# Patient Record
Sex: FEMALE | Race: WHITE | NOT HISPANIC OR LATINO | Employment: OTHER | URBAN - METROPOLITAN AREA
[De-identification: names, ages, dates, MRNs, and addresses within clinical notes are randomized per-mention and may not be internally consistent; named-entity substitution may affect disease eponyms.]

---

## 2017-07-12 ENCOUNTER — APPOINTMENT (OUTPATIENT)
Dept: RADIOLOGY | Facility: MEDICAL CENTER | Age: 77
DRG: 287 | End: 2017-07-12
Attending: EMERGENCY MEDICINE
Payer: MEDICARE

## 2017-07-12 ENCOUNTER — HOSPITAL ENCOUNTER (EMERGENCY)
Facility: MEDICAL CENTER | Age: 77
DRG: 287 | End: 2017-07-12
Attending: EMERGENCY MEDICINE
Payer: MEDICARE

## 2017-07-12 VITALS
OXYGEN SATURATION: 95 % | RESPIRATION RATE: 28 BRPM | WEIGHT: 206 LBS | HEIGHT: 63 IN | SYSTOLIC BLOOD PRESSURE: 147 MMHG | HEART RATE: 99 BPM | BODY MASS INDEX: 36.5 KG/M2 | DIASTOLIC BLOOD PRESSURE: 83 MMHG

## 2017-07-12 DIAGNOSIS — I48.91 ATRIAL FIBRILLATION WITH RAPID VENTRICULAR RESPONSE (HCC): ICD-10-CM

## 2017-07-12 DIAGNOSIS — E05.90 HYPERTHYROIDISM: ICD-10-CM

## 2017-07-12 LAB
ALBUMIN SERPL BCP-MCNC: 3.6 G/DL (ref 3.2–4.9)
ALBUMIN/GLOB SERPL: 1.2 G/DL
ALP SERPL-CCNC: 100 U/L (ref 30–99)
ALT SERPL-CCNC: 14 U/L (ref 2–50)
ANION GAP SERPL CALC-SCNC: 9 MMOL/L (ref 0–11.9)
APTT PPP: 46 SEC (ref 24.7–36)
AST SERPL-CCNC: 20 U/L (ref 12–45)
BASOPHILS # BLD AUTO: 0.8 % (ref 0–1.8)
BASOPHILS # BLD: 0.05 K/UL (ref 0–0.12)
BILIRUB SERPL-MCNC: 1.5 MG/DL (ref 0.1–1.5)
BNP SERPL-MCNC: 437 PG/ML (ref 0–100)
BUN SERPL-MCNC: 14 MG/DL (ref 8–22)
CALCIUM SERPL-MCNC: 9.5 MG/DL (ref 8.5–10.5)
CHLORIDE SERPL-SCNC: 111 MMOL/L (ref 96–112)
CO2 SERPL-SCNC: 21 MMOL/L (ref 20–33)
CREAT SERPL-MCNC: 0.72 MG/DL (ref 0.5–1.4)
EKG IMPRESSION: NORMAL
EOSINOPHIL # BLD AUTO: 0.17 K/UL (ref 0–0.51)
EOSINOPHIL NFR BLD: 2.6 % (ref 0–6.9)
ERYTHROCYTE [DISTWIDTH] IN BLOOD BY AUTOMATED COUNT: 50.1 FL (ref 35.9–50)
GFR SERPL CREATININE-BSD FRML MDRD: >60 ML/MIN/1.73 M 2
GLOBULIN SER CALC-MCNC: 3.1 G/DL (ref 1.9–3.5)
GLUCOSE SERPL-MCNC: 141 MG/DL (ref 65–99)
HCT VFR BLD AUTO: 41.5 % (ref 37–47)
HGB BLD-MCNC: 12.6 G/DL (ref 12–16)
IMM GRANULOCYTES # BLD AUTO: 0.01 K/UL (ref 0–0.11)
IMM GRANULOCYTES NFR BLD AUTO: 0.2 % (ref 0–0.9)
INR PPP: 2.45 (ref 0.87–1.13)
LIPASE SERPL-CCNC: 14 U/L (ref 11–82)
LYMPHOCYTES # BLD AUTO: 1.16 K/UL (ref 1–4.8)
LYMPHOCYTES NFR BLD: 17.6 % (ref 22–41)
MCH RBC QN AUTO: 23.7 PG (ref 27–33)
MCHC RBC AUTO-ENTMCNC: 30.4 G/DL (ref 33.6–35)
MCV RBC AUTO: 78.2 FL (ref 81.4–97.8)
MONOCYTES # BLD AUTO: 0.49 K/UL (ref 0–0.85)
MONOCYTES NFR BLD AUTO: 7.4 % (ref 0–13.4)
NEUTROPHILS # BLD AUTO: 4.72 K/UL (ref 2–7.15)
NEUTROPHILS NFR BLD: 71.4 % (ref 44–72)
NRBC # BLD AUTO: 0 K/UL
NRBC BLD AUTO-RTO: 0 /100 WBC
PLATELET # BLD AUTO: 283 K/UL (ref 164–446)
PMV BLD AUTO: 11.2 FL (ref 9–12.9)
POTASSIUM SERPL-SCNC: 3.7 MMOL/L (ref 3.6–5.5)
PROT SERPL-MCNC: 6.7 G/DL (ref 6–8.2)
PROTHROMBIN TIME: 27.3 SEC (ref 12–14.6)
RBC # BLD AUTO: 5.31 M/UL (ref 4.2–5.4)
SODIUM SERPL-SCNC: 141 MMOL/L (ref 135–145)
T4 FREE SERPL-MCNC: 2.89 NG/DL (ref 0.53–1.43)
TROPONIN I SERPL-MCNC: <0.01 NG/ML (ref 0–0.04)
TSH SERPL DL<=0.005 MIU/L-ACNC: <0.015 UIU/ML (ref 0.3–3.7)
WBC # BLD AUTO: 6.6 K/UL (ref 4.8–10.8)

## 2017-07-12 RX ORDER — SODIUM CHLORIDE 9 MG/ML
1000 INJECTION, SOLUTION INTRAVENOUS ONCE
Status: COMPLETED | OUTPATIENT
Start: 2017-07-12 | End: 2017-07-12

## 2017-07-12 RX ORDER — DILTIAZEM HYDROCHLORIDE 5 MG/ML
10 INJECTION INTRAVENOUS ONCE
Status: COMPLETED | OUTPATIENT
Start: 2017-07-12 | End: 2017-07-12

## 2017-07-12 RX ADMIN — DILTIAZEM HYDROCHLORIDE 10 MG: 5 INJECTION INTRAVENOUS at 08:32

## 2017-07-12 RX ADMIN — SODIUM CHLORIDE 1000 ML: 9 INJECTION, SOLUTION INTRAVENOUS at 08:32

## 2017-07-12 ASSESSMENT — ENCOUNTER SYMPTOMS
DIARRHEA: 0
ABDOMINAL PAIN: 0
PALPITATIONS: 1
BACK PAIN: 0
FEVER: 0
DIZZINESS: 0
NERVOUS/ANXIOUS: 1
HEADACHES: 0
SHORTNESS OF BREATH: 1

## 2017-07-12 NOTE — DISCHARGE INSTRUCTIONS
Atrial Fibrillation  Atrial fibrillation is a type of irregular heart rhythm (arrhythmia). During atrial fibrillation, the upper chambers of the heart (atria) quiver continuously in a chaotic pattern. This causes an irregular and often rapid heart rate.   Atrial fibrillation is the result of the heart becoming overloaded with disorganized signals that tell it to beat. These signals are normally released one at a time by a part of the right atrium called the sinoatrial node. They then travel from the atria to the lower chambers of the heart (ventricles), causing the atria and ventricles to contract and pump blood as they pass. In atrial fibrillation, parts of the atria outside of the sinoatrial node also release these signals. This results in two problems. First, the atria receive so many signals that they do not have time to fully contract. Second, the ventricles, which can only receive one signal at a time, beat irregularly and out of rhythm with the atria.   There are three types of atrial fibrillation:   · Paroxysmal. Paroxysmal atrial fibrillation starts suddenly and stops on its own within a week.  · Persistent. Persistent atrial fibrillation lasts for more than a week. It may stop on its own or with treatment.  · Permanent. Permanent atrial fibrillation does not go away. Episodes of atrial fibrillation may lead to permanent atrial fibrillation.  Atrial fibrillation can prevent your heart from pumping blood normally. It increases your risk of stroke and can lead to heart failure.   CAUSES   · Heart conditions, including a heart attack, heart failure, coronary artery disease, and heart valve conditions.    · Inflammation of the sac that surrounds the heart (pericarditis).  · Blockage of an artery in the lungs (pulmonary embolism).  · Pneumonia or other infections.  · Chronic lung disease.  · Thyroid problems, especially if the thyroid is overactive (hyperthyroidism).  · Caffeine, excessive alcohol use, and use  of some illegal drugs.    · Use of some medicines, including certain decongestants and diet pills.  · Heart surgery.    · Birth defects.    Sometimes, no cause can be found. When this happens, the atrial fibrillation is called lone atrial fibrillation. The risk of complications from atrial fibrillation increases if you have lone atrial fibrillation and you are age 60 years or older.  RISK FACTORS  · Heart failure.  · Coronary artery disease.  · Diabetes mellitus.    · High blood pressure (hypertension).    · Obesity.    · Other arrhythmias.    · Increased age.  SIGNS AND SYMPTOMS   · A feeling that your heart is beating rapidly or irregularly.    · A feeling of discomfort or pain in your chest.    · Shortness of breath.    · Sudden light-headedness or weakness.    · Getting tired easily when exercising.    · Urinating more often than normal (mainly when atrial fibrillation first begins).    In paroxysmal atrial fibrillation, symptoms may start and suddenly stop.  DIAGNOSIS   Your health care provider may be able to detect atrial fibrillation when taking your pulse. Your health care provider may have you take a test called an ambulatory electrocardiogram (ECG). An ECG records your heartbeat patterns over a 24-hour period. You may also have other tests, such as:  · Transthoracic echocardiogram (TTE). During echocardiography, sound waves are used to evaluate how blood flows through your heart.  · Transesophageal echocardiogram (GOMEZ).  · Stress test. There is more than one type of stress test. If a stress test is needed, ask your health care provider about which type is best for you.  · Chest X-ray exam.  · Blood tests.  · Computed tomography (CT).  TREATMENT   Treatment may include:  · Treating any underlying conditions. For example, if you have an overactive thyroid, treating the condition may correct atrial fibrillation.  · Taking medicine. Medicines may be given to control a rapid heart rate or to prevent blood  clots, heart failure, or a stroke.  · Having a procedure to correct the rhythm of the heart:  · Electrical cardioversion. During electrical cardioversion, a controlled, low-energy shock is delivered to the heart through your skin. If you have chest pain, very low blood pressure, or sudden heart failure, this procedure may need to be done as an emergency.  · Catheter ablation. During this procedure, heart tissues that send the signals that cause atrial fibrillation are destroyed.  · Surgical ablation. During this surgery, thin lines of heart tissue that carry the abnormal signals are destroyed. This procedure can either be an open-heart surgery or a minimally invasive surgery. With the minimally invasive surgery, small cuts are made to access the heart instead of a large opening.  · Pulmonary venous isolation. During this surgery, tissue around the veins that carry blood from the lungs (pulmonary veins) is destroyed. This tissue is thought to carry the abnormal signals.  HOME CARE INSTRUCTIONS   · Take medicines only as directed by your health care provider. Some medicines can make atrial fibrillation worse or recur.  · If blood thinners were prescribed by your health care provider, take them exactly as directed. Too much blood-thinning medicine can cause bleeding. If you take too little, you will not have the needed protection against stroke and other problems.  · Perform blood tests at home if directed by your health care provider. Perform blood tests exactly as directed.  · Quit smoking if you smoke.  · Do not drink alcohol.  · Do not drink caffeinated beverages such as coffee, soda, and some teas. You may drink decaffeinated coffee, soda, or tea.    · Maintain a healthy weight. Do not use diet pills unless your health care provider approves. They may make heart problems worse.    · Follow diet instructions as directed by your health care provider.  · Exercise regularly as directed by your health care  provider.  · Keep all follow-up visits as directed by your health care provider. This is important.  PREVENTION   The following substances can cause atrial fibrillation to recur:   · Caffeinated beverages.  · Alcohol.  · Certain medicines, especially those used for breathing problems.  · Certain herbs and herbal medicines, such as those containing ephedra or ginseng.  · Illegal drugs, such as cocaine and amphetamines.  Sometimes medicines are given to prevent atrial fibrillation from recurring. Proper treatment of any underlying condition is also important in helping prevent recurrence.   SEEK MEDICAL CARE IF:  · You notice a change in the rate, rhythm, or strength of your heartbeat.  · You suddenly begin urinating more frequently.  · You tire more easily when exerting yourself or exercising.  SEEK IMMEDIATE MEDICAL CARE IF:   · You have chest pain, abdominal pain, sweating, or weakness.  · You feel nauseous.  · You have shortness of breath.  · You suddenly have swollen feet and ankles.  · You feel dizzy.  · Your face or limbs feel numb or weak.  · You have a change in your vision or speech.  MAKE SURE YOU:   · Understand these instructions.  · Will watch your condition.  · Will get help right away if you are not doing well or get worse.     This information is not intended to replace advice given to you by your health care provider. Make sure you discuss any questions you have with your health care provider.     Document Released: 12/18/2006 Document Revised: 01/08/2016 Document Reviewed: 04/13/2016  [employer] Interactive Patient Education ©2016 [employer]    Hyperthyroidism  Hyperthyroidism is when the thyroid is too active (overactive). Your thyroid is a large gland that is located in your neck. The thyroid helps to control how your body uses food (metabolism). When your thyroid is overactive, it produces too much of a hormone called thyroxine.   CAUSES  Causes of hyperthyroidism may include:  · Graves disease.  This is when your immune system attacks the thyroid gland. This is the most common cause.  · Inflammation of the thyroid gland.  · Tumor in the thyroid gland or somewhere else.  · Excessive use of thyroid medicines, including:  ¨ Prescription thyroid supplement.  ¨ Herbal supplements that mimic thyroid hormones.  · Solid or fluid-filled lumps within your thyroid gland (thyroid nodules).  · Excessive ingestion of iodine.  RISK FACTORS  · Being female.  · Having a family history of thyroid conditions.  SIGNS AND SYMPTOMS  Signs and symptoms of hyperthyroidism may include:  · Nervousness.  · Inability to tolerate heat.  · Unexplained weight loss.  · Diarrhea.  · Change in the texture of hair or skin.  · Heart skipping beats or making extra beats.  · Rapid heart rate.  · Loss of menstruation.  · Shaky hands.  · Fatigue.  · Restlessness.  · Increased appetite.  · Sleep problems.  · Enlarged thyroid gland or nodules.  DIAGNOSIS   Diagnosis of hyperthyroidism may include:  · Medical history and physical exam.  · Blood tests.  · Ultrasound tests.  TREATMENT  Treatment may include:  · Medicines to control your thyroid.  · Surgery to remove your thyroid.  · Radiation therapy.  HOME CARE INSTRUCTIONS   · Take medicines only as directed by your health care provider.  · Do not use any tobacco products, including cigarettes, chewing tobacco, or electronic cigarettes. If you need help quitting, ask your health care provider.  · Do not exercise or do physical activity until your health care provider approves.  · Keep all follow-up appointments as directed by your health care provider. This is important.  SEEK MEDICAL CARE IF:  · Your symptoms do not get better with treatment.  · You have fever.  · You are taking thyroid replacement medicine and you:  ¨ Have depression.  ¨ Feel mentally and physically slow.  ¨ Have weight gain.  SEEK IMMEDIATE MEDICAL CARE IF:   · You have decreased alertness or a change in your awareness.  · You  have abdominal pain.  · You feel dizzy.  · You have a rapid heartbeat.  · You have an irregular heartbeat.     This information is not intended to replace advice given to you by your health care provider. Make sure you discuss any questions you have with your health care provider.     Document Released: 12/18/2006 Document Revised: 01/08/2016 Document Reviewed: 05/05/2015  [employer] Interactive Patient Education ©2016 [employer]

## 2017-07-12 NOTE — ED AVS SNAPSHOT
Home Care Instructions                                                                                                                Aleshia Crooks   MRN: [id number]    Department:  [facility], Emergency Dept   Date of Visit:  7/12/2017            [facility], Emergency Dept    [address]    [place] [address]    Phone:  [phone]      You were seen by     Aby Fair D.O.      Your Diagnosis Was     Atrial fibrillation with rapid ventricular response (CMS-HCC)     I48.91       These are the medications you received during your hospitalization from 07/12/2017 0718 to 07/12/2017 1027     Date/Time Order Dose Route Action    07/12/2017 0832 NS infusion 1,000 mL 1,000 mL Intravenous New Bag    07/12/2017 0832 diltiazem (CARDIZEM) injection 10 mg 10 mg Intravenous Given      Follow-up Information     1. Follow up with Your Physician.    Specialty:  Emergency Medicine    Why:  immediately upon your return home    Contact information    Varies        Medication Information     Review all of your home medications and newly ordered medications with your primary doctor and/or pharmacist as soon as possible. Follow medication instructions as directed by your doctor and/or pharmacist.     Please keep your complete medication list with you and share with your physician. Update the information when medications are discontinued, doses are changed, or new medications (including over-the-counter products) are added; and carry medication information at all times in the event of emergency situations.               Medication List      Notice     You have not been prescribed any medications.            Procedures and tests performed during your visit     Procedure/Test Number of Times Performed    APTT 1    Btype Natriuretic Peptide 1    CBC with Differential 1    Cardiac Monitoring 1    Complete Metabolic Panel (CMP) 1    DX-CHEST-LIMITED (1 VIEW) 1    EKG (ER) 2    ESTIMATED GFR 1    Free Thyroxine (add to TSH for patients with existing thyroid dysfunction) 1    Lipase 1    Maintain O2 sats greater than 94% 1    Oxygen Therapy per Protocol 1    Prothrombin Time 1    Saline Lock 1    TSH (for screening thyroid dysfunction) 1    Troponin 1        Discharge Instructions       Atrial Fibrillation  Atrial fibrillation is a type of irregular heart rhythm (arrhythmia). During atrial fibrillation, the upper chambers of the heart (atria) quiver continuously in a chaotic pattern. This causes an irregular and often rapid heart rate.   Atrial fibrillation is the result of the heart becoming overloaded with disorganized signals that tell it to beat. These signals are normally released one at a time by a part of the right atrium called the sinoatrial node. They then travel from the atria to the lower chambers of the heart (ventricles), causing the atria and ventricles to contract and pump blood as they pass. In atrial fibrillation, parts of the atria outside of the sinoatrial node also release these signals. This results in two problems. First, the atria receive so many signals that they do not have time to fully contract. Second, the ventricles, which can only receive one signal at a time, beat irregularly and out of rhythm with the atria.   There are three types of atrial fibrillation:   · Paroxysmal. Paroxysmal atrial fibrillation starts suddenly and stops on its own within a week.  · Persistent. Persistent atrial fibrillation lasts for more than a week. It may stop on its own or with treatment.  · Permanent. Permanent atrial fibrillation does not go away. Episodes of atrial fibrillation may lead to permanent atrial fibrillation.  Atrial fibrillation can prevent your heart from pumping blood normally. It increases your risk of stroke and can lead to heart failure.   CAUSES   · Heart conditions, including a heart attack, heart failure, coronary artery disease, and heart valve conditions.    · Inflammation  of the sac that surrounds the heart (pericarditis).  · Blockage of an artery in the lungs (pulmonary embolism).  · Pneumonia or other infections.  · Chronic lung disease.  · Thyroid problems, especially if the thyroid is overactive (hyperthyroidism).  · Caffeine, excessive alcohol use, and use of some illegal drugs.    · Use of some medicines, including certain decongestants and diet pills.  · Heart surgery.    · Birth defects.    Sometimes, no cause can be found. When this happens, the atrial fibrillation is called lone atrial fibrillation. The risk of complications from atrial fibrillation increases if you have lone atrial fibrillation and you are age 60 years or older.  RISK FACTORS  · Heart failure.  · Coronary artery disease.  · Diabetes mellitus.    · High blood pressure (hypertension).    · Obesity.    · Other arrhythmias.    · Increased age.  SIGNS AND SYMPTOMS   · A feeling that your heart is beating rapidly or irregularly.    · A feeling of discomfort or pain in your chest.    · Shortness of breath.    · Sudden light-headedness or weakness.    · Getting tired easily when exercising.    · Urinating more often than normal (mainly when atrial fibrillation first begins).    In paroxysmal atrial fibrillation, symptoms may start and suddenly stop.  DIAGNOSIS   Your health care provider may be able to detect atrial fibrillation when taking your pulse. Your health care provider may have you take a test called an ambulatory electrocardiogram (ECG). An ECG records your heartbeat patterns over a 24-hour period. You may also have other tests, such as:  · Transthoracic echocardiogram (TTE). During echocardiography, sound waves are used to evaluate how blood flows through your heart.  · Transesophageal echocardiogram (GOMEZ).  · Stress test. There is more than one type of stress test. If a stress test is needed, ask your health care provider about which type is best for you.  · Chest X-ray exam.  · Blood  tests.  · Computed tomography (CT).  TREATMENT   Treatment may include:  · Treating any underlying conditions. For example, if you have an overactive thyroid, treating the condition may correct atrial fibrillation.  · Taking medicine. Medicines may be given to control a rapid heart rate or to prevent blood clots, heart failure, or a stroke.  · Having a procedure to correct the rhythm of the heart:  · Electrical cardioversion. During electrical cardioversion, a controlled, low-energy shock is delivered to the heart through your skin. If you have chest pain, very low blood pressure, or sudden heart failure, this procedure may need to be done as an emergency.  · Catheter ablation. During this procedure, heart tissues that send the signals that cause atrial fibrillation are destroyed.  · Surgical ablation. During this surgery, thin lines of heart tissue that carry the abnormal signals are destroyed. This procedure can either be an open-heart surgery or a minimally invasive surgery. With the minimally invasive surgery, small cuts are made to access the heart instead of a large opening.  · Pulmonary venous isolation. During this surgery, tissue around the veins that carry blood from the lungs (pulmonary veins) is destroyed. This tissue is thought to carry the abnormal signals.  HOME CARE INSTRUCTIONS   · Take medicines only as directed by your health care provider. Some medicines can make atrial fibrillation worse or recur.  · If blood thinners were prescribed by your health care provider, take them exactly as directed. Too much blood-thinning medicine can cause bleeding. If you take too little, you will not have the needed protection against stroke and other problems.  · Perform blood tests at home if directed by your health care provider. Perform blood tests exactly as directed.  · Quit smoking if you smoke.  · Do not drink alcohol.  · Do not drink caffeinated beverages such as coffee, soda, and some teas. You may drink  decaffeinated coffee, soda, or tea.    · Maintain a healthy weight. Do not use diet pills unless your health care provider approves. They may make heart problems worse.    · Follow diet instructions as directed by your health care provider.  · Exercise regularly as directed by your health care provider.  · Keep all follow-up visits as directed by your health care provider. This is important.  PREVENTION   The following substances can cause atrial fibrillation to recur:   · Caffeinated beverages.  · Alcohol.  · Certain medicines, especially those used for breathing problems.  · Certain herbs and herbal medicines, such as those containing ephedra or ginseng.  · Illegal drugs, such as cocaine and amphetamines.  Sometimes medicines are given to prevent atrial fibrillation from recurring. Proper treatment of any underlying condition is also important in helping prevent recurrence.   SEEK MEDICAL CARE IF:  · You notice a change in the rate, rhythm, or strength of your heartbeat.  · You suddenly begin urinating more frequently.  · You tire more easily when exerting yourself or exercising.  SEEK IMMEDIATE MEDICAL CARE IF:   · You have chest pain, abdominal pain, sweating, or weakness.  · You feel nauseous.  · You have shortness of breath.  · You suddenly have swollen feet and ankles.  · You feel dizzy.  · Your face or limbs feel numb or weak.  · You have a change in your vision or speech.  MAKE SURE YOU:   · Understand these instructions.  · Will watch your condition.  · Will get help right away if you are not doing well or get worse.     This information is not intended to replace advice given to you by your health care provider. Make sure you discuss any questions you have with your health care provider.     Document Released: 12/18/2006 Document Revised: 01/08/2016 Document Reviewed: 04/13/2016  [employer] Interactive Patient Education ©2016 [employer]    Hyperthyroidism  Hyperthyroidism is when the thyroid is too  active (overactive). Your thyroid is a large gland that is located in your neck. The thyroid helps to control how your body uses food (metabolism). When your thyroid is overactive, it produces too much of a hormone called thyroxine.   CAUSES  Causes of hyperthyroidism may include:  · Graves disease. This is when your immune system attacks the thyroid gland. This is the most common cause.  · Inflammation of the thyroid gland.  · Tumor in the thyroid gland or somewhere else.  · Excessive use of thyroid medicines, including:  ¨ Prescription thyroid supplement.  ¨ Herbal supplements that mimic thyroid hormones.  · Solid or fluid-filled lumps within your thyroid gland (thyroid nodules).  · Excessive ingestion of iodine.  RISK FACTORS  · Being female.  · Having a family history of thyroid conditions.  SIGNS AND SYMPTOMS  Signs and symptoms of hyperthyroidism may include:  · Nervousness.  · Inability to tolerate heat.  · Unexplained weight loss.  · Diarrhea.  · Change in the texture of hair or skin.  · Heart skipping beats or making extra beats.  · Rapid heart rate.  · Loss of menstruation.  · Shaky hands.  · Fatigue.  · Restlessness.  · Increased appetite.  · Sleep problems.  · Enlarged thyroid gland or nodules.  DIAGNOSIS   Diagnosis of hyperthyroidism may include:  · Medical history and physical exam.  · Blood tests.  · Ultrasound tests.  TREATMENT  Treatment may include:  · Medicines to control your thyroid.  · Surgery to remove your thyroid.  · Radiation therapy.  HOME CARE INSTRUCTIONS   · Take medicines only as directed by your health care provider.  · Do not use any tobacco products, including cigarettes, chewing tobacco, or electronic cigarettes. If you need help quitting, ask your health care provider.  · Do not exercise or do physical activity until your health care provider approves.  · Keep all follow-up appointments as directed by your health care provider. This is important.  SEEK MEDICAL CARE IF:  · Your  symptoms do not get better with treatment.  · You have fever.  · You are taking thyroid replacement medicine and you:  ¨ Have depression.  ¨ Feel mentally and physically slow.  ¨ Have weight gain.  SEEK IMMEDIATE MEDICAL CARE IF:   · You have decreased alertness or a change in your awareness.  · You have abdominal pain.  · You feel dizzy.  · You have a rapid heartbeat.  · You have an irregular heartbeat.     This information is not intended to replace advice given to you by your health care provider. Make sure you discuss any questions you have with your health care provider.     Document Released: 12/18/2006 Document Revised: 01/08/2016 Document Reviewed: 05/05/2015  [employer] Interactive Patient Education ©2016 [employer]              Patient Information     Patient Information    Following emergency treatment: all patient requiring follow-up care must return either to a private physician or a clinic if your condition worsens before you are able to obtain further medical attention, please return to the emergency room.     Billing Information    At [facility], we work to make the billing process streamlined for our patients.  Our Representatives are here to answer any questions you may have regarding your hospital bill.  If you have insurance coverage and have supplied your insurance information to us, we will submit a claim to your insurer on your behalf.  Should you have any questions regarding your bill, we can be reached online or by phone as follows:  Online: You are able pay your bills online or live chat with our representatives about any billing questions you may have. We are here to help Monday - Friday from 8:00am to 7:30pm and 9:00am - 12:00pm on Saturdays.  Please visit [email or web address]  for more information.   Phone:  [phone] or [phone]    Please note that your emergency physician, surgeon, pathologist, radiologist, anesthesiologist, and other  specialists are not employed by [facility] and will therefore bill separately for their services.  Please contact them directly for any questions concerning their bills at the numbers below:     Emergency Physician Services:  [phone]  [facility]:  [phone]  [facility]:  [phone]  [facility]:  [phone]    1. Your final bill may vary from the amount quoted upon discharge if all procedures are not complete at that time, or if your doctor has additional procedures of which we are not aware. You will receive an additional bill if you return to the Emergency Department at [facility] for suture removal regardless of the facility of which the sutures were placed.     2. Please arrange for settlement of this account at the emergency registration.    3. All self-pay accounts are due in full at the time of treatment.  If you are unable to meet this obligation then payment is expected within 4-5 days.     4. If you have had radiology studies (CT, X-ray, Ultrasound, MRI), you have received a preliminary result during your emergency department visit. Please contact the radiology department [phone] to receive a copy of your final result. Please discuss the Final result with your primary physician or with the follow up physician provided.     Crisis Hotline:  [place] Crisis Hotline:  0-445-WDRPLJM or 1-415.550.3692  [place] Crisis Hotline:    1-411.858.8909 or 483-180-0733         ED Discharge Follow Up Questions    1. In order to provide you with very good care, we would like to follow up with a phone call in the next few days.  May we have your permission to contact you?     YES /  NO    2. What is the best phone number to call you? (       )_____-__________    3. What is the best time to call you?      Morning  /  Afternoon  /  Evening                   Patient Signature:  ____________________________________________________________    Date:   ____________________________________________________________

## 2017-07-12 NOTE — ED NOTES
"Chief Complaint   Patient presents with   • Palpitations     Pt BIB EMS this AM with above CC. Pt denies CP. + SOB and anxiety. Given 500 ml NS and 1 mg versed pta.   /83 mmHg  Pulse 123  Resp 20  Ht 1.6 m (5' 3\")  Wt 93.441 kg (206 lb)  BMI 36.50 kg/m2  SpO2 100%  In gown, EKG completed. Chart up for ERP.   "

## 2017-07-12 NOTE — ED AVS SNAPSHOT
7/12/2017    Aleshia Crooks  [address]  [place] [address]    Dear Aleshia:    [facility] wants to ensure your discharge home is safe and you or your loved ones have had all of your questions answered regarding your care after you leave the hospital.    Below is a list of resources and contact information should you have any questions regarding your hospital stay, follow-up instructions, or active medical symptoms.    Questions or Concerns Regarding… Contact   Medical Questions Related to Your Discharge  (7 days a week, 8am-5pm) Contact a Nurse Care Coordinator   [phone]   Medical Questions Not Related to Your Discharge  (24 hours a day / 7 days a week)  Contact the Nurse Health Line   [phone]    Medications or Discharge Instructions Refer to your discharge packet   or contact your [facility] Primary Care Provider   [phone]   Follow-up Appointment(s) Schedule your appointment via Apixio   or contact Scheduling [phone]   Billing Review your statement via Apixio  or contact Billing [phone]   Medical Records Review your records via Apixio   or contact Medical Records [phone]     You may receive a telephone call within two days of discharge. This call is to make certain you understand your discharge instructions and have the opportunity to have any questions answered. You can also easily access your medical information, test results and upcoming appointments via the Apixio free online health management tool. You can learn more and sign up at [email or web address]. For assistance setting up your Apixio account, please call [phone].    Once again, we want to ensure your discharge home is safe and that you have a clear understanding of any next steps in your care. If you have any questions or concerns, please do not hesitate to contact us, we are here for you. Thank you for choosing [facility] for your healthcare needs.    Sincerely,    Your [facility] Healthcare Team

## 2017-07-12 NOTE — ED AVS SNAPSHOT
BoxTone Access Code: [email or web address]  Expires: 8/11/2017 10:27 AM    Your email address is not on file at [facility].  Email Addresses are required for you to sign up for BoxTone, please contact [phone] to verify your personal information and to provide your email address prior to attempting to register for BoxTone.    Aleshia Crooks  [address] [place] [address]    BoxTone  A secure, online tool to manage your health information     [facility]’s BoxTone® is a secure, online tool that connects you to your personalized health information from the privacy of your home -- day or night - making it very easy for you to manage your healthcare. Once the activation process is completed, you can even access your medical information using the BoxTone alicia, which is available for free in the Apple Alicia store or Google Play store.     To learn more about BoxTone, visit [email or web address]    There are two levels of access available (as shown below):   My Chart Features  [facility] Primary Care Doctor [facility]  Specialists [facility]  Urgent  Care Non-[facility] Primary Care Doctor   Email your healthcare team securely and privately 24/7 X X X    Manage appointments: schedule your next appointment; view details of past/upcoming appointments X      Request prescription refills. X      View recent personal medical records, including lab and immunizations X X X X   View health record, including health history, allergies, medications X X X X   Read reports about your outpatient visits, procedures, consult and ER notes X X X X   See your discharge summary, which is a recap of your hospital and/or ER visit that includes your diagnosis, lab results, and care plan X X  X     How to register for Or[employer]:  Once your e-mail address has been verified, follow the following steps to sign up for BoxTone.     1. Go to  [email or web address]  2. Click on the Sign Up Now box, which takes you to the New Member Sign Up page. You will need  to provide the following information:  a. Enter your [employer] Access Code exactly as it appears at the top of this page. (You will not need to use this code after you’ve completed the sign-up process. If you do not sign up before the expiration date, you must request a new code.)   b. Enter your date of birth.   c. Enter your home email address.   d. Click Submit, and follow the next screen’s instructions.  3. Create a [employer] ID. This will be your [employer] login ID and cannot be changed, so think of one that is secure and easy to remember.  4. Create a [employer] password. You can change your password at any time.  5. Enter your Password Reset Question and Answer. This can be used at a later time if you forget your password.   6. Enter your e-mail address. This allows you to receive e-mail notifications when new information is available in Launch[employer] Toys.  7. Click Sign Up. You can now view your health information.    For assistance activating your [employer] account, call [phone]

## 2017-07-12 NOTE — ED PROVIDER NOTES
ED Provider Note    Scribed for Aby Fair D.O. by Ty Dickerson. 7/12/2017, 7:53 AM.    Primary care provider: Dr. Bria Marques   Means of arrival: EMS  History obtained from: patient  History limited by: none    CHIEF COMPLAINT  Chief Complaint   Patient presents with   • Palpitations       HPI  Aleshia Crooks is a 77 y.o. female with a history of atrial fibrillation who presents to the Emergency Department complaining of heart palpitations starting this morning. She reports associated shortness of breath. Patient reports a history of atrial fibrillation for 15 years. Patient states that she has had similar episodes of pain starting in June. She was evaluated by her primary care doctor at the time and she was found to have hyperthyroid. She has been referred to endocrinology in [facility] where she lives and has an upcoming appointment. She was also evaluated by her Dr. Marques her cardiologist and prescribed Metoprolol to help deal with his symptoms. She takes 25 mg of metoprolol daily. Despite this added medicine, she does report that she continues to occasionally have similar symptoms. She is visiting here from [facility] where she lives. Patient states that she has had a recent increase in stress secondary to her  having a stroke on Sunday. He is now hospitalized. She denies chest pain, diarrhea, abdominal pain.     Really, patient reports improvement in her overall symptoms with resolution of the shortness of breath that often comes with the rapid A. fib.    REVIEW OF SYSTEMS  Review of Systems   Constitutional: Negative for fever.   Respiratory: Positive for shortness of breath.    Cardiovascular: Positive for palpitations. Negative for chest pain.   Gastrointestinal: Negative for abdominal pain and diarrhea.   Genitourinary: Negative for dysuria.   Musculoskeletal: Negative for back pain.   Skin: Negative for rash.   Neurological: Negative for dizziness and headaches.  "  Psychiatric/Behavioral: The patient is nervous/anxious.    All other systems reviewed and are negative.  C.    PAST MEDICAL HISTORY   Atrial fibrillation    SURGICAL HISTORY  patient denies any surgical history    SOCIAL HISTORY  Social History   Substance Use Topics   • Smoking status: No   • Smokeless tobacco: No   • Alcohol Use: No      History   Drug Use No       FAMILY HISTORY  None noted    CURRENT MEDICATIONS  No current facility-administered medications for this encounter.  No current outpatient prescriptions on file.    ALLERGIES  No Known Allergies    PHYSICAL EXAM  VITAL SIGNS: /83 mmHg  Pulse 123  Resp 20  Ht 1.6 m (5' 3\")  Wt 93.441 kg (206 lb)  BMI 36.50 kg/m2  SpO2 100%  Vitals reviewed.  Constitutional: Patient is oriented to person, place, and time. Appears well-developed and well-nourished. No distress.    Head: Normocephalic and atraumatic.   Ears: Normal external ears bilaterally.   Mouth/Throat: Oropharynx is clear and moist, no exudates.   Eyes: Conjunctivae are normal. Pupils are equal, round, and reactive to light.   Neck: Normal range of motion. Neck supple.  Cardiovascular: Tachycardic rate, Regularly irregular rhythm. Normal peripheral pulses.  Pulmonary/Chest: Effort normal and breath sounds normal. No respiratory distress, no wheezes, rhonchi, or rales. No chest wall tenderness.  Abdominal: Soft. Bowel sounds are normal. There is no tenderness, rebound or guarding, or peritoneal signs. No CVA tenderness.  Musculoskeletal: No edema and no tenderness.   Neurological: No focal deficits.   Skin: Skin is warm and dry. No erythema. No pallor.   Psychiatric: Patient has a normal mood and affect.     LABS  Results for orders placed or performed during the hospital encounter of 07/12/17   Troponin   Result Value Ref Range    Troponin I <0.01 0.00 - 0.04 ng/mL   Btype Natriuretic Peptide   Result Value Ref Range    B Natriuretic Peptide 437 (H) 0 - 100 pg/mL   CBC with Differential "   Result Value Ref Range    WBC 6.6 4.8 - 10.8 K/uL    RBC 5.31 4.20 - 5.40 M/uL    Hemoglobin 12.6 12.0 - 16.0 g/dL    Hematocrit 41.5 37.0 - 47.0 %    MCV 78.2 (L) 81.4 - 97.8 fL    MCH 23.7 (L) 27.0 - 33.0 pg    MCHC 30.4 (L) 33.6 - 35.0 g/dL    RDW 50.1 (H) 35.9 - 50.0 fL    Platelet Count 283 164 - 446 K/uL    MPV 11.2 9.0 - 12.9 fL    Neutrophils-Polys 71.40 44.00 - 72.00 %    Lymphocytes 17.60 (L) 22.00 - 41.00 %    Monocytes 7.40 0.00 - 13.40 %    Eosinophils 2.60 0.00 - 6.90 %    Basophils 0.80 0.00 - 1.80 %    Immature Granulocytes 0.20 0.00 - 0.90 %    Nucleated RBC 0.00 /100 WBC    Neutrophils (Absolute) 4.72 2.00 - 7.15 K/uL    Lymphs (Absolute) 1.16 1.00 - 4.80 K/uL    Monos (Absolute) 0.49 0.00 - 0.85 K/uL    Eos (Absolute) 0.17 0.00 - 0.51 K/uL    Baso (Absolute) 0.05 0.00 - 0.12 K/uL    Immature Granulocytes (abs) 0.01 0.00 - 0.11 K/uL    NRBC (Absolute) 0.00 K/uL   Complete Metabolic Panel (CMP)   Result Value Ref Range    Sodium 141 135 - 145 mmol/L    Potassium 3.7 3.6 - 5.5 mmol/L    Chloride 111 96 - 112 mmol/L    Co2 21 20 - 33 mmol/L    Anion Gap 9.0 0.0 - 11.9    Glucose 141 (H) 65 - 99 mg/dL    Bun 14 8 - 22 mg/dL    Creatinine 0.72 0.50 - 1.40 mg/dL    Calcium 9.5 8.5 - 10.5 mg/dL    AST(SGOT) 20 12 - 45 U/L    ALT(SGPT) 14 2 - 50 U/L    Alkaline Phosphatase 100 (H) 30 - 99 U/L    Total Bilirubin 1.5 0.1 - 1.5 mg/dL    Albumin 3.6 3.2 - 4.9 g/dL    Total Protein 6.7 6.0 - 8.2 g/dL    Globulin 3.1 1.9 - 3.5 g/dL    A-G Ratio 1.2 g/dL   Prothrombin Time   Result Value Ref Range    PT 27.3 (H) 12.0 - 14.6 sec    INR 2.45 (H) 0.87 - 1.13   APTT   Result Value Ref Range    APTT 46.0 (H) 24.7 - 36.0 sec   Lipase   Result Value Ref Range    Lipase 14 11 - 82 U/L   ESTIMATED GFR   Result Value Ref Range    GFR If African American >60 >60 mL/min/1.73 m 2    GFR If Non African American >60 >60 mL/min/1.73 m 2   TSH (for screening thyroid dysfunction)   Result Value Ref Range    TSH <0.015 (L) 0.300  - 3.700 uIU/mL   Free Thyroxine (add to TSH for patients with existing thyroid dysfunction)   Result Value Ref Range    Free T-4 2.89 (H) 0.53 - 1.43 ng/dL   EKG (ER)   Result Value Ref Range    Report       [facility] Emergency Dept.    Test Date:  2017  Pt Name:    GINA REICH                Department: ER  MRN:        [id number]                      Room:        12  Gender:     F                            Technician: [id number]  :        [id number]                   Requested By:ER TRIAGE PROTOCOL  Order #:    [id number]                    Reading MD:    Measurements  Intervals                                Axis  Rate:       130                          P:  AZ:                                      QRS:        37  QRSD:       78                           T:          -6  QT:         324  QTc:        477    Interpretive Statements  ATRIAL FIBRILLATION  VENTRICULAR PREMATURE COMPLEX  BORDERLINE REPOL ABNORMALITY, INF-LAT LEADS  BASELINE WANDER IN LEAD(S) I,II,aVR  No previous ECG available for comparison     All labs reviewed by me.    EKG Interpretation  Interpreted by me    Rhythm: atrial fibrillation with RVR  Rate: 130  Axis: normal  Ectopy: none  Conduction: PVCs  ST Segments: no acute change  T Waves: no acute change  Q Waves: none    Clinical Impression: no acute changes and normal EKG    RADIOLOGY  DX-CHEST-LIMITED (1 VIEW)   Final Result      Mild vascular congestion and interstitial edema.      The radiologist's interpretation of all radiological studies have been reviewed by me.    COURSE & MEDICAL DECISION MAKING  Pertinent Labs & Imaging studies reviewed. (See chart for details)    Obtained and reviewed past medical records which indicated the patient has a history of atrial fibrillation.    7:53 AM - Patient seen and examined at bedside. Based on her medical history, she will be evaluated for acute changes to her chronic conditions. Patient will be treated with NS 1000 ml for  fluid resuscitation, Cardizem 10 mg. Ordered DX chest, TSH, Free thyroxine, Estimated GFR, Troponin, BNP, CBC with differential, CMP, PT/INR, APTT, Lipase to evaluate her symptoms. The differential diagnoses include but are not limited to: thyroid dysorder, paroxismal atrial fibrillation, stress reaction, dehydration     9:50 AM - Patient rechecked at bedside. He is resting comfortably. Her heart rates in the 80s. She still in atrial fibrillation on the monitor but the rate is controlled. Back to her normal state of health. Discussed results that confirm she has a hyperactive thyroid. Patient is leaving to return home;ater this week. Discussed increasing her dose of Metoprolol to 25 mg twice a day instead of once per day. However, at this time, I feel she is safe for discharge to home. I instructed her to inform her primary of this recommendation and follow up as scheduled with the endocrinologist. She verbalizes understanding and agrees to discharge.     The patient will return for new or worsening symptoms and is stable at the time of discharge.    The patient is referred to a primary physician for blood pressure management, diabetic screening, and for all other preventative health concerns.    DISPOSITION:  Patient will be discharged home in stable condition.    FOLLOW UP:  Your Physician  Varies      immediately upon your return home      OUTPATIENT MEDICATIONS:  There are no discharge medications for this patient.          FINAL IMPRESSION  1. Atrial fibrillation with rapid ventricular response (CMS-HCC)    2. Hyperthyroidism          Ty MARTIN (Savannahibannmarie), am scribing for, and in the presence of, Aby Fair D.O..    Electronically signed by: Ty Dickerson (Savannahibannmarie), 7/12/2017    Aby MARTIN D.O. personally performed the services described in this documentation, as scribed by Ty Dickerson in my presence, and it is both accurate and complete.    The note accurately reflects work and  decisions made by me.  Aby Fair  7/12/2017  11:48 AM

## 2017-07-13 ENCOUNTER — APPOINTMENT (OUTPATIENT)
Dept: RADIOLOGY | Facility: MEDICAL CENTER | Age: 77
DRG: 287 | End: 2017-07-13
Attending: INTERNAL MEDICINE
Payer: MEDICARE

## 2017-07-13 ENCOUNTER — HOSPITAL ENCOUNTER (INPATIENT)
Facility: MEDICAL CENTER | Age: 77
LOS: 5 days | DRG: 287 | End: 2017-07-19
Attending: EMERGENCY MEDICINE | Admitting: INTERNAL MEDICINE
Payer: MEDICARE

## 2017-07-13 ENCOUNTER — APPOINTMENT (OUTPATIENT)
Dept: RADIOLOGY | Facility: MEDICAL CENTER | Age: 77
DRG: 287 | End: 2017-07-13
Attending: EMERGENCY MEDICINE
Payer: MEDICARE

## 2017-07-13 ENCOUNTER — RESOLUTE PROFESSIONAL BILLING HOSPITAL PROF FEE (OUTPATIENT)
Dept: HOSPITALIST | Facility: MEDICAL CENTER | Age: 77
End: 2017-07-13
Payer: MEDICARE

## 2017-07-13 DIAGNOSIS — I48.91 ATRIAL FIBRILLATION WITH RVR (HCC): ICD-10-CM

## 2017-07-13 DIAGNOSIS — R06.09 DOE (DYSPNEA ON EXERTION): ICD-10-CM

## 2017-07-13 PROBLEM — F41.9 ANXIETY: Status: ACTIVE | Noted: 2017-07-13

## 2017-07-13 PROBLEM — E05.90 HYPERTHYROIDISM: Status: ACTIVE | Noted: 2017-07-13

## 2017-07-13 PROBLEM — D50.9 MICROCYTIC ANEMIA: Status: ACTIVE | Noted: 2017-07-13

## 2017-07-13 LAB
ALBUMIN SERPL BCP-MCNC: 3.4 G/DL (ref 3.2–4.9)
ALBUMIN/GLOB SERPL: 1.2 G/DL
ALP SERPL-CCNC: 89 U/L (ref 30–99)
ALT SERPL-CCNC: 11 U/L (ref 2–50)
ANION GAP SERPL CALC-SCNC: 10 MMOL/L (ref 0–11.9)
AST SERPL-CCNC: 16 U/L (ref 12–45)
BASOPHILS # BLD AUTO: 0.5 % (ref 0–1.8)
BASOPHILS # BLD: 0.04 K/UL (ref 0–0.12)
BILIRUB SERPL-MCNC: 1.5 MG/DL (ref 0.1–1.5)
BNP SERPL-MCNC: 499 PG/ML (ref 0–100)
BUN SERPL-MCNC: 13 MG/DL (ref 8–22)
CALCIUM SERPL-MCNC: 9.4 MG/DL (ref 8.5–10.5)
CHLORIDE SERPL-SCNC: 111 MMOL/L (ref 96–112)
CO2 SERPL-SCNC: 20 MMOL/L (ref 20–33)
CREAT SERPL-MCNC: 0.78 MG/DL (ref 0.5–1.4)
EKG IMPRESSION: NORMAL
EOSINOPHIL # BLD AUTO: 0.21 K/UL (ref 0–0.51)
EOSINOPHIL NFR BLD: 2.7 % (ref 0–6.9)
ERYTHROCYTE [DISTWIDTH] IN BLOOD BY AUTOMATED COUNT: 50.5 FL (ref 35.9–50)
GFR SERPL CREATININE-BSD FRML MDRD: >60 ML/MIN/1.73 M 2
GLOBULIN SER CALC-MCNC: 2.9 G/DL (ref 1.9–3.5)
GLUCOSE SERPL-MCNC: 114 MG/DL (ref 65–99)
HCT VFR BLD AUTO: 39.1 % (ref 37–47)
HGB BLD-MCNC: 11.9 G/DL (ref 12–16)
IMM GRANULOCYTES # BLD AUTO: 0.02 K/UL (ref 0–0.11)
IMM GRANULOCYTES NFR BLD AUTO: 0.3 % (ref 0–0.9)
INR PPP: 2.07 (ref 0.87–1.13)
IRON SATN MFR SERPL: 7 % (ref 15–55)
IRON SERPL-MCNC: 27 UG/DL (ref 40–170)
LYMPHOCYTES # BLD AUTO: 1.35 K/UL (ref 1–4.8)
LYMPHOCYTES NFR BLD: 17.3 % (ref 22–41)
MAGNESIUM SERPL-MCNC: 1.6 MG/DL (ref 1.5–2.5)
MCH RBC QN AUTO: 24.1 PG (ref 27–33)
MCHC RBC AUTO-ENTMCNC: 30.4 G/DL (ref 33.6–35)
MCV RBC AUTO: 79.1 FL (ref 81.4–97.8)
MONOCYTES # BLD AUTO: 0.66 K/UL (ref 0–0.85)
MONOCYTES NFR BLD AUTO: 8.5 % (ref 0–13.4)
NEUTROPHILS # BLD AUTO: 5.52 K/UL (ref 2–7.15)
NEUTROPHILS NFR BLD: 70.7 % (ref 44–72)
NRBC # BLD AUTO: 0 K/UL
NRBC BLD AUTO-RTO: 0 /100 WBC
PLATELET # BLD AUTO: 252 K/UL (ref 164–446)
PMV BLD AUTO: 10.9 FL (ref 9–12.9)
POTASSIUM SERPL-SCNC: 3.6 MMOL/L (ref 3.6–5.5)
PROT SERPL-MCNC: 6.3 G/DL (ref 6–8.2)
PROTHROMBIN TIME: 23.9 SEC (ref 12–14.6)
RBC # BLD AUTO: 4.94 M/UL (ref 4.2–5.4)
SODIUM SERPL-SCNC: 141 MMOL/L (ref 135–145)
TIBC SERPL-MCNC: 388 UG/DL (ref 250–450)
TROPONIN I SERPL-MCNC: 0.01 NG/ML (ref 0–0.04)
WBC # BLD AUTO: 7.8 K/UL (ref 4.8–10.8)

## 2017-07-13 PROCEDURE — 83880 ASSAY OF NATRIURETIC PEPTIDE: CPT

## 2017-07-13 PROCEDURE — 700111 HCHG RX REV CODE 636 W/ 250 OVERRIDE (IP): Performed by: EMERGENCY MEDICINE

## 2017-07-13 PROCEDURE — 700105 HCHG RX REV CODE 258: Performed by: INTERNAL MEDICINE

## 2017-07-13 PROCEDURE — 84484 ASSAY OF TROPONIN QUANT: CPT

## 2017-07-13 PROCEDURE — G0378 HOSPITAL OBSERVATION PER HR: HCPCS

## 2017-07-13 PROCEDURE — A9270 NON-COVERED ITEM OR SERVICE: HCPCS | Performed by: INTERNAL MEDICINE

## 2017-07-13 PROCEDURE — 93306 TTE W/DOPPLER COMPLETE: CPT | Mod: 26 | Performed by: INTERNAL MEDICINE

## 2017-07-13 PROCEDURE — 83735 ASSAY OF MAGNESIUM: CPT

## 2017-07-13 PROCEDURE — 85610 PROTHROMBIN TIME: CPT

## 2017-07-13 PROCEDURE — 71010 DX-CHEST-PORTABLE (1 VIEW): CPT

## 2017-07-13 PROCEDURE — 93005 ELECTROCARDIOGRAM TRACING: CPT

## 2017-07-13 PROCEDURE — 85025 COMPLETE CBC W/AUTO DIFF WBC: CPT

## 2017-07-13 PROCEDURE — 700102 HCHG RX REV CODE 250 W/ 637 OVERRIDE(OP): Performed by: PHARMACIST

## 2017-07-13 PROCEDURE — 700102 HCHG RX REV CODE 250 W/ 637 OVERRIDE(OP): Performed by: INTERNAL MEDICINE

## 2017-07-13 PROCEDURE — 93306 TTE W/DOPPLER COMPLETE: CPT

## 2017-07-13 PROCEDURE — 99220 PR INITIAL OBSERVATION CARE,LEVL III: CPT | Performed by: INTERNAL MEDICINE

## 2017-07-13 PROCEDURE — 96374 THER/PROPH/DIAG INJ IV PUSH: CPT

## 2017-07-13 PROCEDURE — A9270 NON-COVERED ITEM OR SERVICE: HCPCS | Performed by: PHARMACIST

## 2017-07-13 PROCEDURE — 99285 EMERGENCY DEPT VISIT HI MDM: CPT

## 2017-07-13 PROCEDURE — 83550 IRON BINDING TEST: CPT

## 2017-07-13 PROCEDURE — 700102 HCHG RX REV CODE 250 W/ 637 OVERRIDE(OP): Performed by: EMERGENCY MEDICINE

## 2017-07-13 PROCEDURE — 80053 COMPREHEN METABOLIC PANEL: CPT

## 2017-07-13 PROCEDURE — A9270 NON-COVERED ITEM OR SERVICE: HCPCS | Performed by: EMERGENCY MEDICINE

## 2017-07-13 PROCEDURE — 302128 INFUSION PUMP: Performed by: INTERNAL MEDICINE

## 2017-07-13 PROCEDURE — 83540 ASSAY OF IRON: CPT

## 2017-07-13 RX ORDER — WARFARIN SODIUM 6 MG/1
6-9 TABLET ORAL DAILY
COMMUNITY
End: 2020-05-15 | Stop reason: SDUPTHER

## 2017-07-13 RX ORDER — ONDANSETRON 4 MG/1
4 TABLET, ORALLY DISINTEGRATING ORAL EVERY 4 HOURS PRN
Status: DISCONTINUED | OUTPATIENT
Start: 2017-07-13 | End: 2017-07-19 | Stop reason: HOSPADM

## 2017-07-13 RX ORDER — LORAZEPAM 1 MG/1
0.5 TABLET ORAL ONCE
Status: COMPLETED | OUTPATIENT
Start: 2017-07-13 | End: 2017-07-13

## 2017-07-13 RX ORDER — WARFARIN SODIUM 6 MG/1
6 TABLET ORAL
Status: DISCONTINUED | OUTPATIENT
Start: 2017-07-13 | End: 2017-07-15

## 2017-07-13 RX ORDER — POLYETHYLENE GLYCOL 3350 17 G/17G
1 POWDER, FOR SOLUTION ORAL
Status: DISCONTINUED | OUTPATIENT
Start: 2017-07-13 | End: 2017-07-19 | Stop reason: HOSPADM

## 2017-07-13 RX ORDER — DILTIAZEM HYDROCHLORIDE 5 MG/ML
10 INJECTION INTRAVENOUS EVERY 4 HOURS PRN
Status: DISCONTINUED | OUTPATIENT
Start: 2017-07-13 | End: 2017-07-19 | Stop reason: HOSPADM

## 2017-07-13 RX ORDER — LOSARTAN POTASSIUM 50 MG/1
50 TABLET ORAL DAILY
Status: ON HOLD | COMMUNITY
End: 2017-07-19

## 2017-07-13 RX ORDER — AMOXICILLIN 250 MG
2 CAPSULE ORAL 2 TIMES DAILY
Status: DISCONTINUED | OUTPATIENT
Start: 2017-07-13 | End: 2017-07-19 | Stop reason: HOSPADM

## 2017-07-13 RX ORDER — ACETAMINOPHEN 325 MG/1
650 TABLET ORAL EVERY 6 HOURS PRN
Status: DISCONTINUED | OUTPATIENT
Start: 2017-07-13 | End: 2017-07-19 | Stop reason: HOSPADM

## 2017-07-13 RX ORDER — DILTIAZEM HYDROCHLORIDE 5 MG/ML
10 INJECTION INTRAVENOUS ONCE
Status: COMPLETED | OUTPATIENT
Start: 2017-07-13 | End: 2017-07-13

## 2017-07-13 RX ORDER — SODIUM CHLORIDE 9 MG/ML
INJECTION, SOLUTION INTRAVENOUS CONTINUOUS
Status: DISCONTINUED | OUTPATIENT
Start: 2017-07-13 | End: 2017-07-14 | Stop reason: SINTOL

## 2017-07-13 RX ORDER — METOPROLOL SUCCINATE 25 MG/1
25 TABLET, EXTENDED RELEASE ORAL 2 TIMES DAILY
Status: ON HOLD | COMMUNITY
End: 2017-07-19

## 2017-07-13 RX ORDER — METHIMAZOLE 10 MG/1
10 TABLET ORAL 3 TIMES DAILY
Status: DISCONTINUED | OUTPATIENT
Start: 2017-07-13 | End: 2017-07-19 | Stop reason: HOSPADM

## 2017-07-13 RX ORDER — ATORVASTATIN CALCIUM 20 MG/1
40 TABLET, FILM COATED ORAL NIGHTLY
Status: ON HOLD | COMMUNITY
End: 2017-07-19

## 2017-07-13 RX ORDER — ONDANSETRON 2 MG/ML
4 INJECTION INTRAMUSCULAR; INTRAVENOUS EVERY 4 HOURS PRN
Status: DISCONTINUED | OUTPATIENT
Start: 2017-07-13 | End: 2017-07-18

## 2017-07-13 RX ORDER — BISACODYL 10 MG
10 SUPPOSITORY, RECTAL RECTAL
Status: DISCONTINUED | OUTPATIENT
Start: 2017-07-13 | End: 2017-07-19 | Stop reason: HOSPADM

## 2017-07-13 RX ORDER — LORAZEPAM 2 MG/ML
0.5 INJECTION INTRAMUSCULAR EVERY 4 HOURS PRN
Status: DISCONTINUED | OUTPATIENT
Start: 2017-07-13 | End: 2017-07-19 | Stop reason: HOSPADM

## 2017-07-13 RX ORDER — METOPROLOL SUCCINATE 25 MG/1
25 TABLET, EXTENDED RELEASE ORAL DAILY
Status: ON HOLD | COMMUNITY
End: 2017-07-13

## 2017-07-13 RX ORDER — FERROUS GLUCONATE 324(38)MG
324 TABLET ORAL
COMMUNITY
End: 2020-03-12

## 2017-07-13 RX ORDER — OMEPRAZOLE 20 MG/1
20 CAPSULE, DELAYED RELEASE ORAL DAILY
COMMUNITY
End: 2020-06-16 | Stop reason: SDUPTHER

## 2017-07-13 RX ADMIN — METHIMAZOLE 10 MG: 10 TABLET ORAL at 14:49

## 2017-07-13 RX ADMIN — ACETAMINOPHEN 650 MG: 325 TABLET, FILM COATED ORAL at 13:38

## 2017-07-13 RX ADMIN — SODIUM CHLORIDE: 9 INJECTION, SOLUTION INTRAVENOUS at 05:45

## 2017-07-13 RX ADMIN — LORAZEPAM 0.5 MG: 1 TABLET ORAL at 05:07

## 2017-07-13 RX ADMIN — METOPROLOL TARTRATE 25 MG: 25 TABLET, FILM COATED ORAL at 20:28

## 2017-07-13 RX ADMIN — WARFARIN SODIUM 6 MG: 6 TABLET ORAL at 18:28

## 2017-07-13 RX ADMIN — METHIMAZOLE 10 MG: 10 TABLET ORAL at 10:24

## 2017-07-13 RX ADMIN — METHIMAZOLE 10 MG: 10 TABLET ORAL at 20:28

## 2017-07-13 RX ADMIN — DILTIAZEM HYDROCHLORIDE 10 MG: 5 INJECTION INTRAVENOUS at 03:11

## 2017-07-13 RX ADMIN — METOPROLOL TARTRATE 25 MG: 25 TABLET, FILM COATED ORAL at 05:45

## 2017-07-13 ASSESSMENT — PATIENT HEALTH QUESTIONNAIRE - PHQ9
SUM OF ALL RESPONSES TO PHQ QUESTIONS 1-9: 0
2. FEELING DOWN, DEPRESSED, IRRITABLE, OR HOPELESS: NOT AT ALL
SUM OF ALL RESPONSES TO PHQ9 QUESTIONS 1 AND 2: 0
1. LITTLE INTEREST OR PLEASURE IN DOING THINGS: NOT AT ALL

## 2017-07-13 ASSESSMENT — CHA2DS2 SCORE
CHA2DS2 VASC SCORE: 4
DIABETES: NO
CHF OR LEFT VENTRICULAR DYSFUNCTION: NO
AGE 65 TO 74: NO
VASCULAR DISEASE: NO
HYPERTENSION: YES
SEX: FEMALE
PRIOR STROKE OR TIA OR THROMBOEMBOLISM: NO
AGE 75 OR GREATER: YES

## 2017-07-13 ASSESSMENT — LIFESTYLE VARIABLES
EVER_SMOKED: YES
EVER_SMOKED: YES
ALCOHOL_USE: NO
DO YOU DRINK ALCOHOL: NO
DO YOU DRINK ALCOHOL: NO

## 2017-07-13 ASSESSMENT — PAIN SCALES - GENERAL
PAINLEVEL_OUTOF10: 0
PAINLEVEL_OUTOF10: 0

## 2017-07-13 NOTE — H&P
Hospital Medicine History and Physical    Date of Service  7/13/2017    Chief Complaint  Chief Complaint   Patient presents with   • Shortness of Breath     pt D/C at 10:00 on 7/12/17 for SOB, tx of Afib with RVR. pt was home increasing SOB, call ed [facility]. pt HTN.        History of Presenting Illness  77 y.o. female who presented 7/13/2017 with dyspnea with exertion and palpitations. Pt was just in this morning with same, was given fluids and cardizem with resolution of her rvr and she felt better so she went home. Symptoms came back so she returned, noted afib rvr again. Pt also c/o dyspnea with exertion which started in June but has been worse since Saturday. First time she noted it was when her daughter was at [facility] as a pt, this time her  is in the hospital with a stroke. Pt also with known hyperthyroidism.    Primary Care Physician  Pcp Not In Computer    Code Status  Full code    Review of Systems  ROS  Complete ROS obtained with positives noted in HPI, all others negative.     Past Medical History  Past Medical History   Diagnosis Date   • Hypertension        Surgical History  History reviewed. No pertinent past surgical history.    Medications    Current facility-administered medications:   •  senna-docusate (PERICOLACE or SENOKOT S) 8.6-50 MG per tablet 2 Tab, 2 Tab, Oral, BID **AND** polyethylene glycol/lytes (MIRALAX) PACKET 1 Packet, 1 Packet, Oral, QDAY PRN **AND** magnesium hydroxide (MILK OF MAGNESIA) suspension 30 mL, 30 mL, Oral, QDAY PRN **AND** bisacodyl (DULCOLAX) suppository 10 mg, 10 mg, Rectal, QDAY PRN, NISREEN Salgado.O.  •  NS infusion, , Intravenous, Continuous, MEERA SalgadoO.  •  acetaminophen (TYLENOL) tablet 650 mg, 650 mg, Oral, Q6HRS PRN, MEERA SalgadoO.  •  metoprolol (LOPRESSOR) tablet 25 mg, 25 mg, Oral, TWICE DAILY, MEERA SalgadoO.  •  diltiazem (CARDIZEM) injection 10 mg, 10 mg, Intravenous, Q4HRS PRN, NISREEN Salgado.O.  •  MD ALERT... warfarin  (COUMADIN) per pharmacy protocol 1 Each, 1 Each, Other, pharmacy to dose, Saad Mcgee D.O.  •  ondansetron (ZOFRAN) syringe/vial injection 4 mg, 4 mg, Intravenous, Q4HRS PRN, Saad Mcgee D.O.  •  ondansetron (ZOFRAN ODT) dispertab 4 mg, 4 mg, Oral, Q4HRS PRN, Saad Mcgee D.O.  •  methimazole (TAPAZOLE) tablet 10 mg, 10 mg, Oral, TID, Saad Mcgee D.O.  •  lorazepam (ATIVAN) injection 0.5 mg, 0.5 mg, Intravenous, Q4HRS PRN, Saad Mcgee D.O.  No current outpatient prescriptions on file.    Family History  History reviewed. No pertinent family history.    Social History  Social History   Substance Use Topics   • Smoking status: Former Smoker   • Smokeless tobacco: None   • Alcohol Use: No       Allergies  No Known Allergies     Physical Exam  Laboratory   Hemodynamics  Temp (24hrs), Av.9 °C (98.5 °F), Min:36.9 °C (98.5 °F), Max:36.9 °C (98.5 °F)   Temperature: 36.9 °C (98.5 °F)  Pulse  Av  Min: 85  Max: 123 Heart Rate (Monitored): 90  Blood Pressure : (!) 161/89 mmHg, NIBP: 146/95 mmHg      Respiratory      Respiration: 18, Pulse Oximetry: 92 %             Physical Exam  General: NAD, A&O x 4  HEENT: NC/AT, moist mucus membranes  Neck: no JVD/bruit/thyromegaly, No cervical or supraclavicular adenopathy noted  Heart: irregularly irregular, some tachycardia, no murmurs, rubs or gallops  Lungs: CTAB, no wheeze, rhonchi, crackles  Abd: BSx4, non distended/tender, no hepatosplenomegaly  Ext: no clubbing, cyanosis or edema  Skin: no rash or erythema  Neuro: CN II-XII intact, EOMI  Recent Labs      17   0738  17   0301   WBC  6.6  7.8   RBC  5.31  4.94   HEMOGLOBIN  12.6  11.9*   HEMATOCRIT  41.5  39.1   MCV  78.2*  79.1*   MCH  23.7*  24.1*   MCHC  30.4*  30.4*   RDW  50.1*  50.5*   PLATELETCT  283  252   MPV  11.2  10.9     Recent Labs      17   0738  17   0301   SODIUM  141  141   POTASSIUM  3.7  3.6   CHLORIDE  111  111   CO2  21  20   GLUCOSE  141*  114*   BUN  14   13   CREATININE  0.72  0.78   CALCIUM  9.5  9.4     Recent Labs      07/12/17   0738  07/13/17   0301   ALTSGPT  14  11   ASTSGOT  20  16   ALKPHOSPHAT  100*  89   TBILIRUBIN  1.5  1.5   LIPASE  14   --    GLUCOSE  141*  114*     Recent Labs      07/12/17   0738  07/13/17   0301   APTT  46.0*   --    INR  2.45*  2.07*     Recent Labs      07/12/17 0738  07/13/17   0301   BNPBTYPENAT  437*  499*           Imaging  CXR - no acute cardiopulmonary abnormalities    Assessment/Plan     I anticipate this patient is appropriate for observation status at this time.    Atrial fibrillation with RVR (CMS-HCC)  Assessment & Plan  - just had her metoprolol increased by Dr Keith, will continue 25 mg bid  - keeps going back into rvr d/t hyperthyroidism  - will give prn diltiazem    Hyperthyroidism  Assessment & Plan  - pt has had US as outpt and bx, no cancer  - now waiting for endocrine appt which isn't until September  - will add methimazole to metoprolol, metoprolol isn't first line BB for hyperthyroidism but she is already on it and generally has good control with it, likely just needs addition of methimazole    Exertional dyspnea  Assessment & Plan  - will get echo    Anxiety  Assessment & Plan  - symptomatic care  - per her story concerning to also be contributing to her rvr    Microcytic anemia  Assessment & Plan  - no sign of bleeding  - will get iron panel  - if iron deficiency anemia would benefit from outpt GI f/u      VTE prophylaxis: coumadin.

## 2017-07-13 NOTE — IP AVS SNAPSHOT
7/19/2017    Aleshia Crooks  [address]  [place] [address]    Dear Aleshia:    [facility] wants to ensure your discharge home is safe and you or your loved ones have had all of your questions answered regarding your care after you leave the hospital.    Below is a list of resources and contact information should you have any questions regarding your hospital stay, follow-up instructions, or active medical symptoms.    Questions or Concerns Regarding… Contact   Medical Questions Related to Your Discharge  (7 days a week, 8am-5pm) Contact a Nurse Care Coordinator   [phone]   Medical Questions Not Related to Your Discharge  (24 hours a day / 7 days a week)  Contact the Nurse Health Line   [phone]    Medications or Discharge Instructions Refer to your discharge packet   or contact your [facility] Primary Care Provider   [phone]   Follow-up Appointment(s) Schedule your appointment via Check I'm Here   or contact Scheduling [phone]   Billing Review your statement via Check I'm Here  or contact Billing [phone]   Medical Records Review your records via Check I'm Here   or contact Medical Records [phone]     You may receive a telephone call within two days of discharge. This call is to make certain you understand your discharge instructions and have the opportunity to have any questions answered. You can also easily access your medical information, test results and upcoming appointments via the Check I'm Here free online health management tool. You can learn more and sign up at PriceMe/Check I'm Here. For assistance setting up your Check I'm Here account, please call [phone].    Once again, we want to ensure your discharge home is safe and that you have a clear understanding of any next steps in your care. If you have any questions or concerns, please do not hesitate to contact us, we are here for you. Thank you for choosing [facility] for your healthcare needs.    Sincerely,    Your [facility] Healthcare Team

## 2017-07-13 NOTE — IP AVS SNAPSHOT
Goodzer Access Code: [email or web address]  Expires: 8/11/2017 10:27 AM    Your email address is not on file at [facility].  Email Addresses are required for you to sign up for Goodzer, please contact [phone] to verify your personal information and to provide your email address prior to attempting to register for Goodzer.    Aleshia Crooks  [address] [place] [address]    Goodzer  A secure, online tool to manage your health information     [facility]’s Goodzer® is a secure, online tool that connects you to your personalized health information from the privacy of your home -- day or night - making it very easy for you to manage your healthcare. Once the activation process is completed, you can even access your medical information using the Goodzer alicia, which is available for free in the Apple Alicia store or Google Play store.     To learn more about Goodzer, visit [email or web address]    There are two levels of access available (as shown below):   My Chart Features  [facility] Primary Care Doctor [facility]  Specialists [facility]  Urgent  Care Non-[facility] Primary Care Doctor   Email your healthcare team securely and privately 24/7 X X X    Manage appointments: schedule your next appointment; view details of past/upcoming appointments X      Request prescription refills. X      View recent personal medical records, including lab and immunizations X X X X   View health record, including health history, allergies, medications X X X X   Read reports about your outpatient visits, procedures, consult and ER notes X X X X   See your discharge summary, which is a recap of your hospital and/or ER visit that includes your diagnosis, lab results, and care plan X X  X     How to register for Favorite Wordst:  Once your e-mail address has been verified, follow the following steps to sign up for Goodzer.     1. Go to  [email or web address]  2. Click on the Sign Up Now box, which takes you to the New Member Sign Up page. You will need  to provide the following information:  a. Enter your Fengguo Access Code exactly as it appears at the top of this page. (You will not need to use this code after you’ve completed the sign-up process. If you do not sign up before the expiration date, you must request a new code.)   b. Enter your date of birth.   c. Enter your home email address.   d. Click Submit, and follow the next screen’s instructions.  3. Create a Fengguo ID. This will be your Fengguo login ID and cannot be changed, so think of one that is secure and easy to remember.  4. Create a Fengguo password. You can change your password at any time.  5. Enter your Password Reset Question and Answer. This can be used at a later time if you forget your password.   6. Enter your e-mail address. This allows you to receive e-mail notifications when new information is available in Fengguo.  7. Click Sign Up. You can now view your health information.    For assistance activating your Fengguo account, call [phone]

## 2017-07-13 NOTE — IP AVS SNAPSHOT
" <p align=\"LEFT\"><IMG SRC=\"//[email or web address]\" alt=\"Image\" WIDTH=\"50%\" HEIGHT=\"200\" BORDER=\"\"></p>                   Name:Aleshia Crooks  Medical Record Number:[id number]  CSN: [id number]    YOB: 1940   Age: 77 y.o.  Sex: female  HT:1.6 m (5' 3\") WT: 89.7 kg (197 lb 12 oz)          Admit Date: 7/13/2017     Discharge Date:   Today's Date: 7/19/2017  Attending Doctor:  Steph Goodwin M.D.                  Allergies:  Review of patient's allergies indicates no known allergies.          Follow-up Information     1. Follow up with Shelli HOROWITZ MD. Go on 7/25/2017.    Why:  Please arrive at 8:00 am for your appointment. Thank you.    Contact information    [facility] [address] [place] [address] [phone]        2. Follow up with Saad Tabares . Schedule an appointment as soon as possible for a visit in 2 weeks.    Why:  For a follow up appointment. Thank you.    Contact information    Your Primary Care Provider  AIMEE Trimble          Medication List      Take these Medications        Instructions    aspirin 81 MG EC tablet    Take 1 Tab by mouth every day.   Dose:  81 mg       atorvastatin 40 MG Tabs   What changed:    - medication strength  - when to take this   Commonly known as:  LIPITOR    Take 1 Tab by mouth every bedtime.   Dose:  40 mg       digoxin 125 MCG Tabs   Commonly known as:  LANOXIN    Take 1 Tab by mouth every day at 6 PM.   Dose:  125 mcg       ferrous gluconate 324 (38 FE) MG Tabs   Commonly known as:  FERGON    Take 324 mg by mouth every morning with breakfast. Indications: Iron Deficiency   Dose:  324 mg       losartan 100 MG Tabs   What changed:    - medication strength  - how much to take   Commonly known as:  COZAAR    Take 1 Tab by mouth every day.   Dose:  100 mg       methimazole 10 MG Tabs   Commonly known as:  TAPAZOLE    Take 1 Tab by mouth 3 times a day.   Dose:  10 mg       metoprolol 50 MG Tabs   Commonly known as:  LOPRESSOR    Take 1 Tab by mouth 2 Times a Day.   Dose: "  50 mg       omeprazole 20 MG delayed-release capsule   Commonly known as:  PRILOSEC    Take 20 mg by mouth every day. Indications: Gastroesophageal Reflux Disease   Dose:  20 mg       vitamin D 1000 UNIT Tabs   Commonly known as:  cholecalciferol    Take 1,000 Units by mouth 2 Times a Day.   Dose:  1000 Units       warfarin 6 MG Tabs   Commonly known as:  COUMADIN    Take 6 mg by mouth every day. Indications: Obstructing Blood Clot with Chronic Atrial Fibrillation   Dose:  6 mg

## 2017-07-13 NOTE — PROGRESS NOTES
Med Rec completed per patient at bedside.  Allergies reviewed   Called [facility] - They have not filled Metoprolol for her.  Called [facility] they filled Metoprolol for her on 5-11-17 (sig: metoprolol er 25mg daily). Verified with patient that she take BID. She stated that her Dr told her to start taking two a day.

## 2017-07-13 NOTE — ED NOTES
BIB EMS    Chief Complaint   Patient presents with   • Shortness of Breath     pt D/C at 10:00 on 7/12/17 for SOB, tx of Afib with RVR. pt was home increasing SOB, call ed [facility]. pt HTN.      Pt in gown, on monitor, chart up for ERP

## 2017-07-13 NOTE — ED PROVIDER NOTES
"ED Provider Note    Scribed for Henok Sahu M.D. by Rosanna Thomas. 7/13/2017, 2:35 AM.    Means of arrival: Ambulance  History obtained from: Patient  History limited by: None    CHIEF COMPLAINT  Chief Complaint   Patient presents with   • Shortness of Breath     pt D/C at 10:00 on 7/12/17 for SOB, tx of Afib with RVR. pt was home increasing SOB, call ed [facility]. pt HTN.        HPI  Aleshia Crooks is a 77 y.o. female who presents to the Emergency Department for shortness of breath and accelerated heart rate onset 1900 yesterday. As the night progressed, her symptoms began to worsen but she was seen at 1000 on 7/12/17 (17 hours ago) for shortness of breath and treatment of Afib with RVR. She notes that the shortness of breath initially began in June and has been worsening with any amount of exertion. The patient endorses shortness of breath even while she is lying supine. She states that she has been compliant with her medications recently. The patient denies history of similar episodes.     REVIEW OF SYSTEMS  See HPI,  Negative for history of similar episodes. Denies current chest pain, hemoptysis, leg pain, leg swelling, abdominal pain, back pain, dizziness. Remainder of ROS negative.   C    PAST MEDICAL HISTORY   has a past medical history of Hypertension.  Negative stress test in August    SURGICAL HISTORY  CABG    SOCIAL HISTORY  Social History   Substance Use Topics   • Smoking status: Former Smoker   • Smokeless tobacco: None   • Alcohol Use: No      History   Drug Use No   Lives in [place], [place] normally - not much elevation difference    FAMILY HISTORY  History reviewed. No pertinent family history.    CURRENT MEDICATIONS  Reviewed.  See Encounter Summary.     ALLERGIES  No Known Allergies    PHYSICAL EXAM  VITAL SIGNS: /89 mmHg  Pulse 123  Temp(Src) 36.9 °C (98.5 °F)  Resp 20  Ht 1.6 m (5' 3\")  Wt 93.441 kg (206 lb)  BMI 36.50 kg/m2  Constitutional: Awake, alert in no apparent distress.  HENT: " Normocephalic, Bilateral external ears normal. Nose normal.   Eyes: Conjunctiva normal, non-icteric, EOMI.    Thorax & Lungs: Easy unlabored respirations, Clear to ascultation bilaterally.  Cardiovascular: Irregularly regular, No murmurs, rubs or gallops.  Abdomen:  Soft, nontender, no masses   Skin: Visualized skin is  Dry, No erythema, No rash.   Extremities:   No cyanosis, clubbing or edema.  Neurologic: Alert, Grossly non-focal.   Psychiatric: Normal affect, Normal mood    DIAGNOSTIC STUDIES / PROCEDURES     EKG  Interpreted by me    Rhythm: Atrial fibrillatoin  Rate: 111  Axis: normal  Ectopy: none  Conduction: normal  ST Segments: no acute change  T Waves: no acute change  Q Waves: none  Clinical Impression: Atrial Fibrillation    RADIOLOGY  DX-CHEST-PORTABLE (1 VIEW)   Final Result         1.  No acute cardiopulmonary disease.   2.  Cardiomegaly   3.  Atherosclerosis        The radiologist's interpretation of all radiological studies have been reviewed by me.    COURSE & MEDICAL DECISION MAKING  Pertinent Labs & Imaging studies reviewed. (See chart for details)    Differential diagnoses include but are not limited to: Symptomatic A. fib with RVR, CHF, silent ischemia    2:46 AM - Patient seen and examined at bedside. Patient will be treated with Cardizem 10mg IV. Ordered Chest X-ray, PT/INR, CBC, CMP, Troponin, Magnesium, BNP, EKG to evaluate her symptoms.     5:20 AM- patient reexamined, she feels improved but she is complaining of her restless leg syndrome. She is concerned about going home secondary to the severe dyspnea she gets at random intervals. Case discussed with Dr. Mcgee will evaluate patient for admission.    Decision Making:  This is a 77 y.o. year old female who presents with dyspnea on exertion and intermittent paroxysmal dyspnea as well as orthopnea. This is her 2nd presentation in less than 18 hours. Thyroid studies taken yesterday morning are unremarkable. She did have A. fib with RVR that  easily corrected with 10 mg of Cardizem. She is hemodynamically stable, she does not have any sign of fluid overload, she does have slight cardiomegaly but she does not have any hypoxia, rales or edema.   EKG is not suspicious for acute ischemic changes.    At this time the patient is concerned about going home secondary to the significantly worsening dyspnea on exertion and intermittent chest pain/dyspnea at rest. I do not suspect acute pulmonary embolus, the patient does not have signs of DVT, she is therapeutic on Coumadin.    The patient will be admitted to Dr. Mcgee in stable condition.    FINAL IMPRESSION  1. JONES (dyspnea on exertion)    2. Atrial fibrillation with RVR (CMS-HCC)          Rosanna MARTIN (Scribe), am scribing for, and in the presence of, Henok Sahu M.D..    Electronically signed by: Rosanna Thomas (Scribe), 7/13/2017    Henok MARTIN M.D. personally performed the services described in this documentation, as scribed by Rosanna Thomas in my presence, and it is both accurate and complete.    The note accurately reflects work and decisions made by me.  Henok Sahu  7/13/2017  5:23 AM

## 2017-07-13 NOTE — ASSESSMENT & PLAN NOTE
- no sign of bleeding, iron was low  - got IV replacement per pharmcy  - Need GI f/u as outpatient as outpatient for colonoscopy screening

## 2017-07-13 NOTE — ASSESSMENT & PLAN NOTE
- symptomatic care  - per her story ( and daughter presently recovering from medical illnesses) concerning to also be contributing to her rvr

## 2017-07-13 NOTE — IP AVS SNAPSHOT
" Home Care Instructions                                                                                                                  Name:Aleshia Crooks  Medical Record Number:[id number]  CSN: [id number]    YOB: 1940   Age: 77 y.o.  Sex: female  HT:1.6 m (5' 3\") WT: 89.7 kg (197 lb 12 oz)          Admit Date: 7/13/2017     Discharge Date:   Today's Date: 7/19/2017  Attending Doctor:  Steph Goodwin M.D.                  Allergies:  Review of patient's allergies indicates no known allergies.            Discharge Instructions       Discharge Instructions    Discharged to home by car with relative. Discharged via wheelchair, hospital escort: Yes.  Special equipment needed: Not Applicable    Be sure to schedule a follow-up appointment with your primary care doctor or any specialists as instructed.     Discharge Plan:   Not indicated now.     I understand that a diet low in cholesterol, fat, and sodium is recommended for good health. Unless I have been given specific instructions below for another diet, I accept this instruction as my diet prescription.   Other diet: heart healthy   Heart-Healthy Eating Plan  Heart-healthy meal planning includes:  1. Limiting unhealthy fats.  2. Increasing healthy fats.  3. Making other small dietary changes.  You may need to talk with your doctor or a diet specialist (dietitian) to create an eating plan that is right for you.  WHAT TYPES OF FAT SHOULD I CHOOSE?  1. Choose healthy fats. These include olive oil and canola oil, flaxseeds, walnuts, almonds, and seeds.  2. Eat more omega-3 fats. These include salmon, mackerel, sardines, tuna, flaxseed oil, and ground flaxseeds. Try to eat fish at least twice each week.  3. Limit saturated fats.  1. Saturated fats are often found in animal products, such as meats, butter, and cream.  2. Plant sources of saturated fats include palm oil, palm kernel oil, and coconut oil.  4. Avoid foods with partially hydrogenated oils in them. These " "include stick margarine, some tub margarines, cookies, crackers, and other baked goods. These contain trans fats.  WHAT GENERAL GUIDELINES DO I NEED TO FOLLOW?  1. Check food labels carefully. Identify foods with trans fats or high amounts of saturated fat.  2. Fill one half of your plate with vegetables and green salads. Eat 4-5 servings of vegetables per day. A serving of vegetables is:  1. 1 cup of raw leafy vegetables.  2. ½ cup of raw or cooked cut-up vegetables.  3. ½ cup of vegetable juice.  3. Fill one fourth of your plate with whole grains. Look for the word \"whole\" as the first word in the ingredient list.  4. Fill one fourth of your plate with lean protein foods.  5. Eat 4-5 servings of fruit per day. A serving of fruit is:  1. One medium whole fruit.  2. ¼ cup of dried fruit.  3. ½ cup of fresh, frozen, or canned fruit.  4. ½ cup of 100% fruit juice.  6. Eat more foods that contain soluble fiber. These include apples, broccoli, carrots, beans, peas, and barley. Try to get 20-30 g of fiber per day.  7. Eat more home-cooked food. Eat less restaurant, buffet, and fast food.  8. Limit or avoid alcohol.  9. Limit foods high in starch and sugar.  10. Avoid fried foods.  11. Avoid frying your food. Try baking, boiling, grilling, or broiling it instead. You can also reduce fat by:  1. Removing the skin from poultry.  2. Removing all visible fats from meats.  3. Skimming the fat off of stews, soups, and gravies before serving them.  4. Steaming vegetables in water or broth.  12. Lose weight if you are overweight.  13. Eat 4-5 servings of nuts, legumes, and seeds per week:  1. One serving of dried beans or legumes equals ½ cup after being cooked.  2. One serving of nuts equals 1½ ounces.  3. One serving of seeds equals ½ ounce or one tablespoon.  14. You may need to keep track of how much salt or sodium you eat. This is especially true if you have high blood pressure. Talk with your doctor or dietitian to get more " information.  WHAT FOODS CAN I EAT?  Grains  Breads, including English, white, fer, wheat, raisin, rye, oatmeal, and Italian. Tortillas that are neither fried nor made with lard or trans fat. Low-fat rolls, including hotdog and hamburger buns and English muffins. Biscuits. Muffins. Waffles. Pancakes. Light popcorn. Whole-grain cereals. Flatbread. [place] toast. Pretzels. Breadsticks. Rusks. Low-fat snacks. Low-fat crackers, including oyster, saltine, matzo, naheed, animal, and rye. Rice and pasta, including brown rice and pastas that are made with whole wheat.   Vegetables  All vegetables.   Fruits  All fruits, but limit coconut.  Meats and Other Protein Sources  Lean, well-trimmed beef, veal, pork, and lamb. Chicken and turkey without skin. All fish and shellfish. Wild duck, rabbit, pheasant, and venison. Egg whites or low-cholesterol egg substitutes. Dried beans, peas, lentils, and tofu. Seeds and most nuts.  Dairy  Low-fat or nonfat cheeses, including ricotta, string, and mozzarella. Skim or 1% milk that is liquid, powdered, or evaporated. Buttermilk that is made with low-fat milk. Nonfat or low-fat yogurt.  Beverages  Mineral water. Diet carbonated beverages.  Sweets and Desserts  Sherbets and fruit ices. Honey, jam, marmalade, jelly, and syrups. Meringues and gelatins. Pure sugar candy, such as hard candy, jelly beans, gumdrops, mints, marshmallows, and small amounts of dark chocolate. Ld food cake.  Eat all sweets and desserts in moderation.  Fats and Oils  Nonhydrogenated (trans-free) margarines. Vegetable oils, including soybean, sesame, sunflower, olive, peanut, safflower, corn, canola, and cottonseed. Salad dressings or mayonnaise made with a vegetable oil. Limit added fats and oils that you use for cooking, baking, salads, and as spreads.  Other  Cocoa powder. Coffee and tea. All seasonings and condiments.  The items listed above may not be a complete list of recommended foods or beverages. Contact your  dietitian for more options.  WHAT FOODS ARE NOT RECOMMENDED?  Grains  Breads that are made with saturated or trans fats, oils, or whole milk. Croissants. Butter rolls. Cheese breads. Sweet rolls. Donuts. Buttered popcorn. Chow mein noodles. High-fat crackers, such as cheese or butter crackers.  Meats and Other Protein Sources  Fatty meats, such as hotdogs, short ribs, sausage, spareribs, darling, rib eye roast or steak, and mutton. High-fat deli meats, such as salami and bologna. Caviar. Domestic duck and goose. Organ meats, such as kidney, liver, sweetbreads, and heart.  Dairy  Cream, sour cream, cream cheese, and creamed cottage cheese. Whole-milk cheeses, including blue (macie), [place], Brie, Arnie, American, Hav[place]i, Swiss, cheddar, Camembert, and [place]. Whole or 2% milk that is liquid, evaporated, or condensed. Whole buttermilk. Cream sauce or high-fat cheese sauce. Yogurt that is made from whole milk.  Beverages  Regular sodas and juice drinks with added sugar.  Sweets and Desserts  Frosting. Pudding. Cookies. Cakes other than neftaly food cake. Candy that has milk chocolate or white chocolate, hydrogenated fat, butter, coconut, or unknown ingredients. Buttered syrups. Full-fat ice cream or ice cream drinks.  Fats and Oils  Gravy that has suet, meat fat, or shortening. Cocoa butter, hydrogenated oils, palm oil, coconut oil, palm kernel oil. These can often be found in baked products, candy, fried foods, nondairy creamers, and whipped toppings. Solid fats and shortenings, including darling fat, salt pork, lard, and butter. Nondairy cream substitutes, such as coffee creamers and sour cream substitutes. Salad dressings that are made of unknown oils, cheese, or sour cream.  The items listed above may not be a complete list of foods and beverages to avoid. Contact your dietitian for more information.     This information is not intended to replace advice given to you by your health care provider. Make sure you  discuss any questions you have with your health care provider.     Document Released: 06/18/2013 Document Revised: 01/08/2016 Document Reviewed: 06/11/2015  [employer] Interactive Patient Education ©2016 [employer]        Special Instructions: Diagnosis:  Acute Coronary Syndrome (ACS) is a diagnosis that encompasses cardiac-related chest pain and heart attack. ACS occurs when the blood flow to the heart muscle is severely reduced or cut off completely due to a slow process called atherosclerosis.  Atherosclerosis is a disease in which the coronary arteries become narrow from a buildup of fat, cholesterol, and other substances that combine to form plaque. If the plaque breaks, a blood clot will form and block the blood flow to the heart muscle. This lack of blood flow can cause damage or death to the heart muscle which is called a heart attack or Myocardial Infarction (MI). There are two different types of MIs:  ST Elevation Myocardial Infarction or STEMI (the most severe type of heart attack) and Non-ST Elevation Myocardial Infarction or NSTEMI.    Treatment Plan:  · Cardiac Diet  - Low fat, low salt, low cholesterol   · Cardiac Rehab  - Your doctor has ordered you a referral to [facility].  Call [phone] to schedule an appointment.  · Attend my follow-up appointment with my Cardiologist.  · Take my medications as prescribed by my doctor  · Exercise daily  · Reduce stress    Medications:  Certain medications are used to treat ACS.  Remember to always take medications as prescribed and never stop talking medications unless told by your doctor.    You have been prescribed the following medicatons:    Anti-platelet- Aspirin - Aspirin is used as a blood thinning medication and you will require this medication indefinitely.  Beta-Blocker:  metoprolol   Metoprolol tablets  What is this medicine?   METOPROLOL (me TOE proe lole) is a beta-blocker. Beta-blockers reduce the workload on the heart and help it to beat more  regularly. This medicine is used to treat high blood pressure and to prevent chest pain. It is also used to after a heart attack and to prevent an additional heart attack from occurring.  This medicine may be used for other purposes; ask your health care provider or pharmacist if you have questions.  COMMON BRAND NAME(S): Lopressor  What should I tell my health care provider before I take this medicine?  They need to know if you have any of these conditions:  -diabetes  -heart or vessel disease like slow heart rate, worsening heart failure, heart block, sick sinus syndrome or Raynaud's disease  -kidney disease  -liver disease  -lung or breathing disease, like asthma or emphysema  -pheochromocytoma  -thyroid disease  -an unusual or allergic reaction to metoprolol, other beta-blockers, medicines, foods, dyes, or preservatives  -pregnant or trying to get pregnant  -breast-feeding  How should I use this medicine?  Take this medicine by mouth with a drink of water. Follow the directions on the prescription label. Take this medicine immediately after meals. Take your doses at regular intervals. Do not take more medicine than directed. Do not stop taking this medicine suddenly. This could lead to serious heart-related effects.  Talk to your pediatrician regarding the use of this medicine in children. Special care may be needed.  Overdosage: If you think you have taken too much of this medicine contact a poison control center or emergency room at once.  NOTE: This medicine is only for you. Do not share this medicine with others.  What if I miss a dose?  If you miss a dose, take it as soon as you can. If it is almost time for your next dose, take only that dose. Do not take double or extra doses.  What may interact with this medicine?  Do not take this medicine with any of the following medications:  -sotalol  This medicine may also interact with the following  medications:  -clonidine  -digoxin  -dobutamine  -epinephrine  -isoproterenol  -medicine to control heart rhythm like quinidine, propafenone  -medicine for depression like monoamine oxidase (MAO) inhibitors, fluoxetine, and paroxetine  -medicine for high blood pressure like calcium channel blockers  -reserpine  This list may not describe all possible interactions. Give your health care provider a list of all the medicines, herbs, non-prescription drugs, or dietary supplements you use. Also tell them if you smoke, drink alcohol, or use illegal drugs. Some items may interact with your medicine.  What should I watch for while using this medicine?  Visit your doctor or health care professional for regular check ups. Contact your doctor right away if your symptoms worsen. Check your blood pressure and pulse rate regularly. Ask your health care professional what your blood pressure and pulse rate should be, and when you should contact them.  You may get drowsy or dizzy. Do not drive, use machinery, or do anything that needs mental alertness until you know how this medicine affects you. Do not sit or stand up quickly, especially if you are an older patient. This reduces the risk of dizzy or fainting spells. Contact your doctor if these symptoms continue. Alcohol may interfere with the effect of this medicine. Avoid alcoholic drinks.  What side effects may I notice from receiving this medicine?  Side effects that you should report to your doctor or health care professional as soon as possible:  -allergic reactions like skin rash, itching or hives  -cold or numb hands or feet  -depression  -difficulty breathing  -faint  -fever with sore throat  -irregular heartbeat, chest pain  -rapid weight gain  -swollen legs or ankles  Side effects that usually do not require medical attention (report to your doctor or health care professional if they continue or are bothersome):  -anxiety or nervousness  -change in sex drive or  performance  -dry skin  -headache  -nightmares or trouble sleeping  -short term memory loss  -stomach upset or diarrhea  -unusually tired  This list may not describe all possible side effects. Call your doctor for medical advice about side effects. You may report side effects to FDA at 8-130-FDA-8926.  Where should I keep my medicine?  Keep out of the reach of children.  Store at room temperature between 15 and 30 degrees C (59 and 86 degrees F). Throw away any unused medicine after the expiration date.  NOTE: This sheet is a summary. It may not cover all possible information. If you have questions about this medicine, talk to your doctor, pharmacist, or health care provider.  © 2014, [employer]. (2/27/2009 4:11:19 PM)      Statin:  atorvastatin Atorvastatin tablets  What is this medicine?  ATORVASTATIN (a TORE va sta tin) is known as a HMG-CoA reductase inhibitor or 'statin'. It lowers the level of cholesterol and triglycerides in the blood. This drug may also reduce the risk of heart attack, stroke, or other health problems in patients with risk factors for heart disease. Diet and lifestyle changes are often used with this drug.  This medicine may be used for other purposes; ask your health care provider or pharmacist if you have questions.  COMMON BRAND NAME(S): Lipitor  What should I tell my health care provider before I take this medicine?  They need to know if you have any of these conditions:  -frequently drink alcoholic beverages  -history of stroke, TIA  -kidney disease  -liver disease  -muscle aches or weakness  -other medical condition  -an unusual or allergic reaction to atorvastatin, other medicines, foods, dyes, or preservatives  -pregnant or trying to get pregnant  -breast-feeding  How should I use this medicine?  Take this medicine by mouth with a glass of water. Follow the directions on the prescription label. You can take this medicine with or without food. Take your doses at regular  intervals. Do not take your medicine more often than directed.  Talk to your pediatrician regarding the use of this medicine in children. While this drug may be prescribed for children as young as 10 years old for selected conditions, precautions do apply.  Overdosage: If you think you have taken too much of this medicine contact a poison control center or emergency room at once.  NOTE: This medicine is only for you. Do not share this medicine with others.  What if I miss a dose?  If you miss a dose, take it as soon as you can. If it is almost time for your next dose, take only that dose. Do not take double or extra doses.  What may interact with this medicine?  Do not take this medicine with any of the following medications:  -red yeast rice  -telaprevir  -telithromycin  -voriconazole  This medicine may also interact with the following medications:  -alcohol  -antiviral medicines for HIV or AIDS  -boceprevir  -certain antibiotics like clarithromycin, erythromycin, troleandomycin  -certain medicines for cholesterol like fenofibrate or gemfibrozil  -cimetidine  -clarithromycin  -colchicine  -cyclosporine  -digoxin  -female hormones, like estrogens or progestins and birth control pills  -grapefruit juice  -medicines for fungal infections like fluconazole, itraconazole, ketoconazole  -niacin  -rifampin  -spironolactone  This list may not describe all possible interactions. Give your health care provider a list of all the medicines, herbs, non-prescription drugs, or dietary supplements you use. Also tell them if you smoke, drink alcohol, or use illegal drugs. Some items may interact with your medicine.  What should I watch for while using this medicine?  Visit your doctor or health care professional for regular check-ups. You may need regular tests to make sure your liver is working properly.  Tell your doctor or health care professional right away if you get any unexplained muscle pain, tenderness, or weakness,  especially if you also have a fever and tiredness. Your doctor or health care professional may tell you to stop taking this medicine if you develop muscle problems. If your muscle problems do not go away after stopping this medicine, contact your health care professional.  This drug is only part of a total heart-health program. Your doctor or a dietician can suggest a low-cholesterol and low-fat diet to help. Avoid alcohol and smoking, and keep a proper exercise schedule.  Do not use this drug if you are pregnant or breast-feeding. Serious side effects to an unborn child or to an infant are possible. Talk to your doctor or pharmacist for more information.  This medicine may affect blood sugar levels. If you have diabetes, check with your doctor or health care professional before you change your diet or the dose of your diabetic medicine.  If you are going to have surgery tell your health care professional that you are taking this drug.  What side effects may I notice from receiving this medicine?  Side effects that you should report to your doctor or health care professional as soon as possible:  -allergic reactions like skin rash, itching or hives, swelling of the face, lips, or tongue  -dark urine  -fever  -joint pain  -muscle cramps, pain  -redness, blistering, peeling or loosening of the skin, including inside the mouth  -trouble passing urine or change in the amount of urine  -unusually weak or tired  -yellowing of eyes or skin  Side effects that usually do not require medical attention (report to your doctor or health care professional if they continue or are bothersome):  -constipation  -heartburn  -stomach gas, pain, upset  This list may not describe all possible side effects. Call your doctor for medical advice about side effects. You may report side effects to FDA at 2-895-FDA-2033.  Where should I keep my medicine?  Keep out of the reach of children.  Store at room temperature between 20 to 25 degrees C  (68 to 77 degrees F). Throw away any unused medicine after the expiration date.  NOTE: This sheet is a summary. It may not cover all possible information. If you have questions about this medicine, talk to your doctor, pharmacist, or health care provider.  © 2014, [employer]. (11/6/2012 9:18:24 AM)      · Is patient discharged on Warfarin / Coumadin?   Yes    You are receiving the drug warfarin. Please understand the importance of monitoring warfarin with scheduled PT/INR blood draws.  Follow-up with a call to your personal Doctor's office in 3 days to schedule a PT/INR. .    IMPORTANT: HOW TO USE THIS INFORMATION:  This is a summary and does NOT have all possible information about this product. This information does not assure that this product is safe, effective, or appropriate for you. This information is not individual medical advice and does not substitute for the advice of your health care professional. Always ask your health care professional for complete information about this product and your specific health needs.      WARFARIN - ORAL (WARF-uh-rin)      COMMON BRAND NAME(S): Coumadin      WARNING:  Warfarin can cause very serious (possibly fatal) bleeding. This is more likely to occur when you first start taking this medication or if you take too much warfarin. To decrease your risk for bleeding, your doctor or other health care provider will monitor you closely and check your lab results (INR test) to make sure you are not taking too much warfarin. Keep all medical and laboratory appointments. Tell your doctor right away if you notice any signs of serious bleeding. See also Side Effects section.      USES:  This medication is used to treat blood clots (such as in deep vein thrombosis-DVT or pulmonary embolus-PE) and/or to prevent new clots from forming in your body. Preventing harmful blood clots helps to reduce the risk of a stroke or heart attack. Conditions that increase your risk of  "developing blood clots include a certain type of irregular heart rhythm (atrial fibrillation), heart valve replacement, recent heart attack, and certain surgeries (such as hip/knee replacement). Warfarin is commonly called a \"blood thinner,\" but the more correct term is \"anticoagulant.\" It helps to keep blood flowing smoothly in your body by decreasing the amount of certain substances (clotting proteins) in your blood.      HOW TO USE:  Read the Medication Guide provided by your pharmacist before you start taking warfarin and each time you get a refill. If you have any questions, ask your doctor or pharmacist. Take this medication by mouth with or without food as directed by your doctor or other health care professional, usually once a day. It is very important to take it exactly as directed. Do not increase the dose, take it more frequently, or stop using it unless directed by your doctor. Dosage is based on your medical condition, laboratory tests (such as INR), and response to treatment. Your doctor or other health care provider will monitor you closely while you are taking this medication to determine the right dose for you. Use this medication regularly to get the most benefit from it. To help you remember, take it at the same time each day. It is important to eat a balanced, consistent diet while taking warfarin. Some foods can affect how warfarin works in your body and may affect your treatment and dose. Avoid sudden large increases or decreases in your intake of foods high in vitamin K (such as broccoli, cauliflower, cabbage, brussels sprouts, kale, spinach, and other green leafy vegetables, liver, green tea, certain vitamin supplements). If you are trying to lose weight, check with your doctor before you try to go on a diet. Cranberry products may also affect how your warfarin works. Limit the amount of cranberry juice (16 ounces/480 milliliters a day) or other cranberry products you may drink or eat.    "   SIDE EFFECTS:  Nausea, loss of appetite, or stomach/abdominal pain may occur. If any of these effects persist or worsen, tell your doctor or pharmacist promptly. Remember that your doctor has prescribed this medication because he or she has judged that the benefit to you is greater than the risk of side effects. Many people using this medication do not have serious side effects. This medication can cause serious bleeding if it affects your blood clotting proteins too much (shown by unusually high INR lab results). Even if your doctor stops your medication, this risk of bleeding can continue for up to a week. Tell your doctor right away if you have any signs of serious bleeding, including: unusual pain/swelling/discomfort, unusual/easy bruising, prolonged bleeding from cuts or gums, persistent/frequent nosebleeds, unusually heavy/prolonged menstrual flow, pink/dark urine, coughing up blood, vomit that is bloody or looks like coffee grounds, severe headache, dizziness/fainting, unusual or persistent tiredness/weakness, bloody/black/tarry stools, chest pain, shortness of breath, difficulty swallowing. Tell your doctor right away if any of these unlikely but serious side effects occur: persistent nausea/vomiting, severe stomach/abdominal pain, yellowing eyes/skin. This drug rarely has caused very serious (possibly fatal) problems if its effects lead to small blood clots (usually at the beginning of treatment). This can lead to severe skin/tissue damage that may require surgery or amputation if left untreated. Patients with certain blood conditions (protein C or S deficiency) may be at greater risk. Get medical help right away if any of these rare but serious side effects occur: painful/red/purplish patches on the skin (such as on the toe, breast, abdomen), change in the amount of urine, vision changes, confusion, slurred speech, weakness on one side of the body. A very serious allergic reaction to this drug is rare.  However, get medical help right away if you notice any symptoms of a serious allergic reaction, including: rash, itching/swelling (especially of the face/tongue/throat), severe dizziness, trouble breathing. This is not a complete list of possible side effects. If you notice other effects not listed above, contact your doctor or pharmacist. In the [place] - Call your doctor for medical advice about side effects. You may report side effects to FDA at 5-855-LKZ-2061. In [place] - Call your doctor for medical advice about side effects. You may report side effects to [facility] [place] at 1-153.902.6767.      PRECAUTIONS:  Before taking warfarin, tell your doctor or pharmacist if you are allergic to it; or if you have any other allergies. This product may contain inactive ingredients, which can cause allergic reactions or other problems. Talk to your pharmacist for more details. Before using this medication, tell your doctor or pharmacist your medical history, especially of: blood disorders (such as anemia, hemophilia), bleeding problems (such as bleeding of the stomach/intestines, bleeding in the brain), blood vessel disorders (such as aneurysms), recent major injury/surgery, liver disease, alcohol use, mental/mood disorders (including memory problems), frequent falls/injuries. It is important that all your doctors and dentists know that you take warfarin. Before having surgery or any medical/dental procedures, tell your doctor or dentist that you are taking this medication and about all the products you use (including prescription drugs, nonprescription drugs, and herbal products). Avoid getting injections into the muscles. If you must have an injection into a muscle (for example, a flu shot), it should be given in the arm. This way, it will be easier to check for bleeding and/or apply pressure bandages. This medication may cause stomach bleeding. Daily use of alcohol while using this medicine will increase your risk for stomach  bleeding and may also affect how this medication works. Limit or avoid alcoholic beverages. If you have not been eating well, if you have an illness or infection that causes fever, vomiting, or diarrhea for more than 2 days, or if you start using any antibiotic medications, contact your doctor or pharmacist immediately because these conditions can affect how warfarin works. This medication can cause heavy bleeding. To lower the chance of getting cut, bruised, or injured, use great caution with sharp objects like safety razors and nail cutters. Use an electric razor when shaving and a soft toothbrush when brushing your teeth. Avoid activities such as contact sports. If you fall or injure yourself, especially if you hit your head, call your doctor immediately. Your doctor may need to check you. The Food & Drug Administration has stated that generic warfarin products are interchangeable. However, consult your doctor or pharmacist before switching warfarin products. Be careful not to take more than one medication that contains warfarin unless specifically directed by the doctor or health care provider who is monitoring your warfarin treatment. Older adults may be at greater risk for bleeding while using this drug. This medication is not recommended for use during pregnancy because of serious (possibly fatal) harm to an unborn baby. Discuss the use of reliable forms of birth control with your doctor. If you become pregnant or think you may be pregnant, tell your doctor immediately. If you are planning pregnancy, discuss a plan for managing your condition with your doctor before you become pregnant. Your doctor may switch the type of medication you use during pregnancy. Very small amounts of this medication may pass into breast milk but is unlikely to harm a nursing infant. Consult your doctor before breast-feeding.      DRUG INTERACTIONS:  Drug interactions may change how your medications work or increase your risk for  "serious side effects. This document does not contain all possible drug interactions. Keep a list of all the products you use (including prescription/nonprescription drugs and herbal products) and share it with your doctor and pharmacist. Do not start, stop, or change the dosage of any medicines without your doctor's approval. Warfarin interacts with many prescription, nonprescription, vitamin, and herbal products. This includes medications that are applied to the skin or inside the vagina or rectum. The interactions with warfarin usually result in an increase or decrease in the \"blood-thinning\" (anticoagulant) effect. Your doctor or other health care professional should closely monitor you to prevent serious bleeding or clotting problems. While taking warfarin, it is very important to tell your doctor or pharmacist of any changes in medications, vitamins, or herbal products that you are taking. Some products that may interact with this drug include: capecitabine, imatinib, mifepristone. Aspirin, aspirin-like drugs (salicylates), and nonsteroidal anti-inflammatory drugs (NSAIDs such as ibuprofen, naproxen, celecoxib) may have effects similar to warfarin. These drugs may increase the risk of bleeding problems if taken during treatment with warfarin. Carefully check all prescription/nonprescription product labels (including drugs applied to the skin such as pain-relieving creams) since the products may contain NSAIDs or salicylates. Talk to your doctor about using a different medication (such as acetaminophen) to treat pain/fever. Low-dose aspirin and related drugs (such as clopidogrel, ticlopidine) should be continued if prescribed by your doctor for specific medical reasons such as heart attack or stroke prevention. Consult your doctor or pharmacist for more details. Many herbal products interact with warfarin. Tell your doctor before taking any herbal products, especially bromelains, coenzyme Q10, cranberry, " danshen, dong quai, fenugreek, garlic, ginkgo biloba, ginseng, and Evelyn's wort, among others. This medication may interfere with a certain laboratory test to measure theophylline levels, possibly causing false test results. Make sure laboratory personnel and all your doctors know you use this drug.      OVERDOSE:  If overdose is suspected, contact a poison control center or emergency room immediately. [place] residents can call the  National Poison Hotline at 1-504.326.8922. [place] residents can call a provincial poison control center. Symptoms of overdose may include: bloody/black/tarry stools, pink/dark urine, unusual/prolonged bleeding.      NOTES:  Do not share this medication with others. Laboratory and/or medical tests (such as INR, complete blood count) must be performed periodically to monitor your progress or check for side effects. Consult your doctor for more details.      MISSED DOSE:  For the best possible benefit, do not miss any doses. If you do miss a dose and remember on the same day, take it as soon as you remember. If you remember on the next day, skip the missed dose and resume your usual dosing schedule. Do not double the dose to catch up because this could increase your risk for bleeding. Keep a record of missed doses to give to your doctor or pharmacist. Contact your doctor or pharmacist if you miss 2 or more doses in a row.      STORAGE:  Store at room temperature away from light and moisture. Do not store in the bathroom. Keep all medications away from children and pets. Do not flush medications down the toilet or pour them into a drain unless instructed to do so. Properly discard this product when it is  or no longer needed. Consult your pharmacist or local waste disposal company for more details about how to safely discard your product.      MEDICAL ALERT:  Your condition and medication can cause complications in a medical emergency. For information about enrolling in MedicAlert,  call 1-297.148.9551 ([place]) or 1-309.365.4081 ([place]).      Information last revised October 2010 Copyright(c) 2010 [employer]           HF Patient Discharge Instructions  · Monitor your weight daily, and maintain a weight chart, to track your weight changes.   · Activity as tolerated, unless your Doctor has ordered otherwise. Other activity order: walk with rest periods.  · Follow a low fat, low cholesterol, low salt diet unless instructed otherwise by your Doctor. Read the labels on the back of food products and track your intake of fat, cholesterol and salt.   · Fluid Restriction No. If a Fluid Restriction has been ordered by your Doctor, measure fluids with a measuring cup to ensure that you are not exceeding the restriction.   · No smoking.  · Oxygen -no, only Cpap. If your Doctor has ordered that you wear Oxygen at home, it is important to wear it as ordered.  · Did you receive an explanation from staff on the importance of taking each of your medications and why it is necessary to keep taking them unless your doctor says to stop? Yes  · Were all of your questions answered about how to manage your heart failure and what to do if you have increased signs and symptoms after you go home? Yes  · Do you feel like your heart failure care team involved you in the care treatment plan and allowed you to make decisions regarding your care while in the hospital and addressed any discharge needs you might have? Yes    See the educational handout provided at discharge for more information on monitoring your daily weight, activity and diet. This also explains more about Heart Failure, symptoms of a flare-up and some of the tests that you have undergone.     Warning Signs of a Flare-Up include:  · Swelling in the ankles or lower legs.  · Shortness of breath, while at rest, or while doing normal activities.   · Shortness of breath at night when in bed, or coughing in bed.   · Requiring more pillows to sleep at night,  or needing to sit up at night to sleep.  · Feeling weak, dizzy or fatigued.     When to call your Doctor:  · Call [facility] seven days a week from 8:00 a.m. to 8:00 p.m. for medical questions [phone].  · Call your Primary Care Physician or Cardiologist if:   1. You experience any pain radiating to your jaw or neck.  2. You have any difficulty breathing.  3. You experience weight gain of 3 lbs in a day or 5 lbs in a week.   4. You feel any palpitations or irregular heartbeats.  5. You become dizzy or lose consciousness.   If you have had an angiogram or had a pacemaker or AICD placed, and experience:  1. Bleeding, drainage or swelling at the surgical / puncture site.  2. Fever greater than 100.0 F  3. Shock from internal defibrillator.  4. Cool and / or numb extremities.  5.       · Is patient Post Blood Transfusion?  No    Depression / Suicide Risk    As you are discharged from this [facility], it is important to learn how to keep safe from harming yourself.    Recognize the warning signs:  · Abrupt changes in personality, positive or negative- including increase in energy   · Giving away possessions  · Change in eating patterns- significant weight changes-  positive or negative  · Change in sleeping patterns- unable to sleep or sleeping all the time   · Unwillingness or inability to communicate  · Depression  · Unusual sadness, discouragement and loneliness  · Talk of wanting to die  · Neglect of personal appearance   · Rebelliousness- reckless behavior  · Withdrawal from people/activities they love  · Confusion- inability to concentrate     If you or a loved one observes any of these behaviors or has concerns about self-harm, here's what you can do:  · Talk about it- your feelings and reasons for harming yourself  · Remove any means that you might use to hurt yourself (examples: pills, rope, extension cords, firearm)  · Get professional help from the community (Mental Health,  Substance Abuse, psychological counseling)  · Do not be alone:Call your Safe Contact- someone whom you trust who will be there for you.  · Call your local CRISIS HOTLINE 168-7269 or 246-320-3700  · Call your local Children's Mobile Crisis Response Team [place] (410) 548-4763 or www.Gertrude  · Call the toll free National Suicide Prevention Hotlines   · National Suicide Prevention Lifeline 225-530-MXLI (8232)  · National Hope Line Network 800-SUICIDE (898-3444)        Follow-up Information     1. Follow up with Shelli HOROWITZ MD. Go on 7/25/2017.    Why:  Please arrive at 8:00 am for your appointment. Thank you.    Contact information    [address] [place] [address] [phone]        2. Follow up with Saad Tabares . Schedule an appointment as soon as possible for a visit in 2 weeks.    Why:  For a follow up appointment. Thank you.    Contact information    Your Primary Care Provider  Atilio [place]          Discharge Medication Instructions:    Below are the medications your physician expects you to take upon discharge:    Review all your home medications and newly ordered medications with your doctor and/or pharmacist. Follow medication instructions as directed by your doctor and/or pharmacist.    Please keep your medication list with you and share with your physician.               Medication List      START taking these medications        Instructions    Morning Afternoon Evening Bedtime    aspirin 81 MG EC tablet   Last time this was given:  81 mg on 7/19/2017  9:34 AM        Take 1 Tab by mouth every day.   Dose:  81 mg                        digoxin 125 MCG Tabs   Last time this was given:  125 mcg on 7/18/2017  6:01 PM   Commonly known as:  LANOXIN        Take 1 Tab by mouth every day at 6 PM.   Dose:  125 mcg                        methimazole 10 MG Tabs   Last time this was given:  10 mg on 7/19/2017  3:22 PM   Commonly known as:  TAPAZOLE        Take 1 Tab by mouth 3 times a day.    Dose:  10 mg                        metoprolol 50 MG Tabs   Last time this was given:  50 mg on 7/19/2017  9:36 AM   Commonly known as:  LOPRESSOR        Take 1 Tab by mouth 2 Times a Day.   Dose:  50 mg                          CHANGE how you take these medications        Instructions    Morning Afternoon Evening Bedtime    atorvastatin 40 MG Tabs   What changed:    - medication strength  - when to take this   Last time this was given:  40 mg on 7/18/2017 10:47 PM   Commonly known as:  LIPITOR        Take 1 Tab by mouth every bedtime.   Dose:  40 mg                        losartan 100 MG Tabs   What changed:    - medication strength  - how much to take   Last time this was given:  100 mg on 7/19/2017  9:35 AM   Commonly known as:  COZAAR        Take 1 Tab by mouth every day.   Dose:  100 mg                          CONTINUE taking these medications        Instructions    Morning Afternoon Evening Bedtime    ferrous gluconate 324 (38 FE) MG Tabs   Commonly known as:  FERGON        Take 324 mg by mouth every morning with breakfast. Indications: Iron Deficiency   Dose:  324 mg                        omeprazole 20 MG delayed-release capsule   Commonly known as:  PRILOSEC        Take 20 mg by mouth every day. Indications: Gastroesophageal Reflux Disease   Dose:  20 mg                        vitamin D 1000 UNIT Tabs   Commonly known as:  cholecalciferol        Take 1,000 Units by mouth 2 Times a Day.   Dose:  1000 Units                        warfarin 6 MG Tabs   Last time this was given:  5 mg on 7/15/2017  6:01 PM   Commonly known as:  COUMADIN        Take 6 mg by mouth every day. Indications: Obstructing Blood Clot with Chronic Atrial Fibrillation   Dose:  6 mg                          STOP taking these medications     metoprolol SR 25 MG Tb24   Commonly known as:  TOPROL XL                    Where to Get Your Medications      These medications were sent to [facility] #[id number] - [place], [place] - [address]  [place] [address]     Phone:  [phone]    - aspirin 81 MG EC tablet  - atorvastatin 40 MG Tabs  - digoxin 125 MCG Tabs  - losartan 100 MG Tabs  - methimazole 10 MG Tabs  - metoprolol 50 MG Tabs            Instructions           Diet / Nutrition:    Follow any diet instructions given to you by your doctor or the dietician, including how much salt (sodium) you are allowed each day.    If you are overweight, talk to your doctor about a weight reduction plan.    Activity:    Remain physically active following your doctor's instructions about exercise and activity.    Rest often.     Any time you become even a little tired or short of breath, SIT DOWN and rest.    Worsening Symptoms:    Report any of the following signs and symptoms to the doctor's office immediately:    *Pain of jaw, arm, or neck  *Chest pain not relieved by medication                               *Dizziness or loss of consciousness  *Difficulty breathing even when at rest   *More tired than usual                                       *Bleeding drainage or swelling of surgical site  *Swelling of feet, ankles, legs or stomach                 *Fever (>100ºF)  *Pink or blood tinged sputum  *Weight gain (3lbs/day or 5lbs /week)           *Shock from internal defibrillator (if applicable)  *Palpitations or irregular heartbeats                *Cool and/or numb extremities    Stroke Awareness    Common Risk Factors for Stroke include:    Age  Atrial Fibrillation  Carotid Artery Stenosis  Diabetes Mellitus  Excessive alcohol consumption  High blood pressure  Overweight   Physical inactivity  Smoking    Warning signs and symptoms of a stroke include:    *Sudden numbness or weakness of the face, arm or leg (especially on one side of the body).  *Sudden confusion, trouble speaking or understanding.  *Sudden trouble seeing in one or both eyes.  *Sudden trouble walking, dizziness, loss of balance or coordination.Sudden severe headache with no  known cause.    It is very important to get treatment quickly when a stroke occurs. If you experience any of the above warning signs, call 911 immediately.                   Disclaimer         Quit Smoking / Tobacco Use:    I understand the use of any tobacco products increases my chance of suffering from future heart disease or stroke and could cause other illnesses which may shorten my life. Quitting the use of tobacco products is the single most important thing I can do to improve my health. For further information on smoking / tobacco cessation call a Toll Free Quit Line at 1-701.186.2733 (*National Cancer [place]) or 1-405.491.4254 (American Lung Association) or you can access the web based program at www.lungusa.org.    [place] Tobacco Users Help Line:  (853) 187-6255       Toll Free: 1-172.356.4995  Quit Tobacco Program [facility] ([phone]    Crisis Hotline:    [place] Crisis Hotline:  7-004-XGHVGYT or 1-723.837.7293    [place] Crisis Hotline:    1-587.497.1984 or 590-498-6284    Discharge Survey:   Thank you for choosing [facility]. We hope we did everything we could to make your hospital stay a pleasant one. You may be receiving a phone survey and we would appreciate your time and participation in answering the questions. Your input is very valuable to us in our efforts to improve our service to our patients and their families.        My signature on this form indicates that:    1. I have reviewed and understand the above information.  2. My questions regarding this information have been answered to my satisfaction.  3. I have formulated a plan with my discharge nurse to obtain my prescribed medications for home.                  Disclaimer         __________________________________                     __________       ________                       Patient Signature                                                 Date                    Time

## 2017-07-13 NOTE — ASSESSMENT & PLAN NOTE
- pt has had US as outpt and bx, no cancer  - now waiting for endocrine appt which isn't until September  - Added methimazole 10 TID  - Continue metoprolol (isn't first line BB for hyperthyroid but she is already on it and generally has good control with it, likely just needs addition of methimazole

## 2017-07-13 NOTE — PROGRESS NOTES
Inpatient Anticoagulation Service Note    Date: 2017  Reason for Anticoagulation: Atrial Fibrillation   [id number] VASc Score: 4    Hemoglobin Value: 11.9  Hematocrit Value: 39.1  Lab Platelet Value: 252  Target INR: 2.0 to 3.0    INR from last 7 days     Date/Time INR Value    17 0301 (!)2.07        Dose from last 7 days     Date/Time Dose (mg)    17 1600 6        Average Dose (mg):  (Home Dose 6 mg daily per chart review)  Significant Interactions: Other (Comments) (methimazole)  Bridge Therapy: No    Comments:   Home Coumadin is to continue for pt who presented to the ED in afib with RVR.  Pt was seen for same on 17, but was discharged home without admsission due to resolution of sx in the ED. Per med rec interview, pt takes 6 mg daily, last taken 7/12 AM. Drug interaction with methimazole - may reduce INR. INR is therapeutic today. Regular diet has been ordered.     Plan:  Will continue with pt's home regimen of 6 mg daily and trend daily INRs x a few days.      Pharmacist suggested discharge dosin mg daily, with outpatient INR follow up within 1 week of discharge      Alejandra Cook, PharmD

## 2017-07-14 PROBLEM — I48.0 PAROXYSMAL ATRIAL FIBRILLATION (HCC): Status: ACTIVE | Noted: 2017-07-14

## 2017-07-14 PROBLEM — I27.20 MODERATE TO SEVERE PULMONARY HYPERTENSION (HCC): Status: ACTIVE | Noted: 2017-07-14

## 2017-07-14 PROBLEM — I25.10 CORONARY ARTERY DISEASE INVOLVING NATIVE CORONARY ARTERY OF NATIVE HEART WITHOUT ANGINA PECTORIS: Status: ACTIVE | Noted: 2017-07-14

## 2017-07-14 PROBLEM — I50.42 CHRONIC COMBINED SYSTOLIC AND DIASTOLIC CHF, NYHA CLASS 3 (HCC): Status: ACTIVE | Noted: 2017-07-14

## 2017-07-14 PROBLEM — I51.89 DIASTOLIC DYSFUNCTION: Status: ACTIVE | Noted: 2017-07-14

## 2017-07-14 PROBLEM — I50.43 ACUTE ON CHRONIC COMBINED SYSTOLIC AND DIASTOLIC CHF, NYHA CLASS 3 (HCC): Status: ACTIVE | Noted: 2017-07-14

## 2017-07-14 PROBLEM — I34.0 SEVERE MITRAL REGURGITATION: Status: ACTIVE | Noted: 2017-07-14

## 2017-07-14 LAB
ANION GAP SERPL CALC-SCNC: 8 MMOL/L (ref 0–11.9)
BUN SERPL-MCNC: 13 MG/DL (ref 8–22)
CALCIUM SERPL-MCNC: 9.3 MG/DL (ref 8.5–10.5)
CHLORIDE SERPL-SCNC: 111 MMOL/L (ref 96–112)
CO2 SERPL-SCNC: 21 MMOL/L (ref 20–33)
CREAT SERPL-MCNC: 0.8 MG/DL (ref 0.5–1.4)
ERYTHROCYTE [DISTWIDTH] IN BLOOD BY AUTOMATED COUNT: 50.1 FL (ref 35.9–50)
FERRITIN SERPL-MCNC: 53.2 NG/ML (ref 10–291)
GFR SERPL CREATININE-BSD FRML MDRD: >60 ML/MIN/1.73 M 2
GLUCOSE SERPL-MCNC: 113 MG/DL (ref 65–99)
HCT VFR BLD AUTO: 40.6 % (ref 37–47)
HGB BLD-MCNC: 12.2 G/DL (ref 12–16)
INR PPP: 2.16 (ref 0.87–1.13)
LV EJECT FRACT  99904: 30
LV EJECT FRACT MOD 2C 99903: 37.35
LV EJECT FRACT MOD 4C 99902: 44.25
LV EJECT FRACT MOD BP 99901: 41.88
MCH RBC QN AUTO: 23.6 PG (ref 27–33)
MCHC RBC AUTO-ENTMCNC: 30 G/DL (ref 33.6–35)
MCV RBC AUTO: 78.5 FL (ref 81.4–97.8)
PLATELET # BLD AUTO: 242 K/UL (ref 164–446)
PMV BLD AUTO: 11.3 FL (ref 9–12.9)
POTASSIUM SERPL-SCNC: 3.8 MMOL/L (ref 3.6–5.5)
PROTHROMBIN TIME: 24.8 SEC (ref 12–14.6)
RBC # BLD AUTO: 5.17 M/UL (ref 4.2–5.4)
SODIUM SERPL-SCNC: 140 MMOL/L (ref 135–145)
WBC # BLD AUTO: 7.2 K/UL (ref 4.8–10.8)

## 2017-07-14 PROCEDURE — A9270 NON-COVERED ITEM OR SERVICE: HCPCS | Performed by: INTERNAL MEDICINE

## 2017-07-14 PROCEDURE — 700111 HCHG RX REV CODE 636 W/ 250 OVERRIDE (IP): Performed by: INTERNAL MEDICINE

## 2017-07-14 PROCEDURE — 36415 COLL VENOUS BLD VENIPUNCTURE: CPT

## 2017-07-14 PROCEDURE — 770020 HCHG ROOM/CARE - TELE (206)

## 2017-07-14 PROCEDURE — 85027 COMPLETE CBC AUTOMATED: CPT

## 2017-07-14 PROCEDURE — 700102 HCHG RX REV CODE 250 W/ 637 OVERRIDE(OP): Performed by: INTERNAL MEDICINE

## 2017-07-14 PROCEDURE — A9270 NON-COVERED ITEM OR SERVICE: HCPCS | Performed by: PHARMACIST

## 2017-07-14 PROCEDURE — 99233 SBSQ HOSP IP/OBS HIGH 50: CPT | Performed by: INTERNAL MEDICINE

## 2017-07-14 PROCEDURE — 82728 ASSAY OF FERRITIN: CPT

## 2017-07-14 PROCEDURE — 80048 BASIC METABOLIC PNL TOTAL CA: CPT

## 2017-07-14 PROCEDURE — 85610 PROTHROMBIN TIME: CPT

## 2017-07-14 PROCEDURE — 700105 HCHG RX REV CODE 258: Performed by: INTERNAL MEDICINE

## 2017-07-14 PROCEDURE — 700102 HCHG RX REV CODE 250 W/ 637 OVERRIDE(OP): Performed by: PHARMACIST

## 2017-07-14 RX ORDER — FUROSEMIDE 10 MG/ML
20 INJECTION INTRAMUSCULAR; INTRAVENOUS
Status: DISCONTINUED | OUTPATIENT
Start: 2017-07-14 | End: 2017-07-14

## 2017-07-14 RX ORDER — ATORVASTATIN CALCIUM 40 MG/1
40 TABLET, FILM COATED ORAL
Status: DISCONTINUED | OUTPATIENT
Start: 2017-07-14 | End: 2017-07-19 | Stop reason: HOSPADM

## 2017-07-14 RX ORDER — LOSARTAN POTASSIUM 25 MG/1
25 TABLET ORAL ONCE
Status: COMPLETED | OUTPATIENT
Start: 2017-07-14 | End: 2017-07-14

## 2017-07-14 RX ORDER — FUROSEMIDE 10 MG/ML
40 INJECTION INTRAMUSCULAR; INTRAVENOUS
Status: DISCONTINUED | OUTPATIENT
Start: 2017-07-15 | End: 2017-07-19 | Stop reason: HOSPADM

## 2017-07-14 RX ORDER — LOSARTAN POTASSIUM 50 MG/1
50 TABLET ORAL
Status: DISCONTINUED | OUTPATIENT
Start: 2017-07-14 | End: 2017-07-14

## 2017-07-14 RX ORDER — LOSARTAN POTASSIUM 25 MG/1
75 TABLET ORAL
Status: DISCONTINUED | OUTPATIENT
Start: 2017-07-15 | End: 2017-07-16

## 2017-07-14 RX ADMIN — SODIUM CHLORIDE: 9 INJECTION, SOLUTION INTRAVENOUS at 05:25

## 2017-07-14 RX ADMIN — FUROSEMIDE 20 MG: 10 INJECTION, SOLUTION INTRAMUSCULAR; INTRAVENOUS at 17:17

## 2017-07-14 RX ADMIN — METHIMAZOLE 10 MG: 10 TABLET ORAL at 08:05

## 2017-07-14 RX ADMIN — LOSARTAN POTASSIUM 25 MG: 25 TABLET, FILM COATED ORAL at 21:46

## 2017-07-14 RX ADMIN — METHIMAZOLE 10 MG: 10 TABLET ORAL at 14:15

## 2017-07-14 RX ADMIN — ASPIRIN 81 MG: 81 TABLET ORAL at 21:46

## 2017-07-14 RX ADMIN — METOPROLOL TARTRATE 25 MG: 25 TABLET, FILM COATED ORAL at 08:05

## 2017-07-14 RX ADMIN — ACETAMINOPHEN 650 MG: 325 TABLET, FILM COATED ORAL at 22:57

## 2017-07-14 RX ADMIN — METHIMAZOLE 10 MG: 10 TABLET ORAL at 20:02

## 2017-07-14 RX ADMIN — ACETAMINOPHEN 650 MG: 325 TABLET, FILM COATED ORAL at 14:16

## 2017-07-14 RX ADMIN — METOPROLOL TARTRATE 25 MG: 25 TABLET, FILM COATED ORAL at 20:02

## 2017-07-14 RX ADMIN — LOSARTAN POTASSIUM 50 MG: 50 TABLET, FILM COATED ORAL at 09:58

## 2017-07-14 RX ADMIN — ATORVASTATIN CALCIUM 40 MG: 40 TABLET, FILM COATED ORAL at 20:02

## 2017-07-14 RX ADMIN — DILTIAZEM HYDROCHLORIDE 10 MG: 5 INJECTION INTRAVENOUS at 05:25

## 2017-07-14 RX ADMIN — WARFARIN SODIUM 6 MG: 6 TABLET ORAL at 17:17

## 2017-07-14 ASSESSMENT — ENCOUNTER SYMPTOMS
DIZZINESS: 0
NAUSEA: 0
SORE THROAT: 0
DEPRESSION: 0
FOCAL WEAKNESS: 0
ABDOMINAL PAIN: 0
HEADACHES: 0
CHILLS: 0
DIARRHEA: 0
BLOOD IN STOOL: 0
VOMITING: 0
COUGH: 0
BACK PAIN: 0
SHORTNESS OF BREATH: 1
HALLUCINATIONS: 0
FEVER: 0
WEAKNESS: 0
ORTHOPNEA: 1
MYALGIAS: 0
PALPITATIONS: 1
HEARTBURN: 0

## 2017-07-14 ASSESSMENT — PAIN SCALES - GENERAL
PAINLEVEL_OUTOF10: 0
PAINLEVEL_OUTOF10: 7
PAINLEVEL_OUTOF10: 8

## 2017-07-14 ASSESSMENT — LIFESTYLE VARIABLES: DO YOU DRINK ALCOHOL: NO

## 2017-07-14 NOTE — CONSULTS
Cardiology Consult Note    Date & Time note created:    7/14/2017   12:45 PM       Patient ID:   Name:             Aleshia Crooks     YOB: 1940  Age:                 77 y.o.  female   MRN:               [id number]               Referring Physician: Dr. Bergeron                                                  Reason for Consult:      cardiomyopathy    History of Present Illness:    77-year-old female with a past medical history significant for hyperthyroidism, paroxysmal atrial fibrillation, coronary artery disease status post CABG in 1992 who was found to have a new diagnosis of cardiomyopathy and cardiology consulted for further management. In the past 2 days, patient has been seen in the ER twice for dyspnea and A. fib with RVR. On her initial presentation 2 days ago, her metoprolol dose was increased and she was discharged after achieving rate control. After her second presentation, she was admitted for further management. Echocardiogram was ordered which showed cardiomyopathy.    Patient reports being in her usual state of health until about June 2017 at which time her daughter underwent renal transplant. A week ago her  suffered from a stroke. During this extreme stress, patient reports having palpitations almost daily basis usually with exertion associated with dyspnea. Her activity level is limited to about 5-10 feet, by dyspnea on exertion. She denies any chest discomfort. Previously she used to have occasional palpitations about once a week. She also reports that in the past month she has noted orthopnea and PND about once a week. No lower extremity edema. She does not add salt to her meals. She has noted abdominal distention as well.     She was diagnosed with hyperthyroidism around March 2017, however was not on any medications pending endocrinology consultation.  She has been compliant with all her medications. She reports having an annual stress test with her cardiologist, Dr. Marques  in [facility]. Last stress test was in August 2016 which per patient was unremarkable.    She drinks one cup of coffee a day.    Of note, the cardiologist who recommended a CABG to the patient was Dr. Shahid in [facility].    Review of Systems:      A full review of systems was completed and all pertinent positives and negatives are included in the HPI above.    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 No pertinent past surgical history.    Family History:  History reviewed. No pertinent family history.    Social History:  Social History     Social History   • Marital Status:      Spouse Name: N/A   • Number of Children: N/A   • Years of Education: N/A     Occupational History   • Not on file.     Social History Main Topics   • Smoking status: Former Smoker   • Smokeless tobacco: Not on file   • Alcohol Use: No   • Drug Use: No   • Sexual Activity: Not on file     Other Topics Concern   • Not on file     Social History Narrative     Former smoker, quit in 1992. No alcohol or recreational drug use.    Hospital Medications:    Current facility-administered medications:   •  pneumococcal vaccine (PNEUMOVAX-23) injection 25 mcg, 0.5 mL, Intramuscular, Once PRN, Adriana Bergeron D.O.  •  atorvastatin (LIPITOR) tablet 40 mg, 40 mg, Oral, QHS, NISREEN Chambers.O.  •  losartan (COZAAR) tablet 50 mg, 50 mg, Oral, Q DAY, MEERA ChambersO., 50 mg at 07/14/17 0958  •  senna-docusate (PERICOLACE  or SENOKOT S) 8.6-50 MG per tablet 2 Tab, 2 Tab, Oral, BID, 2 Tab at 07/13/17 0900 **AND** polyethylene glycol/lytes (MIRALAX) PACKET 1 Packet, 1 Packet, Oral, QDAY PRN **AND** magnesium hydroxide (MILK OF MAGNESIA) suspension 30 mL, 30 mL, Oral, QDAY PRN **AND** bisacodyl (DULCOLAX) suppository 10 mg, 10 mg, Rectal, QDAY PRN, MEERA SalgadoO.  •  acetaminophen (TYLENOL) tablet 650 mg, 650 mg, Oral, Q6HRS PRN, NISREEN Salgado.O., 650 mg at 07/13/17 1338  •  metoprolol (LOPRESSOR) tablet 25 mg, 25 mg, Oral, TWICE DAILY, MEERA ChambersOMerary, 25 mg at 07/14/17 0805  •  diltiazem (CARDIZEM) injection 10 mg, 10 mg, Intravenous, Q4HRS PRN, MEERA SalgadoO., 10 mg at 07/14/17 0525  •  ondansetron (ZOFRAN) syringe/vial injection 4 mg, 4 mg, Intravenous, Q4HRS PRN, MEERA SalgadoO.  •  ondansetron (ZOFRAN ODT) dispertab 4 mg, 4 mg, Oral, Q4HRS PRN, NISREEN Salgdao.O.  •  methimazole (TAPAZOLE) tablet 10 mg, 10 mg, Oral, TID, MEERA SalgadoO., 10 mg at 07/14/17 0805  •  lorazepam (ATIVAN) injection 0.5 mg, 0.5 mg, Intravenous, Q4HRS PRN, NISREEN Salgado.O.  •  MD ALERT... warfarin (COUMADIN) per pharmacy protocol, , Other, pharmacy to dose, Saad Mcgee D.O.  •  warfarin (COUMADIN) tablet 6 mg, 6 mg, Oral, COUMADIN-DAILY, Quique Tovar, PHARMD, 6 mg at 07/13/17 7001    Current Outpatient Medications:  Prescriptions prior to admission   Medication Sig Dispense Refill Last Dose   • warfarin (COUMADIN) 6 MG Tab Take 6 mg by mouth every day. Indications: Obstructing Blood Clot with Chronic Atrial Fibrillation   7/12/2017 at 0900   • losartan (COZAAR) 50 MG Tab Take 50 mg by mouth every day. Indications: High Blood Pressure   7/12/2017 at 0900   • atorvastatin (LIPITOR) 20 MG Tab Take 40 mg by mouth every evening. Indications: Elevation of Both Cholesterol and Triglycerides in Blood   7/12/2017 at 2200   • omeprazole (PRILOSEC) 20 MG delayed-release capsule Take 20 mg by mouth every day.  "Indications: Gastroesophageal Reflux Disease   7/12/2017 at 0900   • ferrous gluconate (FERGON) 324 (38 FE) MG Tab Take 324 mg by mouth every morning with breakfast. Indications: Iron Deficiency   7/12/2017 at 0900   • vitamin D (CHOLECALCIFEROL) 1000 UNIT Tab Take 1,000 Units by mouth 2 Times a Day.   7/12/2017 at 0900   • metoprolol SR (TOPROL XL) 25 MG TABLET SR 24 HR Take 25 mg by mouth 2 Times a Day.   7/12/2017 at 0900       Medication Allergy:  No Known Allergies    Physical Exam:  Vitals/ General Appearance:   Weight/BMI: Body mass index is 36.99 kg/(m^2).  Blood pressure 157/81, pulse 83, temperature 36.1 °C (97 °F), resp. rate 18, height 1.6 m (5' 3\"), weight 94.7 kg (208 lb 12.4 oz), SpO2 96 %, not currently breastfeeding.  Filed Vitals:    07/14/17 0439 07/14/17 0800 07/14/17 0901 07/14/17 1200   BP: 131/101 154/92 159/103 157/81   Pulse: 103 80 60 83   Temp: 36.7 °C (98 °F) 36.1 °C (97 °F) 36.8 °C (98.2 °F) 36.1 °C (97 °F)   Resp: 19 16 20 18   Height:       Weight:       SpO2: 98% 97% 98% 96%       Constitutional:   Well developed, Well nourished, No acute distress  HEENT:  Normocephalic, Atraumatic  Eyes: Conjunctiva normal  Neck:  Normal range of motion, no JVD.  Cardiovascular:  Normal heart rate, irregular rhythm, grade 3/6 holosystolic murmur appreciated at the apex, No rubs, No gallops. Extremitites with intact distal pulses, no cyanosis, or edema. RV heave noted as well.  Lungs:  bibasilar crackles appreciated  Abdomen:  Soft, No tenderness  Skin: No rash  Neurologic: Alert & oriented x 3  Psychiatric: Affect normal    MDM (Data Review):     Records reviewed and summarized in current documentation    Lab Data Review:  Recent Labs      07/12/17   0738  07/13/17   0301  07/14/17   0329   WBC  6.6  7.8  7.2   RBC  5.31  4.94  5.17   HEMOGLOBIN  12.6  11.9*  12.2   HEMATOCRIT  41.5  39.1  40.6   MCV  78.2*  79.1*  78.5*   MCH  23.7*  24.1*  23.6*   MCHC  30.4*  30.4*  30.0*   RDW  50.1*  50.5*  " 50.1*   PLATELETCT  283  252  242   MPV  11.2  10.9  11.3     Recent Labs      07/12/17   0738  07/13/17   0301  07/14/17   0329   SODIUM  141  141  140   POTASSIUM  3.7  3.6  3.8   CHLORIDE  111  111  111   CO2  21  20  21   GLUCOSE  141*  114*  113*   BUN  14  13  13   CREATININE  0.72  0.78  0.80   CALCIUM  9.5  9.4  9.3     Recent Labs      07/12/17   0738  07/13/17   0301   TROPONINI  <0.01  0.01   BNPBTYPENAT  437*  499*       Labs reviewed as noted above.    Imaging/Procedures Review:      EKG: From yesterday was reviewed and shows atrial fibrillation with RVR, ventricular rate 110s, nonspecific ST-T changes.    ECHO:   CONCLUSIONS  Severely reduced left ventricular systolic function.  Global hypokinesis with regional variation, most prominent in mid   lateral wall.  Grade III-IV diastolic dysfunction (restrictive pattern).  Ecentric, posterolaterally directed, severe mitral regurgitation.  Severely dilated left atrium.  Severely enlarged right atrium.  Dilated inferior vena cava without inspiratory collapse.  Moderately dilated right ventricle.  Reduced right ventricular systolic function.  Estimated right ventricular systolic pressure  is 80 mmHg.  Right heart pressures are consistent with severe pulmonary   hypertension.  No aortic stenosis.      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 Concern for ischemic, other etiologies could include rate related, stress-induced and also secondary to untreated hyperthyroidism. She is NYHA class III. Mildly fluid overloaded  on exam. Echocardiogram shows an elevated right ventricular systolic pressure. IVC is dilated and not collapsible. She will definitely benefit with some diuresis. Patient received her first dose of diuretics today. Will monitor ins and outs, plan for Lasix 40 mg tomorrow. We will increase losartan dose to 75 mg daily and metoprolol dose to 37.5 mg twice a day. We'll maximize above medications before addition of Aldactone.  Patient is already on therapy for hyperthyroidism. Rate control has been achieved. She will need an ischemic eval during this hospitalization. We'll plan for coronary angiography on Monday.    Concern for pulmonary hypertension-noted on echocardiogram. Will need right heart catheterization at the time of angiography. For now diuresis as noted above.    Coronary artery disease-continue statin. Ideally patient needs to be on aspirin. No history of GI bleed. Will start baby aspirin. Continue metoprolol at current dose.    Atrial fibrillation-likely chronic. Currently rate controlled. Metoprolol dose as noted above. Continue Coumadin.    Blood pressure elevated earlier today. Medication changes as noted above.    Hypothyroidism is already being treated by the primary team.    Thank you for allowing me to participate in the care of this patient. Please do not hesitate to contact me with any questions.    Vijaya Jasso MD  Cardiologist  [facility]

## 2017-07-14 NOTE — CARE PLAN
Problem: Safety  Goal: Will remain free from falls  Outcome: PROGRESSING AS EXPECTED  Call don't fall.

## 2017-07-14 NOTE — CARE PLAN
Problem: Knowledge Deficit  Goal: Knowledge of disease process/condition, treatment plan, diagnostic tests, and medications will improve  Outcome: PROGRESSING AS EXPECTED  Discussed afib complications, importance of warfarin and anticoagulants, pt. Expressed understanding of disease and treatment from having been educated on previous admission.    Problem: Discharge Barriers/Planning  Goal: Patient’s continuum of care needs will be met  Outcome: PROGRESSING AS EXPECTED    Problem: Respiratory:  Goal: Respiratory status will improve  Outcome: PROGRESSING AS EXPECTED  Discussed possibly requiring O2 at home during times of dyspnea. Pt admitted to becoming SOB frequently at home but did not express interest in home O2.

## 2017-07-14 NOTE — ASSESSMENT & PLAN NOTE
Very likely contributing to her symptoms (SOB)  -Cardiology consulting, cath results as above   -Lasix to 40 IV daily  - Echo shows EF 30%, severe MR, RVSP 80 mmHg

## 2017-07-14 NOTE — ASSESSMENT & PLAN NOTE
No smoking hx per patient.  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 2 years  -Continue CPAP nightly  -Consider CTA to rule out PE (but already on coumadin, so PE unlikely)  -Supplemental O2  -Lasix  -Right heart cath with left heart cath yest, results as above

## 2017-07-14 NOTE — CARE PLAN
Problem: Discharge Barriers/Planning  Goal: Patient’s continuum of care needs will be met  Outcome: PROGRESSING AS EXPECTED  Discussed possible discharge and new medications.

## 2017-07-14 NOTE — CARE PLAN
Problem: Safety  Goal: Will remain free from injury  Intervention: Provide assistance with mobility  Pt instructed to call for help when getting out of bed, verbalized understanding.

## 2017-07-14 NOTE — PROGRESS NOTES
Inpatient Anticoagulation Service Note    Date: 2017  Reason for Anticoagulation: Atrial Fibrillation  Hemoglobin Value: 12.2  Hematocrit Value: 40.6  Lab Platelet Value: 242  Target INR: 2.0 to 3.0  INR from last 7 days     Date/Time INR Value    17 0329 (!)2.16    17 0301 (!)2.07        Dose from last 7 days     Date/Time Dose (mg)    17 1300 6    17 1600 6        Average Dose (mg):  (Home Dose 6 mg daily per chart review)  Significant Interactions: Other (Comments) (methimazole), statin  Bridge Therapy: No    Comments:   Home Coumadin is to continue for pt who presented to the ED in afib with RVR.  Pt was seen for same on 17, but was discharged home without admsission due to resolution of sx in the ED. Per med rec interview, pt takes 6 mg daily, last taken 7/12 AM.   New drug interaction with atorvastatin. Drug interaction with methimazole - may reduce INR. Monitor.  INR is therapeutic today.     Plan: Will continue with pt's home regimen of 6 mg daily and trend daily INRs  Education Material Provided?: No (Chronic warfarin)  Pharmacist suggested discharge dosin mg daily, with outpatient INR follow up within 1 week of discharge       Nakul Marshall, PharmD, BCPS

## 2017-07-14 NOTE — PROGRESS NOTES
Patient receiving bed bath at this time. CNA in room assisting patient. Patient acknowledged plan to stay at hospital and see Cardiology. Encouraged to speak up and ask questions or voice concerns.

## 2017-07-14 NOTE — CARE PLAN
Problem: Communication  Goal: The ability to communicate needs accurately and effectively will improve  Outcome: PROGRESSING AS EXPECTED  Encouraged to speak up.

## 2017-07-15 LAB
ALBUMIN SERPL BCP-MCNC: 3.2 G/DL (ref 3.2–4.9)
BASOPHILS # BLD AUTO: 0.6 % (ref 0–1.8)
BASOPHILS # BLD: 0.05 K/UL (ref 0–0.12)
BUN SERPL-MCNC: 15 MG/DL (ref 8–22)
CALCIUM SERPL-MCNC: 9.2 MG/DL (ref 8.5–10.5)
CHLORIDE SERPL-SCNC: 109 MMOL/L (ref 96–112)
CO2 SERPL-SCNC: 22 MMOL/L (ref 20–33)
CREAT SERPL-MCNC: 0.78 MG/DL (ref 0.5–1.4)
EOSINOPHIL # BLD AUTO: 0.34 K/UL (ref 0–0.51)
EOSINOPHIL NFR BLD: 4.4 % (ref 0–6.9)
ERYTHROCYTE [DISTWIDTH] IN BLOOD BY AUTOMATED COUNT: 49.7 FL (ref 35.9–50)
GFR SERPL CREATININE-BSD FRML MDRD: >60 ML/MIN/1.73 M 2
GLUCOSE SERPL-MCNC: 110 MG/DL (ref 65–99)
HCT VFR BLD AUTO: 38.6 % (ref 37–47)
HGB BLD-MCNC: 11.7 G/DL (ref 12–16)
IMM GRANULOCYTES # BLD AUTO: 0.03 K/UL (ref 0–0.11)
IMM GRANULOCYTES NFR BLD AUTO: 0.4 % (ref 0–0.9)
INR PPP: 2.86 (ref 0.87–1.13)
LYMPHOCYTES # BLD AUTO: 1.32 K/UL (ref 1–4.8)
LYMPHOCYTES NFR BLD: 17.1 % (ref 22–41)
MCH RBC QN AUTO: 23.9 PG (ref 27–33)
MCHC RBC AUTO-ENTMCNC: 30.3 G/DL (ref 33.6–35)
MCV RBC AUTO: 78.8 FL (ref 81.4–97.8)
MONOCYTES # BLD AUTO: 0.76 K/UL (ref 0–0.85)
MONOCYTES NFR BLD AUTO: 9.9 % (ref 0–13.4)
NEUTROPHILS # BLD AUTO: 5.21 K/UL (ref 2–7.15)
NEUTROPHILS NFR BLD: 67.6 % (ref 44–72)
NRBC # BLD AUTO: 0 K/UL
NRBC BLD AUTO-RTO: 0 /100 WBC
PHOSPHATE SERPL-MCNC: 3.6 MG/DL (ref 2.5–4.5)
PLATELET # BLD AUTO: 231 K/UL (ref 164–446)
PMV BLD AUTO: 11.3 FL (ref 9–12.9)
POTASSIUM SERPL-SCNC: 3.4 MMOL/L (ref 3.6–5.5)
PROTHROMBIN TIME: 30.9 SEC (ref 12–14.6)
RBC # BLD AUTO: 4.9 M/UL (ref 4.2–5.4)
SODIUM SERPL-SCNC: 141 MMOL/L (ref 135–145)
WBC # BLD AUTO: 7.7 K/UL (ref 4.8–10.8)

## 2017-07-15 PROCEDURE — 99232 SBSQ HOSP IP/OBS MODERATE 35: CPT | Performed by: INTERNAL MEDICINE

## 2017-07-15 PROCEDURE — 700102 HCHG RX REV CODE 250 W/ 637 OVERRIDE(OP): Performed by: PHARMACIST

## 2017-07-15 PROCEDURE — 85610 PROTHROMBIN TIME: CPT

## 2017-07-15 PROCEDURE — A9270 NON-COVERED ITEM OR SERVICE: HCPCS | Performed by: PHARMACIST

## 2017-07-15 PROCEDURE — 700102 HCHG RX REV CODE 250 W/ 637 OVERRIDE(OP): Performed by: INTERNAL MEDICINE

## 2017-07-15 PROCEDURE — A9270 NON-COVERED ITEM OR SERVICE: HCPCS | Performed by: INTERNAL MEDICINE

## 2017-07-15 PROCEDURE — 85025 COMPLETE CBC W/AUTO DIFF WBC: CPT

## 2017-07-15 PROCEDURE — 770020 HCHG ROOM/CARE - TELE (206)

## 2017-07-15 PROCEDURE — 36415 COLL VENOUS BLD VENIPUNCTURE: CPT

## 2017-07-15 PROCEDURE — 700111 HCHG RX REV CODE 636 W/ 250 OVERRIDE (IP): Performed by: INTERNAL MEDICINE

## 2017-07-15 PROCEDURE — 80069 RENAL FUNCTION PANEL: CPT

## 2017-07-15 RX ORDER — WARFARIN SODIUM 6 MG/1
6 TABLET ORAL
Status: DISCONTINUED | OUTPATIENT
Start: 2017-07-16 | End: 2017-07-16

## 2017-07-15 RX ORDER — WARFARIN SODIUM 5 MG/1
5 TABLET ORAL
Status: COMPLETED | OUTPATIENT
Start: 2017-07-15 | End: 2017-07-15

## 2017-07-15 RX ORDER — POTASSIUM CHLORIDE 20 MEQ/1
40 TABLET, EXTENDED RELEASE ORAL DAILY
Status: DISCONTINUED | OUTPATIENT
Start: 2017-07-15 | End: 2017-07-16

## 2017-07-15 RX ORDER — FERROUS SULFATE 325(65) MG
325 TABLET ORAL
Status: DISCONTINUED | OUTPATIENT
Start: 2017-07-15 | End: 2017-07-19 | Stop reason: HOSPADM

## 2017-07-15 RX ADMIN — WARFARIN SODIUM 5 MG: 5 TABLET ORAL at 18:01

## 2017-07-15 RX ADMIN — METHIMAZOLE 10 MG: 10 TABLET ORAL at 20:32

## 2017-07-15 RX ADMIN — METOPROLOL TARTRATE 37.5 MG: 25 TABLET, FILM COATED ORAL at 10:03

## 2017-07-15 RX ADMIN — Medication 325 MG: at 09:51

## 2017-07-15 RX ADMIN — LOSARTAN POTASSIUM 75 MG: 25 TABLET, FILM COATED ORAL at 09:52

## 2017-07-15 RX ADMIN — METOPROLOL TARTRATE 37.5 MG: 25 TABLET, FILM COATED ORAL at 20:31

## 2017-07-15 RX ADMIN — SENNOSIDES AND DOCUSATE SODIUM 2 TABLET: 8.6; 5 TABLET ORAL at 20:31

## 2017-07-15 RX ADMIN — METHIMAZOLE 10 MG: 10 TABLET ORAL at 09:53

## 2017-07-15 RX ADMIN — SENNOSIDES AND DOCUSATE SODIUM 2 TABLET: 8.6; 5 TABLET ORAL at 09:54

## 2017-07-15 RX ADMIN — ASPIRIN 81 MG: 81 TABLET ORAL at 09:51

## 2017-07-15 RX ADMIN — ATORVASTATIN CALCIUM 40 MG: 40 TABLET, FILM COATED ORAL at 20:31

## 2017-07-15 RX ADMIN — METHIMAZOLE 10 MG: 10 TABLET ORAL at 15:11

## 2017-07-15 RX ADMIN — ACETAMINOPHEN 650 MG: 325 TABLET, FILM COATED ORAL at 15:26

## 2017-07-15 RX ADMIN — POTASSIUM CHLORIDE 40 MEQ: 1500 TABLET, EXTENDED RELEASE ORAL at 09:54

## 2017-07-15 RX ADMIN — FUROSEMIDE 40 MG: 10 INJECTION, SOLUTION INTRAMUSCULAR; INTRAVENOUS at 09:52

## 2017-07-15 ASSESSMENT — ENCOUNTER SYMPTOMS
PND: 0
BLOOD IN STOOL: 0
FEVER: 0
ORTHOPNEA: 1
ABDOMINAL PAIN: 0
ORTHOPNEA: 0
CHILLS: 0
SHORTNESS OF BREATH: 1
HEADACHES: 0
PALPITATIONS: 1
COUGH: 0
MYALGIAS: 0
CLAUDICATION: 0
FOCAL WEAKNESS: 0
HEARTBURN: 0
DIARRHEA: 0
WEAKNESS: 0
BACK PAIN: 0
HALLUCINATIONS: 0
DIZZINESS: 0
VOMITING: 0
DEPRESSION: 0
NAUSEA: 0
PALPITATIONS: 0
SORE THROAT: 0

## 2017-07-15 ASSESSMENT — PAIN SCALES - GENERAL: PAINLEVEL_OUTOF10: 5

## 2017-07-15 NOTE — PROGRESS NOTES
Cardiology Progress Note               Author: Richie Cabrera Date & Time created: 7/15/2017  10:06 AM     Interval History:        Consultation for new cardiomyopathy EF 30%, afib RVR      Admitted with palpitation, dyspnea with exertion      History of hyperthyroid ( 3/2017 ) , PAF, CAD with CABG in         Echocardiogram 17, severely reduced LV systolic function, global hypokinesis with regional variation, grade 3 peripheral diastolic dysfunction, severe MR, severe dilated left atrium severely enlarged right atrium, moderately dilated RV, RVSP 80 mmHg, no aortic stenosis        Labs reviewed  K=3.4  Na, CR stable  Trop  neg   AON==933  T4=2.89      JQ=060/85  HR=98 afib    Review of Systems   Respiratory: Negative for cough.    Cardiovascular: Negative for chest pain, palpitations, orthopnea, claudication, leg swelling and PND.       Physical Exam   Constitutional: She is oriented to person, place, and time.   Over weight   HENT:   Head: Normocephalic.   Neck: No thyromegaly present.   Cardiovascular: Normal rate.  An irregularly irregular rhythm present.   Murmur heard.   Systolic murmur is present   Pulses:       Carotid pulses are 2+ on the right side, and 2+ on the left side.       Radial pulses are 2+ on the right side, and 2+ on the left side.        Posterior tibial pulses are 2+ on the right side, and 2+ on the left side.   Pulmonary/Chest: She has no wheezes.   Musculoskeletal: She exhibits no edema.   Neurological: She is alert and oriented to person, place, and time.   Skin: Skin is warm and dry.       Hemodynamics:  Temp (24hrs), Av.4 °C (97.5 °F), Min:36.1 °C (97 °F), Max:36.9 °C (98.4 °F)  Temperature: 36.6 °C (97.8 °F)  Pulse  Av.3  Min: 60  Max: 123  Blood Pressure : 135/85 mmHg     Respiratory:    Respiration: 16, Pulse Oximetry: 97 %     Work Of Breathing / Effort: Mild  RUL Breath Sounds: Clear, RML Breath Sounds: Diminished, RLL Breath Sounds: Diminished, RICHA Breath Sounds:  Clear, LLL Breath Sounds: Diminished  Fluids:     Weight: 93.6 kg (206 lb 5.6 oz)  GI/Nutrition:  Orders Placed This Encounter   Procedures   • Diet Order     Standing Status: Standing      Number of Occurrences: 1      Standing Expiration Date:      Order Specific Question:  Diet:     Answer:  Regular [1]     Lab Results:  Recent Labs      07/13/17   0301  07/14/17   0329  07/15/17   0205   WBC  7.8  7.2  7.7   RBC  4.94  5.17  4.90   HEMOGLOBIN  11.9*  12.2  11.7*   HEMATOCRIT  39.1  40.6  38.6   MCV  79.1*  78.5*  78.8*   MCH  24.1*  23.6*  23.9*   MCHC  30.4*  30.0*  30.3*   RDW  50.5*  50.1*  49.7   PLATELETCT  252  242  231   MPV  10.9  11.3  11.3     Recent Labs      07/13/17   0301  07/14/17   0329  07/15/17   0205   SODIUM  141  140  141   POTASSIUM  3.6  3.8  3.4*   CHLORIDE  111  111  109   CO2  20  21  22   GLUCOSE  114*  113*  110*   BUN  13  13  15   CREATININE  0.78  0.80  0.78   CALCIUM  9.4  9.3  9.2     Recent Labs      07/13/17   0301  07/14/17   0329  07/15/17   0205   INR  2.07*  2.16*  2.86*     Recent Labs      07/13/17   0301   BNPBTYPENAT  499*     Recent Labs      07/13/17   0301   TROPONINI  0.01   BNPBTYPENAT  499*             Medical Decision Making, by Problem:  Active Hospital Problems    Diagnosis   • *Atrial fibrillation with RVR (CMS-[facility]) [I48.91]   • Microcytic anemia [D50.9]   • Severe mitral regurgitation [I34.0]   • Moderate to severe pulmonary hypertension (CMS-[facility]) [I27.2]   • Acute on chronic combined systolic and diastolic CHF, NYHA class 3 (CMS-[facility]) [I50.43]   • Paroxysmal atrial fibrillation (CMS-[facility]) [I48.0]   • Coronary artery disease involving native coronary artery of native heart without angina pectoris, s/p 1V CABG 1992 [I25.10]   • Hyperthyroidism [E05.90]   • Anxiety [F41.9]       Plan:      New cardiomyopathy, EF 30% (ischemia versus rate related versus stress induced versus untreated hyperthyroid), class III, losartan 75 mg, Metoprolol 37.5 mg BID,     Systolic  and diastolic HF, Lasix 40 mg IV ( dose was increased today )      I/O =- 140    Standing weight tonight        Plan for ischemic eval on Monday (L & R heart  cath )    Severe pulmonary hypertension by echo, continue with diuresis, may need right heart catheter as well      Severe MR, continue with diuresis    History of CAD with CABG x 1 v 1992, , aspirin, Lipitor 40,  beta blocker, denies angina    A. fib RVR (history of paroxysmal A. Fib), BB, Coumadin, INR=2.86, in afib rate controlled     Low K= 3.4, repleted    Hyperthyroid, Methimazole        Medications reviewed and Labs reviewed

## 2017-07-15 NOTE — ASSESSMENT & PLAN NOTE
-Lasix 40 mg IV daily  -Atorvastatin 40 mg  -cath results as above  -pt follows with Dr. Marques (cardiology) in [place]  - ASA  -Continue BB, cozaar, dig 125 mcg daily

## 2017-07-15 NOTE — PROGRESS NOTES
[facility] Hospitalist Progress Note    Date of Service: 7/15/2017    Chief Complaint  77 y.o. Female w hx of atrial fibrillation on coumadin, hx of MI s/ 1v CABG, hyperthyroid admitted 2017 with increasing SOB w exertion found to have severe MR, acute on chronic s/grade III-IV d CHF (EF 30%), severe PAH.    Interval Problem Update  : Echo abnormal, MR, PAH, EF 30%, cardiology consulted.  Lasix started. Remains on 4L O2  7/15: Lasix increased, weaned to RA at rest 1-3 with ambulation.  HR controlled, BP 120s    Consultants/Specialty  Dr. Jasso - Cardiology    Disposition  F/U Cath on Monday        Review of Systems   Constitutional: Negative for fever, chills and malaise/fatigue.   HENT: Negative for sore throat.    Respiratory: Positive for shortness of breath. Negative for cough.    Cardiovascular: Positive for palpitations, orthopnea and leg swelling. Negative for chest pain.   Gastrointestinal: Negative for heartburn, nausea, vomiting, abdominal pain, diarrhea and blood in stool.   Genitourinary: Negative for dysuria and frequency.   Musculoskeletal: Negative for myalgias and back pain.   Neurological: Negative for dizziness, focal weakness, weakness and headaches.   Psychiatric/Behavioral: Negative for depression and hallucinations.   All other systems reviewed and are negative.     Physical Exam  Laboratory/Imaging   Hemodynamics  Temp (24hrs), Av.6 °C (97.8 °F), Min:36.2 °C (97.2 °F), Max:36.9 °C (98.4 °F)   Temperature: 36.8 °C (98.3 °F)  Pulse  Av.6  Min: 60  Max: 123   Blood Pressure : 126/75 mmHg      Respiratory      Respiration: 15, Pulse Oximetry: 96 %     Work Of Breathing / Effort: Mild  RUL Breath Sounds: Clear, RML Breath Sounds: Clear, RLL Breath Sounds: Clear;Diminished, RICHA Breath Sounds: Clear, LLL Breath Sounds: Clear;Diminished    Fluids    Intake/Output Summary (Last 24 hours) at 07/15/17 6528  Last data filed at 07/15/17 1335   Gross per 24 hour   Intake    836 ml   Output    3450 ml   Net  -2614 ml       Nutrition  Orders Placed This Encounter   Procedures   • Diet Order     Standing Status: Standing      Number of Occurrences: 1      Standing Expiration Date:      Order Specific Question:  Diet:     Answer:  Regular [1]     Physical Exam   Constitutional: She is oriented to person, place, and time. She appears well-developed and well-nourished. No distress.   HENT:   Head: Normocephalic and atraumatic.   Neck: No JVD present. No thyromegaly present.   Cardiovascular:   No murmur heard.  Irreg irreg   Pulmonary/Chest: No respiratory distress. She has no wheezes.   Abdominal: She exhibits no distension. There is no tenderness.   Musculoskeletal: She exhibits no edema or tenderness.   Neurological: She is alert and oriented to person, place, and time. No cranial nerve deficit.   Skin: No rash noted. No erythema.   Psychiatric: She has a normal mood and affect. Her behavior is normal.       Recent Labs      07/13/17   0301  07/14/17   0329  07/15/17   0205   WBC  7.8  7.2  7.7   RBC  4.94  5.17  4.90   HEMOGLOBIN  11.9*  12.2  11.7*   HEMATOCRIT  39.1  40.6  38.6   MCV  79.1*  78.5*  78.8*   MCH  24.1*  23.6*  23.9*   MCHC  30.4*  30.0*  30.3*   RDW  50.5*  50.1*  49.7   PLATELETCT  252  242  231   MPV  10.9  11.3  11.3     Recent Labs      07/13/17   0301  07/14/17   0329  07/15/17   0205   SODIUM  141  140  141   POTASSIUM  3.6  3.8  3.4*   CHLORIDE  111  111  109   CO2  20  21  22   GLUCOSE  114*  113*  110*   BUN  13  13  15   CREATININE  0.78  0.80  0.78   CALCIUM  9.4  9.3  9.2     Recent Labs      07/13/17   0301  07/14/17   0329  07/15/17   0205   INR  2.07*  2.16*  2.86*     Recent Labs      07/13/17   0301   BNPBTYPENAT  499*              Assessment/Plan     * Atrial fibrillation with RVR (CMS-HCC)  Assessment & Plan  - just had her metoprolol increased by Dr Keith, will continue 25 mg bid  - keeps going back into rvr d/t hyperthyroidism  - will give prn  diltiazem    Hyperthyroidism  Assessment & Plan  - pt has had US as outpt and bx, no cancer  - now waiting for endocrine appt which isn't until September  - will add methimazole 10 TID  - Continue metoprolol (isn't first line BB for hyperthyroid but she is already on it and generally has good control with it, likely just needs addition of methimazole    Anxiety  Assessment & Plan  - symptomatic care  - per her story concerning to also be contributing to her rvr    Severe mitral regurgitation  Assessment & Plan  Very likely contributing to her symptoms (SOB)  -Requested records from Dr. Gardner ([facility]) her primary cardiologist  -Cardiology consulting, planning for cath Monday  -Increase lasix to 40 IV daily    Moderate to severe pulmonary hypertension (CMS-HCC)  Assessment & Plan  No smoking hx per patient.  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 2 years  -Continue CPAP  -Consider CTA to rule out PE (but already on coumadin, so PE unlikely)  -Supplemental O2  -Lasix  -Right heart cath with left heart cath on Monday    Acute on chronic combined systolic and diastolic CHF, NYHA class 3 (CMS-HCC)  Assessment & Plan  -Lasix 20 IV daily  -Atorva 40  -Consult cardiology, possible cath if EF is reduced from echo in 9/2016  -Requested records from Dr. Marques in [facility]  -On coumadin so no ASA    Paroxysmal atrial fibrillation (CMS-[facility])  Assessment & Plan  Rate is controlled  -Continue metoprolol 25 BID  -Continue coumadin  -Dilt PRN RVR  -Tele  -Lytes in AM  -TSH suppressed, add methimazole    Coronary artery disease involving native coronary artery of native heart without angina pectoris, s/p 1V CABG 1992  Assessment & Plan  Cardiologist is in [facility]  -Continue Coumadin  -Add asa  -Atorva 40  -Metop 25 BID  -Cardiology following, her last cath was >15 years ago  -Planning cath on Monday    Microcytic anemia  Assessment & Plan  - no sign of bleeding, iron was low  - replacement per pharmcy  - Need GI f/u as outpatient as  outpatient for colonoscopy screening      Labs reviewed, Medications reviewed and Radiology images reviewed        DVT Prophylaxis: Warfarin (Coumadin)

## 2017-07-15 NOTE — CARE PLAN
Problem: Respiratory:  Goal: Respiratory status will improve  Intervention: Assess and monitor pulmonary status  SOB with any type of activity. Encouraged to listen to her body and speak up if she has increased SOB.      Problem: Psychosocial Needs:  Goal: Level of anxiety will decrease  Outcome: PROGRESSING AS EXPECTED  Patient likes to express her thoughts and feelings. Encouraged to speak up.

## 2017-07-15 NOTE — PROGRESS NOTES
Handoff report received. Assume patient care. Patient sitting on edge of bed. Patient not in pain. Patient not in distress, AAOX 4. Safety precautions in place. Call light and personal belongings within reach. Educated to call for assistance if needed.

## 2017-07-15 NOTE — ASSESSMENT & PLAN NOTE
Rate controlled  -Metoprolol to 50 BID  -Dig 125 mcg daily in AM  -Hold coumadin for GOMEZ tomm   -Tele  -TSH suppressed, added methimazole

## 2017-07-15 NOTE — CARE PLAN
Problem: Communication  Goal: The ability to communicate needs accurately and effectively will improve  Outcome: PROGRESSING AS EXPECTED  Encouraged to speak up.    Problem: Knowledge Deficit  Goal: Knowledge of disease process/condition, treatment plan, diagnostic tests, and medications will improve  Outcome: PROGRESSING AS EXPECTED  Plan for Heart Cath on Monday. Will need continue education on Heart Cath.

## 2017-07-15 NOTE — ASSESSMENT & PLAN NOTE
Cardiologist is in [place]  -Add asa  -Atorva 40  -Metop 50 BID  -Cardiology following, her last cath was >15 years ago  -s/p left and right heart cath on 7/18 which showed 100% occlusion of the mid RCA, patent saphenous vein graft to distal RCA.  significant mitral insufficiency, EF of 45%.  - pt scheduled for GOMEZ tomm per cards recs

## 2017-07-15 NOTE — PROGRESS NOTES
[facility] Hospitalist Progress Note    Date of Service: 2017    Chief Complaint  77 y.o. Female w hx of atrial fibrillation on coumadin, hx of MI s/ 1v CABG, hyperthyroid admitted 2017 with increasing SOB w exertion found to have severe MR, acute on chronic s/grade III-IV d CHF (EF 30%), severe PAH.    Interval Problem Update  : Echo abnormal, MR, PAH, EF 30%, cardiology consulted.  Lasix started. Remains on 4L O2    Consultants/Specialty  Dr. Jasso - Cardiology    Disposition  F/U Cardiology recs, outside records        Review of Systems   Constitutional: Negative for fever, chills and malaise/fatigue.   HENT: Negative for sore throat.    Respiratory: Positive for shortness of breath. Negative for cough.    Cardiovascular: Positive for palpitations, orthopnea and leg swelling. Negative for chest pain.   Gastrointestinal: Negative for heartburn, nausea, vomiting, abdominal pain, diarrhea and blood in stool.   Genitourinary: Negative for dysuria and frequency.   Musculoskeletal: Negative for myalgias and back pain.   Neurological: Negative for dizziness, focal weakness, weakness and headaches.   Psychiatric/Behavioral: Negative for depression and hallucinations.   All other systems reviewed and are negative.     Physical Exam  Laboratory/Imaging   Hemodynamics  Temp (24hrs), Av.5 °C (97.7 °F), Min:36.1 °C (97 °F), Max:37.2 °C (98.9 °F)   Temperature: 36.2 °C (97.2 °F)  Pulse  Av.8  Min: 60  Max: 123   Blood Pressure : 131/91 mmHg (rn notified)      Respiratory      Respiration: 18, Pulse Oximetry: 97 %     Work Of Breathing / Effort: Mild  RUL Breath Sounds: Clear, RML Breath Sounds: Clear, RLL Breath Sounds: Clear, RICHA Breath Sounds: Clear, LLL Breath Sounds: Clear    Fluids    Intake/Output Summary (Last 24 hours) at 17 1706  Last data filed at 17 0800   Gross per 24 hour   Intake    360 ml   Output      0 ml   Net    360 ml       Nutrition  Orders Placed This Encounter    Procedures   • Diet Order     Standing Status: Standing      Number of Occurrences: 1      Standing Expiration Date:      Order Specific Question:  Diet:     Answer:  Regular [1]     Physical Exam   Constitutional: She is oriented to person, place, and time. She appears well-developed and well-nourished. No distress.   HENT:   Head: Normocephalic and atraumatic.   Neck: No JVD present. No thyromegaly present.   Cardiovascular:   No murmur heard.  Irreg irreg   Pulmonary/Chest: No respiratory distress. She has no wheezes.   Abdominal: She exhibits no distension. There is no tenderness.   Musculoskeletal: She exhibits no edema or tenderness.   Neurological: She is alert and oriented to person, place, and time. No cranial nerve deficit.   Skin: No rash noted. No erythema.   Psychiatric: She has a normal mood and affect. Her behavior is normal.       Recent Labs      07/12/17 0738 07/13/17 0301 07/14/17   0329   WBC  6.6  7.8  7.2   RBC  5.31  4.94  5.17   HEMOGLOBIN  12.6  11.9*  12.2   HEMATOCRIT  41.5  39.1  40.6   MCV  78.2*  79.1*  78.5*   MCH  23.7*  24.1*  23.6*   MCHC  30.4*  30.4*  30.0*   RDW  50.1*  50.5*  50.1*   PLATELETCT  283  252  242   MPV  11.2  10.9  11.3     Recent Labs      07/12/17 0738 07/13/17   0301  07/14/17   0329   SODIUM  141  141  140   POTASSIUM  3.7  3.6  3.8   CHLORIDE  111  111  111   CO2  21  20  21   GLUCOSE  141*  114*  113*   BUN  14  13  13   CREATININE  0.72  0.78  0.80   CALCIUM  9.5  9.4  9.3     Recent Labs      07/12/17 0738 07/13/17   0301  07/14/17   0329   APTT  46.0*   --    --    INR  2.45*  2.07*  2.16*     Recent Labs      07/12/17 0738 07/13/17   0301   BNPBTYPENAT  437*  499*              Assessment/Plan     * Atrial fibrillation with RVR (CMS-[facility])  Assessment & Plan  - just had her metoprolol increased by Dr Keith, will continue 25 mg bid  - keeps going back into rvr d/t hyperthyroidism  - will give prn diltiazem    Hyperthyroidism  Assessment &  Plan  - pt has had US as outpt and bx, no cancer  - now waiting for endocrine appt which isn't until September  - will add methimazole 10 TID  - Continue metoprolol (isn't first line BB for hyperthyroid but she is already on it and generally has good control with it, likely just needs addition of methimazole    Anxiety  Assessment & Plan  - symptomatic care  - per her story concerning to also be contributing to her rvr    Severe mitral regurgitation  Assessment & Plan  Very likely contributing to her symptoms (SOB)  -Requested records from Dr. Gardner ([facility]) her primary cardiologist  -Consult made to cardiology ([facility])  -Lasix 20 IV daily    Moderate to severe pulmonary hypertension (CMS-HCC)  Assessment & Plan  No smoking hx per patient.  ?SHAKA vs L heart failure causing R heart failure  -Sleep study as outpatient  -Consider CTA to rule out PE (but already on coumadin, so PE unlikely)  -Supplemental O2  -Lasix as tolerated    Acute on chronic combined systolic and diastolic CHF, NYHA class 3 (CMS-[facility])  Assessment & Plan  -Lasix 20 IV daily  -Atorva 40  -Consult cardiology, possible cath if EF is reduced from echo in 9/2016  -Requested records from Dr. Marques in [facility]  -On coumadin so no ASA    Paroxysmal atrial fibrillation (CMS-[facility])  Assessment & Plan  Rate is controlled  -Continue metoprolol 25 BID  -Continue coumadin  -Dilt PRN RVR  -Tele  -Lytes in AM  -TSH suppressed, add methimazole    Coronary artery disease involving native coronary artery of native heart without angina pectoris, s/p 1V CABG 1992  Assessment & Plan  Cardiologist is in [facility]  -Continue Coumadin (no asa due to this)  -Atorva 40  -Metop 24 BID  -Cardiology consulted, her last cath was >15 years ago    Microcytic anemia  Assessment & Plan  - no sign of bleeding  - iron mildly low, f/u ferritin  - May need GI f/u as outpatient      Labs reviewed, Medications reviewed and Radiology images reviewed        DVT Prophylaxis: Warfarin  (Coumadin)

## 2017-07-15 NOTE — PROGRESS NOTES
Assumed Care at 0715 after bedside report from Ambar BUSTILLO. Plan of care reviewed with patient. Patient is alert and orientated x4. Verbalized understanding.

## 2017-07-15 NOTE — PROGRESS NOTES
MS     A fib     With PVCs, couplets, and triplets  Pt had 4 beats of V-tach and was asymptomatic    -/0.08/-

## 2017-07-15 NOTE — PROGRESS NOTES
Cardiology consult brief note    No significant response with diuresis. Lasix dose increased for this morning. Blood pressure elevated, will await medication administration this morning and reevaluate blood pressure. If it continues to be elevated we'll need to adjust medications. Telemetry reviewed.  No other changes compared to initial consultation from yesterday.    Formal note to follow by NP.    Vijaya Jasso MD  Cardiologist  [facility]

## 2017-07-15 NOTE — CARE PLAN
Problem: Infection  Goal: Will remain free from infection  Intervention: Implement standard precautions and perform hand washing before and after patient contact  RN will follow protocols and necessary steps to minimize the spread of infection. RN educated pt and and any visitors on proper hand hygiene. RN will follow protocols and necessary steps to minimize the spread of infection. RN educated pt and and any visitors on proper hand hygiene.       Problem: Knowledge Deficit  Goal: Knowledge of disease process/condition, treatment plan, diagnostic tests, and medications will improve  Intervention: Explain information regarding disease process/condition, treatment plan, diagnostic tests, and medications and document in education  Patient educated on plan of care, medications, pain management, and disease processes. Patient showed verbal understanding and acceptance. Will continue to answer questions regarding medication administration, care, procedures, and upcoming tests. Patient educated on plan of care, medications, pain management, and disease processes. Patient showed verbal understanding and acceptance. Will continue to answer questions regarding medication administration, care, procedures, and upcoming tests.

## 2017-07-15 NOTE — PROGRESS NOTES
Inpatient Anticoagulation Service Note  Date: 7/15/2017  Estimated Creatinine Clearance: 67.8 mL/min (by C-G formula based on Cr of 0.78).  Reason for Anticoagulation: Atrial Fibrillation   [id number] VASc Score: 4  Hemoglobin Value: 11.7  Hematocrit Value: 38.6  Lab Platelet Value: 231  Target INR: 2.0 to 3.0  INR from last 7 days     Date/Time INR Value    07/15/17 0205 (!)2.86    07/14/17 0329 (!)2.16    07/13/17 0301 (!)2.07        Dose from last 7 days     Date/Time Dose (mg)    07/15/17 1300 5    07/14/17 1300 6    07/13/17 1600 6        Average Dose (mg): 6 (Home Dose 6 mg daily per chart review)  Significant Interactions: Other (Comments), Statin, Aspirin (methimazole, oral FE supplements)  Bridge Therapy: No  Reversal Agent Administered: Not Applicable  Comments: INR improved to upper end of goal tonight. Continued warfarin interactions noted. H/H down slightly. PLT stable. No overt bleeding noted. Will give 5 mg today and trend INR with AM labs. Likely to continue home dose pending clinical course.     Plan:  Warfarin 5 mg 7/15/17  Education Material Provided?: No  Pharmacist suggested discharge dosing: warfarin 6 mg daily (recomemnd INR withing 72 hours of discharge)    Quique Tovar, PHARMD

## 2017-07-16 LAB
ALBUMIN SERPL BCP-MCNC: 3.2 G/DL (ref 3.2–4.9)
BASOPHILS # BLD AUTO: 0.7 % (ref 0–1.8)
BASOPHILS # BLD: 0.05 K/UL (ref 0–0.12)
BUN SERPL-MCNC: 14 MG/DL (ref 8–22)
CALCIUM SERPL-MCNC: 9.9 MG/DL (ref 8.5–10.5)
CHLORIDE SERPL-SCNC: 107 MMOL/L (ref 96–112)
CO2 SERPL-SCNC: 25 MMOL/L (ref 20–33)
CREAT SERPL-MCNC: 0.71 MG/DL (ref 0.5–1.4)
EOSINOPHIL # BLD AUTO: 0.34 K/UL (ref 0–0.51)
EOSINOPHIL NFR BLD: 4.9 % (ref 0–6.9)
ERYTHROCYTE [DISTWIDTH] IN BLOOD BY AUTOMATED COUNT: 51.3 FL (ref 35.9–50)
GFR SERPL CREATININE-BSD FRML MDRD: >60 ML/MIN/1.73 M 2
GLUCOSE SERPL-MCNC: 98 MG/DL (ref 65–99)
HCT VFR BLD AUTO: 40.4 % (ref 37–47)
HGB BLD-MCNC: 12.2 G/DL (ref 12–16)
IMM GRANULOCYTES # BLD AUTO: 0.02 K/UL (ref 0–0.11)
IMM GRANULOCYTES NFR BLD AUTO: 0.3 % (ref 0–0.9)
INR PPP: 2.82 (ref 0.87–1.13)
LYMPHOCYTES # BLD AUTO: 1.12 K/UL (ref 1–4.8)
LYMPHOCYTES NFR BLD: 16 % (ref 22–41)
MAGNESIUM SERPL-MCNC: 1.6 MG/DL (ref 1.5–2.5)
MCH RBC QN AUTO: 23.7 PG (ref 27–33)
MCHC RBC AUTO-ENTMCNC: 30.2 G/DL (ref 33.6–35)
MCV RBC AUTO: 78.6 FL (ref 81.4–97.8)
MONOCYTES # BLD AUTO: 0.76 K/UL (ref 0–0.85)
MONOCYTES NFR BLD AUTO: 10.9 % (ref 0–13.4)
NEUTROPHILS # BLD AUTO: 4.71 K/UL (ref 2–7.15)
NEUTROPHILS NFR BLD: 67.2 % (ref 44–72)
NRBC # BLD AUTO: 0 K/UL
NRBC BLD AUTO-RTO: 0 /100 WBC
PHOSPHATE SERPL-MCNC: 3.6 MG/DL (ref 2.5–4.5)
PLATELET # BLD AUTO: 251 K/UL (ref 164–446)
PMV BLD AUTO: 11.4 FL (ref 9–12.9)
POTASSIUM SERPL-SCNC: 3.4 MMOL/L (ref 3.6–5.5)
PROTHROMBIN TIME: 30.5 SEC (ref 12–14.6)
RBC # BLD AUTO: 5.14 M/UL (ref 4.2–5.4)
SODIUM SERPL-SCNC: 141 MMOL/L (ref 135–145)
WBC # BLD AUTO: 7 K/UL (ref 4.8–10.8)

## 2017-07-16 PROCEDURE — A9270 NON-COVERED ITEM OR SERVICE: HCPCS | Performed by: INTERNAL MEDICINE

## 2017-07-16 PROCEDURE — 700111 HCHG RX REV CODE 636 W/ 250 OVERRIDE (IP): Performed by: INTERNAL MEDICINE

## 2017-07-16 PROCEDURE — 770020 HCHG ROOM/CARE - TELE (206)

## 2017-07-16 PROCEDURE — 700102 HCHG RX REV CODE 250 W/ 637 OVERRIDE(OP): Performed by: INTERNAL MEDICINE

## 2017-07-16 PROCEDURE — 80069 RENAL FUNCTION PANEL: CPT

## 2017-07-16 PROCEDURE — 85610 PROTHROMBIN TIME: CPT

## 2017-07-16 PROCEDURE — 99233 SBSQ HOSP IP/OBS HIGH 50: CPT | Performed by: INTERNAL MEDICINE

## 2017-07-16 PROCEDURE — 36415 COLL VENOUS BLD VENIPUNCTURE: CPT

## 2017-07-16 PROCEDURE — 85025 COMPLETE CBC W/AUTO DIFF WBC: CPT

## 2017-07-16 PROCEDURE — 83735 ASSAY OF MAGNESIUM: CPT

## 2017-07-16 RX ORDER — DIGOXIN 0.25 MG/ML
500 INJECTION INTRAMUSCULAR; INTRAVENOUS DAILY
Status: DISCONTINUED | OUTPATIENT
Start: 2017-07-16 | End: 2017-07-16

## 2017-07-16 RX ORDER — DIGOXIN 0.25 MG/ML
250 INJECTION INTRAMUSCULAR; INTRAVENOUS EVERY 6 HOURS
Status: COMPLETED | OUTPATIENT
Start: 2017-07-16 | End: 2017-07-16

## 2017-07-16 RX ORDER — DIGOXIN 125 MCG
125 TABLET ORAL DAILY
Status: DISCONTINUED | OUTPATIENT
Start: 2017-07-17 | End: 2017-07-19 | Stop reason: HOSPADM

## 2017-07-16 RX ORDER — DIGOXIN 0.25 MG/ML
500 INJECTION INTRAMUSCULAR; INTRAVENOUS ONCE
Status: COMPLETED | OUTPATIENT
Start: 2017-07-16 | End: 2017-07-16

## 2017-07-16 RX ORDER — METOPROLOL TARTRATE 50 MG/1
50 TABLET, FILM COATED ORAL TWICE DAILY
Status: DISCONTINUED | OUTPATIENT
Start: 2017-07-16 | End: 2017-07-19 | Stop reason: HOSPADM

## 2017-07-16 RX ORDER — PHYTONADIONE 5 MG/1
2.5 TABLET ORAL ONCE
Status: COMPLETED | OUTPATIENT
Start: 2017-07-16 | End: 2017-07-16

## 2017-07-16 RX ORDER — POTASSIUM CHLORIDE 20 MEQ/1
40 TABLET, EXTENDED RELEASE ORAL 2 TIMES DAILY
Status: DISCONTINUED | OUTPATIENT
Start: 2017-07-16 | End: 2017-07-19 | Stop reason: HOSPADM

## 2017-07-16 RX ORDER — LOSARTAN POTASSIUM 50 MG/1
100 TABLET ORAL
Status: DISCONTINUED | OUTPATIENT
Start: 2017-07-16 | End: 2017-07-19 | Stop reason: HOSPADM

## 2017-07-16 RX ADMIN — LOSARTAN POTASSIUM 100 MG: 50 TABLET, FILM COATED ORAL at 09:45

## 2017-07-16 RX ADMIN — FUROSEMIDE 40 MG: 10 INJECTION, SOLUTION INTRAMUSCULAR; INTRAVENOUS at 09:47

## 2017-07-16 RX ADMIN — POTASSIUM CHLORIDE 40 MEQ: 1500 TABLET, EXTENDED RELEASE ORAL at 09:46

## 2017-07-16 RX ADMIN — METOPROLOL TARTRATE 50 MG: 50 TABLET, FILM COATED ORAL at 20:40

## 2017-07-16 RX ADMIN — DIGOXIN 250 MCG: 0.25 INJECTION INTRAMUSCULAR; INTRAVENOUS at 22:57

## 2017-07-16 RX ADMIN — POTASSIUM CHLORIDE 40 MEQ: 1500 TABLET, EXTENDED RELEASE ORAL at 20:41

## 2017-07-16 RX ADMIN — METOPROLOL TARTRATE 50 MG: 50 TABLET, FILM COATED ORAL at 09:46

## 2017-07-16 RX ADMIN — METHIMAZOLE 10 MG: 10 TABLET ORAL at 14:53

## 2017-07-16 RX ADMIN — ATORVASTATIN CALCIUM 40 MG: 40 TABLET, FILM COATED ORAL at 20:40

## 2017-07-16 RX ADMIN — ACETAMINOPHEN 650 MG: 325 TABLET, FILM COATED ORAL at 21:00

## 2017-07-16 RX ADMIN — DIGOXIN 500 MCG: 0.25 INJECTION INTRAMUSCULAR; INTRAVENOUS at 09:44

## 2017-07-16 RX ADMIN — METHIMAZOLE 10 MG: 10 TABLET ORAL at 09:47

## 2017-07-16 RX ADMIN — DIGOXIN 250 MCG: 0.25 INJECTION INTRAMUSCULAR; INTRAVENOUS at 14:54

## 2017-07-16 RX ADMIN — SENNOSIDES AND DOCUSATE SODIUM 2 TABLET: 8.6; 5 TABLET ORAL at 09:49

## 2017-07-16 RX ADMIN — ACETAMINOPHEN 650 MG: 325 TABLET, FILM COATED ORAL at 14:53

## 2017-07-16 RX ADMIN — ACETAMINOPHEN 650 MG: 325 TABLET, FILM COATED ORAL at 00:54

## 2017-07-16 RX ADMIN — PHYTONADIONE 2.5 MG: 5 TABLET ORAL at 09:47

## 2017-07-16 RX ADMIN — METHIMAZOLE 10 MG: 10 TABLET ORAL at 22:57

## 2017-07-16 RX ADMIN — Medication 325 MG: at 09:46

## 2017-07-16 RX ADMIN — ASPIRIN 81 MG: 81 TABLET ORAL at 09:45

## 2017-07-16 ASSESSMENT — PAIN SCALES - GENERAL
PAINLEVEL_OUTOF10: 3
PAINLEVEL_OUTOF10: 0
PAINLEVEL_OUTOF10: 5

## 2017-07-16 ASSESSMENT — ENCOUNTER SYMPTOMS
WEAKNESS: 0
CHILLS: 0
BACK PAIN: 0
PALPITATIONS: 0
ORTHOPNEA: 0
SORE THROAT: 0
VOMITING: 0
DEPRESSION: 0
HALLUCINATIONS: 0
HEARTBURN: 0
CLAUDICATION: 0
MYALGIAS: 0
PND: 0
HEADACHES: 0
DIARRHEA: 0
FEVER: 0
ORTHOPNEA: 1
ABDOMINAL PAIN: 0
COUGH: 0
SHORTNESS OF BREATH: 1
BLOOD IN STOOL: 0
DIZZINESS: 0
FOCAL WEAKNESS: 0
NAUSEA: 0

## 2017-07-16 NOTE — CARE PLAN
Problem: Venous Thromboembolism (VTW)/Deep Vein Thrombosis (DVT) Prevention:  Goal: Patient will participate in Venous Thrombosis (VTE)/Deep Vein Thrombosis (DVT)Prevention Measures  Intervention: Assess and monitor for anticoagulation complications  IV access points, gums, mucus membranes observed for bleeding. Also assessed for blood in urine or stool or black stool, nose bleeds, severe bruising, back pain, or chest pain.           Problem: Knowledge Deficit  Goal: Knowledge of disease process/condition, treatment plan, diagnostic tests, and medications will improve  Intervention: Explain information regarding disease process/condition, treatment plan, diagnostic tests, and medications and document in education  Pt  educated on POC, all questions answered in regards to disease process, treatment and diet. Pt  verbalize understanding and voice no further questions at this time.

## 2017-07-16 NOTE — PROGRESS NOTES
Bedside report performed with Annie. Pt is a/o, safety check performed, all possessions and call bell within reach. POC and doctors orders addressed as needed.

## 2017-07-16 NOTE — PROGRESS NOTES
Report received by NOC RN. Assumed care of pt. Assessment complete. Personal items at bedside. Pt A&O x4. Pt is in an afib rhythm 80s, no c/o SOB, and ambulates to bathroom. Pt in no apparent signs of distress. Plan of care discussed. Call light in reach,  bed in lowest position, and pt has no further questions at this time.

## 2017-07-16 NOTE — CARE PLAN
Problem: Venous Thromboembolism (VTW)/Deep Vein Thrombosis (DVT) Prevention:  Goal: Patient will participate in Venous Thrombosis (VTE)/Deep Vein Thrombosis (DVT)Prevention Measures  Intervention: Encourage patient to perform ankle flex, foot rotation, and knee flex exercises in addition to other prophylatic measures every hour while awake  Encouraged to move and flex legs.

## 2017-07-16 NOTE — PROGRESS NOTES
[facility] Hospitalist Progress Note    Date of Service: 2017    Chief Complaint  77 y.o. Female w hx of atrial fibrillation on coumadin, hx of MI s/ 1v CABG, hyperthyroid admitted 2017 with increasing SOB w exertion found to have severe MR, acute on chronic s/grade III-IV d CHF (EF 30%), severe PAH.    Interval Problem Update  : Echo abnormal, MR, PAH, EF 30%, cardiology consulted.  Lasix started. Remains on 4L O2  7/15: Lasix increased, weaned to RA at rest 1-3 with ambulation.  HR controlled, BP 120s  : Dig added, tolerating lasix, remains on RA.  PO vit K x1, coumadin held for cath tuesday    Consultants/Specialty  Dr. Jasso - Cardiology    Disposition  F/U Cath on Tuesday  HR w digoxin        Review of Systems   Constitutional: Negative for fever, chills and malaise/fatigue.   HENT: Negative for sore throat.    Respiratory: Positive for shortness of breath (Improved). Negative for cough.    Cardiovascular: Positive for orthopnea and leg swelling. Negative for chest pain and palpitations (Resolved).   Gastrointestinal: Negative for heartburn, nausea, vomiting, abdominal pain, diarrhea and blood in stool.   Genitourinary: Negative for dysuria and frequency.   Musculoskeletal: Negative for myalgias and back pain.   Neurological: Negative for dizziness, focal weakness, weakness and headaches.   Psychiatric/Behavioral: Negative for depression and hallucinations.   All other systems reviewed and are negative.     Physical Exam  Laboratory/Imaging   Hemodynamics  Temp (24hrs), Av.5 °C (97.7 °F), Min:36.2 °C (97.2 °F), Max:36.7 °C (98.1 °F)   Temperature: 36.5 °C (97.7 °F)  Pulse  Av.4  Min: 60  Max: 123   Blood Pressure : 142/77 mmHg      Respiratory      Respiration: 18, Pulse Oximetry: 100 %        RUL Breath Sounds: Clear, RML Breath Sounds: Clear, RLL Breath Sounds: Clear;Diminished, RICHA Breath Sounds: Clear, LLL Breath Sounds: Clear;Diminished    Fluids    Intake/Output Summary (Last 24  hours) at 07/16/17 1214  Last data filed at 07/16/17 0600   Gross per 24 hour   Intake    480 ml   Output   2200 ml   Net  -1720 ml       Nutrition  Orders Placed This Encounter   Procedures   • Diet Order     Standing Status: Standing      Number of Occurrences: 1      Standing Expiration Date:      Order Specific Question:  Diet:     Answer:  Regular [1]     Physical Exam   Constitutional: She is oriented to person, place, and time. She appears well-developed and well-nourished. No distress.   HENT:   Head: Normocephalic and atraumatic.   Neck: No JVD present. No thyromegaly present.   Cardiovascular:   No murmur heard.  Irreg irreg   Pulmonary/Chest: No respiratory distress. She has no wheezes.   Abdominal: She exhibits no distension. There is no tenderness.   Overweight   Musculoskeletal: She exhibits no edema or tenderness.   Neurological: She is alert and oriented to person, place, and time. No cranial nerve deficit.   Skin: No rash noted. No erythema.   Psychiatric: She has a normal mood and affect. Her behavior is normal.       Recent Labs      07/14/17   0329  07/15/17   0205  07/16/17   0356   WBC  7.2  7.7  7.0   RBC  5.17  4.90  5.14   HEMOGLOBIN  12.2  11.7*  12.2   HEMATOCRIT  40.6  38.6  40.4   MCV  78.5*  78.8*  78.6*   MCH  23.6*  23.9*  23.7*   MCHC  30.0*  30.3*  30.2*   RDW  50.1*  49.7  51.3*   PLATELETCT  242  231  251   MPV  11.3  11.3  11.4     Recent Labs      07/14/17   0329  07/15/17   0205  07/16/17   0356   SODIUM  140  141  141   POTASSIUM  3.8  3.4*  3.4*   CHLORIDE  111  109  107   CO2  21  22  25   GLUCOSE  113*  110*  98   BUN  13  15  14   CREATININE  0.80  0.78  0.71   CALCIUM  9.3  9.2  9.9     Recent Labs      07/14/17   0329  07/15/17   0205  07/16/17   0356   INR  2.16*  2.86*  2.82*                  Assessment/Plan     Hyperthyroidism  Assessment & Plan  - pt has had US as outpt and bx, no cancer  - now waiting for endocrine appt which isn't until September  - Added  methimazole 10 TID  - Continue metoprolol (isn't first line BB for hyperthyroid but she is already on it and generally has good control with it, likely just needs addition of methimazole    Anxiety  Assessment & Plan  - symptomatic care  - per her story concerning to also be contributing to her rvr    Severe mitral regurgitation  Assessment & Plan  Very likely contributing to her symptoms (SOB)  -Cardiology consulting, planning for cath Tuesday  -Lasix to 40 IV daily    Moderate to severe pulmonary hypertension (CMS-[facility])  Assessment & Plan  No smoking hx per patient.  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 2 years  -Continue CPAP  -Consider CTA to rule out PE (but already on coumadin, so PE unlikely)  -Supplemental O2  -Lasix  -Right heart cath with left heart cath on Monday    Acute on chronic combined systolic and diastolic CHF, NYHA class 3 (CMS-HCC)  Assessment & Plan  Inadequate urine output overnight  -Lasix to 40 IV daily  -Atorva 40  -Cath Tuesday  -Requested records from Dr. Marques in [place]  -On coumadin  -Add ASA  -Continue BB  -Continue cozaar  -Add dig today (per cardiology)      Paroxysmal atrial fibrillation (CMS-[facility])  Assessment & Plan  Rate is consistently in 100s-110s at rest, no pain, breathing improved.  -Increase metoprolol to 50 BID  -IV loading dose of dig today  -Add Dig 125 mcg daily in AM  -Hold coumadin for cath Tuesday  -INR elevated, 1 dose PO vit K today  -Tele  -Lytes in AM  -TSH suppressed, added methimazole    Coronary artery disease involving native coronary artery of native heart without angina pectoris, s/p 1V CABG 1992  Assessment & Plan  Cardiologist is in [place]  -Continue Coumadin  -Add asa  -Atorva 40  -Metop 50 BID  -Cardiology following, her last cath was >15 years ago  -Planning cath on Tuesday    Microcytic anemia  Assessment & Plan  - no sign of bleeding, iron was low  - replacement per pharmcy  - Need GI f/u as outpatient as outpatient for colonoscopy screening      Labs  reviewed, Medications reviewed and Radiology images reviewed        DVT Prophylaxis: Warfarin (Coumadin)

## 2017-07-16 NOTE — PROGRESS NOTES
Cardiology Progress Note               Author: Richie Cabrera Date & Time created: 2017  12:09 PM     Interval History:        Consultation for new cardiomyopathy EF 30%, afib RVR ( hx of PAF )      Admitted with palpitation, dyspnea with exertion, afib RVR      History of hyperthyroid ( 3/2017 ) , PAF, CAD with CABG in         Echocardiogram 17, severely reduced LV systolic function, global hypokinesis with regional variation, grade 3 peripheral diastolic dysfunction, severe MR, severe dilated left atrium severely enlarged right atrium, moderately dilated RV, RVSP 80 mmHg, no aortic stenosis        Labs reviewed  K=3.4  Na, CR stable  Trop  neg   WNA==464  T4=2.89      SG=511/77  HR=98 afib    Review of Systems   Respiratory: Negative for cough.    Cardiovascular: Negative for chest pain, palpitations, orthopnea, claudication, leg swelling and PND.       Physical Exam   Constitutional: She is oriented to person, place, and time.   Over weight   HENT:   Head: Normocephalic.   Neck: No thyromegaly present.   Cardiovascular: Normal rate.  An irregularly irregular rhythm present.   Murmur heard.   Systolic murmur is present   Pulses:       Carotid pulses are 2+ on the right side, and 2+ on the left side.       Radial pulses are 2+ on the right side, and 2+ on the left side.        Posterior tibial pulses are 2+ on the right side, and 2+ on the left side.   Pulmonary/Chest: She has no wheezes.   Musculoskeletal: She exhibits no edema.   Neurological: She is alert and oriented to person, place, and time.   Skin: Skin is warm and dry.       Hemodynamics:  Temp (24hrs), Av.5 °C (97.7 °F), Min:36.2 °C (97.2 °F), Max:36.7 °C (98.1 °F)  Temperature: 36.5 °C (97.7 °F)  Pulse  Av.4  Min: 60  Max: 123  Blood Pressure : 142/77 mmHg     Respiratory:    Respiration: 18, Pulse Oximetry: 100 %        RUL Breath Sounds: Clear, RML Breath Sounds: Clear, RLL Breath Sounds: Clear;Diminished, RICHA Breath Sounds:  Clear, LLL Breath Sounds: Clear;Diminished  Fluids:     Weight: 91.9 kg (202 lb 9.6 oz)  GI/Nutrition:  Orders Placed This Encounter   Procedures   • Diet Order     Standing Status: Standing      Number of Occurrences: 1      Standing Expiration Date:      Order Specific Question:  Diet:     Answer:  Regular [1]     Lab Results:  Recent Labs      07/14/17   0329  07/15/17   0205  07/16/17   0356   WBC  7.2  7.7  7.0   RBC  5.17  4.90  5.14   HEMOGLOBIN  12.2  11.7*  12.2   HEMATOCRIT  40.6  38.6  40.4   MCV  78.5*  78.8*  78.6*   MCH  23.6*  23.9*  23.7*   MCHC  30.0*  30.3*  30.2*   RDW  50.1*  49.7  51.3*   PLATELETCT  242  231  251   MPV  11.3  11.3  11.4     Recent Labs      07/14/17   0329  07/15/17   0205  07/16/17   0356   SODIUM  140  141  141   POTASSIUM  3.8  3.4*  3.4*   CHLORIDE  111  109  107   CO2  21  22  25   GLUCOSE  113*  110*  98   BUN  13  15  14   CREATININE  0.80  0.78  0.71   CALCIUM  9.3  9.2  9.9     Recent Labs      07/14/17   0329  07/15/17   0205  07/16/17   0356   INR  2.16*  2.86*  2.82*                     Medical Decision Making, by Problem:  Active Hospital Problems    Diagnosis   • *Atrial fibrillation with RVR (CMS-HCC) [I48.91]   • Microcytic anemia [D50.9]   • Severe mitral regurgitation [I34.0]   • Moderate to severe pulmonary hypertension (CMS-HCC) [I27.2]   • Acute on chronic combined systolic and diastolic CHF, NYHA class 3 (CMS-[facility]) [I50.43]   • Paroxysmal atrial fibrillation (CMS-[facility]) [I48.0]   • Coronary artery disease involving native coronary artery of native heart without angina pectoris, s/p 1V CABG 1992 [I25.10]   • Hyperthyroidism [E05.90]   • Anxiety [F41.9]       Plan:    INR=2.82  Plan for L&R heart cath on Tuesday, when INR more stable  HOld Coumadin  Start IV Heparin when INR subtherapeutic  AFib RVR with exertion, dig added  Excellent diuresis  Weight down  Discussed with DR Jasso      New cardiomyopathy, EF 30% (ischemia versus rate related versus stress  induced versus untreated hyperthyroid), class III, losartan 100 mg, Metoprolol 50 mg BID,     Systolic and diastolic HF, Lasix 40 mg IV       I/O =- 3,000 x 24 h     Weight 202 # ( weight trending down )      Plan for ischemic eval on Tuesday  (L & R heart  cath ) depending on INR,     Severe pulmonary hypertension by echo, continue with diuresis, RHC on Tuesday depending on INR    Severe MR, continue with diuresis    History of CAD with CABG x 1 v 1992, , aspirin, Lipitor 40,  beta blocker, denies angina    A. fib RVR (history of paroxysmal A. Fib), BB, Coumadin, INR=2.82, in afib rate controlled     Low K= 3.4, repleted    Hyperthyroid, Methimazole        Medications reviewed and Labs reviewed

## 2017-07-17 LAB
ALBUMIN SERPL BCP-MCNC: 3.3 G/DL (ref 3.2–4.9)
APTT PPP: 74.9 SEC (ref 24.7–36)
BUN SERPL-MCNC: 16 MG/DL (ref 8–22)
CALCIUM SERPL-MCNC: 9.5 MG/DL (ref 8.5–10.5)
CHLORIDE SERPL-SCNC: 107 MMOL/L (ref 96–112)
CO2 SERPL-SCNC: 24 MMOL/L (ref 20–33)
CREAT SERPL-MCNC: 0.68 MG/DL (ref 0.5–1.4)
ERYTHROCYTE [DISTWIDTH] IN BLOOD BY AUTOMATED COUNT: 49.2 FL (ref 35.9–50)
GFR SERPL CREATININE-BSD FRML MDRD: >60 ML/MIN/1.73 M 2
GLUCOSE SERPL-MCNC: 100 MG/DL (ref 65–99)
HCT VFR BLD AUTO: 40.1 % (ref 37–47)
HGB BLD-MCNC: 12.3 G/DL (ref 12–16)
INR PPP: 1.32 (ref 0.87–1.13)
INR PPP: 1.53 (ref 0.87–1.13)
MCH RBC QN AUTO: 23.8 PG (ref 27–33)
MCHC RBC AUTO-ENTMCNC: 30.7 G/DL (ref 33.6–35)
MCV RBC AUTO: 77.6 FL (ref 81.4–97.8)
PHOSPHATE SERPL-MCNC: 3.8 MG/DL (ref 2.5–4.5)
PLATELET # BLD AUTO: 265 K/UL (ref 164–446)
PMV BLD AUTO: 11.3 FL (ref 9–12.9)
POTASSIUM SERPL-SCNC: 3.7 MMOL/L (ref 3.6–5.5)
PROTHROMBIN TIME: 16.8 SEC (ref 12–14.6)
PROTHROMBIN TIME: 18.9 SEC (ref 12–14.6)
RBC # BLD AUTO: 5.17 M/UL (ref 4.2–5.4)
SODIUM SERPL-SCNC: 138 MMOL/L (ref 135–145)
WBC # BLD AUTO: 7.1 K/UL (ref 4.8–10.8)

## 2017-07-17 PROCEDURE — 80069 RENAL FUNCTION PANEL: CPT

## 2017-07-17 PROCEDURE — 85730 THROMBOPLASTIN TIME PARTIAL: CPT

## 2017-07-17 PROCEDURE — 700102 HCHG RX REV CODE 250 W/ 637 OVERRIDE(OP): Performed by: INTERNAL MEDICINE

## 2017-07-17 PROCEDURE — 770020 HCHG ROOM/CARE - TELE (206)

## 2017-07-17 PROCEDURE — 85610 PROTHROMBIN TIME: CPT

## 2017-07-17 PROCEDURE — 700111 HCHG RX REV CODE 636 W/ 250 OVERRIDE (IP): Performed by: INTERNAL MEDICINE

## 2017-07-17 PROCEDURE — A9270 NON-COVERED ITEM OR SERVICE: HCPCS | Performed by: INTERNAL MEDICINE

## 2017-07-17 PROCEDURE — 85027 COMPLETE CBC AUTOMATED: CPT

## 2017-07-17 PROCEDURE — 99233 SBSQ HOSP IP/OBS HIGH 50: CPT | Performed by: INTERNAL MEDICINE

## 2017-07-17 PROCEDURE — 36415 COLL VENOUS BLD VENIPUNCTURE: CPT

## 2017-07-17 PROCEDURE — 700111 HCHG RX REV CODE 636 W/ 250 OVERRIDE (IP)

## 2017-07-17 RX ADMIN — HEPARIN SODIUM 1200 UNITS/HR: 5000 INJECTION, SOLUTION INTRAVENOUS at 14:58

## 2017-07-17 RX ADMIN — ACETAMINOPHEN 650 MG: 325 TABLET, FILM COATED ORAL at 21:01

## 2017-07-17 RX ADMIN — METHIMAZOLE 10 MG: 10 TABLET ORAL at 21:01

## 2017-07-17 RX ADMIN — POTASSIUM CHLORIDE 40 MEQ: 1500 TABLET, EXTENDED RELEASE ORAL at 08:27

## 2017-07-17 RX ADMIN — ASPIRIN 81 MG: 81 TABLET ORAL at 08:26

## 2017-07-17 RX ADMIN — METOPROLOL TARTRATE 50 MG: 50 TABLET, FILM COATED ORAL at 21:01

## 2017-07-17 RX ADMIN — FUROSEMIDE 40 MG: 10 INJECTION, SOLUTION INTRAMUSCULAR; INTRAVENOUS at 10:45

## 2017-07-17 RX ADMIN — METHIMAZOLE 10 MG: 10 TABLET ORAL at 15:03

## 2017-07-17 RX ADMIN — POTASSIUM CHLORIDE 40 MEQ: 1500 TABLET, EXTENDED RELEASE ORAL at 21:01

## 2017-07-17 RX ADMIN — METHIMAZOLE 10 MG: 10 TABLET ORAL at 08:31

## 2017-07-17 RX ADMIN — METOPROLOL TARTRATE 50 MG: 50 TABLET, FILM COATED ORAL at 08:28

## 2017-07-17 RX ADMIN — DIGOXIN 125 MCG: 125 TABLET ORAL at 18:46

## 2017-07-17 RX ADMIN — LOSARTAN POTASSIUM 100 MG: 50 TABLET, FILM COATED ORAL at 08:28

## 2017-07-17 RX ADMIN — Medication 325 MG: at 08:26

## 2017-07-17 RX ADMIN — ATORVASTATIN CALCIUM 40 MG: 40 TABLET, FILM COATED ORAL at 21:01

## 2017-07-17 ASSESSMENT — ENCOUNTER SYMPTOMS
DIARRHEA: 0
DIZZINESS: 0
VOMITING: 0
HEARTBURN: 0
HEADACHES: 0
ORTHOPNEA: 1
NAUSEA: 0
FEVER: 0
WEAKNESS: 0
FOCAL WEAKNESS: 0
CHILLS: 0
PALPITATIONS: 0
MYALGIAS: 0
SORE THROAT: 0
ABDOMINAL PAIN: 0
SHORTNESS OF BREATH: 1
COUGH: 0
DEPRESSION: 0
HALLUCINATIONS: 0
BLOOD IN STOOL: 0
WHEEZING: 1
BACK PAIN: 0

## 2017-07-17 NOTE — HEART FAILURE PROGRAM
Cardiovascular Nurse Navigator () Progress Note:    Attempted to meet with patient to provide HF Education. Pt not in room. HF packet not seen, left one on table.    Thank you and please call with questions, Aby

## 2017-07-17 NOTE — PROGRESS NOTES
"Date of Service: 7/17/2017  Chief Complaint:  Chief Complaint   Patient presents with   • Shortness of Breath     pt D/C at 10:00 on 7/12/17 for SOB, tx of Afib with RVR. pt was home increasing SOB, call ed [facility]. pt HTN.      Interval History:  78 y/o femal: Consultation for new cardiomyopathy EF 30%, afib RVR ( hx of PAF )Admitted with palpitation, dyspnea with exertion, afib RVR  History of hyperthyroid ( 3/2017 ) , PAF, CAD with CABG in 1992  Breathing not as good this AM but better after her bathroom chores;  All recent medication, labs, imaging studies and procedures reviewed    Physical Exam   Blood pressure 178/87, pulse 74, temperature 36.9 °C (98.5 °F), resp. rate 16, height 1.6 m (5' 3\"), weight 90.1 kg (198 lb 10.2 oz), SpO2 96 %, not currently breastfeeding.    Constitutional:  SHe appears well-developed.   HENT: Normocephalic and atraumatic. No scleral icterus.   Neck: No JVD present.   Cardiovascular: Normal rate. RRR; Exam reveals no gallop and no friction rub. No murmur heard.   Pulmonary/Chest: CTAB coarse   Abdominal: S/NT/ND BS+   Musculoskeletal: SHe exhibits no edema. Pulses present.  Skin: Skin is warm and dry.       Intake/Output Summary (Last 24 hours) at 07/17/17 0904  Last data filed at 07/17/17 0500   Gross per 24 hour   Intake    480 ml   Output   1800 ml   Net  -1320 ml       LABS:  No results found for: CHOLSTRLTOT, LDL, HDL, TRIGLYCERIDE    Lab Results   Component Value Date/Time    WBC 7.1 07/17/2017 02:39 AM    RBC 5.17 07/17/2017 02:39 AM    HEMOGLOBIN 12.3 07/17/2017 02:39 AM    HEMATOCRIT 40.1 07/17/2017 02:39 AM    MCV 77.6* 07/17/2017 02:39 AM    NEUTROPHILS-POLYS 67.20 07/16/2017 03:56 AM    LYMPHOCYTES 16.00* 07/16/2017 03:56 AM    MONOCYTES 10.90 07/16/2017 03:56 AM    EOSINOPHILS 4.90 07/16/2017 03:56 AM    BASOPHILS 0.70 07/16/2017 03:56 AM     Lab Results   Component Value Date/Time    SODIUM 138 07/17/2017 02:39 AM    POTASSIUM 3.7 07/17/2017 02:39 AM    CHLORIDE 107 " 07/17/2017 02:39 AM    CO2 24 07/17/2017 02:39 AM    GLUCOSE 100* 07/17/2017 02:39 AM    BUN 16 07/17/2017 02:39 AM    CREATININE 0.68 07/17/2017 02:39 AM         Lab Results   Component Value Date/Time    ALKALINE PHOSPHATASE 89 07/13/2017 03:01 AM    AST(SGOT) 16 07/13/2017 03:01 AM    ALT(SGPT) 11 07/13/2017 03:01 AM    TOTAL BILIRUBIN 1.5 07/13/2017 03:01 AM      Lab Results   Component Value Date/Time    B NATRIURETIC PEPTIDE 499* 07/13/2017 03:01 AM      No results found for: TSH  Lab Results   Component Value Date/Time    PT 30.5* 07/16/2017 03:56 AM    INR 2.82* 07/16/2017 03:56 AM        Medications reviewed    Imaging reviewed    ECHO(7/14/2017):  Severely reduced left ventricular systolic function.Left ventricular ejection fraction is visually estimated to   be 30-34%.  Global hypokinesis with regional variation, most prominent in mid   lateral wall.  Grade III-IV diastolic dysfunction (restrictive pattern).  Ecentric, posterolaterally directed, severe mitral regurgitation.  Severely dilated left atrium.  Severely enlarged right atrium.  Dilated inferior vena cava without inspiratory collapse.  Moderately dilated right ventricle.  Reduced right ventricular systolic function.  Estimated right ventricular systolic pressure  is 80 mmHg.  Right heart pressures are consistent with severe pulmonary   hypertension.  No aortic stenosis    Impressions:  •  *Atrial fibrillation with RVR (CMS-HCC) [I48.91]    •  Microcytic anemia [D50.9]    •  Severe mitral regurgitation [I34.0]    •  Moderate to severe pulmonary hypertension (CMS-[facility]) [I27.2]    •  Acute on chronic combined systolic and diastolic CHF, NYHA class 3 (CMS-[facility]) [I50.43]    •  Paroxysmal atrial fibrillation (CMS-[facility]) [I48.0]    •  Coronary artery disease involving native coronary artery of native heart without angina pectoris, s/p 1V CABG 1992 [I25.10]    •  Hyperthyroidism [E05.90]    •  Anxiety [F41.9               Recommendations:  History of CAD  with CABG x 1 v 1992, , aspirin, Lipitor 40,  beta blocker, denies angina  Recheck INR;  Coumadin discontinued for planned L&R cardiac catheterization on Tuesday. If INR becomes subtherapeutic, we will need to start heparin drip.  Patient is agreeable with the above plan. Little scared and wants to be sedated for cath;    Case discussed with javan and RN  Will follow  x

## 2017-07-17 NOTE — PROGRESS NOTES
Bedside report performed with Yomi. Pt is a/o, safety check performed, all possessions and call bell within reach. POC and doctors orders addressed as needed.

## 2017-07-18 ENCOUNTER — PATIENT OUTREACH (OUTPATIENT)
Dept: HEALTH INFORMATION MANAGEMENT | Facility: OTHER | Age: 77
End: 2017-07-18

## 2017-07-18 LAB
ALBUMIN SERPL BCP-MCNC: 3.3 G/DL (ref 3.2–4.9)
BUN SERPL-MCNC: 14 MG/DL (ref 8–22)
CALCIUM SERPL-MCNC: 10 MG/DL (ref 8.5–10.5)
CHLORIDE SERPL-SCNC: 107 MMOL/L (ref 96–112)
CO2 SERPL-SCNC: 27 MMOL/L (ref 20–33)
CREAT SERPL-MCNC: 0.82 MG/DL (ref 0.5–1.4)
ERYTHROCYTE [DISTWIDTH] IN BLOOD BY AUTOMATED COUNT: 50.3 FL (ref 35.9–50)
GFR SERPL CREATININE-BSD FRML MDRD: >60 ML/MIN/1.73 M 2
GLUCOSE SERPL-MCNC: 103 MG/DL (ref 65–99)
HCT VFR BLD AUTO: 43.7 % (ref 37–47)
HGB BLD-MCNC: 13.1 G/DL (ref 12–16)
INR PPP: 1.26 (ref 0.87–1.13)
MCH RBC QN AUTO: 23.6 PG (ref 27–33)
MCHC RBC AUTO-ENTMCNC: 30 G/DL (ref 33.6–35)
MCV RBC AUTO: 78.7 FL (ref 81.4–97.8)
PHOSPHATE SERPL-MCNC: 3.8 MG/DL (ref 2.5–4.5)
PLATELET # BLD AUTO: 266 K/UL (ref 164–446)
PMV BLD AUTO: 11.4 FL (ref 9–12.9)
POTASSIUM SERPL-SCNC: 3.8 MMOL/L (ref 3.6–5.5)
PROTHROMBIN TIME: 16.2 SEC (ref 12–14.6)
RBC # BLD AUTO: 5.55 M/UL (ref 4.2–5.4)
SODIUM SERPL-SCNC: 140 MMOL/L (ref 135–145)
WBC # BLD AUTO: 6.6 K/UL (ref 4.8–10.8)

## 2017-07-18 PROCEDURE — 93461 R&L HRT ART/VENTRICLE ANGIO: CPT

## 2017-07-18 PROCEDURE — A9270 NON-COVERED ITEM OR SERVICE: HCPCS | Performed by: INTERNAL MEDICINE

## 2017-07-18 PROCEDURE — 4A023N8 MEASUREMENT OF CARDIAC SAMPLING AND PRESSURE, BILATERAL, PERCUTANEOUS APPROACH: ICD-10-PCS | Performed by: INTERNAL MEDICINE

## 2017-07-18 PROCEDURE — 700102 HCHG RX REV CODE 250 W/ 637 OVERRIDE(OP): Performed by: INTERNAL MEDICINE

## 2017-07-18 PROCEDURE — 80069 RENAL FUNCTION PANEL: CPT

## 2017-07-18 PROCEDURE — B2151ZZ FLUOROSCOPY OF LEFT HEART USING LOW OSMOLAR CONTRAST: ICD-10-PCS | Performed by: INTERNAL MEDICINE

## 2017-07-18 PROCEDURE — 99153 MOD SED SAME PHYS/QHP EA: CPT

## 2017-07-18 PROCEDURE — B2181ZZ FLUOROSCOPY OF LEFT INTERNAL MAMMARY BYPASS GRAFT USING LOW OSMOLAR CONTRAST: ICD-10-PCS | Performed by: INTERNAL MEDICINE

## 2017-07-18 PROCEDURE — C1769 GUIDE WIRE: HCPCS

## 2017-07-18 PROCEDURE — 700117 HCHG RX CONTRAST REV CODE 255: Performed by: INTERNAL MEDICINE

## 2017-07-18 PROCEDURE — 99233 SBSQ HOSP IP/OBS HIGH 50: CPT | Performed by: INTERNAL MEDICINE

## 2017-07-18 PROCEDURE — 304952 HCHG R 2 PADS

## 2017-07-18 PROCEDURE — B2121ZZ FLUOROSCOPY OF SINGLE CORONARY ARTERY BYPASS GRAFT USING LOW OSMOLAR CONTRAST: ICD-10-PCS | Performed by: INTERNAL MEDICINE

## 2017-07-18 PROCEDURE — 700105 HCHG RX REV CODE 258: Performed by: INTERNAL MEDICINE

## 2017-07-18 PROCEDURE — 99152 MOD SED SAME PHYS/QHP 5/>YRS: CPT

## 2017-07-18 PROCEDURE — 700101 HCHG RX REV CODE 250

## 2017-07-18 PROCEDURE — 700111 HCHG RX REV CODE 636 W/ 250 OVERRIDE (IP): Performed by: INTERNAL MEDICINE

## 2017-07-18 PROCEDURE — 305478 HCHG 7.5FR EDWARDS SWAN/THERMO

## 2017-07-18 PROCEDURE — 360979 HCHG DIAGNOSTIC CATH

## 2017-07-18 PROCEDURE — 85027 COMPLETE CBC AUTOMATED: CPT

## 2017-07-18 PROCEDURE — 36415 COLL VENOUS BLD VENIPUNCTURE: CPT

## 2017-07-18 PROCEDURE — B2111ZZ FLUOROSCOPY OF MULTIPLE CORONARY ARTERIES USING LOW OSMOLAR CONTRAST: ICD-10-PCS | Performed by: INTERNAL MEDICINE

## 2017-07-18 PROCEDURE — 85610 PROTHROMBIN TIME: CPT

## 2017-07-18 PROCEDURE — 700111 HCHG RX REV CODE 636 W/ 250 OVERRIDE (IP)

## 2017-07-18 PROCEDURE — C1894 INTRO/SHEATH, NON-LASER: HCPCS

## 2017-07-18 PROCEDURE — 770020 HCHG ROOM/CARE - TELE (206)

## 2017-07-18 RX ORDER — HEPARIN SODIUM,PORCINE 1000/ML
VIAL (ML) INJECTION
Status: COMPLETED
Start: 2017-07-18 | End: 2017-07-18

## 2017-07-18 RX ORDER — LIDOCAINE HYDROCHLORIDE 20 MG/ML
INJECTION, SOLUTION INFILTRATION; PERINEURAL
Status: COMPLETED
Start: 2017-07-18 | End: 2017-07-18

## 2017-07-18 RX ORDER — LORAZEPAM 0.5 MG/1
0.5 TABLET ORAL ONCE
Status: COMPLETED | OUTPATIENT
Start: 2017-07-18 | End: 2017-07-18

## 2017-07-18 RX ORDER — ONDANSETRON 2 MG/ML
4 INJECTION INTRAMUSCULAR; INTRAVENOUS EVERY 4 HOURS PRN
Status: DISCONTINUED | OUTPATIENT
Start: 2017-07-18 | End: 2017-07-19 | Stop reason: HOSPADM

## 2017-07-18 RX ORDER — SODIUM CHLORIDE 9 MG/ML
INJECTION, SOLUTION INTRAVENOUS CONTINUOUS
Status: ACTIVE | OUTPATIENT
Start: 2017-07-18 | End: 2017-07-18

## 2017-07-18 RX ORDER — HALOPERIDOL 5 MG/ML
1 INJECTION INTRAMUSCULAR EVERY 6 HOURS PRN
Status: DISCONTINUED | OUTPATIENT
Start: 2017-07-18 | End: 2017-07-19 | Stop reason: HOSPADM

## 2017-07-18 RX ORDER — SCOLOPAMINE TRANSDERMAL SYSTEM 1 MG/1
1 PATCH, EXTENDED RELEASE TRANSDERMAL
Status: DISCONTINUED | OUTPATIENT
Start: 2017-07-18 | End: 2017-07-19 | Stop reason: HOSPADM

## 2017-07-18 RX ORDER — LABETALOL HYDROCHLORIDE 5 MG/ML
10 INJECTION, SOLUTION INTRAVENOUS EVERY 4 HOURS PRN
Status: DISCONTINUED | OUTPATIENT
Start: 2017-07-18 | End: 2017-07-19 | Stop reason: HOSPADM

## 2017-07-18 RX ORDER — DEXAMETHASONE SODIUM PHOSPHATE 4 MG/ML
4 INJECTION, SOLUTION INTRA-ARTICULAR; INTRALESIONAL; INTRAMUSCULAR; INTRAVENOUS; SOFT TISSUE
Status: DISCONTINUED | OUTPATIENT
Start: 2017-07-18 | End: 2017-07-19 | Stop reason: HOSPADM

## 2017-07-18 RX ORDER — DIPHENHYDRAMINE HYDROCHLORIDE 50 MG/ML
25 INJECTION INTRAMUSCULAR; INTRAVENOUS EVERY 6 HOURS PRN
Status: DISCONTINUED | OUTPATIENT
Start: 2017-07-18 | End: 2017-07-19 | Stop reason: HOSPADM

## 2017-07-18 RX ORDER — MIDAZOLAM HYDROCHLORIDE 1 MG/ML
INJECTION INTRAMUSCULAR; INTRAVENOUS
Status: COMPLETED
Start: 2017-07-18 | End: 2017-07-18

## 2017-07-18 RX ADMIN — FUROSEMIDE 40 MG: 10 INJECTION, SOLUTION INTRAMUSCULAR; INTRAVENOUS at 07:33

## 2017-07-18 RX ADMIN — ATORVASTATIN CALCIUM 40 MG: 40 TABLET, FILM COATED ORAL at 22:47

## 2017-07-18 RX ADMIN — SODIUM CHLORIDE: 9 INJECTION, SOLUTION INTRAVENOUS at 18:01

## 2017-07-18 RX ADMIN — POTASSIUM CHLORIDE 40 MEQ: 1500 TABLET, EXTENDED RELEASE ORAL at 22:47

## 2017-07-18 RX ADMIN — METHIMAZOLE 10 MG: 10 TABLET ORAL at 17:03

## 2017-07-18 RX ADMIN — LORAZEPAM 0.5 MG: 0.5 TABLET ORAL at 09:52

## 2017-07-18 RX ADMIN — METOPROLOL TARTRATE 50 MG: 50 TABLET, FILM COATED ORAL at 22:47

## 2017-07-18 RX ADMIN — Medication 325 MG: at 07:33

## 2017-07-18 RX ADMIN — METHIMAZOLE 10 MG: 10 TABLET ORAL at 07:34

## 2017-07-18 RX ADMIN — IOHEXOL 146 ML: 350 INJECTION, SOLUTION INTRAVENOUS at 16:14

## 2017-07-18 RX ADMIN — LOSARTAN POTASSIUM 100 MG: 50 TABLET, FILM COATED ORAL at 07:33

## 2017-07-18 RX ADMIN — ASPIRIN 81 MG: 81 TABLET ORAL at 07:33

## 2017-07-18 RX ADMIN — DIGOXIN 125 MCG: 125 TABLET ORAL at 18:01

## 2017-07-18 RX ADMIN — LIDOCAINE HYDROCHLORIDE: 20 INJECTION, SOLUTION INFILTRATION; PERINEURAL at 14:28

## 2017-07-18 RX ADMIN — MIDAZOLAM 1.5 MG: 1 INJECTION INTRAMUSCULAR; INTRAVENOUS at 16:11

## 2017-07-18 RX ADMIN — POTASSIUM CHLORIDE 40 MEQ: 1500 TABLET, EXTENDED RELEASE ORAL at 07:33

## 2017-07-18 RX ADMIN — FENTANYL CITRATE 50 MCG: 50 INJECTION, SOLUTION INTRAMUSCULAR; INTRAVENOUS at 16:11

## 2017-07-18 RX ADMIN — LORAZEPAM 0.5 MG: 2 INJECTION INTRAMUSCULAR; INTRAVENOUS at 13:34

## 2017-07-18 RX ADMIN — METOPROLOL TARTRATE 50 MG: 50 TABLET, FILM COATED ORAL at 07:33

## 2017-07-18 RX ADMIN — HEPARIN SODIUM 2000 UNITS: 200 INJECTION, SOLUTION INTRAVENOUS at 14:27

## 2017-07-18 RX ADMIN — METHIMAZOLE 10 MG: 10 TABLET ORAL at 22:47

## 2017-07-18 RX ADMIN — HEPARIN SODIUM: 1000 INJECTION, SOLUTION INTRAVENOUS; SUBCUTANEOUS at 14:28

## 2017-07-18 RX ADMIN — NITROGLYCERIN 10 ML: 20 INJECTION INTRAVENOUS at 14:28

## 2017-07-18 ASSESSMENT — ENCOUNTER SYMPTOMS
CHILLS: 0
DEPRESSION: 0
BACK PAIN: 0
ABDOMINAL PAIN: 0
DIZZINESS: 0
HEARTBURN: 0
PALPITATIONS: 0
WHEEZING: 0
ORTHOPNEA: 0
FEVER: 0
FOCAL WEAKNESS: 0
SHORTNESS OF BREATH: 0
NERVOUS/ANXIOUS: 1
MYALGIAS: 0
NAUSEA: 0
WEAKNESS: 0
COUGH: 0

## 2017-07-18 ASSESSMENT — PAIN SCALES - GENERAL: PAINLEVEL_OUTOF10: 0

## 2017-07-18 NOTE — PROGRESS NOTES
[facility] Hospitalist Progress Note    Date of Service: 2017    Chief Complaint  77 y.o. Female w hx of atrial fibrillation on coumadin, hx of MI s/ 1v CABG, hyperthyroid admitted 2017 with increasing SOB w exertion found to have severe MR, acute on chronic s/grade III-IV d CHF (EF 30%), severe PAH.    Interval Problem Update  Patient anxious and tearful about cardiac cath today. Cardiac cath procedure explained to patient. She states that she has had 3 previous catheterizations before and is familiar with the procedure. Patient is anxious as her family cannot be with her. Patient's  and daughter recovering from medical illnesses at present time.    Consultants/Specialty  Dr. Alexis/ Cardiology    Disposition  Pt to get cath today, dispo pending         Review of Systems   Constitutional: Negative for fever, chills and malaise/fatigue.   Respiratory: Negative for cough, shortness of breath and wheezing.    Cardiovascular: Negative for chest pain, palpitations, orthopnea and leg swelling.   Gastrointestinal: Negative for heartburn, nausea and abdominal pain.   Genitourinary: Negative for dysuria and frequency.   Musculoskeletal: Negative for myalgias and back pain.   Neurological: Negative for dizziness, focal weakness and weakness.   Psychiatric/Behavioral: Negative for depression. The patient is nervous/anxious.    All other systems reviewed and are negative.     Physical Exam  Laboratory/Imaging   Hemodynamics  Temp (24hrs), Av.6 °C (97.9 °F), Min:36.2 °C (97.1 °F), Max:37 °C (98.6 °F)   Temperature: 36.6 °C (97.9 °F)  Pulse  Av.9  Min: 59  Max: 123   Blood Pressure : 148/63 mmHg      Respiratory      Respiration: 17, Pulse Oximetry: 94 %        RUL Breath Sounds: Clear, RML Breath Sounds: Clear, RLL Breath Sounds: Clear, RICHA Breath Sounds: Clear, LLL Breath Sounds: Clear    Fluids    Intake/Output Summary (Last 24 hours) at 17 1108  Last data filed at 17 0600   Gross per 24 hour    Intake  696.8 ml   Output   1400 ml   Net -703.2 ml       Nutrition  Orders Placed This Encounter   Procedures   • DIET NPO     Standing Status: Standing      Number of Occurrences: 8      Standing Expiration Date:      Order Specific Question:  Restrict to:     Answer:  Sips with Medications [3]     Physical Exam   Constitutional: She is oriented to person, place, and time. She appears well-developed and well-nourished. No distress.   HENT:   Head: Normocephalic and atraumatic.   Neck: No JVD present. No thyromegaly present.   Cardiovascular:   Murmur heard.  Irreg irreg   Pulmonary/Chest: No respiratory distress. She has no wheezes.   Abdominal: Soft. Bowel sounds are normal. She exhibits no distension. There is no tenderness.   Overweight   Musculoskeletal: She exhibits no edema or tenderness.   Neurological: She is alert and oriented to person, place, and time. No cranial nerve deficit.   Skin: No rash noted. No erythema. There is pallor.   Psychiatric: She has a normal mood and affect. Her behavior is normal.   Nursing note and vitals reviewed.      Recent Labs      07/16/17   0356  07/17/17   0239  07/18/17   0507   WBC  7.0  7.1  6.6   RBC  5.14  5.17  5.55*   HEMOGLOBIN  12.2  12.3  13.1   HEMATOCRIT  40.4  40.1  43.7   MCV  78.6*  77.6*  78.7*   MCH  23.7*  23.8*  23.6*   MCHC  30.2*  30.7*  30.0*   RDW  51.3*  49.2  50.3*   PLATELETCT  251  265  266   MPV  11.4  11.3  11.4     Recent Labs      07/16/17   0356  07/17/17   0239  07/18/17   0507   SODIUM  141  138  140   POTASSIUM  3.4*  3.7  3.8   CHLORIDE  107  107  107   CO2  25  24  27   GLUCOSE  98  100*  103*   BUN  14  16  14   CREATININE  0.71  0.68  0.82   CALCIUM  9.9  9.5  10.0     Recent Labs      07/17/17   1111  07/17/17   2034  07/18/17   0507   APTT   --   74.9*   --    INR  1.53*  1.32*  1.26*                  Assessment/Plan     * Acute on chronic combined systolic and diastolic CHF, NYHA class 3 (CMS-HCC)  Assessment & Plan  -Lasix 40 mg  IV daily  -Atorvastatin 40 mg  -cath today   -Requested records from Dr. Marques in [place]  - ASA  -Continue BB, cozaar, dig 125 mcg daily      Hyperthyroidism  Assessment & Plan  - pt has had US as outpt and bx, no cancer  - now waiting for endocrine appt which isn't until September  - Added methimazole 10 TID  - Continue metoprolol (isn't first line BB for hyperthyroid but she is already on it and generally has good control with it, likely just needs addition of methimazole    Anxiety  Assessment & Plan  - symptomatic care  - per her story ( and daughter presently recovering from medical illnesses) concerning to also be contributing to her rvr    Severe mitral regurgitation  Assessment & Plan  Very likely contributing to her symptoms (SOB)  -Cardiology consulting, planning for cath today  -Lasix to 40 IV daily  - Echo shows EF 30%, severe MR, RVSP 80 mmHg     Moderate to severe pulmonary hypertension (CMS-HCC)  Assessment & Plan  No smoking hx per patient.  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 2 years  -Continue CPAP nightly  -Consider CTA to rule out PE (but already on coumadin, so PE unlikely)  -Supplemental O2  -Lasix  -Right heart cath with left heart cath today    Paroxysmal atrial fibrillation (CMS-HCC)  Assessment & Plan  Rate controlled  -Metoprolol to 50 BID  -Dig 125 mcg daily in AM  -Hold coumadin for cath, bridge w heparin gtt   -Tele  -TSH suppressed, added methimazole    Coronary artery disease involving native coronary artery of native heart without angina pectoris, s/p 1V CABG 1992  Assessment & Plan  Cardiologist is in [place]  -Continue heparin gtt, hold at midnight (hold coumadin and resume after cath)  -Add asa  -Atorva 40  -Metop 50 BID  -Cardiology following, her last cath was >15 years ago  -Planning cath today    Microcytic anemia  Assessment & Plan  - no sign of bleeding, iron was low  - got IV replacement per pharmcy  - Need GI f/u as outpatient as outpatient for colonoscopy  screening      Labs reviewed, Medications reviewed and Radiology images reviewed        DVT Prophylaxis: Warfarin (Coumadin)

## 2017-07-18 NOTE — PROGRESS NOTES
Bedside report performed with Adolfo. Pt is a/o, safety check performed, all possessions and call bell within reach. POC and doctors orders addressed as needed.

## 2017-07-18 NOTE — CATH LAB
Immediate Post-Operative Note      PreOp Diagnosis: S/P CABG and mitral insufficiency    PostOp Diagnosis: same    Procedure(s) :  Coronary Angiography, Left Heart Catheterization, Left Ventriculography. anative ANDRÉS angio, SVG X1.  R heart cath    Surgeon(s):  Star Keith M.D.    Type of Anesthesia: Moderate Sedation    Specimen: None    Estimated Blood Loss: 15 cc's    Contrast Media:  146 cc's    Fluoro Time: 7.5 min    Xray DAP: 27071    Findings: Widely patent SVG to RCA. 100% mid RCA. 3+ or greater MR by LV gram. Significant V waves  On PAW    Complications: none      Star Keith M.D.  7/18/2017 4:18 PM

## 2017-07-18 NOTE — PROGRESS NOTES
Bedside report received. Pt admitted for JONES/SOB. Hx Afib. Echo 30%, severe MR. Scheduled for cath on Tues a.m. Pt denies CP, SOB, or N&V, at this time. Paperwork needs to be signed for tomorrow. Bed low and locked, alarm off, call bell and phone within reach.

## 2017-07-18 NOTE — CARE PLAN
Problem: Bowel/Gastric:  Goal: Normal bowel function is maintained or improved  Outcome: PROGRESSING AS EXPECTED    Problem: Respiratory:  Goal: Respiratory status will improve  Outcome: PROGRESSING AS EXPECTED

## 2017-07-18 NOTE — PROGRESS NOTES
Bedside report received. Pt is asleep in bed, no acute distress. PM RN reports no issues overnight. Pt scheduled for heart cath today, time TBD. No other issues. Bed low and locked, alarm on, call bell and phone in place.

## 2017-07-18 NOTE — PROGRESS NOTES
[facility] Hospitalist Progress Note    Date of Service: 2017    Chief Complaint  77 y.o. Female w hx of atrial fibrillation on coumadin, hx of MI s/ 1v CABG, hyperthyroid admitted 2017 with increasing SOB w exertion found to have severe MR, acute on chronic s/grade III-IV d CHF (EF 30%), severe PAH.    Interval Problem Update  : Echo abnormal, MR, PAH, EF 30%, cardiology consulted.  Lasix started. Remains on 4L O2  7/15: Lasix increased, weaned to RA at rest 1-3 with ambulation.  HR controlled, BP 120s  : Dig added, tolerating lasix, remains on RA.  PO vit K x1, coumadin held for cath : BP elevated, not at goal despite two rx and lasix.  Heparin gtt started to bridge prior to cath.  NPO at MN    Consultants/Specialty  Dr. Jasso - Cardiology    Disposition  F/U Cath in AM, heparin gtt        Review of Systems   Constitutional: Negative for fever, chills and malaise/fatigue.   HENT: Negative for sore throat.    Respiratory: Positive for shortness of breath (Feels slightly worse today, still on room air) and wheezing. Negative for cough.    Cardiovascular: Positive for orthopnea and leg swelling (R>L, improved since admission). Negative for chest pain and palpitations (Resolved).   Gastrointestinal: Negative for heartburn, nausea, vomiting, abdominal pain, diarrhea and blood in stool.   Genitourinary: Negative for dysuria and frequency.   Musculoskeletal: Negative for myalgias and back pain.   Neurological: Negative for dizziness, focal weakness, weakness and headaches.   Psychiatric/Behavioral: Negative for depression and hallucinations.   All other systems reviewed and are negative.     Physical Exam  Laboratory/Imaging   Hemodynamics  Temp (24hrs), Av.7 °C (98 °F), Min:36.2 °C (97.2 °F), Max:37 °C (98.6 °F)   Temperature: 37 °C (98.6 °F)  Pulse  Av.2  Min: 59  Max: 123   Blood Pressure : 137/72 mmHg      Respiratory      Respiration: 18, Pulse Oximetry: 93 %              Fluids    Intake/Output Summary (Last 24 hours) at 07/17/17 1706  Last data filed at 07/17/17 0500   Gross per 24 hour   Intake    480 ml   Output   1200 ml   Net   -720 ml       Nutrition  Orders Placed This Encounter   Procedures   • Diet Order     Standing Status: Standing      Number of Occurrences: 1      Standing Expiration Date:      Order Specific Question:  Diet:     Answer:  Regular [1]     Physical Exam   Constitutional: She is oriented to person, place, and time. She appears well-developed and well-nourished. No distress.   HENT:   Head: Normocephalic and atraumatic.   Neck: No JVD present. No thyromegaly present.   Cardiovascular:   No murmur heard.  Irreg irreg   Pulmonary/Chest: No respiratory distress. She has no wheezes.   Tachypnia   Abdominal: She exhibits no distension. There is no tenderness.   Overweight   Musculoskeletal: She exhibits no edema or tenderness.   Neurological: She is alert and oriented to person, place, and time. No cranial nerve deficit.   Skin: No rash noted. No erythema. There is pallor.   Psychiatric: She has a normal mood and affect. Her behavior is normal.       Recent Labs      07/15/17   0205  07/16/17   0356  07/17/17   0239   WBC  7.7  7.0  7.1   RBC  4.90  5.14  5.17   HEMOGLOBIN  11.7*  12.2  12.3   HEMATOCRIT  38.6  40.4  40.1   MCV  78.8*  78.6*  77.6*   MCH  23.9*  23.7*  23.8*   MCHC  30.3*  30.2*  30.7*   RDW  49.7  51.3*  49.2   PLATELETCT  231  251  265   MPV  11.3  11.4  11.3     Recent Labs      07/15/17   0205  07/16/17   0356  07/17/17   0239   SODIUM  141  141  138   POTASSIUM  3.4*  3.4*  3.7   CHLORIDE  109  107  107   CO2  22  25  24   GLUCOSE  110*  98  100*   BUN  15  14  16   CREATININE  0.78  0.71  0.68   CALCIUM  9.2  9.9  9.5     Recent Labs      07/15/17   0205  07/16/17   0356  07/17/17   1111   INR  2.86*  2.82*  1.53*                  Assessment/Plan     * Acute on chronic combined systolic and diastolic CHF, NYHA class 3  (CMS-HCC)  Assessment & Plan  Inadequate urine output overnight  -Lasix 40 IV daily  -Atorva 40  -Cath Tuesday  -Requested records from Dr. Marques in [facility]  -Added ASA  -Continue BB  -Continue cozaar  -Continue dig 125 mcg daily      Hyperthyroidism  Assessment & Plan  - pt has had US as outpt and bx, no cancer  - now waiting for endocrine appt which isn't until September  - Added methimazole 10 TID  - Continue metoprolol (isn't first line BB for hyperthyroid but she is already on it and generally has good control with it, likely just needs addition of methimazole    Anxiety  Assessment & Plan  - symptomatic care  - per her story concerning to also be contributing to her rvr    Severe mitral regurgitation  Assessment & Plan  Very likely contributing to her symptoms (SOB)  -Cardiology consulting, planning for cath Tuesday  -Lasix to 40 IV daily    Moderate to severe pulmonary hypertension (CMS-HCC)  Assessment & Plan  No smoking hx per patient.  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  -Metoprolol to 50 BID  -Dig 125 mcg daily in AM  -Hold coumadin for cath Tuesday, bridge w heparin gtt due to INR of 1.5 (high CHADS2)  -Tele  -Lytes in AM  -TSH suppressed, added methimazole    Coronary artery disease involving native coronary artery of native heart without angina pectoris, s/p 1V CABG 1992  Assessment & Plan  Cardiologist is in [facility]  -Continue heparin gtt, hold at midnight (hold coumadin and resume after cath)  -Add asa  -Atorva 40  -Metop 50 BID  -Cardiology following, her last cath was >15 years ago  -Planning cath on Tuesday    Microcytic anemia  Assessment & Plan  - no sign of bleeding, iron was low  -  got IV replacement per pharmcy  - Need GI f/u as outpatient as outpatient for colonoscopy screening      Labs reviewed, Medications reviewed and Radiology images reviewed        DVT Prophylaxis: Warfarin (Coumadin)

## 2017-07-18 NOTE — PROGRESS NOTES
"Date of Service: 7/18/2017  Chief Complaint:  Chief Complaint   Patient presents with   • Shortness of Breath     pt D/C at 10:00 on 7/12/17 for SOB, tx of Afib with RVR. pt was home increasing SOB, call ed [facility]. pt HTN.      Interval History:  78 y/o femal: Consultation for new cardiomyopathy EF 30%, afib RVR ( hx of PAF )Admitted with palpitation, dyspnea with exertion, afib RVR  History of hyperthyroid ( 3/2017 ) , PAF, CAD with CABG in 1992  BP better;   No CP and breathing better; OK to proceeds to L&RHC today  All recent medication, labs, imaging studies and procedures reviewed    Physical Exam   Blood pressure 148/63, pulse 70, temperature 36.6 °C (97.9 °F), resp. rate 17, height 1.6 m (5' 3\"), weight 91.5 kg (201 lb 11.5 oz), SpO2 94 %, not currently breastfeeding.    Constitutional:  SHe appears well-developed.   HENT: Normocephalic and atraumatic. No scleral icterus.   Neck: No JVD present.   Cardiovascular: Normal rate. Exam reveals no gallop and no friction rub. No murmur heard.   Pulmonary/Chest: CTAB coarse   Abdominal: S/NT/ND BS+   Musculoskeletal: SHe exhibits no edema. Pulses present.  Skin: Skin is warm and dry.       Intake/Output Summary (Last 24 hours) at 07/18/17 0907  Last data filed at 07/18/17 0600   Gross per 24 hour   Intake  696.8 ml   Output   1400 ml   Net -703.2 ml       LABS:  No results found for: CHOLSTRLTOT, LDL, HDL, TRIGLYCERIDE    Lab Results   Component Value Date/Time    WBC 6.6 07/18/2017 05:07 AM    RBC 5.55* 07/18/2017 05:07 AM    HEMOGLOBIN 13.1 07/18/2017 05:07 AM    HEMATOCRIT 43.7 07/18/2017 05:07 AM    MCV 78.7* 07/18/2017 05:07 AM    NEUTROPHILS-POLYS 67.20 07/16/2017 03:56 AM    LYMPHOCYTES 16.00* 07/16/2017 03:56 AM    MONOCYTES 10.90 07/16/2017 03:56 AM    EOSINOPHILS 4.90 07/16/2017 03:56 AM    BASOPHILS 0.70 07/16/2017 03:56 AM     Lab Results   Component Value Date/Time    SODIUM 140 07/18/2017 05:07 AM    POTASSIUM 3.8 07/18/2017 05:07 AM    CHLORIDE 107 " 07/18/2017 05:07 AM    CO2 27 07/18/2017 05:07 AM    GLUCOSE 103* 07/18/2017 05:07 AM    BUN 14 07/18/2017 05:07 AM    CREATININE 0.82 07/18/2017 05:07 AM         Lab Results   Component Value Date/Time    ALKALINE PHOSPHATASE 89 07/13/2017 03:01 AM    AST(SGOT) 16 07/13/2017 03:01 AM    ALT(SGPT) 11 07/13/2017 03:01 AM    TOTAL BILIRUBIN 1.5 07/13/2017 03:01 AM      Lab Results   Component Value Date/Time    B NATRIURETIC PEPTIDE 499* 07/13/2017 03:01 AM      No results found for: TSH  Lab Results   Component Value Date/Time    PT 16.2* 07/18/2017 05:07 AM    INR 1.26* 07/18/2017 05:07 AM        Medications reviewed    Imaging reviewed    ECHO(7/14/2017):  Severely reduced left ventricular systolic function.Left ventricular ejection fraction is visually estimated to   be 30-34%.  Global hypokinesis with regional variation, most prominent in mid   lateral wall.  Grade III-IV diastolic dysfunction (restrictive pattern).  Ecentric, posterolaterally directed, severe mitral regurgitation.  Severely dilated left atrium.  Severely enlarged right atrium.  Dilated inferior vena cava without inspiratory collapse.  Moderately dilated right ventricle.  Reduced right ventricular systolic function.  Estimated right ventricular systolic pressure  is 80 mmHg.  Right heart pressures are consistent with severe pulmonary   hypertension.  No aortic stenosis    Impressions:  •   *Atrial fibrillation with RVR (CMS-HCC) [I48.91]     •   Microcytic anemia [D50.9]     •   Severe mitral regurgitation [I34.0]     •   Moderate to severe pulmonary hypertension (CMS-[facility]) [I27.2]     •   Acute on chronic combined systolic and diastolic CHF, NYHA class 3 (CMS-[facility]) [I50.43]     •   Paroxysmal atrial fibrillation (CMS-[facility]) [I48.0]     •   Coronary artery disease involving native coronary artery of native heart without angina pectoris, s/p 1V CABG 1992 [I25.10]     •   Hyperthyroidism [E05.90]     •   Anxiety [F41.9                       Recommendations:  Recommendations:  History of CAD with CABG x 1 v 1992, , aspirin, Lipitor 40,  beta blocker, denies angina  Recheck INR; now decreased to 1.26; No CP  Coumadin discontinued for planned L&R cardiac catheterization on Today. If INR becomes subtherapeutic, we will need to start heparin drip.  Patient is agreeable with the above plan. Little scared and wants to be sedated for cath;    Case discussed with javan and RN  Will follow  Thx

## 2017-07-19 VITALS
SYSTOLIC BLOOD PRESSURE: 98 MMHG | HEART RATE: 61 BPM | WEIGHT: 197.75 LBS | TEMPERATURE: 98.2 F | OXYGEN SATURATION: 97 % | BODY MASS INDEX: 35.04 KG/M2 | HEIGHT: 63 IN | RESPIRATION RATE: 18 BRPM | DIASTOLIC BLOOD PRESSURE: 57 MMHG

## 2017-07-19 PROBLEM — I50.43 ACUTE ON CHRONIC COMBINED SYSTOLIC AND DIASTOLIC CHF, NYHA CLASS 3 (HCC): Status: RESOLVED | Noted: 2017-07-14 | Resolved: 2017-07-19

## 2017-07-19 LAB
ALBUMIN SERPL BCP-MCNC: 3.4 G/DL (ref 3.2–4.9)
ANION GAP SERPL CALC-SCNC: 8 MMOL/L (ref 0–11.9)
BUN SERPL-MCNC: 19 MG/DL (ref 8–22)
CALCIUM SERPL-MCNC: 9.8 MG/DL (ref 8.5–10.5)
CHLORIDE SERPL-SCNC: 106 MMOL/L (ref 96–112)
CO2 SERPL-SCNC: 25 MMOL/L (ref 20–33)
CREAT SERPL-MCNC: 0.86 MG/DL (ref 0.5–1.4)
GFR SERPL CREATININE-BSD FRML MDRD: >60 ML/MIN/1.73 M 2
GLUCOSE SERPL-MCNC: 101 MG/DL (ref 65–99)
INR PPP: 1.17 (ref 0.87–1.13)
PHOSPHATE SERPL-MCNC: 4.4 MG/DL (ref 2.5–4.5)
POTASSIUM SERPL-SCNC: 4.4 MMOL/L (ref 3.6–5.5)
PROTHROMBIN TIME: 15.3 SEC (ref 12–14.6)
SODIUM SERPL-SCNC: 139 MMOL/L (ref 135–145)

## 2017-07-19 PROCEDURE — 700102 HCHG RX REV CODE 250 W/ 637 OVERRIDE(OP): Performed by: INTERNAL MEDICINE

## 2017-07-19 PROCEDURE — 700111 HCHG RX REV CODE 636 W/ 250 OVERRIDE (IP): Performed by: INTERNAL MEDICINE

## 2017-07-19 PROCEDURE — 80069 RENAL FUNCTION PANEL: CPT

## 2017-07-19 PROCEDURE — A9270 NON-COVERED ITEM OR SERVICE: HCPCS | Performed by: INTERNAL MEDICINE

## 2017-07-19 PROCEDURE — 85610 PROTHROMBIN TIME: CPT

## 2017-07-19 PROCEDURE — 99239 HOSP IP/OBS DSCHRG MGMT >30: CPT | Performed by: INTERNAL MEDICINE

## 2017-07-19 PROCEDURE — 36415 COLL VENOUS BLD VENIPUNCTURE: CPT

## 2017-07-19 RX ORDER — LOSARTAN POTASSIUM 100 MG/1
100 TABLET ORAL DAILY
Qty: 30 TAB | Refills: 0 | Status: SHIPPED | OUTPATIENT
Start: 2017-07-19 | End: 2017-07-19

## 2017-07-19 RX ORDER — DIGOXIN 125 MCG
125 TABLET ORAL DAILY
Qty: 30 TAB | Refills: 0 | Status: SHIPPED | OUTPATIENT
Start: 2017-07-19 | End: 2017-07-19

## 2017-07-19 RX ORDER — METOPROLOL TARTRATE 50 MG/1
50 TABLET, FILM COATED ORAL 2 TIMES DAILY
Qty: 60 TAB | Refills: 0 | Status: SHIPPED | OUTPATIENT
Start: 2017-07-19 | End: 2017-07-19

## 2017-07-19 RX ORDER — WARFARIN SODIUM 5 MG/1
5 TABLET ORAL
Status: DISCONTINUED | OUTPATIENT
Start: 2017-07-19 | End: 2017-07-19 | Stop reason: HOSPADM

## 2017-07-19 RX ORDER — FERROUS SULFATE 325(65) MG
325 TABLET ORAL
Qty: 30 TAB | Refills: 0 | Status: SHIPPED | OUTPATIENT
Start: 2017-07-19 | End: 2017-07-19

## 2017-07-19 RX ORDER — ATORVASTATIN CALCIUM 40 MG/1
40 TABLET, FILM COATED ORAL
Qty: 30 TAB | Refills: 1 | Status: SHIPPED | OUTPATIENT
Start: 2017-07-19 | End: 2020-06-16 | Stop reason: SDUPTHER

## 2017-07-19 RX ORDER — ASPIRIN 81 MG/1
81 TABLET ORAL DAILY
Qty: 30 TAB | Refills: 0 | Status: SHIPPED | OUTPATIENT
Start: 2017-07-19 | End: 2017-07-19

## 2017-07-19 RX ORDER — HEPARIN SODIUM 5000 [USP'U]/ML
5000 INJECTION, SOLUTION INTRAVENOUS; SUBCUTANEOUS EVERY 8 HOURS
Status: DISCONTINUED | OUTPATIENT
Start: 2017-07-19 | End: 2017-07-19 | Stop reason: HOSPADM

## 2017-07-19 RX ORDER — METHIMAZOLE 10 MG/1
10 TABLET ORAL 3 TIMES DAILY
Qty: 90 TAB | Refills: 0 | Status: SHIPPED | OUTPATIENT
Start: 2017-07-19 | End: 2017-07-19

## 2017-07-19 RX ORDER — ASPIRIN 81 MG/1
81 TABLET ORAL DAILY
Qty: 30 TAB | Refills: 0 | Status: SHIPPED | OUTPATIENT
Start: 2017-07-19 | End: 2020-03-12

## 2017-07-19 RX ORDER — METOPROLOL TARTRATE 50 MG/1
50 TABLET, FILM COATED ORAL 2 TIMES DAILY
Qty: 60 TAB | Refills: 0 | Status: SHIPPED | OUTPATIENT
Start: 2017-07-19 | End: 2020-08-14 | Stop reason: SDUPTHER

## 2017-07-19 RX ORDER — ATORVASTATIN CALCIUM 40 MG/1
40 TABLET, FILM COATED ORAL
Qty: 30 TAB | Refills: 1 | Status: SHIPPED | OUTPATIENT
Start: 2017-07-19 | End: 2017-07-19

## 2017-07-19 RX ORDER — LOSARTAN POTASSIUM 100 MG/1
100 TABLET ORAL DAILY
Qty: 30 TAB | Refills: 0 | Status: SHIPPED | OUTPATIENT
Start: 2017-07-19 | End: 2020-03-12

## 2017-07-19 RX ORDER — METHIMAZOLE 10 MG/1
10 TABLET ORAL 3 TIMES DAILY
Qty: 90 TAB | Refills: 0 | Status: SHIPPED | OUTPATIENT
Start: 2017-07-19 | End: 2020-03-12

## 2017-07-19 RX ORDER — DIGOXIN 125 MCG
125 TABLET ORAL DAILY
Qty: 30 TAB | Refills: 0 | Status: SHIPPED | OUTPATIENT
Start: 2017-07-19 | End: 2020-03-12

## 2017-07-19 RX ADMIN — ASPIRIN 81 MG: 81 TABLET ORAL at 09:34

## 2017-07-19 RX ADMIN — METOPROLOL TARTRATE 50 MG: 50 TABLET, FILM COATED ORAL at 09:36

## 2017-07-19 RX ADMIN — ACETAMINOPHEN 650 MG: 325 TABLET, FILM COATED ORAL at 09:34

## 2017-07-19 RX ADMIN — Medication 325 MG: at 09:34

## 2017-07-19 RX ADMIN — METHIMAZOLE 10 MG: 10 TABLET ORAL at 09:34

## 2017-07-19 RX ADMIN — LOSARTAN POTASSIUM 100 MG: 50 TABLET, FILM COATED ORAL at 09:35

## 2017-07-19 RX ADMIN — METHIMAZOLE 10 MG: 10 TABLET ORAL at 15:22

## 2017-07-19 RX ADMIN — POTASSIUM CHLORIDE 40 MEQ: 1500 TABLET, EXTENDED RELEASE ORAL at 09:36

## 2017-07-19 RX ADMIN — FUROSEMIDE 40 MG: 10 INJECTION, SOLUTION INTRAMUSCULAR; INTRAVENOUS at 09:34

## 2017-07-19 RX ADMIN — HEPARIN SODIUM 5000 UNITS: 5000 INJECTION, SOLUTION INTRAVENOUS; SUBCUTANEOUS at 15:22

## 2017-07-19 ASSESSMENT — PAIN SCALES - GENERAL
PAINLEVEL_OUTOF10: 0

## 2017-07-19 ASSESSMENT — ENCOUNTER SYMPTOMS
WHEEZING: 0
SHORTNESS OF BREATH: 0
WEAKNESS: 0
PND: 0
NERVOUS/ANXIOUS: 1
HEARTBURN: 0
CHILLS: 0
NAUSEA: 0
CLAUDICATION: 0
COUGH: 0
DEPRESSION: 0
DIZZINESS: 0
ABDOMINAL PAIN: 0
FEVER: 0
FOCAL WEAKNESS: 0
BACK PAIN: 0
MYALGIAS: 0
PALPITATIONS: 0
ORTHOPNEA: 0

## 2017-07-19 ASSESSMENT — CHA2DS2 SCORE
AGE 75 OR GREATER: YES
CHA2DS2 VASC SCORE: 5
DIABETES: NO
CHF OR LEFT VENTRICULAR DYSFUNCTION: YES
HYPERTENSION: YES
VASCULAR DISEASE: NO
AGE 65 TO 74: NO
SEX: FEMALE
PRIOR STROKE OR TIA OR THROMBOEMBOLISM: NO

## 2017-07-19 NOTE — DISCHARGE PLANNING
Medical Social Work    Received call back from [facility] with  stating they don't do INR checks but local lab in [place] does.

## 2017-07-19 NOTE — PROGRESS NOTES
Cardiology Progress Note               Author: Richie Cabrera Date & Time created: 2017  10:35 AM     Interval History:    Consultation for new cardiomyopathy      Admitted with dyspnea, found to be in A. fib RVR    History of hyper-thyroid, paroxysmal A. fib, CAD status post CABG x 1 v (RCA) in     Labs reviewed  Na, K, CR stable     BP = 140/66  HR = 78 atrial fib           Echocardiogram 17, grade 3-4 diastolic dysfunction, severe mitral regurgitation, RVSP 80 mmHg, LVEF 30-34%          Coronary angiography (right and left) 17 widely patent SVG to RCA, 100% mid RCA, 3+ mitral regurgitation        Review of Systems   Respiratory: Negative for cough and shortness of breath.    Cardiovascular: Negative for chest pain, palpitations, orthopnea, claudication, leg swelling and PND.       Physical Exam   Constitutional: She is oriented to person, place, and time.   Over weight    HENT:   Head: Normocephalic.   Eyes: Conjunctivae are normal.   Neck: No JVD present. No thyromegaly present.   Cardiovascular: Normal rate.  An irregularly irregular rhythm present.   Murmur heard.  Pulses:       Carotid pulses are 2+ on the right side, and 2+ on the left side.       Radial pulses are 2+ on the right side, and 2+ on the left side.   Pulmonary/Chest: She has no wheezes.   Abdominal: Soft.   Musculoskeletal: She exhibits no edema.   Neurological: She is oriented to person, place, and time.   Skin: Skin is warm and dry.   R groin C/D/I  NO hematoma       Hemodynamics:  Temp (24hrs), Av.8 °C (98.2 °F), Min:36.2 °C (97.2 °F), Max:37.1 °C (98.8 °F)  Temperature: 36.8 °C (98.3 °F)  Pulse  Av.3  Min: 59  Max: 123  Blood Pressure : 140/66 mmHg     Respiratory:    Respiration: 17, Pulse Oximetry: 98 %     Work Of Breathing / Effort: Mild  RUL Breath Sounds: Clear, RML Breath Sounds: Fine Crackles, RLL Breath Sounds: Fine Crackles, RICHA Breath Sounds: Clear, LLL Breath Sounds: Fine Crackles  Fluids:     Weight:  89.7 kg (197 lb 12 oz)  GI/Nutrition:  Orders Placed This Encounter   Procedures   • Diet Order     Standing Status: Standing      Number of Occurrences: 1      Standing Expiration Date:      Order Specific Question:  Diet:     Answer:  Cardiac [6]     Lab Results:  Recent Labs      07/17/17   0239  07/18/17   0507   WBC  7.1  6.6   RBC  5.17  5.55*   HEMOGLOBIN  12.3  13.1   HEMATOCRIT  40.1  43.7   MCV  77.6*  78.7*   MCH  23.8*  23.6*   MCHC  30.7*  30.0*   RDW  49.2  50.3*   PLATELETCT  265  266   MPV  11.3  11.4     Recent Labs      07/17/17   0239  07/18/17   0507  07/19/17   0509   SODIUM  138  140  139   POTASSIUM  3.7  3.8  4.4   CHLORIDE  107  107  106   CO2  24  27  25   GLUCOSE  100*  103*  101*   BUN  16  14  19   CREATININE  0.68  0.82  0.86   CALCIUM  9.5  10.0  9.8     Recent Labs      07/17/17   1111  07/17/17 2034  07/18/17   0507   APTT   --   74.9*   --    INR  1.53*  1.32*  1.26*                     Medical Decision Making, by Problem:  Active Hospital Problems    Diagnosis   • *Acute on chronic combined systolic and diastolic CHF, NYHA class 3 (CMS-HCC) [I50.43]   • Microcytic anemia [D50.9]   • Severe mitral regurgitation [I34.0]   • Moderate to severe pulmonary hypertension (CMS-HCC) [I27.2]   • Paroxysmal atrial fibrillation (CMS-[facility]) [I48.0]   • Coronary artery disease involving native coronary artery of native heart without angina pectoris, s/p 1V CABG 1992 [I25.10]   • Hyperthyroidism [E05.90]   • Anxiety [F41.9]       Plan:    Plan for GOMEZ in am   Discussed with Dr Alexis     Severe mitral regurgitation by recent L and R heart cath 7/1/17    Severe pulmonary hypertension, RVSP 80 mmHg, on Lasix 40 mg IV daily , stable, denies dyspnea    Presented with decompensated systolic and diastolic heart failure, furosemide 40 IV, I/O=- 5600, no HF symptoms    New cardiomyopathy, EF 30%, losartan 100 , metoprolol 50 mg BID     Hyperthyroid, T 4= 2.89 on admission, on Methimazole        Atrial  fibrillation RVR, remains in A. fib, rate controlled, on Coumadin, INR=1.26 7/18/17 ( goal 2-3 )  , on Dig, rate controlled     History of CAD times CABG ×1 vessel in 1992, no angina, cath 7/18/17 patent graft, on ASA, BB, Lipitor        Labs reviewed and Medications reviewed

## 2017-07-19 NOTE — CARE PLAN
Problem: Respiratory:  Goal: Respiratory status will improve  Intervention: Assess and monitor pulmonary status    07/18/17 2000 07/19/17 0010   OTHER   Respiration --  18   Pulse Oximetry --  97 %   O2 (LPM) --  2   Respiratory Pattern Dyspnea with Exertion --    Work Of Breathing / Effort Mild --    RUL Breath Sounds Clear --    RML Breath Sounds Clear --    RLL Breath Sounds Diminished --    RICHA Breath Sounds Clear --    LLL Breath Sounds Diminished --

## 2017-07-19 NOTE — PROGRESS NOTES
[facility] Hospitalist Progress Note    Date of Service: 2017    Chief Complaint  77 y.o. Female w hx of atrial fibrillation on coumadin, hx of MI s/ 1v CABG, hyperthyroid admitted 2017 with increasing SOB w exertion found to have severe MR, acute on chronic s/grade III-IV d CHF (EF 30%), severe PAH.    Interval Problem Update  Cardiac cath yesterday showed 100% occlusion of the mid RCA with  patent saphenous vein graft to distal RCA, significant mitral insufficiency. Pt denies any chest pain, shortness of breath, palpitations, lower extremity edema today. Patient resting in bed comfortably. Cardiology following, patient scheduled for GOMEZ tomorrow. Patient nothing by mouth at midnight.      Consultants/Specialty  Dr. Alexis/ Cardiology    Disposition  Pt to get GOMEZ tomm, dispo pending         Review of Systems   Constitutional: Negative for fever, chills and malaise/fatigue.   Respiratory: Negative for cough, shortness of breath and wheezing.    Cardiovascular: Negative for chest pain, palpitations, orthopnea and leg swelling.   Gastrointestinal: Negative for heartburn, nausea and abdominal pain.   Genitourinary: Negative for dysuria and frequency.   Musculoskeletal: Negative for myalgias and back pain.   Neurological: Negative for dizziness, focal weakness and weakness.   Psychiatric/Behavioral: Negative for depression. The patient is nervous/anxious.    All other systems reviewed and are negative.     Physical Exam  Laboratory/Imaging   Hemodynamics  Temp (24hrs), Av.8 °C (98.3 °F), Min:36.2 °C (97.2 °F), Max:37.1 °C (98.8 °F)   Temperature: 36.8 °C (98.3 °F)  Pulse  Av.1  Min: 59  Max: 123   Blood Pressure : 113/66 mmHg      Respiratory      Respiration: 18, Pulse Oximetry: 97 %     Work Of Breathing / Effort: Mild  RUL Breath Sounds: Clear, RML Breath Sounds: Fine Crackles, RLL Breath Sounds: Fine Crackles, RICHA Breath Sounds: Clear, LLL Breath Sounds: Fine Crackles    Fluids    Intake/Output  Summary (Last 24 hours) at 07/19/17 1129  Last data filed at 07/19/17 0600   Gross per 24 hour   Intake    360 ml   Output    300 ml   Net     60 ml       Nutrition  Orders Placed This Encounter   Procedures   • Diet Order     Standing Status: Standing      Number of Occurrences: 1      Standing Expiration Date:      Order Specific Question:  Diet:     Answer:  Cardiac [6]   • DIET NPO     Standing Status: Standing      Number of Occurrences: 8      Standing Expiration Date:      Order Specific Question:  Restrict to:     Answer:  Strict [1]     Physical Exam   Constitutional: She is oriented to person, place, and time. She appears well-developed and well-nourished. No distress.   HENT:   Head: Normocephalic and atraumatic.   Neck: No JVD present. No thyromegaly present.   Cardiovascular:   Murmur heard.  Irreg irreg   Pulmonary/Chest: No respiratory distress. She has no wheezes.   Abdominal: Soft. Bowel sounds are normal. She exhibits no distension. There is no tenderness.   Overweight   Musculoskeletal: She exhibits no edema or tenderness.   Neurological: She is alert and oriented to person, place, and time. No cranial nerve deficit.   Skin: No rash noted. No erythema. There is pallor.   Psychiatric: She has a normal mood and affect. Her behavior is normal.   Nursing note and vitals reviewed.      Recent Labs      07/17/17   0239  07/18/17   0507   WBC  7.1  6.6   RBC  5.17  5.55*   HEMOGLOBIN  12.3  13.1   HEMATOCRIT  40.1  43.7   MCV  77.6*  78.7*   MCH  23.8*  23.6*   MCHC  30.7*  30.0*   RDW  49.2  50.3*   PLATELETCT  265  266   MPV  11.3  11.4     Recent Labs      07/17/17   0239  07/18/17   0507  07/19/17   0509   SODIUM  138  140  139   POTASSIUM  3.7  3.8  4.4   CHLORIDE  107  107  106   CO2  24  27  25   GLUCOSE  100*  103*  101*   BUN  16  14  19   CREATININE  0.68  0.82  0.86   CALCIUM  9.5  10.0  9.8     Recent Labs      07/17/17   1111  07/17/17   2034  07/18/17   0507   APTT   --   74.9*   --    INR   1.53*  1.32*  1.26*                  Assessment/Plan     * Acute on chronic combined systolic and diastolic CHF, NYHA class 3 (CMS-[facility])  Assessment & Plan  -Lasix 40 mg IV daily  -Atorvastatin 40 mg  -cath results as above  -pt follows with Dr. Marques (cardiology) in [place]  - ASA  -Continue BB, cozaar, dig 125 mcg daily      Hyperthyroidism  Assessment & Plan  - pt has had US as outpt and bx, no cancer  - now waiting for endocrine appt which isn't until September  - Added methimazole 10 TID  - Continue metoprolol (isn't first line BB for hyperthyroid but she is already on it and generally has good control with it, likely just needs addition of methimazole    Anxiety  Assessment & Plan  - symptomatic care  - per her story ( and daughter presently recovering from medical illnesses) concerning to also be contributing to her rvr    Severe mitral regurgitation  Assessment & Plan  Very likely contributing to her symptoms (SOB)  -Cardiology consulting, cath results as above   -Lasix to 40 IV daily  - Echo shows EF 30%, severe MR, RVSP 80 mmHg     Moderate to severe pulmonary hypertension (CMS-[facility])  Assessment & Plan  No smoking hx per patient.  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 2 years  -Continue CPAP nightly  -Consider CTA to rule out PE (but already on coumadin, so PE unlikely)  -Supplemental O2  -Lasix  -Right heart cath with left heart cath yest, results as above    Paroxysmal atrial fibrillation (CMS-[facility])  Assessment & Plan  Rate controlled  -Metoprolol to 50 BID  -Dig 125 mcg daily in AM  -Hold coumadin for GOMEZ tomm   -Tele  -TSH suppressed, added methimazole    Coronary artery disease involving native coronary artery of native heart without angina pectoris, s/p 1V CABG 1992  Assessment & Plan  Cardiologist is in [place]  -Add asa  -Atorva 40  -Metop 50 BID  -Cardiology following, her last cath was >15 years ago  -s/p left and right heart cath on 7/18 which showed 100% occlusion of the mid RCA, patent  saphenous vein graft to distal RCA.  significant mitral insufficiency, EF of 45%.  - pt scheduled for GOMEZ tomm per cards recs     Microcytic anemia  Assessment & Plan  - no sign of bleeding, iron was low  - got IV replacement per pharmcy  - Need GI f/u as outpatient as outpatient for colonoscopy screening      Labs reviewed, Medications reviewed and Radiology images reviewed  Barnes catheter: No Barnes      DVT Prophylaxis: Heparin    Ulcer prophylaxis: Not indicated

## 2017-07-19 NOTE — CATH LAB
Cardiac catheterization report  This report is dictated in the Epic format as the hospital dictation is currently not functioning.    Procedures:  Selective coronary angiography  Saphenous vein graft angiography  Angiography of native internal mammary artery  Left heart catheterization  right heart catheterization  Left ventriculography    Indications:   77-year-old female with history of remote CABG, LV dysfunction and severe mitral insufficiency. She is undergoing left and right heart catheterization to determine extent of her coronary disease and severity of her mitral insufficiency.    Procedure:  The right groin was sterilely prepped and draped in the usual fashion and the area over the right femoral artery and vein was anesthetized with 2% lidocaine. Conscious sedation was obtained using Versed 1 mg and fentanyl 25 µg. The patient received additional Versed and fentanyl during the procedure please refer to the nurse's notes for dosages and timing.   The right femoral vein was easily entered and an 8 [place] sheath was placed. A 4 [place] sheath was placed in the right femoral artery. A [place] catheter was placed via the venous sheath and a right heart catheterization was done in standard fashion.  A 4 [place] left Brad catheter was advanced via the arterial sheath to the ostium of the left main coronary artery where selective angiographic performed. This cath was exchanged for a right Brad catheter which was advanced into the ostium of the native right coronary artery where selective angiograms were performed. Next the same catheter was advanced into the ostium of the native internal mammary artery where a selective angiogram was performed. The catheter would not seat well into these saphenous vein graft that was exchanged for a multipurpose catheter which was advanced and the ostium of saphenous vein graft where selective angiograms were performed.  Next, pigtail catheter was exchanged and a left  heart catheterization was performed followed by left ventriculogram using 30 mL of contrast. Left heart pullback was performed. The catheters and sheaths were removed and hemostasis obtained by direct compression of the artery and vein.  Complications: None  Estimated blood loss 15 mL.  Total contrast media 146 mL.  Fluoroscopy time 7.5 minutes.  X-ray dose area product 15,061.     Hemodynamics:  Cardiac output 4.2 L/m cardiac index 2.2 L/m  Right atrial pressure mean 8, V waves 11. Right ventricular pressure 45/7. Pulmonary artery pressure 47/22. Wedge pressure mean 16 V waves 23. Aortic pressure 106/80. LV pressure 153/16. No gradient on LV pullback.,     Angiography:  Left main: Free of disease.  LAD: Terminates at LV apex. Large diagonal arising mid vessel. Smaller 1st diagonal artery. No disease in the LAD system.  Circumflex: Small caliber proximal 1st marginal artery. Proximal atrial branch. Terminates in a large marginal artery. Free of disease  Right coronary artery: Dominant vessel. Completed occluded proximal to the acute margin.  Saphenous vein graft to right coronary artery: Widely patent. PDA unremarkable. Large posterior left ventricular branch. Distal anastomosis unremarkable.  Left ventricular gram: Estimated ejection fraction 45%. At least 3+ mitral insufficiency.  Native ANDRÉS: Widely patent. Not used for graft    Impression:  100% occlusion of the mid RCA.  Widely patent saphenous vein graft to distal RCA.  Elevated pulmonary artery pressure at 47 mmHg.  Significant V waves noted on pulmonary artery wedge pressure indicating significant mitral insufficiency.  At least 3+ mitral sufficiency by left ventriculography.  Reduced ejection fraction of 45%.                                Star Keith M.D.  7/18/2017 9:58 PM

## 2017-07-19 NOTE — CARE PLAN
Problem: Infection  Goal: Will remain free from infection  Outcome: PROGRESSING AS EXPECTED  Assessing closely for s/s of worsening infection. Vitals q4, and additionally as needed. Encouraged pt on using alcohol hand wipes before eating meals, and after using toilet.  Used scrupulous hand hygiene, and sterile technique when administering IV medications.   Educated on s/s of infection of cath site.        Problem: Fluid Volume:  Goal: Will maintain balanced intake and output  Outcome: PROGRESSING AS EXPECTED  Maintained strict I's and O's, educated on the reason for this, the disease process behind it.

## 2017-07-19 NOTE — DISCHARGE PLANNING
Medical Social Work    VM left for [facility] to see if they are able to do INR checks for pt. SW confirmed they service pt's area.     Awaiting call back

## 2017-07-19 NOTE — PROGRESS NOTES
Cardiovascular Nurse Navigator ([id number]) Note:    Reviewed CAD medications:  aspirin + warfarin  Beta-Blocker:  metoprolol  Statin:  atorvastatin    Consider for aldosterone blockade?   No--EF 45%    Intensive Cardiac Rehab (ICR) Referral:  Referred on 7/18; has current inpatient orders for nutrition consult & PT for Phase I ICR    Cardiology Follow-Up:  From [facility]  Follow-up With Details Why Contact Info Shelli HOROWITZ MD Go on 7/25/2017 Please arrive at 8:00 am for your appointment. Thank you. [id number], [place] [id number] [phone]      Inpatient & Discharge Patient Education:  Bedside nursing to continually provide patient education on CAD meds, signs and symptoms to monitor for, and risk factor modification.     Also at discharge please complete the “ACS” special instructions on the AVS.      Thank you and please call with questions.

## 2017-07-19 NOTE — PROGRESS NOTES
"Date of Service: 7/19/2017  Chief Complaint:  Chief Complaint   Patient presents with   • Shortness of Breath     pt D/C at 10:00 on 7/12/17 for SOB, tx of Afib with RVR. pt was home increasing SOB, call ed [facility]. pt HTN.      Interval History:  78 y/o femal: Consultation for new cardiomyopathy EF 30%, afib RVR ( hx of PAF )Admitted with palpitation, dyspnea with exertion, afib RVR  History of hyperthyroid ( 3/2017 ) , PAF, CAD with CABG in 1992  Feels fine no cardiac complaints;    LHC: 100% occlusion of the mid RCA.  Widely patent saphenous vein graft to distal RCA.  Elevated pulmonary artery pressure at 47 mmHg.  Significant V waves noted on pulmonary artery wedge pressure indicating significant mitral insufficiency.  At least 3+ mitral sufficiency by left ventriculography.  Reduced ejection fraction of 45%.  All recent medication, labs, imaging studies and procedures reviewed    Physical Exam   Blood pressure 140/66, pulse 78, temperature 36.8 °C (98.3 °F), resp. rate 17, height 1.6 m (5' 3\"), weight 89.7 kg (197 lb 12 oz), SpO2 98 %, not currently breastfeeding.    Constitutional:  SHe appears well-developed.    HENT: Normocephalic and atraumatic. No scleral icterus.    Neck: No JVD present.    Cardiovascular: Normal rate. Exam reveals no gallop and no friction rub. 1-2/6 SE murmur heard.    Pulmonary/Chest: CTAB coarse    Abdominal: S/NT/ND BS+   Musculoskeletal: SHe exhibits no edema. Pulses present.  Skin: Skin is warm and dry.       Intake/Output Summary (Last 24 hours) at 07/19/17 1040  Last data filed at 07/19/17 0600   Gross per 24 hour   Intake    360 ml   Output    300 ml   Net     60 ml       LABS:  No results found for: CHOLSTRLTOT, LDL, HDL, TRIGLYCERIDE    Lab Results   Component Value Date/Time    WBC 6.6 07/18/2017 05:07 AM    RBC 5.55* 07/18/2017 05:07 AM    HEMOGLOBIN 13.1 07/18/2017 05:07 AM    HEMATOCRIT 43.7 07/18/2017 05:07 AM    MCV 78.7* 07/18/2017 05:07 AM    NEUTROPHILS-POLYS 67.20 " 07/16/2017 03:56 AM    LYMPHOCYTES 16.00* 07/16/2017 03:56 AM    MONOCYTES 10.90 07/16/2017 03:56 AM    EOSINOPHILS 4.90 07/16/2017 03:56 AM    BASOPHILS 0.70 07/16/2017 03:56 AM     Lab Results   Component Value Date/Time    SODIUM 139 07/19/2017 05:09 AM    POTASSIUM 4.4 07/19/2017 05:09 AM    CHLORIDE 106 07/19/2017 05:09 AM    CO2 25 07/19/2017 05:09 AM    GLUCOSE 101* 07/19/2017 05:09 AM    BUN 19 07/19/2017 05:09 AM    CREATININE 0.86 07/19/2017 05:09 AM         Lab Results   Component Value Date/Time    ALKALINE PHOSPHATASE 89 07/13/2017 03:01 AM    AST(SGOT) 16 07/13/2017 03:01 AM    ALT(SGPT) 11 07/13/2017 03:01 AM    TOTAL BILIRUBIN 1.5 07/13/2017 03:01 AM      Lab Results   Component Value Date/Time    B NATRIURETIC PEPTIDE 499* 07/13/2017 03:01 AM      No results found for: TSH  Lab Results   Component Value Date/Time    PT 16.2* 07/18/2017 05:07 AM    INR 1.26* 07/18/2017 05:07 AM        Medications reviewed    Imaging reviewed    ECHO(7/14/2017):  Severely reduced left ventricular systolic function.Left ventricular ejection fraction is visually estimated to   be 30-34%.  Global hypokinesis with regional variation, most prominent in mid   lateral wall.  Grade III-IV diastolic dysfunction (restrictive pattern).  Ecentric, posterolaterally directed, severe mitral regurgitation.  Severely dilated left atrium.  Severely enlarged right atrium.  Dilated inferior vena cava without inspiratory collapse.  Moderately dilated right ventricle.  Reduced right ventricular systolic function.  Estimated right ventricular systolic pressure  is 80 mmHg.  Right heart pressures are consistent with severe pulmonary   hypertension.  No aortic stenosis    Impressions:  •    *Atrial fibrillation with RVR (CMS-HCC) [I48.91]      •    Microcytic anemia [D50.9]      •    Severe mitral regurgitation [I34.0]      •    Moderate to severe pulmonary hypertension (CMS-HCC) [I27.2]      •    Acute on chronic combined systolic and  diastolic CHF, NYHA class 3 (CMS-[facility]) [I50.43]      •    Paroxysmal atrial fibrillation (CMS-HCC) [I48.0]      •    Coronary artery disease involving native coronary artery of native heart without angina pectoris, s/p 1V CABG 1992 [I25.10]      •    Hyperthyroidism [E05.90]      •    Anxiety [F41.9                              Recommendations:  GOMEZ at 9 AM to eval MR  She is deciding if she wants to go home and do GOMEZ as outpatient  INR aims 2-3  CM with reduced LVEF; BB, ACEI/ARB, Statin, posssible Aldactone, NTG  [place]  discuss with ateending and RN  Will follow  Thx

## 2017-07-19 NOTE — PROGRESS NOTES
Pt discussed with her daughter and she wants to leave. She wants to do the GOMEZ as outpatient. RN explained the risks of delaying this test given her severe valve disorder, pt verbalized understanding of these risks.  Updated hospitalist and Dr Alexis.

## 2017-07-19 NOTE — DISCHARGE INSTRUCTIONS
Discharge Instructions    Discharged to home by car with relative. Discharged via wheelchair, hospital escort: Yes.  Special equipment needed: Not Applicable    Be sure to schedule a follow-up appointment with your primary care doctor or any specialists as instructed.     Discharge Plan:   Not indicated now.     I understand that a diet low in cholesterol, fat, and sodium is recommended for good health. Unless I have been given specific instructions below for another diet, I accept this instruction as my diet prescription.   Other diet: heart healthy   Heart-Healthy Eating Plan  Heart-healthy meal planning includes:  1. Limiting unhealthy fats.  2. Increasing healthy fats.  3. Making other small dietary changes.  You may need to talk with your doctor or a diet specialist (dietitian) to create an eating plan that is right for you.  WHAT TYPES OF FAT SHOULD I CHOOSE?  1. Choose healthy fats. These include olive oil and canola oil, flaxseeds, walnuts, almonds, and seeds.  2. Eat more omega-3 fats. These include salmon, mackerel, sardines, tuna, flaxseed oil, and ground flaxseeds. Try to eat fish at least twice each week.  3. Limit saturated fats.  1. Saturated fats are often found in animal products, such as meats, butter, and cream.  2. Plant sources of saturated fats include palm oil, palm kernel oil, and coconut oil.  4. Avoid foods with partially hydrogenated oils in them. These include stick margarine, some tub margarines, cookies, crackers, and other baked goods. These contain trans fats.  WHAT GENERAL GUIDELINES DO I NEED TO FOLLOW?  1. Check food labels carefully. Identify foods with trans fats or high amounts of saturated fat.  2. Fill one half of your plate with vegetables and green salads. Eat 4-5 servings of vegetables per day. A serving of vegetables is:  1. 1 cup of raw leafy vegetables.  2. ½ cup of raw or cooked cut-up vegetables.  3. ½ cup of vegetable juice.  3. Fill one fourth of your plate with  "whole grains. Look for the word \"whole\" as the first word in the ingredient list.  4. Fill one fourth of your plate with lean protein foods.  5. Eat 4-5 servings of fruit per day. A serving of fruit is:  1. One medium whole fruit.  2. ¼ cup of dried fruit.  3. ½ cup of fresh, frozen, or canned fruit.  4. ½ cup of 100% fruit juice.  6. Eat more foods that contain soluble fiber. These include apples, broccoli, carrots, beans, peas, and barley. Try to get 20-30 g of fiber per day.  7. Eat more home-cooked food. Eat less restaurant, buffet, and fast food.  8. Limit or avoid alcohol.  9. Limit foods high in starch and sugar.  10. Avoid fried foods.  11. Avoid frying your food. Try baking, boiling, grilling, or broiling it instead. You can also reduce fat by:  1. Removing the skin from poultry.  2. Removing all visible fats from meats.  3. Skimming the fat off of stews, soups, and gravies before serving them.  4. Steaming vegetables in water or broth.  12. Lose weight if you are overweight.  13. Eat 4-5 servings of nuts, legumes, and seeds per week:  1. One serving of dried beans or legumes equals ½ cup after being cooked.  2. One serving of nuts equals 1½ ounces.  3. One serving of seeds equals ½ ounce or one tablespoon.  14. You may need to keep track of how much salt or sodium you eat. This is especially true if you have high blood pressure. Talk with your doctor or dietitian to get more information.  WHAT FOODS CAN I EAT?  Grains  Breads, including Dutch, white, fer, wheat, raisin, rye, oatmeal, and Italian. Tortillas that are neither fried nor made with lard or trans fat. Low-fat rolls, including hotdog and hamburger buns and English muffins. Biscuits. Muffins. Waffles. Pancakes. Light popcorn. Whole-grain cereals. Flatbread. [place] toast. Pretzels. Breadsticks. Rusks. Low-fat snacks. Low-fat crackers, including oyster, saltine, matzo, naheed, animal, and rye. Rice and pasta, including brown rice and pastas that are " made with whole wheat.   Vegetables  All vegetables.   Fruits  All fruits, but limit coconut.  Meats and Other Protein Sources  Lean, well-trimmed beef, veal, pork, and lamb. Chicken and turkey without skin. All fish and shellfish. Wild duck, rabbit, pheasant, and venison. Egg whites or low-cholesterol egg substitutes. Dried beans, peas, lentils, and tofu. Seeds and most nuts.  Dairy  Low-fat or nonfat cheeses, including ricotta, string, and mozzarella. Skim or 1% milk that is liquid, powdered, or evaporated. Buttermilk that is made with low-fat milk. Nonfat or low-fat yogurt.  Beverages  Mineral water. Diet carbonated beverages.  Sweets and Desserts  Sherbets and fruit ices. Honey, jam, marmalade, jelly, and syrups. Meringues and gelatins. Pure sugar candy, such as hard candy, jelly beans, gumdrops, mints, marshmallows, and small amounts of dark chocolate. Ld food cake.  Eat all sweets and desserts in moderation.  Fats and Oils  Nonhydrogenated (trans-free) margarines. Vegetable oils, including soybean, sesame, sunflower, olive, peanut, safflower, corn, canola, and cottonseed. Salad dressings or mayonnaise made with a vegetable oil. Limit added fats and oils that you use for cooking, baking, salads, and as spreads.  Other  Cocoa powder. Coffee and tea. All seasonings and condiments.  The items listed above may not be a complete list of recommended foods or beverages. Contact your dietitian for more options.  WHAT FOODS ARE NOT RECOMMENDED?  Grains  Breads that are made with saturated or trans fats, oils, or whole milk. Croissants. Butter rolls. Cheese breads. Sweet rolls. Donuts. Buttered popcorn. Chow mein noodles. High-fat crackers, such as cheese or butter crackers.  Meats and Other Protein Sources  Fatty meats, such as hotdogs, short ribs, sausage, spareribs, darling, rib eye roast or steak, and mutton. High-fat deli meats, such as salami and bologna. Caviar. Domestic duck and goose. Organ meats, such as  kidney, liver, sweetbreads, and heart.  Dairy  Cream, sour cream, cream cheese, and creamed cottage cheese. Whole-milk cheeses, including blue (macie), [place], Brie, Arnie, American, [place]i, Swiss, cheddar, Camembert, and [place]. Whole or 2% milk that is liquid, evaporated, or condensed. Whole buttermilk. Cream sauce or high-fat cheese sauce. Yogurt that is made from whole milk.  Beverages  Regular sodas and juice drinks with added sugar.  Sweets and Desserts  Frosting. Pudding. Cookies. Cakes other than neftaly food cake. Candy that has milk chocolate or white chocolate, hydrogenated fat, butter, coconut, or unknown ingredients. Buttered syrups. Full-fat ice cream or ice cream drinks.  Fats and Oils  Gravy that has suet, meat fat, or shortening. Cocoa butter, hydrogenated oils, palm oil, coconut oil, palm kernel oil. These can often be found in baked products, candy, fried foods, nondairy creamers, and whipped toppings. Solid fats and shortenings, including darling fat, salt pork, lard, and butter. Nondairy cream substitutes, such as coffee creamers and sour cream substitutes. Salad dressings that are made of unknown oils, cheese, or sour cream.  The items listed above may not be a complete list of foods and beverages to avoid. Contact your dietitian for more information.     This information is not intended to replace advice given to you by your health care provider. Make sure you discuss any questions you have with your health care provider.     Document Released: 06/18/2013 Document Revised: 01/08/2016 Document Reviewed: 06/11/2015  [employer] Interactive Patient Education ©2016 [employer]        Special Instructions: Diagnosis:  Acute Coronary Syndrome (ACS) is a diagnosis that encompasses cardiac-related chest pain and heart attack. ACS occurs when the blood flow to the heart muscle is severely reduced or cut off completely due to a slow process called atherosclerosis.  Atherosclerosis is a disease in  which the coronary arteries become narrow from a buildup of fat, cholesterol, and other substances that combine to form plaque. If the plaque breaks, a blood clot will form and block the blood flow to the heart muscle. This lack of blood flow can cause damage or death to the heart muscle which is called a heart attack or Myocardial Infarction (MI). There are two different types of MIs:  ST Elevation Myocardial Infarction or STEMI (the most severe type of heart attack) and Non-ST Elevation Myocardial Infarction or NSTEMI.    Treatment Plan:  · Cardiac Diet  - Low fat, low salt, low cholesterol   · Cardiac Rehab  - Your doctor has ordered you a referral to [facility].  Call [phone] to schedule an appointment.  · Attend my follow-up appointment with my Cardiologist.  · Take my medications as prescribed by my doctor  · Exercise daily  · Reduce stress    Medications:  Certain medications are used to treat ACS.  Remember to always take medications as prescribed and never stop talking medications unless told by your doctor.    You have been prescribed the following medicatons:    Anti-platelet- Aspirin - Aspirin is used as a blood thinning medication and you will require this medication indefinitely.  Beta-Blocker:  metoprolol   Metoprolol tablets  What is this medicine?   METOPROLOL (me TOE proe lole) is a beta-blocker. Beta-blockers reduce the workload on the heart and help it to beat more regularly. This medicine is used to treat high blood pressure and to prevent chest pain. It is also used to after a heart attack and to prevent an additional heart attack from occurring.  This medicine may be used for other purposes; ask your health care provider or pharmacist if you have questions.  COMMON BRAND NAME(S): Lopressor  What should I tell my health care provider before I take this medicine?  They need to know if you have any of these conditions:  -diabetes  -heart or vessel disease like slow heart rate, worsening heart  failure, heart block, sick sinus syndrome or Raynaud's disease  -kidney disease  -liver disease  -lung or breathing disease, like asthma or emphysema  -pheochromocytoma  -thyroid disease  -an unusual or allergic reaction to metoprolol, other beta-blockers, medicines, foods, dyes, or preservatives  -pregnant or trying to get pregnant  -breast-feeding  How should I use this medicine?  Take this medicine by mouth with a drink of water. Follow the directions on the prescription label. Take this medicine immediately after meals. Take your doses at regular intervals. Do not take more medicine than directed. Do not stop taking this medicine suddenly. This could lead to serious heart-related effects.  Talk to your pediatrician regarding the use of this medicine in children. Special care may be needed.  Overdosage: If you think you have taken too much of this medicine contact a poison control center or emergency room at once.  NOTE: This medicine is only for you. Do not share this medicine with others.  What if I miss a dose?  If you miss a dose, take it as soon as you can. If it is almost time for your next dose, take only that dose. Do not take double or extra doses.  What may interact with this medicine?  Do not take this medicine with any of the following medications:  -sotalol  This medicine may also interact with the following medications:  -clonidine  -digoxin  -dobutamine  -epinephrine  -isoproterenol  -medicine to control heart rhythm like quinidine, propafenone  -medicine for depression like monoamine oxidase (MAO) inhibitors, fluoxetine, and paroxetine  -medicine for high blood pressure like calcium channel blockers  -reserpine  This list may not describe all possible interactions. Give your health care provider a list of all the medicines, herbs, non-prescription drugs, or dietary supplements you use. Also tell them if you smoke, drink alcohol, or use illegal drugs. Some items may interact with your  medicine.  What should I watch for while using this medicine?  Visit your doctor or health care professional for regular check ups. Contact your doctor right away if your symptoms worsen. Check your blood pressure and pulse rate regularly. Ask your health care professional what your blood pressure and pulse rate should be, and when you should contact them.  You may get drowsy or dizzy. Do not drive, use machinery, or do anything that needs mental alertness until you know how this medicine affects you. Do not sit or stand up quickly, especially if you are an older patient. This reduces the risk of dizzy or fainting spells. Contact your doctor if these symptoms continue. Alcohol may interfere with the effect of this medicine. Avoid alcoholic drinks.  What side effects may I notice from receiving this medicine?  Side effects that you should report to your doctor or health care professional as soon as possible:  -allergic reactions like skin rash, itching or hives  -cold or numb hands or feet  -depression  -difficulty breathing  -faint  -fever with sore throat  -irregular heartbeat, chest pain  -rapid weight gain  -swollen legs or ankles  Side effects that usually do not require medical attention (report to your doctor or health care professional if they continue or are bothersome):  -anxiety or nervousness  -change in sex drive or performance  -dry skin  -headache  -nightmares or trouble sleeping  -short term memory loss  -stomach upset or diarrhea  -unusually tired  This list may not describe all possible side effects. Call your doctor for medical advice about side effects. You may report side effects to FDA at 2-379-FDA-1439.  Where should I keep my medicine?  Keep out of the reach of children.  Store at room temperature between 15 and 30 degrees C (59 and 86 degrees F). Throw away any unused medicine after the expiration date.  NOTE: This sheet is a summary. It may not cover all possible information. If you have  questions about this medicine, talk to your doctor, pharmacist, or health care provider.  © 2014, [employer]. (2/27/2009 4:11:19 PM)      Statin:  atorvastatin Atorvastatin tablets  What is this medicine?  ATORVASTATIN (a TORE va sta tin) is known as a HMG-CoA reductase inhibitor or 'statin'. It lowers the level of cholesterol and triglycerides in the blood. This drug may also reduce the risk of heart attack, stroke, or other health problems in patients with risk factors for heart disease. Diet and lifestyle changes are often used with this drug.  This medicine may be used for other purposes; ask your health care provider or pharmacist if you have questions.  COMMON BRAND NAME(S): Lipitor  What should I tell my health care provider before I take this medicine?  They need to know if you have any of these conditions:  -frequently drink alcoholic beverages  -history of stroke, TIA  -kidney disease  -liver disease  -muscle aches or weakness  -other medical condition  -an unusual or allergic reaction to atorvastatin, other medicines, foods, dyes, or preservatives  -pregnant or trying to get pregnant  -breast-feeding  How should I use this medicine?  Take this medicine by mouth with a glass of water. Follow the directions on the prescription label. You can take this medicine with or without food. Take your doses at regular intervals. Do not take your medicine more often than directed.  Talk to your pediatrician regarding the use of this medicine in children. While this drug may be prescribed for children as young as 10 years old for selected conditions, precautions do apply.  Overdosage: If you think you have taken too much of this medicine contact a poison control center or emergency room at once.  NOTE: This medicine is only for you. Do not share this medicine with others.  What if I miss a dose?  If you miss a dose, take it as soon as you can. If it is almost time for your next dose, take only that dose. Do  not take double or extra doses.  What may interact with this medicine?  Do not take this medicine with any of the following medications:  -red yeast rice  -telaprevir  -telithromycin  -voriconazole  This medicine may also interact with the following medications:  -alcohol  -antiviral medicines for HIV or AIDS  -boceprevir  -certain antibiotics like clarithromycin, erythromycin, troleandomycin  -certain medicines for cholesterol like fenofibrate or gemfibrozil  -cimetidine  -clarithromycin  -colchicine  -cyclosporine  -digoxin  -female hormones, like estrogens or progestins and birth control pills  -grapefruit juice  -medicines for fungal infections like fluconazole, itraconazole, ketoconazole  -niacin  -rifampin  -spironolactone  This list may not describe all possible interactions. Give your health care provider a list of all the medicines, herbs, non-prescription drugs, or dietary supplements you use. Also tell them if you smoke, drink alcohol, or use illegal drugs. Some items may interact with your medicine.  What should I watch for while using this medicine?  Visit your doctor or health care professional for regular check-ups. You may need regular tests to make sure your liver is working properly.  Tell your doctor or health care professional right away if you get any unexplained muscle pain, tenderness, or weakness, especially if you also have a fever and tiredness. Your doctor or health care professional may tell you to stop taking this medicine if you develop muscle problems. If your muscle problems do not go away after stopping this medicine, contact your health care professional.  This drug is only part of a total heart-health program. Your doctor or a dietician can suggest a low-cholesterol and low-fat diet to help. Avoid alcohol and smoking, and keep a proper exercise schedule.  Do not use this drug if you are pregnant or breast-feeding. Serious side effects to an unborn child or to an infant are  possible. Talk to your doctor or pharmacist for more information.  This medicine may affect blood sugar levels. If you have diabetes, check with your doctor or health care professional before you change your diet or the dose of your diabetic medicine.  If you are going to have surgery tell your health care professional that you are taking this drug.  What side effects may I notice from receiving this medicine?  Side effects that you should report to your doctor or health care professional as soon as possible:  -allergic reactions like skin rash, itching or hives, swelling of the face, lips, or tongue  -dark urine  -fever  -joint pain  -muscle cramps, pain  -redness, blistering, peeling or loosening of the skin, including inside the mouth  -trouble passing urine or change in the amount of urine  -unusually weak or tired  -yellowing of eyes or skin  Side effects that usually do not require medical attention (report to your doctor or health care professional if they continue or are bothersome):  -constipation  -heartburn  -stomach gas, pain, upset  This list may not describe all possible side effects. Call your doctor for medical advice about side effects. You may report side effects to FDA at 5-969-FDA-7151.  Where should I keep my medicine?  Keep out of the reach of children.  Store at room temperature between 20 to 25 degrees C (68 to 77 degrees F). Throw away any unused medicine after the expiration date.  NOTE: This sheet is a summary. It may not cover all possible information. If you have questions about this medicine, talk to your doctor, pharmacist, or health care provider.  © 2014, [employer]. (11/6/2012 9:18:24 AM)      · Is patient discharged on Warfarin / Coumadin?   Yes    You are receiving the drug warfarin. Please understand the importance of monitoring warfarin with scheduled PT/INR blood draws.  Follow-up with a call to your personal Doctor's office in 3 days to schedule a PT/INR.  ".    IMPORTANT: HOW TO USE THIS INFORMATION:  This is a summary and does NOT have all possible information about this product. This information does not assure that this product is safe, effective, or appropriate for you. This information is not individual medical advice and does not substitute for the advice of your health care professional. Always ask your health care professional for complete information about this product and your specific health needs.      WARFARIN - ORAL (WARF-uh-rin)      COMMON BRAND NAME(S): Coumadin      WARNING:  Warfarin can cause very serious (possibly fatal) bleeding. This is more likely to occur when you first start taking this medication or if you take too much warfarin. To decrease your risk for bleeding, your doctor or other health care provider will monitor you closely and check your lab results (INR test) to make sure you are not taking too much warfarin. Keep all medical and laboratory appointments. Tell your doctor right away if you notice any signs of serious bleeding. See also Side Effects section.      USES:  This medication is used to treat blood clots (such as in deep vein thrombosis-DVT or pulmonary embolus-PE) and/or to prevent new clots from forming in your body. Preventing harmful blood clots helps to reduce the risk of a stroke or heart attack. Conditions that increase your risk of developing blood clots include a certain type of irregular heart rhythm (atrial fibrillation), heart valve replacement, recent heart attack, and certain surgeries (such as hip/knee replacement). Warfarin is commonly called a \"blood thinner,\" but the more correct term is \"anticoagulant.\" It helps to keep blood flowing smoothly in your body by decreasing the amount of certain substances (clotting proteins) in your blood.      HOW TO USE:  Read the Medication Guide provided by your pharmacist before you start taking warfarin and each time you get a refill. If you have any questions, ask your " doctor or pharmacist. Take this medication by mouth with or without food as directed by your doctor or other health care professional, usually once a day. It is very important to take it exactly as directed. Do not increase the dose, take it more frequently, or stop using it unless directed by your doctor. Dosage is based on your medical condition, laboratory tests (such as INR), and response to treatment. Your doctor or other health care provider will monitor you closely while you are taking this medication to determine the right dose for you. Use this medication regularly to get the most benefit from it. To help you remember, take it at the same time each day. It is important to eat a balanced, consistent diet while taking warfarin. Some foods can affect how warfarin works in your body and may affect your treatment and dose. Avoid sudden large increases or decreases in your intake of foods high in vitamin K (such as broccoli, cauliflower, cabbage, brussels sprouts, kale, spinach, and other green leafy vegetables, liver, green tea, certain vitamin supplements). If you are trying to lose weight, check with your doctor before you try to go on a diet. Cranberry products may also affect how your warfarin works. Limit the amount of cranberry juice (16 ounces/480 milliliters a day) or other cranberry products you may drink or eat.      SIDE EFFECTS:  Nausea, loss of appetite, or stomach/abdominal pain may occur. If any of these effects persist or worsen, tell your doctor or pharmacist promptly. Remember that your doctor has prescribed this medication because he or she has judged that the benefit to you is greater than the risk of side effects. Many people using this medication do not have serious side effects. This medication can cause serious bleeding if it affects your blood clotting proteins too much (shown by unusually high INR lab results). Even if your doctor stops your medication, this risk of bleeding can  continue for up to a week. Tell your doctor right away if you have any signs of serious bleeding, including: unusual pain/swelling/discomfort, unusual/easy bruising, prolonged bleeding from cuts or gums, persistent/frequent nosebleeds, unusually heavy/prolonged menstrual flow, pink/dark urine, coughing up blood, vomit that is bloody or looks like coffee grounds, severe headache, dizziness/fainting, unusual or persistent tiredness/weakness, bloody/black/tarry stools, chest pain, shortness of breath, difficulty swallowing. Tell your doctor right away if any of these unlikely but serious side effects occur: persistent nausea/vomiting, severe stomach/abdominal pain, yellowing eyes/skin. This drug rarely has caused very serious (possibly fatal) problems if its effects lead to small blood clots (usually at the beginning of treatment). This can lead to severe skin/tissue damage that may require surgery or amputation if left untreated. Patients with certain blood conditions (protein C or S deficiency) may be at greater risk. Get medical help right away if any of these rare but serious side effects occur: painful/red/purplish patches on the skin (such as on the toe, breast, abdomen), change in the amount of urine, vision changes, confusion, slurred speech, weakness on one side of the body. A very serious allergic reaction to this drug is rare. However, get medical help right away if you notice any symptoms of a serious allergic reaction, including: rash, itching/swelling (especially of the face/tongue/throat), severe dizziness, trouble breathing. This is not a complete list of possible side effects. If you notice other effects not listed above, contact your doctor or pharmacist. In the [place] - Call your doctor for medical advice about side effects. You may report side effects to FDA at 0-487-FDA-5327. In [place] - Call your doctor for medical advice about side effects. You may report side effects to [employer] at  0-135-750-9681.      PRECAUTIONS:  Before taking warfarin, tell your doctor or pharmacist if you are allergic to it; or if you have any other allergies. This product may contain inactive ingredients, which can cause allergic reactions or other problems. Talk to your pharmacist for more details. Before using this medication, tell your doctor or pharmacist your medical history, especially of: blood disorders (such as anemia, hemophilia), bleeding problems (such as bleeding of the stomach/intestines, bleeding in the brain), blood vessel disorders (such as aneurysms), recent major injury/surgery, liver disease, alcohol use, mental/mood disorders (including memory problems), frequent falls/injuries. It is important that all your doctors and dentists know that you take warfarin. Before having surgery or any medical/dental procedures, tell your doctor or dentist that you are taking this medication and about all the products you use (including prescription drugs, nonprescription drugs, and herbal products). Avoid getting injections into the muscles. If you must have an injection into a muscle (for example, a flu shot), it should be given in the arm. This way, it will be easier to check for bleeding and/or apply pressure bandages. This medication may cause stomach bleeding. Daily use of alcohol while using this medicine will increase your risk for stomach bleeding and may also affect how this medication works. Limit or avoid alcoholic beverages. If you have not been eating well, if you have an illness or infection that causes fever, vomiting, or diarrhea for more than 2 days, or if you start using any antibiotic medications, contact your doctor or pharmacist immediately because these conditions can affect how warfarin works. This medication can cause heavy bleeding. To lower the chance of getting cut, bruised, or injured, use great caution with sharp objects like safety razors and nail cutters. Use an electric razor when  shaving and a soft toothbrush when brushing your teeth. Avoid activities such as contact sports. If you fall or injure yourself, especially if you hit your head, call your doctor immediately. Your doctor may need to check you. The Food & Drug Administration has stated that generic warfarin products are interchangeable. However, consult your doctor or pharmacist before switching warfarin products. Be careful not to take more than one medication that contains warfarin unless specifically directed by the doctor or health care provider who is monitoring your warfarin treatment. Older adults may be at greater risk for bleeding while using this drug. This medication is not recommended for use during pregnancy because of serious (possibly fatal) harm to an unborn baby. Discuss the use of reliable forms of birth control with your doctor. If you become pregnant or think you may be pregnant, tell your doctor immediately. If you are planning pregnancy, discuss a plan for managing your condition with your doctor before you become pregnant. Your doctor may switch the type of medication you use during pregnancy. Very small amounts of this medication may pass into breast milk but is unlikely to harm a nursing infant. Consult your doctor before breast-feeding.      DRUG INTERACTIONS:  Drug interactions may change how your medications work or increase your risk for serious side effects. This document does not contain all possible drug interactions. Keep a list of all the products you use (including prescription/nonprescription drugs and herbal products) and share it with your doctor and pharmacist. Do not start, stop, or change the dosage of any medicines without your doctor's approval. Warfarin interacts with many prescription, nonprescription, vitamin, and herbal products. This includes medications that are applied to the skin or inside the vagina or rectum. The interactions with warfarin usually result in an increase or decrease  "in the \"blood-thinning\" (anticoagulant) effect. Your doctor or other health care professional should closely monitor you to prevent serious bleeding or clotting problems. While taking warfarin, it is very important to tell your doctor or pharmacist of any changes in medications, vitamins, or herbal products that you are taking. Some products that may interact with this drug include: capecitabine, imatinib, mifepristone. Aspirin, aspirin-like drugs (salicylates), and nonsteroidal anti-inflammatory drugs (NSAIDs such as ibuprofen, naproxen, celecoxib) may have effects similar to warfarin. These drugs may increase the risk of bleeding problems if taken during treatment with warfarin. Carefully check all prescription/nonprescription product labels (including drugs applied to the skin such as pain-relieving creams) since the products may contain NSAIDs or salicylates. Talk to your doctor about using a different medication (such as acetaminophen) to treat pain/fever. Low-dose aspirin and related drugs (such as clopidogrel, ticlopidine) should be continued if prescribed by your doctor for specific medical reasons such as heart attack or stroke prevention. Consult your doctor or pharmacist for more details. Many herbal products interact with warfarin. Tell your doctor before taking any herbal products, especially bromelains, coenzyme Q10, cranberry, danshen, dong quai, fenugreek, garlic, ginkgo biloba, ginseng, and [place]oyne's wort, among others. This medication may interfere with a certain laboratory test to measure theophylline levels, possibly causing false test results. Make sure laboratory personnel and all your doctors know you use this drug.      OVERDOSE:  If overdose is suspected, contact a poison control center or emergency room immediately. [place] residents can call the [place] National Poison Hotline at 1-834.109.3369. [place] residents can call a provincial poison control center. Symptoms of overdose may include: " bloody/black/tarry stools, pink/dark urine, unusual/prolonged bleeding.      NOTES:  Do not share this medication with others. Laboratory and/or medical tests (such as INR, complete blood count) must be performed periodically to monitor your progress or check for side effects. Consult your doctor for more details.      MISSED DOSE:  For the best possible benefit, do not miss any doses. If you do miss a dose and remember on the same day, take it as soon as you remember. If you remember on the next day, skip the missed dose and resume your usual dosing schedule. Do not double the dose to catch up because this could increase your risk for bleeding. Keep a record of missed doses to give to your doctor or pharmacist. Contact your doctor or pharmacist if you miss 2 or more doses in a row.      STORAGE:  Store at room temperature away from light and moisture. Do not store in the bathroom. Keep all medications away from children and pets. Do not flush medications down the toilet or pour them into a drain unless instructed to do so. Properly discard this product when it is  or no longer needed. Consult your pharmacist or local waste disposal company for more details about how to safely discard your product.      MEDICAL ALERT:  Your condition and medication can cause complications in a medical emergency. For information about enrolling in MedicAlert, call 1-551.407.7619 ([place]) or 1-336.631.9327 ([place]).      Information last revised 2010 Copyright(c)  [employer]           HF Patient Discharge Instructions  · Monitor your weight daily, and maintain a weight chart, to track your weight changes.   · Activity as tolerated, unless your Doctor has ordered otherwise. Other activity order: walk with rest periods.  · Follow a low fat, low cholesterol, low salt diet unless instructed otherwise by your Doctor. Read the labels on the back of food products and track your intake of fat, cholesterol and salt.    · Fluid Restriction No. If a Fluid Restriction has been ordered by your Doctor, measure fluids with a measuring cup to ensure that you are not exceeding the restriction.   · No smoking.  · Oxygen -no, only Cpap. If your Doctor has ordered that you wear Oxygen at home, it is important to wear it as ordered.  · Did you receive an explanation from staff on the importance of taking each of your medications and why it is necessary to keep taking them unless your doctor says to stop? Yes  · Were all of your questions answered about how to manage your heart failure and what to do if you have increased signs and symptoms after you go home? Yes  · Do you feel like your heart failure care team involved you in the care treatment plan and allowed you to make decisions regarding your care while in the hospital and addressed any discharge needs you might have? Yes    See the educational handout provided at discharge for more information on monitoring your daily weight, activity and diet. This also explains more about Heart Failure, symptoms of a flare-up and some of the tests that you have undergone.     Warning Signs of a Flare-Up include:  · Swelling in the ankles or lower legs.  · Shortness of breath, while at rest, or while doing normal activities.   · Shortness of breath at night when in bed, or coughing in bed.   · Requiring more pillows to sleep at night, or needing to sit up at night to sleep.  · Feeling weak, dizzy or fatigued.     When to call your Doctor:  · [facility] seven days a week from 8:00 a.m. to 8:00 p.m. for medical questions [phone].  · Call your Primary Care Physician or Cardiologist if:   1. You experience any pain radiating to your jaw or neck.  2. You have any difficulty breathing.  3. You experience weight gain of 3 lbs in a day or 5 lbs in a week.   4. You feel any palpitations or irregular heartbeats.  5. You become dizzy or lose consciousness.   If you have had an angiogram  or had a pacemaker or AICD placed, and experience:  1. Bleeding, drainage or swelling at the surgical / puncture site.  2. Fever greater than 100.0 F  3. Shock from internal defibrillator.  4. Cool and / or numb extremities.  5.       · Is patient Post Blood Transfusion?  No    Depression / Suicide Risk    As you are discharged from this [facility] facility, it is important to learn how to keep safe from harming yourself.    Recognize the warning signs:  · Abrupt changes in personality, positive or negative- including increase in energy   · Giving away possessions  · Change in eating patterns- significant weight changes-  positive or negative  · Change in sleeping patterns- unable to sleep or sleeping all the time   · Unwillingness or inability to communicate  · Depression  · Unusual sadness, discouragement and loneliness  · Talk of wanting to die  · Neglect of personal appearance   · Rebelliousness- reckless behavior  · Withdrawal from people/activities they love  · Confusion- inability to concentrate     If you or a loved one observes any of these behaviors or has concerns about self-harm, here's what you can do:  · Talk about it- your feelings and reasons for harming yourself  · Remove any means that you might use to hurt yourself (examples: pills, rope, extension cords, firearm)  · Get professional help from the community (Mental Health, Substance Abuse, psychological counseling)  · Do not be alone:Call your Safe Contact- someone whom you trust who will be there for you.  · Call your local CRISIS HOTLINE 003-3132 or 549-002-7891  · Call your local Children's Mobile Crisis Response Team [place] (530) 098-0187 or www.[employer]  · Call the toll free National Suicide Prevention Hotlines   · National Suicide Prevention Lifeline 982-933-FPFC (7056)  · National Hope Line Network 800-SUICIDE (475-1412)

## 2017-07-19 NOTE — CARE PLAN
Problem: Safety  Goal: Will remain free from injury  Intervention: Provide assistance with mobility    07/18/17 2000   OTHER   Assistance / Tolerance Standby Assist     Provide assistance with ambulation to bathroom as needed.

## 2017-07-19 NOTE — CARE PLAN
Problem: Nutritional:  Goal: Patient to verbalize or demonstrate understanding of diet  Outcome: MET Date Met:  07/19/17  Cardiac diet instruction provided, pt receptive and reports understanding. RD available PRN if pt presents with further questions or concerns.

## 2017-07-19 NOTE — PROGRESS NOTES
Bedside report completed with noc RN. Pt resting, denies pain. Bed locked in low position, call light within reach. Bed alarm on. Reviewed plan of care with noc RN and pt. Patient has no questions at this time.

## 2017-07-19 NOTE — PROGRESS NOTES
Pt returned to floor. Report received. RT groin site c/d/i, no S/S of hematoma or bleeding. Post-cath protocol started.

## 2017-07-20 ENCOUNTER — PATIENT OUTREACH (OUTPATIENT)
Dept: HEALTH INFORMATION MANAGEMENT | Facility: OTHER | Age: 77
End: 2017-07-20

## 2017-07-20 NOTE — PROGRESS NOTES
Pt discharged, all PIVs removed, vitals stable. Went over discharge instructions and paperwork signed. All questions answered. Follow up appointments set and pt understands. All belongings with patient. Daughter at bedside, RN wheeled pt down to [facility] pharmacy and then to the daughter's car. Pt denies pain, vitals stable.   Thoroughly went over HF education with handouts, assessed pt's prior knowledge, and used handouts and diagrams, teach back, answered all questions. Thoroughly went over ACS and CAD information, pt verbalizes understanding. Went over all medications and answered all questions.

## 2017-07-20 NOTE — DISCHARGE SUMMARY
CHIEF COMPLAINT ON ADMISSION  Chief Complaint   Patient presents with   • Shortness of Breath     pt D/C at 10:00 on 7/12/17 for SOB, tx of Afib with RVR. pt was home increasing SOB, call ed [facility]. pt HTN.        CODE STATUS  FULL    HPI & HOSPITAL COURSE  Please see H&P dictated by Dr. Mcgee. This is a 77 y.o. female here with atrial fibrillation with RVR and acute on chronic combined systolic and diastolic congestive heart failure. Patient was admitted to telemetry.  Patient was started on IV Lasix and her electrolytes were monitored closely and repleted as needed. Cardiology was consulted. Patient for atrial fibrillation was on metoprolol 50 twice a day, digoxin 125 µg daily. Patient's rate was controlled. Patient had echocardiogram which showed ejection fraction of 30%, grade 3-4 diastolic dysfunction, severe mitral regurgitation and RVSP of 80 mmHg. Patient had a left and right heart catheterization which showed 100% occlusion of the mid RCA with  patent saphenous vein graft to distal RCA, significant mitral insufficiency, RVSP 47 mmHg, EF 45%.  Patient was restarted on Coumadin for anticoagulation for atrial fibrillation after her right and left heart catheterization. Transesophageal echo was recommended for further evaluation of patient's mitral regurgitation. She decided that she did not want the procedure done as an inpatient and wanted to follow-up with her cardiologist, Dr. Marques in [facility] for outpatient transesophageal echo. Patient was advised to follow-up with cardiology closely.  Patient also has history of hypothyroidism and was started on methimazole 10 mg 3 times a day. Patient is scheduled to follow-up with endocrinology in September 2017.    Therefore, she is discharged in good and stable condition with close outpatient follow-up.    SPECIFIC OUTPATIENT FOLLOW-UP  Primary care physician in 1-2 weeks  Cardiology in 2 weeks    DISCHARGE PROBLEM LIST  Principal Problem (Resolved):     Acute on chronic combined systolic and diastolic CHF, NYHA class 3 (CMS-[facility]) POA: Unknown  Active Problems:    Microcytic anemia POA: Unknown    Hyperthyroidism POA: Unknown    Anxiety POA: Unknown    Severe mitral regurgitation POA: Unknown    Moderate to severe pulmonary hypertension (CMS-HCC) POA: Unknown    Paroxysmal atrial fibrillation (CMS-HCC) POA: Unknown    Coronary artery disease involving native coronary artery of native heart without angina pectoris, s/p 1V CABG 1992 POA: Unknown      FOLLOW UP  No future appointments.  Shelli HOROWITZ MD  [address], [place] [id number] [phone]  Go on 7/25/2017  Please arrive at 8:00 am for your appointment. Thank you.    Saad Tabares   Your Primary Care Provider  AIMEE Trimble   Schedule an appointment as soon as possible for a visit in 2 weeks  For a follow up appointment. Thank you.    Pcp Not In Computer            MEDICATIONS ON DISCHARGE   Aleshia Crooks   Home Medication Instructions TERESA:[id number]    Printed on:07/20/17 0709   Medication Information                      aspirin 81 MG EC tablet  Take 1 Tab by mouth every day.             atorvastatin (LIPITOR) 40 MG Tab  Take 1 Tab by mouth every bedtime.             digoxin (LANOXIN) 125 MCG Tab  Take 1 Tab by mouth every day at 6 PM.             ferrous gluconate (FERGON) 324 (38 FE) MG Tab  Take 324 mg by mouth every morning with breakfast. Indications: Iron Deficiency             losartan (COZAAR) 100 MG Tab  Take 1 Tab by mouth every day.             methimazole (TAPAZOLE) 10 MG Tab  Take 1 Tab by mouth 3 times a day.             metoprolol (LOPRESSOR) 50 MG Tab  Take 1 Tab by mouth 2 Times a Day.             omeprazole (PRILOSEC) 20 MG delayed-release capsule  Take 20 mg by mouth every day. Indications: Gastroesophageal Reflux Disease             vitamin D (CHOLECALCIFEROL) 1000 UNIT Tab  Take 1,000 Units by mouth 2 Times a Day.             warfarin (COUMADIN) 6 MG Tab  Take 6 mg by mouth every day.  Indications: Obstructing Blood Clot with Chronic Atrial Fibrillation                 DIET  No orders of the defined types were placed in this encounter.       ACTIVITY  As tolerated.        CONSULTATIONS  Dr. Alexis - Cardiology     PROCEDURES  Left and right heart catheterization    LABORATORY  Lab Results   Component Value Date/Time    SODIUM 139 07/19/2017 05:09 AM    POTASSIUM 4.4 07/19/2017 05:09 AM    CHLORIDE 106 07/19/2017 05:09 AM    CO2 25 07/19/2017 05:09 AM    GLUCOSE 101* 07/19/2017 05:09 AM    BUN 19 07/19/2017 05:09 AM    CREATININE 0.86 07/19/2017 05:09 AM        Lab Results   Component Value Date/Time    WBC 6.6 07/18/2017 05:07 AM    HEMOGLOBIN 13.1 07/18/2017 05:07 AM    HEMATOCRIT 43.7 07/18/2017 05:07 AM    PLATELET COUNT 266 07/18/2017 05:07 AM        Total time of the discharge process exceeds 40 minutes

## 2020-03-04 ENCOUNTER — TELEPHONE (OUTPATIENT)
Dept: SCHEDULING | Facility: IMAGING CENTER | Age: 80
End: 2020-03-04

## 2020-03-10 LAB — INR PPP: 2.8 (ref 2–3.5)

## 2020-03-12 ENCOUNTER — TELEPHONE (OUTPATIENT)
Dept: VASCULAR LAB | Facility: MEDICAL CENTER | Age: 80
End: 2020-03-12

## 2020-03-12 ENCOUNTER — OFFICE VISIT (OUTPATIENT)
Dept: MEDICAL GROUP | Facility: PHYSICIAN GROUP | Age: 80
End: 2020-03-12
Payer: MEDICARE

## 2020-03-12 VITALS
SYSTOLIC BLOOD PRESSURE: 120 MMHG | DIASTOLIC BLOOD PRESSURE: 90 MMHG | OXYGEN SATURATION: 96 % | TEMPERATURE: 97.5 F | RESPIRATION RATE: 16 BRPM | HEART RATE: 77 BPM | BODY MASS INDEX: 36.86 KG/M2 | HEIGHT: 63 IN | WEIGHT: 208 LBS

## 2020-03-12 DIAGNOSIS — F41.9 ANXIETY: ICD-10-CM

## 2020-03-12 DIAGNOSIS — Z85.820 HX OF MELANOMA EXCISION: ICD-10-CM

## 2020-03-12 DIAGNOSIS — G47.33 OBSTRUCTIVE SLEEP APNEA SYNDROME: ICD-10-CM

## 2020-03-12 DIAGNOSIS — I27.20 MODERATE TO SEVERE PULMONARY HYPERTENSION (HCC): ICD-10-CM

## 2020-03-12 DIAGNOSIS — E55.9 VITAMIN D INSUFFICIENCY: ICD-10-CM

## 2020-03-12 DIAGNOSIS — I48.0 PAROXYSMAL ATRIAL FIBRILLATION (HCC): ICD-10-CM

## 2020-03-12 DIAGNOSIS — I25.10 CORONARY ARTERY DISEASE INVOLVING NATIVE CORONARY ARTERY OF NATIVE HEART WITHOUT ANGINA PECTORIS: ICD-10-CM

## 2020-03-12 DIAGNOSIS — I50.42 CHRONIC COMBINED SYSTOLIC AND DIASTOLIC CONGESTIVE HEART FAILURE (HCC): ICD-10-CM

## 2020-03-12 DIAGNOSIS — E03.9 HYPOTHYROIDISM, UNSPECIFIED TYPE: ICD-10-CM

## 2020-03-12 DIAGNOSIS — I34.0 SEVERE MITRAL REGURGITATION: ICD-10-CM

## 2020-03-12 DIAGNOSIS — I10 ESSENTIAL HYPERTENSION: ICD-10-CM

## 2020-03-12 DIAGNOSIS — E66.9 OBESITY (BMI 35.0-39.9 WITHOUT COMORBIDITY): ICD-10-CM

## 2020-03-12 DIAGNOSIS — L98.9 SKIN LESIONS: ICD-10-CM

## 2020-03-12 DIAGNOSIS — Z98.890 HX OF MELANOMA EXCISION: ICD-10-CM

## 2020-03-12 PROCEDURE — 99204 OFFICE O/P NEW MOD 45 MIN: CPT | Performed by: INTERNAL MEDICINE

## 2020-03-12 RX ORDER — LEVOTHYROXINE SODIUM 0.15 MG/1
150 TABLET ORAL
COMMUNITY
End: 2020-03-12

## 2020-03-12 RX ORDER — FUROSEMIDE 40 MG/1
40 TABLET ORAL DAILY
Qty: 90 TAB | Refills: 0 | Status: SHIPPED | OUTPATIENT
Start: 2020-03-12 | End: 2020-06-22

## 2020-03-12 RX ORDER — POTASSIUM CHLORIDE 20 MEQ/1
20 TABLET, EXTENDED RELEASE ORAL DAILY
Qty: 90 TAB | Refills: 0 | Status: SHIPPED | OUTPATIENT
Start: 2020-03-12 | End: 2020-04-13

## 2020-03-12 RX ORDER — LEVOTHYROXINE SODIUM 0.15 MG/1
150 TABLET ORAL
Qty: 10 TAB | Refills: 0 | Status: SHIPPED | OUTPATIENT
Start: 2020-03-12 | End: 2020-08-11 | Stop reason: SDUPTHER

## 2020-03-12 RX ORDER — POTASSIUM CHLORIDE 20 MEQ/1
20 TABLET, EXTENDED RELEASE ORAL DAILY
COMMUNITY
End: 2020-03-12 | Stop reason: SDUPTHER

## 2020-03-12 RX ORDER — LOSARTAN POTASSIUM 50 MG/1
50 TABLET ORAL DAILY
Qty: 90 TAB | Refills: 0 | Status: SHIPPED | OUTPATIENT
Start: 2020-03-12 | End: 2020-07-07

## 2020-03-12 RX ORDER — FUROSEMIDE 40 MG/1
40 TABLET ORAL DAILY
COMMUNITY
End: 2020-03-12 | Stop reason: SDUPTHER

## 2020-03-12 RX ORDER — LOSARTAN POTASSIUM 50 MG/1
50 TABLET ORAL DAILY
COMMUNITY
End: 2020-03-12 | Stop reason: SDUPTHER

## 2020-03-12 ASSESSMENT — PATIENT HEALTH QUESTIONNAIRE - PHQ9: CLINICAL INTERPRETATION OF PHQ2 SCORE: 0

## 2020-03-12 NOTE — LETTER
[facility]  La Paredes M.D.  [address]  [facility] [address]  Fax: [phone]   Authorization for Release/Disclosure of   Protected Health Information   Name: ALESHIA REICH : 1940 SSN: [id number]   Address: [address] [place] [address] [place] [address] Phone:    [phone] (home)    I authorize the entity listed below to release/disclose the PHI below to:   [facility]La Paredes M.[facility]. and La Paredes M.D.   Provider or Entity Name:     Address   City, State, [facility]   Phone:      Fax:     Reason for request: continuity of care   Information to be released:    [  ] LAST COLONOSCOPY,  including any PATH REPORT and follow-up  [  ] LAST FIT/COLOGUARD RESULT [  ] LAST DEXA  [  ] LAST MAMMOGRAM  [  ] LAST PAP  [  ] LAST LABS [  ] RETINA EXAM REPORT  [  ] IMMUNIZATION RECORDS  [  ] Release all info      [  ] Check here and initial the line next to each item to release ALL health information INCLUDING  _____ Care and treatment for drug and / or alcohol abuse  _____ HIV testing, infection status, or AIDS  _____ Genetic Testing    DATES OF SERVICE OR TIME PERIOD TO BE DISCLOSED: _____________  I understand and acknowledge that:  * This Authorization may be revoked at any time by you in writing, except if your health information has already been used or disclosed.  * Your health information that will be used or disclosed as a result of you signing this authorization could be re-disclosed by the recipient. If this occurs, your re-disclosed health information may no longer be protected by State or Federal laws.  * You may refuse to sign this Authorization. Your refusal will not affect your ability to obtain treatment.  * This Authorization becomes effective upon signing and will  on (date) __________.      If no date is indicated, this Authorization will  one (1) year from the signature date.    Name: Aleshia Reich    Signature:   Date:     3/12/2020       PLEASE  FAX REQUESTED RECORDS BACK TO: [phone]

## 2020-03-12 NOTE — PROGRESS NOTES
New Patient to Establish    Reason to establish: New patient to establish    Aleshia Crooks is a 80 y.o. female who presents today with the following:    CC:   Chief Complaint   Patient presents with   • Establish Care            HPI:     Anxiety  Intermittent anxiety. Ok for behavioral medicine referral  Denies depression, SI/HI      Chronic combined systolic and diastolic congestive heart failure (HCC)  Hx of CHF, Afib, CAD s/p 1v CABG  On metoprolol, losartan, lasix/KCL      Skin lesions  Multiple skin lesions on her trunk. Hx of melanoma resection.       Essential hypertension  Stable on metoprolol, losartan      Moderate to severe pulmonary hypertension (HCC)  History of SHAKA on CPAP, denies smoking history  On Lasix, KCL      Hypothyroidism  Hx of hyperthyroidism, thyroid nodule s/p thyroidectomy  On Levoxyl.       Obstructive sleep apnea syndrome  SHAKA on CPAP      Paroxysmal atrial fibrillation (HCC)  Rate controlled on metoprolol  Anticoagulation with warfarin, Vit K        Severe mitral regurgitation  Hx of severe mitral regurgitation              Current Outpatient Medications:   •  vitamin k 100 MCG tablet, Take 100 mcg by mouth every day., Disp: , Rfl:   •  levothyroxine (LEVOXYL) 150 MCG Tab, Take 1 Tab by mouth Every morning on an empty stomach., Disp: 10 Tab, Rfl: 0  •  potassium chloride SA (KDUR) 20 MEQ Tab CR, Take 1 Tab by mouth every day., Disp: 90 Tab, Rfl: 0  •  furosemide (LASIX) 40 MG Tab, Take 1 Tab by mouth every day., Disp: 90 Tab, Rfl: 0  •  losartan (COZAAR) 50 MG Tab, Take 1 Tab by mouth every day., Disp: 90 Tab, Rfl: 0  •  atorvastatin (LIPITOR) 40 MG Tab, Take 1 Tab by mouth every bedtime., Disp: 30 Tab, Rfl: 1  •  metoprolol (LOPRESSOR) 50 MG Tab, Take 1 Tab by mouth 2 Times a Day., Disp: 60 Tab, Rfl: 0  •  warfarin (COUMADIN) 6 MG Tab, Take 6 mg by mouth every day. Indications: Obstructing Blood Clot with Chronic Atrial Fibrillation, Disp: , Rfl:   •  omeprazole (PRILOSEC) 20 MG  "delayed-release capsule, Take 20 mg by mouth every day. Indications: Gastroesophageal Reflux Disease, Disp: , Rfl:   •  vitamin D (CHOLECALCIFEROL) 1000 UNIT Tab, Take 4,000 Units by mouth every day., Disp: , Rfl:     Allergies, past medical history, past surgical history, medications, family history, social history reviewed and updated.    ROS     Constitutional: Denies fevers or chills  Eyes: Denies changes in vision  Ears/Nose/Throat/Mouth: Denies nasal congestion or sore throat   Cardiovascular: Denies chest pain or palpitations   Respiratory: Denies shortness of breath , Denies cough  Gastrointestinal/Hepatic: Denies abd pain, nausea, vomiting, diarrhea, constipation   Genitourinary: Denies dysuria or frequency  Musculoskeletal/Rheum: Denies joint pain and swelling   Neurological: Denies headache  Psychiatric: Denies mood disorder   Endocrine: hypothyroidism Denies hx of diabetes   Heme/Oncology/Lymph Nodes: Denies weight changes or enlarged LNs.    Physical Exam  /90 (BP Location: Right arm, Patient Position: Sitting, BP Cuff Size: Large adult)   Pulse 77   Temp 36.4 °C (97.5 °F) (Temporal)   Resp 16   Ht 1.6 m (5' 3\")   Wt 94.3 kg (208 lb)   LMP  (LMP Unknown)   SpO2 96%   BMI 36.85 kg/m²   General: Normal appearance.  Well developed, well nourished, no acute distress.  HEENT: Normocephalic.  Extraocular motion intact. Pupils are equally round, reactive to light and accommodation, conjunctiva clear, no scleral icterus.  Ears: normal shape and contour, ear canals clear, tympanic membranes intact. Hearing intact.  Oropharynx clear, no erythema, edema or exudate noted.  NECK: Thyroid is not enlarged. No JVD.  No carotid bruits. No masses.  Cardiovascular: Regular rhythm and rate.    Respiratory: Normal respiratory effort, clear to auscultation bilaterally. No wheezing/rales/rhonchi.    Abdomen: Bowel sounds present, soft, nontender, nondistended, no rebound, no guarding. No hepatosplenomegaly.  : " No suprapubic tenderness. No CVA tenderness.   EXT: no LE edema b/l. No cyanosis.  No clubbing.  Lymph: No cervical, supraclavicular or axillary lymph nodes are palpable  Skin: Warm and dry.  Scattered skin color, brown, round to oval shape skin lesions, also moles on her trunk.  Neurologic: No focal deficits.    Psych: AAOx3,  Normal mood and affect, normal judgment and insight, memory is forgetful especially short-term at baseline        Assessment and Plan    1. Chronic combined systolic and diastolic congestive heart failure (HCC)  2. Coronary artery disease involving native coronary artery of native heart without angina pectoris, s/p 1V CABG 1992  3. Paroxysmal atrial fibrillation (HCC)  4. Severe mitral regurgitation  Continue metoprolol, losartan, Lasix with KCl, warfarin  - REFERRAL TO CARDIOLOGY  - REFERRAL TO ANTICOAGULATION MONITORING  - potassium chloride SA (KDUR) 20 MEQ Tab CR; Take 1 Tab by mouth every day.  Dispense: 90 Tab; Refill: 0  - furosemide (LASIX) 40 MG Tab; Take 1 Tab by mouth every day.  Dispense: 90 Tab; Refill: 0  - Lipid Profile; Future      5. Moderate to severe pulmonary hypertension (HCC)  SHAKA on CPAP  - potassium chloride SA (KDUR) 20 MEQ Tab CR; Take 1 Tab by mouth every day.  Dispense: 90 Tab; Refill: 0  - furosemide (LASIX) 40 MG Tab; Take 1 Tab by mouth every day.  Dispense: 90 Tab; Refill: 0  - REFERRAL TO CARDIOLOGY    6. Essential hypertension  - losartan (COZAAR) 50 MG Tab; Take 1 Tab by mouth every day.  Dispense: 90 Tab; Refill: 0  - REFERRAL TO CARDIOLOGY  - Comp Metabolic Panel; Future  - CBC WITH DIFFERENTIAL; Future  - Lipid Profile; Future    7. Hypothyroidism, unspecified type  - levothyroxine (LEVOXYL) 150 MCG Tab; Take 1 Tab by mouth Every morning on an empty stomach.  Dispense: 10 Tab; Refill: 0  - TSH WITH REFLEX TO FT4; Future    8. Hx of melanoma excision  9. Skin lesions  - REFERRAL TO DERMATOLOGY    10. Anxiety  - REFERRAL TO BEHAVIORAL HEALTH    11. Vitamin  D insufficiency  replacement  - VITAMIN D,25 HYDROXY; Future    12. Obesity (BMI 35.0-39.9 without comorbidity)  - Patient identified as having weight management issue.  Appropriate orders and counseling given.    13. Obstructive sleep apnea syndrom  On CPAP    Request records from her PCP, cardiology, endocrinology    Follow-up:Return in about 2 weeks (around 3/26/2020), or if symptoms worsen or fail to improve.    This note was created using voice recognition software. There may be unintended errors in spelling, grammar or content.

## 2020-03-12 NOTE — LETTER
[facility]  La Paredes M.D.  [address]  [place] [address]  Fax: [phone]   Authorization for Release/Disclosure of   Protected Health Information   Name: ALESHIA REICH : 1940 SSN: [id number]   Address: [address] [place] [address] [place] [address] Phone:    [phone] (home)    I authorize the entity listed below to release/disclose the PHI below to:   [facility]/La Paredes M.D. and La Paredes M.D.   Provider or Entity Name:     Address   City, State, [place]   Phone:      Fax:     Reason for request: continuity of care   Information to be released:    [  ] LAST COLONOSCOPY,  including any PATH REPORT and follow-up  [  ] LAST FIT/COLOGUARD RESULT [  ] LAST DEXA  [  ] LAST MAMMOGRAM  [  ] LAST PAP  [  ] LAST LABS [  ] RETINA EXAM REPORT  [  ] IMMUNIZATION RECORDS  [  ] Release all info      [  ] Check here and initial the line next to each item to release ALL health information INCLUDING  _____ Care and treatment for drug and / or alcohol abuse  _____ HIV testing, infection status, or AIDS  _____ Genetic Testing    DATES OF SERVICE OR TIME PERIOD TO BE DISCLOSED: _____________  I understand and acknowledge that:  * This Authorization may be revoked at any time by you in writing, except if your health information has already been used or disclosed.  * Your health information that will be used or disclosed as a result of you signing this authorization could be re-disclosed by the recipient. If this occurs, your re-disclosed health information may no longer be protected by State or Federal laws.  * You may refuse to sign this Authorization. Your refusal will not affect your ability to obtain treatment.  * This Authorization becomes effective upon signing and will  on (date) __________.      If no date is indicated, this Authorization will  one (1) year from the signature date.    Name: Aleshia Reich    Signature:   Date:     3/12/2020       PLEASE  FAX REQUESTED RECORDS BACK TO: [phone]

## 2020-03-12 NOTE — LETTER
[facility]  La Paredes M.D.  [address]  [facility] [address]  Fax: [phone]   Authorization for Release/Disclosure of   Protected Health Information   Name: ALESHIA REICH : 1940 SSN: [id number]   Address: [address] [place] [address] [place] [address] Phone:    [phone] (home)    I authorize the entity listed below to release/disclose the PHI below to:   [email or web address]/La Paredes M.D. and La Paredes M.D.   Provider or Entity Name:     Address   City, State, [facility]   Phone:      Fax:     Reason for request: continuity of care   Information to be released:    [  ] LAST COLONOSCOPY,  including any PATH REPORT and follow-up  [  ] LAST FIT/COLOGUARD RESULT [  ] LAST DEXA  [  ] LAST MAMMOGRAM  [  ] LAST PAP  [  ] LAST LABS [  ] RETINA EXAM REPORT  [  ] IMMUNIZATION RECORDS  [  ] Release all info      [  ] Check here and initial the line next to each item to release ALL health information INCLUDING  _____ Care and treatment for drug and / or alcohol abuse  _____ HIV testing, infection status, or AIDS  _____ Genetic Testing    DATES OF SERVICE OR TIME PERIOD TO BE DISCLOSED: _____________  I understand and acknowledge that:  * This Authorization may be revoked at any time by you in writing, except if your health information has already been used or disclosed.  * Your health information that will be used or disclosed as a result of you signing this authorization could be re-disclosed by the recipient. If this occurs, your re-disclosed health information may no longer be protected by State or Federal laws.  * You may refuse to sign this Authorization. Your refusal will not affect your ability to obtain treatment.  * This Authorization becomes effective upon signing and will  on (date) __________.      If no date is indicated, this Authorization will  one (1) year from the signature date.    Name: Aleshia Reich    Signature:   Date:     3/12/2020       PLEASE  FAX REQUESTED RECORDS BACK TO: [phone]

## 2020-03-17 ENCOUNTER — ANTICOAGULATION VISIT (OUTPATIENT)
Dept: VASCULAR LAB | Facility: MEDICAL CENTER | Age: 80
End: 2020-03-17
Attending: INTERNAL MEDICINE
Payer: MEDICARE

## 2020-03-17 DIAGNOSIS — I48.0 PAROXYSMAL ATRIAL FIBRILLATION (HCC): ICD-10-CM

## 2020-03-17 LAB
INR BLD: 3.3 (ref 0.9–1.2)
INR PPP: 3.3 (ref 2–3.5)

## 2020-03-17 PROCEDURE — 99213 OFFICE O/P EST LOW 20 MIN: CPT

## 2020-03-17 PROCEDURE — 85610 PROTHROMBIN TIME: CPT

## 2020-03-17 NOTE — PROGRESS NOTES
.  Anticoagulation Summary  As of 3/17/2020    INR goal:   2.0-3.0   TTR:   --   INR used for dosin.80 (3/10/2020)   Warfarin maintenance plan:   9 mg (6 mg x 1.5) every Wed; 6 mg (6 mg x 1) all other days   Weekly warfarin total:   45 mg   Plan last modified:   Rudy Lowe, PharmD (3/17/2020)   Next INR check:   3/24/2020   Target end date:   Indefinite    Indications    Paroxysmal atrial fibrillation (HCC) [I48.0]             Anticoagulation Episode Summary     INR check location:       Preferred lab:       Send INR reminders to:       Comments:         Anticoagulation Care Providers     Provider Role Specialty Phone number    WinniemeirCameronRenay Christian Paredes M.D. Referring Internal Medicine [phone]    [facility] Responsible  [phone]        Anticoagulation Patient Findings      PCP Dr Paredes  Cardiologist none  Pt hx of Afib for the past 3 years. Pt moved from [place] and is establishing care in [facility] ~6(CHF, HTN, Age5, CAD, Gender)  HAS-BLED ~2 (HTN Age)  DOAC = Too expensive per pt.     Pt is new to warfarin and new to [facility]. Discussed:   · Indication for warfarin therapy and INR goal range.   · Importance of monitoring and compliance.   · Monitoring parameters, signs and symptoms of bleeding or clotting.  · Warfarin therapy, side effects, potential DDIs, OTC medications  · Hormonal therapy denies  · ASA denies  · DDI not of clinical concern  · Importance of diet consistency, ie vitamin K intake, supplements  · Lifestyle safety, ie smoking, ETOH, hobby safety, fall safety/prevention  · Procedures for missed doses or suspected missed doses, surgeries/procedures, travel, dental work, any medication changes    Pt denies any unusual s/s of bleeding, bruising, clotting or any changes to diet or medications.    A/P  INR is SUPRA therapeutic today.   She reports she stopped her Vit K 100 mcg tablets about 7 days ago. Discussed if she would like to reduce warfarin or restart Vit K.  Pt prefers to start Vit K.   Reduce warfarin today then Pt is to continue with current warfarin dosing regimen.  Restart Vit K.     Follow up in 7 days based on pt's warfarin hx.     Rudy Lowe, PharmD    CC  Dr Bloch Dr Lin

## 2020-03-18 ENCOUNTER — TELEPHONE (OUTPATIENT)
Dept: VASCULAR LAB | Facility: MEDICAL CENTER | Age: 80
End: 2020-03-18

## 2020-03-18 NOTE — TELEPHONE ENCOUNTER
Initial anticoag note and most recent PCP note reviewed.  Patient with afib and chads vasc =6*    Pending further recommendations, we will continue with indefinite anticoagulation with warfarin as directed by PCP    Will defer all management of rhythm, rate, and other cv issues, aside from anticoagulation, to cards/pcp    Michael Bloch, MD  Anticoagulation Clinic    Cc: Dr Paredes

## 2020-03-24 ENCOUNTER — ANTICOAGULATION VISIT (OUTPATIENT)
Dept: VASCULAR LAB | Facility: MEDICAL CENTER | Age: 80
End: 2020-03-24
Attending: INTERNAL MEDICINE
Payer: MEDICARE

## 2020-03-24 DIAGNOSIS — I48.0 PAROXYSMAL ATRIAL FIBRILLATION (HCC): ICD-10-CM

## 2020-03-24 LAB — INR PPP: 2.6 (ref 2–3.5)

## 2020-03-24 PROCEDURE — 99211 OFF/OP EST MAY X REQ PHY/QHP: CPT

## 2020-03-24 PROCEDURE — 85610 PROTHROMBIN TIME: CPT

## 2020-03-24 NOTE — PROGRESS NOTES
Anticoagulation Summary  As of 3/24/2020    INR goal:   2.0-3.0   TTR:   --   INR used for dosin.60 (3/24/2020)   Warfarin maintenance plan:   9 mg (6 mg x 1.5) every Wed; 6 mg (6 mg x 1) all other days   Weekly warfarin total:   45 mg   Plan last modified:   Rudy Lowe, PharmD (3/17/2020)   Next INR check:      Target end date:   Indefinite    Indications    Paroxysmal atrial fibrillation (HCC) [I48.0]             Anticoagulation Episode Summary     INR check location:       Preferred lab:       Send INR reminders to:       Comments:         Anticoagulation Care Providers     Provider Role Specialty Phone number    La Paredes M.D. Referring Internal Medicine [phone]    [facility] Anticoagulation Services Responsible  [phone]        Anticoagulation Patient Findings          History of Present Illness: follow up appointment for chronic anticoagulation with the high risk medication, warfarin for atrial fibrillation    Last INR was out of range, dosage adjusted: pt is now at goal. Continue current dosing regimen.  Follow up in 2 weeks, to reduce the risk of adverse events related to this high risk medication, warfarin.    Taylor Cabrales, Clinical Pharmacist      Pt states that she has a home meter at home, however does not know who she reports it to. If she can ascertain this information we will send in a new order to that agency for home monitor use.

## 2020-04-08 ENCOUNTER — HOSPITAL ENCOUNTER (INPATIENT)
Facility: MEDICAL CENTER | Age: 80
LOS: 2 days | DRG: 291 | End: 2020-04-10
Attending: EMERGENCY MEDICINE | Admitting: HOSPITALIST
Payer: MEDICARE

## 2020-04-08 ENCOUNTER — APPOINTMENT (OUTPATIENT)
Dept: RADIOLOGY | Facility: MEDICAL CENTER | Age: 80
DRG: 291 | End: 2020-04-08
Attending: EMERGENCY MEDICINE
Payer: MEDICARE

## 2020-04-08 ENCOUNTER — APPOINTMENT (OUTPATIENT)
Dept: CARDIOLOGY | Facility: MEDICAL CENTER | Age: 80
DRG: 291 | End: 2020-04-08
Attending: HOSPITALIST
Payer: MEDICARE

## 2020-04-08 DIAGNOSIS — R09.02 HYPOXIA: ICD-10-CM

## 2020-04-08 DIAGNOSIS — I48.11 LONGSTANDING PERSISTENT ATRIAL FIBRILLATION (HCC): ICD-10-CM

## 2020-04-08 DIAGNOSIS — R06.00 DYSPNEA, UNSPECIFIED TYPE: ICD-10-CM

## 2020-04-08 DIAGNOSIS — I50.1 PULMONARY EDEMA CARDIAC CAUSE (HCC): ICD-10-CM

## 2020-04-08 PROBLEM — J96.01 ACUTE HYPOXEMIC RESPIRATORY FAILURE (HCC): Status: ACTIVE | Noted: 2020-04-08

## 2020-04-08 PROBLEM — D64.9 ANEMIA: Status: ACTIVE | Noted: 2020-04-08

## 2020-04-08 PROBLEM — R79.89 ELEVATED TROPONIN: Status: ACTIVE | Noted: 2020-04-08

## 2020-04-08 LAB
ALBUMIN SERPL BCP-MCNC: 4 G/DL (ref 3.2–4.9)
ALBUMIN/GLOB SERPL: 1.3 G/DL
ALP SERPL-CCNC: 100 U/L (ref 30–99)
ALT SERPL-CCNC: 14 U/L (ref 2–50)
ANION GAP SERPL CALC-SCNC: 15 MMOL/L (ref 7–16)
APTT PPP: 45.3 SEC (ref 24.7–36)
AST SERPL-CCNC: 17 U/L (ref 12–45)
BASOPHILS # BLD AUTO: 1 % (ref 0–1.8)
BASOPHILS # BLD: 0.08 K/UL (ref 0–0.12)
BILIRUB SERPL-MCNC: 1.2 MG/DL (ref 0.1–1.5)
BUN SERPL-MCNC: 16 MG/DL (ref 8–22)
CALCIUM SERPL-MCNC: 9 MG/DL (ref 8.5–10.5)
CHLORIDE SERPL-SCNC: 107 MMOL/L (ref 96–112)
CO2 SERPL-SCNC: 21 MMOL/L (ref 20–33)
COVID ORDER STATUS COVID19: NORMAL
CREAT SERPL-MCNC: 1.06 MG/DL (ref 0.5–1.4)
EKG IMPRESSION: NORMAL
EOSINOPHIL # BLD AUTO: 0.21 K/UL (ref 0–0.51)
EOSINOPHIL NFR BLD: 2.6 % (ref 0–6.9)
ERYTHROCYTE [DISTWIDTH] IN BLOOD BY AUTOMATED COUNT: 51.2 FL (ref 35.9–50)
FERRITIN SERPL-MCNC: 38.8 NG/ML (ref 10–291)
FLUAV RNA SPEC QL NAA+PROBE: NEGATIVE
FLUBV RNA SPEC QL NAA+PROBE: NEGATIVE
FOLATE SERPL-MCNC: 19.6 NG/ML
GLOBULIN SER CALC-MCNC: 3.1 G/DL (ref 1.9–3.5)
GLUCOSE SERPL-MCNC: 143 MG/DL (ref 65–99)
HCT VFR BLD AUTO: 37.5 % (ref 37–47)
HGB BLD-MCNC: 11.3 G/DL (ref 12–16)
HGB RETIC QN AUTO: 28.6 PG/CELL (ref 29–35)
IMM GRANULOCYTES # BLD AUTO: 0.02 K/UL (ref 0–0.11)
IMM GRANULOCYTES NFR BLD AUTO: 0.2 % (ref 0–0.9)
IMM RETICS NFR: 23 % (ref 9.3–17.4)
INR PPP: 2.29 (ref 0.87–1.13)
IRON SATN MFR SERPL: 10 % (ref 15–55)
IRON SERPL-MCNC: 40 UG/DL (ref 40–170)
LACTATE BLD-SCNC: 2.5 MMOL/L (ref 0.5–2)
LIPASE SERPL-CCNC: 35 U/L (ref 11–82)
LYMPHOCYTES # BLD AUTO: 1.06 K/UL (ref 1–4.8)
LYMPHOCYTES NFR BLD: 13.2 % (ref 22–41)
MCH RBC QN AUTO: 25.6 PG (ref 27–33)
MCHC RBC AUTO-ENTMCNC: 30.1 G/DL (ref 33.6–35)
MCV RBC AUTO: 84.8 FL (ref 81.4–97.8)
MONOCYTES # BLD AUTO: 0.61 K/UL (ref 0–0.85)
MONOCYTES NFR BLD AUTO: 7.6 % (ref 0–13.4)
NEUTROPHILS # BLD AUTO: 6.04 K/UL (ref 2–7.15)
NEUTROPHILS NFR BLD: 75.4 % (ref 44–72)
NRBC # BLD AUTO: 0 K/UL
NRBC BLD-RTO: 0 /100 WBC
NT-PROBNP SERPL IA-MCNC: 2324 PG/ML (ref 0–125)
PLATELET # BLD AUTO: 264 K/UL (ref 164–446)
PMV BLD AUTO: 11.4 FL (ref 9–12.9)
POTASSIUM SERPL-SCNC: 3.6 MMOL/L (ref 3.6–5.5)
PROT SERPL-MCNC: 7.1 G/DL (ref 6–8.2)
PROTHROMBIN TIME: 26 SEC (ref 12–14.6)
RBC # BLD AUTO: 4.42 M/UL (ref 4.2–5.4)
RETICS # AUTO: 0.07 M/UL (ref 0.04–0.06)
RETICS/RBC NFR: 1.5 % (ref 0.8–2.1)
SODIUM SERPL-SCNC: 143 MMOL/L (ref 135–145)
TIBC SERPL-MCNC: 409 UG/DL (ref 250–450)
TROPONIN T SERPL-MCNC: 20 NG/L (ref 6–19)
TSH SERPL DL<=0.005 MIU/L-ACNC: 5.73 UIU/ML (ref 0.38–5.33)
UIBC SERPL-MCNC: 369 UG/DL (ref 110–370)
VIT B12 SERPL-MCNC: 345 PG/ML (ref 211–911)
WBC # BLD AUTO: 8 K/UL (ref 4.8–10.8)

## 2020-04-08 PROCEDURE — 84443 ASSAY THYROID STIM HORMONE: CPT

## 2020-04-08 PROCEDURE — 87040 BLOOD CULTURE FOR BACTERIA: CPT

## 2020-04-08 PROCEDURE — 99223 1ST HOSP IP/OBS HIGH 75: CPT | Mod: AI | Performed by: HOSPITALIST

## 2020-04-08 PROCEDURE — 85610 PROTHROMBIN TIME: CPT

## 2020-04-08 PROCEDURE — 87502 INFLUENZA DNA AMP PROBE: CPT

## 2020-04-08 PROCEDURE — 82607 VITAMIN B-12: CPT

## 2020-04-08 PROCEDURE — 96374 THER/PROPH/DIAG INJ IV PUSH: CPT

## 2020-04-08 PROCEDURE — 71045 X-RAY EXAM CHEST 1 VIEW: CPT

## 2020-04-08 PROCEDURE — 84484 ASSAY OF TROPONIN QUANT: CPT

## 2020-04-08 PROCEDURE — 83550 IRON BINDING TEST: CPT

## 2020-04-08 PROCEDURE — 93005 ELECTROCARDIOGRAM TRACING: CPT

## 2020-04-08 PROCEDURE — 82728 ASSAY OF FERRITIN: CPT

## 2020-04-08 PROCEDURE — 83880 ASSAY OF NATRIURETIC PEPTIDE: CPT

## 2020-04-08 PROCEDURE — 83605 ASSAY OF LACTIC ACID: CPT

## 2020-04-08 PROCEDURE — 700117 HCHG RX CONTRAST REV CODE 255: Performed by: EMERGENCY MEDICINE

## 2020-04-08 PROCEDURE — 82746 ASSAY OF FOLIC ACID SERUM: CPT

## 2020-04-08 PROCEDURE — 700111 HCHG RX REV CODE 636 W/ 250 OVERRIDE (IP): Performed by: EMERGENCY MEDICINE

## 2020-04-08 PROCEDURE — 80053 COMPREHEN METABOLIC PANEL: CPT

## 2020-04-08 PROCEDURE — 770020 HCHG ROOM/CARE - TELE (206)

## 2020-04-08 PROCEDURE — 85046 RETICYTE/HGB CONCENTRATE: CPT

## 2020-04-08 PROCEDURE — 93005 ELECTROCARDIOGRAM TRACING: CPT | Performed by: EMERGENCY MEDICINE

## 2020-04-08 PROCEDURE — 74177 CT ABD & PELVIS W/CONTRAST: CPT

## 2020-04-08 PROCEDURE — 83540 ASSAY OF IRON: CPT

## 2020-04-08 PROCEDURE — 83690 ASSAY OF LIPASE: CPT

## 2020-04-08 PROCEDURE — 85730 THROMBOPLASTIN TIME PARTIAL: CPT

## 2020-04-08 PROCEDURE — 99285 EMERGENCY DEPT VISIT HI MDM: CPT

## 2020-04-08 PROCEDURE — 85025 COMPLETE CBC W/AUTO DIFF WBC: CPT

## 2020-04-08 RX ORDER — WARFARIN SODIUM 6 MG/1
6 TABLET ORAL ONCE
Status: COMPLETED | OUTPATIENT
Start: 2020-04-08 | End: 2020-04-09

## 2020-04-08 RX ORDER — METOPROLOL TARTRATE 50 MG/1
50 TABLET, FILM COATED ORAL 2 TIMES DAILY
Status: DISCONTINUED | OUTPATIENT
Start: 2020-04-08 | End: 2020-04-10 | Stop reason: HOSPADM

## 2020-04-08 RX ORDER — LEVOTHYROXINE SODIUM 0.07 MG/1
150 TABLET ORAL
Status: DISCONTINUED | OUTPATIENT
Start: 2020-04-09 | End: 2020-04-10 | Stop reason: HOSPADM

## 2020-04-08 RX ORDER — OMEPRAZOLE 20 MG/1
20 CAPSULE, DELAYED RELEASE ORAL DAILY
Status: DISCONTINUED | OUTPATIENT
Start: 2020-04-09 | End: 2020-04-10 | Stop reason: HOSPADM

## 2020-04-08 RX ORDER — ATORVASTATIN CALCIUM 40 MG/1
40 TABLET, FILM COATED ORAL
Status: DISCONTINUED | OUTPATIENT
Start: 2020-04-08 | End: 2020-04-10 | Stop reason: HOSPADM

## 2020-04-08 RX ORDER — POLYETHYLENE GLYCOL 3350 17 G/17G
1 POWDER, FOR SOLUTION ORAL
Status: DISCONTINUED | OUTPATIENT
Start: 2020-04-08 | End: 2020-04-10 | Stop reason: HOSPADM

## 2020-04-08 RX ORDER — BISACODYL 10 MG
10 SUPPOSITORY, RECTAL RECTAL
Status: DISCONTINUED | OUTPATIENT
Start: 2020-04-08 | End: 2020-04-10 | Stop reason: HOSPADM

## 2020-04-08 RX ORDER — FUROSEMIDE 10 MG/ML
40 INJECTION INTRAMUSCULAR; INTRAVENOUS
Status: DISCONTINUED | OUTPATIENT
Start: 2020-04-08 | End: 2020-04-10 | Stop reason: HOSPADM

## 2020-04-08 RX ORDER — FUROSEMIDE 10 MG/ML
40 INJECTION INTRAMUSCULAR; INTRAVENOUS ONCE
Status: COMPLETED | OUTPATIENT
Start: 2020-04-08 | End: 2020-04-08

## 2020-04-08 RX ORDER — ONDANSETRON 4 MG/1
4 TABLET, ORALLY DISINTEGRATING ORAL EVERY 4 HOURS PRN
Status: DISCONTINUED | OUTPATIENT
Start: 2020-04-08 | End: 2020-04-10 | Stop reason: HOSPADM

## 2020-04-08 RX ORDER — LOSARTAN POTASSIUM 50 MG/1
50 TABLET ORAL DAILY
Status: DISCONTINUED | OUTPATIENT
Start: 2020-04-09 | End: 2020-04-10 | Stop reason: HOSPADM

## 2020-04-08 RX ORDER — ONDANSETRON 2 MG/ML
4 INJECTION INTRAMUSCULAR; INTRAVENOUS EVERY 4 HOURS PRN
Status: DISCONTINUED | OUTPATIENT
Start: 2020-04-08 | End: 2020-04-10 | Stop reason: HOSPADM

## 2020-04-08 RX ORDER — AMOXICILLIN 250 MG
2 CAPSULE ORAL 2 TIMES DAILY
Status: DISCONTINUED | OUTPATIENT
Start: 2020-04-08 | End: 2020-04-10 | Stop reason: HOSPADM

## 2020-04-08 RX ADMIN — IOHEXOL 100 ML: 350 INJECTION, SOLUTION INTRAVENOUS at 21:15

## 2020-04-08 RX ADMIN — FUROSEMIDE 40 MG: 10 INJECTION, SOLUTION INTRAMUSCULAR; INTRAVENOUS at 22:45

## 2020-04-08 ASSESSMENT — ENCOUNTER SYMPTOMS
MYALGIAS: 0
BRUISES/BLEEDS EASILY: 0
COUGH: 1
EYE DISCHARGE: 0
DOUBLE VISION: 0
FEVER: 0
HEMOPTYSIS: 0
WEAKNESS: 0
PALPITATIONS: 0
SPEECH CHANGE: 0
DIZZINESS: 0
SHORTNESS OF BREATH: 1
FLANK PAIN: 0
HEARTBURN: 0
HALLUCINATIONS: 0
ABDOMINAL PAIN: 0
BLURRED VISION: 0
ORTHOPNEA: 1
NAUSEA: 0
VOMITING: 0
DEPRESSION: 0
SENSORY CHANGE: 0
FOCAL WEAKNESS: 0
CHILLS: 0

## 2020-04-08 ASSESSMENT — LIFESTYLE VARIABLES
HAVE PEOPLE ANNOYED YOU BY CRITICIZING YOUR DRINKING: NO
EVER HAD A DRINK FIRST THING IN THE MORNING TO STEADY YOUR NERVES TO GET RID OF A HANGOVER: NO
DO YOU DRINK ALCOHOL: NO
HAVE YOU EVER FELT YOU SHOULD CUT DOWN ON YOUR DRINKING: NO
SUBSTANCE_ABUSE: 0
TOTAL SCORE: 0
CONSUMPTION TOTAL: INCOMPLETE
EVER FELT BAD OR GUILTY ABOUT YOUR DRINKING: NO
TOTAL SCORE: 0
TOTAL SCORE: 0

## 2020-04-08 ASSESSMENT — CHA2DS2 SCORE
AGE 65 TO 74: NO
SEX: FEMALE
AGE 75 OR GREATER: YES
CHA2DS2 VASC SCORE: 3

## 2020-04-08 ASSESSMENT — FIBROSIS 4 INDEX: FIB4 SCORE: 1.34

## 2020-04-09 ENCOUNTER — APPOINTMENT (OUTPATIENT)
Dept: CARDIOLOGY | Facility: MEDICAL CENTER | Age: 80
DRG: 291 | End: 2020-04-09
Attending: HOSPITALIST
Payer: MEDICARE

## 2020-04-09 LAB
ANION GAP SERPL CALC-SCNC: 18 MMOL/L (ref 7–16)
BASOPHILS # BLD AUTO: 0.8 % (ref 0–1.8)
BASOPHILS # BLD: 0.07 K/UL (ref 0–0.12)
BUN SERPL-MCNC: 14 MG/DL (ref 8–22)
CALCIUM SERPL-MCNC: 9.1 MG/DL (ref 8.5–10.5)
CHLORIDE SERPL-SCNC: 98 MMOL/L (ref 96–112)
CO2 SERPL-SCNC: 24 MMOL/L (ref 20–33)
CREAT SERPL-MCNC: 0.98 MG/DL (ref 0.5–1.4)
EOSINOPHIL # BLD AUTO: 0.28 K/UL (ref 0–0.51)
EOSINOPHIL NFR BLD: 3 % (ref 0–6.9)
ERYTHROCYTE [DISTWIDTH] IN BLOOD BY AUTOMATED COUNT: 49 FL (ref 35.9–50)
GLUCOSE SERPL-MCNC: 113 MG/DL (ref 65–99)
HCT VFR BLD AUTO: 39.6 % (ref 37–47)
HGB BLD-MCNC: 12.3 G/DL (ref 12–16)
IMM GRANULOCYTES # BLD AUTO: 0.02 K/UL (ref 0–0.11)
IMM GRANULOCYTES NFR BLD AUTO: 0.2 % (ref 0–0.9)
INR PPP: 1.98 (ref 0.87–1.13)
LV EJECT FRACT  99904: 50
LV EJECT FRACT MOD 2C 99903: 52.84
LV EJECT FRACT MOD 4C 99902: 47.4
LV EJECT FRACT MOD BP 99901: 48.69
LYMPHOCYTES # BLD AUTO: 1.17 K/UL (ref 1–4.8)
LYMPHOCYTES NFR BLD: 12.6 % (ref 22–41)
MCH RBC QN AUTO: 25.4 PG (ref 27–33)
MCHC RBC AUTO-ENTMCNC: 31.1 G/DL (ref 33.6–35)
MCV RBC AUTO: 81.8 FL (ref 81.4–97.8)
MONOCYTES # BLD AUTO: 0.74 K/UL (ref 0–0.85)
MONOCYTES NFR BLD AUTO: 7.9 % (ref 0–13.4)
NEUTROPHILS # BLD AUTO: 7.04 K/UL (ref 2–7.15)
NEUTROPHILS NFR BLD: 75.5 % (ref 44–72)
NRBC # BLD AUTO: 0 K/UL
NRBC BLD-RTO: 0 /100 WBC
NT-PROBNP SERPL IA-MCNC: 3005 PG/ML (ref 0–125)
PLATELET # BLD AUTO: 308 K/UL (ref 164–446)
PMV BLD AUTO: 11.1 FL (ref 9–12.9)
POTASSIUM SERPL-SCNC: 3 MMOL/L (ref 3.6–5.5)
PROTHROMBIN TIME: 23.1 SEC (ref 12–14.6)
RBC # BLD AUTO: 4.84 M/UL (ref 4.2–5.4)
SODIUM SERPL-SCNC: 140 MMOL/L (ref 135–145)
WBC # BLD AUTO: 9.3 K/UL (ref 4.8–10.8)

## 2020-04-09 PROCEDURE — 700102 HCHG RX REV CODE 250 W/ 637 OVERRIDE(OP): Performed by: HOSPITALIST

## 2020-04-09 PROCEDURE — 700102 HCHG RX REV CODE 250 W/ 637 OVERRIDE(OP): Performed by: INTERNAL MEDICINE

## 2020-04-09 PROCEDURE — 99232 SBSQ HOSP IP/OBS MODERATE 35: CPT | Performed by: INTERNAL MEDICINE

## 2020-04-09 PROCEDURE — 85025 COMPLETE CBC W/AUTO DIFF WBC: CPT

## 2020-04-09 PROCEDURE — 85610 PROTHROMBIN TIME: CPT

## 2020-04-09 PROCEDURE — A9270 NON-COVERED ITEM OR SERVICE: HCPCS | Performed by: INTERNAL MEDICINE

## 2020-04-09 PROCEDURE — 80048 BASIC METABOLIC PNL TOTAL CA: CPT

## 2020-04-09 PROCEDURE — 700111 HCHG RX REV CODE 636 W/ 250 OVERRIDE (IP): Performed by: HOSPITALIST

## 2020-04-09 PROCEDURE — 83880 ASSAY OF NATRIURETIC PEPTIDE: CPT

## 2020-04-09 PROCEDURE — 93306 TTE W/DOPPLER COMPLETE: CPT

## 2020-04-09 PROCEDURE — 36415 COLL VENOUS BLD VENIPUNCTURE: CPT

## 2020-04-09 PROCEDURE — 770020 HCHG ROOM/CARE - TELE (206)

## 2020-04-09 PROCEDURE — A9270 NON-COVERED ITEM OR SERVICE: HCPCS | Performed by: HOSPITALIST

## 2020-04-09 PROCEDURE — 93306 TTE W/DOPPLER COMPLETE: CPT | Mod: 26 | Performed by: INTERNAL MEDICINE

## 2020-04-09 RX ORDER — CYCLOBENZAPRINE HCL 10 MG
10 TABLET ORAL 3 TIMES DAILY PRN
Status: DISCONTINUED | OUTPATIENT
Start: 2020-04-09 | End: 2020-04-10 | Stop reason: HOSPADM

## 2020-04-09 RX ORDER — ACETAMINOPHEN 325 MG/1
650 TABLET ORAL EVERY 6 HOURS PRN
Status: DISCONTINUED | OUTPATIENT
Start: 2020-04-09 | End: 2020-04-10 | Stop reason: HOSPADM

## 2020-04-09 RX ORDER — WARFARIN SODIUM 6 MG/1
6 TABLET ORAL
Status: DISCONTINUED | OUTPATIENT
Start: 2020-04-10 | End: 2020-04-10 | Stop reason: HOSPADM

## 2020-04-09 RX ORDER — POTASSIUM CHLORIDE 20 MEQ/1
40 TABLET, EXTENDED RELEASE ORAL 2 TIMES DAILY
Status: DISCONTINUED | OUTPATIENT
Start: 2020-04-09 | End: 2020-04-10 | Stop reason: HOSPADM

## 2020-04-09 RX ORDER — FERROUS SULFATE 325(65) MG
325 TABLET ORAL
Status: DISCONTINUED | OUTPATIENT
Start: 2020-04-10 | End: 2020-04-10 | Stop reason: HOSPADM

## 2020-04-09 RX ADMIN — LEVOTHYROXINE SODIUM 150 MCG: 75 TABLET ORAL at 05:30

## 2020-04-09 RX ADMIN — POTASSIUM CHLORIDE 40 MEQ: 1500 TABLET, EXTENDED RELEASE ORAL at 17:35

## 2020-04-09 RX ADMIN — FUROSEMIDE 40 MG: 10 INJECTION, SOLUTION INTRAMUSCULAR; INTRAVENOUS at 05:30

## 2020-04-09 RX ADMIN — WARFARIN SODIUM 6 MG: 6 TABLET ORAL at 01:10

## 2020-04-09 RX ADMIN — POTASSIUM CHLORIDE 40 MEQ: 1500 TABLET, EXTENDED RELEASE ORAL at 08:22

## 2020-04-09 RX ADMIN — LOSARTAN POTASSIUM 50 MG: 50 TABLET, FILM COATED ORAL at 06:36

## 2020-04-09 RX ADMIN — CYCLOBENZAPRINE HYDROCHLORIDE 10 MG: 10 TABLET, FILM COATED ORAL at 20:49

## 2020-04-09 RX ADMIN — ACETAMINOPHEN 650 MG: 325 TABLET, FILM COATED ORAL at 12:40

## 2020-04-09 RX ADMIN — METOPROLOL TARTRATE 50 MG: 50 TABLET, FILM COATED ORAL at 00:01

## 2020-04-09 RX ADMIN — ATORVASTATIN CALCIUM 40 MG: 40 TABLET, FILM COATED ORAL at 00:01

## 2020-04-09 RX ADMIN — CYCLOBENZAPRINE HYDROCHLORIDE 10 MG: 10 TABLET, FILM COATED ORAL at 15:47

## 2020-04-09 RX ADMIN — METOPROLOL TARTRATE 50 MG: 50 TABLET, FILM COATED ORAL at 06:36

## 2020-04-09 RX ADMIN — ATORVASTATIN CALCIUM 40 MG: 40 TABLET, FILM COATED ORAL at 20:49

## 2020-04-09 RX ADMIN — WARFARIN SODIUM 9 MG: 3 TABLET ORAL at 17:35

## 2020-04-09 RX ADMIN — FUROSEMIDE 40 MG: 10 INJECTION, SOLUTION INTRAMUSCULAR; INTRAVENOUS at 00:01

## 2020-04-09 RX ADMIN — METOPROLOL TARTRATE 50 MG: 50 TABLET, FILM COATED ORAL at 17:35

## 2020-04-09 RX ADMIN — FUROSEMIDE 40 MG: 10 INJECTION, SOLUTION INTRAMUSCULAR; INTRAVENOUS at 15:47

## 2020-04-09 ASSESSMENT — ENCOUNTER SYMPTOMS
CHILLS: 0
DOUBLE VISION: 0
VOMITING: 0
DIZZINESS: 0
HALLUCINATIONS: 0
PALPITATIONS: 0
COUGH: 1
SENSORY CHANGE: 0
FOCAL WEAKNESS: 0
SHORTNESS OF BREATH: 1
ABDOMINAL PAIN: 0
FEVER: 0
HEARTBURN: 0
BLURRED VISION: 0
WEAKNESS: 0
NAUSEA: 0
ORTHOPNEA: 1
FLANK PAIN: 0
EYE DISCHARGE: 0
MYALGIAS: 0
SPEECH CHANGE: 0
BRUISES/BLEEDS EASILY: 0
HEMOPTYSIS: 0
DEPRESSION: 0

## 2020-04-09 ASSESSMENT — COGNITIVE AND FUNCTIONAL STATUS - GENERAL
SUGGESTED CMS G CODE MODIFIER DAILY ACTIVITY: CH
DAILY ACTIVITIY SCORE: 24
MOBILITY SCORE: 24
SUGGESTED CMS G CODE MODIFIER MOBILITY: CH

## 2020-04-09 ASSESSMENT — CHA2DS2 SCORE
AGE 65 TO 74: NO
PRIOR STROKE OR TIA OR THROMBOEMBOLISM: NO
AGE 75 OR GREATER: YES
CHF OR LEFT VENTRICULAR DYSFUNCTION: YES
VASCULAR DISEASE: YES
HYPERTENSION: YES
CHA2DS2 VASC SCORE: 6
DIABETES: NO
SEX: FEMALE

## 2020-04-09 ASSESSMENT — LIFESTYLE VARIABLES
TOTAL SCORE: 0
CONSUMPTION TOTAL: NEGATIVE
EVER HAD A DRINK FIRST THING IN THE MORNING TO STEADY YOUR NERVES TO GET RID OF A HANGOVER: NO
TOTAL SCORE: 0
HOW MANY TIMES IN THE PAST YEAR HAVE YOU HAD 5 OR MORE DRINKS IN A DAY: 0
DOES PATIENT WANT TO STOP DRINKING: NO
SUBSTANCE_ABUSE: 0
ALCOHOL_USE: NO
ON A TYPICAL DAY WHEN YOU DRINK ALCOHOL HOW MANY DRINKS DO YOU HAVE: 0
EVER FELT BAD OR GUILTY ABOUT YOUR DRINKING: NO
HAVE YOU EVER FELT YOU SHOULD CUT DOWN ON YOUR DRINKING: NO
TOTAL SCORE: 0
EVER_SMOKED: YES
HAVE PEOPLE ANNOYED YOU BY CRITICIZING YOUR DRINKING: NO
AVERAGE NUMBER OF DAYS PER WEEK YOU HAVE A DRINK CONTAINING ALCOHOL: 0

## 2020-04-09 ASSESSMENT — COPD QUESTIONNAIRES
DURING THE PAST 4 WEEKS HOW MUCH DID YOU FEEL SHORT OF BREATH: MOST  OR ALL OF THE TIME
HAVE YOU SMOKED AT LEAST 100 CIGARETTES IN YOUR ENTIRE LIFE: YES
COPD SCREENING SCORE: 6
DO YOU EVER COUGH UP ANY MUCUS OR PHLEGM?: NO/ONLY WITH OCCASIONAL COLDS OR INFECTIONS
IN THE PAST 12 MONTHS DO YOU DO LESS THAN YOU USED TO BECAUSE OF YOUR BREATHING PROBLEMS: DISAGREE/UNSURE

## 2020-04-09 ASSESSMENT — PATIENT HEALTH QUESTIONNAIRE - PHQ9
2. FEELING DOWN, DEPRESSED, IRRITABLE, OR HOPELESS: NOT AT ALL
1. LITTLE INTEREST OR PLEASURE IN DOING THINGS: NOT AT ALL
SUM OF ALL RESPONSES TO PHQ9 QUESTIONS 1 AND 2: 0

## 2020-04-09 ASSESSMENT — FIBROSIS 4 INDEX: FIB4 SCORE: 1.15

## 2020-04-09 NOTE — ASSESSMENT & PLAN NOTE
Appears mildly overloaded with associated hypoxia   Cont with IV lasix 40 BID  Resume home bb, arb  Serial troponin   Cardiac monitoring   Monitor I/o and daily weight   2 gm sodium diet  Echocardiogram ordered  COVID 19 r/o due to upper respiratory complaint

## 2020-04-09 NOTE — ED PROVIDER NOTES
ED Provider Note    Scribed for Solo Pritchett MD by Gordy Milligan. 2020, 7:45 PM.    Primary care provider: La Paredes M.D.  Means of arrival: EMS  History obtained from: patient  History limited by: none    CHIEF COMPLAINT  Chief Complaint   Patient presents with   • Chest Pain   • Shortness of Breath       HPI  Aleshia Crooks is a 80 y.o. female with a history of A. Fib and thyroidectomy who presents to the Emergency Department with complaints of shortness of breath that acute onset this morning when she woke up. This has been intermittent, and there is also a positional component as she will become more short of breath with bending over. She reports that abdominal bloating will exacerbate her shortness of breath. She has also been belching more so than normal. She states she had a high-sodium meal last night that may have been . She has had chronic diarrhea for the past 2 years which is slightly more noticeable. She has chronic lower extremity edema; she is currently treated with Lasix 40 mg. She denies any palpitations. She states that she has a mild irritation in the back of her throat, but she does not have any coughing. She is not on O2 supplementation at home. She does use a CPAP at night.     PPE Note: I personally donned full PPE for all patient encounters during this visit, including being clean-shaven with an N95 respirator mask, gloves, and goggles.     Scribe remained outside the patient's room and did not have any contact with the patient for the duration of patient encounter.      REVIEW OF SYSTEMS  Pertinent positives include shortness of breath, bloating, chronic diarrhea, and lower extremity edema (chronic). Pertinent negatives include no palpitations. All other systems reviewed and negative.    PAST MEDICAL HISTORY   has a past medical history of Atrial fibrillation (HCC), Heart attack (HCC), Hypertension, and Thyroid disease.    SURGICAL HISTORY   has a past  surgical history that includes thyroidectomy total; cholecystectomy; multiple coronary artery bypass; and eye surgery (Bilateral).    SOCIAL HISTORY  Social History     Tobacco Use   • Smoking status: Former Smoker     Packs/day: 1.00     Years: 31.00     Pack years: 31.00     Last attempt to quit:      Years since quittin.2   • Smokeless tobacco: Never Used   Substance Use Topics   • Alcohol use: No   • Drug use: No      Social History     Substance and Sexual Activity   Drug Use No       FAMILY HISTORY  Family History   Problem Relation Age of Onset   • Cancer Mother         cancer, smoker   • Stroke Maternal Grandmother    • Other Other         Crohn   • Kidney Disease Other         kidney transplant       CURRENT MEDICATIONS  Current Outpatient Medications:   •  vitamin k 100 MCG tablet, Take 100 mcg by mouth every day., Disp: , Rfl:   •  levothyroxine (LEVOXYL) 150 MCG Tab, Take 1 Tab by mouth Every morning on an empty stomach., Disp: 10 Tab, Rfl: 0  •  potassium chloride SA (KDUR) 20 MEQ Tab CR, Take 1 Tab by mouth every day., Disp: 90 Tab, Rfl: 0  •  furosemide (LASIX) 40 MG Tab, Take 1 Tab by mouth every day., Disp: 90 Tab, Rfl: 0  •  losartan (COZAAR) 50 MG Tab, Take 1 Tab by mouth every day., Disp: 90 Tab, Rfl: 0  •  atorvastatin (LIPITOR) 40 MG Tab, Take 1 Tab by mouth every bedtime., Disp: 30 Tab, Rfl: 1  •  metoprolol (LOPRESSOR) 50 MG Tab, Take 1 Tab by mouth 2 Times a Day., Disp: 60 Tab, Rfl: 0  •  warfarin (COUMADIN) 6 MG Tab, Take 6 mg by mouth every day. Indications: Obstructing Blood Clot with Chronic Atrial Fibrillation, Disp: , Rfl:   •  omeprazole (PRILOSEC) 20 MG delayed-release capsule, Take 20 mg by mouth every day. Indications: Gastroesophageal Reflux Disease, Disp: , Rfl:   •  vitamin D (CHOLECALCIFEROL) 1000 UNIT Tab, Take 4,000 Units by mouth every day., Disp: , Rfl:      ALLERGIES  No Known Allergies    PHYSICAL EXAM  VITAL SIGNS: Pulse 81   Temp 37.1 °C (98.8 °F) (Temporal)   " Resp (!) 22   Ht 1.6 m (5' 3\")   Wt 94.3 kg (208 lb)   LMP  (LMP Unknown)   SpO2 97%   BMI 36.85 kg/m²     General: Alert, no acute distress  Skin: Warm, dry, normal for ethnicity  Head: Normocephalic, atraumatic  Neck: Trachea midline, no tenderness  Eye: PERRL, normal conjunctiva  ENMT: Oral mucosa moist, no pharyngeal erythema or exudate  Cardiovascular: Irregularly irregular rhythm. No murmur, Normal peripheral perfusion  Respiratory:  Decreased breath sounds at the bases bilaterally, respirations are non-labored on 2L nasal cannula.  Gastrointestinal: Hyperactive bowel sounds, Soft, nontender, non distended  Musculoskeletal: 1+ pitting peripheral edema.  Neurological: Alert and oriented to person, place, time, and situation  Lymphatics: No lymphadenopathy  Psychiatric: Cooperative, appropriate mood & affect    DIAGNOSTIC STUDIES/PROCEDURES    LABS  Results for orders placed or performed during the hospital encounter of 04/08/20   CBC w/ Differential   Result Value Ref Range    WBC 8.0 4.8 - 10.8 K/uL    RBC 4.42 4.20 - 5.40 M/uL    Hemoglobin 11.3 (L) 12.0 - 16.0 g/dL    Hematocrit 37.5 37.0 - 47.0 %    MCV 84.8 81.4 - 97.8 fL    MCH 25.6 (L) 27.0 - 33.0 pg    MCHC 30.1 (L) 33.6 - 35.0 g/dL    RDW 51.2 (H) 35.9 - 50.0 fL    Platelet Count 264 164 - 446 K/uL    MPV 11.4 9.0 - 12.9 fL    Neutrophils-Polys 75.40 (H) 44.00 - 72.00 %    Lymphocytes 13.20 (L) 22.00 - 41.00 %    Monocytes 7.60 0.00 - 13.40 %    Eosinophils 2.60 0.00 - 6.90 %    Basophils 1.00 0.00 - 1.80 %    Immature Granulocytes 0.20 0.00 - 0.90 %    Nucleated RBC 0.00 /100 WBC    Neutrophils (Absolute) 6.04 2.00 - 7.15 K/uL    Lymphs (Absolute) 1.06 1.00 - 4.80 K/uL    Monos (Absolute) 0.61 0.00 - 0.85 K/uL    Eos (Absolute) 0.21 0.00 - 0.51 K/uL    Baso (Absolute) 0.08 0.00 - 0.12 K/uL    Immature Granulocytes (abs) 0.02 0.00 - 0.11 K/uL    NRBC (Absolute) 0.00 K/uL   Complete Metabolic Panel (CMP)   Result Value Ref Range    Sodium 143 135 " - 145 mmol/L    Potassium 3.6 3.6 - 5.5 mmol/L    Chloride 107 96 - 112 mmol/L    Co2 21 20 - 33 mmol/L    Anion Gap 15.0 7.0 - 16.0    Glucose 143 (H) 65 - 99 mg/dL    Bun 16 8 - 22 mg/dL    Creatinine 1.06 0.50 - 1.40 mg/dL    Calcium 9.0 8.5 - 10.5 mg/dL    AST(SGOT) 17 12 - 45 U/L    ALT(SGPT) 14 2 - 50 U/L    Alkaline Phosphatase 100 (H) 30 - 99 U/L    Total Bilirubin 1.2 0.1 - 1.5 mg/dL    Albumin 4.0 3.2 - 4.9 g/dL    Total Protein 7.1 6.0 - 8.2 g/dL    Globulin 3.1 1.9 - 3.5 g/dL    A-G Ratio 1.3 g/dL   proBrain Natriuretic Peptide, NT   Result Value Ref Range    NT-proBNP 2324 (H) 0 - 125 pg/mL   Troponin STAT   Result Value Ref Range    Troponin T 20 (H) 6 - 19 ng/L   Lipase   Result Value Ref Range    Lipase 35 11 - 82 U/L   TSH (for screening thyroid dysfunction)   Result Value Ref Range    TSH 5.730 (H) 0.380 - 5.330 uIU/mL   PT/INR   Result Value Ref Range    PT 26.0 (H) 12.0 - 14.6 sec    INR 2.29 (H) 0.87 - 1.13   APTT   Result Value Ref Range    APTT 45.3 (H) 24.7 - 36.0 sec   Influenza By PCR, A/B   Result Value Ref Range    Influenza virus A RNA Negative Negative    Influenza virus B, PCR Negative Negative   LACTIC ACID   Result Value Ref Range    Lactic Acid 2.5 (H) 0.5 - 2.0 mmol/L   ESTIMATED GFR   Result Value Ref Range    GFR If African American >60 >60 mL/min/1.73 m 2    GFR If Non African American 50 (A) >60 mL/min/1.73 m 2   COVID/SARS CoV-2   Result Value Ref Range    COVID Order Status Received    2019-nCoV RNA ([facility])   Result Value Ref Range    2019-nCoV Source NP Swab    EKG   Result Value Ref Range    Report       [facility] Emergency Dept.    Test Date:  2020  Pt Name:    GINA REICH                Department: ER  MRN:        [id number]                      Room:        13  Gender:     Female                       Technician: [id number]  :        [id number]                   Requested By:ER TRIAGE PROTOCOL  Order #:    [id number]                    Reading  MD: KRISTAN CISNEROS MD    Measurements  Intervals                                Axis  Rate:       81                           P:  AZ:                                      QRS:        33  QRSD:       80                           T:          -36  QT:         388  QTc:        451    Interpretive Statements  ATRIAL FIBRILLATION, V-RATE    MULTIPLE VENTRICULAR PREMATURE COMPLEXES  BORDERLINE REPOLARIZATION ABNORMALITY  Compared to ECG 07/13/2017 02:27:22  Ventricular premature complex(es) now present  T-wave abnormality no longer present  Electronically Signed On 4-8-2020 22:42:14 PDT by KRISTAN GUARDADO MD        All labs reviewed by me, impressively elevated BNP, mildly depressed renal function from previous, otherwise unremarkable.    EKG  12 Lead EKG obtained at 1921 and interpreted by me to show:  Rhythm: A. fib  Rate: 81  Axis: Normal  Ectopy: Occasional PCVS  Clinical Impression: A. fib with no signs of ischemia.  Compared to 7/13/17 with no changes noted.     RADIOLOGY  CT-ABDOMEN-PELVIS WITH   Final Result      1.  No acute inflammatory process in the abdomen or pelvis.   2.  Hepatic steatosis with nodular hepatic contour, likely underlying cirrhosis.   3.  Cardiomegaly and interstitial edema, suggestive of CHF.   4.  Colonic diverticulosis. No diverticulitis.      DX-CHEST-PORTABLE (1 VIEW)   Final Result         1. Stable cardiomegaly.   2. No acute cardiac pulmonary abnormality.      EC-ECHOCARDIOGRAM COMPLETE W/O CONT    (Results Pending)     The radiologist's interpretation of all radiological studies have been reviewed by me.    COURSE & MEDICAL DECISION MAKING  Pertinent Labs & Imaging studies reviewed. (See chart for details)    7:45 PM - Patient seen and examined at bedside. Ordered CBC w/ diff, CMP, BNP, troponin, lipase, flu, blood cultures, APTT, PT/INR, TSH, EKG, DX-chest, CT-abdomen/pelvis to evaluate her symptoms. The differential diagnoses include but are not limited to:  "CHF vs ACS vs gastritis.    9:36 PM - Patient was reevaluated at bedside. Discussed current diagnositc results with the patient. BNP is elevated at 2324 and troponin is minimally elevated at 20. We are still awaiting her CT results. She is feeling well at the moment.     9:47 PM - Patient's chest xray showed cardiomegaly without any signs of edema. However, patient's CT showed evidence of cardiomegaly and interstitial edema suggestive of CHF. We will attempt a room air challenge.     10:11 PM - Patient reevaluated at bedside. O2 saturations are between 88-90% RA. Patient also appears mildly dyspneic. I have ordered Lasix for the pulmonary edema seen on CT. We will place the patient back on 2L of O2 and page the hospitalist.    10:17 PM I discussed the patient's case and the above findings with Dr. Hodge (Hospitalist) who accepts the patient for hospitalization. Given patient's respiratory symptoms, he advised placing the patient on droplet precautions and ordering a COVID-19 rule out.     2038: Blood pressure improving with Lasix (144/67), oxygen saturation also improved with supplemental oxygen, currently 94%.    Patient Vitals for the past 24 hrs:   BP Temp Temp src Pulse Resp SpO2 Height Weight   04/08/20 2100 -- -- -- -- -- 94 % -- --   04/08/20 2038 144/67 -- -- 79 14 98 % -- --   04/08/20 2036 (!) 167/74 -- -- 77 16 98 % -- --   04/08/20 1921 -- 37.1 °C (98.8 °F) Temporal 81 (!) 22 97 % -- --   04/08/20 1918 -- -- -- -- -- -- 1.6 m (5' 3\") 94.3 kg (208 lb)     Patient is critically ill.   The patient continues to have: Hypoxia  The vital organ system that is affected is the: Cardiopulmonary  If untreated there is a high chance of deterioration into: Respiratory failure  And eventually death.   The critical care that I am providing today is: Supplemental oxygen and diuresis  The critical that has been undertaken is medically complex.   There has been no overlap in critical care time.   Critical Care Time not " including procedures: 30 min    Decision Makin-year-old female with history of A. fib as well as CHF presents for evaluation of acute dyspnea with sensation of palpitations.  BNP is elevated but has been in the past.  Chest x-ray shows no large pulmonary edema nor pleural effusion.  Troponin is only minimally out of range consistent with her mild renal insufficiency.  She has no sustained dysrhythmia, INR is therapeutic, she has no leukocytosis and no fever, no evidence of sepsis.  This would not be typical for ACS, I suspect perhaps mild volume overload, no x-ray read as unremarkable needs CT does show some pulmonary edema bilaterally and her BNP is impressively elevated.  Given she remains hypoxic throughout stay I do feel inpatient management is warranted.  Hospitalist also requested a rule out CoVID-19 panel given the patient presents with respiratory symptoms and is being admitted.    DISPOSITION:  Patient will be hospitalized by Dr. Hodge in guarded condition.     FINAL IMPRESSION  1. Dyspnea, unspecified type    2. Hypoxia    3. Pulmonary edema cardiac cause (HCC)    4. Longstanding persistent atrial fibrillation (HCC)          Gordy MARTIN (Scribe), am scribing for, and in the presence of, Solo Pritchett MD.    Electronically signed by: Gordy Milligan (Azame), 2020    ISolo MD personally performed the services described in this documentation, as scribed by Gordy Milligan in my presence, and it is both accurate and complete C    The note accurately reflects work and decisions made by me.  Solo Pritchett M.D.  2020  10:44 PM

## 2020-04-09 NOTE — PROGRESS NOTES
2 RN skin check complete with IWONA Steiner.   Devices in place tele box, silicone nasal cannula.  Skin assessed under devices yes.   - pannus slightly pink/ red.   - bilateral shins have small scabs throughout which Pt relates to either psoriasis or eczema.   Confirmed pressure ulcers found on n/a.  New potential pressure ulcers noted on n/a. Wound consult placed n/a.  The following interventions in place gray foams, silicone nasal cannula, pillows, extra blankets.

## 2020-04-09 NOTE — PROGRESS NOTES
Inpatient Anticoagulation Service Note    Date: 4/9/2020    Reason for Anticoagulation: Atrial Fibrillation   Target INR: 2.0 to 3.0  [id number] VASc Score: 6  HAS-BLED Score: 2   Hemoglobin Value: 12.3  Hematocrit Value: 39.6  Lab Platelet Value: 308    INR from last 7 days     Date/Time INR Value    04/09/20 0607  (!) 1.98    04/08/20 1940  (!) 2.29        Dose from last 7 days     Date/Time Dose (mg)    04/09/20 1637  9    04/08/20 2259  6        Average Dose (mg): Confirmed via phone w/pt 6 mg daily except 9 mg on Wed  Significant Interactions: Statin, Thyroid Medications, Proton Pump Inhibitor  Bridge Therapy: No   Reversal Agent Administered: Not Applicable    Comments: Spoke with patient over the phone today.  She confirms to me that she has been taking Coumadin 6 mg daily except 9 mg on Wednesday's, she reports her last dose PTA was 4/7/20 of 6 mg - med rec updated.  Patient just recently established with [facility] after moving to [place].  She confirms that her Coumadin dose has been relatively stable for at least a few years and that only occassionally her INR will increase to above 3.  Patient also chronically takes low dose vitamin K to aid in Coumadin/INR stabilization with reported inconsistent dietary vitamin K intake.  Recently had INR checked and was slightly elevated at 3.3 after being off the vitamin K for about 1 week.  She resumed her vitamin K dosing and subsequent INR's into range.  Slight decrease in INR today but nearly therapeutic, patient reports no missing doses in past week or so.  Prior reported outpatient dose was 6 mg daily on med rec at admission - 6 mg given last night Wednesday.  H/H overall stable, d/w MD consider oral iron supplementation - to be started in AM.      Plan:  Tonight will give 9 mg dose as this dose was missed last night with only 6 mg given.  Then resume home regimen of 6 mg daily except for 9 mg on Wednesday.  Spoke with MD and will continue with her  supplemental low dose vitamin K 100 mcg po daily while inpatient as well to continue to aid stabilization of INR.  INR x 1 ordered for AM.  Pharmacy to monitor and adjust as needed.          Education Material Provided?: No(Chronic Coumadin Patient)  Pharmacist suggested discharge dosing: Coumadin 6 mg daily except 9 mg on Wednesday's, continue with low dose vitamin K supplementation therapy of 100 mcg/day.  Recommend INR follow in 5-7 days post discharge.      Roxy Cardona, PharmD, BCPS

## 2020-04-09 NOTE — PROGRESS NOTES
[facility] Medicine Daily Progress Note    Date of Service  4/9/2020    Chief Complaint  80 y.o. female admitted 4/8/2020 with shortness of breath and cough    Hospital Course    this is a 81 y/o F with PMHX of hypertension, CHF, atrial fibrillation and hypothyroidism who was admitted on 4/8/20 after presenting to ER complaining of shortness of breath and cough.  As per pt the SOB has been intermittent in the last month but was worse on the admission day.  No home oxygen requirement.  Here in the emergency department she had new O2 requirement appeared to be mildly volume overloaded and was be admitted to the hospital for CHF exacerbation.        Interval Problem Update  Pt seen and examined, afebrile no nausea or vomiting, no overnight events still with some SOB. Denies any chest pain.  COvid test pending   Cont on lasix for her CHF.     Consultants/Specialty  None     Code Status  Full Code     Disposition  tbd     Review of Systems  Review of Systems   Constitutional: Positive for malaise/fatigue. Negative for chills and fever.   HENT: Negative for congestion, hearing loss and tinnitus.    Eyes: Negative for blurred vision, double vision and discharge.   Respiratory: Positive for cough and shortness of breath. Negative for hemoptysis.    Cardiovascular: Positive for orthopnea. Negative for chest pain, palpitations and leg swelling.   Gastrointestinal: Negative for abdominal pain, heartburn, nausea and vomiting.   Genitourinary: Negative for dysuria, flank pain, frequency and urgency.   Musculoskeletal: Negative for joint pain and myalgias.   Skin: Negative for rash.   Neurological: Negative for dizziness, sensory change, speech change, focal weakness and weakness.   Endo/Heme/Allergies: Negative for environmental allergies. Does not bruise/bleed easily.   Psychiatric/Behavioral: Negative for depression, hallucinations and substance abuse.   All other systems reviewed and are negative.       Physical Exam  Temp:   [36.5 °C (97.7 °F)-37.1 °C (98.8 °F)] 36.5 °C (97.7 °F)  Pulse:  [65-89] 65  Resp:  [14-22] 18  BP: (124-167)/() 124/85  SpO2:  [93 %-98 %] 96 %    Physical Exam  Vitals signs and nursing note reviewed.   Constitutional:       Appearance: Normal appearance.   HENT:      Head: Normocephalic and atraumatic.      Nose: No congestion.   Eyes:      Extraocular Movements: Extraocular movements intact.      Pupils: Pupils are equal, round, and reactive to light.   Neck:      Musculoskeletal: Normal range of motion and neck supple. No muscular tenderness.   Cardiovascular:      Rate and Rhythm: Normal rate and regular rhythm.      Pulses: Normal pulses.      Heart sounds: Normal heart sounds. No murmur.   Pulmonary:      Effort: Pulmonary effort is normal. No respiratory distress.      Breath sounds: No stridor. Rhonchi present.   Abdominal:      General: Bowel sounds are normal. There is no distension.      Palpations: Abdomen is soft.      Tenderness: There is no abdominal tenderness. There is no guarding or rebound.   Musculoskeletal:         General: No swelling or deformity.      Right lower leg: Edema present.      Left lower leg: Edema present.   Skin:     General: Skin is warm and dry.      Capillary Refill: Capillary refill takes less than 2 seconds.   Neurological:      General: No focal deficit present.      Mental Status: She is alert and oriented to person, place, and time.   Psychiatric:         Mood and Affect: Mood normal.         Behavior: Behavior normal.         Thought Content: Thought content normal.         Judgment: Judgment normal.         Fluids    Intake/Output Summary (Last 24 hours) at 4/9/2020 1048  Last data filed at 4/9/2020 0600  Gross per 24 hour   Intake 1100 ml   Output --   Net 1100 ml       Laboratory  Recent Labs     04/08/20  1940 04/09/20  0607   WBC 8.0 9.3   RBC 4.42 4.84   HEMOGLOBIN 11.3* 12.3   HEMATOCRIT 37.5 39.6   MCV 84.8 81.8   MCH 25.6* 25.4*   MCHC 30.1* 31.1*   RDW  51.2* 49.0   PLATELETCT 264 308   MPV 11.4 11.1     Recent Labs     04/08/20 1940 04/09/20  0607   SODIUM 143 140   POTASSIUM 3.6 3.0*   CHLORIDE 107 98   CO2 21 24   GLUCOSE 143* 113*   BUN 16 14   CREATININE 1.06 0.98   CALCIUM 9.0 9.1     Recent Labs     04/08/20 1940 04/09/20  0607   APTT 45.3*  --    INR 2.29* 1.98*               Imaging  CT-ABDOMEN-PELVIS WITH   Final Result      1.  No acute inflammatory process in the abdomen or pelvis.   2.  Hepatic steatosis with nodular hepatic contour, likely underlying cirrhosis.   3.  Cardiomegaly and interstitial edema, suggestive of CHF.   4.  Colonic diverticulosis. No diverticulitis.      DX-CHEST-PORTABLE (1 VIEW)   Final Result         1. Stable cardiomegaly.   2. No acute cardiac pulmonary abnormality.      EC-ECHOCARDIOGRAM COMPLETE W/O CONT    (Results Pending)        Assessment/Plan  * Acute on chronic combined systolic and diastolic congestive heart failure (HCC)- (present on admission)  Assessment & Plan  Appears mildly overloaded with associated hypoxia   Cont with IV lasix 40 BID  Resume home bb, arb  Serial troponin   Cardiac monitoring   Monitor I/o and daily weight   2 gm sodium diet  Echocardiogram ordered  COVID 19 r/o due to upper respiratory complaint    Anemia  Assessment & Plan  No evidence of bleeding   Lab workup ordered    Elevated troponin  Assessment & Plan  Due to volume overload and demand   Serial troponin and cardiac monitoring for now    Acute hypoxemic respiratory failure (HCC)  Assessment & Plan  Wean 02 as tolerated      Obstructive sleep apnea syndrome- (present on admission)  Assessment & Plan  cpap ordered    Obesity (BMI 35.0-39.9 without comorbidity) (HCC)- (present on admission)  Assessment & Plan  Encourage weight loss    Hypothyroidism- (present on admission)  Assessment & Plan  Resume home levothyroxine     Essential hypertension- (present on admission)  Assessment & Plan  Resume home anti hypertensive  medications    Paroxysmal atrial fibrillation (HCC)- (present on admission)  Assessment & Plan  Cont on coumadin, BB    Moderate to severe pulmonary hypertension (HCC)- (present on admission)  Assessment & Plan  Chronic, hx of SHAKA   cpap ordered         VTE prophylaxis: warfarin

## 2020-04-09 NOTE — HEART FAILURE PROGRAM
Cardiovascular Nurse Navigator () Advanced Heart Failure Program HF Exacerbation Consult Note    Patient presented to the ED yesterday evening via EMS. She had c/o intermittent CP that began after she awoke, she described as indigestion with SOB.    Patient has known AF and she was also showing frequent PVC's in ED. The ERP note indicates that patient ate a very high sodium meal the night before that may also have been .    Patient's BP upon presentation was  167/74 and patient was hypoxic. Thus she was admitted to [facility] by [facility] medicine for exacerbation of her known HFpEF, improved.    Receiving IV furosemide 40mg IV bid.    Unfortunately, patient has not been to cardiology clinic since 2017, unless she is following with a group outside of the [facility] system. She quit smoking 28 years ago and she does not carry a diagnosis of diabetes.    I will place an order for social work to assess any barriers patient may have to getting to the cardiology office and to assess for any food insecurities. Also, I've placed an order for an RD to provide cardiac diet education.    · HFpEF, improved (50%)  · NYHA: III (most notes say that she was dyspneic with exertion)  · Precipitant of exacerbation: presumably salty meal  · Consider for CardioMEMS commercial or GUIDE HF? No, needs to re-establish with cardiology regularly.  · Atrial fibrillation?: is on Warfarin for AC  · Smoking history? As above  · PHQ-2 score:0    [facility] Plan Notes: none    Therapy Notes: none    Demographics:    · Residence: [place]  · Insurance: Medicare and AARP    GD Secondary Prevention Interventions:    ·     Daily Weights: ordered    I's and O's: ordered    Advanced Care Planning: No AD on file. Full code status at the time of this note's filing.    The ACC recommends engaging palliative care as part of optimization of HF treatment to solicit goals of care and focus on quality of life throughout the clinical course of HF.    Once all  "diagnostics are in, please consider an order for palliative to discuss Advanced Care Planning.      Speaking with patients frankly about their end-of-life wishes is one of the most important things a palliative care team can do. This is especially important in the context of heart failure, since it’s such an unpredictable disease.    Follow up appointment:   • If discharged from acute care to home (exception hospice discharge), pt must have an appointment scheduled within 7 days of discharge (Cardiology, PCP, or DC Clinic).    • If discharged to Transitional Care Facility (LTAC, SNF, IRH), appointment should be made about a month out for after TCF.     Bedside Nursing Education:  Please provide HF booklet and repeated, ongoing education while administering medications, weighing patient, discussing management of symptoms, diet and need to follow up and act on changes. Please target education to the precipitant of the exacerbation.    Bedside Nursing Discharge:  When completing the after visit summary (discharge instructions) please select \"Cardiac Diagnosis, and Heart Failure\" in the special instructions section to populate the heart failure specific discharge instructions.     Referrals/Orders Placed:    Hospital Schedulers for HF f/u?  yes  Social Work   yes  Registered Dietician  yes  REMSA CP Program for patients with Medicaid, [facility], or [facility] coverage?  no  Outpatient Care Coordination for patients with Medicaid?  no    Many thanks, Aby, Cardiovascular Nurse Navigator, RN, [facility] [phone], & [employer] M-F (excluding holidays).          "

## 2020-04-09 NOTE — PROGRESS NOTES
Pt transferred to floor by this RN. Pt A&O4, VSS, on 2L via NC. Pt saturating between 91-93% on RA and does not wear oxygen at home. Pt updated on POC, all questions answered. Tele box on and monitor room notified. Pt oriented to room and educated to use call light for assistance. Will continue to monitor.

## 2020-04-09 NOTE — H&P
Hospital Medicine History & Physical Note    Date of Service  4/8/2020    Primary Care Physician  La Paredes M.D.    Consultants  none    Code Status  full    Chief Complaint  Shortness of breath, cough     History of Presenting Illness  80 y.o. female who presented 4/8/2020 with past medical history of hypertension, CHF, atrial fibrillation and hypothyroidism who presents with shortness of breath and cough.  This patient woke up this morning with shortness of breath.  Is been intermittent over the last several months.  But worsened this morning.  She is also had some mild shortness of breath especially with lying flat and exertion.  She is currently on Lasix and is compliant with her home medications.  Otherwise no known alleviating or exacerbating factors to her symptoms.  She does have mild tickle in her throat but no sore throat.  No sick contacts no recent fever.  No home oxygen requirement.  Here in the emergency department she has new O2 requirement appears to be mildly volume overloaded and will be admitted to the hospital for CHF exacerbation.    Review of Systems  Review of Systems   Constitutional: Positive for malaise/fatigue. Negative for chills and fever.   HENT: Negative for congestion, hearing loss and tinnitus.    Eyes: Negative for blurred vision, double vision and discharge.   Respiratory: Positive for cough and shortness of breath. Negative for hemoptysis.    Cardiovascular: Positive for orthopnea. Negative for chest pain, palpitations and leg swelling.   Gastrointestinal: Negative for abdominal pain, heartburn, nausea and vomiting.   Genitourinary: Negative for dysuria and flank pain.   Musculoskeletal: Negative for joint pain and myalgias.   Skin: Negative for rash.   Neurological: Negative for dizziness, sensory change, speech change, focal weakness and weakness.   Endo/Heme/Allergies: Negative for environmental allergies. Does not bruise/bleed easily.   Psychiatric/Behavioral:  Negative for depression, hallucinations and substance abuse.       Past Medical History   has a past medical history of Atrial fibrillation (HCC), Heart attack (HCC), Hypertension, and Thyroid disease.    Surgical History   has a past surgical history that includes thyroidectomy total; cholecystectomy; multiple coronary artery bypass; and eye surgery (Bilateral).     Family History  family history includes Cancer in her mother; Kidney Disease in an other family member; Other in an other family member; Stroke in her maternal grandmother.     Social History   reports that she quit smoking about 28 years ago. She has a 31.00 pack-year smoking history. She has never used smokeless tobacco. She reports that she does not drink alcohol or use drugs.    Allergies  No Known Allergies    Medications  Prior to Admission Medications   Prescriptions Last Dose Informant Patient Reported? Taking?   atorvastatin (LIPITOR) 40 MG Tab   No No   Sig: Take 1 Tab by mouth every bedtime.   furosemide (LASIX) 40 MG Tab   No No   Sig: Take 1 Tab by mouth every day.   levothyroxine (LEVOXYL) 150 MCG Tab   No No   Sig: Take 1 Tab by mouth Every morning on an empty stomach.   losartan (COZAAR) 50 MG Tab   No No   Sig: Take 1 Tab by mouth every day.   metoprolol (LOPRESSOR) 50 MG Tab   No No   Sig: Take 1 Tab by mouth 2 Times a Day.   omeprazole (PRILOSEC) 20 MG delayed-release capsule  Patient Yes No   Sig: Take 20 mg by mouth every day. Indications: Gastroesophageal Reflux Disease   potassium chloride SA (KDUR) 20 MEQ Tab CR   No No   Sig: Take 1 Tab by mouth every day.   vitamin D (CHOLECALCIFEROL) 1000 UNIT Tab  Patient Yes No   Sig: Take 4,000 Units by mouth every day.   vitamin k 100 MCG tablet   Yes No   Sig: Take 100 mcg by mouth every day.   warfarin (COUMADIN) 6 MG Tab  Patient Yes No   Sig: Take 6 mg by mouth every day. Indications: Obstructing Blood Clot with Chronic Atrial Fibrillation      Facility-Administered Medications: None        Physical Exam  Temp:  [37.1 °C (98.8 °F)] 37.1 °C (98.8 °F)  Pulse:  [77-81] 79  Resp:  [14-22] 14  BP: (144-167)/(67-74) 144/67  SpO2:  [94 %-98 %] 94 %    Physical Exam  Vitals signs reviewed.   Constitutional:       Appearance: Normal appearance.   HENT:      Head: Normocephalic and atraumatic.      Nose: No congestion.   Eyes:      Extraocular Movements: Extraocular movements intact.      Pupils: Pupils are equal, round, and reactive to light.   Neck:      Musculoskeletal: Normal range of motion and neck supple. No muscular tenderness.   Cardiovascular:      Rate and Rhythm: Normal rate and regular rhythm.      Pulses: Normal pulses.      Heart sounds: Normal heart sounds. No murmur.   Pulmonary:      Effort: Pulmonary effort is normal. No respiratory distress.      Breath sounds: No stridor. Rhonchi present.   Abdominal:      General: Bowel sounds are normal. There is no distension.      Palpations: Abdomen is soft.      Tenderness: There is no abdominal tenderness.   Musculoskeletal:         General: No swelling or deformity.      Right lower leg: Edema present.      Left lower leg: Edema present.   Skin:     General: Skin is warm and dry.      Capillary Refill: Capillary refill takes less than 2 seconds.   Neurological:      General: No focal deficit present.      Mental Status: She is alert and oriented to person, place, and time.   Psychiatric:         Mood and Affect: Mood normal.         Behavior: Behavior normal.         Thought Content: Thought content normal.         Judgment: Judgment normal.         Laboratory:  Recent Labs     04/08/20 1940   WBC 8.0   RBC 4.42   HEMOGLOBIN 11.3*   HEMATOCRIT 37.5   MCV 84.8   MCH 25.6*   MCHC 30.1*   RDW 51.2*   PLATELETCT 264   MPV 11.4     Recent Labs     04/08/20 1940   SODIUM 143   POTASSIUM 3.6   CHLORIDE 107   CO2 21   GLUCOSE 143*   BUN 16   CREATININE 1.06   CALCIUM 9.0     Recent Labs     04/08/20 1940   ALTSGPT 14   ASTSGOT 17   ALKPHOSPHAT 100*    TBILIRUBIN 1.2   LIPASE 35   GLUCOSE 143*     Recent Labs     04/08/20 1940   APTT 45.3*   INR 2.29*     Recent Labs     04/08/20 1940   NTPROBNP 2324*         Recent Labs     04/08/20 1940   TROPONINT 20*       Urinalysis:    No results found     Imaging:  CT-ABDOMEN-PELVIS WITH   Final Result      1.  No acute inflammatory process in the abdomen or pelvis.   2.  Hepatic steatosis with nodular hepatic contour, likely underlying cirrhosis.   3.  Cardiomegaly and interstitial edema, suggestive of CHF.   4.  Colonic diverticulosis. No diverticulitis.      DX-CHEST-PORTABLE (1 VIEW)   Final Result         1. Stable cardiomegaly.   2. No acute cardiac pulmonary abnormality.      EC-ECHOCARDIOGRAM COMPLETE W/O CONT    (Results Pending)         Assessment/Plan:  I anticipate this patient will require at least two midnights for appropriate medical management, necessitating inpatient admission.    * Acute on chronic combined systolic and diastolic congestive heart failure (HCC)- (present on admission)  Assessment & Plan  Appears mildly overloaded with associated hypoxia   Cont with IV lasix 40 BID  Resume home bb, arb  Serial troponin   Cardiac monitoring   Monitor I/o and daily weight   2 gm sodium diet  Echocardiogram ordered  COVID 19 r/o due to upper respiratory complaint    Anemia  Assessment & Plan  No evidence of bleeding   Lab workup ordered    Elevated troponin  Assessment & Plan  Due to volume overload and demand   Serial troponin and cardiac monitoring for now    Acute hypoxemic respiratory failure (HCC)  Assessment & Plan  Wean 02 as tolerated      Obstructive sleep apnea syndrome- (present on admission)  Assessment & Plan  cpap ordered    Obesity (BMI 35.0-39.9 without comorbidity) (HCC)- (present on admission)  Assessment & Plan  Encourage weight loss    Hypothyroidism- (present on admission)  Assessment & Plan  Resume home levothyroxine     Essential hypertension- (present on admission)  Assessment &  Plan  Resume home anti hypertensive medications    Paroxysmal atrial fibrillation (HCC)- (present on admission)  Assessment & Plan  Resume home coumadin, BB    Moderate to severe pulmonary hypertension (HCC)- (present on admission)  Assessment & Plan  Chroic, hx of SHAKA   cpap ordered      VTE prophylaxis: coumadin

## 2020-04-09 NOTE — CARE PLAN
Problem: Safety  Goal: Will remain free from injury  Outcome: PROGRESSING AS EXPECTED  Goal: Will remain free from falls  Outcome: PROGRESSING AS EXPECTED  Note: Fall precautions in place: treaded slipper socks on, mobility signs posted, hourly rounding, bed in lowest position, belongings and call light are within reach, bed alarm on, and near nurses station.      Problem: Respiratory:  Goal: Respiratory status will improve  Outcome: PROGRESSING AS EXPECTED

## 2020-04-09 NOTE — PROGRESS NOTES
Inpatient Anticoagulation Service Note    Date: 4/8/2020    Reason for Anticoagulation: Atrial Fibrillation   Target INR: 2.0 to 3.0  [id number] VASc Score: 3      Hemoglobin Value: (!) 11.3  Hematocrit Value: 37.5  Lab Platelet Value: 264    INR from last 7 days     Date/Time INR Value    04/08/20 1940  (!) 2.29        Dose from last 7 days     Date/Time Dose (mg)    04/08/20 2259  6        Average Dose (mg): (6 mg qday)  Significant Interactions: Statin, Thyroid Medications, Proton Pump Inhibitor  Bridge Therapy: No (If less than 5 days and overlap therapy discontinued -- document reason (i.e. Bleed Risk))    (If still on overlap therapy, if No -- document reason (i.e. Bleed Risk))    Reversal Agent Administered: Not Applicable  Comments: 80 year old admitted for CHF exacerbation.  Patient is anticoagulated with warfarin PTA for h/o a. fib. Although previous anticoag notes indicate that patient would take warfarin 9 mg on Wednesday patient states she takes warfarin 6 mg on Wednesdays and did not take her dose PTA. Will give patient warfarin 6 mg PO X 1 with repeat INR in AM.     Plan:  Warfarin 6 mg po X 1 with repeat INR in AM  Education Material Provided?: No(Established with Coumadin Clinic)  Pharmacist suggested discharge dosing: Resume home dosing of warfarin 6 mg po qday w/ repeat INR w/i 72 hours of discharge     Preeti Okeefe, PharmD

## 2020-04-09 NOTE — PROGRESS NOTES
MS:    Atrial Fibrillation (78 - 126) with rare Couplets, Frequent Bigeminy, Frequent Trigeminy  - - / 0.10 / - -

## 2020-04-09 NOTE — DISCHARGE PLANNING
"Care Coordination Assessment and IP Consult request. SW called patient over phone for assessment.     Patient is a 80-year old  women who lives with her , Ryne and her 61 year old adult daughter, Yamilka. Patient is retired and has SS financial benefits. Patient is a retired , cook, lumber worker, and . Has Medicare/AARP insurance.     PCP is Dr. Christian Paredes. Also seen in anticoagulation clinic. When assessed about cardiology, patient simply stated, \"I don't have a cardiologist\" and she would be happy to go, but doesn't have one. SW reviewed notes from last PCP appointment on 3/12/20, which was her first appointment. Patient referred to the following specialists:   1.) cardiology  2.) anticoagulation monitoring  3.) dermatology  4.) behavioral health  When asked about this, patient reports she was aware that she was referred to all these specialists, but then COVID lock-down happened and \"no one ever called.\" Patient is under assumption that specialists will call for appointments and are not able to due to COVID (which sounds right). Patient reports she is happy to attend these appointments, has her own car, and drives.     Patient reports no issues with ADLs or IADLs. Was on home O2 \"once\" when she did a sleep study, but was ultimately taken off and now just uses CPAP machine.     No issues with drugs, alcohol, and mental health per patient.     [facility] ([facility] and [facility] ([facility]).     Discussed current diet and how she grocery shops, etc. Patient reports prior to [facility] shut down she used to go to the grocery store herself and shop. Now she has her daughter got to the grocery store. Patient reported that she does know she is supposed to eat healthy foods that are low sodium, but since the shut-down she is \"TIRED OF COOKING!!\" so that is why she had her daughter buy more frozen and processed foods. SW asked if daughter can cook, and she reports she " "can and normally does.    Apparently, they had their daughter move in with a friend the last few weeks, as her daughter was still working in the community and they were worried about getting COVID. So, she got tired of cooking during this time. Now that she is in the hospital, her daughter is back home with patient's spouse, because he needs \"a lot of help.\" She reports once she returns home, her daughter will stay there again and will cook again.     Patient finds it ironic that she self-isolated and now she is sick.     PLAN:  Patient is happy to get dietary education, follow up with specialty appointments. SW to continue to follow for D/C needs.     Care Transition Team Assessment    Information Source  Orientation : Oriented x 4  Information Given By: Patient  Who is responsible for making decisions for patient? : Patient    Readmission Evaluation  Is this a readmission?: No    Elopement Risk  Legal Hold: No  Ambulatory or Self Mobile in Wheelchair: Yes  Disoriented: No  Psychiatric Symptoms: None  History of Wandering: No  Elopement this Admit: No  Vocalizing Wanting to Leave: No  Displays Behaviors, Body Language Wanting to Leave: No-Not at Risk for Elopement  Elopement Risk: Not at Risk for Elopement         Discharge Preparedness  What is your plan after discharge?: Home with help  What are your discharge supports?: Child, Spouse  Prior Functional Level: Ambulatory, Drives Self, Independent with Activities of Daily Living, Independent with Medication Management  Difficulity with ADLs: None  Difficulity with IADLs: None    Functional Assesment  Prior Functional Level: Ambulatory, Drives Self, Independent with Activities of Daily Living, Independent with Medication Management    Finances  Financial Barriers to Discharge: No  Prescription Coverage: Yes    Vision / Hearing Impairment  Vision Impairment : Yes  Right Eye Vision: Wears Glasses  Left Eye Vision: Wears Glasses  Hearing Impairment : No         Advance " Directive  Advance Directive?: None    Domestic Abuse  Have you ever been the victim of abuse or violence?: No  Physical Abuse or Sexual Abuse: No  Verbal Abuse or Emotional Abuse: No    Psychological Assessment  History of Substance Abuse: None  History of Psychiatric Problems: No  Non-compliant with Treatment: No  Newly Diagnosed Illness: No    Discharge Risks or Barriers  Discharge risks or barriers?: No    Anticipated Discharge Information  Anticipated discharge disposition: Home  Discharge Address: [place]  Discharge Contact Phone Number: [phone]

## 2020-04-09 NOTE — ED TRIAGE NOTES
Pt brought in from home via EMS for intermittent chest pain that began after pt woke up. She describes in as indigestion with SOB.  She denies any pain at this time.  Denies any lightheadedness or nausea.  She has a known hx of afib and take coumadin for it.  RR are even and unlabored at this time, worsening with exertion.  Afib noted on the monitor with frequent PVCs.

## 2020-04-09 NOTE — PROGRESS NOTES
Assumed care of patient at 0700. Patient alert and oriented, Afib on tele. Pt c/o cramping in BLE, MD notified of K 3.0. Replacement given. VO for Tylenol per MD and given per MAR. Oxygen weaned off, 94% on RA. Bed in low and locked position. Call light within reach. Will continue to monitor.

## 2020-04-09 NOTE — CARE PLAN
Problem: Communication  Goal: The ability to communicate needs accurately and effectively will improve  Outcome: PROGRESSING AS EXPECTED  Intervention: [place] patient and significant other/support system to call light to alert staff of needs  Flowsheets (Taken 4/9/2020 2774)  Oriented to:: Call Light & Bedside Controls     Problem: Knowledge Deficit  Goal: Knowledge of disease process/condition, treatment plan, diagnostic tests, and medications will improve  Outcome: PROGRESSING AS EXPECTED  Intervention: Assess knowledge level of disease process/condition, treatment plan, diagnostic tests, and medications

## 2020-04-10 VITALS
BODY MASS INDEX: 36.25 KG/M2 | WEIGHT: 204.59 LBS | DIASTOLIC BLOOD PRESSURE: 79 MMHG | HEART RATE: 77 BPM | TEMPERATURE: 97.5 F | OXYGEN SATURATION: 94 % | RESPIRATION RATE: 18 BRPM | HEIGHT: 63 IN | SYSTOLIC BLOOD PRESSURE: 135 MMHG

## 2020-04-10 LAB
ANION GAP SERPL CALC-SCNC: 14 MMOL/L (ref 7–16)
BUN SERPL-MCNC: 22 MG/DL (ref 8–22)
CALCIUM SERPL-MCNC: 8.9 MG/DL (ref 8.5–10.5)
CHLORIDE SERPL-SCNC: 102 MMOL/L (ref 96–112)
CO2 SERPL-SCNC: 24 MMOL/L (ref 20–33)
CREAT SERPL-MCNC: 1.24 MG/DL (ref 0.5–1.4)
ERYTHROCYTE [DISTWIDTH] IN BLOOD BY AUTOMATED COUNT: 50.4 FL (ref 35.9–50)
GLUCOSE SERPL-MCNC: 103 MG/DL (ref 65–99)
HCT VFR BLD AUTO: 41.5 % (ref 37–47)
HGB BLD-MCNC: 12.5 G/DL (ref 12–16)
INR PPP: 2.34 (ref 0.87–1.13)
MCH RBC QN AUTO: 25.3 PG (ref 27–33)
MCHC RBC AUTO-ENTMCNC: 30.1 G/DL (ref 33.6–35)
MCV RBC AUTO: 83.8 FL (ref 81.4–97.8)
PLATELET # BLD AUTO: 299 K/UL (ref 164–446)
PMV BLD AUTO: 11 FL (ref 9–12.9)
POTASSIUM SERPL-SCNC: 3.6 MMOL/L (ref 3.6–5.5)
PROTHROMBIN TIME: 26.5 SEC (ref 12–14.6)
RBC # BLD AUTO: 4.95 M/UL (ref 4.2–5.4)
SARS-COV-2 RNA RESP QL NAA+PROBE: NOT DETECTED
SODIUM SERPL-SCNC: 140 MMOL/L (ref 135–145)
SPECIMEN SOURCE: NORMAL
WBC # BLD AUTO: 7.7 K/UL (ref 4.8–10.8)

## 2020-04-10 PROCEDURE — A9270 NON-COVERED ITEM OR SERVICE: HCPCS | Performed by: HOSPITALIST

## 2020-04-10 PROCEDURE — 99239 HOSP IP/OBS DSCHRG MGMT >30: CPT | Performed by: INTERNAL MEDICINE

## 2020-04-10 PROCEDURE — 85610 PROTHROMBIN TIME: CPT

## 2020-04-10 PROCEDURE — A9270 NON-COVERED ITEM OR SERVICE: HCPCS | Performed by: INTERNAL MEDICINE

## 2020-04-10 PROCEDURE — 700102 HCHG RX REV CODE 250 W/ 637 OVERRIDE(OP): Performed by: HOSPITALIST

## 2020-04-10 PROCEDURE — 700111 HCHG RX REV CODE 636 W/ 250 OVERRIDE (IP): Performed by: HOSPITALIST

## 2020-04-10 PROCEDURE — 700102 HCHG RX REV CODE 250 W/ 637 OVERRIDE(OP): Performed by: INTERNAL MEDICINE

## 2020-04-10 PROCEDURE — 36415 COLL VENOUS BLD VENIPUNCTURE: CPT

## 2020-04-10 PROCEDURE — 80048 BASIC METABOLIC PNL TOTAL CA: CPT

## 2020-04-10 PROCEDURE — 85027 COMPLETE CBC AUTOMATED: CPT

## 2020-04-10 RX ADMIN — POTASSIUM CHLORIDE 40 MEQ: 1500 TABLET, EXTENDED RELEASE ORAL at 05:34

## 2020-04-10 RX ADMIN — LOSARTAN POTASSIUM 50 MG: 50 TABLET, FILM COATED ORAL at 05:34

## 2020-04-10 RX ADMIN — METOPROLOL TARTRATE 50 MG: 50 TABLET, FILM COATED ORAL at 07:49

## 2020-04-10 RX ADMIN — FUROSEMIDE 40 MG: 10 INJECTION, SOLUTION INTRAMUSCULAR; INTRAVENOUS at 05:34

## 2020-04-10 RX ADMIN — FERROUS SULFATE TAB 325 MG (65 MG ELEMENTAL FE) 325 MG: 325 (65 FE) TAB at 07:49

## 2020-04-10 RX ADMIN — LEVOTHYROXINE SODIUM 150 MCG: 75 TABLET ORAL at 05:42

## 2020-04-10 NOTE — DISCHARGE SUMMARY
Discharge Summary    CHIEF COMPLAINT ON ADMISSION  Chief Complaint   Patient presents with   • Chest Pain   • Shortness of Breath       Reason for Admission  EMS     Admission Date  4/8/2020    CODE STATUS  Full Code    HPI & HOSPITAL COURSE  This is a 79 y/o F with PMHX of hypertension, CHF, atrial fibrillation and hypothyroidism who was admitted on 4/8/20 after presenting to ER complaining of shortness of breath and cough.  As per pt the SOB has been intermittent in the last month but was worse on the admission day.  No home oxygen requirement.  Here in the emergency department she had new O2 requirement appeared to be mildly volume overloaded and was be admitted to the hospital for CHF exacerbation.   Pt was also checked for COVID and was negative.  Pt was started on IV lasix and her symptoms have improved, her SOB has resolved and she is not requiring any more oxygen.  Pt feeling well this AM.  Pt will be discharged home today and was instructed for self isolation at home for 14 days.    The patient met 2-midnight criteria for an inpatient stay at the time of discharge.    Discharge Date  4/10/20    FOLLOW UP ITEMS POST DISCHARGE  pcp  cardiology    DISCHARGE DIAGNOSES  Principal Problem:    Acute on chronic combined systolic and diastolic congestive heart failure (HCC) POA: Yes      Overview: Hx of CHF, Afib, CAD s/p 1v CABG      On metoprolol, losartan, lasix/KCL  Active Problems:    Moderate to severe pulmonary hypertension (HCC) POA: Yes      Overview: History of SHAKA on CPAP, denies smoking history      On Lasix, KCL    Paroxysmal atrial fibrillation (HCC) POA: Yes      Overview: Rate controlled on metoprolol      Anticoagulation with warfarin, Vit K    Essential hypertension POA: Yes      Overview: Stable on metoprolol, losartan    Hypothyroidism POA: Yes      Overview: Hx of hyperthyroidism, thyroid nodule s/p thyroidectomy      On Levoxyl.     Obesity (BMI 35.0-39.9 without comorbidity) (HCC) POA: Yes     Obstructive sleep apnea syndrome POA: Yes      Overview: SHAKA on CPAP    Acute hypoxemic respiratory failure (HCC) POA: Unknown    Elevated troponin POA: Unknown    Anemia POA: Unknown  Resolved Problems:    * No resolved hospital problems. *      FOLLOW UP  Future Appointments   Date Time Provider Department Center   4/13/2020  8:15 AM Allen Villar M.D. [facility] None   4/13/2020 11:15 AM [facility] None   4/17/2020  1:00 PM [facility]     No follow-up provider specified.    MEDICATIONS ON DISCHARGE     Medication List      CONTINUE taking these medications      Instructions   atorvastatin 40 MG Tabs  Commonly known as:  LIPITOR   Take 1 Tab by mouth every bedtime.  Dose:  40 mg     furosemide 40 MG Tabs  Commonly known as:  LASIX   Take 1 Tab by mouth every day.  Dose:  40 mg     levothyroxine 150 MCG Tabs  Commonly known as:  Levoxyl   Doctor's comments:  levoxyl  Take 1 Tab by mouth Every morning on an empty stomach.  Dose:  150 mcg     losartan 50 MG Tabs  Commonly known as:  COZAAR   Take 1 Tab by mouth every day.  Dose:  50 mg     metoprolol 50 MG Tabs  Commonly known as:  LOPRESSOR   Take 1 Tab by mouth 2 Times a Day.  Dose:  50 mg     omeprazole 20 MG delayed-release capsule  Commonly known as:  PRILOSEC   Take 20 mg by mouth every day. Indications: Gastroesophageal Reflux Disease  Dose:  20 mg     potassium chloride SA 20 MEQ Tbcr  Commonly known as:  Kdur   Take 1 Tab by mouth every day.  Dose:  20 mEq     vitamin D 1000 Unit (25 mcg) Tabs  Commonly known as:  cholecalciferol   Take 4,000 Units by mouth every day.  Dose:  4,000 Units     vitamin k 100 MCG tablet   Take 100 mcg by mouth every day.  Dose:  100 mcg     warfarin 6 MG Tabs  Commonly known as:  COUMADIN   Take 6-9 mg by mouth every day. 6 mg daily except 9 mg on Wednesday's  Indications: Obstructing Blood Clot with Chronic Atrial Fibrillation  Dose:  6-9 mg            Allergies  No Known  Allergies    DIET  Orders Placed This Encounter   Procedures   • Diet Order 2 Gram Sodium, Cardiac     Standing Status:   Standing     Number of Occurrences:   1     Order Specific Question:   Diet:     Answer:   2 Gram Sodium [7]     Order Specific Question:   Diet:     Answer:   Cardiac [6]   • Discontinue Diet Tray     Standing Status:   Standing     Number of Occurrences:   1       ACTIVITY  As tolerated.  Weight bearing as tolerated    CONSULTATIONS  None     PROCEDURES  None     LABORATORY  Lab Results   Component Value Date    SODIUM 140 04/10/2020    POTASSIUM 3.6 04/10/2020    CHLORIDE 102 04/10/2020    CO2 24 04/10/2020    GLUCOSE 103 (H) 04/10/2020    BUN 22 04/10/2020    CREATININE 1.24 04/10/2020        Lab Results   Component Value Date    WBC 7.7 04/10/2020    HEMOGLOBIN 12.5 04/10/2020    HEMATOCRIT 41.5 04/10/2020    PLATELETCT 299 04/10/2020        Total time of the discharge process exceeds 41 minutes.

## 2020-04-10 NOTE — PROGRESS NOTES
Received report and assumed care at 0700. On assessment patient A/O x 4, no complaints of pain or discomfort. Patient up independently. Reporting urine output to nursing staff as she goes. Bed locked and in lowest position. Tele box in place, on RA. Will continue to monitor.

## 2020-04-10 NOTE — DISCHARGE INSTRUCTIONS
Self-Isolating at Home  If it is determined that you do not need to be hospitalized and can be isolated at home please follow the follow the prevention steps below as based on CDC guidelines.  Stay home except to get medical care  People who are mildly ill with unconfirmed COVID-19 or have any other infectious respiratory illness are able to isolate at home during their illness. You should restrict activities outside your home, except for getting medical care. Do not go to work, school, or public areas. Avoid using public transportation, ride-sharing, or taxis.  Call ahead before visiting your doctor  If you have a medical appointment, call the healthcare provider and tell them that you have or may have unconfirmed COVID-19 or another possibly contagious respiratory illness. This will help the healthcare provider’s office take steps to keep other people from getting infected or exposed.  Separate yourself from other people and animals in your home  As much as possible, you should stay in a specific room and away from other people in your home. Also, you should use a separate bathroom, if available.  You should restrict contact with pets and other animals while you are sick, just like you would around other people. When possible, have another member of your household care for your animals while you are sick. If you must care for your pet or be around animals while you are sick, wash your hands before and after you interact with pets.  Wear a facemask  You should wear a facemask when you are around other people (e.g., sharing a room or vehicle) or pets and before you enter a healthcare provider’s office. If you are not able to wear a facemask (for example, because it causes trouble breathing), then people who live with you should not stay in the same room with you, or they should wear a facemask if they enter your room.  Cover your coughs and sneezes  Cover your mouth and nose with a tissue when you cough or sneeze.  Throw used tissues in a lined trash can. Immediately wash your hands with soap and water for at least 20 seconds or, if soap and water are not available, clean your hands with an alcohol-based hand  that contains at least 60% alcohol.  Clean your hands often  Wash your hands often with soap and water for at least 20 seconds, especially after blowing your nose, coughing, or sneezing; going to the bathroom; and before eating or preparing food. If soap and water are not readily available, use an alcohol-based hand  with at least 60% alcohol, covering all surfaces of your hands and rubbing them together until they feel dry.  Soap and water are the best option if hands are visibly dirty. Avoid touching your eyes, nose, and mouth with unwashed hands.  Avoid sharing personal household items  You should not share dishes, drinking glasses, cups, eating utensils, towels, or bedding with other people or pets in your home. After using these items, they should be washed thoroughly with soap and water.  Clean all “high-touch” surfaces everyday  High touch surfaces include counters, tabletops, doorknobs, bathroom fixtures, toilets, phones, keyboards, tablets, and bedside tables. Also, clean any surfaces that may have blood, stool, or body fluids on them. Use a household cleaning spray or wipe, according to the label instructions. Labels contain instructions for safe and effective use of the cleaning product including precautions you should take when applying the product, such as wearing gloves and making sure you have good ventilation during use of the product.  Monitor your symptoms  Seek prompt medical attention if your illness is worsening (e.g., difficulty breathing). Before seeking care, call your healthcare provider and tell them that you have, or are being evaluated for, unconfirmed COVID-19 or another infectious respiratory illness. Put on a facemask before you enter the facility. These steps will help  the healthcare provider’s office to keep other people in the office or waiting room from getting infected or exposed. Ask your healthcare provider to call the local or state health department. Persons who are placed under active monitoring or facilitated self-monitoring should follow instructions provided by their local health department or occupational health professionals, as appropriate. When working with your local health department check their available hours.  If you have a medical emergency and need to call 911, notify the dispatch personnel that you have, or are being evaluated for unconfirmed COVID-19 or another infectious respiratory illness. If possible, put on a facemask before emergency medical services arrive.  Discontinuing home isolation  Patients with unconfirmed COVID-19 or other infectious respiratory illnesses should remain under home isolation precautions until the risk of secondary transmission to others is thought to be low. In general that means 72 hours after fever resolves without the use of fever reducing medications, AND symptoms have improved AND at least 7 days since symptoms first appeared. If you have questions or concerns consult your healthcare providers or your local health department.  Per CDC guidelines, you are not required to provide a healthcare provider’s note to validate your illness or to return to work, as healthcare provider offices and medical facilities may be extremely busy and not able to provide such documentation in a timely way.    Discharge Instructions    Discharged to home by car with relative. Discharged via wheelchair, hospital escort: Refused.  Special equipment needed: Wheelchair    Be sure to schedule a follow-up appointment with your primary care doctor or any specialists as instructed.     Discharge Plan:   Diet Plan: Discussed  Activity Level: Discussed  Confirmed Follow up Appointment: Appointment Scheduled  Confirmed Symptoms Management:  Discussed  Medication Reconciliation Updated: Yes  Influenza Vaccine Indication: Not indicated: Previously immunized this influenza season and > 8 years of age    I understand that a diet low in cholesterol, fat, and sodium is recommended for good health. Unless I have been given specific instructions below for another diet, I accept this instruction as my diet prescription.   Other diet: low salt, heart healthy    Special Instructions:   HF Patient Discharge Instructions  · Monitor your weight daily, and maintain a weight chart, to track your weight changes.   · Activity as tolerated, unless your Doctor has ordered otherwise. Other activity order: as tolerated.  · Follow a low fat, low cholesterol, low salt diet unless instructed otherwise by your Doctor. Read the labels on the back of food products and track your intake of fat, cholesterol and salt.   · Fluid Restriction No. If a Fluid Restriction has been ordered by your Doctor, measure fluids with a measuring cup to ensure that you are not exceeding the restriction.   · No smoking.  · Oxygen No. If your Doctor has ordered that you wear Oxygen at home, it is important to wear it as ordered.  · Did you receive an explanation from staff on the importance of taking each of your medications and why it is necessary to keep taking them unless your doctor says to stop? Yes  · Were all of your questions answered about how to manage your heart failure and what to do if you have increased signs and symptoms after you go home? Yes  · Do you feel like your heart failure care team involved you in the care treatment plan and allowed you to make decisions regarding your care while in the hospital and addressed any discharge needs you might have? Yes    See the educational handout provided at discharge for more information on monitoring your daily weight, activity and diet. This also explains more about Heart Failure, symptoms of a flare-up and some of the tests that you have  undergone.     Warning Signs of a Flare-Up include:  · Swelling in the ankles or lower legs.  · Shortness of breath, while at rest, or while doing normal activities.   · Shortness of breath at night when in bed, or coughing in bed.   · Requiring more pillows to sleep at night, or needing to sit up at night to sleep.  · Feeling weak, dizzy or fatigued.     When to call your Doctor:  · Call [facility] seven days a week from 8:00 a.m. to 8:00 p.m. for medical questions [phone].  · Call your Primary Care Physician or Cardiologist if:   1. You experience any pain radiating to your jaw or neck.  2. You have any difficulty breathing.  3. You experience weight gain of 3 lbs in a day or 5 lbs in a week.   4. You feel any palpitations or irregular heartbeats.  5. You become dizzy or lose consciousness.   If you have had an angiogram or had a pacemaker or AICD placed, and experience:  1. Bleeding, drainage or swelling at the surgical / puncture site.  2. Fever greater than 100.0 F  3. Shock from internal defibrillator.  4. Cool and / or numb extremities.      · Is patient discharged on Warfarin / Coumadin?   Yes    You are receiving the drug warfarin. Please understand the importance of monitoring warfarin with scheduled PT/INR blood draws.  Follow-up with April 13th    IMPORTANT: HOW TO USE THIS INFORMATION:  This is a summary and does NOT have all possible information about this product. This information does not assure that this product is safe, effective, or appropriate for you. This information is not individual medical advice and does not substitute for the advice of your health care professional. Always ask your health care professional for complete information about this product and your specific health needs.      WARFARIN - ORAL (WARF-uh-rin)      COMMON BRAND NAME(S): Coumadin      WARNING:  Warfarin can cause very serious (possibly fatal) bleeding. This is more likely to occur when you first start  "taking this medication or if you take too much warfarin. To decrease your risk for bleeding, your doctor or other health care provider will monitor you closely and check your lab results (INR test) to make sure you are not taking too much warfarin. Keep all medical and laboratory appointments. Tell your doctor right away if you notice any signs of serious bleeding. See also Side Effects section.      USES:  This medication is used to treat blood clots (such as in deep vein thrombosis-DVT or pulmonary embolus-PE) and/or to prevent new clots from forming in your body. Preventing harmful blood clots helps to reduce the risk of a stroke or heart attack. Conditions that increase your risk of developing blood clots include a certain type of irregular heart rhythm (atrial fibrillation), heart valve replacement, recent heart attack, and certain surgeries (such as hip/knee replacement). Warfarin is commonly called a \"blood thinner,\" but the more correct term is \"anticoagulant.\" It helps to keep blood flowing smoothly in your body by decreasing the amount of certain substances (clotting proteins) in your blood.      HOW TO USE:  Read the Medication Guide provided by your pharmacist before you start taking warfarin and each time you get a refill. If you have any questions, ask your doctor or pharmacist. Take this medication by mouth with or without food as directed by your doctor or other health care professional, usually once a day. It is very important to take it exactly as directed. Do not increase the dose, take it more frequently, or stop using it unless directed by your doctor. Dosage is based on your medical condition, laboratory tests (such as INR), and response to treatment. Your doctor or other health care provider will monitor you closely while you are taking this medication to determine the right dose for you. Use this medication regularly to get the most benefit from it. To help you remember, take it at the same " time each day. It is important to eat a balanced, consistent diet while taking warfarin. Some foods can affect how warfarin works in your body and may affect your treatment and dose. Avoid sudden large increases or decreases in your intake of foods high in vitamin K (such as broccoli, cauliflower, cabbage, brussels sprouts, kale, spinach, and other green leafy vegetables, liver, green tea, certain vitamin supplements). If you are trying to lose weight, check with your doctor before you try to go on a diet. Cranberry products may also affect how your warfarin works. Limit the amount of cranberry juice (16 ounces/480 milliliters a day) or other cranberry products you may drink or eat.      SIDE EFFECTS:  Nausea, loss of appetite, or stomach/abdominal pain may occur. If any of these effects persist or worsen, tell your doctor or pharmacist promptly. Remember that your doctor has prescribed this medication because he or she has judged that the benefit to you is greater than the risk of side effects. Many people using this medication do not have serious side effects. This medication can cause serious bleeding if it affects your blood clotting proteins too much (shown by unusually high INR lab results). Even if your doctor stops your medication, this risk of bleeding can continue for up to a week. Tell your doctor right away if you have any signs of serious bleeding, including: unusual pain/swelling/discomfort, unusual/easy bruising, prolonged bleeding from cuts or gums, persistent/frequent nosebleeds, unusually heavy/prolonged menstrual flow, pink/dark urine, coughing up blood, vomit that is bloody or looks like coffee grounds, severe headache, dizziness/fainting, unusual or persistent tiredness/weakness, bloody/black/tarry stools, chest pain, shortness of breath, difficulty swallowing. Tell your doctor right away if any of these unlikely but serious side effects occur: persistent nausea/vomiting, severe  stomach/abdominal pain, yellowing eyes/skin. This drug rarely has caused very serious (possibly fatal) problems if its effects lead to small blood clots (usually at the beginning of treatment). This can lead to severe skin/tissue damage that may require surgery or amputation if left untreated. Patients with certain blood conditions (protein C or S deficiency) may be at greater risk. Get medical help right away if any of these rare but serious side effects occur: painful/red/purplish patches on the skin (such as on the toe, breast, abdomen), change in the amount of urine, vision changes, confusion, slurred speech, weakness on one side of the body. A very serious allergic reaction to this drug is rare. However, get medical help right away if you notice any symptoms of a serious allergic reaction, including: rash, itching/swelling (especially of the face/tongue/throat), severe dizziness, trouble breathing. This is not a complete list of possible side effects. If you notice other effects not listed above, contact your doctor or pharmacist. In the [place] - Call your doctor for medical advice about side effects. You may report side effects to FDA at 4-026-JUI-4626. In [place] - Call your doctor for medical advice about side effects. You may report side effects to [facility] [place] at 1-906.404.5334.      PRECAUTIONS:  Before taking warfarin, tell your doctor or pharmacist if you are allergic to it; or if you have any other allergies. This product may contain inactive ingredients, which can cause allergic reactions or other problems. Talk to your pharmacist for more details. Before using this medication, tell your doctor or pharmacist your medical history, especially of: blood disorders (such as anemia, hemophilia), bleeding problems (such as bleeding of the stomach/intestines, bleeding in the brain), blood vessel disorders (such as aneurysms), recent major injury/surgery, liver disease, alcohol use, mental/mood disorders  (including memory problems), frequent falls/injuries. It is important that all your doctors and dentists know that you take warfarin. Before having surgery or any medical/dental procedures, tell your doctor or dentist that you are taking this medication and about all the products you use (including prescription drugs, nonprescription drugs, and herbal products). Avoid getting injections into the muscles. If you must have an injection into a muscle (for example, a flu shot), it should be given in the arm. This way, it will be easier to check for bleeding and/or apply pressure bandages. This medication may cause stomach bleeding. Daily use of alcohol while using this medicine will increase your risk for stomach bleeding and may also affect how this medication works. Limit or avoid alcoholic beverages. If you have not been eating well, if you have an illness or infection that causes fever, vomiting, or diarrhea for more than 2 days, or if you start using any antibiotic medications, contact your doctor or pharmacist immediately because these conditions can affect how warfarin works. This medication can cause heavy bleeding. To lower the chance of getting cut, bruised, or injured, use great caution with sharp objects like safety razors and nail cutters. Use an electric razor when shaving and a soft toothbrush when brushing your teeth. Avoid activities such as contact sports. If you fall or injure yourself, especially if you hit your head, call your doctor immediately. Your doctor may need to check you. The Food & Drug Administration has stated that generic warfarin products are interchangeable. However, consult your doctor or pharmacist before switching warfarin products. Be careful not to take more than one medication that contains warfarin unless specifically directed by the doctor or health care provider who is monitoring your warfarin treatment. Older adults may be at greater risk for bleeding while using this drug.  "This medication is not recommended for use during pregnancy because of serious (possibly fatal) harm to an unborn baby. Discuss the use of reliable forms of birth control with your doctor. If you become pregnant or think you may be pregnant, tell your doctor immediately. If you are planning pregnancy, discuss a plan for managing your condition with your doctor before you become pregnant. Your doctor may switch the type of medication you use during pregnancy. Very small amounts of this medication may pass into breast milk but is unlikely to harm a nursing infant. Consult your doctor before breast-feeding.      DRUG INTERACTIONS:  Drug interactions may change how your medications work or increase your risk for serious side effects. This document does not contain all possible drug interactions. Keep a list of all the products you use (including prescription/nonprescription drugs and herbal products) and share it with your doctor and pharmacist. Do not start, stop, or change the dosage of any medicines without your doctor's approval. Warfarin interacts with many prescription, nonprescription, vitamin, and herbal products. This includes medications that are applied to the skin or inside the vagina or rectum. The interactions with warfarin usually result in an increase or decrease in the \"blood-thinning\" (anticoagulant) effect. Your doctor or other health care professional should closely monitor you to prevent serious bleeding or clotting problems. While taking warfarin, it is very important to tell your doctor or pharmacist of any changes in medications, vitamins, or herbal products that you are taking. Some products that may interact with this drug include: capecitabine, imatinib, mifepristone. Aspirin, aspirin-like drugs (salicylates), and nonsteroidal anti-inflammatory drugs (NSAIDs such as ibuprofen, naproxen, celecoxib) may have effects similar to warfarin. These drugs may increase the risk of bleeding problems if " taken during treatment with warfarin. Carefully check all prescription/nonprescription product labels (including drugs applied to the skin such as pain-relieving creams) since the products may contain NSAIDs or salicylates. Talk to your doctor about using a different medication (such as acetaminophen) to treat pain/fever. Low-dose aspirin and related drugs (such as clopidogrel, ticlopidine) should be continued if prescribed by your doctor for specific medical reasons such as heart attack or stroke prevention. Consult your doctor or pharmacist for more details. Many herbal products interact with warfarin. Tell your doctor before taking any herbal products, especially bromelains, coenzyme Q10, cranberry, danshen, dong quai, fenugreek, garlic, ginkgo biloba, ginseng, and Evelyn's wort, among others. This medication may interfere with a certain laboratory test to measure theophylline levels, possibly causing false test results. Make sure laboratory personnel and all your doctors know you use this drug.      OVERDOSE:  If overdose is suspected, contact a poison control center or emergency room immediately. [place] residents can call the [place] National Poison Hotline at 1-752.110.7754. [place] residents can call a provincial poison control center. Symptoms of overdose may include: bloody/black/tarry stools, pink/dark urine, unusual/prolonged bleeding.      NOTES:  Do not share this medication with others. Laboratory and/or medical tests (such as INR, complete blood count) must be performed periodically to monitor your progress or check for side effects. Consult your doctor for more details.      MISSED DOSE:  For the best possible benefit, do not miss any doses. If you do miss a dose and remember on the same day, take it as soon as you remember. If you remember on the next day, skip the missed dose and resume your usual dosing schedule. Do not double the dose to catch up because this could increase your risk for bleeding. Keep a  record of missed doses to give to your doctor or pharmacist. Contact your doctor or pharmacist if you miss 2 or more doses in a row.      STORAGE:  Store at room temperature away from light and moisture. Do not store in the bathroom. Keep all medications away from children and pets. Do not flush medications down the toilet or pour them into a drain unless instructed to do so. Properly discard this product when it is  or no longer needed. Consult your pharmacist or local waste disposal company for more details about how to safely discard your product.      MEDICAL ALERT:  Your condition and medication can cause complications in a medical emergency. For information about enrolling in MedicAlert, call 1-856.223.3353 ([place]) or 1-230.957.8068 ([place]).      Information last revised 2010 Copyright(c)  [employer]             Depression / Suicide Risk    As you are discharged from this [facility] facility, it is important to learn how to keep safe from harming yourself.    Recognize the warning signs:  · Abrupt changes in personality, positive or negative- including increase in energy   · Giving away possessions  · Change in eating patterns- significant weight changes-  positive or negative  · Change in sleeping patterns- unable to sleep or sleeping all the time   · Unwillingness or inability to communicate  · Depression  · Unusual sadness, discouragement and loneliness  · Talk of wanting to die  · Neglect of personal appearance   · Rebelliousness- reckless behavior  · Withdrawal from people/activities they love  · Confusion- inability to concentrate     If you or a loved one observes any of these behaviors or has concerns about self-harm, here's what you can do:  · Talk about it- your feelings and reasons for harming yourself  · Remove any means that you might use to hurt yourself (examples: pills, rope, extension cords, firearm)  · Get professional help from the community (Mental Health,  Substance Abuse, psychological counseling)  · Do not be alone:Call your Safe Contact- someone whom you trust who will be there for you.  · Call your local CRISIS HOTLINE 791-5369 or 134-276-4329  · Call your local Children's Mobile Crisis Response Team [place] (439) 284-8722 or www.[employer]  · Call the toll free National Suicide Prevention Hotlines   · National Suicide Prevention Lifeline 481-038-IKUD (9720)  · National Hope Line Network 800-SUICIDE (011-7016)

## 2020-04-10 NOTE — CARE PLAN
Problem: Safety  Goal: Will remain free from injury  Outcome: PROGRESSING AS EXPECTED     Problem: Venous Thromboembolism (VTW)/Deep Vein Thrombosis (DVT) Prevention:  Goal: Patient will participate in Venous Thrombosis (VTE)/Deep Vein Thrombosis (DVT)Prevention Measures  Outcome: PROGRESSING AS EXPECTED     Problem: Knowledge Deficit  Goal: Knowledge of the prescribed therapeutic regimen will improve  Outcome: PROGRESSING AS EXPECTED     Problem: Respiratory:  Goal: Respiratory status will improve  Outcome: PROGRESSING AS EXPECTED

## 2020-04-10 NOTE — RESPIRATORY CARE
COPD EDUCATION by COPD CLINICAL EDUCATOR  4/10/2020 at 6:16 AM by Desirae Laughlin, RRT     Patient reviewed by COPD education team. Patient does not have a history or diagnosis of COPD and is a non-smoker, therefore does not qualify for the COPD program.

## 2020-04-13 ENCOUNTER — OFFICE VISIT (OUTPATIENT)
Dept: CARDIOLOGY | Facility: MEDICAL CENTER | Age: 80
End: 2020-04-13
Payer: MEDICARE

## 2020-04-13 ENCOUNTER — ANTICOAGULATION VISIT (OUTPATIENT)
Dept: VASCULAR LAB | Facility: MEDICAL CENTER | Age: 80
End: 2020-04-13
Attending: INTERNAL MEDICINE
Payer: MEDICARE

## 2020-04-13 VITALS
HEIGHT: 63 IN | WEIGHT: 207 LBS | OXYGEN SATURATION: 90 % | DIASTOLIC BLOOD PRESSURE: 80 MMHG | HEART RATE: 84 BPM | BODY MASS INDEX: 36.68 KG/M2 | SYSTOLIC BLOOD PRESSURE: 136 MMHG | RESPIRATION RATE: 16 BRPM

## 2020-04-13 DIAGNOSIS — G47.33 OBSTRUCTIVE SLEEP APNEA SYNDROME: ICD-10-CM

## 2020-04-13 DIAGNOSIS — I10 ESSENTIAL HYPERTENSION: ICD-10-CM

## 2020-04-13 DIAGNOSIS — I25.10 CORONARY ARTERY DISEASE INVOLVING NATIVE CORONARY ARTERY OF NATIVE HEART WITHOUT ANGINA PECTORIS: ICD-10-CM

## 2020-04-13 DIAGNOSIS — I48.0 PAROXYSMAL ATRIAL FIBRILLATION (HCC): ICD-10-CM

## 2020-04-13 DIAGNOSIS — I34.0 SEVERE MITRAL REGURGITATION: ICD-10-CM

## 2020-04-13 DIAGNOSIS — E66.9 OBESITY (BMI 35.0-39.9 WITHOUT COMORBIDITY): ICD-10-CM

## 2020-04-13 DIAGNOSIS — I50.43 ACUTE ON CHRONIC COMBINED SYSTOLIC AND DIASTOLIC CONGESTIVE HEART FAILURE (HCC): ICD-10-CM

## 2020-04-13 DIAGNOSIS — I27.20 MODERATE TO SEVERE PULMONARY HYPERTENSION (HCC): ICD-10-CM

## 2020-04-13 DIAGNOSIS — R79.89 ELEVATED TROPONIN: ICD-10-CM

## 2020-04-13 DIAGNOSIS — J96.01 ACUTE HYPOXEMIC RESPIRATORY FAILURE (HCC): ICD-10-CM

## 2020-04-13 LAB
BACTERIA BLD CULT: NORMAL
BACTERIA BLD CULT: NORMAL
INR PPP: 2.9 (ref 2–3.5)
SIGNIFICANT IND 70042: NORMAL
SIGNIFICANT IND 70042: NORMAL
SITE SITE: NORMAL
SITE SITE: NORMAL
SOURCE SOURCE: NORMAL
SOURCE SOURCE: NORMAL

## 2020-04-13 PROCEDURE — 99205 OFFICE O/P NEW HI 60 MIN: CPT | Performed by: INTERNAL MEDICINE

## 2020-04-13 PROCEDURE — 99211 OFF/OP EST MAY X REQ PHY/QHP: CPT

## 2020-04-13 PROCEDURE — 85610 PROTHROMBIN TIME: CPT

## 2020-04-13 RX ORDER — SPIRONOLACTONE 25 MG/1
25 TABLET ORAL DAILY
Qty: 30 TAB | Refills: 11 | Status: SHIPPED | OUTPATIENT
Start: 2020-04-13 | End: 2020-07-02 | Stop reason: SDUPTHER

## 2020-04-13 ASSESSMENT — ENCOUNTER SYMPTOMS
ORTHOPNEA: 0
CONSTITUTIONAL NEGATIVE: 1
CARDIOVASCULAR NEGATIVE: 1
SORE THROAT: 0
STRIDOR: 0
PND: 0
COUGH: 0
HEMOPTYSIS: 0
LOSS OF CONSCIOUSNESS: 0
EYES NEGATIVE: 1
RESPIRATORY NEGATIVE: 1
BRUISES/BLEEDS EASILY: 0
FEVER: 0
WHEEZING: 0
NEUROLOGICAL NEGATIVE: 1
MUSCULOSKELETAL NEGATIVE: 1
WEAKNESS: 0
CHILLS: 0
GASTROINTESTINAL NEGATIVE: 1
DIZZINESS: 0
PALPITATIONS: 0
CLAUDICATION: 0
SPUTUM PRODUCTION: 0
SHORTNESS OF BREATH: 0

## 2020-04-13 ASSESSMENT — FIBROSIS 4 INDEX: FIB4 SCORE: 1.19

## 2020-04-13 NOTE — PROGRESS NOTES
Anticoagulation Summary  As of 2020    INR goal:   2.0-3.0   TTR:   100.0 % (1.7 wk)   INR used for dosin.90 (2020)   Warfarin maintenance plan:   9 mg (6 mg x 1.5) every Wed; 6 mg (6 mg x 1) all other days   Weekly warfarin total:   45 mg   Plan last modified:   Abad MoraD (3/17/2020)   Next INR check:   2020   Target end date:   Indefinite    Indications    Paroxysmal atrial fibrillation (HCC) [I48.0]             Anticoagulation Episode Summary     INR check location:       Preferred lab:       Send INR reminders to:       Comments:         Anticoagulation Care Providers     Provider Role Specialty Phone number    MariahRenay Christian Paredes M.D. Referring Internal Medicine [phone]    [facility] Responsible  [phone]             Anticoagulation Patient Findings  Patient Findings     Positives:   Change in medications (potassium d/c'd, spironolactone initated), Hospital admission (for AeCHF; d/c'd 4/10)    Negatives:   Signs/symptoms of thrombosis, Signs/symptoms of bleeding, Laboratory test error suspected, Change in health, Change in alcohol use, Change in activity, Upcoming invasive procedure, Emergency department visit, Upcoming dental procedure, Missed doses, Extra doses, Change in diet/appetite, Bruising, Other complaints          HPI:  Aleshia Crooks seen in clinic today, on anticoagulation therapy with warfarin for atrial fibrillation  Denies signs/symptoms of bleeding and/or thrombosis since the last appt.    Denies any interval changes to diet  Denies any complications or cost restrictions with current therapy.   Verified current warfarin dosing schedule.   BP check declined        A/P   INR  therapeutic.   Pt is to continue with current warfarin dosing regimen.    Follow up appointment in 3 week(s).    Nedra Mora, PharmD

## 2020-04-13 NOTE — PATIENT INSTRUCTIONS
You will be seen at our drive thru service at [facility] located on the west side of the building at  [address] [place] [address].  [place] [address]         Please call [phone] with any questions.

## 2020-04-13 NOTE — PROGRESS NOTES
Chief Complaint   Patient presents with   • Congestive Heart Failure       Subjective:   Aleshia Crooks is a 80 y.o. female who presents today as a new consultation for heart failure.  She is a 80-year-old female with a past medical history of CAD status post CABG.  She was seen 3 years ago was admitted to the hospital for atrial fibrillation with rapid ventricular response and found to have an EF of 30%.  At the time she was also noted to have severe mitral regurgitation.  She underwent a coronary angiogram where there was no interventions performed.  She was treated with medical therapy started on warfarin and discharged.  Since then she has been doing fine however with the last couple of weeks she developed progressive shortness of breath.  She is not sure what led to this.  She was admitted to the hospital and diuresed and sent home on the same dose of furosemide that she was admitted with.  Since then she continues to be not short of breath and feeling well.  Her blood pressures are controlled.  She did have a set of PFTs performed 1 year ago in [place].    Past Medical History:   Diagnosis Date   • Atrial fibrillation (HCC)    • Heart attack (HCC)    • Hypertension    • Thyroid disease      Past Surgical History:   Procedure Laterality Date   • CHOLECYSTECTOMY     • EYE SURGERY Bilateral    • MULTIPLE CORONARY ARTERY BYPASS      1 bypass   • THYROIDECTOMY TOTAL       Family History   Problem Relation Age of Onset   • Cancer Mother         cancer, smoker   • Stroke Maternal Grandmother    • Other Other         Crohn   • Kidney Disease Other         kidney transplant     Social History     Socioeconomic History   • Marital status:      Spouse name: Not on file   • Number of children: Not on file   • Years of education: Not on file   • Highest education level: Not on file   Occupational History     Comment: [employer]   Social Needs   • Financial resource strain: Not on file   • Food insecurity      Worry: Not on file     Inability: Not on file   • Transportation needs     Medical: Not on file     Non-medical: Not on file   Tobacco Use   • Smoking status: Former Smoker     Packs/day: 1.00     Years: 31.00     Pack years: 31.00     Last attempt to quit:      Years since quittin.3   • Smokeless tobacco: Never Used   Substance and Sexual Activity   • Alcohol use: No   • Drug use: No   • Sexual activity: Not Currently     Partners: Male   Lifestyle   • Physical activity     Days per week: Not on file     Minutes per session: Not on file   • Stress: Not on file   Relationships   • Social connections     Talks on phone: Not on file     Gets together: Not on file     Attends Scientologist service: Not on file     Active member of club or organization: Not on file     Attends meetings of clubs or organizations: Not on file     Relationship status: Not on file   • Intimate partner violence     Fear of current or ex partner: Not on file     Emotionally abused: Not on file     Physically abused: Not on file     Forced sexual activity: Not on file   Other Topics Concern   • Not on file   Social History Narrative   • Not on file     No Known Allergies  Outpatient Encounter Medications as of 2020   Medication Sig Dispense Refill   • spironolactone (ALDACTONE) 25 MG Tab Take 1 Tab by mouth every day. 30 Tab 11   • vitamin k 100 MCG tablet Take 100 mcg by mouth every day.     • levothyroxine (LEVOXYL) 150 MCG Tab Take 1 Tab by mouth Every morning on an empty stomach. 10 Tab 0   • furosemide (LASIX) 40 MG Tab Take 1 Tab by mouth every day. 90 Tab 0   • losartan (COZAAR) 50 MG Tab Take 1 Tab by mouth every day. 90 Tab 0   • atorvastatin (LIPITOR) 40 MG Tab Take 1 Tab by mouth every bedtime. 30 Tab 1   • metoprolol (LOPRESSOR) 50 MG Tab Take 1 Tab by mouth 2 Times a Day. 60 Tab 0   • warfarin (COUMADIN) 6 MG Tab Take 6-9 mg by mouth every day. 6 mg daily except 9 mg on Wednesday's  Indications: Obstructing Blood Clot  "with Chronic Atrial Fibrillation     • omeprazole (PRILOSEC) 20 MG delayed-release capsule Take 20 mg by mouth every day. Indications: Gastroesophageal Reflux Disease     • vitamin D (CHOLECALCIFEROL) 1000 UNIT Tab Take 4,000 Units by mouth every day.     • [DISCONTINUED] potassium chloride SA (KDUR) 20 MEQ Tab CR Take 1 Tab by mouth every day. 90 Tab 0     No facility-administered encounter medications on file as of 4/13/2020.      Review of Systems   Constitutional: Negative.  Negative for chills, fever and malaise/fatigue.   HENT: Negative.  Negative for sore throat.    Eyes: Negative.    Respiratory: Negative.  Negative for cough, hemoptysis, sputum production, shortness of breath, wheezing and stridor.    Cardiovascular: Negative.  Negative for chest pain, palpitations, orthopnea, claudication, leg swelling and PND.   Gastrointestinal: Negative.    Genitourinary: Negative.    Musculoskeletal: Negative.    Skin: Negative.    Neurological: Negative.  Negative for dizziness, loss of consciousness and weakness.   Endo/Heme/Allergies: Negative.  Does not bruise/bleed easily.   All other systems reviewed and are negative.       Objective:   /80 (BP Location: Right arm, Patient Position: Sitting, BP Cuff Size: Adult)   Pulse 84   Resp 16   Ht 1.6 m (5' 3\")   Wt 93.9 kg (207 lb)   LMP  (LMP Unknown)   SpO2 90%   BMI 36.67 kg/m²     Physical Exam   Constitutional: She appears well-developed and well-nourished. No distress.   HENT:   Head: Normocephalic and atraumatic.   Right Ear: External ear normal.   Left Ear: External ear normal.   Nose: Nose normal.   Mouth/Throat: No oropharyngeal exudate.   Eyes: Pupils are equal, round, and reactive to light. Conjunctivae and EOM are normal. Right eye exhibits no discharge. Left eye exhibits no discharge. No scleral icterus.   Neck: Neck supple. No JVD present.   Cardiovascular: Normal rate, regular rhythm and intact distal pulses. Exam reveals no gallop and no " friction rub.   No murmur heard.  Pulmonary/Chest: Effort normal. No stridor. No respiratory distress. She has no wheezes. She has no rales. She exhibits no tenderness.   Abdominal: Soft. She exhibits no distension. There is no guarding.   Musculoskeletal: Normal range of motion.         General: No tenderness, deformity or edema.   Neurological: She is alert. She has normal reflexes. She displays normal reflexes. No cranial nerve deficit. She exhibits normal muscle tone. Coordination normal.   Skin: Skin is warm and dry. No rash noted. She is not diaphoretic. No erythema. No pallor.   Psychiatric: She has a normal mood and affect. Her behavior is normal. Judgment and thought content normal.   Nursing note and vitals reviewed.    Echocardiogram: Dated 4/9/2020 personally viewed interpreted by myself showing an EF of 50%, prolapse of the anterior leaflet with moderate to severe mitral regurgitation present.  Similar to prior.    Echocardiogram: Dated 7/14/2017 personally reviewed maternity myself showing EF of 30% with severe mid regurgitation.    No results found for: CHOLSTRLTOT, LDL, HDL, TRIGLYCERIDE    Lab Results   Component Value Date/Time    SODIUM 140 04/10/2020 04:03 AM    POTASSIUM 3.6 04/10/2020 04:03 AM    CHLORIDE 102 04/10/2020 04:03 AM    CO2 24 04/10/2020 04:03 AM    GLUCOSE 103 (H) 04/10/2020 04:03 AM    BUN 22 04/10/2020 04:03 AM    CREATININE 1.24 04/10/2020 04:03 AM     Lab Results   Component Value Date/Time    ALKPHOSPHAT 100 (H) 04/08/2020 07:40 PM    ASTSGOT 17 04/08/2020 07:40 PM    ALTSGPT 14 04/08/2020 07:40 PM    TBILIRUBIN 1.2 04/08/2020 07:40 PM        Assessment:     1. Acute on chronic combined systolic and diastolic congestive heart failure (HCC)     2. Coronary artery disease involving native coronary artery of native heart without angina pectoris, s/p 1V CABG 1992     3. Elevated troponin  spironolactone (ALDACTONE) 25 MG Tab   4. Essential hypertension  spironolactone (ALDACTONE)  25 MG Tab   5. Moderate to severe pulmonary hypertension (HCC)     6. Obesity (BMI 35.0-39.9 without comorbidity) ([facility])  LIPID PANEL   7. Paroxysmal atrial fibrillation (HCC)  spironolactone (ALDACTONE) 25 MG Tab    Basic Metabolic Panel    LIPID PANEL   8. Severe mitral regurgitation  Basic Metabolic Panel    LIPID PANEL   9. Obstructive sleep apnea syndrome  spironolactone (ALDACTONE) 25 MG Tab    Basic Metabolic Panel    LIPID PANEL   10. Acute hypoxemic respiratory failure (HCC)  spironolactone (ALDACTONE) 25 MG Tab    Basic Metabolic Panel    LIPID PANEL       Medical Decision Making:  Today's Assessment / Status / Plan:   72-year-old female with CAD status post CABG with severe mild regurgitation paroxysmal atrial fibrillation and sleep apnea with possible COPD.  We will get a copy of her PFTs from her pulmonologist in [facility].  In the meantime I will start her on spironolactone and stop her potassium.  We will check labs in 1 week.  We will schedule a GOMEZ to assess her mitral valve.  She will likely be a candidate for a mitral clip.    1. Severe MR    - GOMEZ when able    2. COPD?    - obtain PFTs from prior pulmonologist    3. CAD s/p CABG    - cont atorva 40    4. A-fib    - cont metpo 50 BID    - cont warfarin    5. CHF, normalized    - per above    - cont losartan 50    6. Edema    - cont furosemide 40    - stop K    - start psiro 25    - labs in one week

## 2020-04-14 ENCOUNTER — TELEPHONE (OUTPATIENT)
Dept: CARDIOLOGY | Facility: MEDICAL CENTER | Age: 80
End: 2020-04-14

## 2020-04-14 NOTE — TELEPHONE ENCOUNTER
Called patient and did education over the phone:  Education Narrative  Reviewed anatomy and physiology of heart failure with patient. Went over heart failure worksheet and patient's individual HF diagnosis, EF, risk factors, general medication classes and indications, as well as personal goals.  Goals: Patient's primary goal is to increase activity level.     Discussed daily weights, sodium restriction, worsening signs and symptoms to report to physician, heart medications, and importance of adherence to medication regimen. Emphasized recommendation from AHA/AAHFN to keep daily sodium intake between 1500mg-2000mg.      Reviewed dietary handouts, advanced care planning classes, and advance directive planning handout. Discussed dietary considerations and reviewed Seven Day Heart Healthy Meal Plan by [employer].     Invited patient and family members/friends to HF support group and encouraged patient to call Heart Failure clinic during normal business hours with any questions.  Heart Failure program card with number given to patient.           Patient states full understanding of all information given.

## 2020-04-16 ENCOUNTER — APPOINTMENT (RX ONLY)
Dept: URBAN - METROPOLITAN AREA CLINIC 20 | Facility: CLINIC | Age: 80
Setting detail: DERMATOLOGY
End: 2020-04-16

## 2020-04-16 DIAGNOSIS — L57.8 OTHER SKIN CHANGES DUE TO CHRONIC EXPOSURE TO NONIONIZING RADIATION: ICD-10-CM

## 2020-04-16 DIAGNOSIS — D18.0 HEMANGIOMA: ICD-10-CM

## 2020-04-16 DIAGNOSIS — D22 MELANOCYTIC NEVI: ICD-10-CM

## 2020-04-16 DIAGNOSIS — L40.0 PSORIASIS VULGARIS: ICD-10-CM

## 2020-04-16 DIAGNOSIS — Z85.820 PERSONAL HISTORY OF MALIGNANT MELANOMA OF SKIN: ICD-10-CM

## 2020-04-16 DIAGNOSIS — L81.4 OTHER MELANIN HYPERPIGMENTATION: ICD-10-CM

## 2020-04-16 DIAGNOSIS — L82.1 OTHER SEBORRHEIC KERATOSIS: ICD-10-CM

## 2020-04-16 PROBLEM — D22.5 MELANOCYTIC NEVI OF TRUNK: Status: ACTIVE | Noted: 2020-04-16

## 2020-04-16 PROBLEM — L30.9 DERMATITIS, UNSPECIFIED: Status: ACTIVE | Noted: 2020-04-16

## 2020-04-16 PROBLEM — D18.01 HEMANGIOMA OF SKIN AND SUBCUTANEOUS TISSUE: Status: ACTIVE | Noted: 2020-04-16

## 2020-04-16 LAB — INR BLD: 2.9 (ref 0.9–1.2)

## 2020-04-16 PROCEDURE — ? PRESCRIPTION

## 2020-04-16 PROCEDURE — 99203 OFFICE O/P NEW LOW 30 MIN: CPT

## 2020-04-16 PROCEDURE — ? COUNSELING

## 2020-04-16 RX ORDER — CLOBETASOL PROPIONATE 0.5 MG/G
THIN LAYER CREAM TOPICAL BID
Qty: 2 | Refills: 6 | Status: ERX | COMMUNITY
Start: 2020-04-16

## 2020-04-16 RX ORDER — TRIAMCINOLONE ACETONIDE 1 MG/G
THIN LAYER CREAM TOPICAL BID
Qty: 1 | Refills: 5 | Status: ERX | COMMUNITY
Start: 2020-04-16

## 2020-04-16 RX ADMIN — CLOBETASOL PROPIONATE THIN LAYER: 0.5 CREAM TOPICAL at 00:00

## 2020-04-16 RX ADMIN — TRIAMCINOLONE ACETONIDE THIN LAYER: 1 CREAM TOPICAL at 00:00

## 2020-04-16 ASSESSMENT — LOCATION DETAILED DESCRIPTION DERM
LOCATION DETAILED: RIGHT CHEEK
LOCATION DETAILED: RIGHT DORSAL FOREARM
LOCATION DETAILED: LEFT MID BACK
LOCATION DETAILED: RIGHT DORSAL HAND
LOCATION DETAILED: RIGHT UPPER BACK
LOCATION DETAILED: RIGHT PROXIMAL PRETIBIAL REGION
LOCATION DETAILED: LEFT PROXIMAL PRETIBIAL REGION
LOCATION DETAILED: LEFT CHEEK
LOCATION DETAILED: LEFT DORSAL HAND
LOCATION DETAILED: LEFT ANTERIOR THIGH
LOCATION DETAILED: RIGHT ANTERIOR THIGH
LOCATION DETAILED: LEFT LATERAL SHOULDER
LOCATION DETAILED: LEFT UPPER BACK
LOCATION DETAILED: LEFT DORSAL FOREARM

## 2020-04-16 ASSESSMENT — LOCATION SIMPLE DESCRIPTION DERM
LOCATION SIMPLE: LEFT LOWER EXTREMITY
LOCATION SIMPLE: LEFT CHEEK
LOCATION SIMPLE: LEFT HAND
LOCATION SIMPLE: RIGHT PRETIBIAL REGION
LOCATION SIMPLE: BACK
LOCATION SIMPLE: LEFT SHOULDER
LOCATION SIMPLE: RIGHT UPPER EXTREMITY
LOCATION SIMPLE: RIGHT HAND
LOCATION SIMPLE: LEFT UPPER EXTREMITY
LOCATION SIMPLE: RIGHT LOWER EXTREMITY
LOCATION SIMPLE: RIGHT CHEEK
LOCATION SIMPLE: LEFT PRETIBIAL REGION

## 2020-04-16 ASSESSMENT — LOCATION ZONE DERM
LOCATION ZONE: LEG
LOCATION ZONE: TRUNK
LOCATION ZONE: FACE
LOCATION ZONE: HAND
LOCATION ZONE: ARM

## 2020-04-17 ENCOUNTER — TELEPHONE (OUTPATIENT)
Dept: MEDICAL GROUP | Facility: PHYSICIAN GROUP | Age: 80
End: 2020-04-17

## 2020-04-17 ENCOUNTER — OFFICE VISIT (OUTPATIENT)
Dept: MEDICAL GROUP | Facility: PHYSICIAN GROUP | Age: 80
End: 2020-04-17
Payer: MEDICARE

## 2020-04-17 VITALS
RESPIRATION RATE: 20 BRPM | SYSTOLIC BLOOD PRESSURE: 118 MMHG | HEIGHT: 63 IN | BODY MASS INDEX: 36.14 KG/M2 | WEIGHT: 204 LBS | TEMPERATURE: 97.6 F | OXYGEN SATURATION: 96 % | DIASTOLIC BLOOD PRESSURE: 76 MMHG | HEART RATE: 72 BPM

## 2020-04-17 DIAGNOSIS — G47.33 OBSTRUCTIVE SLEEP APNEA SYNDROME: ICD-10-CM

## 2020-04-17 DIAGNOSIS — Z11.59 ENCOUNTER FOR SCREENING FOR OTHER VIRAL DISEASES: ICD-10-CM

## 2020-04-17 DIAGNOSIS — J96.01 ACUTE HYPOXEMIC RESPIRATORY FAILURE (HCC): ICD-10-CM

## 2020-04-17 DIAGNOSIS — I25.10 CORONARY ARTERY DISEASE INVOLVING NATIVE CORONARY ARTERY OF NATIVE HEART WITHOUT ANGINA PECTORIS: ICD-10-CM

## 2020-04-17 DIAGNOSIS — I48.0 PAROXYSMAL ATRIAL FIBRILLATION (HCC): ICD-10-CM

## 2020-04-17 DIAGNOSIS — R74.8 ELEVATED ALKALINE PHOSPHATASE LEVEL: ICD-10-CM

## 2020-04-17 DIAGNOSIS — Z12.31 ENCOUNTER FOR SCREENING MAMMOGRAM FOR BREAST CANCER: ICD-10-CM

## 2020-04-17 DIAGNOSIS — E66.9 OBESITY (BMI 35.0-39.9 WITHOUT COMORBIDITY): ICD-10-CM

## 2020-04-17 DIAGNOSIS — D64.9 ANEMIA, UNSPECIFIED TYPE: ICD-10-CM

## 2020-04-17 DIAGNOSIS — R93.5 ABNORMAL CT OF THE ABDOMEN: ICD-10-CM

## 2020-04-17 DIAGNOSIS — I34.0 SEVERE MITRAL REGURGITATION: ICD-10-CM

## 2020-04-17 DIAGNOSIS — I27.20 MODERATE TO SEVERE PULMONARY HYPERTENSION (HCC): ICD-10-CM

## 2020-04-17 DIAGNOSIS — E78.49 OTHER HYPERLIPIDEMIA: ICD-10-CM

## 2020-04-17 DIAGNOSIS — E03.9 HYPOTHYROIDISM, UNSPECIFIED TYPE: ICD-10-CM

## 2020-04-17 DIAGNOSIS — K76.0 HEPATIC STEATOSIS: ICD-10-CM

## 2020-04-17 DIAGNOSIS — I50.42 CHRONIC COMBINED SYSTOLIC AND DIASTOLIC CONGESTIVE HEART FAILURE (HCC): ICD-10-CM

## 2020-04-17 DIAGNOSIS — I10 ESSENTIAL HYPERTENSION: ICD-10-CM

## 2020-04-17 PROBLEM — E05.90 HYPERTHYROIDISM: Status: RESOLVED | Noted: 2017-07-13 | Resolved: 2020-04-17

## 2020-04-17 PROCEDURE — 99215 OFFICE O/P EST HI 40 MIN: CPT | Performed by: INTERNAL MEDICINE

## 2020-04-17 ASSESSMENT — FIBROSIS 4 INDEX: FIB4 SCORE: 1.19

## 2020-04-17 NOTE — ASSESSMENT & PLAN NOTE
Hx of severe mitral regurgitation  Moderate MR in recent echo  Per cardiology, may need GOMEZ and procedure when able  Following with cardiology

## 2020-04-17 NOTE — ASSESSMENT & PLAN NOTE
Hx of CHF, Afib, CAD s/p 1v CABG, severe mitral regurgitation  On metoprolol, losartan, lasix, spironolactone  Following with cardiology

## 2020-04-17 NOTE — TELEPHONE ENCOUNTER
Lvm at [facility] in [place], [place] to send previous Colonoscopy Report. Also lvm to Dr. Melendrez phone [phone] to try to obtain previous Bone Density Report.

## 2020-04-17 NOTE — ASSESSMENT & PLAN NOTE
4/8/2020   1.  No acute inflammatory process in the abdomen or pelvis.  2.  Hepatic steatosis with nodular hepatic contour, likely underlying cirrhosis.  3.  Cardiomegaly and interstitial edema, suggestive of CHF.  4.  Colonic diverticulosis. No diverticulitis.    Will get liver US, hepatitis panel, liver function test  Patient denies alcoholism

## 2020-04-17 NOTE — PROGRESS NOTES
Established Patient    Aleshia Crooks is a 80 y.o. female who presents today with the following:    CC:   Chief Complaint   Patient presents with   • Hospital Follow-up       HPI:     Patient was admitted from April 8 to April 10, 2020 presented with shortness of breath and cough and new oxygen requirement found to be mildly volume overloaded was admitted for CHF exacerbation.  She was checked for COVID and was negative.  Patient was started on IV Lasix and her symptoms improved, her shortness of breath resolved and did not require any more oxygen.  She was also found to have probable cirrhosis on her CT abdomen.    Severe mitral regurgitation  Hx of severe mitral regurgitation  Moderate MR in recent echo  Per cardiology, may need GOMEZ and procedure when able  Following with cardiology          Paroxysmal atrial fibrillation (HCC)  Rate controlled on metoprolol  Anticoagulation with warfarin, Vit K  Following with anticoagulation clinic      Obstructive sleep apnea syndrome  SHAKA on CPAP  Referral to sleep medicine in [place]    Obesity (BMI 35.0-39.9 without comorbidity) (HCC)  Body mass index is 36.14 kg/m².  Lifestyle modifications        Moderate to severe pulmonary hypertension (HCC)  History of SHAKA on CPAP, denies smoking history  On Lasix, spironolactone      Hypothyroidism  Hx of hyperthyroidism, thyroid nodule s/p thyroidectomy  On Levoxyl.       Hyperthyroidism  S/p thyroidectomy      Essential hypertension  Stable on metoprolol, losartan      Coronary artery disease involving native coronary artery of native heart without angina pectoris, s/p 1V CABG 1992  On losartan, metoprolol, statin  Following with Dr. Villar      Abnormal CT of the abdomen  4/8/2020   1.  No acute inflammatory process in the abdomen or pelvis.  2.  Hepatic steatosis with nodular hepatic contour, likely underlying cirrhosis.  3.  Cardiomegaly and interstitial edema, suggestive of CHF.  4.  Colonic diverticulosis. No  diverticulitis.    Will get liver US, hepatitis panel, liver function test  Patient denies alcoholism      Other hyperlipidemia  Pending lipid profile.  On statin      Hepatic steatosis  Lifestyle modifications        Elevated alkaline phosphatase level     Ref. Range 4/8/2020 19:40   Alkaline Phosphatase Latest Ref Range: 30 - 99 U/L 100 (H)   Check GGT      Anemia  Improved. Will monitor CBC. If still anemic, would do additional work up  Request records for her last colonoscopy   Ref. Range 4/8/2020 19:40 4/9/2020 06:07 4/10/2020 04:03   WBC Latest Ref Range: 4.8 - 10.8 K/uL 8.0 9.3 7.7   RBC Latest Ref Range: 4.20 - 5.40 M/uL 4.42 4.84 4.95   Hemoglobin Latest Ref Range: 12.0 - 16.0 g/dL 11.3 (L) 12.3 12.5   Hematocrit Latest Ref Range: 37.0 - 47.0 % 37.5 39.6 41.5   MCV Latest Ref Range: 81.4 - 97.8 fL 84.8 81.8 83.8   MCH Latest Ref Range: 27.0 - 33.0 pg 25.6 (L) 25.4 (L) 25.3 (L)   MCHC Latest Ref Range: 33.6 - 35.0 g/dL 30.1 (L) 31.1 (L) 30.1 (L)   RDW Latest Ref Range: 35.9 - 50.0 fL 51.2 (H) 49.0 50.4 (H)   Platelet Count Latest Ref Range: 164 - 446 K/uL 264 308 299   MPV Latest Ref Range: 9.0 - 12.9 fL 11.4 11.1 11.0      Ref. Range 4/8/2020 19:40   Ferritin Latest Ref Range: 10.0 - 291.0 ng/mL 38.8      Ref. Range 4/8/2020 19:40   Iron Latest Ref Range: 40 - 170 ug/dL 40   Total Iron Binding Latest Ref Range: 250 - 450 ug/dL 409   % Saturation Latest Ref Range: 15 - 55 % 10 (L)   Unsat Iron Binding Latest Ref Range: 110 - 370 ug/dL 369         Acute hypoxemic respiratory failure (HCC)  Patient had acute hypoxic respiratory failure requiring oxygen, that improved with lasix treating for her CHF exacerbation, she was wean off oxygen.   She wears CPAP for her SHAKA.       Chronic combined systolic and diastolic congestive heart failure (HCC)  Hx of CHF, Afib, CAD s/p 1v CABG, severe mitral regurgitation  On metoprolol, losartan, lasix, spironolactone  Following with cardiology        Current Outpatient  Medications   Medication Sig Dispense Refill   • spironolactone (ALDACTONE) 25 MG Tab Take 1 Tab by mouth every day. 30 Tab 11   • vitamin k 100 MCG tablet Take 100 mcg by mouth every day.     • levothyroxine (LEVOXYL) 150 MCG Tab Take 1 Tab by mouth Every morning on an empty stomach. 10 Tab 0   • furosemide (LASIX) 40 MG Tab Take 1 Tab by mouth every day. 90 Tab 0   • losartan (COZAAR) 50 MG Tab Take 1 Tab by mouth every day. 90 Tab 0   • atorvastatin (LIPITOR) 40 MG Tab Take 1 Tab by mouth every bedtime. 30 Tab 1   • metoprolol (LOPRESSOR) 50 MG Tab Take 1 Tab by mouth 2 Times a Day. 60 Tab 0   • warfarin (COUMADIN) 6 MG Tab Take 6-9 mg by mouth every day. 6 mg daily except 9 mg on Wednesday's  Indications: Obstructing Blood Clot with Chronic Atrial Fibrillation     • omeprazole (PRILOSEC) 20 MG delayed-release capsule Take 20 mg by mouth every day. Indications: Gastroesophageal Reflux Disease     • vitamin D (CHOLECALCIFEROL) 1000 UNIT Tab Take 4,000 Units by mouth every day.       No current facility-administered medications for this visit.        Allergies, past medical history, past surgical history, medications, family history, social history reviewed and updated.    ROS   Constitutional: Denies fevers or chills  Eyes: Denies changes in vision  Ears/Nose/Throat/Mouth: Denies nasal congestion or sore throat   Cardiovascular: Denies chest pain or palpitations   Respiratory: Denies shortness of breath , Denies cough  Gastrointestinal/Hepatic: Denies abd pain, nausea, vomiting   Genitourinary: Denies dysuria or frequency  Musculoskeletal/Rheum: Denies joint pain and swelling   Neurological: Denies headache  Psychiatric: Denies mood disorder   Endocrine: hypothyroidism Denies hx of diabetes  Heme/Oncology/Lymph Nodes: Denies weight changes or enlarged LNs.    Physical Exam  Vitals: /76 (BP Location: Left arm, Patient Position: Sitting, BP Cuff Size: Adult)   Pulse 72   Temp 36.4 °C (97.6 °F) (Temporal)    "Resp 20   Ht 1.6 m (5' 3\")   Wt 92.5 kg (204 lb)   LMP  (LMP Unknown)   SpO2 96%   BMI 36.14 kg/m²   General: Alert, pleasant, NAD  HEENT: Normocephalic.  EOMI, no icterus or pallor.  Conjunctivae and lids normal. External ears normal. Oropharynx non-erythematous, mucous membranes moist.  Neck supple.  No thyromegaly or masses palpated.   Lymph: No cervical or supraclavicular lymphadenopathy.  Cardiovascular: Regular rate and rhythm.    Respiratory: Normal respiratory effort.  Clear to auscultation bilaterally.  Abdomen: Non-distended, soft, non-tender  Skin: Warm, dry.  Musculoskeletal: Gait is normal.  Moves all extremities well.  Extremities: No leg edema.    Psych:  Affect/mood is normal, judgement is good, memory is at baseline, grooming is appropriate.      Labs (April 8 to April 10, 2020) were reviewed and discussed with patients.  Imaging (April 8 to April 9, 2020) was reviewed.  Patient notes (April 8 to April 10, 2020) were reviewed.   All questions were answered.      Assessment and Plan    1. Abnormal CT of the abdomen  2. Hepatic steatosis  3. Elevated alkaline phosphatase level  Per CT, probable cirrhosis  Patient rarely drinks ETOH  - US-RUQ; Future  - HEPATIC FUNCTION PANEL; Future  - GAMMA GT (GGT); Future  - HEP B SURFACE ANTIGEN; Future  - HEP BE ANTIBODY; Future  - HEP BE ANTIGEN; Future  - HEPATITIS B CORE AB W/REFLEX  - HCV AB REFLEX TO [facility]    4. Encounter for screening for other viral diseases   - HEP B SURFACE AB; Future    5. Encounter for screening mammogram for breast cancer  - MA-SCREENING MAMMO BILAT W/TOMOSYNTHESIS W/CAD; Future    6. Chronic combined systolic and diastolic congestive heart failure (HCC)  7. Coronary artery disease involving native coronary artery of native heart without angina pectoris, s/p 1V CABG 1992  8. Severe mitral regurgitation  9. Paroxysmal atrial fibrillation (HCC)  On metoprolol, losartan, lasix, spironolactone, warfarin  May need addition work up for " severe mitral regurgitation  Following with cardiology    10. Essential hypertension  Controlled    11. Obesity (BMI 35.0-39.9 without comorbidity) ([facility])  Lifestyle modifications    12. Moderate to severe pulmonary hypertension (HCC)  Treating SHAKA with CPAP  Getting diuresis for her CHF    13. Obstructive sleep apnea syndrome  SHAKA on CPAP, need sleep medicine provider in town  - REFERRAL TO SLEEP STUDIES    14. Hypothyroidism, unspecified type  On levoxyl    15. Other hyperlipidemia  Statin  Pending Lipid profile    16. Anemia, unspecified type  Improved.  Get record for her colonoscopy report  Monitor CBC, if still anemia, need work up    17. Acute hypoxemic respiratory failure (HCC)  resolved    Patient was seen for 40 minutes face to face of which > 50% of appointment time was spent on counseling and coordination of care regarding the above.    Follow-up:Return in about 1 month (around 5/17/2020), or if symptoms worsen or fail to improve.    This note was created using voice recognition software. There may be unintended errors in spelling, grammar or content.

## 2020-04-17 NOTE — ASSESSMENT & PLAN NOTE
Ref. Range 4/8/2020 19:40   Alkaline Phosphatase Latest Ref Range: 30 - 99 U/L 100 (H)   Check GGT

## 2020-04-17 NOTE — ASSESSMENT & PLAN NOTE
Rate controlled on metoprolol  Anticoagulation with warfarin, Vit K  Following with anticoagulation clinic

## 2020-04-17 NOTE — ASSESSMENT & PLAN NOTE
Patient had acute hypoxic respiratory failure requiring oxygen, that improved with lasix treating for her CHF exacerbation, she was wean off oxygen.   She wears CPAP for her SHAKA.

## 2020-04-17 NOTE — ASSESSMENT & PLAN NOTE
Improved. Will monitor CBC. If still anemic, would do additional work up  Request records for her last colonoscopy   Ref. Range 4/8/2020 19:40 4/9/2020 06:07 4/10/2020 04:03   WBC Latest Ref Range: 4.8 - 10.8 K/uL 8.0 9.3 7.7   RBC Latest Ref Range: 4.20 - 5.40 M/uL 4.42 4.84 4.95   Hemoglobin Latest Ref Range: 12.0 - 16.0 g/dL 11.3 (L) 12.3 12.5   Hematocrit Latest Ref Range: 37.0 - 47.0 % 37.5 39.6 41.5   MCV Latest Ref Range: 81.4 - 97.8 fL 84.8 81.8 83.8   MCH Latest Ref Range: 27.0 - 33.0 pg 25.6 (L) 25.4 (L) 25.3 (L)   MCHC Latest Ref Range: 33.6 - 35.0 g/dL 30.1 (L) 31.1 (L) 30.1 (L)   RDW Latest Ref Range: 35.9 - 50.0 fL 51.2 (H) 49.0 50.4 (H)   Platelet Count Latest Ref Range: 164 - 446 K/uL 264 308 299   MPV Latest Ref Range: 9.0 - 12.9 fL 11.4 11.1 11.0      Ref. Range 4/8/2020 19:40   Ferritin Latest Ref Range: 10.0 - 291.0 ng/mL 38.8      Ref. Range 4/8/2020 19:40   Iron Latest Ref Range: 40 - 170 ug/dL 40   Total Iron Binding Latest Ref Range: 250 - 450 ug/dL 409   % Saturation Latest Ref Range: 15 - 55 % 10 (L)   Unsat Iron Binding Latest Ref Range: 110 - 370 ug/dL 369

## 2020-04-20 ENCOUNTER — HOSPITAL ENCOUNTER (OUTPATIENT)
Dept: LAB | Facility: MEDICAL CENTER | Age: 80
End: 2020-04-20
Attending: INTERNAL MEDICINE
Payer: MEDICARE

## 2020-04-20 DIAGNOSIS — R74.8 ELEVATED ALKALINE PHOSPHATASE LEVEL: ICD-10-CM

## 2020-04-20 DIAGNOSIS — Z11.59 ENCOUNTER FOR SCREENING FOR OTHER VIRAL DISEASES: ICD-10-CM

## 2020-04-20 DIAGNOSIS — J96.01 ACUTE HYPOXEMIC RESPIRATORY FAILURE (HCC): ICD-10-CM

## 2020-04-20 DIAGNOSIS — G47.33 OBSTRUCTIVE SLEEP APNEA SYNDROME: ICD-10-CM

## 2020-04-20 DIAGNOSIS — I25.10 CORONARY ARTERY DISEASE INVOLVING NATIVE CORONARY ARTERY OF NATIVE HEART WITHOUT ANGINA PECTORIS: ICD-10-CM

## 2020-04-20 DIAGNOSIS — E03.9 HYPOTHYROIDISM, UNSPECIFIED TYPE: ICD-10-CM

## 2020-04-20 DIAGNOSIS — I48.0 PAROXYSMAL ATRIAL FIBRILLATION (HCC): ICD-10-CM

## 2020-04-20 DIAGNOSIS — I34.0 SEVERE MITRAL REGURGITATION: ICD-10-CM

## 2020-04-20 DIAGNOSIS — I10 ESSENTIAL HYPERTENSION: ICD-10-CM

## 2020-04-20 DIAGNOSIS — E55.9 VITAMIN D INSUFFICIENCY: ICD-10-CM

## 2020-04-20 DIAGNOSIS — K76.0 HEPATIC STEATOSIS: ICD-10-CM

## 2020-04-20 LAB
25(OH)D3 SERPL-MCNC: 62 NG/ML (ref 30–100)
ALBUMIN SERPL BCP-MCNC: 4.1 G/DL (ref 3.2–4.9)
ALP SERPL-CCNC: 99 U/L (ref 30–99)
ALT SERPL-CCNC: 12 U/L (ref 2–50)
ANION GAP SERPL CALC-SCNC: 14 MMOL/L (ref 7–16)
AST SERPL-CCNC: 16 U/L (ref 12–45)
BASOPHILS # BLD AUTO: 0.9 % (ref 0–1.8)
BASOPHILS # BLD: 0.07 K/UL (ref 0–0.12)
BILIRUB CONJ SERPL-MCNC: <0.2 MG/DL (ref 0.1–0.5)
BILIRUB INDIRECT SERPL-MCNC: NORMAL MG/DL (ref 0–1)
BILIRUB SERPL-MCNC: 0.8 MG/DL (ref 0.1–1.5)
BUN SERPL-MCNC: 23 MG/DL (ref 8–22)
CALCIUM SERPL-MCNC: 9.4 MG/DL (ref 8.5–10.5)
CHLORIDE SERPL-SCNC: 103 MMOL/L (ref 96–112)
CHOLEST SERPL-MCNC: 149 MG/DL (ref 100–199)
CHOLEST SERPL-MCNC: 149 MG/DL (ref 100–199)
CO2 SERPL-SCNC: 25 MMOL/L (ref 20–33)
CREAT SERPL-MCNC: 1.04 MG/DL (ref 0.5–1.4)
EOSINOPHIL # BLD AUTO: 0.24 K/UL (ref 0–0.51)
EOSINOPHIL NFR BLD: 3.2 % (ref 0–6.9)
ERYTHROCYTE [DISTWIDTH] IN BLOOD BY AUTOMATED COUNT: 50.9 FL (ref 35.9–50)
GGT SERPL-CCNC: 67 U/L (ref 7–34)
GLUCOSE SERPL-MCNC: 101 MG/DL (ref 65–99)
HBV CORE AB SERPL QL IA: NONREACTIVE
HBV SURFACE AB SERPL IA-ACNC: <3.5 MIU/ML (ref 0–10)
HBV SURFACE AG SER QL: NORMAL
HCT VFR BLD AUTO: 40.7 % (ref 37–47)
HCV AB SER QL: NORMAL
HDLC SERPL-MCNC: 35 MG/DL
HDLC SERPL-MCNC: 36 MG/DL
HGB BLD-MCNC: 12.2 G/DL (ref 12–16)
IMM GRANULOCYTES # BLD AUTO: 0.02 K/UL (ref 0–0.11)
IMM GRANULOCYTES NFR BLD AUTO: 0.3 % (ref 0–0.9)
LDLC SERPL CALC-MCNC: 84 MG/DL
LDLC SERPL CALC-MCNC: 86 MG/DL
LYMPHOCYTES # BLD AUTO: 0.98 K/UL (ref 1–4.8)
LYMPHOCYTES NFR BLD: 12.9 % (ref 22–41)
MCH RBC QN AUTO: 25.4 PG (ref 27–33)
MCHC RBC AUTO-ENTMCNC: 30 G/DL (ref 33.6–35)
MCV RBC AUTO: 84.8 FL (ref 81.4–97.8)
MONOCYTES # BLD AUTO: 0.61 K/UL (ref 0–0.85)
MONOCYTES NFR BLD AUTO: 8 % (ref 0–13.4)
NEUTROPHILS # BLD AUTO: 5.67 K/UL (ref 2–7.15)
NEUTROPHILS NFR BLD: 74.7 % (ref 44–72)
NRBC # BLD AUTO: 0 K/UL
NRBC BLD-RTO: 0 /100 WBC
PLATELET # BLD AUTO: 268 K/UL (ref 164–446)
PMV BLD AUTO: 11.1 FL (ref 9–12.9)
POTASSIUM SERPL-SCNC: 3.7 MMOL/L (ref 3.6–5.5)
PROT SERPL-MCNC: 7.5 G/DL (ref 6–8.2)
RBC # BLD AUTO: 4.8 M/UL (ref 4.2–5.4)
SODIUM SERPL-SCNC: 142 MMOL/L (ref 135–145)
TRIGL SERPL-MCNC: 141 MG/DL (ref 0–149)
TRIGL SERPL-MCNC: 145 MG/DL (ref 0–149)
TSH SERPL DL<=0.005 MIU/L-ACNC: 4.29 UIU/ML (ref 0.38–5.33)
WBC # BLD AUTO: 7.6 K/UL (ref 4.8–10.8)

## 2020-04-20 PROCEDURE — 80076 HEPATIC FUNCTION PANEL: CPT

## 2020-04-20 PROCEDURE — 36415 COLL VENOUS BLD VENIPUNCTURE: CPT

## 2020-04-20 PROCEDURE — 86803 HEPATITIS C AB TEST: CPT

## 2020-04-20 PROCEDURE — 87340 HEPATITIS B SURFACE AG IA: CPT

## 2020-04-20 PROCEDURE — 80048 BASIC METABOLIC PNL TOTAL CA: CPT

## 2020-04-20 PROCEDURE — 80061 LIPID PANEL: CPT | Mod: 91

## 2020-04-20 PROCEDURE — 86707 HEPATITIS BE ANTIBODY: CPT

## 2020-04-20 PROCEDURE — 86704 HEP B CORE ANTIBODY TOTAL: CPT

## 2020-04-20 PROCEDURE — 80061 LIPID PANEL: CPT | Mod: GA

## 2020-04-20 PROCEDURE — 82306 VITAMIN D 25 HYDROXY: CPT

## 2020-04-20 PROCEDURE — 82977 ASSAY OF GGT: CPT

## 2020-04-20 PROCEDURE — 85025 COMPLETE CBC W/AUTO DIFF WBC: CPT

## 2020-04-20 PROCEDURE — 87350 HEPATITIS BE AG IA: CPT

## 2020-04-20 PROCEDURE — 86706 HEP B SURFACE ANTIBODY: CPT

## 2020-04-20 PROCEDURE — 84443 ASSAY THYROID STIM HORMONE: CPT

## 2020-04-21 LAB
HBV E AB SERPL QL IA: NEGATIVE
HBV E AG SERPL QL IA: NEGATIVE

## 2020-04-23 ENCOUNTER — ANTICOAGULATION MONITORING (OUTPATIENT)
Dept: VASCULAR LAB | Facility: MEDICAL CENTER | Age: 80
End: 2020-04-23

## 2020-04-23 DIAGNOSIS — I48.0 PAROXYSMAL ATRIAL FIBRILLATION (HCC): ICD-10-CM

## 2020-04-23 LAB — INR PPP: 3.4 (ref 2–3.5)

## 2020-04-23 NOTE — PROGRESS NOTES
Anticoagulation Summary  As of 4/23/2020    INR goal:   2.0-3.0   TTR:   64.9 % (3.1 wk)   INR used for dosing:   3.40! (4/23/2020)   Warfarin maintenance plan:   6 mg (6 mg x 1) every day   Weekly warfarin total:   42 mg   Plan last modified:   Dorina Rodriguez (4/23/2020)   Next INR check:   4/30/2020   Target end date:   Indefinite    Indications    Paroxysmal atrial fibrillation (HCC) [I48.0]             Anticoagulation Episode Summary     INR check location:       Preferred lab:       Send INR reminders to:       Comments:         Anticoagulation Care Providers     Provider Role Specialty Phone number    La Paredes M.D. Referring Internal Medicine [phone]    [facility] Anticoagulation Services Responsible  [phone]        Anticoagulation Patient Findings  Patient Findings     Positives:   Change in diet/appetite    Negatives:   Signs/symptoms of thrombosis, Signs/symptoms of bleeding, Laboratory test error suspected, Change in health, Change in alcohol use, Change in activity, Upcoming invasive procedure, Emergency department visit, Upcoming dental procedure, Missed doses, Extra doses, Change in medications, Hospital admission, Bruising, Other complaints    Comments:   Less greens         Spoke with the patient on the phone today, reporting a SUPRA-therapeutic INR of 3.4. Confirmed the current warfarin dosing regimen and patient compliance. Patient reports not eating as many greens as its hard to get to grocery store as much. Patient denies any interval changes to medications. Patient denies any signs/symptoms of bleeding or clotting.  Patient instructed to reduce dose to 3mg TONIGHT ONLY, then to begin reduced regimen of 6mg QD. Patient will follow up again in 1 week.    Linda Rodriguez  PharmD

## 2020-04-30 ENCOUNTER — ANTICOAGULATION MONITORING (OUTPATIENT)
Dept: VASCULAR LAB | Facility: MEDICAL CENTER | Age: 80
End: 2020-04-30

## 2020-04-30 DIAGNOSIS — I48.0 PAROXYSMAL ATRIAL FIBRILLATION (HCC): ICD-10-CM

## 2020-04-30 LAB — INR PPP: 3.4 (ref 2–3.5)

## 2020-04-30 NOTE — PROGRESS NOTES
OP Telephone Anticoagulation Service Note    Date: 4/30/2020      Anticoagulation Summary  As of 4/30/2020    INR goal:   2.0-3.0   TTR:   49.7 % (4.1 wk)   INR used for dosing:   3.40! (4/30/2020)   Warfarin maintenance plan:   6 mg (6 mg x 1) every day   Weekly warfarin total:   42 mg   Plan last modified:   Dorina Rodriguez (4/23/2020)   Next INR check:      Target end date:   Indefinite    Indications    Paroxysmal atrial fibrillation (HCC) [I48.0]             Anticoagulation Episode Summary     INR check location:       Preferred lab:       Send INR reminders to:       Comments:         Anticoagulation Care Providers     Provider Role Specialty Phone number    La Paredes M.D. Referring Internal Medicine [phone]    [facility] Anticoagulation Services Responsible  [phone]        Anticoagulation Patient Findings        Plan: INR is high. Left message on patient's answering machine/voicemail. Instructed patient to call back with any concerns regarding any unusual bleeding or bruising, any medication or diet changes or any signs or symptoms of thrombosis. Instructed patient to take 3 mg tonight then resume medication as outlined above. Patient to follow up in 1 week(s).             Celina Diego, PharmD

## 2020-05-04 ENCOUNTER — APPOINTMENT (OUTPATIENT)
Dept: VASCULAR LAB | Facility: MEDICAL CENTER | Age: 80
End: 2020-05-04
Attending: INTERNAL MEDICINE
Payer: MEDICARE

## 2020-05-05 ENCOUNTER — HOSPITAL ENCOUNTER (OUTPATIENT)
Dept: RADIOLOGY | Facility: MEDICAL CENTER | Age: 80
End: 2020-05-05
Attending: INTERNAL MEDICINE
Payer: MEDICARE

## 2020-05-05 DIAGNOSIS — K76.0 HEPATIC STEATOSIS: ICD-10-CM

## 2020-05-05 DIAGNOSIS — R93.5 ABNORMAL CT OF THE ABDOMEN: ICD-10-CM

## 2020-05-05 DIAGNOSIS — K74.60 CIRRHOSIS OF LIVER WITHOUT ASCITES, UNSPECIFIED HEPATIC CIRRHOSIS TYPE (HCC): ICD-10-CM

## 2020-05-05 PROCEDURE — 76705 ECHO EXAM OF ABDOMEN: CPT

## 2020-05-05 NOTE — PROGRESS NOTES
Hepatitis panel negative.   Patient denies nausea, vomiting, dysphagia, abdominal pain, diarrhea, constipation     5/5/2020 US RUQ:  1.  Enlarged edematous appearance of the pancreas which could represent pancreatitis.  2.  Heterogeneous appearance of the hepatic parenchyma which may represent cirrhotic change. No hepatic mass or biliary dilatation.  3.  Surgical absence of the gallbladder.  4.  No ascites or portal venous flow reversal.  5.  No right hydronephrosis.    4/8/2020 CT abdomen pelvis with  1.  No acute inflammatory process in the abdomen or pelvis.  2.  Hepatic steatosis with nodular hepatic contour, likely underlying cirrhosis.  3.  Cardiomegaly and interstitial edema, suggestive of CHF.  4.  Colonic diverticulosis. No diverticulitis.   Ref. Range 4/20/2020 08:19 4/23/2020 00:00 4/30/2020 00:00 5/5/2020 10:12   WBC Latest Ref Range: 4.8 - 10.8 K/uL 7.6      RBC Latest Ref Range: 4.20 - 5.40 M/uL 4.80      Hemoglobin Latest Ref Range: 12.0 - 16.0 g/dL 12.2      Hematocrit Latest Ref Range: 37.0 - 47.0 % 40.7      MCV Latest Ref Range: 81.4 - 97.8 fL 84.8      MCH Latest Ref Range: 27.0 - 33.0 pg 25.4 (L)      MCHC Latest Ref Range: 33.6 - 35.0 g/dL 30.0 (L)      RDW Latest Ref Range: 35.9 - 50.0 fL 50.9 (H)      Platelet Count Latest Ref Range: 164 - 446 K/uL 268      MPV Latest Ref Range: 9.0 - 12.9 fL 11.1      Neutrophils-Polys Latest Ref Range: 44.00 - 72.00 % 74.70 (H)      Neutrophils (Absolute) Latest Ref Range: 2.00 - 7.15 K/uL 5.67      Lymphocytes Latest Ref Range: 22.00 - 41.00 % 12.90 (L)      Lymphs (Absolute) Latest Ref Range: 1.00 - 4.80 K/uL 0.98 (L)      Monocytes Latest Ref Range: 0.00 - 13.40 % 8.00      Monos (Absolute) Latest Ref Range: 0.00 - 0.85 K/uL 0.61      Eosinophils Latest Ref Range: 0.00 - 6.90 % 3.20      Eos (Absolute) Latest Ref Range: 0.00 - 0.51 K/uL 0.24      Basophils Latest Ref Range: 0.00 - 1.80 % 0.90      Baso (Absolute) Latest Ref Range: 0.00 - 0.12 K/uL 0.07       Immature Granulocytes Latest Ref Range: 0.00 - 0.90 % 0.30      Immature Granulocytes (abs) Latest Ref Range: 0.00 - 0.11 K/uL 0.02      Nucleated RBC Latest Units: /100 WBC 0.00      NRBC (Absolute) Latest Units: K/uL 0.00      AST(SGOT) Latest Ref Range: 12 - 45 U/L 16      ALT(SGPT) Latest Ref Range: 2 - 50 U/L 12      Alkaline Phosphatase Latest Ref Range: 30 - 99 U/L 99      Total Bilirubin Latest Ref Range: 0.1 - 1.5 mg/dL 0.8      Direct Bilirubin Latest Ref Range: 0.1 - 0.5 mg/dL <0.2      Indirect Bilirubin Latest Ref Range: 0.0 - 1.0 mg/dL see below      Albumin Latest Ref Range: 3.2 - 4.9 g/dL 4.1      Total Protein Latest Ref Range: 6.0 - 8.2 g/dL 7.5      Gamma Gt Latest Ref Range: 7 - 34 U/L 67 (H)      Cholesterol,Tot Latest Ref Range: 100 - 199 mg/dL 149      Triglycerides Latest Ref Range: 0 - 149 mg/dL 141      HDL Latest Ref Range: >=40 mg/dL 35 (A)      LDL Latest Ref Range: <100 mg/dL 86      INR Unknown  3.40 3.40    25-Hydroxy   Vitamin D 25 Latest Ref Range: 30 - 100 ng/mL 62      TSH Latest Ref Range: 0.380 - 5.330 uIU/mL 4.290      Hep B Surface Antibody Quant Latest Ref Range: 0.00 - 10.00 mIU/mL <3.50      Hepatitis B Surface Antigen Latest Ref Range: Non-Reactive  Non-Reactive      Hepatitis B Core Ab, Total Latest Ref Range: Non-Reactive  NonReactive      Hepatitis Be Antigen Latest Ref Range: Negative  Negative      Hepatitis BE Antibody Latest Ref Range: Negative  Negative      Hepatitis C Antibody Latest Ref Range: Non-Reactive  Non-Reactive

## 2020-05-07 ENCOUNTER — ANTICOAGULATION MONITORING (OUTPATIENT)
Dept: MEDICAL GROUP | Facility: MEDICAL CENTER | Age: 80
End: 2020-05-07

## 2020-05-07 DIAGNOSIS — I48.0 PAROXYSMAL ATRIAL FIBRILLATION (HCC): ICD-10-CM

## 2020-05-07 LAB — INR PPP: 2.6 (ref 2–3.5)

## 2020-05-07 NOTE — PROGRESS NOTES
Anticoagulation Summary  As of 2020    INR goal:   2.0-3.0   TTR:   49.8 % (1.2 mo)   INR used for dosin.60 (2020)   Warfarin maintenance plan:   6 mg (6 mg x 1) every day   Weekly warfarin total:   42 mg   Plan last modified:   Dallin Viveros, PharmD (2020)   Next INR check:   2020   Target end date:   Indefinite    Indications    Paroxysmal atrial fibrillation (HCC) [I48.0]             Anticoagulation Episode Summary     INR check location:       Preferred lab:       Send INR reminders to:       Comments:         Anticoagulation Care Providers     Provider Role Specialty Phone number    La Paredes M.D. Referring Internal Medicine [phone]    [facility] Anticoagulation Services Responsible  [phone]        Anticoagulation Patient Findings        Spoke to patient on the phone.   INR  therapeutic.   Denies signs/symptoms of bleeding and/or thrombosis.   Denies changes to diet or medications.   Follow up appointment in 1 week(s).    Continue weekly warfarin dose as noted

## 2020-05-11 ENCOUNTER — APPOINTMENT (OUTPATIENT)
Dept: MEDICAL GROUP | Facility: MEDICAL CENTER | Age: 80
End: 2020-05-11
Payer: MEDICARE

## 2020-05-14 ENCOUNTER — ANTICOAGULATION MONITORING (OUTPATIENT)
Dept: VASCULAR LAB | Facility: MEDICAL CENTER | Age: 80
End: 2020-05-14

## 2020-05-14 DIAGNOSIS — I48.0 PAROXYSMAL ATRIAL FIBRILLATION (HCC): ICD-10-CM

## 2020-05-14 LAB — INR PPP: 3.9 (ref 2–3.5)

## 2020-05-14 NOTE — PROGRESS NOTES
Anticoagulation Summary  As of 5/14/2020    INR goal:   2.0-3.0   TTR:   46.7 % (1.4 mo)   INR used for dosing:   3.90! (5/14/2020)   Warfarin maintenance plan:   6 mg (6 mg x 1) every day   Weekly warfarin total:   42 mg   Plan last modified:   Dallin Viveros PharmD (5/14/2020)   Next INR check:   5/21/2020   Target end date:   Indefinite    Indications    Paroxysmal atrial fibrillation (HCC) [I48.0]             Anticoagulation Episode Summary     INR check location:       Preferred lab:       Send INR reminders to:       Comments:         Anticoagulation Care Providers     Provider Role Specialty Phone number    La Paredes M.D. Referring Internal Medicine [phone]    [facility] Anticoagulation Services Responsible  [phone]        Anticoagulation Patient Findings        Spoke to patient on the phone.   INR  supra-therapeutic.   Denies signs/symptoms of bleeding and/or thrombosis.   Denies changes to diet or medications.   Follow up appointment in 1 week(s).    Hold today then continue weekly warfarin dose as noted      Dallin Viveros, Mau, MS, BCACP, LCC    This note was created using voice recognition software (Dragon). The accuracy of the dictation is limited by the abilities of the software. I have reviewed the note prior to signing, however some errors in grammar and context are still possible. If you have any questions related to this note please do not hesitate to contact our office.

## 2020-05-15 DIAGNOSIS — Z79.01 CHRONIC ANTICOAGULATION: ICD-10-CM

## 2020-05-15 RX ORDER — WARFARIN SODIUM 6 MG/1
TABLET ORAL
Qty: 90 TAB | Refills: 1 | Status: SHIPPED | OUTPATIENT
Start: 2020-05-15 | End: 2020-07-17 | Stop reason: SDUPTHER

## 2020-05-21 ENCOUNTER — ANTICOAGULATION MONITORING (OUTPATIENT)
Dept: VASCULAR LAB | Facility: MEDICAL CENTER | Age: 80
End: 2020-05-21

## 2020-05-21 DIAGNOSIS — I48.0 PAROXYSMAL ATRIAL FIBRILLATION (HCC): ICD-10-CM

## 2020-05-21 LAB — INR PPP: 3 (ref 2–3.5)

## 2020-05-21 NOTE — PROGRESS NOTES
Anticoagulation Summary  As of 5/21/2020    INR goal:   2.0-3.0   TTR:   40.3 % (1.7 mo)   INR used for dosing:   3.00 (5/21/2020)   Warfarin maintenance plan:   3 mg (6 mg x 0.5) every Thu; 6 mg (6 mg x 1) all other days   Weekly warfarin total:   39 mg   Plan last modified:   Sonia Lopez PharmD (5/21/2020)   Next INR check:   5/28/2020   Target end date:   Indefinite    Indications    Paroxysmal atrial fibrillation (HCC) [I48.0]             Anticoagulation Episode Summary     INR check location:       Preferred lab:       Send INR reminders to:       Comments:         Anticoagulation Care Providers     Provider Role Specialty Phone number    La Paredes M.D. Referring Internal Medicine [phone]    [facility] Anticoagulation Services Responsible  [phone]        Anticoagulation Patient Findings      Spoke with patient to report a therapeutic INR.    Pt instructed to start a new weekly warfarin dosing regimen as last INR was elevated, confirms dosing.   Pt denies any s/s of bleeding, bruising, clotting or any changes to diet or medication.    Will follow up in 1 week(s).     Sonia Lopez, AbadD

## 2020-05-29 ENCOUNTER — ANTICOAGULATION MONITORING (OUTPATIENT)
Dept: VASCULAR LAB | Facility: MEDICAL CENTER | Age: 80
End: 2020-05-29

## 2020-05-29 DIAGNOSIS — I48.0 PAROXYSMAL ATRIAL FIBRILLATION (HCC): ICD-10-CM

## 2020-05-29 LAB — INR PPP: 2.3 (ref 2–3.5)

## 2020-05-29 NOTE — PROGRESS NOTES
Anticoagulation Summary  As of 2020    INR goal:   2.0-3.0   TTR:   47.5 % (1.9 mo)   INR used for dosin.30 (2020)   Warfarin maintenance plan:   3 mg (6 mg x 0.5) every Thu; 6 mg (6 mg x 1) all other days   Weekly warfarin total:   39 mg   Plan last modified:   Sonia Lopez PharmD (2020)   Next INR check:   2020   Target end date:   Indefinite    Indications    Paroxysmal atrial fibrillation (HCC) [I48.0]             Anticoagulation Episode Summary     INR check location:       Preferred lab:       Send INR reminders to:       Comments:         Anticoagulation Care Providers     Provider Role Specialty Phone number    WinniemeirCameronRenay Christian Paredes M.D. Referring Internal Medicine [phone]    [facility] Anticoagulation Services Responsible  [phone]        Anticoagulation Patient Findings  Patient Findings     Negatives:   Signs/symptoms of thrombosis, Signs/symptoms of bleeding, Laboratory test error suspected, Change in health, Change in alcohol use, Change in activity, Upcoming invasive procedure, Emergency department visit, Upcoming dental procedure, Missed doses, Extra doses, Change in medications, Change in diet/appetite, Hospital admission, Bruising, Other complaints        Spoke with patient today regarding therapeutic INR of 2.3.  Patient denies any signs/symptoms of bruising or bleeding or any changes in diet and medications.  Instructed patient to call clinic with any questions or concerns.  Pt is to continue with current warfarin dosing regimen.  Follow up in 2 weeks, to reduce risk of adverse events related to this high risk medication,  Warfarin.    Christian Jordan, PharmD, BCACP

## 2020-06-08 ENCOUNTER — OFFICE VISIT (OUTPATIENT)
Dept: MEDICAL GROUP | Facility: PHYSICIAN GROUP | Age: 80
End: 2020-06-08
Payer: MEDICARE

## 2020-06-08 VITALS
HEIGHT: 63 IN | RESPIRATION RATE: 16 BRPM | TEMPERATURE: 98.5 F | OXYGEN SATURATION: 96 % | HEART RATE: 64 BPM | BODY MASS INDEX: 35.44 KG/M2 | SYSTOLIC BLOOD PRESSURE: 110 MMHG | WEIGHT: 200 LBS | DIASTOLIC BLOOD PRESSURE: 60 MMHG

## 2020-06-08 DIAGNOSIS — I48.0 PAROXYSMAL ATRIAL FIBRILLATION (HCC): ICD-10-CM

## 2020-06-08 DIAGNOSIS — R79.89 ABNORMAL CBC: ICD-10-CM

## 2020-06-08 DIAGNOSIS — E03.9 HYPOTHYROIDISM, UNSPECIFIED TYPE: ICD-10-CM

## 2020-06-08 DIAGNOSIS — K21.9 GASTROESOPHAGEAL REFLUX DISEASE WITHOUT ESOPHAGITIS: ICD-10-CM

## 2020-06-08 DIAGNOSIS — I34.0 SEVERE MITRAL REGURGITATION: ICD-10-CM

## 2020-06-08 DIAGNOSIS — E78.49 OTHER HYPERLIPIDEMIA: ICD-10-CM

## 2020-06-08 DIAGNOSIS — I50.42 CHRONIC COMBINED SYSTOLIC AND DIASTOLIC CONGESTIVE HEART FAILURE (HCC): ICD-10-CM

## 2020-06-08 DIAGNOSIS — C43.62 MALIGNANT MELANOMA OF LEFT UPPER EXTREMITY INCLUDING SHOULDER (HCC): ICD-10-CM

## 2020-06-08 DIAGNOSIS — I10 ESSENTIAL HYPERTENSION: ICD-10-CM

## 2020-06-08 DIAGNOSIS — Z12.39 SCREENING FOR BREAST CANCER: ICD-10-CM

## 2020-06-08 DIAGNOSIS — K76.0 HEPATIC STEATOSIS: ICD-10-CM

## 2020-06-08 DIAGNOSIS — Z23 NEED FOR VACCINATION: ICD-10-CM

## 2020-06-08 DIAGNOSIS — G47.33 OBSTRUCTIVE SLEEP APNEA SYNDROME: ICD-10-CM

## 2020-06-08 PROCEDURE — 90471 IMMUNIZATION ADMIN: CPT | Performed by: PHYSICIAN ASSISTANT

## 2020-06-08 PROCEDURE — 90715 TDAP VACCINE 7 YRS/> IM: CPT | Performed by: PHYSICIAN ASSISTANT

## 2020-06-08 PROCEDURE — 99214 OFFICE O/P EST MOD 30 MIN: CPT | Mod: 25 | Performed by: PHYSICIAN ASSISTANT

## 2020-06-08 RX ORDER — TRIAMCINOLONE ACETONIDE 1 MG/G
CREAM TOPICAL
COMMUNITY
Start: 2020-04-16 | End: 2021-03-19

## 2020-06-08 ASSESSMENT — FIBROSIS 4 INDEX: FIB4 SCORE: 1.34

## 2020-06-08 NOTE — ASSESSMENT & PLAN NOTE
On omprazole 20 mg daily. Chronic condition. Stable with PPI. No GERD symptoms. In general avoids most triggers. Does drink a cup coffee.

## 2020-06-08 NOTE — ASSESSMENT & PLAN NOTE
Being followed by Dr. Villar. Severe MR. Considering surgery. Supposed to get transesophageal echo in future.

## 2020-06-08 NOTE — ASSESSMENT & PLAN NOTE
This is a chronic condition.   Latest Labs:   Lab Results   Component Value Date/Time    CHOLSTRLTOT 149 04/20/2020 08:19 AM    LDL 86 04/20/2020 08:19 AM    HDL 35 (A) 04/20/2020 08:19 AM    TRIGLYCERIDE 141 04/20/2020 08:19 AM      Medications: atrovastatin 40 mg   Medication side effects: none  Diet/Exercise: tries to have balanced diet. Has to watch greens with warfarin. Exercise, is very active taking care of her .   Family History of high cholesterol or heart disease? Unkown.   Risk calculator: The 10-year ASCVD risk score (Davepanchito LEROY Jr., et al., 2013) is: 20.7%

## 2020-06-08 NOTE — ASSESSMENT & PLAN NOTE
Being followed by cardiology, Dr. Villar. On metoprolol for rate control. She is always in A-fib. On coumadin, goes to coumadin clinic.

## 2020-06-08 NOTE — ASSESSMENT & PLAN NOTE
Chronic condition, history of MI, A-fib, CAD. S/p CABG. Severe mitral regurge. On metoprolol 50 mg daily. Furosemide 40 mg and spironolactone 25 mg. Taken off potassium. Followed by Dr. Villar. Plan for future- transesophageal echo with possible surgical correction mitral valve.

## 2020-06-08 NOTE — ASSESSMENT & PLAN NOTE
This is a chronic condition.  Onset: years ago  Current Meds: losartan 50 mg, metoprolol 50 mg. Also on lasix and spironolactone with CHF.   Side effects? none  Home BP Log: not checked   Associated symptoms:  denies any chest pain, sob, headaches, dizziness/lightheadedness

## 2020-06-08 NOTE — ASSESSMENT & PLAN NOTE
This is a  chronic condition.   Onset: years ago. Thyroidectomy- large nodules 2017. No cancer.   Last TSH level:   TSH   Date Value Ref Range Status   04/20/2020 4.290 0.380 - 5.330 uIU/mL Final     Comment:     Please note new reference ranges effective 12/14/2017 10:00 AM  Pregnant Females, 1st Trimester  0.050-3.700  Pregnant Females, 2nd Trimester  0.310-4.350  Pregnant Females, 3rd Trimester  0.410-5.180       Current Medication: Levoxyl 150 mcg.   Side effects to medication: none  Denies any  chronic fatigue, constipation, dry skin, weight gain, heat/cold intolerance, or hair loss.

## 2020-06-08 NOTE — ASSESSMENT & PLAN NOTE
Has appointment with Sleep medicine in July. Chronic condition. Uses Cpap nightly. Diagnosed in 2015. She does feel she is sleeping better with cpap machine.

## 2020-06-08 NOTE — PROGRESS NOTES
CC:    Chief Complaint   Patient presents with   • Establish Care   • Hypothyroidism        HISTORY OF THE PRESENT ILLNESS: Patient is a 80 y.o. female. This pleasant patient is here today establish care, and review her chronic conditions:     Health Maintenance: Completed      Other hyperlipidemia  This is a chronic condition.   Latest Labs:   Lab Results   Component Value Date/Time    CHOLSTRLTOT 149 04/20/2020 08:19 AM    LDL 86 04/20/2020 08:19 AM    HDL 35 (A) 04/20/2020 08:19 AM    TRIGLYCERIDE 141 04/20/2020 08:19 AM      Medications: atrovastatin 40 mg   Medication side effects: none  Diet/Exercise: tries to have balanced diet. Has to watch greens with warfarin. Exercise, is very active taking care of her .   Family History of high cholesterol or heart disease? Unkown.   Risk calculator: The 10-year ASCVD risk score ([place] RAD Jr., et al., 2013) is: 20.7%       Chronic combined systolic and diastolic congestive heart failure (HCC)  Chronic condition, history of MI, A-fib, CAD. S/p CABG. Severe mitral regurge. On metoprolol 50 mg daily. Furosemide 40 mg and spironolactone 25 mg. Taken off potassium. Followed by Dr. Villar. Plan for future- transesophageal echo with possible surgical correction mitral valve.     Hypothyroidism  This is a  chronic condition.   Onset: years ago. Thyroidectomy- large nodules 2017. No cancer.   Last TSH level:   TSH   Date Value Ref Range Status   04/20/2020 4.290 0.380 - 5.330 uIU/mL Final     Comment:     Please note new reference ranges effective 12/14/2017 10:00 AM  Pregnant Females, 1st Trimester  0.050-3.700  Pregnant Females, 2nd Trimester  0.310-4.350  Pregnant Females, 3rd Trimester  0.410-5.180       Current Medication: Levoxyl 150 mcg.   Side effects to medication: none  Denies any  chronic fatigue, constipation, dry skin, weight gain, heat/cold intolerance, or hair loss.       Essential hypertension  This is a chronic condition.  Onset: years ago  Current Meds:  losartan 50 mg, metoprolol 50 mg. Also on lasix and spironolactone with CHF.   Side effects? none  Home BP Log: not checked   Associated symptoms:  denies any chest pain, sob, headaches, dizziness/lightheadedness      Gastroesophageal reflux disease without esophagitis  On omprazole 20 mg daily. Chronic condition. Stable with PPI. No GERD symptoms. In general avoids most triggers. Does drink a cup coffee.     Obstructive sleep apnea syndrome  Has appointment with Sleep medicine in July. Chronic condition. Uses Cpap nightly. Diagnosed in 2015. She does feel she is sleeping better with cpap machine.     Paroxysmal atrial fibrillation (HCC)  Being followed by cardiology, Dr. Villar. On metoprolol for rate control. She is always in A-fib. On coumadin, goes to coumadin clinic.     Severe mitral regurgitation  Being followed by Dr. Villar. Severe MR. Considering surgery. Supposed to get transesophageal echo in future.     Hepatic steatosis  Went to GI specialist, she DOES NOT have cirrhosis, suspected fatty liver, she has made lifestyle modifications to help correct this.     Abnormal CBC  Elevated neutrophils and low lymphocytes labs 04/20 . No acute illness. Will repeat labs.     Malignant melanoma of left upper extremity including shoulder (HCC)  Seeing dermatology here in [place]. Goes every 6 months. Hx melanoma left upper arm.     Allergies: Patient has no known allergies.    Current Outpatient Medications Ordered in Epic   Medication Sig Dispense Refill   • Zoster Vac Recomb Adjuvanted (SHINGRIX) 50 MCG/0.5ML Recon Susp 0.5 mL by Intramuscular route Once for 1 dose. 0.5 mL 0   • warfarin (COUMADIN) 6 MG Tab Take one tablet daily as directed by [facility]  Indications: Obstructing Blood Clot with Chronic Atrial Fibrillation 90 Tab 1   • spironolactone (ALDACTONE) 25 MG Tab Take 1 Tab by mouth every day. 30 Tab 11   • vitamin k 100 MCG tablet Take 100 mcg by mouth every day.     • levothyroxine  (LEVOXYL) 150 MCG Tab Take 1 Tab by mouth Every morning on an empty stomach. 10 Tab 0   • furosemide (LASIX) 40 MG Tab Take 1 Tab by mouth every day. 90 Tab 0   • losartan (COZAAR) 50 MG Tab Take 1 Tab by mouth every day. 90 Tab 0   • atorvastatin (LIPITOR) 40 MG Tab Take 1 Tab by mouth every bedtime. 30 Tab 1   • metoprolol (LOPRESSOR) 50 MG Tab Take 1 Tab by mouth 2 Times a Day. 60 Tab 0   • omeprazole (PRILOSEC) 20 MG delayed-release capsule Take 20 mg by mouth every day. Indications: Gastroesophageal Reflux Disease     • vitamin D (CHOLECALCIFEROL) 1000 UNIT Tab Take 4,000 Units by mouth every day.       No current [facility]-ordered facility-administered medications on file.        Past Medical History:   Diagnosis Date   • Atrial fibrillation (HCC)    • Heart attack (HCC)    • Hyperlipidemia    • Hypertension    • Thyroid disease        Past Surgical History:   Procedure Laterality Date   • CHOLECYSTECTOMY     • EYE SURGERY Bilateral     cataracts   • MULTIPLE CORONARY ARTERY BYPASS      1 bypass   • THYROIDECTOMY TOTAL         Social History     Tobacco Use   • Smoking status: Former Smoker     Packs/day: 1.00     Years: 31.00     Pack years: 31.00     Last attempt to quit:      Years since quittin.4   • Smokeless tobacco: Never Used   Substance Use Topics   • Alcohol use: No   • Drug use: No       Social History     Social History Narrative   • Not on file       Family History   Problem Relation Age of Onset   • Cancer Mother         cancer, smoker   • Stroke Maternal Grandmother    • Other Other         Crohn   • Kidney Disease Other         kidney transplant       ROS:   Constitutional: Patient denies any fever, chills, night sweats, weight loss/gain, fatigue, or malaise.   Resp: Patient denies cough, wheezing, or shortness of breath.   CV: Patient denies any chest pain, claudication, cyanosis, palpitations, or edema.   GI: Patient denies any constipation, nausea, vomiting, diarrhea, bloating, abdominal  "pain, or dyspepsia.   MSK: Patient denies any joint pain, cramps, swelling, weakness, stiffness, or tremor  Skin: Patient denies any color changes, skin changes, lesions, ulcerations, wounds, pruritus, hair or nail changes.   Psych: Patient denies any suicidal or homicidal ideation, depression or anxiety.       Exam: /60   Pulse 64   Temp 36.9 °C (98.5 °F) (Temporal)   Resp 16   Ht 1.588 m (5' 2.5\")   Wt 90.7 kg (200 lb)   SpO2 96%  Body mass index is 36 kg/m².      GENERAL: No acute distress, appropriately dressed. Well developed female.   HENT: Atraumatic, normocephalic, EAC's clear without impaction. TM's pearly gray and translucent.   EYES: Extraocular movements intact  NECK: Supple, FROM. (Patient history thyroidectomy) no thyrid palpated. No lymphadenopathy (submandibular, anterior/posterior cervical, and supraclavicular lymph nodes palpated) or masses. No bruits appreciated.   CHEST: No deformities, Equal chest expansion  HEART: Irregularly irregular.   RESP: Clear to auscultation bilaterally without wheezes, rales or rhonchi.   ABD: Soft, Nontender, Non-Distended  Extremities: No Clubbing, Cyanosis, or Edema  Skin: Warm/dry, no rashes.   Psych: Normal behavior, normal affect      Assessment/Plan:  1. Screening for breast cancer    2. Other hyperlipidemia  - Lipid Profile; Future  Continue atorvastatin 40 mg a day due for labs in 3 months.    3. Chronic combined systolic and diastolic congestive heart failure (HCC)  - Comp Metabolic Panel; Future  Chronic condition.  Stable.  Continue furosemide 40 mg a day and Spironolactone 25 mg a day.  Being followed by Dr. Villar at cardiology.  4. Hypothyroidism, unspecified type  - TSH; Future  Chronic condition.  History of thyroidectomy secondary to large nodules.  Continue Levoxyl brand name 150 mcg a day.  TSH acceptable on labs in April.  5. Essential hypertension  Chronic condition.  Stable.  Continue losartan 50 mg a day, metoprolol 50 mg a day, she " is also on Lasix 40 mg a day and Spironolactone 25 mg a day.  6. Gastroesophageal reflux disease without esophagitis  Chronic condition.  Stable continue omeprazole 20 mg a day.  7. Obstructive sleep apnea syndrome  Chronic condition.  Stable.  Being followed by sleep medicine.  Uses CPAP nightly.  8. Paroxysmal atrial fibrillation (HCC)  Being followed by cardiology.  Rate is controlled, on metoprolol 50 mg a day.  9. Severe mitral regurgitation  Being followed by cardiology Dr. Villar.  Scheduled for GOMEZ in the future.  10. Hepatic steatosis  - Comp Metabolic Panel; Future  Has consulted with GI on this.  Was told that she does not have cirrhosis, but probably fatty liver.  She has been making appropriate lifestyle modifications for this condition.  11. Need for vaccination  - Tdap Vaccine =>8YO IM  - Zoster Vac Recomb Adjuvanted (SHINGRIX) 50 MCG/0.5ML Recon Susp; 0.5 mL by Intramuscular route Once for 1 dose.  Dispense: 0.5 mL; Refill: 0    12. Abnormal CBC  - CBC WITH DIFFERENTIAL; Future  Lymphocytes and neutrophils off on her latest CBC.  Not concerned, but will repeat with her labs in 3 months.  13. Malignant melanoma of left upper extremity including shoulder (HCC)  Pain followed by dermatology every 6 months.    Follow-up: Return in about 3 months (around 9/8/2020) for Follow up labs, please schedule seperate AWV as well. .    Please note that this dictation was created using voice recognition software. I have made every reasonable attempt to correct obvious errors, but I expect that there are errors of grammar and possibly content that I did not discover before finalizing the note.

## 2020-06-08 NOTE — ASSESSMENT & PLAN NOTE
Went to GI specialist, she DOES NOT have cirrhosis, suspected fatty liver, she has made lifestyle modifications to help correct this.

## 2020-06-17 RX ORDER — ATORVASTATIN CALCIUM 40 MG/1
40 TABLET, FILM COATED ORAL
Qty: 90 TAB | Refills: 0 | Status: SHIPPED | OUTPATIENT
Start: 2020-06-17 | End: 2020-06-26 | Stop reason: SDUPTHER

## 2020-06-17 RX ORDER — OMEPRAZOLE 20 MG/1
20 CAPSULE, DELAYED RELEASE ORAL DAILY
Qty: 90 CAP | Refills: 0 | Status: SHIPPED | OUTPATIENT
Start: 2020-06-17 | End: 2020-06-26 | Stop reason: SDUPTHER

## 2020-06-19 ENCOUNTER — ANTICOAGULATION MONITORING (OUTPATIENT)
Dept: VASCULAR LAB | Facility: MEDICAL CENTER | Age: 80
End: 2020-06-19

## 2020-06-19 DIAGNOSIS — I48.0 PAROXYSMAL ATRIAL FIBRILLATION (HCC): ICD-10-CM

## 2020-06-19 LAB — INR PPP: 2.2 (ref 2–3.5)

## 2020-06-19 NOTE — PROGRESS NOTES
OP   Telephone Anticoagulation Service Note      Anticoagulation Summary  As of 2020    INR goal:   2.0-3.0   TTR:   62.0 % (2.6 mo)   INR used for dosin.20 (2020)   Warfarin maintenance plan:   3 mg (6 mg x 0.5) every Thu; 6 mg (6 mg x 1) all other days   Weekly warfarin total:   39 mg   No change documented:   Dorina Rodriguez   Plan last modified:   Sonia Lopez, PharmD (2020)   Next INR check:   2020   Target end date:   Indefinite    Indications    Paroxysmal atrial fibrillation (HCC) [I48.0]             Anticoagulation Episode Summary     INR check location:       Preferred lab:       Send INR reminders to:       Comments:         Anticoagulation Care Providers     Provider Role Specialty Phone number    La Paredes M.D. Referring Internal Medicine [phone]    [facility] Responsible  [phone]        Anticoagulation Patient Findings     Spoke with the patient on the phone today, reporting a therapeutic INR of 2.2. Confirmed the current warfarin dosing regimen and patient compliance. Patient denies any interval changes to diet and/or medications. Patient denies any signs/symptoms of bleeding or clotting.  Patient instructed to continue with the current warfarin dosing regimen, and asked to follow up again in 4 weeks.   Patient made appt for clinic visit with her .  Appt: 2020 jami MelgozaD

## 2020-06-20 DIAGNOSIS — I25.10 CORONARY ARTERY DISEASE INVOLVING NATIVE CORONARY ARTERY OF NATIVE HEART WITHOUT ANGINA PECTORIS: ICD-10-CM

## 2020-06-20 DIAGNOSIS — I27.20 MODERATE TO SEVERE PULMONARY HYPERTENSION (HCC): ICD-10-CM

## 2020-06-22 RX ORDER — FUROSEMIDE 40 MG/1
TABLET ORAL
Qty: 90 TAB | Refills: 0 | Status: SHIPPED | OUTPATIENT
Start: 2020-06-22 | End: 2020-06-26 | Stop reason: SDUPTHER

## 2020-06-26 DIAGNOSIS — I25.10 CORONARY ARTERY DISEASE INVOLVING NATIVE CORONARY ARTERY OF NATIVE HEART WITHOUT ANGINA PECTORIS: ICD-10-CM

## 2020-06-26 DIAGNOSIS — I27.20 MODERATE TO SEVERE PULMONARY HYPERTENSION (HCC): ICD-10-CM

## 2020-06-26 RX ORDER — OMEPRAZOLE 20 MG/1
20 CAPSULE, DELAYED RELEASE ORAL DAILY
Qty: 30 CAP | Refills: 0 | Status: SHIPPED | OUTPATIENT
Start: 2020-06-26 | End: 2020-06-30

## 2020-06-26 RX ORDER — ATORVASTATIN CALCIUM 40 MG/1
40 TABLET, FILM COATED ORAL
Qty: 30 TAB | Refills: 0 | Status: SHIPPED | OUTPATIENT
Start: 2020-06-26 | End: 2020-08-20

## 2020-06-26 RX ORDER — FUROSEMIDE 40 MG/1
TABLET ORAL
Qty: 30 TAB | Refills: 0 | Status: SHIPPED | OUTPATIENT
Start: 2020-06-26 | End: 2020-08-10

## 2020-06-26 NOTE — TELEPHONE ENCOUNTER
Received request via: Patient    Was the patient seen in the last year in this department? Yes    Does the patient have an active prescription (recently filled or refills available) for medication(s) requested? Yes. Pt is out and waiting for rxs from mail order.  Was told will not be delivered until 07/07

## 2020-06-30 RX ORDER — OMEPRAZOLE 20 MG/1
CAPSULE, DELAYED RELEASE ORAL
Qty: 90 CAP | Refills: 3 | Status: SHIPPED | OUTPATIENT
Start: 2020-06-30 | End: 2020-09-08

## 2020-06-30 NOTE — TELEPHONE ENCOUNTER
Received request via: Pharmacy    Was the patient seen in the last year in this department? Yes    Does the patient have an active prescription (recently filled or refills available) for medication(s) requested? No       Patient requesting 90 day supply.

## 2020-07-01 ENCOUNTER — SLEEP CENTER VISIT (OUTPATIENT)
Dept: SLEEP MEDICINE | Facility: MEDICAL CENTER | Age: 80
End: 2020-07-01
Payer: MEDICARE

## 2020-07-01 VITALS
HEIGHT: 63 IN | SYSTOLIC BLOOD PRESSURE: 118 MMHG | WEIGHT: 203 LBS | OXYGEN SATURATION: 98 % | RESPIRATION RATE: 16 BRPM | HEART RATE: 73 BPM | DIASTOLIC BLOOD PRESSURE: 62 MMHG | BODY MASS INDEX: 35.97 KG/M2

## 2020-07-01 DIAGNOSIS — E66.9 OBESITY (BMI 35.0-39.9 WITHOUT COMORBIDITY): ICD-10-CM

## 2020-07-01 DIAGNOSIS — I50.42 CHRONIC COMBINED SYSTOLIC AND DIASTOLIC CONGESTIVE HEART FAILURE (HCC): ICD-10-CM

## 2020-07-01 DIAGNOSIS — G47.33 OBSTRUCTIVE SLEEP APNEA SYNDROME: ICD-10-CM

## 2020-07-01 DIAGNOSIS — I25.10 CORONARY ARTERY DISEASE INVOLVING NATIVE CORONARY ARTERY OF NATIVE HEART WITHOUT ANGINA PECTORIS: ICD-10-CM

## 2020-07-01 DIAGNOSIS — I10 ESSENTIAL HYPERTENSION: ICD-10-CM

## 2020-07-01 PROCEDURE — 99203 OFFICE O/P NEW LOW 30 MIN: CPT | Performed by: INTERNAL MEDICINE

## 2020-07-01 ASSESSMENT — FIBROSIS 4 INDEX: FIB4 SCORE: 1.34

## 2020-07-01 NOTE — PROGRESS NOTES
"Chief Complaint   Patient presents with   • Establish Care     Referred by Dr. Paredes for SHAKA on CPAP       HPI: This patient is a 80 y.o. female who is referred for obstructive sleep apnea management.  She was diagnosed with SHAKA in [place], [place] approximately 5 years ago prescribed BiPAP: 13/9 cm of water.  Medical records have been requested.  She presents to establish sleep care.  She has an extensive cardiovascular history including systolic and diastolic heart failure, coronary artery disease and atrial fibrillation.  She \"loves\" her BiPAP machine and compliance card confirms 100% use for 7 hours nightly.  Her AHI is 6.  She sleeps well through the night and awakens rested.  She denies daytime somnolence.  Uses nasal CPAP mask and requires new supplies as her mask is leaking.      Past Medical History:   Diagnosis Date   • Apnea, sleep    • Atrial fibrillation (HCC)    • Gasping for breath    • Heart attack (HCC)    • Hyperlipidemia    • Hypertension    • Thyroid disease        Social History     Socioeconomic History   • Marital status:      Spouse name: Not on file   • Number of children: Not on file   • Years of education: Not on file   • Highest education level: Not on file   Occupational History     Comment: [employer]   Social Needs   • Financial resource strain: Not on file   • Food insecurity     Worry: Not on file     Inability: Not on file   • Transportation needs     Medical: Not on file     Non-medical: Not on file   Tobacco Use   • Smoking status: Former Smoker     Packs/day: 1.00     Years: 31.00     Pack years: 31.00     Last attempt to quit:      Years since quittin.5   • Smokeless tobacco: Never Used   Substance and Sexual Activity   • Alcohol use: No   • Drug use: No   • Sexual activity: Not Currently     Partners: Male   Lifestyle   • Physical activity     Days per week: Not on file     Minutes per session: Not on file   • Stress: Not on file   Relationships   • Social " connections     Talks on phone: Not on file     Gets together: Not on file     Attends [employer] service: Not on file     Active member of club or organization: Not on file     Attends meetings of clubs or organizations: Not on file     Relationship status: Not on file   • Intimate partner violence     Fear of current or ex partner: Not on file     Emotionally abused: Not on file     Physically abused: Not on file     Forced sexual activity: Not on file   Other Topics Concern   • Not on file   Social History Narrative   • Not on file       Family History   Problem Relation Age of Onset   • Cancer Mother         cancer, smoker   • Stroke Maternal Grandmother    • Other Other         Crohn   • Kidney Disease Other         kidney transplant       Current Outpatient Medications on File Prior to Visit   Medication Sig Dispense Refill   • omeprazole (PRILOSEC) 20 MG delayed-release capsule TAKE 1 CAPSULE BY MOUTH EVERY DAY FOR GASTROESOPHAGEAL REFLUX DISEASE 90 Cap 3   • furosemide (LASIX) 40 MG Tab TAKE 1 TABLET BY MOUTH  DAILY 30 Tab 0   • atorvastatin (LIPITOR) 40 MG Tab Take 1 Tab by mouth every bedtime. 30 Tab 0   • triamcinolone acetonide (KENALOG) 0.1 % Cream MAGNUS EXT AA BID FOR 7 TO 10 DAYS THEN PRF FLARES     • warfarin (COUMADIN) 6 MG Tab Take one tablet daily as directed by [facility]  Indications: Obstructing Blood Clot with Chronic Atrial Fibrillation 90 Tab 1   • spironolactone (ALDACTONE) 25 MG Tab Take 1 Tab by mouth every day. 30 Tab 11   • vitamin k 100 MCG tablet Take 100 mcg by mouth every day.     • levothyroxine (LEVOXYL) 150 MCG Tab Take 1 Tab by mouth Every morning on an empty stomach. 10 Tab 0   • losartan (COZAAR) 50 MG Tab Take 1 Tab by mouth every day. 90 Tab 0   • metoprolol (LOPRESSOR) 50 MG Tab Take 1 Tab by mouth 2 Times a Day. 60 Tab 0   • vitamin D (CHOLECALCIFEROL) 1000 UNIT Tab Take 4,000 Units by mouth every day.       No current facility-administered medications on  "file prior to visit.        Allergies: Patient has no known allergies.    ROS:   Constitutional: Denies fevers, chills, night sweats, fatigue or weight loss  Eyes: Denies vision loss, pain, drainage, double vision  Ears, Nose, Throat: Denies earache, difficulty hearing, tinnitus, nasal congestion, hoarseness  Cardiovascular: Denies chest pain, tightness, palpitations, orthopnea or edema  Respiratory: Denies shortness of breath, cough, wheezing, hemoptysis  Sleep: Denies daytime sleepiness, snoring, apneas, insomnia, morning headaches  GI: Denies heartburn, dysphagia, nausea, abdominal pain, diarrhea or constipation  : Denies frequent urination, hematuria, discharge or painful urination  Musculoskeletal: Denies back pain, painful joints, sore muscles  Neurological: Denies weakness or headaches  Skin: No rashes    /62 (BP Location: Left arm, Patient Position: Sitting, BP Cuff Size: Large adult)   Pulse 73   Resp 16   Ht 1.6 m (5' 3\")   Wt 92.1 kg (203 lb)   SpO2 98%     Physical Exam:  Appearance: Well-nourished, well-developed, in no acute distress  HEENT: Normocephalic, atraumatic, white sclera, PERRLA, oropharynx clear, Mallampati 3  Neck: No adenopathy or masses  Respiratory: no intercostal retractions or accessory muscle use  Lungs auscultation: Clear to auscultation bilaterally  Cardiovascular: Regular rate rhythm. No murmurs, rubs or gallops.  No LE edema  Abdomen: soft, nondistended  Gait: Normal  Digits: No clubbing, cyanosis  Motor: No focal deficits  Orientation: Oriented to time, person and place    Diagnosis:  1. Obstructive sleep apnea syndrome  DME Mask and Supplies   2. Obesity (BMI 35.0-39.9 without comorbidity) (HCC)     3. Chronic combined systolic and diastolic congestive heart failure (HCC)     4. Essential hypertension     5. Coronary artery disease involving native coronary artery of native heart without angina pectoris         Plan:  The patient has obstructive sleep apnea, with " excellent compliance with BiPAP therapy and is benefiting from treatment.  She comprehends the importance of ongoing BiPAP use particularly due to her cardiovascular comorbidities.    We will establish her with a local DME for CPAP supplies.    Sleep medical records from Dr. Garcia, [facility] have been requested.   Return in about 6 months (around 1/1/2021).

## 2020-07-02 ENCOUNTER — OFFICE VISIT (OUTPATIENT)
Dept: CARDIOLOGY | Facility: MEDICAL CENTER | Age: 80
End: 2020-07-02
Payer: MEDICARE

## 2020-07-02 VITALS
HEIGHT: 63 IN | OXYGEN SATURATION: 98 % | BODY MASS INDEX: 36.32 KG/M2 | DIASTOLIC BLOOD PRESSURE: 60 MMHG | RESPIRATION RATE: 16 BRPM | HEART RATE: 74 BPM | WEIGHT: 205 LBS | SYSTOLIC BLOOD PRESSURE: 110 MMHG

## 2020-07-02 DIAGNOSIS — R79.89 ELEVATED TROPONIN: ICD-10-CM

## 2020-07-02 DIAGNOSIS — I50.42 CHRONIC COMBINED SYSTOLIC AND DIASTOLIC CONGESTIVE HEART FAILURE (HCC): ICD-10-CM

## 2020-07-02 DIAGNOSIS — J96.01 ACUTE HYPOXEMIC RESPIRATORY FAILURE (HCC): ICD-10-CM

## 2020-07-02 DIAGNOSIS — I10 ESSENTIAL HYPERTENSION: ICD-10-CM

## 2020-07-02 DIAGNOSIS — R74.8 ELEVATED ALKALINE PHOSPHATASE LEVEL: ICD-10-CM

## 2020-07-02 DIAGNOSIS — Z85.820 HX OF MELANOMA EXCISION: ICD-10-CM

## 2020-07-02 DIAGNOSIS — I25.10 CORONARY ARTERY DISEASE INVOLVING NATIVE CORONARY ARTERY OF NATIVE HEART WITHOUT ANGINA PECTORIS: ICD-10-CM

## 2020-07-02 DIAGNOSIS — R93.5 ABNORMAL CT OF THE ABDOMEN: ICD-10-CM

## 2020-07-02 DIAGNOSIS — Z98.890 HX OF MELANOMA EXCISION: ICD-10-CM

## 2020-07-02 DIAGNOSIS — E66.9 OBESITY (BMI 35.0-39.9 WITHOUT COMORBIDITY): ICD-10-CM

## 2020-07-02 DIAGNOSIS — E78.49 OTHER HYPERLIPIDEMIA: ICD-10-CM

## 2020-07-02 DIAGNOSIS — I27.20 MODERATE TO SEVERE PULMONARY HYPERTENSION (HCC): ICD-10-CM

## 2020-07-02 DIAGNOSIS — I34.0 SEVERE MITRAL REGURGITATION: ICD-10-CM

## 2020-07-02 DIAGNOSIS — I48.0 PAROXYSMAL ATRIAL FIBRILLATION (HCC): ICD-10-CM

## 2020-07-02 DIAGNOSIS — K76.0 HEPATIC STEATOSIS: ICD-10-CM

## 2020-07-02 DIAGNOSIS — G47.33 OBSTRUCTIVE SLEEP APNEA SYNDROME: ICD-10-CM

## 2020-07-02 PROCEDURE — 99214 OFFICE O/P EST MOD 30 MIN: CPT | Performed by: INTERNAL MEDICINE

## 2020-07-02 RX ORDER — SPIRONOLACTONE 25 MG/1
25 TABLET ORAL DAILY
Qty: 90 TAB | Refills: 3 | Status: SHIPPED | OUTPATIENT
Start: 2020-07-02 | End: 2020-12-22 | Stop reason: SDUPTHER

## 2020-07-02 ASSESSMENT — ENCOUNTER SYMPTOMS
SORE THROAT: 0
CLAUDICATION: 0
ORTHOPNEA: 0
SHORTNESS OF BREATH: 0
EYES NEGATIVE: 1
COUGH: 0
SPUTUM PRODUCTION: 0
GASTROINTESTINAL NEGATIVE: 1
MUSCULOSKELETAL NEGATIVE: 1
BRUISES/BLEEDS EASILY: 0
CARDIOVASCULAR NEGATIVE: 1
PND: 0
NEUROLOGICAL NEGATIVE: 1
CHILLS: 0
PALPITATIONS: 0
DIZZINESS: 0
RESPIRATORY NEGATIVE: 1
WHEEZING: 0
CONSTITUTIONAL NEGATIVE: 1
STRIDOR: 0
HEMOPTYSIS: 0
WEAKNESS: 0
FEVER: 0
LOSS OF CONSCIOUSNESS: 0

## 2020-07-02 ASSESSMENT — FIBROSIS 4 INDEX: FIB4 SCORE: 1.34

## 2020-07-02 NOTE — PROGRESS NOTES
Chief Complaint   Patient presents with   • Congestive Heart Failure       Subjective:   Aleshia Crooks is a 80 y.o. female who presents today as a follow-up for her CAD status post CABG hypertension hyperlipidemia lower extremity edema and mitral valve regurgitation with paroxysmal atrial fibrillation.  Since she was last seen she continues to be doing well.  Her blood pressures averaging 1 10-1 30.  She has no lower extremity edema.  She no longer no longer has any exertional shortness of breath.  She is very happy with how she is doing at this point.    Past Medical History:   Diagnosis Date   • Apnea, sleep    • Atrial fibrillation (HCC)    • Gasping for breath    • Heart attack (HCC)    • Hyperlipidemia    • Hypertension    • Thyroid disease      Past Surgical History:   Procedure Laterality Date   • CHOLECYSTECTOMY     • EYE SURGERY Bilateral     cataracts   • MULTIPLE CORONARY ARTERY BYPASS      1 bypass   • THYROIDECTOMY TOTAL       Family History   Problem Relation Age of Onset   • Cancer Mother         cancer, smoker   • Stroke Maternal Grandmother    • Other Other         Crohn   • Kidney Disease Other         kidney transplant     Social History     Socioeconomic History   • Marital status:      Spouse name: Not on file   • Number of children: Not on file   • Years of education: Not on file   • Highest education level: Not on file   Occupational History     Comment: [employer]   Social Needs   • Financial resource strain: Not on file   • Food insecurity     Worry: Not on file     Inability: Not on file   • Transportation needs     Medical: Not on file     Non-medical: Not on file   Tobacco Use   • Smoking status: Former Smoker     Packs/day: 1.00     Years: 31.00     Pack years: 31.00     Last attempt to quit:      Years since quittin.5   • Smokeless tobacco: Never Used   Substance and Sexual Activity   • Alcohol use: No   • Drug use: No   • Sexual activity: Not Currently      Partners: Male   Lifestyle   • Physical activity     Days per week: Not on file     Minutes per session: Not on file   • Stress: Not on file   Relationships   • Social connections     Talks on phone: Not on file     Gets together: Not on file     Attends Anabaptist service: Not on file     Active member of club or organization: Not on file     Attends meetings of clubs or organizations: Not on file     Relationship status: Not on file   • Intimate partner violence     Fear of current or ex partner: Not on file     Emotionally abused: Not on file     Physically abused: Not on file     Forced sexual activity: Not on file   Other Topics Concern   • Not on file   Social History Narrative   • Not on file     No Known Allergies  Outpatient Encounter Medications as of 7/2/2020   Medication Sig Dispense Refill   • spironolactone (ALDACTONE) 25 MG Tab Take 1 Tab by mouth every day. 90 Tab 3   • omeprazole (PRILOSEC) 20 MG delayed-release capsule TAKE 1 CAPSULE BY MOUTH EVERY DAY FOR GASTROESOPHAGEAL REFLUX DISEASE 90 Cap 3   • furosemide (LASIX) 40 MG Tab TAKE 1 TABLET BY MOUTH  DAILY 30 Tab 0   • atorvastatin (LIPITOR) 40 MG Tab Take 1 Tab by mouth every bedtime. 30 Tab 0   • triamcinolone acetonide (KENALOG) 0.1 % Cream MAGNUS EXT AA BID FOR 7 TO 10 DAYS THEN PRF FLARES     • warfarin (COUMADIN) 6 MG Tab Take one tablet daily as directed by [facility]  Indications: Obstructing Blood Clot with Chronic Atrial Fibrillation 90 Tab 1   • vitamin k 100 MCG tablet Take 100 mcg by mouth every day.     • levothyroxine (LEVOXYL) 150 MCG Tab Take 1 Tab by mouth Every morning on an empty stomach. 10 Tab 0   • losartan (COZAAR) 50 MG Tab Take 1 Tab by mouth every day. 90 Tab 0   • metoprolol (LOPRESSOR) 50 MG Tab Take 1 Tab by mouth 2 Times a Day. 60 Tab 0   • vitamin D (CHOLECALCIFEROL) 1000 UNIT Tab Take 4,000 Units by mouth every day.     • [DISCONTINUED] spironolactone (ALDACTONE) 25 MG Tab Take 1 Tab by mouth  "every day. 30 Tab 11     No facility-administered encounter medications on file as of 7/2/2020.      Review of Systems   Constitutional: Negative.  Negative for chills, fever and malaise/fatigue.   HENT: Negative.  Negative for sore throat.    Eyes: Negative.    Respiratory: Negative.  Negative for cough, hemoptysis, sputum production, shortness of breath, wheezing and stridor.    Cardiovascular: Negative.  Negative for chest pain, palpitations, orthopnea, claudication, leg swelling and PND.   Gastrointestinal: Negative.    Genitourinary: Negative.    Musculoskeletal: Negative.    Skin: Negative.    Neurological: Negative.  Negative for dizziness, loss of consciousness and weakness.   Endo/Heme/Allergies: Negative.  Does not bruise/bleed easily.   All other systems reviewed and are negative.       Objective:   /60 (BP Location: Left arm, Patient Position: Sitting, BP Cuff Size: Adult)   Pulse 74   Resp 16   Ht 1.6 m (5' 3\")   Wt 93 kg (205 lb)   LMP  (LMP Unknown)   SpO2 98%   BMI 36.31 kg/m²     Physical Exam   Constitutional: She appears well-developed and well-nourished. No distress.   HENT:   Head: Normocephalic and atraumatic.   Right Ear: External ear normal.   Left Ear: External ear normal.   Nose: Nose normal.   Mouth/Throat: No oropharyngeal exudate.   Eyes: Pupils are equal, round, and reactive to light. Conjunctivae and EOM are normal. Right eye exhibits no discharge. Left eye exhibits no discharge. No scleral icterus.   Neck: Neck supple. No JVD present.   Cardiovascular: Normal rate, regular rhythm and intact distal pulses. Exam reveals no gallop and no friction rub.   No murmur heard.  Pulmonary/Chest: Effort normal. No stridor. No respiratory distress. She has no wheezes. She has no rales. She exhibits no tenderness.   Abdominal: Soft. She exhibits no distension. There is no guarding.   Musculoskeletal: Normal range of motion.         General: No tenderness, deformity or edema. "   Neurological: She is alert. She has normal reflexes. She displays normal reflexes. No cranial nerve deficit. She exhibits normal muscle tone. Coordination normal.   Skin: Skin is warm and dry. No rash noted. She is not diaphoretic. No erythema. No pallor.   Psychiatric: She has a normal mood and affect. Her behavior is normal. Judgment and thought content normal.   Nursing note and vitals reviewed.      Assessment:     1. Essential hypertension  spironolactone (ALDACTONE) 25 MG Tab   2. Elevated troponin  spironolactone (ALDACTONE) 25 MG Tab   3. Elevated alkaline phosphatase level     4. Coronary artery disease involving native coronary artery of native heart without angina pectoris, s/p 1V CABG 1992     5. Chronic combined systolic and diastolic congestive heart failure (HCC)     6. Abnormal CT of the abdomen     7. Hepatic steatosis     8. Hx of melanoma excision     9. Obesity (BMI 35.0-39.9 without comorbidity) (HCC)     10. Moderate to severe pulmonary hypertension (HCC)     11. Obstructive sleep apnea syndrome  spironolactone (ALDACTONE) 25 MG Tab   12. Other hyperlipidemia     13. Paroxysmal atrial fibrillation (HCC)  spironolactone (ALDACTONE) 25 MG Tab   14. Severe mitral regurgitation     15. Acute hypoxemic respiratory failure (HCC)  spironolactone (ALDACTONE) 25 MG Tab       Medical Decision Making:  Today's Assessment / Status / Plan:   80-year-old female with paroxysmal atrial fibrillation and might regurgitation which appears to improved following diuresis.  At this point I feel that she is doing well.  I will make decisions to her medications.  We will see her back in 6 months.  She will continue to check her weights.      1. Severe MR, now improved after diuresis    - clinical follow up     2. COPD?    - obtain PFTs from prior pulmonologist     3. CAD s/p CABG    - cont atorva 40     4. A-fib    - cont metpo 50 BID    - cont warfarin     5. CHF, normalized    - per above    - cont losartan  50     6. Edema    - cont furosemide 40    - cont soiro25

## 2020-07-07 DIAGNOSIS — I10 ESSENTIAL HYPERTENSION: ICD-10-CM

## 2020-07-07 RX ORDER — LOSARTAN POTASSIUM 50 MG/1
TABLET ORAL
Qty: 90 TAB | Refills: 0 | Status: SHIPPED | OUTPATIENT
Start: 2020-07-07 | End: 2020-08-18 | Stop reason: SDUPTHER

## 2020-07-17 ENCOUNTER — TELEPHONE (OUTPATIENT)
Dept: VASCULAR LAB | Facility: MEDICAL CENTER | Age: 80
End: 2020-07-17

## 2020-07-17 ENCOUNTER — ANTICOAGULATION VISIT (OUTPATIENT)
Dept: VASCULAR LAB | Facility: MEDICAL CENTER | Age: 80
End: 2020-07-17
Attending: INTERNAL MEDICINE
Payer: MEDICARE

## 2020-07-17 DIAGNOSIS — Z79.01 CHRONIC ANTICOAGULATION: ICD-10-CM

## 2020-07-17 DIAGNOSIS — I48.0 PAROXYSMAL ATRIAL FIBRILLATION (HCC): ICD-10-CM

## 2020-07-17 LAB — INR PPP: 2.3 (ref 2–3.5)

## 2020-07-17 PROCEDURE — 99211 OFF/OP EST MAY X REQ PHY/QHP: CPT

## 2020-07-17 PROCEDURE — 85610 PROTHROMBIN TIME: CPT

## 2020-07-17 RX ORDER — WARFARIN SODIUM 6 MG/1
TABLET ORAL
Qty: 90 TAB | Refills: 3 | Status: SHIPPED | OUTPATIENT
Start: 2020-07-17 | End: 2021-07-02 | Stop reason: SDUPTHER

## 2020-07-17 NOTE — PROGRESS NOTES
Anticoagulation Summary  As of 2020    INR goal:   2.0-3.0   TTR:   71.9 % (3.6 mo)   INR used for dosin.30 (2020)   Warfarin maintenance plan:   3 mg (6 mg x 0.5) every Thu; 6 mg (6 mg x 1) all other days   Weekly warfarin total:   39 mg   Plan last modified:   Sonia Lopez PharmD (2020)   Next INR check:   2020   Target end date:   Indefinite    Indications    Paroxysmal atrial fibrillation (HCC) [I48.0]             Anticoagulation Episode Summary     INR check location:       Preferred lab:       Send INR reminders to:       Comments:         Anticoagulation Care Providers     Provider Role Specialty Phone number    La Paredes M.D. Referring Internal Medicine [phone]    [facility] Responsible  [phone]        Anticoagulation Patient Findings      HPI:  Aleshia Crooks seen in clinic today for follow up on anticoagulation therapy in the presence of Afib.   Denies any changes to current medical/health status since last appointment.   Denies any medication or diet changes.   No current symptoms of bleeding or thrombosis reported.    A/P:   INR therapeutic.   Continue current regimen.   Patient will like to apply for home monitor as she has been on in the past.    Follow up appointment in 5 week(s).    Abad Miner.D

## 2020-07-20 LAB — INR BLD: 2.3 (ref 0.9–1.2)

## 2020-07-28 ENCOUNTER — TELEPHONE (OUTPATIENT)
Dept: SLEEP MEDICINE | Facility: MEDICAL CENTER | Age: 80
End: 2020-07-28

## 2020-07-28 NOTE — TELEPHONE ENCOUNTER
Patient called and states that [facility] still has not received her order for mask and supplies. Please advise and call patient.

## 2020-08-10 DIAGNOSIS — I27.20 MODERATE TO SEVERE PULMONARY HYPERTENSION (HCC): ICD-10-CM

## 2020-08-10 DIAGNOSIS — I25.10 CORONARY ARTERY DISEASE INVOLVING NATIVE CORONARY ARTERY OF NATIVE HEART WITHOUT ANGINA PECTORIS: ICD-10-CM

## 2020-08-10 RX ORDER — FUROSEMIDE 40 MG/1
TABLET ORAL
Qty: 90 TAB | Refills: 3 | Status: SHIPPED | OUTPATIENT
Start: 2020-08-10 | End: 2020-11-12 | Stop reason: SDUPTHER

## 2020-08-11 ENCOUNTER — TELEPHONE (OUTPATIENT)
Dept: MEDICAL GROUP | Facility: PHYSICIAN GROUP | Age: 80
End: 2020-08-11

## 2020-08-11 DIAGNOSIS — E03.9 HYPOTHYROIDISM, UNSPECIFIED TYPE: ICD-10-CM

## 2020-08-11 RX ORDER — LEVOTHYROXINE SODIUM 150 UG/1
150 TABLET ORAL
Qty: 90 TAB | Refills: 1 | Status: SHIPPED | OUTPATIENT
Start: 2020-08-11 | End: 2021-02-12

## 2020-08-11 NOTE — TELEPHONE ENCOUNTER
1. Caller Name: Aleshia Crooks                          Call Back Number: [phone] (home)         How would the patient prefer to be contacted with a response: not indicated    Pt states she needs refill on Levoxyl 150 mcg AYMILE for #90

## 2020-08-12 ENCOUNTER — APPOINTMENT (RX ONLY)
Dept: URBAN - METROPOLITAN AREA CLINIC 20 | Facility: CLINIC | Age: 80
Setting detail: DERMATOLOGY
End: 2020-08-12

## 2020-08-12 DIAGNOSIS — D22 MELANOCYTIC NEVI: ICD-10-CM

## 2020-08-12 DIAGNOSIS — L40.0 PSORIASIS VULGARIS: ICD-10-CM | Status: INADEQUATELY CONTROLLED

## 2020-08-12 DIAGNOSIS — L57.8 OTHER SKIN CHANGES DUE TO CHRONIC EXPOSURE TO NONIONIZING RADIATION: ICD-10-CM

## 2020-08-12 DIAGNOSIS — L81.4 OTHER MELANIN HYPERPIGMENTATION: ICD-10-CM

## 2020-08-12 DIAGNOSIS — L82.1 OTHER SEBORRHEIC KERATOSIS: ICD-10-CM

## 2020-08-12 DIAGNOSIS — Z85.820 PERSONAL HISTORY OF MALIGNANT MELANOMA OF SKIN: ICD-10-CM | Status: STABLE

## 2020-08-12 DIAGNOSIS — D18.0 HEMANGIOMA: ICD-10-CM

## 2020-08-12 PROBLEM — D18.01 HEMANGIOMA OF SKIN AND SUBCUTANEOUS TISSUE: Status: ACTIVE | Noted: 2020-08-12

## 2020-08-12 PROBLEM — D22.5 MELANOCYTIC NEVI OF TRUNK: Status: ACTIVE | Noted: 2020-08-12

## 2020-08-12 PROBLEM — L30.9 DERMATITIS, UNSPECIFIED: Status: ACTIVE | Noted: 2020-08-12

## 2020-08-12 PROCEDURE — ? ADDITIONAL NOTES

## 2020-08-12 PROCEDURE — 99214 OFFICE O/P EST MOD 30 MIN: CPT

## 2020-08-12 PROCEDURE — ? COUNSELING

## 2020-08-12 ASSESSMENT — LOCATION SIMPLE DESCRIPTION DERM
LOCATION SIMPLE: LEFT SHOULDER
LOCATION SIMPLE: LEFT HAND
LOCATION SIMPLE: BACK
LOCATION SIMPLE: RIGHT CHEEK
LOCATION SIMPLE: RIGHT UPPER EXTREMITY
LOCATION SIMPLE: LEFT PRETIBIAL REGION
LOCATION SIMPLE: LEFT LOWER EXTREMITY
LOCATION SIMPLE: RIGHT LOWER EXTREMITY
LOCATION SIMPLE: LEFT CHEEK
LOCATION SIMPLE: RIGHT HAND
LOCATION SIMPLE: LEFT UPPER EXTREMITY
LOCATION SIMPLE: RIGHT PRETIBIAL REGION

## 2020-08-12 ASSESSMENT — LOCATION DETAILED DESCRIPTION DERM
LOCATION DETAILED: LEFT DORSAL FOREARM
LOCATION DETAILED: LEFT UPPER BACK
LOCATION DETAILED: RIGHT DORSAL HAND
LOCATION DETAILED: LEFT MID BACK
LOCATION DETAILED: RIGHT CHEEK
LOCATION DETAILED: RIGHT DORSAL FOREARM
LOCATION DETAILED: LEFT LATERAL SHOULDER
LOCATION DETAILED: LEFT DORSAL HAND
LOCATION DETAILED: RIGHT ANTERIOR THIGH
LOCATION DETAILED: RIGHT UPPER BACK
LOCATION DETAILED: RIGHT PROXIMAL PRETIBIAL REGION
LOCATION DETAILED: LEFT PROXIMAL PRETIBIAL REGION
LOCATION DETAILED: LEFT CHEEK
LOCATION DETAILED: LEFT ANTERIOR THIGH

## 2020-08-12 ASSESSMENT — LOCATION ZONE DERM
LOCATION ZONE: LEG
LOCATION ZONE: ARM
LOCATION ZONE: TRUNK
LOCATION ZONE: HAND
LOCATION ZONE: FACE

## 2020-08-12 NOTE — PROCEDURE: ADDITIONAL NOTES
Additional Notes: Moy Rosas, Sample given. \\n\\nPlan:\\n-Use Clobetasol BID for 2 weeks. \\n-RTC in 3 weeks if no improvement will biopsy
Detail Level: Simple

## 2020-08-14 RX ORDER — METOPROLOL TARTRATE 50 MG/1
50 TABLET, FILM COATED ORAL 2 TIMES DAILY
Qty: 180 TAB | Refills: 0 | Status: SHIPPED | OUTPATIENT
Start: 2020-08-14 | End: 2020-11-11

## 2020-08-18 DIAGNOSIS — I10 ESSENTIAL HYPERTENSION: ICD-10-CM

## 2020-08-18 RX ORDER — LOSARTAN POTASSIUM 50 MG/1
TABLET ORAL
Qty: 90 TAB | Refills: 0 | Status: SHIPPED | OUTPATIENT
Start: 2020-08-18 | End: 2020-11-19

## 2020-08-18 NOTE — TELEPHONE ENCOUNTER
Received request via: Patient    Was the patient seen in the last year in this department? Yes    Does the patient have an active prescription (recently filled or refills available) for medication(s) requested? Yes. Pt states she did not get medication from [facility]

## 2020-08-20 RX ORDER — ATORVASTATIN CALCIUM 40 MG/1
TABLET, FILM COATED ORAL
Qty: 90 TAB | Refills: 3 | Status: SHIPPED | OUTPATIENT
Start: 2020-08-20 | End: 2020-12-07 | Stop reason: SDUPTHER

## 2020-08-20 RX ORDER — OMEPRAZOLE 20 MG/1
CAPSULE, DELAYED RELEASE ORAL
Qty: 90 CAP | Refills: 3 | OUTPATIENT
Start: 2020-08-20

## 2020-08-21 ENCOUNTER — ANTICOAGULATION VISIT (OUTPATIENT)
Dept: VASCULAR LAB | Facility: MEDICAL CENTER | Age: 80
End: 2020-08-21
Attending: INTERNAL MEDICINE
Payer: MEDICARE

## 2020-08-21 DIAGNOSIS — I48.0 PAROXYSMAL ATRIAL FIBRILLATION (HCC): ICD-10-CM

## 2020-08-21 LAB — INR PPP: 2.1 (ref 2–3.5)

## 2020-08-21 PROCEDURE — 85610 PROTHROMBIN TIME: CPT

## 2020-08-21 PROCEDURE — 99211 OFF/OP EST MAY X REQ PHY/QHP: CPT

## 2020-08-21 NOTE — PROGRESS NOTES
Anticoagulation Summary  As of 2020    INR goal:  2.0-3.0   TTR:  78.8 % (4.7 mo)   INR used for dosin.10 (2020)   Warfarin maintenance plan:  3 mg (6 mg x 0.5) every Thu; 6 mg (6 mg x 1) all other days   Weekly warfarin total:  39 mg   Plan last modified:  Sonia Lopez PharmD (2020)   Next INR check:  2020   Target end date:  Indefinite    Indications    Paroxysmal atrial fibrillation (HCC) [I48.0]             Anticoagulation Episode Summary     INR check location:      Preferred lab:      Send INR reminders to:      Comments:        Anticoagulation Care Providers     Provider Role Specialty Phone number    La Paredes M.D. Referring Internal Medicine [phone]    [facility] Responsible  [phone]            Anticoagulation Patient Findings      HPI:  Aleshia Crooks seen in clinic today, on anticoagulation therapy with warfarin for A.fib.  Changes to current medical/health status since last appt: none   Denies signs/symptoms of bleeding and/or thrombosis since the last appt.    Denies any interval changes to diet  Denies any interval changes to medications since last appt.   Denies any complications or cost restrictions with current therapy.   Verified current warfarin dosing schedule.   Pt did not want BP checked today.      A/P   INR - therapeutic.   Patient instructed to continue current dose.   Patient mentioned she is taking Vitamin K as a supplement, and has been doing so for years. She was instructed to continue taking it as she has been, to not risk having her INR go up.     Follow up appointment in 4 week(s).    Mau Vasquez PharmD

## 2020-08-24 ENCOUNTER — HOSPITAL ENCOUNTER (OUTPATIENT)
Dept: LAB | Facility: MEDICAL CENTER | Age: 80
End: 2020-08-24
Attending: INTERNAL MEDICINE
Payer: MEDICARE

## 2020-08-24 LAB
ALBUMIN SERPL BCP-MCNC: 4.3 G/DL (ref 3.2–4.9)
ALBUMIN/GLOB SERPL: 1.4 G/DL
ALP SERPL-CCNC: 88 U/L (ref 30–99)
ALT SERPL-CCNC: 14 U/L (ref 2–50)
ANION GAP SERPL CALC-SCNC: 15 MMOL/L (ref 7–16)
AST SERPL-CCNC: 17 U/L (ref 12–45)
BILIRUB SERPL-MCNC: 0.8 MG/DL (ref 0.1–1.5)
BUN SERPL-MCNC: 24 MG/DL (ref 8–22)
CALCIUM SERPL-MCNC: 8.9 MG/DL (ref 8.5–10.5)
CHLORIDE SERPL-SCNC: 102 MMOL/L (ref 96–112)
CO2 SERPL-SCNC: 24 MMOL/L (ref 20–33)
CREAT SERPL-MCNC: 1.39 MG/DL (ref 0.5–1.4)
GLOBULIN SER CALC-MCNC: 3.1 G/DL (ref 1.9–3.5)
GLUCOSE SERPL-MCNC: 99 MG/DL (ref 65–99)
INR BLD: 2.1 (ref 0.9–1.2)
POTASSIUM SERPL-SCNC: 4.1 MMOL/L (ref 3.6–5.5)
PROT SERPL-MCNC: 7.4 G/DL (ref 6–8.2)
SODIUM SERPL-SCNC: 141 MMOL/L (ref 135–145)

## 2020-08-24 PROCEDURE — 80053 COMPREHEN METABOLIC PANEL: CPT

## 2020-08-24 PROCEDURE — 36415 COLL VENOUS BLD VENIPUNCTURE: CPT

## 2020-08-24 PROCEDURE — 82105 ALPHA-FETOPROTEIN SERUM: CPT

## 2020-08-26 LAB — AFP-TM SERPL-MCNC: 3 NG/ML (ref 0–9)

## 2020-09-03 ENCOUNTER — APPOINTMENT (RX ONLY)
Dept: URBAN - METROPOLITAN AREA CLINIC 20 | Facility: CLINIC | Age: 80
Setting detail: DERMATOLOGY
End: 2020-09-03

## 2020-09-03 DIAGNOSIS — L40.0 PSORIASIS VULGARIS: ICD-10-CM | Status: INADEQUATELY CONTROLLED

## 2020-09-03 DIAGNOSIS — R21 RASH AND OTHER NONSPECIFIC SKIN ERUPTION: ICD-10-CM

## 2020-09-03 PROBLEM — L30.9 DERMATITIS, UNSPECIFIED: Status: ACTIVE | Noted: 2020-09-03

## 2020-09-03 PROCEDURE — 99214 OFFICE O/P EST MOD 30 MIN: CPT | Mod: 25

## 2020-09-03 PROCEDURE — ? PRESCRIPTION

## 2020-09-03 PROCEDURE — 11103 TANGNTL BX SKIN EA SEP/ADDL: CPT

## 2020-09-03 PROCEDURE — ? BIOPSY BY SHAVE METHOD

## 2020-09-03 PROCEDURE — ? ADDITIONAL NOTES

## 2020-09-03 PROCEDURE — 11102 TANGNTL BX SKIN SINGLE LES: CPT

## 2020-09-03 PROCEDURE — ? COUNSELING

## 2020-09-03 RX ORDER — CLOBETASOL PROPIONATE 0.5 MG/G
OINTMENT TOPICAL
Qty: 1 | Refills: 3 | Status: ERX | COMMUNITY
Start: 2020-09-03

## 2020-09-03 RX ADMIN — CLOBETASOL PROPIONATE: 0.5 OINTMENT TOPICAL at 00:00

## 2020-09-03 ASSESSMENT — LOCATION SIMPLE DESCRIPTION DERM
LOCATION SIMPLE: RIGHT PRETIBIAL REGION
LOCATION SIMPLE: LEFT UPPER BACK
LOCATION SIMPLE: LEFT PRETIBIAL REGION
LOCATION SIMPLE: LEFT THIGH

## 2020-09-03 ASSESSMENT — LOCATION DETAILED DESCRIPTION DERM
LOCATION DETAILED: LEFT ANTERIOR PROXIMAL THIGH
LOCATION DETAILED: LEFT MEDIAL UPPER BACK
LOCATION DETAILED: LEFT ANTERIOR DISTAL THIGH
LOCATION DETAILED: RIGHT PROXIMAL PRETIBIAL REGION
LOCATION DETAILED: LEFT PROXIMAL PRETIBIAL REGION

## 2020-09-03 ASSESSMENT — LOCATION ZONE DERM
LOCATION ZONE: TRUNK
LOCATION ZONE: LEG

## 2020-09-03 NOTE — PROCEDURE: ADDITIONAL NOTES
Additional Notes: Instructed patient to D/C using dial soap all over the body, only to wash under each axilla and private areas. Use the Clobetasol ointment bid for 7 days. Continue using the CeraVe lotion. Return to clinic in 2 weeks
Detail Level: Simple
Additional Notes: Discussed UVB photo therapy if the itch gets extremely bad, also discussed oral Hydroxyzine.

## 2020-09-03 NOTE — HPI: RASH (ECZEMA)
How Severe Is Your Eczema?: moderate
Is This A New Presentation, Or A Follow-Up?: Rash
Additional History: Pt did start new detergent, and softener.  She has also been putting dial soap all over body.

## 2020-09-08 RX ORDER — OMEPRAZOLE 20 MG/1
CAPSULE, DELAYED RELEASE ORAL
Qty: 90 CAP | Refills: 3 | Status: SHIPPED | OUTPATIENT
Start: 2020-09-08 | End: 2021-04-22 | Stop reason: SDUPTHER

## 2020-09-08 NOTE — TELEPHONE ENCOUNTER
Received request via: Pharmacy    Was the patient seen in the last year in this department? Yes    Does the patient have an active prescription (recently filled or refills available) for medication(s) requested? No     PATIENT WOULD LIKE THIS SENT TO THE MAIL ORDER.

## 2020-09-09 ENCOUNTER — HOSPITAL ENCOUNTER (OUTPATIENT)
Dept: LAB | Facility: MEDICAL CENTER | Age: 80
End: 2020-09-09
Attending: PHYSICIAN ASSISTANT
Payer: MEDICARE

## 2020-09-09 DIAGNOSIS — R79.89 ABNORMAL CBC: ICD-10-CM

## 2020-09-09 DIAGNOSIS — E03.9 HYPOTHYROIDISM, UNSPECIFIED TYPE: ICD-10-CM

## 2020-09-09 DIAGNOSIS — E78.49 OTHER HYPERLIPIDEMIA: ICD-10-CM

## 2020-09-09 DIAGNOSIS — K76.0 HEPATIC STEATOSIS: ICD-10-CM

## 2020-09-09 DIAGNOSIS — I50.42 CHRONIC COMBINED SYSTOLIC AND DIASTOLIC CONGESTIVE HEART FAILURE (HCC): ICD-10-CM

## 2020-09-09 LAB
ALBUMIN SERPL BCP-MCNC: 4.6 G/DL (ref 3.2–4.9)
ALBUMIN/GLOB SERPL: 1.4 G/DL
ALP SERPL-CCNC: 102 U/L (ref 30–99)
ALT SERPL-CCNC: 14 U/L (ref 2–50)
ANION GAP SERPL CALC-SCNC: 14 MMOL/L (ref 7–16)
AST SERPL-CCNC: 19 U/L (ref 12–45)
BASOPHILS # BLD AUTO: 1.1 % (ref 0–1.8)
BASOPHILS # BLD: 0.09 K/UL (ref 0–0.12)
BILIRUB SERPL-MCNC: 1.1 MG/DL (ref 0.1–1.5)
BUN SERPL-MCNC: 24 MG/DL (ref 8–22)
CALCIUM SERPL-MCNC: 10 MG/DL (ref 8.5–10.5)
CHLORIDE SERPL-SCNC: 98 MMOL/L (ref 96–112)
CHOLEST SERPL-MCNC: 140 MG/DL (ref 100–199)
CO2 SERPL-SCNC: 25 MMOL/L (ref 20–33)
CREAT SERPL-MCNC: 1.35 MG/DL (ref 0.5–1.4)
EOSINOPHIL # BLD AUTO: 0.41 K/UL (ref 0–0.51)
EOSINOPHIL NFR BLD: 5 % (ref 0–6.9)
ERYTHROCYTE [DISTWIDTH] IN BLOOD BY AUTOMATED COUNT: 46.5 FL (ref 35.9–50)
GLOBULIN SER CALC-MCNC: 3.3 G/DL (ref 1.9–3.5)
GLUCOSE SERPL-MCNC: 83 MG/DL (ref 65–99)
HCT VFR BLD AUTO: 38 % (ref 37–47)
HDLC SERPL-MCNC: 35 MG/DL
HGB BLD-MCNC: 11.6 G/DL (ref 12–16)
IMM GRANULOCYTES # BLD AUTO: 0.02 K/UL (ref 0–0.11)
IMM GRANULOCYTES NFR BLD AUTO: 0.2 % (ref 0–0.9)
LDLC SERPL CALC-MCNC: 77 MG/DL
LYMPHOCYTES # BLD AUTO: 1.12 K/UL (ref 1–4.8)
LYMPHOCYTES NFR BLD: 13.7 % (ref 22–41)
MCH RBC QN AUTO: 25.1 PG (ref 27–33)
MCHC RBC AUTO-ENTMCNC: 30.5 G/DL (ref 33.6–35)
MCV RBC AUTO: 82.1 FL (ref 81.4–97.8)
MONOCYTES # BLD AUTO: 0.84 K/UL (ref 0–0.85)
MONOCYTES NFR BLD AUTO: 10.3 % (ref 0–13.4)
NEUTROPHILS # BLD AUTO: 5.68 K/UL (ref 2–7.15)
NEUTROPHILS NFR BLD: 69.7 % (ref 44–72)
NRBC # BLD AUTO: 0 K/UL
NRBC BLD-RTO: 0 /100 WBC
PLATELET # BLD AUTO: 340 K/UL (ref 164–446)
PMV BLD AUTO: 11 FL (ref 9–12.9)
POTASSIUM SERPL-SCNC: 4.3 MMOL/L (ref 3.6–5.5)
PROT SERPL-MCNC: 7.9 G/DL (ref 6–8.2)
RBC # BLD AUTO: 4.63 M/UL (ref 4.2–5.4)
SODIUM SERPL-SCNC: 137 MMOL/L (ref 135–145)
TRIGL SERPL-MCNC: 140 MG/DL (ref 0–149)
TSH SERPL DL<=0.005 MIU/L-ACNC: 1.56 UIU/ML (ref 0.38–5.33)
WBC # BLD AUTO: 8.2 K/UL (ref 4.8–10.8)

## 2020-09-09 PROCEDURE — 85025 COMPLETE CBC W/AUTO DIFF WBC: CPT

## 2020-09-09 PROCEDURE — 80053 COMPREHEN METABOLIC PANEL: CPT

## 2020-09-09 PROCEDURE — 36415 COLL VENOUS BLD VENIPUNCTURE: CPT

## 2020-09-09 PROCEDURE — 80061 LIPID PANEL: CPT

## 2020-09-09 PROCEDURE — 84443 ASSAY THYROID STIM HORMONE: CPT

## 2020-09-11 ENCOUNTER — OFFICE VISIT (OUTPATIENT)
Dept: MEDICAL GROUP | Facility: PHYSICIAN GROUP | Age: 80
End: 2020-09-11
Payer: MEDICARE

## 2020-09-11 VITALS
DIASTOLIC BLOOD PRESSURE: 62 MMHG | HEIGHT: 63 IN | SYSTOLIC BLOOD PRESSURE: 136 MMHG | BODY MASS INDEX: 35.26 KG/M2 | WEIGHT: 199 LBS | HEART RATE: 89 BPM | TEMPERATURE: 97.3 F | RESPIRATION RATE: 14 BRPM | OXYGEN SATURATION: 96 %

## 2020-09-11 DIAGNOSIS — R79.89 ABNORMAL CBC: ICD-10-CM

## 2020-09-11 DIAGNOSIS — E78.2 MIXED HYPERLIPIDEMIA: ICD-10-CM

## 2020-09-11 DIAGNOSIS — K76.0 HEPATIC STEATOSIS: ICD-10-CM

## 2020-09-11 DIAGNOSIS — Z23 NEED FOR VACCINATION: ICD-10-CM

## 2020-09-11 DIAGNOSIS — D64.9 ANEMIA, UNSPECIFIED TYPE: ICD-10-CM

## 2020-09-11 DIAGNOSIS — E03.9 HYPOTHYROIDISM, UNSPECIFIED TYPE: ICD-10-CM

## 2020-09-11 DIAGNOSIS — N18.30 STAGE 3 CHRONIC KIDNEY DISEASE: ICD-10-CM

## 2020-09-11 PROCEDURE — 99214 OFFICE O/P EST MOD 30 MIN: CPT | Mod: 25 | Performed by: PHYSICIAN ASSISTANT

## 2020-09-11 PROCEDURE — 90732 PPSV23 VACC 2 YRS+ SUBQ/IM: CPT | Performed by: PHYSICIAN ASSISTANT

## 2020-09-11 PROCEDURE — G0009 ADMIN PNEUMOCOCCAL VACCINE: HCPCS | Performed by: PHYSICIAN ASSISTANT

## 2020-09-11 ASSESSMENT — FIBROSIS 4 INDEX: FIB4 SCORE: 1.16

## 2020-09-11 NOTE — ASSESSMENT & PLAN NOTE
Lab Results   Component Value Date/Time    WBC 8.2 09/09/2020 12:30 PM    RBC 4.63 09/09/2020 12:30 PM    HEMOGLOBIN 11.6 (L) 09/09/2020 12:30 PM    HEMATOCRIT 38.0 09/09/2020 12:30 PM    MCV 82.1 09/09/2020 12:30 PM    MCH 25.1 (L) 09/09/2020 12:30 PM    MCHC 30.5 (L) 09/09/2020 12:30 PM    RDW 46.5 09/09/2020 12:30 PM    PLATELETCT 340 09/09/2020 12:30 PM    MPV 11.0 09/09/2020 12:30 PM    NEUTSPOLYS 69.70 09/09/2020 12:30 PM    LYMPHOCYTES 13.70 (L) 09/09/2020 12:30 PM    MONOCYTES 10.30 09/09/2020 12:30 PM    EOSINOPHILS 5.00 09/09/2020 12:30 PM    BASOPHILS 1.10 09/09/2020 12:30 PM    IMMGRAN 0.20 09/09/2020 12:30 PM    NRBC 0.00 09/09/2020 12:30 PM    NEUTS 5.68 09/09/2020 12:30 PM    LYMPHS 1.12 09/09/2020 12:30 PM    MONOS 0.84 09/09/2020 12:30 PM    EOS 0.41 09/09/2020 12:30 PM    BASO 0.09 09/09/2020 12:30 PM    IMMGRANAB 0.02 09/09/2020 12:30 PM    NRBCAB 0.00 09/09/2020 12:30 PM   ]history of iron deficiency. No dark tarry stools, no belly pain, no vaginal bleeding. MCV normal. Could be CKD.

## 2020-09-11 NOTE — ASSESSMENT & PLAN NOTE
Lab Results   Component Value Date/Time    WBC 8.2 09/09/2020 12:30 PM    RBC 4.63 09/09/2020 12:30 PM    HEMOGLOBIN 11.6 (L) 09/09/2020 12:30 PM    HEMATOCRIT 38.0 09/09/2020 12:30 PM    MCV 82.1 09/09/2020 12:30 PM    MCH 25.1 (L) 09/09/2020 12:30 PM    MCHC 30.5 (L) 09/09/2020 12:30 PM    RDW 46.5 09/09/2020 12:30 PM    PLATELETCT 340 09/09/2020 12:30 PM    MPV 11.0 09/09/2020 12:30 PM    NEUTSPOLYS 69.70 09/09/2020 12:30 PM    LYMPHOCYTES 13.70 (L) 09/09/2020 12:30 PM    MONOCYTES 10.30 09/09/2020 12:30 PM    EOSINOPHILS 5.00 09/09/2020 12:30 PM    BASOPHILS 1.10 09/09/2020 12:30 PM    IMMGRAN 0.20 09/09/2020 12:30 PM    NRBC 0.00 09/09/2020 12:30 PM    NEUTS 5.68 09/09/2020 12:30 PM    LYMPHS 1.12 09/09/2020 12:30 PM    MONOS 0.84 09/09/2020 12:30 PM    EOS 0.41 09/09/2020 12:30 PM    BASO 0.09 09/09/2020 12:30 PM    IMMGRANAB 0.02 09/09/2020 12:30 PM    NRBCAB 0.00 09/09/2020 12:30 PM   ]  Slight anemia.   Dark tarry stools? Belly pain?   Could be secondary to CKD.

## 2020-09-11 NOTE — ASSESSMENT & PLAN NOTE
Creatinine labs 09/2020 at 1.35.     Patient does not drink enough water, barely drinks any water, also on diuretics. Especially the last few months has been bad about water. She is also on furosemide 40 mg daily. This could be contributing factor.

## 2020-09-11 NOTE — PROGRESS NOTES
CC:   Chief Complaint   Patient presents with   • Lab Results   • Hypothyroidism          HISTORY OF PRESENT ILLNESS: Patient is a 80 y.o. female established patient who presents today to f/u on labs    Health Maintenance: Completed  Colonoscopy- 2014- [facility], she said she was on 10 year list.   Chickenpox as kid. Gave Shingrix Rx.   [facility]. Will request records- declines another.           Stage 3 chronic kidney disease (HCC)      Creatinine labs 09/2020 at 1.35.     Patient does not drink enough water, barely drinks any water, also on diuretics. Especially the last few months has been bad about water. She is also on furosemide 40 mg daily. This could be contributing factor.     Hypothyroidism  This is a  chronic condition.   Medication: levoxyl 150 mcg, continue dose.   Last TSH level:   TSH   Date Value Ref Range Status   09/09/2020 1.560 0.380 - 5.330 uIU/mL Final     Comment:     Please note new reference ranges effective 12/14/2017 10:00 AM  Pregnant Females, 1st Trimester  0.050-3.700  Pregnant Females, 2nd Trimester  0.310-4.350  Pregnant Females, 3rd Trimester  0.410-5.180         Mixed hyperlipidemia  This is a chronic condition.   Latest Labs:   Lab Results   Component Value Date/Time    CHOLSTRLTOT 140 09/09/2020 12:30 PM    LDL 77 09/09/2020 12:30 PM    HDL 35 (A) 09/09/2020 12:30 PM    TRIGLYCERIDE 140 09/09/2020 12:30 PM      Medications: atorvastatin 40 mg daily  Risk calculator: The 10-year ASCVD risk score (Dave RAD Jr., et al., 2013) is: 20.7%       Abnormal CBC  Lab Results   Component Value Date/Time    WBC 8.2 09/09/2020 12:30 PM    RBC 4.63 09/09/2020 12:30 PM    HEMOGLOBIN 11.6 (L) 09/09/2020 12:30 PM    HEMATOCRIT 38.0 09/09/2020 12:30 PM    MCV 82.1 09/09/2020 12:30 PM    MCH 25.1 (L) 09/09/2020 12:30 PM    MCHC 30.5 (L) 09/09/2020 12:30 PM    RDW 46.5 09/09/2020 12:30 PM    PLATELETCT 340 09/09/2020 12:30 PM    MPV 11.0  09/09/2020 12:30 PM    NEUTSPOLYS 69.70 09/09/2020 12:30 PM    LYMPHOCYTES 13.70 (L) 09/09/2020 12:30 PM    MONOCYTES 10.30 09/09/2020 12:30 PM    EOSINOPHILS 5.00 09/09/2020 12:30 PM    BASOPHILS 1.10 09/09/2020 12:30 PM    IMMGRAN 0.20 09/09/2020 12:30 PM    NRBC 0.00 09/09/2020 12:30 PM    NEUTS 5.68 09/09/2020 12:30 PM    LYMPHS 1.12 09/09/2020 12:30 PM    MONOS 0.84 09/09/2020 12:30 PM    EOS 0.41 09/09/2020 12:30 PM    BASO 0.09 09/09/2020 12:30 PM    IMMGRANAB 0.02 09/09/2020 12:30 PM    NRBCAB 0.00 09/09/2020 12:30 PM   ]  Slight anemia.   Dark tarry stools? Belly pain?   Could be secondary to CKD.     Hepatic steatosis  Alk phos slightly elevated, history of fatty liver. Alk phos 102. AST and ALT wnl. Has seen GI in past, no cirrhosis.     Anemia  Lab Results   Component Value Date/Time    WBC 8.2 09/09/2020 12:30 PM    RBC 4.63 09/09/2020 12:30 PM    HEMOGLOBIN 11.6 (L) 09/09/2020 12:30 PM    HEMATOCRIT 38.0 09/09/2020 12:30 PM    MCV 82.1 09/09/2020 12:30 PM    MCH 25.1 (L) 09/09/2020 12:30 PM    MCHC 30.5 (L) 09/09/2020 12:30 PM    RDW 46.5 09/09/2020 12:30 PM    PLATELETCT 340 09/09/2020 12:30 PM    MPV 11.0 09/09/2020 12:30 PM    NEUTSPOLYS 69.70 09/09/2020 12:30 PM    LYMPHOCYTES 13.70 (L) 09/09/2020 12:30 PM    MONOCYTES 10.30 09/09/2020 12:30 PM    EOSINOPHILS 5.00 09/09/2020 12:30 PM    BASOPHILS 1.10 09/09/2020 12:30 PM    IMMGRAN 0.20 09/09/2020 12:30 PM    NRBC 0.00 09/09/2020 12:30 PM    NEUTS 5.68 09/09/2020 12:30 PM    LYMPHS 1.12 09/09/2020 12:30 PM    MONOS 0.84 09/09/2020 12:30 PM    EOS 0.41 09/09/2020 12:30 PM    BASO 0.09 09/09/2020 12:30 PM    IMMGRANAB 0.02 09/09/2020 12:30 PM    NRBCAB 0.00 09/09/2020 12:30 PM   ]history of iron deficiency. No dark tarry stools, no belly pain, no vaginal bleeding. MCV normal. Could be CKD.         Patient Active Problem List    Diagnosis Date Noted   • Microcytic anemia 07/13/2017     Priority: Low   • Stage 3 chronic kidney disease (HCC) 09/11/2020     • Gastroesophageal reflux disease without esophagitis 06/08/2020   • Abnormal CBC 06/08/2020   • Malignant melanoma of left upper extremity including shoulder ([facility]) 06/08/2020   • Abnormal CT of the abdomen 04/17/2020   • Mixed hyperlipidemia 04/17/2020   • Hepatic steatosis 04/17/2020   • Elevated alkaline phosphatase level 04/17/2020   • Elevated troponin 04/08/2020   • Anemia 04/08/2020   • Hx of melanoma excision 03/12/2020   • Skin lesions 03/12/2020   • Essential hypertension 03/12/2020   • Hypothyroidism 03/12/2020   • Obesity (BMI 35.0-39.9 without comorbidity) ([facility]) 03/12/2020   • Obstructive sleep apnea syndrome 03/12/2020   • Severe mitral regurgitation 07/14/2017   • Moderate to severe pulmonary hypertension ([facility]) 07/14/2017   • Chronic combined systolic and diastolic congestive heart failure ([facility]) 07/14/2017   • Paroxysmal atrial fibrillation ([facility]) 07/14/2017   • Coronary artery disease involving native coronary artery of native heart without angina pectoris, s/p 1V CABG 1992 07/14/2017   • Anxiety 07/13/2017      Allergies:Patient has no known allergies.    Current Outpatient Medications   Medication Sig Dispense Refill   • omeprazole (PRILOSEC) 20 MG delayed-release capsule TAKE 1 CAPSULE BY MOUTH  EVERY DAY. 90 Cap 3   • atorvastatin (LIPITOR) 40 MG Tab TAKE 1 TABLET BY MOUTH AT  BEDTIME 90 Tab 3   • losartan (COZAAR) 50 MG Tab TAKE 1 TABLET BY MOUTH  DAILY 90 Tab 0   • metoprolol (LOPRESSOR) 50 MG Tab Take 1 Tab by mouth 2 Times a Day. 180 Tab 0   • LEVOXYL 150 MCG Tab Take 1 Tab by mouth Every morning on an empty stomach. 90 Tab 1   • furosemide (LASIX) 40 MG Tab TAKE 1 TABLET BY MOUTH  DAILY 90 Tab 3   • warfarin (COUMADIN) 6 MG Tab Take one tablet daily as directed by [facility]  Indications: Obstructing Blood Clot with Chronic Atrial Fibrillation 90 Tab 3   • spironolactone (ALDACTONE) 25 MG Tab Take 1 Tab by mouth every day. 90 Tab 3   • triamcinolone acetonide (KENALOG)  "0.1 % Cream MAGNUS EXT AA BID FOR 7 TO 10 DAYS THEN PRF FLARES     • vitamin k 100 MCG tablet Take 100 mcg by mouth every day.     • vitamin D (CHOLECALCIFEROL) 1000 UNIT Tab Take 4,000 Units by mouth every day.       No current facility-administered medications for this visit.        Social History     Tobacco Use   • Smoking status: Former Smoker     Packs/day: 1.00     Years: 31.00     Pack years: 31.00     Quit date:      Years since quittin.   • Smokeless tobacco: Never Used   Substance Use Topics   • Alcohol use: No   • Drug use: No     Social History     Social History Narrative   • Not on file       Family History   Problem Relation Age of Onset   • Cancer Mother         cancer, smoker   • Stroke Maternal Grandmother    • Other Other         Crohn   • Kidney Disease Other         kidney transplant       Review of Systems:    Constitutional: No Fevers, Chills  Eyes: No vision changes  ENT: No hearing changes  Resp: No Shortness of breath  CV: No Chest pain  GI: No Nausea/Vomiting  MSK: No weakness  Skin: No rashes  Neuro: No Headaches  Psych: No Suicidal ideations    All remaining systems reviewed and found to be negative, except as stated above.    Exam:    /62   Pulse 89   Temp 36.3 °C (97.3 °F) (Temporal)   Resp 14   Ht 1.6 m (5' 3\")   Wt 90.3 kg (199 lb)   SpO2 96%  Body mass index is 35.25 kg/m².    General:  Well nourished, well developed female in NAD  HENT: Atraumatic, normocephalic  EYES: Extraocular movements intact  NECK: Supple, FROM  CHEST: No deformities, Equal chest expansion  RESP: Unlabored, no stridor or audible wheeze  HEART: Regular Rate and rhythm.   Extremities: No Clubbing, Cyanosis, or Edema  Skin: Warm/dry, without rashes  Neuro: A/O x 4, due to COVID-19- did not have patient remove face mask to test cranial nerves.  Motor/sensory grossly intact  Psych: Normal behavior, normal affect      Lab review:  Labs are reviewed and discussed with a patient  Lab Results   "   Component Value Date/Time    CHOLSTRLTOT 140 09/09/2020 12:30 PM    LDL 77 09/09/2020 12:30 PM    HDL 35 (A) 09/09/2020 12:30 PM    TRIGLYCERIDE 140 09/09/2020 12:30 PM       Lab Results   Component Value Date/Time    SODIUM 137 09/09/2020 12:30 PM    POTASSIUM 4.3 09/09/2020 12:30 PM    CHLORIDE 98 09/09/2020 12:30 PM    CO2 25 09/09/2020 12:30 PM    GLUCOSE 83 09/09/2020 12:30 PM    BUN 24 (H) 09/09/2020 12:30 PM    CREATININE 1.35 09/09/2020 12:30 PM     Lab Results   Component Value Date/Time    ALKPHOSPHAT 102 (H) 09/09/2020 12:30 PM    ASTSGOT 19 09/09/2020 12:30 PM    ALTSGPT 14 09/09/2020 12:30 PM    TBILIRUBIN 1.1 09/09/2020 12:30 PM      No results found for: HBA1C  No results found for: TSH  Lab Results   Component Value Date/Time    FREET4 2.89 (H) 07/12/2017 07:40 AM       Lab Results   Component Value Date/Time    WBC 8.2 09/09/2020 12:30 PM    RBC 4.63 09/09/2020 12:30 PM    HEMOGLOBIN 11.6 (L) 09/09/2020 12:30 PM    HEMATOCRIT 38.0 09/09/2020 12:30 PM    MCV 82.1 09/09/2020 12:30 PM    MCH 25.1 (L) 09/09/2020 12:30 PM    MCHC 30.5 (L) 09/09/2020 12:30 PM    MPV 11.0 09/09/2020 12:30 PM    NEUTSPOLYS 69.70 09/09/2020 12:30 PM    LYMPHOCYTES 13.70 (L) 09/09/2020 12:30 PM    MONOCYTES 10.30 09/09/2020 12:30 PM    EOSINOPHILS 5.00 09/09/2020 12:30 PM    BASOPHILS 1.10 09/09/2020 12:30 PM        Assessment/Plan:  1. Stage 3 chronic kidney disease (HCC)  - Comp Metabolic Panel; Future  Chronic condition, the last 2 months her GFR has been in 30s.  Previously in the 50s.  We discussed at length the need to increase her fluid intake.  She drinks maybe 1 glass a day possibly 8 ounces.  She does have CHF so we do need to balance her fluid retention in addition to her water intake.  And she is on frusemide 40 mg daily that certainly could be an aggravating factor.  We will try increasing her fluids over the next 6 weeks and repeat labs.  Reassess at that time.  2. Hypothyroidism, unspecified type  Chronic  condition.  Stable.  TSH target.  Continue Levoxyl 150 micrograms daily.  3. Mixed hyperlipidemia  Chronic condition.  Stable.  Continue atorvastatin 40 mg daily.    4. Abnormal CBC  - CBC WITH DIFFERENTIAL; Future  - IRON/TOTAL IRON BIND; Future  Suspect her decrease in hemoglobin could be secondary to her CKD.  She also has a history of iron deficiency.  Will screen for this as well.  No evidence of a bleed.  5. Hepatic steatosis  Alk phos slightly elevated history of fatty liver.  Discussed how this could improve with lifestyle modifications.  She is working on this and has lost some weight.  6. Anemia, unspecified type  - CBC WITH DIFFERENTIAL; Future  - IRON/TOTAL IRON BIND; Future    7. Need for vaccination  - Pneumovax Vaccine (PPSV23)       Follow-up: Return in about 8 weeks (around 11/6/2020) for Follow up on labs.    Please note that this dictation was created using voice recognition software. I have made every reasonable attempt to correct obvious errors, but I expect that there are errors of grammar and possibly content that I did not discover before finalizing the note.

## 2020-09-11 NOTE — ASSESSMENT & PLAN NOTE
This is a chronic condition.   Latest Labs:   Lab Results   Component Value Date/Time    CHOLSTRLTOT 140 09/09/2020 12:30 PM    LDL 77 09/09/2020 12:30 PM    HDL 35 (A) 09/09/2020 12:30 PM    TRIGLYCERIDE 140 09/09/2020 12:30 PM      Medications: atorvastatin 40 mg daily  Risk calculator: The 10-year ASCVD risk score (Dave LEROY Jr., et al., 2013) is: 20.7%

## 2020-09-11 NOTE — ASSESSMENT & PLAN NOTE
This is a  chronic condition.   Medication: levoxyl 150 mcg, continue dose.   Last TSH level:   TSH   Date Value Ref Range Status   09/09/2020 1.560 0.380 - 5.330 uIU/mL Final     Comment:     Please note new reference ranges effective 12/14/2017 10:00 AM  Pregnant Females, 1st Trimester  0.050-3.700  Pregnant Females, 2nd Trimester  0.310-4.350  Pregnant Females, 3rd Trimester  0.410-5.180

## 2020-09-17 ENCOUNTER — APPOINTMENT (RX ONLY)
Dept: URBAN - METROPOLITAN AREA CLINIC 20 | Facility: CLINIC | Age: 80
Setting detail: DERMATOLOGY
End: 2020-09-17

## 2020-09-17 DIAGNOSIS — L20.89 OTHER ATOPIC DERMATITIS: ICD-10-CM | Status: INADEQUATELY CONTROLLED

## 2020-09-17 PROBLEM — L20.84 INTRINSIC (ALLERGIC) ECZEMA: Status: ACTIVE | Noted: 2020-09-17

## 2020-09-17 PROCEDURE — ? PRESCRIPTION

## 2020-09-17 PROCEDURE — ? COUNSELING

## 2020-09-17 PROCEDURE — 99214 OFFICE O/P EST MOD 30 MIN: CPT

## 2020-09-17 PROCEDURE — ? ADDITIONAL NOTES

## 2020-09-17 PROCEDURE — ? DIAGNOSIS COMMENT

## 2020-09-17 RX ORDER — CLOBETASOL PROPIONATE 0.5 MG/ML
SOLUTION TOPICAL
Qty: 1 | Refills: 3 | Status: ERX | COMMUNITY
Start: 2020-09-17

## 2020-09-17 RX ADMIN — CLOBETASOL PROPIONATE: 0.5 SOLUTION TOPICAL at 00:00

## 2020-09-17 ASSESSMENT — LOCATION SIMPLE DESCRIPTION DERM
LOCATION SIMPLE: LEFT PRETIBIAL REGION
LOCATION SIMPLE: ABDOMEN
LOCATION SIMPLE: LEFT UPPER BACK
LOCATION SIMPLE: LEFT THIGH

## 2020-09-17 ASSESSMENT — LOCATION DETAILED DESCRIPTION DERM
LOCATION DETAILED: EPIGASTRIC SKIN
LOCATION DETAILED: LEFT PROXIMAL PRETIBIAL REGION
LOCATION DETAILED: LEFT ANTERIOR DISTAL THIGH
LOCATION DETAILED: LEFT MEDIAL UPPER BACK

## 2020-09-17 ASSESSMENT — LOCATION ZONE DERM
LOCATION ZONE: LEG
LOCATION ZONE: TRUNK

## 2020-09-17 NOTE — HPI: RASH (ALLERGIC CONTACT DERMATITIS)
How Severe Is Your Rash?: mild
Is This A New Presentation, Or A Follow-Up?: Rash
Response To Previous Treatment?: topical treatments are usually effective

## 2020-09-17 NOTE — PROCEDURE: ADDITIONAL NOTES
Detail Level: Simple
Additional Notes: Pt has been using soap all over her body.  Last OV discussed however pt does not remember. \\n\\nlengthy discussion about DDX\\n-ACD; possibly pt to stop body products, start clobetasol + cera ve cream (reviewed how to mix)\\n-ID reaction; less likely \\n-Nummular Dermatitis; less likely presentation of rash does not look nummular, also no improvement w/clobetasol\\n-Medication allergy; possible pt is taking several medications known for allergy (Lasix, Levothyroxine, Losartan, warfarin, atorvastatin)\\n\\nOver next week stop body products, if no improvement and worsening consider IM TAC, refer to PCP to start titrations off medications.

## 2020-10-08 ENCOUNTER — ANTICOAGULATION VISIT (OUTPATIENT)
Dept: VASCULAR LAB | Facility: MEDICAL CENTER | Age: 80
End: 2020-10-08
Attending: INTERNAL MEDICINE
Payer: MEDICARE

## 2020-10-08 DIAGNOSIS — I48.0 PAROXYSMAL ATRIAL FIBRILLATION (HCC): ICD-10-CM

## 2020-10-08 LAB — INR PPP: 3.4 (ref 2–3.5)

## 2020-10-08 PROCEDURE — 85610 PROTHROMBIN TIME: CPT

## 2020-10-08 PROCEDURE — 99212 OFFICE O/P EST SF 10 MIN: CPT

## 2020-10-08 NOTE — PROGRESS NOTES
Anticoagulation Summary  As of 10/8/2020    INR goal:  2.0-3.0   TTR:  76.4 % (6.3 mo)   INR used for dosing:  3.40 (10/8/2020)   Warfarin maintenance plan:  3 mg (6 mg x 0.5) every Thu; 6 mg (6 mg x 1) all other days   Weekly warfarin total:  39 mg   Plan last modified:  Sonia Lopez, PharmD (5/21/2020)   Next INR check:  11/5/2020   Target end date:  Indefinite    Indications    Paroxysmal atrial fibrillation (HCC) [I48.0]             Anticoagulation Episode Summary     INR check location:      Preferred lab:      Send INR reminders to:      Comments:        Anticoagulation Care Providers     Provider Role Specialty Phone number    La Paredes M.D. Referring Internal Medicine [phone]    [facility] Anticoagulation Services Responsible  [phone]        Anticoagulation Patient Findings  Patient Findings     Negatives:  Signs/symptoms of thrombosis, Signs/symptoms of bleeding, Laboratory test error suspected, Change in health, Change in alcohol use, Change in activity, Upcoming invasive procedure, Emergency department visit, Upcoming dental procedure, Missed doses, Extra doses, Change in medications, Change in diet/appetite, Hospital admission, Bruising, Other complaints              History of Present Illness: follow up appointment for chronic anticoagulation with the high risk medication, warfarin for atrial fibrillation    Last INR was at goal.  Pt is now supra therapeutic.  Will HOLD dose tonight, then resume current dosing regimen.   Follow up in 4 weeks pt preference , to reduce the risk of adverse events related to this high risk medication, warfarin.    Taylor Cabrales, Clinical Pharmacist

## 2020-10-09 LAB — INR BLD: 3.4 (ref 0.9–1.2)

## 2020-10-13 ENCOUNTER — TELEPHONE (OUTPATIENT)
Dept: VASCULAR LAB | Facility: MEDICAL CENTER | Age: 80
End: 2020-10-13

## 2020-10-15 ENCOUNTER — APPOINTMENT (RX ONLY)
Dept: URBAN - METROPOLITAN AREA CLINIC 20 | Facility: CLINIC | Age: 80
Setting detail: DERMATOLOGY
End: 2020-10-15

## 2020-10-15 DIAGNOSIS — L20.89 OTHER ATOPIC DERMATITIS: ICD-10-CM | Status: IMPROVED

## 2020-10-15 PROBLEM — L20.84 INTRINSIC (ALLERGIC) ECZEMA: Status: ACTIVE | Noted: 2020-10-15

## 2020-10-15 PROCEDURE — 99214 OFFICE O/P EST MOD 30 MIN: CPT

## 2020-10-15 PROCEDURE — ? DIAGNOSIS COMMENT

## 2020-10-15 PROCEDURE — ? COUNSELING

## 2020-10-15 PROCEDURE — ? ADDITIONAL NOTES

## 2020-10-15 ASSESSMENT — LOCATION SIMPLE DESCRIPTION DERM
LOCATION SIMPLE: LEFT UPPER BACK
LOCATION SIMPLE: LEFT THIGH
LOCATION SIMPLE: ABDOMEN
LOCATION SIMPLE: LEFT PRETIBIAL REGION

## 2020-10-15 ASSESSMENT — LOCATION DETAILED DESCRIPTION DERM
LOCATION DETAILED: LEFT PROXIMAL PRETIBIAL REGION
LOCATION DETAILED: EPIGASTRIC SKIN
LOCATION DETAILED: LEFT MEDIAL UPPER BACK
LOCATION DETAILED: LEFT ANTERIOR DISTAL THIGH

## 2020-10-15 ASSESSMENT — LOCATION ZONE DERM
LOCATION ZONE: LEG
LOCATION ZONE: TRUNK

## 2020-10-15 NOTE — HPI: RASH
What Type Of Note Output Would You Prefer (Optional)?: Bullet Format
Is The Patient Presenting As Previously Scheduled?: Yes
How Severe Is Your Rash?: mild
Is This A New Presentation, Or A Follow-Up?: Follow Up Rash
Additional History: Bx proven Spongiotic derm.  Pt was told at [facility] to stop all fragrances, on exam noted strong perfume odor.  She has stopped her perfume and body wash, which has significantly helped with her itch and rash.

## 2020-10-20 ENCOUNTER — TELEPHONE (OUTPATIENT)
Dept: PULMONOLOGY | Facility: HOSPICE | Age: 80
End: 2020-10-20

## 2020-10-21 ENCOUNTER — TELEPHONE (OUTPATIENT)
Dept: PULMONOLOGY | Facility: HOSPICE | Age: 80
End: 2020-10-21

## 2020-10-21 DIAGNOSIS — G47.33 OBSTRUCTIVE SLEEP APNEA SYNDROME: ICD-10-CM

## 2020-10-21 NOTE — TELEPHONE ENCOUNTER
Patient called and wanted supplies for her C-pap. She has been using nasal cannula's INSTEAD of a full face mask which she never could not tolerate.    Order placed for supplies to [facility]

## 2020-10-22 NOTE — TELEPHONE ENCOUNTER
Faxed to [facility] the order for nasal cannula's as requested. Confirmation from [facility] received regarding the order.

## 2020-10-28 ENCOUNTER — ANTICOAGULATION MONITORING (OUTPATIENT)
Dept: MEDICAL GROUP | Facility: PHYSICIAN GROUP | Age: 80
End: 2020-10-28

## 2020-10-28 DIAGNOSIS — I48.0 PAROXYSMAL ATRIAL FIBRILLATION (HCC): ICD-10-CM

## 2020-10-28 LAB — INR PPP: 4.3 (ref 2–3.5)

## 2020-10-28 NOTE — PROGRESS NOTES
Anticoagulation Summary  As of 10/28/2020    INR goal:  2.0-3.0   TTR:  69.1 % (7 mo)   INR used for dosin.30 (10/28/2020)   Warfarin maintenance plan:  3 mg (6 mg x 0.5) every Thu; 6 mg (6 mg x 1) all other days   Weekly warfarin total:  39 mg   Plan last modified:  Sonia Lopez, PharmD (2020)   Next INR check:  2020   Target end date:  Indefinite    Indications    Paroxysmal atrial fibrillation (HCC) [I48.0]             Anticoagulation Episode Summary     INR check location:      Preferred lab:      Send INR reminders to:      Comments:  [facility]      Anticoagulation Care Providers     Provider Role Specialty Phone number    La Paredes M.D. Referring Internal Medicine [phone]    [facility] Anticoagulation Services Responsible  [phone]        Anticoagulation Patient Findings     Spoke with patient today regarding supra therapeutic INR of 4.3.  Patient denies any signs/symptoms of bruising or bleeding. Pt has stopped cooking and has not eaten any vegetables in the last 2 weeks. She also stopped taking her 100 mg vitamin k supplements. Instructed patient to call clinic with any questions or concerns.    Instructed pt to hold dose x 2 then continue with current warfarin dosing regimen. Also suggested she eat some spinach or broccoli this week.     Follow up in 5 days, to reduce risk of adverse events related to this high risk medication,  Warfarin.    Aby Mosqueda, Pharmacy Intern

## 2020-10-31 ENCOUNTER — HOSPITAL ENCOUNTER (OUTPATIENT)
Dept: LAB | Facility: MEDICAL CENTER | Age: 80
End: 2020-10-31
Attending: PHYSICIAN ASSISTANT
Payer: MEDICARE

## 2020-10-31 DIAGNOSIS — D64.9 ANEMIA, UNSPECIFIED TYPE: ICD-10-CM

## 2020-10-31 DIAGNOSIS — R79.89 ABNORMAL CBC: ICD-10-CM

## 2020-10-31 DIAGNOSIS — N18.30 STAGE 3 CHRONIC KIDNEY DISEASE: ICD-10-CM

## 2020-10-31 LAB
ALBUMIN SERPL BCP-MCNC: 4 G/DL (ref 3.2–4.9)
ALBUMIN/GLOB SERPL: 1.3 G/DL
ALP SERPL-CCNC: 86 U/L (ref 30–99)
ALT SERPL-CCNC: 13 U/L (ref 2–50)
ANION GAP SERPL CALC-SCNC: 14 MMOL/L (ref 7–16)
ANISOCYTOSIS BLD QL SMEAR: ABNORMAL
AST SERPL-CCNC: 21 U/L (ref 12–45)
BASOPHILS # BLD AUTO: 0.9 % (ref 0–1.8)
BASOPHILS # BLD: 0.07 K/UL (ref 0–0.12)
BILIRUB SERPL-MCNC: 0.7 MG/DL (ref 0.1–1.5)
BUN SERPL-MCNC: 26 MG/DL (ref 8–22)
CALCIUM SERPL-MCNC: 9 MG/DL (ref 8.5–10.5)
CHLORIDE SERPL-SCNC: 100 MMOL/L (ref 96–112)
CO2 SERPL-SCNC: 24 MMOL/L (ref 20–33)
COMMENT 1642: NORMAL
CREAT SERPL-MCNC: 1.52 MG/DL (ref 0.5–1.4)
EOSINOPHIL # BLD AUTO: 0.36 K/UL (ref 0–0.51)
EOSINOPHIL NFR BLD: 4.7 % (ref 0–6.9)
ERYTHROCYTE [DISTWIDTH] IN BLOOD BY AUTOMATED COUNT: 50.8 FL (ref 35.9–50)
GLOBULIN SER CALC-MCNC: 3 G/DL (ref 1.9–3.5)
GLUCOSE SERPL-MCNC: 95 MG/DL (ref 65–99)
HCT VFR BLD AUTO: 28 % (ref 37–47)
HGB BLD-MCNC: 8.1 G/DL (ref 12–16)
HYPOCHROMIA BLD QL SMEAR: ABNORMAL
IMM GRANULOCYTES # BLD AUTO: 0.05 K/UL (ref 0–0.11)
IMM GRANULOCYTES NFR BLD AUTO: 0.6 % (ref 0–0.9)
IRON SATN MFR SERPL: 5 % (ref 15–55)
IRON SERPL-MCNC: 23 UG/DL (ref 40–170)
LG PLATELETS BLD QL SMEAR: NORMAL
LYMPHOCYTES # BLD AUTO: 1.01 K/UL (ref 1–4.8)
LYMPHOCYTES NFR BLD: 13.1 % (ref 22–41)
MCH RBC QN AUTO: 23.7 PG (ref 27–33)
MCHC RBC AUTO-ENTMCNC: 28.9 G/DL (ref 33.6–35)
MCV RBC AUTO: 81.9 FL (ref 81.4–97.8)
MICROCYTES BLD QL SMEAR: ABNORMAL
MONOCYTES # BLD AUTO: 0.76 K/UL (ref 0–0.85)
MONOCYTES NFR BLD AUTO: 9.9 % (ref 0–13.4)
MORPHOLOGY BLD-IMP: NORMAL
NEUTROPHILS # BLD AUTO: 5.45 K/UL (ref 2–7.15)
NEUTROPHILS NFR BLD: 70.8 % (ref 44–72)
NRBC # BLD AUTO: 0 K/UL
NRBC BLD-RTO: 0 /100 WBC
OVALOCYTES BLD QL SMEAR: NORMAL
PLATELET # BLD AUTO: 369 K/UL (ref 164–446)
PLATELET BLD QL SMEAR: NORMAL
PMV BLD AUTO: 11.2 FL (ref 9–12.9)
POIKILOCYTOSIS BLD QL SMEAR: NORMAL
POLYCHROMASIA BLD QL SMEAR: NORMAL
POTASSIUM SERPL-SCNC: 4.6 MMOL/L (ref 3.6–5.5)
PROT SERPL-MCNC: 7 G/DL (ref 6–8.2)
RBC # BLD AUTO: 3.42 M/UL (ref 4.2–5.4)
RBC BLD AUTO: PRESENT
SCHISTOCYTES BLD QL SMEAR: NORMAL
SODIUM SERPL-SCNC: 138 MMOL/L (ref 135–145)
TIBC SERPL-MCNC: 443 UG/DL (ref 250–450)
UIBC SERPL-MCNC: 420 UG/DL (ref 110–370)
WBC # BLD AUTO: 7.7 K/UL (ref 4.8–10.8)

## 2020-10-31 PROCEDURE — 83550 IRON BINDING TEST: CPT

## 2020-10-31 PROCEDURE — 83540 ASSAY OF IRON: CPT

## 2020-10-31 PROCEDURE — 36415 COLL VENOUS BLD VENIPUNCTURE: CPT

## 2020-10-31 PROCEDURE — 80053 COMPREHEN METABOLIC PANEL: CPT

## 2020-10-31 PROCEDURE — 85025 COMPLETE CBC W/AUTO DIFF WBC: CPT

## 2020-11-02 ENCOUNTER — ANTICOAGULATION MONITORING (OUTPATIENT)
Dept: VASCULAR LAB | Facility: MEDICAL CENTER | Age: 80
End: 2020-11-02

## 2020-11-02 ENCOUNTER — TELEPHONE (OUTPATIENT)
Dept: MEDICAL GROUP | Facility: PHYSICIAN GROUP | Age: 80
End: 2020-11-02

## 2020-11-02 DIAGNOSIS — I48.0 PAROXYSMAL ATRIAL FIBRILLATION (HCC): ICD-10-CM

## 2020-11-02 DIAGNOSIS — N18.32 STAGE 3B CHRONIC KIDNEY DISEASE: ICD-10-CM

## 2020-11-02 LAB — INR PPP: 1.6 (ref 2–3.5)

## 2020-11-02 NOTE — PROGRESS NOTES
Anticoagulation Summary  As of 2020    INR goal:  2.0-3.0   TTR:  68.4 % (7.2 mo)   INR used for dosin.60 (2020)   Warfarin maintenance plan:  3 mg (6 mg x 0.5) every Thu; 6 mg (6 mg x 1) all other days   Weekly warfarin total:  39 mg   Plan last modified:  Dallin Viveros PharmD (2020)   Next INR check:  2020   Target end date:  Indefinite    Indications    Paroxysmal atrial fibrillation (HCC) [I48.0]             Anticoagulation Episode Summary     INR check location:      Preferred lab:      Send INR reminders to:      Comments:  [facility]      Anticoagulation Care Providers     Provider Role Specialty Phone number    MariahRenay Christian Paredes M.D. Referring Internal Medicine [phone]    [facility] Anticoagulation Services Responsible  [phone]        Anticoagulation Patient Findings      Spoke to patient on the phone.   INR  sub-therapeutic.   Denies signs/symptoms of bleeding and/or thrombosis.   Denies changes to diet or medications.   Follow up appointment in 1 week(s).    Continue the same warfarin dose, as noted above, per pt       Dallin Viveros, PharmD, MS, BCACP, LCC    This note was created using voice recognition software (Dragon). The accuracy of the dictation is limited by the abilities of the software. I have reviewed the note prior to signing, however some errors in grammar and context are still possible. If you have any questions related to this note please do not hesitate to contact our office.

## 2020-11-02 NOTE — TELEPHONE ENCOUNTER
----- Message from Shiela Carpenter P.A.-C. sent at 11/2/2020  9:01 AM PST -----  Please call patient about their results.     Results showed:     I see that we have an appointment on November 12, 2020, we will discuss your labs in detail at that appointment.  But your labs do show significant anemia and that your iron is quite low.  I know that you have a history of this as well.  If you are not already would like you to start iron supplementation twice a day, taking it with at least 500 mg of vitamin C.    If you are having any dark tarry stools, or evidence of blood in your stools please be seen immediately at the emergency room.    Your kidney function is still quite low, this could be combination of the dehydration we discussed at your last visit and medications.  I am also going to send a referral to nephrology now for consult.    If you have any questions or concerns, do not hesitate to contact me or my Medical Assistant. Thank you for your time today.     Respectfully,     Shiela Carpenter PA-C

## 2020-11-05 ENCOUNTER — APPOINTMENT (OUTPATIENT)
Dept: VASCULAR LAB | Facility: MEDICAL CENTER | Age: 80
End: 2020-11-05
Payer: MEDICARE

## 2020-11-09 ENCOUNTER — ANTICOAGULATION MONITORING (OUTPATIENT)
Dept: VASCULAR LAB | Facility: MEDICAL CENTER | Age: 80
End: 2020-11-09

## 2020-11-09 DIAGNOSIS — I48.0 PAROXYSMAL ATRIAL FIBRILLATION (HCC): ICD-10-CM

## 2020-11-09 LAB — INR PPP: 1.6 (ref 2–3.5)

## 2020-11-09 NOTE — PROGRESS NOTES
OP   Telephone Anticoagulation Service Note      Anticoagulation Summary  As of 2020    INR goal:  2.0-3.0   TTR:  66.2 % (7.4 mo)   INR used for dosin.60 (2020)   Warfarin maintenance plan:  3 mg (6 mg x 0.5) every Thu; 6 mg (6 mg x 1) all other days   Weekly warfarin total:  39 mg   Plan last modified:  Dallin Viveros PharmD (2020)   Next INR check:  2020   Target end date:  Indefinite    Indications    Paroxysmal atrial fibrillation (HCC) [I48.0]             Anticoagulation Episode Summary     INR check location:      Preferred lab:      Send INR reminders to:      Comments:  [facility]      Anticoagulation Care Providers     Provider Role Specialty Phone number    La Paredes M.D. Referring Internal Medicine [phone]    [facility] Anticoagulation Services Responsible  [phone]        Anticoagulation Patient Findings  Patient Findings     Positives:  Change in diet/appetite        Spoke with the patient on the phone today, reporting a SUB-therapeutic INR of 1.6. Confirmed the current warfarin dosing regimen and patient compliance.  Patient reports eating increased vegetables this week.   Patient denies any interval changes to medications. Patient denies any signs/symptoms of bleeding or clotting.  Patient instructed to bolus with warfarin 9mg TONIGHT ONLY, as a one time boost dose, then to resume her current regimen.  Patient asked to retest again in 1 week.     Linda MelgozaD

## 2020-11-10 NOTE — ASSESSMENT & PLAN NOTE
This is a chronic condition.   Latest Labs:   Lab Results   Component Value Date/Time    CHOLSTRLTOT 140 09/09/2020 12:30 PM    LDL 77 09/09/2020 12:30 PM    HDL 35 (A) 09/09/2020 12:30 PM    TRIGLYCERIDE 140 09/09/2020 12:30 PM      Stable on atorvastatin 40 mg daily.      Risk calculator: The 10-year ASCVD risk score (Dave LEROY Jr., et al., 2013) is: 29.9%

## 2020-11-10 NOTE — ASSESSMENT & PLAN NOTE
Chronic condition.  Currently on losartan 50 mg, metoprolol 50 mg daily.  Also on Lasix and spironolactone with CHF.  Patient denies any chest pain, shortness of breath, headache or dizziness.

## 2020-11-10 NOTE — ASSESSMENT & PLAN NOTE
Labs do indicate significant iron deficiency anemia with an iron saturation at 5%.  Total iron at 23.  CBC shows white blood cell count at 3.42, hemoglobin 8.1 hematocrit 28.0.  Upon receiving the patient's lab work I started her on iron supplementation immediately.  She should be taking iron twice a day with 500 mg of vitamin C.    No dark tarry stools.

## 2020-11-10 NOTE — ASSESSMENT & PLAN NOTE
At her last visit we discussed extensively a balance between fluid overload and adequate hydration.  Patient admits that she is chronically dehydrated.  Unfortunately GFR is worsened from 38 now at 33.  Her labs on October 31, 2020.  BUN elevated at 26 and creatinine at 1.52.  I have already referred her to nephrology.

## 2020-11-11 RX ORDER — METOPROLOL TARTRATE 50 MG/1
TABLET, FILM COATED ORAL
Qty: 180 TAB | Refills: 1 | Status: SHIPPED | OUTPATIENT
Start: 2020-11-11 | End: 2021-03-25 | Stop reason: SDUPTHER

## 2020-11-12 ENCOUNTER — OFFICE VISIT (OUTPATIENT)
Dept: MEDICAL GROUP | Facility: PHYSICIAN GROUP | Age: 80
End: 2020-11-12
Payer: MEDICARE

## 2020-11-12 VITALS
DIASTOLIC BLOOD PRESSURE: 60 MMHG | RESPIRATION RATE: 14 BRPM | HEART RATE: 62 BPM | WEIGHT: 200 LBS | TEMPERATURE: 97.5 F | HEIGHT: 63 IN | OXYGEN SATURATION: 100 % | BODY MASS INDEX: 35.44 KG/M2 | SYSTOLIC BLOOD PRESSURE: 120 MMHG

## 2020-11-12 DIAGNOSIS — I10 ESSENTIAL HYPERTENSION: ICD-10-CM

## 2020-11-12 DIAGNOSIS — E78.2 MIXED HYPERLIPIDEMIA: ICD-10-CM

## 2020-11-12 DIAGNOSIS — N18.32 STAGE 3B CHRONIC KIDNEY DISEASE: ICD-10-CM

## 2020-11-12 DIAGNOSIS — I25.10 CORONARY ARTERY DISEASE INVOLVING NATIVE CORONARY ARTERY OF NATIVE HEART WITHOUT ANGINA PECTORIS: ICD-10-CM

## 2020-11-12 DIAGNOSIS — D50.9 MICROCYTIC ANEMIA: ICD-10-CM

## 2020-11-12 DIAGNOSIS — I27.20 MODERATE TO SEVERE PULMONARY HYPERTENSION (HCC): ICD-10-CM

## 2020-11-12 PROCEDURE — 99214 OFFICE O/P EST MOD 30 MIN: CPT | Performed by: PHYSICIAN ASSISTANT

## 2020-11-12 RX ORDER — DOCUSATE SODIUM 100 MG/1
100 CAPSULE, LIQUID FILLED ORAL 2 TIMES DAILY PRN
Qty: 60 CAP | Refills: 1 | Status: SHIPPED | OUTPATIENT
Start: 2020-11-12 | End: 2020-12-29

## 2020-11-12 RX ORDER — CLOBETASOL PROPIONATE 0.5 MG/G
1 OINTMENT TOPICAL 2 TIMES DAILY
Qty: 60 G | Refills: 1 | Status: SHIPPED | OUTPATIENT
Start: 2020-11-12 | End: 2021-01-28

## 2020-11-12 RX ORDER — FERROUS SULFATE 325(65) MG
325 TABLET ORAL 2 TIMES DAILY
Qty: 60 TAB | Refills: 3 | Status: SHIPPED | OUTPATIENT
Start: 2020-11-12 | End: 2021-04-20

## 2020-11-12 RX ORDER — FUROSEMIDE 40 MG/1
TABLET ORAL
Qty: 90 TAB | Refills: 3 | Status: SHIPPED | OUTPATIENT
Start: 2020-11-12 | End: 2021-02-26

## 2020-11-12 RX ORDER — ASCORBIC ACID 500 MG
500 TABLET ORAL 2 TIMES DAILY
Qty: 60 TAB | Refills: 3 | Status: SHIPPED | OUTPATIENT
Start: 2020-11-12 | End: 2021-04-20

## 2020-11-12 ASSESSMENT — FIBROSIS 4 INDEX: FIB4 SCORE: 1.23

## 2020-11-12 NOTE — PROGRESS NOTES
CC:   Chief Complaint   Patient presents with   • Lab Results   • Anemia          HISTORY OF PRESENT ILLNESS: Patient is a 80 y.o. female established patient who presents today to follow up labs.     Health Maintenance: Completed    Stage 3 chronic kidney disease (HCC)  At her last visit we discussed extensively a balance between fluid overload and adequate hydration.  Patient admits that she is chronically dehydrated.  Unfortunately GFR is worsened from 38 now at 33.  Her labs on October 31, 2020.  BUN elevated at 26 and creatinine at 1.52.  I have already referred her to nephrology.    Microcytic anemia  Labs do indicate significant iron deficiency anemia with an iron saturation at 5%.  Total iron at 23.  CBC shows white blood cell count at 3.42, hemoglobin 8.1 hematocrit 28.0.  Upon receiving the patient's lab work I started her on iron supplementation immediately.  She should be taking iron twice a day with 500 mg of vitamin C.    No dark tarry stools.     Essential hypertension  Chronic condition.  Currently on losartan 50 mg, metoprolol 50 mg daily.  Also on Lasix and spironolactone with CHF.  Patient denies any chest pain, shortness of breath, headache or dizziness.    Mixed hyperlipidemia  This is a chronic condition.   Latest Labs:   Lab Results   Component Value Date/Time    CHOLSTRLTOT 140 09/09/2020 12:30 PM    LDL 77 09/09/2020 12:30 PM    HDL 35 (A) 09/09/2020 12:30 PM    TRIGLYCERIDE 140 09/09/2020 12:30 PM      Stable on atorvastatin 40 mg daily.      Risk calculator: The 10-year ASCVD risk score ([place] LEROY Jr., et al., 2013) is: 29.9%         Patient Active Problem List    Diagnosis Date Noted   • Microcytic anemia 07/13/2017     Priority: Low   • Stage 3 chronic kidney disease 09/11/2020   • Gastroesophageal reflux disease without esophagitis 06/08/2020   • Abnormal CBC 06/08/2020   • Malignant melanoma of left upper extremity including shoulder (HCC) 06/08/2020   • Abnormal CT of the abdomen  04/17/2020   • Mixed hyperlipidemia 04/17/2020   • Hepatic steatosis 04/17/2020   • Elevated alkaline phosphatase level 04/17/2020   • Elevated troponin 04/08/2020   • Anemia 04/08/2020   • Hx of melanoma excision 03/12/2020   • Skin lesions 03/12/2020   • Essential hypertension 03/12/2020   • Hypothyroidism 03/12/2020   • Obesity (BMI 35.0-39.9 without comorbidity) ([facility]) 03/12/2020   • Obstructive sleep apnea syndrome 03/12/2020   • Severe mitral regurgitation 07/14/2017   • Moderate to severe pulmonary hypertension ([facility]) 07/14/2017   • Chronic combined systolic and diastolic congestive heart failure ([facility]) 07/14/2017   • Paroxysmal atrial fibrillation ([facility]) 07/14/2017   • Coronary artery disease involving native coronary artery of native heart without angina pectoris, s/p 1V CABG 1992 07/14/2017   • Anxiety 07/13/2017      Allergies:Patient has no known allergies.    Current Outpatient Medications   Medication Sig Dispense Refill   • ferrous sulfate 325 (65 Fe) MG tablet Take 1 Tab by mouth 2 Times a Day. 60 Tab 3   • ascorbic acid (VITAMIN C) 500 MG tablet Take 1 Tab by mouth 2 times a day. 60 Tab 3   • docusate sodium (COLACE) 100 MG Cap Take 1 Cap by mouth 2 times a day as needed for Constipation. 60 Cap 1   • furosemide (LASIX) 40 MG Tab TAKE 1 TABLET BY MOUTH DAILY 90 Tab 3   • clobetasol (TEMOVATE) 0.05 % Ointment Apply 1 g topically 2 times a day. 60 g 1   • metoprolol (LOPRESSOR) 50 MG Tab TAKE 1 TABLET BY MOUTH TWICE DAILY 180 Tab 1   • omeprazole (PRILOSEC) 20 MG delayed-release capsule TAKE 1 CAPSULE BY MOUTH  EVERY DAY. 90 Cap 3   • atorvastatin (LIPITOR) 40 MG Tab TAKE 1 TABLET BY MOUTH AT  BEDTIME 90 Tab 3   • losartan (COZAAR) 50 MG Tab TAKE 1 TABLET BY MOUTH  DAILY 90 Tab 0   • LEVOXYL 150 MCG Tab Take 1 Tab by mouth Every morning on an empty stomach. 90 Tab 1   • warfarin (COUMADIN) 6 MG Tab Take one tablet daily as directed by [facility]  Indications: Obstructing Blood Clot  "with Chronic Atrial Fibrillation 90 Tab 3   • spironolactone (ALDACTONE) 25 MG Tab Take 1 Tab by mouth every day. 90 Tab 3   • vitamin k 100 MCG tablet Take 100 mcg by mouth every day.     • vitamin D (CHOLECALCIFEROL) 1000 UNIT Tab Take 4,000 Units by mouth every day.     • triamcinolone acetonide (KENALOG) 0.1 % Cream MAGNUS EXT AA BID FOR 7 TO 10 DAYS THEN PRF FLARES       No current facility-administered medications for this visit.        Social History     Tobacco Use   • Smoking status: Former Smoker     Packs/day: 1.00     Years: 31.     Pack years: 31.00     Quit date:      Years since quittin.8   • Smokeless tobacco: Never Used   Substance Use Topics   • Alcohol use: No   • Drug use: No     Social History     Social History Narrative   • Not on file       Family History   Problem Relation Age of Onset   • Cancer Mother         cancer, smoker   • Stroke Maternal Grandmother    • Other Other         Crohn   • Kidney Disease Other         kidney transplant       Review of Systems:    Constitutional: No Fevers, Chills  Eyes: No vision changes  ENT: No hearing changes  Resp: No Shortness of breath  CV: No Chest pain  GI: No Nausea/Vomiting  MSK: No weakness  Skin: No rashes  Neuro: No Headaches  Psych: No Suicidal ideations    All remaining systems reviewed and found to be negative, except as stated above.    Exam:    /60   Pulse 62   Temp 36.4 °C (97.5 °F) (Temporal)   Resp 14   Ht 1.6 m (5' 3\")   Wt 90.7 kg (200 lb)   SpO2 100%  Body mass index is 35.43 kg/m².    General:  Well nourished, well developed female in NAD. Pallor   HENT: Atraumatic, normocephalic  EYES: Extraocular movements intact  NECK: Supple, FROM  CHEST: No deformities, Equal chest expansion  RESP: Unlabored, no stridor or audible wheeze  HEART: Regular Rate and rhythm.   Extremities: No Clubbing, Cyanosis, or Edema  Skin: Warm/dry, without rashes  Neuro: A/O x 4, due to COVID-19- did not have patient remove face mask to " test cranial nerves.  Motor/sensory grossly intact  Psych: Normal behavior, normal affect      Lab review:  Labs are reviewed and discussed with a patient  Lab Results   Component Value Date/Time    CHOLSTRLTOT 140 09/09/2020 12:30 PM    LDL 77 09/09/2020 12:30 PM    HDL 35 (A) 09/09/2020 12:30 PM    TRIGLYCERIDE 140 09/09/2020 12:30 PM       Lab Results   Component Value Date/Time    SODIUM 138 10/31/2020 10:54 AM    POTASSIUM 4.6 10/31/2020 10:54 AM    CHLORIDE 100 10/31/2020 10:54 AM    CO2 24 10/31/2020 10:54 AM    GLUCOSE 95 10/31/2020 10:54 AM    BUN 26 (H) 10/31/2020 10:54 AM    CREATININE 1.52 (H) 10/31/2020 10:54 AM     Lab Results   Component Value Date/Time    ALKPHOSPHAT 86 10/31/2020 10:54 AM    ASTSGOT 21 10/31/2020 10:54 AM    ALTSGPT 13 10/31/2020 10:54 AM    TBILIRUBIN 0.7 10/31/2020 10:54 AM      No results found for: HBA1C  No results found for: TSH  Lab Results   Component Value Date/Time    FREET4 2.89 (H) 07/12/2017 07:40 AM       Lab Results   Component Value Date/Time    WBC 7.7 10/31/2020 10:54 AM    RBC 3.42 (L) 10/31/2020 10:54 AM    HEMOGLOBIN 8.1 (L) 10/31/2020 10:54 AM    HEMATOCRIT 28.0 (L) 10/31/2020 10:54 AM    MCV 81.9 10/31/2020 10:54 AM    MCH 23.7 (L) 10/31/2020 10:54 AM    MCHC 28.9 (L) 10/31/2020 10:54 AM    MPV 11.2 10/31/2020 10:54 AM    NEUTSPOLYS 70.80 10/31/2020 10:54 AM    LYMPHOCYTES 13.10 (L) 10/31/2020 10:54 AM    MONOCYTES 9.90 10/31/2020 10:54 AM    EOSINOPHILS 4.70 10/31/2020 10:54 AM    BASOPHILS 0.90 10/31/2020 10:54 AM    HYPOCHROMIA 1+ 10/31/2020 10:54 AM    ANISOCYTOSIS 1+ 10/31/2020 10:54 AM          Assessment/Plan:  1. Stage 3b chronic kidney disease  Referred to nephrology, printed referral information out for patient today instructed her to call.  Would like to consult with him on this matter.  Patient has been maintaining her hydration levels and trying to balance her fluid overload risk as well.  GFR now at 33.  In addition she does have significant iron  deficiency anemia could be complicated by the chronic kidney disease.  Patient would like to see Dr. Davis at [facility], her  goes to him as well.     2. Microcytic anemia  - CBC WITH DIFFERENTIAL; Future  - IRON/TOTAL IRON BIND; Future  - RETICULOCYTES COUNT; Future  - FERRITIN; Future  Start iron supplementation immediately we will do ferrous sulfate 325 mg twice a day with 500 mg vitamin C twice a day.  Wrote patient prescription for Colace to use as needed for any constipation issues.  Once again patient denies any dark tarry stools.  She has been diligent on watching for this but she is on a Coumadin regimen.  Discussed with patient today possible referral to GI to rule out slow GI bleed.  We will start with iron supplementation first and watch this very closely.  I want her to repeat labs in 4 weeks    3. Essential hypertension  Chronic condition.  Stable.  Continue metoprolol 50 mg, and losartan 50 mg daily.  Patient is also on furosemide 40 mg and spironolactone 25 mg.  She follows up with her cardiologist beginning December.    4. Mixed hyperlipidemia  Chronic condition.  Stable.  Continue atorvastatin 40 mg daily.    5. Coronary artery disease involving native coronary artery of native heart without angina pectoris, s/p 1V CABG 1992  - furosemide (LASIX) 40 MG Tab; TAKE 1 TABLET BY MOUTH DAILY  Dispense: 90 Tab; Refill: 3    6. Moderate to severe pulmonary hypertension (HCC)  - furosemide (LASIX) 40 MG Tab; TAKE 1 TABLET BY MOUTH DAILY  Dispense: 90 Tab; Refill: 3    Other orders  - ferrous sulfate 325 (65 Fe) MG tablet; Take 1 Tab by mouth 2 Times a Day.  Dispense: 60 Tab; Refill: 3  - ascorbic acid (VITAMIN C) 500 MG tablet; Take 1 Tab by mouth 2 times a day.  Dispense: 60 Tab; Refill: 3  - docusate sodium (COLACE) 100 MG Cap; Take 1 Cap by mouth 2 times a day as needed for Constipation.  Dispense: 60 Cap; Refill: 1  - clobetasol (TEMOVATE) 0.05 % Ointment; Apply 1 g topically 2 times a  day.  Dispense: 60 g; Refill: 1       Follow-up: Return in about 6 weeks (around 12/24/2020) for Follow up on labs.    Please note that this dictation was created using voice recognition software. I have made every reasonable attempt to correct obvious errors, but I expect that there are errors of grammar and possibly content that I did not discover before finalizing the note.

## 2020-11-16 ENCOUNTER — ANTICOAGULATION MONITORING (OUTPATIENT)
Dept: VASCULAR LAB | Facility: MEDICAL CENTER | Age: 80
End: 2020-11-16

## 2020-11-16 DIAGNOSIS — I48.0 PAROXYSMAL ATRIAL FIBRILLATION (HCC): ICD-10-CM

## 2020-11-16 LAB — INR PPP: 2.2 (ref 2–3.5)

## 2020-11-17 NOTE — PROGRESS NOTES
OP Telephone Anticoagulation Service Note    Date: 2020      Anticoagulation Summary  As of 2020    INR goal:  2.0-3.0   TTR:  65.2 % (7.6 mo)   INR used for dosin.20 (2020)   Warfarin maintenance plan:  3 mg (6 mg x 0.5) every Thu; 6 mg (6 mg x 1) all other days   Weekly warfarin total:  39 mg   Plan last modified:  Dallin Viveros PharmD (2020)   Next INR check:  2020   Target end date:  Indefinite    Indications    Paroxysmal atrial fibrillation (HCC) [I48.0]             Anticoagulation Episode Summary     INR check location:      Preferred lab:      Send INR reminders to:      Comments:  [facility]      Anticoagulation Care Providers     Provider Role Specialty Phone number    La Paredes M.D. Referring Internal Medicine [phone]    [facility] Responsible  [phone]        Anticoagulation Patient Findings        Plan: Spoke with patient on the phone. Patient is  therapeutic today. Confirmed dosing. No missed tablets in the last week. Patient denies any changes in medications or diet. Patient denies any signs or symptoms of bleeding or clotting. Instructed patient to call clinic if any unusual bleeding or bruising occurs. Will continue dosing as outlined. Will follow-up with patient in 2 week(s).              Celina Diego PharmD

## 2020-11-18 DIAGNOSIS — I10 ESSENTIAL HYPERTENSION: ICD-10-CM

## 2020-11-19 RX ORDER — LOSARTAN POTASSIUM 50 MG/1
TABLET ORAL
Qty: 90 TAB | Refills: 0 | Status: SHIPPED | OUTPATIENT
Start: 2020-11-19 | End: 2020-12-22 | Stop reason: SDUPTHER

## 2020-11-20 NOTE — TELEPHONE ENCOUNTER
Last seen by PCP 11/12/2020.   Last Blood Pressure reading was 120/60 on 11/12/2020    Lab Results   Component Value Date/Time    SODIUM 138 10/31/2020 10:54 AM    POTASSIUM 4.6 10/31/2020 10:54 AM    CHLORIDE 100 10/31/2020 10:54 AM    CO2 24 10/31/2020 10:54 AM    GLUCOSE 95 10/31/2020 10:54 AM    BUN 26 (H) 10/31/2020 10:54 AM    CREATININE 1.52 (H) 10/31/2020 10:54 AM

## 2020-11-23 ENCOUNTER — ANTICOAGULATION MONITORING (OUTPATIENT)
Dept: VASCULAR LAB | Facility: MEDICAL CENTER | Age: 80
End: 2020-11-23

## 2020-11-23 DIAGNOSIS — I48.0 PAROXYSMAL ATRIAL FIBRILLATION (HCC): ICD-10-CM

## 2020-11-23 LAB — INR PPP: 1.9 (ref 2–3.5)

## 2020-11-23 NOTE — PROGRESS NOTES
Anticoagulation Summary  As of 2020    INR goal:  2.0-3.0   TTR:  65.3 % (7.9 mo)   INR used for dosin.90 (2020)   Warfarin maintenance plan:  6 mg (6 mg x 1) every day   Weekly warfarin total:  42 mg   Plan last modified:  Nedra Mora PharmD (2020)   Next INR check:  2020   Target end date:  Indefinite    Indications    Paroxysmal atrial fibrillation (HCC) [I48.0]             Anticoagulation Episode Summary     INR check location:      Preferred lab:      Send INR reminders to:      Comments:  [facility]      Anticoagulation Care Providers     Provider Role Specialty Phone number    Slimeaisha Christian Paredes M.D. Referring Internal Medicine [phone]    [facility] Responsible  [phone]        Anticoagulation Patient Findings      Spoke with pt.  INR is subtherapeutic.   Pt denies any unusual s/s of bleeding, bruising, clotting or any changes to diet or medications. Denies any etoh, cranberries, supplements, or illness.   Pt verifies warfarin weekly dosing.     Will have pt increase dosing by 7%    Repeat INR in 1 week(s).     Nedra Mora, PharmD

## 2020-11-24 ENCOUNTER — OFFICE VISIT (OUTPATIENT)
Dept: NEPHROLOGY | Facility: MEDICAL CENTER | Age: 80
End: 2020-11-24
Payer: MEDICARE

## 2020-11-24 VITALS
OXYGEN SATURATION: 95 % | SYSTOLIC BLOOD PRESSURE: 112 MMHG | WEIGHT: 199.8 LBS | HEART RATE: 89 BPM | BODY MASS INDEX: 35.39 KG/M2 | DIASTOLIC BLOOD PRESSURE: 66 MMHG | TEMPERATURE: 97.2 F

## 2020-11-24 DIAGNOSIS — I10 ESSENTIAL HYPERTENSION: ICD-10-CM

## 2020-11-24 DIAGNOSIS — I27.20 MODERATE TO SEVERE PULMONARY HYPERTENSION (HCC): ICD-10-CM

## 2020-11-24 DIAGNOSIS — N17.9 AKI (ACUTE KIDNEY INJURY) (HCC): ICD-10-CM

## 2020-11-24 DIAGNOSIS — Z86.39 HISTORY OF VITAMIN D DEFICIENCY: ICD-10-CM

## 2020-11-24 DIAGNOSIS — D64.9 ANEMIA, UNSPECIFIED TYPE: ICD-10-CM

## 2020-11-24 DIAGNOSIS — I50.42 CHRONIC COMBINED SYSTOLIC AND DIASTOLIC CONGESTIVE HEART FAILURE (HCC): ICD-10-CM

## 2020-11-24 PROCEDURE — 99204 OFFICE O/P NEW MOD 45 MIN: CPT | Performed by: INTERNAL MEDICINE

## 2020-11-24 ASSESSMENT — ENCOUNTER SYMPTOMS
FEVER: 0
SHORTNESS OF BREATH: 1
ABDOMINAL PAIN: 0

## 2020-11-24 ASSESSMENT — FIBROSIS 4 INDEX: FIB4 SCORE: 1.23

## 2020-11-25 ENCOUNTER — HOSPITAL ENCOUNTER (OUTPATIENT)
Dept: LAB | Facility: MEDICAL CENTER | Age: 80
End: 2020-11-25
Attending: INTERNAL MEDICINE
Payer: MEDICARE

## 2020-11-25 DIAGNOSIS — N17.9 AKI (ACUTE KIDNEY INJURY) (HCC): ICD-10-CM

## 2020-11-25 LAB
ANION GAP SERPL CALC-SCNC: 12 MMOL/L (ref 7–16)
APPEARANCE UR: CLEAR
BACTERIA #/AREA URNS HPF: NEGATIVE /HPF
BILIRUB UR QL STRIP.AUTO: NEGATIVE
BUN SERPL-MCNC: 24 MG/DL (ref 8–22)
CALCIUM SERPL-MCNC: 9.5 MG/DL (ref 8.5–10.5)
CHLORIDE SERPL-SCNC: 100 MMOL/L (ref 96–112)
CO2 SERPL-SCNC: 25 MMOL/L (ref 20–33)
COLOR UR: YELLOW
CREAT SERPL-MCNC: 1.4 MG/DL (ref 0.5–1.4)
CREAT UR-MCNC: 19.62 MG/DL
CREAT UR-MCNC: 19.89 MG/DL
EPI CELLS #/AREA URNS HPF: NORMAL /HPF
GLUCOSE SERPL-MCNC: 114 MG/DL (ref 65–99)
GLUCOSE UR STRIP.AUTO-MCNC: NEGATIVE MG/DL
HYALINE CASTS #/AREA URNS LPF: NORMAL /LPF
KETONES UR STRIP.AUTO-MCNC: NEGATIVE MG/DL
LEUKOCYTE ESTERASE UR QL STRIP.AUTO: ABNORMAL
MICRO URNS: ABNORMAL
MICROALBUMIN UR-MCNC: <1.2 MG/DL
MICROALBUMIN/CREAT UR: NORMAL MG/G (ref 0–30)
NITRITE UR QL STRIP.AUTO: NEGATIVE
PH UR STRIP.AUTO: 6.5 [PH] (ref 5–8)
POTASSIUM SERPL-SCNC: 4.1 MMOL/L (ref 3.6–5.5)
PROT UR QL STRIP: NEGATIVE MG/DL
PROT UR-MCNC: <4 MG/DL (ref 0–15)
PROT/CREAT UR: NORMAL MG/G (ref 10–107)
RBC # URNS HPF: NORMAL /HPF
RBC UR QL AUTO: NEGATIVE
SODIUM SERPL-SCNC: 137 MMOL/L (ref 135–145)
SP GR UR STRIP.AUTO: 1.01
UROBILINOGEN UR STRIP.AUTO-MCNC: 0.2 MG/DL
WBC #/AREA URNS HPF: NORMAL /HPF

## 2020-11-25 PROCEDURE — 82570 ASSAY OF URINE CREATININE: CPT | Mod: 91

## 2020-11-25 PROCEDURE — 81001 URINALYSIS AUTO W/SCOPE: CPT

## 2020-11-25 PROCEDURE — 84156 ASSAY OF PROTEIN URINE: CPT

## 2020-11-25 PROCEDURE — 82043 UR ALBUMIN QUANTITATIVE: CPT

## 2020-11-25 PROCEDURE — 36415 COLL VENOUS BLD VENIPUNCTURE: CPT

## 2020-11-25 PROCEDURE — 80048 BASIC METABOLIC PNL TOTAL CA: CPT

## 2020-11-25 NOTE — PROGRESS NOTES
Chief Complaint   Patient presents with   • New Patient     CC: my kidney labs are getting worse  Requesting Provider: Shiela Carpenter P.A.*    HPI:  Aleshia Crooks is a 80 y.o. female who presents today to establish nephrology care.     Re: HTN, diagnosed 1992 when she had a heart attack. BP control over the years has been well controlled. She doesn't check BP at home.     Re: CHF, noted in 2017 and 2020. She follows with Dr. Villar. She might have leaky valves. She is on lasix 40mg daily, and feels a diuretic effect from it. She is also on spironolactone.     Re: CKD, notably worsening in the last year. She denies urinary difficulties. She takes Tylenol PRN.  She denies NSAIDs. She has a new rash on legs for the last year or so. She denies history of kidney stones.     Re: Anemia, she denies bleeding. She is on iron BID just for the last two weeks.       Past Medical History:   Diagnosis Date   • Apnea, sleep    • Atrial fibrillation (HCC)    • Gasping for breath    • Heart attack (HCC)    • Hyperlipidemia    • Hypertension    • Thyroid disease        Past Surgical History:   Procedure Laterality Date   • CHOLECYSTECTOMY     • EYE SURGERY Bilateral     cataracts   • MULTIPLE CORONARY ARTERY BYPASS      1 bypass   • THYROIDECTOMY TOTAL          Outpatient Encounter Medications as of 11/24/2020   Medication Sig Dispense Refill   • losartan (COZAAR) 50 MG Tab TAKE 1 TABLET BY MOUTH DAILY 90 Tab 0   • ferrous sulfate 325 (65 Fe) MG tablet Take 1 Tab by mouth 2 Times a Day. 60 Tab 3   • ascorbic acid (VITAMIN C) 500 MG tablet Take 1 Tab by mouth 2 times a day. 60 Tab 3   • docusate sodium (COLACE) 100 MG Cap Take 1 Cap by mouth 2 times a day as needed for Constipation. 60 Cap 1   • furosemide (LASIX) 40 MG Tab TAKE 1 TABLET BY MOUTH DAILY 90 Tab 3   • clobetasol (TEMOVATE) 0.05 % Ointment Apply 1 g topically 2 times a day. 60 g 1   • metoprolol (LOPRESSOR) 50 MG Tab TAKE 1 TABLET BY MOUTH TWICE DAILY 180 Tab 1   •  omeprazole (PRILOSEC) 20 MG delayed-release capsule TAKE 1 CAPSULE BY MOUTH  EVERY DAY. 90 Cap 3   • atorvastatin (LIPITOR) 40 MG Tab TAKE 1 TABLET BY MOUTH AT  BEDTIME 90 Tab 3   • LEVOXYL 150 MCG Tab Take 1 Tab by mouth Every morning on an empty stomach. 90 Tab 1   • warfarin (COUMADIN) 6 MG Tab Take one tablet daily as directed by [facility]  Indications: Obstructing Blood Clot with Chronic Atrial Fibrillation 90 Tab 3   • spironolactone (ALDACTONE) 25 MG Tab Take 1 Tab by mouth every day. 90 Tab 3   • triamcinolone acetonide (KENALOG) 0.1 % Cream MAGNUS EXT AA BID FOR 7 TO 10 DAYS THEN PRF FLARES     • vitamin k 100 MCG tablet Take 100 mcg by mouth every day.     • vitamin D (CHOLECALCIFEROL) 1000 UNIT Tab Take 4,000 Units by mouth every day.       No facility-administered encounter medications on file as of 2020.         No Known Allergies    Social History     Socioeconomic History   • Marital status:      Spouse name: Not on file   • Number of children: Not on file   • Years of education: Not on file   • Highest education level: Not on file   Occupational History     Comment: [employer]   Social Needs   • Financial resource strain: Not on file   • Food insecurity     Worry: Not on file     Inability: Not on file   • Transportation needs     Medical: Not on file     Non-medical: Not on file   Tobacco Use   • Smoking status: Former Smoker     Packs/day: 1.00     Years: 31.00     Pack years: 31.00     Quit date:      Years since quittin.9   • Smokeless tobacco: Never Used   Substance and Sexual Activity   • Alcohol use: No   • Drug use: No   • Sexual activity: Not Currently     Partners: Male   Lifestyle   • Physical activity     Days per week: Not on file     Minutes per session: Not on file   • Stress: Not on file   Relationships   • Social connections     Talks on phone: Not on file     Gets together: Not on file     Attends Orthodoxy service: Not on file     Active  "member of club or organization: Not on file     Attends meetings of clubs or organizations: Not on file     Relationship status: Not on file   • Intimate partner violence     Fear of current or ex partner: Not on file     Emotionally abused: Not on file     Physically abused: Not on file     Forced sexual activity: Not on file   Other Topics Concern   • Not on file   Social History Narrative   • Not on file       Family History   Problem Relation Age of Onset   • Cancer Mother         cancer, smoker   • Stroke Maternal Grandmother    • Other Other         Crohn   • Kidney Disease Other         kidney transplant       Review of Systems   Constitutional: Negative for fever.   Respiratory: Positive for shortness of breath (\"for 3 years.\").    Cardiovascular: Negative for chest pain.   Gastrointestinal: Negative for abdominal pain.   Genitourinary: Negative for dysuria.   All other systems reviewed and are negative.      /66 (BP Location: Right arm, Patient Position: Sitting, BP Cuff Size: Adult long)   Pulse 89   Temp 36.2 °C (97.2 °F) (Temporal)   Wt 90.6 kg (199 lb 12.8 oz)   LMP  (LMP Unknown)   SpO2 95%   BMI 35.39 kg/m²     Physical Exam   Constitutional: She is oriented to person, place, and time and well-developed, well-nourished, and in no distress. No distress.   HENT:   Mouth/Throat: Oropharynx is clear and moist. No oropharyngeal exudate.   Eyes: EOM are normal. No scleral icterus.   Neck: Neck supple. No tracheal deviation present.   Cardiovascular: Normal rate.   Murmur heard.  irreg irreg   Pulmonary/Chest: Effort normal and breath sounds normal. No stridor. She has no rales.   Abdominal: Soft. Bowel sounds are normal. There is no abdominal tenderness.   Musculoskeletal: Normal range of motion.         General: Edema (trace bilat LE) present.   Neurological: She is alert and oriented to person, place, and time.   Skin: Skin is warm and dry. Rash (red rash on bilat legs) noted.   Psychiatric: " Mood and affect normal.         Labs reviewed.    Recent Labs     04/20/20  0817 04/20/20  0819 08/24/20  1258 09/09/20  1230 10/31/20  1054   ALBUMIN  --  4.1 4.3 4.6 4.0   HDL 36* 35*  --  35*  --    TRIGLYCERIDE 145 141  --  140  --    SODIUM 142  --  141 137 138   POTASSIUM 3.7  --  4.1 4.3 4.6   CHLORIDE 103  --  102 98 100   CO2 25  --  24 25 24   BUN 23*  --  24* 24* 26*   CREATININE 1.04  --  1.39 1.35 1.52*       Lab Results   Component Value Date/Time    WBC 7.7 10/31/2020 10:54 AM    RBC 3.42 (L) 10/31/2020 10:54 AM    HEMOGLOBIN 8.1 (L) 10/31/2020 10:54 AM    HEMATOCRIT 28.0 (L) 10/31/2020 10:54 AM    MCV 81.9 10/31/2020 10:54 AM    MCH 23.7 (L) 10/31/2020 10:54 AM    MCHC 28.9 (L) 10/31/2020 10:54 AM    MPV 11.2 10/31/2020 10:54 AM           URINALYSIS:  No results found for: COLORURINE, CLARITY, SPECGRAVITY, PHURINE, KETONES, PROTEINURIN, BILIRUBINUR, UROBILU, NITRITE, LEUKESTERAS, OCCULTBLOOD  UPC  No results found for: TOTPROTUR No results found for: CREATININEU      Imaging reviewed  No orders to display         Assessment:  Aleshia Crooks is a 80 y.o. female who presents today to establish nephrology care.     Plan:  1. SUNNY (acute kidney injury) (HCC)  -Unclear etiology of worsening SUNNY.  Patient does have a rash, which raises concern for autoimmune disease.  Check urinalysis, urine protein creatinine ratio, urine microalbumin creatinine ratio, and repeat chemistry panel.  If urinalysis has hematuria or proteinuria, will order further serologic work-up, and discussed kidney biopsy.  However, patient is very hesitant to consider kidney biopsy given traumatic experience with her daughter getting a kidney biopsy in the past.  No acute need for dialysis.  I explained the importance of blood pressure control to help slow CKD progression.  Avoid NSAIDs and other nephrotoxins.  Low-salt diet.    2. Essential hypertension  -Well-controlled.  Continue losartan, and other regimen as above.    3. Moderate  to severe pulmonary hypertension (HCC)  -Likely due to left-sided heart failure, and valvular disease from tricuspid and mitral regurgitation.  Further management per cardiology    4. Chronic combined systolic and diastolic congestive heart failure (HCC)  -Patient appears relatively euvolemic on my exam today.  Continue Lasix 40 mg p.o. daily for now.  Continue losartan.  Further management per primary care and cardiology.    5. Anemia, unspecified type  -Patient is quite iron deficient.  Recommend continue iron supplementation orally for now, but if unable to improve iron stores, I would recommend IV iron infusions.      RTC 3 months with preclinic labs, with phone calls in the interim to help manage current issues    Ryder Davis MD  Nephrology

## 2020-11-30 ENCOUNTER — ANTICOAGULATION MONITORING (OUTPATIENT)
Dept: VASCULAR LAB | Facility: MEDICAL CENTER | Age: 80
End: 2020-11-30

## 2020-11-30 DIAGNOSIS — I48.0 PAROXYSMAL ATRIAL FIBRILLATION (HCC): ICD-10-CM

## 2020-11-30 LAB — INR PPP: 2.1 (ref 2–3.5)

## 2020-11-30 NOTE — PROGRESS NOTES
OP   Telephone Anticoagulation Service Note      Anticoagulation Summary  As of 2020    INR goal:  2.0-3.0   TTR:  64.8 % (8.1 mo)   INR used for dosin.10 (2020)   Warfarin maintenance plan:  6 mg (6 mg x 1) every day   Weekly warfarin total:  42 mg   Plan last modified:  Nedra Mora PharmD (2020)   Next INR check:  2020   Target end date:  Indefinite    Indications    Paroxysmal atrial fibrillation (HCC) [I48.0]             Anticoagulation Episode Summary     INR check location:      Preferred lab:      Send INR reminders to:      Comments:  [facility]      Anticoagulation Care Providers     Provider Role Specialty Phone number    La Paredes M.D. Referring Internal Medicine [phone]    [facility] Responsible  [phone]        Anticoagulation Patient Findings     Spoke with the patient on the phone today, reporting a therapeutic INR of 2.1.   Confirmed the current warfarin dosing regimen and patient compliance.  Patient denies any interval changes to diet and/or medications. Patient denies any signs/symptoms of bleeding or clotting.  Patient instructed to continue with the current warfarin dosing regimen, and asked to follow up again in 1 week.     Linda Rodriguez PharmD

## 2020-12-07 ENCOUNTER — ANTICOAGULATION MONITORING (OUTPATIENT)
Dept: VASCULAR LAB | Facility: MEDICAL CENTER | Age: 80
End: 2020-12-07

## 2020-12-07 DIAGNOSIS — I48.0 PAROXYSMAL ATRIAL FIBRILLATION (HCC): ICD-10-CM

## 2020-12-07 LAB — INR PPP: 2.8 (ref 2–3.5)

## 2020-12-07 RX ORDER — ATORVASTATIN CALCIUM 40 MG/1
TABLET, FILM COATED ORAL
Qty: 90 TAB | Refills: 3 | Status: SHIPPED | OUTPATIENT
Start: 2020-12-07 | End: 2021-03-17 | Stop reason: SDUPTHER

## 2020-12-07 NOTE — TELEPHONE ENCOUNTER
Received request via: Patient    Was the patient seen in the last year in this department? Yes    Does the patient have an active prescription (recently filled or refills available) for medication(s) requested? No     NEEDS RX SENT TO [facility] NOW.

## 2020-12-08 NOTE — PROGRESS NOTES
OP Telephone Anticoagulation Service Note    Date: 2020      Anticoagulation Summary  As of 2020    INR goal:  2.0-3.0   TTR:  65.8 % (8.3 mo)   INR used for dosin.80 (2020)   Warfarin maintenance plan:  6 mg (6 mg x 1) every day   Weekly warfarin total:  42 mg   Plan last modified:  Nedra Mora PharmD (2020)   Next INR check:  2020   Target end date:  Indefinite    Indications    Paroxysmal atrial fibrillation (HCC) [I48.0]             Anticoagulation Episode Summary     INR check location:      Preferred lab:      Send INR reminders to:      Comments:  [facility]      Anticoagulation Care Providers     Provider Role Specialty Phone number    La Paredes M.D. Referring Internal Medicine [phone]    [facility] Responsible  [phone]        Anticoagulation Patient Findings        Plan: Spoke with patient on the phone. Patient is  therapeutic today. Confirmed dosing. No missed tablets in the last week. Patient denies any changes in medications or diet. Patient denies any signs or symptoms of bleeding or clotting. Instructed patient to call clinic if any unusual bleeding or bruising occurs. Will continue dosing as outlined. Will follow-up with patient in 2 week(s).              Celina Diego PharmD

## 2020-12-16 ENCOUNTER — HOSPITAL ENCOUNTER (OUTPATIENT)
Dept: LAB | Facility: MEDICAL CENTER | Age: 80
End: 2020-12-16
Attending: PHYSICIAN ASSISTANT
Payer: MEDICARE

## 2020-12-16 DIAGNOSIS — D50.9 MICROCYTIC ANEMIA: ICD-10-CM

## 2020-12-16 LAB
ANISOCYTOSIS BLD QL SMEAR: ABNORMAL
BASOPHILS # BLD AUTO: 1 % (ref 0–1.8)
BASOPHILS # BLD: 0.08 K/UL (ref 0–0.12)
COMMENT 1642: NORMAL
EOSINOPHIL # BLD AUTO: 0.3 K/UL (ref 0–0.51)
EOSINOPHIL NFR BLD: 3.9 % (ref 0–6.9)
ERYTHROCYTE [DISTWIDTH] IN BLOOD BY AUTOMATED COUNT: 73.1 FL (ref 35.9–50)
FERRITIN SERPL-MCNC: 41.2 NG/ML (ref 10–291)
HCT VFR BLD AUTO: 35.8 % (ref 37–47)
HGB BLD-MCNC: 10.5 G/DL (ref 12–16)
HGB RETIC QN AUTO: 30.1 PG/CELL (ref 29–35)
IMM GRANULOCYTES # BLD AUTO: 0.02 K/UL (ref 0–0.11)
IMM GRANULOCYTES NFR BLD AUTO: 0.3 % (ref 0–0.9)
IMM RETICS NFR: 14.5 % (ref 9.3–17.4)
IRON SATN MFR SERPL: 10 % (ref 15–55)
IRON SERPL-MCNC: 40 UG/DL (ref 40–170)
LYMPHOCYTES # BLD AUTO: 0.9 K/UL (ref 1–4.8)
LYMPHOCYTES NFR BLD: 11.7 % (ref 22–41)
MCH RBC QN AUTO: 26.2 PG (ref 27–33)
MCHC RBC AUTO-ENTMCNC: 29.3 G/DL (ref 33.6–35)
MCV RBC AUTO: 89.3 FL (ref 81.4–97.8)
MICROCYTES BLD QL SMEAR: ABNORMAL
MONOCYTES # BLD AUTO: 0.69 K/UL (ref 0–0.85)
MONOCYTES NFR BLD AUTO: 8.9 % (ref 0–13.4)
MORPHOLOGY BLD-IMP: NORMAL
NEUTROPHILS # BLD AUTO: 5.72 K/UL (ref 2–7.15)
NEUTROPHILS NFR BLD: 74.2 % (ref 44–72)
NRBC # BLD AUTO: 0 K/UL
NRBC BLD-RTO: 0 /100 WBC
OVALOCYTES BLD QL SMEAR: NORMAL
PLATELET # BLD AUTO: 302 K/UL (ref 164–446)
PLATELET BLD QL SMEAR: NORMAL
PMV BLD AUTO: 10.7 FL (ref 9–12.9)
POIKILOCYTOSIS BLD QL SMEAR: NORMAL
POLYCHROMASIA BLD QL SMEAR: NORMAL
RBC # BLD AUTO: 4.01 M/UL (ref 4.2–5.4)
RBC BLD AUTO: PRESENT
RETICS # AUTO: 0.11 M/UL (ref 0.04–0.06)
RETICS/RBC NFR: 2.6 % (ref 0.8–2.1)
TIBC SERPL-MCNC: 415 UG/DL (ref 250–450)
UIBC SERPL-MCNC: 375 UG/DL (ref 110–370)
WBC # BLD AUTO: 7.7 K/UL (ref 4.8–10.8)

## 2020-12-16 PROCEDURE — 36415 COLL VENOUS BLD VENIPUNCTURE: CPT

## 2020-12-16 PROCEDURE — 83540 ASSAY OF IRON: CPT

## 2020-12-16 PROCEDURE — 85046 RETICYTE/HGB CONCENTRATE: CPT

## 2020-12-16 PROCEDURE — 83550 IRON BINDING TEST: CPT

## 2020-12-16 PROCEDURE — 85025 COMPLETE CBC W/AUTO DIFF WBC: CPT

## 2020-12-16 PROCEDURE — 82728 ASSAY OF FERRITIN: CPT

## 2020-12-21 ENCOUNTER — ANTICOAGULATION MONITORING (OUTPATIENT)
Dept: VASCULAR LAB | Facility: MEDICAL CENTER | Age: 80
End: 2020-12-21

## 2020-12-21 DIAGNOSIS — I48.0 PAROXYSMAL ATRIAL FIBRILLATION (HCC): ICD-10-CM

## 2020-12-21 LAB — INR PPP: 1.7 (ref 2–3.5)

## 2020-12-22 ENCOUNTER — OFFICE VISIT (OUTPATIENT)
Dept: CARDIOLOGY | Facility: MEDICAL CENTER | Age: 80
End: 2020-12-22
Payer: MEDICARE

## 2020-12-22 VITALS
OXYGEN SATURATION: 95 % | DIASTOLIC BLOOD PRESSURE: 80 MMHG | WEIGHT: 203.6 LBS | BODY MASS INDEX: 36.07 KG/M2 | RESPIRATION RATE: 17 BRPM | SYSTOLIC BLOOD PRESSURE: 102 MMHG | HEIGHT: 63 IN | HEART RATE: 66 BPM

## 2020-12-22 DIAGNOSIS — C43.62 MALIGNANT MELANOMA OF LEFT UPPER EXTREMITY INCLUDING SHOULDER (HCC): ICD-10-CM

## 2020-12-22 DIAGNOSIS — R79.89 ELEVATED TROPONIN: ICD-10-CM

## 2020-12-22 DIAGNOSIS — I25.10 CORONARY ARTERY DISEASE INVOLVING NATIVE CORONARY ARTERY OF NATIVE HEART WITHOUT ANGINA PECTORIS: ICD-10-CM

## 2020-12-22 DIAGNOSIS — I10 ESSENTIAL HYPERTENSION: ICD-10-CM

## 2020-12-22 DIAGNOSIS — N18.32 STAGE 3B CHRONIC KIDNEY DISEASE: ICD-10-CM

## 2020-12-22 DIAGNOSIS — G47.33 OBSTRUCTIVE SLEEP APNEA SYNDROME: ICD-10-CM

## 2020-12-22 DIAGNOSIS — I34.0 SEVERE MITRAL REGURGITATION: ICD-10-CM

## 2020-12-22 DIAGNOSIS — I48.0 PAROXYSMAL ATRIAL FIBRILLATION (HCC): ICD-10-CM

## 2020-12-22 DIAGNOSIS — E78.2 MIXED HYPERLIPIDEMIA: ICD-10-CM

## 2020-12-22 DIAGNOSIS — I50.42 CHRONIC COMBINED SYSTOLIC AND DIASTOLIC CONGESTIVE HEART FAILURE (HCC): ICD-10-CM

## 2020-12-22 DIAGNOSIS — D64.9 ANEMIA, UNSPECIFIED TYPE: ICD-10-CM

## 2020-12-22 DIAGNOSIS — J96.01 ACUTE HYPOXEMIC RESPIRATORY FAILURE (HCC): ICD-10-CM

## 2020-12-22 DIAGNOSIS — K76.0 HEPATIC STEATOSIS: ICD-10-CM

## 2020-12-22 DIAGNOSIS — I27.20 MODERATE TO SEVERE PULMONARY HYPERTENSION (HCC): ICD-10-CM

## 2020-12-22 PROCEDURE — 99214 OFFICE O/P EST MOD 30 MIN: CPT | Performed by: INTERNAL MEDICINE

## 2020-12-22 RX ORDER — SPIRONOLACTONE 25 MG/1
50 TABLET ORAL DAILY
Qty: 180 TAB | Refills: 3 | Status: SHIPPED | OUTPATIENT
Start: 2020-12-22 | End: 2021-03-25 | Stop reason: SDUPTHER

## 2020-12-22 RX ORDER — LOSARTAN POTASSIUM 50 MG/1
TABLET ORAL
Qty: 90 TAB | Refills: 3 | Status: SHIPPED | OUTPATIENT
Start: 2020-12-22 | End: 2021-02-23

## 2020-12-22 ASSESSMENT — ENCOUNTER SYMPTOMS
STRIDOR: 0
WEAKNESS: 0
SORE THROAT: 0
CARDIOVASCULAR NEGATIVE: 1
CLAUDICATION: 0
SHORTNESS OF BREATH: 0
ORTHOPNEA: 0
CHILLS: 0
NEUROLOGICAL NEGATIVE: 1
MUSCULOSKELETAL NEGATIVE: 1
HEMOPTYSIS: 0
BRUISES/BLEEDS EASILY: 0
PND: 0
FEVER: 0
PALPITATIONS: 0
RESPIRATORY NEGATIVE: 1
DIZZINESS: 0
CONSTITUTIONAL NEGATIVE: 1
GASTROINTESTINAL NEGATIVE: 1
EYES NEGATIVE: 1
LOSS OF CONSCIOUSNESS: 0
WHEEZING: 0
SPUTUM PRODUCTION: 0
COUGH: 0

## 2020-12-22 ASSESSMENT — FIBROSIS 4 INDEX: FIB4 SCORE: 1.5

## 2020-12-22 NOTE — PROGRESS NOTES
Anticoagulation Summary  As of 2020    INR goal:  2.0-3.0   TTR:  66.2 % (8.8 mo)   INR used for dosin.70 (2020)   Warfarin maintenance plan:  6 mg (6 mg x 1) every day   Weekly warfarin total:  42 mg   Plan last modified:  Nedra Moar PharmD (2020)   Next INR check:  2021   Target end date:  Indefinite    Indications    Paroxysmal atrial fibrillation (HCC) [I48.0]             Anticoagulation Episode Summary     INR check location:      Preferred lab:      Send INR reminders to:      Comments:  [facility]      Anticoagulation Care Providers     Provider Role Specialty Phone number    WinniemeirCameronRenay Christian Paredes M.D. Referring Internal Medicine [phone]    [facility] Responsible  [phone]        Anticoagulation Patient Findings      Spoke with pt.  INR is subtherapeutic.   Pt denies any unusual s/s of bleeding, bruising, clotting or any changes to diet or medications. Denies any etoh, cranberries, supplements, or illness.   Pt verifies warfarin weekly dosing.     Will have pt bolus x 1 day then continue regimen    Repeat INR in 2 week(s).     Nedra Mora, PharmD

## 2020-12-22 NOTE — PROGRESS NOTES
Chief Complaint   Patient presents with   • Hypertension       Subjective:   Aleshia Crooks is a 80 y.o. female who presents today as a follow-up for her CAD status post CABG hypertension hyperlipidemia lower extremity edema and mitral valve regurgitation with paroxysmal atrial fibrillation.  Since she was last seen she continues to do well.  She had no chest pain.  Her blood pressure controlled.  She has essentially no shortness of breath with exertion.  Her A. fib is controlled as well.  She is therapeutic on her warfarin.    Past Medical History:   Diagnosis Date   • Apnea, sleep    • Atrial fibrillation (HCC)    • Gasping for breath    • Heart attack (HCC)    • Hyperlipidemia    • Hypertension    • Thyroid disease      Past Surgical History:   Procedure Laterality Date   • CHOLECYSTECTOMY     • EYE SURGERY Bilateral     cataracts   • MULTIPLE CORONARY ARTERY BYPASS      1 bypass   • THYROIDECTOMY TOTAL       Family History   Problem Relation Age of Onset   • Cancer Mother         cancer, smoker   • Stroke Maternal Grandmother    • Other Other         Crohn   • Kidney Disease Other         kidney transplant     Social History     Socioeconomic History   • Marital status:      Spouse name: Not on file   • Number of children: Not on file   • Years of education: Not on file   • Highest education level: Not on file   Occupational History     Comment: [employer]   Social Needs   • Financial resource strain: Not on file   • Food insecurity     Worry: Not on file     Inability: Not on file   • Transportation needs     Medical: Not on file     Non-medical: Not on file   Tobacco Use   • Smoking status: Former Smoker     Packs/day: 1.00     Years: 31.00     Pack years: 31.00     Quit date:      Years since quittin.9   • Smokeless tobacco: Never Used   Substance and Sexual Activity   • Alcohol use: No   • Drug use: No   • Sexual activity: Not Currently     Partners: Male   Lifestyle   • Physical activity      Days per week: Not on file     Minutes per session: Not on file   • Stress: Not on file   Relationships   • Social connections     Talks on phone: Not on file     Gets together: Not on file     Attends Jain service: Not on file     Active member of club or organization: Not on file     Attends meetings of clubs or organizations: Not on file     Relationship status: Not on file   • Intimate partner violence     Fear of current or ex partner: Not on file     Emotionally abused: Not on file     Physically abused: Not on file     Forced sexual activity: Not on file   Other Topics Concern   • Not on file   Social History Narrative   • Not on file     No Known Allergies  Outpatient Encounter Medications as of 12/22/2020   Medication Sig Dispense Refill   • spironolactone (ALDACTONE) 25 MG Tab Take 2 Tabs by mouth every day. 180 Tab 3   • losartan (COZAAR) 50 MG Tab TAKE 1 TABLET BY MOUTH DAILY 90 Tab 3   • atorvastatin (LIPITOR) 40 MG Tab TAKE 1 TABLET BY MOUTH AT  BEDTIME 90 Tab 3   • ferrous sulfate 325 (65 Fe) MG tablet Take 1 Tab by mouth 2 Times a Day. 60 Tab 3   • ascorbic acid (VITAMIN C) 500 MG tablet Take 1 Tab by mouth 2 times a day. 60 Tab 3   • docusate sodium (COLACE) 100 MG Cap Take 1 Cap by mouth 2 times a day as needed for Constipation. 60 Cap 1   • furosemide (LASIX) 40 MG Tab TAKE 1 TABLET BY MOUTH DAILY 90 Tab 3   • clobetasol (TEMOVATE) 0.05 % Ointment Apply 1 g topically 2 times a day. 60 g 1   • metoprolol (LOPRESSOR) 50 MG Tab TAKE 1 TABLET BY MOUTH TWICE DAILY 180 Tab 1   • omeprazole (PRILOSEC) 20 MG delayed-release capsule TAKE 1 CAPSULE BY MOUTH  EVERY DAY. 90 Cap 3   • LEVOXYL 150 MCG Tab Take 1 Tab by mouth Every morning on an empty stomach. 90 Tab 1   • warfarin (COUMADIN) 6 MG Tab Take one tablet daily as directed by [facility]  Indications: Obstructing Blood Clot with Chronic Atrial Fibrillation 90 Tab 3   • triamcinolone acetonide (KENALOG) 0.1 % Cream MAGNUS EXT  "AA BID FOR 7 TO 10 DAYS THEN PRF FLARES     • vitamin k 100 MCG tablet Take 100 mcg by mouth every day.     • vitamin D (CHOLECALCIFEROL) 1000 UNIT Tab Take 4,000 Units by mouth every day.     • [DISCONTINUED] losartan (COZAAR) 50 MG Tab TAKE 1 TABLET BY MOUTH DAILY 90 Tab 0   • [DISCONTINUED] spironolactone (ALDACTONE) 25 MG Tab Take 1 Tab by mouth every day. 90 Tab 3     No facility-administered encounter medications on file as of 12/22/2020.      Review of Systems   Constitutional: Negative.  Negative for chills, fever and malaise/fatigue.   HENT: Negative.  Negative for sore throat.    Eyes: Negative.    Respiratory: Negative.  Negative for cough, hemoptysis, sputum production, shortness of breath, wheezing and stridor.    Cardiovascular: Negative.  Negative for chest pain, palpitations, orthopnea, claudication, leg swelling and PND.   Gastrointestinal: Negative.    Genitourinary: Negative.    Musculoskeletal: Negative.    Skin: Negative.    Neurological: Negative.  Negative for dizziness, loss of consciousness and weakness.   Endo/Heme/Allergies: Negative.  Does not bruise/bleed easily.   All other systems reviewed and are negative.       Objective:   /80 (BP Location: Right arm, Patient Position: Sitting, BP Cuff Size: Adult)   Pulse 66   Resp 17   Ht 1.6 m (5' 3\")   Wt 92.4 kg (203 lb 9.6 oz)   LMP  (LMP Unknown)   SpO2 95%   BMI 36.07 kg/m²     Physical Exam   Constitutional: She appears well-developed and well-nourished. No distress.   HENT:   Head: Normocephalic and atraumatic.   Right Ear: External ear normal.   Left Ear: External ear normal.   Nose: Nose normal.   Mouth/Throat: No oropharyngeal exudate.   Eyes: Pupils are equal, round, and reactive to light. Conjunctivae and EOM are normal. Right eye exhibits no discharge. Left eye exhibits no discharge. No scleral icterus.   Neck: Neck supple. No JVD present.   Cardiovascular: Normal rate, regular rhythm and intact distal pulses. Exam " reveals no gallop and no friction rub.   No murmur heard.  Pulmonary/Chest: Effort normal. No stridor. No respiratory distress. She has no wheezes. She has no rales. She exhibits no tenderness.   Abdominal: Soft. She exhibits no distension. There is no guarding.   Musculoskeletal: Normal range of motion.         General: No tenderness, deformity or edema.   Neurological: She is alert. She has normal reflexes. She displays normal reflexes. No cranial nerve deficit. She exhibits normal muscle tone. Coordination normal.   Skin: Skin is warm and dry. No rash noted. She is not diaphoretic. No erythema. No pallor.   Psychiatric: She has a normal mood and affect. Her behavior is normal. Judgment and thought content normal.   Nursing note and vitals reviewed.      Assessment:     1. Coronary artery disease involving native coronary artery of native heart without angina pectoris, s/p 1V CABG 1992     2. Chronic combined systolic and diastolic congestive heart failure (HCC)  EC-ECHOCARDIOGRAM COMPLETE W/O CONT   3. Anemia, unspecified type     4. Elevated troponin  spironolactone (ALDACTONE) 25 MG Tab   5. Essential hypertension  spironolactone (ALDACTONE) 25 MG Tab    losartan (COZAAR) 50 MG Tab   6. Hepatic steatosis     7. Malignant melanoma of left upper extremity including shoulder (HCC)     8. Mixed hyperlipidemia  EC-ECHOCARDIOGRAM COMPLETE W/O CONT   9. Moderate to severe pulmonary hypertension (HCC)  EC-ECHOCARDIOGRAM COMPLETE W/O CONT   10. Obstructive sleep apnea syndrome  spironolactone (ALDACTONE) 25 MG Tab   11. Paroxysmal atrial fibrillation (HCC)  spironolactone (ALDACTONE) 25 MG Tab   12. Severe mitral regurgitation     13. Stage 3b chronic kidney disease     14. Acute hypoxemic respiratory failure (HCC)  spironolactone (ALDACTONE) 25 MG Tab       Medical Decision Making:  Today's Assessment / Status / Plan:   80-year-old female with paroxysmal atrial fibrillation and might regurgitation which appears to  improved following diuresis.  I would like to recheck an echocardiogram given her severe MR seen before.  Otherwise we will keep her on the same medications and see her back in 6 months.    1. Severe MR, now improved after diuresis    - repeat TTE     2. COPD?    - obtain PFTs from prior pulmonologist     3. CAD s/p CABG    - cont atorva 40     4. A-fib    - cont metpo 50 BID    - cont warfarin     5. CHF, normalized    - per above    - cont losartan 50     6. Edema    - cont furosemide 40    - cont soiro25

## 2020-12-28 NOTE — ASSESSMENT & PLAN NOTE
Since her last visit the patient has seen nephrology.  They were unsure the etiology of her SUNNY.  They suspected possible autoimmune disease.  Further labs were ordered.  They did not make any change of her medications at that time.  They discussed her iron deficiency anemia.  Discussed iron transfusion if it did not improve.

## 2020-12-28 NOTE — ASSESSMENT & PLAN NOTE
Since her last visit she also saw Dr. Villar her cardiologist on December 22, 2020.  He ordered for updated echo to reevaluate her severe mitral regurg.  Otherwise no changes in medications were made.

## 2020-12-28 NOTE — ASSESSMENT & PLAN NOTE
Per labs on December 16, 2020 her overall anemia did improve.  Iron saturation at 10%.  Iron 40.  Red blood cell count 4.01, hemoglobin 10.5, hematocrit 35.8.  Previous red blood cell count 3.42, hemoglobin 8.1, hematocrit 28.0.  Previous iron saturation at 5% with an iron at 23.

## 2020-12-29 ENCOUNTER — OFFICE VISIT (OUTPATIENT)
Dept: MEDICAL GROUP | Facility: PHYSICIAN GROUP | Age: 80
End: 2020-12-29
Payer: MEDICARE

## 2020-12-29 VITALS
WEIGHT: 200.8 LBS | TEMPERATURE: 98.5 F | HEIGHT: 63 IN | HEART RATE: 93 BPM | OXYGEN SATURATION: 97 % | SYSTOLIC BLOOD PRESSURE: 124 MMHG | DIASTOLIC BLOOD PRESSURE: 64 MMHG | RESPIRATION RATE: 14 BRPM | BODY MASS INDEX: 35.58 KG/M2

## 2020-12-29 DIAGNOSIS — E03.9 HYPOTHYROIDISM, UNSPECIFIED TYPE: ICD-10-CM

## 2020-12-29 DIAGNOSIS — D50.9 MICROCYTIC ANEMIA: ICD-10-CM

## 2020-12-29 DIAGNOSIS — D64.9 ANEMIA, UNSPECIFIED TYPE: ICD-10-CM

## 2020-12-29 DIAGNOSIS — E78.2 MIXED HYPERLIPIDEMIA: ICD-10-CM

## 2020-12-29 DIAGNOSIS — I50.42 CHRONIC COMBINED SYSTOLIC AND DIASTOLIC CONGESTIVE HEART FAILURE (HCC): ICD-10-CM

## 2020-12-29 DIAGNOSIS — N18.32 STAGE 3B CHRONIC KIDNEY DISEASE: ICD-10-CM

## 2020-12-29 DIAGNOSIS — Z79.899 HIGH RISK MEDICATION USE: ICD-10-CM

## 2020-12-29 PROCEDURE — 99214 OFFICE O/P EST MOD 30 MIN: CPT | Performed by: PHYSICIAN ASSISTANT

## 2020-12-29 ASSESSMENT — FIBROSIS 4 INDEX: FIB4 SCORE: 1.5

## 2020-12-29 NOTE — PROGRESS NOTES
CC:   Chief Complaint   Patient presents with   • Lab Results   • Anemia          HISTORY OF PRESENT ILLNESS: Patient is a 80 y.o. female established patient who presents today to follow up on the following conditions:       Stage 3 chronic kidney disease (HCC)  Since her last visit the patient has seen nephrology.  They were unsure the etiology of her SUNNY.  They suspected possible autoimmune disease.  Further labs were ordered.  They did not make any change of her medications at that time.  They discussed her iron deficiency anemia.  Discussed iron transfusion if it did not improve.    Anemia  Per labs on December 16, 2020 her overall anemia did improve.  Iron saturation at 10%.  Iron 40.  Red blood cell count 4.01, hemoglobin 10.5, hematocrit 35.8.  Previous red blood cell count 3.42, hemoglobin 8.1, hematocrit 28.0.  Previous iron saturation at 5% with an iron at 23.    Chronic combined systolic and diastolic congestive heart failure (HCC)  Since her last visit she also saw Dr. Villar her cardiologist on December 22, 2020.  He ordered for updated echo to reevaluate her severe mitral regurg.  Otherwise no changes in medications were made.       Patient Active Problem List    Diagnosis Date Noted   • Microcytic anemia 07/13/2017     Priority: Low   • Stage 3 chronic kidney disease 09/11/2020   • Gastroesophageal reflux disease without esophagitis 06/08/2020   • Abnormal CBC 06/08/2020   • Malignant melanoma of left upper extremity including shoulder (HCC) 06/08/2020   • Abnormal CT of the abdomen 04/17/2020   • Mixed hyperlipidemia 04/17/2020   • Hepatic steatosis 04/17/2020   • Elevated alkaline phosphatase level 04/17/2020   • Elevated troponin 04/08/2020   • Anemia 04/08/2020   • Hx of melanoma excision 03/12/2020   • Skin lesions 03/12/2020   • Essential hypertension 03/12/2020   • Hypothyroidism 03/12/2020   • Obesity (BMI 35.0-39.9 without comorbidity) ([facility]) 03/12/2020   • Obstructive sleep apnea syndrome  03/12/2020   • Severe mitral regurgitation 07/14/2017   • Moderate to severe pulmonary hypertension (HCC) 07/14/2017   • Chronic combined systolic and diastolic congestive heart failure (HCC) 07/14/2017   • Paroxysmal atrial fibrillation (HCC) 07/14/2017   • Coronary artery disease involving native coronary artery of native heart without angina pectoris, s/p 1V CABG 1992 07/14/2017   • Anxiety 07/13/2017      Allergies:Patient has no known allergies.    Current Outpatient Medications   Medication Sig Dispense Refill   • spironolactone (ALDACTONE) 25 MG Tab Take 2 Tabs by mouth every day. 180 Tab 3   • losartan (COZAAR) 50 MG Tab TAKE 1 TABLET BY MOUTH DAILY 90 Tab 3   • atorvastatin (LIPITOR) 40 MG Tab TAKE 1 TABLET BY MOUTH AT  BEDTIME 90 Tab 3   • ferrous sulfate 325 (65 Fe) MG tablet Take 1 Tab by mouth 2 Times a Day. 60 Tab 3   • ascorbic acid (VITAMIN C) 500 MG tablet Take 1 Tab by mouth 2 times a day. 60 Tab 3   • furosemide (LASIX) 40 MG Tab TAKE 1 TABLET BY MOUTH DAILY 90 Tab 3   • clobetasol (TEMOVATE) 0.05 % Ointment Apply 1 g topically 2 times a day. 60 g 1   • metoprolol (LOPRESSOR) 50 MG Tab TAKE 1 TABLET BY MOUTH TWICE DAILY 180 Tab 1   • omeprazole (PRILOSEC) 20 MG delayed-release capsule TAKE 1 CAPSULE BY MOUTH  EVERY DAY. 90 Cap 3   • LEVOXYL 150 MCG Tab Take 1 Tab by mouth Every morning on an empty stomach. 90 Tab 1   • warfarin (COUMADIN) 6 MG Tab Take one tablet daily as directed by [facility]  Indications: Obstructing Blood Clot with Chronic Atrial Fibrillation 90 Tab 3   • triamcinolone acetonide (KENALOG) 0.1 % Cream MAGNUS EXT AA BID FOR 7 TO 10 DAYS THEN PRF FLARES     • vitamin k 100 MCG tablet Take 100 mcg by mouth every day.     • vitamin D (CHOLECALCIFEROL) 1000 UNIT Tab Take 4,000 Units by mouth every day.       No current facility-administered medications for this visit.        Social History     Tobacco Use   • Smoking status: Former Smoker     Packs/day: 1.00      "Years: 31.00     Pack years: 31.00     Quit date:      Years since quittin.0   • Smokeless tobacco: Never Used   Substance Use Topics   • Alcohol use: No   • Drug use: No     Social History     Social History Narrative   • Not on file       Family History   Problem Relation Age of Onset   • Cancer Mother         cancer, smoker   • Stroke Maternal Grandmother    • Other Other         Crohn   • Kidney Disease Other         kidney transplant       Review of Systems:    Constitutional: No Fevers, Chills  Eyes: No vision changes  ENT: No hearing changes  Resp: No Shortness of breath  CV: No Chest pain  GI: No Nausea/Vomiting  MSK: No weakness  Skin: No rashes  Neuro: No Headaches  Psych: No Suicidal ideations    All remaining systems reviewed and found to be negative, except as stated above.    Exam:    /64   Pulse 93   Temp 36.9 °C (98.5 °F)   Resp 14   Ht 1.6 m (5' 3\")   Wt 91.1 kg (200 lb 12.8 oz)   SpO2 97%  Body mass index is 35.57 kg/m².    General:  Well nourished, well developed female in NAD  HENT: Atraumatic, normocephalic  EYES: Extraocular movements intact  NECK: Supple, FROM  CHEST: No deformities, Equal chest expansion  RESP: Unlabored, no stridor or audible wheeze  HEART: Regular Rate and rhythm.   Extremities: No Clubbing, Cyanosis, or Edema  Skin: Warm/dry, without rashes  Neuro: A/O x 4, due to COVID-19- did not have patient remove face mask to test cranial nerves.  Motor/sensory grossly intact  Psych: Normal behavior, normal affect      Lab review:  Labs are reviewed and discussed with a patient  Lab Results   Component Value Date/Time    CHOLSTRLTOT 140 2020 12:30 PM    LDL 77 2020 12:30 PM    HDL 35 (A) 2020 12:30 PM    TRIGLYCERIDE 140 2020 12:30 PM       Lab Results   Component Value Date/Time    SODIUM 137 2020 12:15 PM    POTASSIUM 4.1 2020 12:15 PM    CHLORIDE 100 2020 12:15 PM    CO2 25 2020 12:15 PM    GLUCOSE 114 (H) " 11/25/2020 12:15 PM    BUN 24 (H) 11/25/2020 12:15 PM    CREATININE 1.40 11/25/2020 12:15 PM     Lab Results   Component Value Date/Time    ALKPHOSPHAT 86 10/31/2020 10:54 AM    ASTSGOT 21 10/31/2020 10:54 AM    ALTSGPT 13 10/31/2020 10:54 AM    TBILIRUBIN 0.7 10/31/2020 10:54 AM      No results found for: HBA1C  No results found for: TSH  Lab Results   Component Value Date/Time    FREET4 2.89 (H) 07/12/2017 07:40 AM       Lab Results   Component Value Date/Time    WBC 7.7 12/16/2020 11:32 AM    RBC 4.01 (L) 12/16/2020 11:32 AM    HEMOGLOBIN 10.5 (L) 12/16/2020 11:32 AM    HEMATOCRIT 35.8 (L) 12/16/2020 11:32 AM    MCV 89.3 12/16/2020 11:32 AM    MCH 26.2 (L) 12/16/2020 11:32 AM    MCHC 29.3 (L) 12/16/2020 11:32 AM    MPV 10.7 12/16/2020 11:32 AM    NEUTSPOLYS 74.20 (H) 12/16/2020 11:32 AM    LYMPHOCYTES 11.70 (L) 12/16/2020 11:32 AM    MONOCYTES 8.90 12/16/2020 11:32 AM    EOSINOPHILS 3.90 12/16/2020 11:32 AM    BASOPHILS 1.00 12/16/2020 11:32 AM    HYPOCHROMIA 1+ 10/31/2020 10:54 AM    ANISOCYTOSIS 1+ 12/16/2020 11:32 AM          Assessment/Plan:  1. Stage 3b chronic kidney disease  Chronic condition, followed by Nephrology.    2. Anemia, unspecified type  - FERRITIN; Future  - IRON/TOTAL IRON BIND; Future  Good news, anemia is improving, iron coming up. Continued iron supplementation twice daily with vitamin C. Tolerating without issue. Will repeat labs in 3 months   3. Chronic combined systolic and diastolic congestive heart failure (HCC)  Follow up with cardiology. Chronic condition. Stable  4. Hypothyroidism, unspecified type  - TSH; Future  Chronic condition, stable, due for updated labs. Continue levoxyl 150 mcg daily  5. Microcytic anemia  - CBC WITH DIFFERENTIAL; Future  - FERRITIN; Future  - IRON/TOTAL IRON BIND; Future  Improving, continue iron.   6. Mixed hyperlipidemia  - Lipid Profile; Future  Chronic condition, due for labs, continue atorvastatin 40 mg   7. High risk medication use  - Comp Metabolic  Panel; Future       Follow-up: Return in about 3 months (around 3/29/2021) for Follow up on labs.    Please note that this dictation was created using voice recognition software. I have made every reasonable attempt to correct obvious errors, but I expect that there are errors of grammar and possibly content that I did not discover before finalizing the note.

## 2020-12-29 NOTE — LETTER
[facility]  Shiela Carpenter P.A.-C.  [address]  [place] [address]  Fax: [phone]   Authorization for Release/Disclosure of   Protected Health Information   Name: ALESHIA REICH : 1940 SSN: [id number]   Address: [address] [place] [address] [place] [address] Phone:    [phone] (home)    I authorize the entity listed below to release/disclose the PHI below to:   [facility]/Shiela Carpenter P.A.-C. and Shiela Carpenter P.A.-C.   Provider or Entity Name:  [facility]    Address   City, State, [place]   Phone:      Fax:     Reason for request: continuity of care   Information to be released:    [X  ] LAST COLONOSCOPY,  including any PATH REPORT and follow-up  [  ] LAST FIT/COLOGUARD RESULT [  ] LAST DEXA  [  ] LAST MAMMOGRAM  [  ] LAST PAP  [  ] LAST LABS [  ] RETINA EXAM REPORT  [  ] IMMUNIZATION RECORDS  [  ] Release all info      [  ] Check here and initial the line next to each item to release ALL health information INCLUDING  _____ Care and treatment for drug and / or alcohol abuse  _____ HIV testing, infection status, or AIDS  _____ Genetic Testing    DATES OF SERVICE OR TIME PERIOD TO BE DISCLOSED: _____________  I understand and acknowledge that:  * This Authorization may be revoked at any time by you in writing, except if your health information has already been used or disclosed.  * Your health information that will be used or disclosed as a result of you signing this authorization could be re-disclosed by the recipient. If this occurs, your re-disclosed health information may no longer be protected by State or Federal laws.  * You may refuse to sign this Authorization. Your refusal will not affect your ability to obtain treatment.  * This Authorization becomes effective upon signing and will  on (date) __________.      If no date is indicated, this Authorization will  one (1) year from the signature date.    Name: Aleshia Reich    Signature:   Date:          12/29/2020       PLEASE FAX REQUESTED RECORDS BACK TO: [phone]

## 2021-01-11 DIAGNOSIS — Z23 NEED FOR VACCINATION: ICD-10-CM

## 2021-01-13 ENCOUNTER — TELEPHONE (OUTPATIENT)
Dept: VASCULAR LAB | Facility: MEDICAL CENTER | Age: 81
End: 2021-01-13

## 2021-01-15 ENCOUNTER — OFFICE VISIT (OUTPATIENT)
Dept: SLEEP MEDICINE | Facility: MEDICAL CENTER | Age: 81
End: 2021-01-15
Payer: MEDICARE

## 2021-01-15 VITALS
SYSTOLIC BLOOD PRESSURE: 106 MMHG | RESPIRATION RATE: 16 BRPM | DIASTOLIC BLOOD PRESSURE: 70 MMHG | WEIGHT: 205 LBS | BODY MASS INDEX: 36.32 KG/M2 | OXYGEN SATURATION: 96 % | HEIGHT: 63 IN | HEART RATE: 64 BPM

## 2021-01-15 DIAGNOSIS — I50.42 CHRONIC COMBINED SYSTOLIC AND DIASTOLIC CONGESTIVE HEART FAILURE (HCC): ICD-10-CM

## 2021-01-15 DIAGNOSIS — G47.33 OBSTRUCTIVE SLEEP APNEA SYNDROME: ICD-10-CM

## 2021-01-15 DIAGNOSIS — I48.0 PAROXYSMAL ATRIAL FIBRILLATION (HCC): ICD-10-CM

## 2021-01-15 DIAGNOSIS — I10 ESSENTIAL HYPERTENSION: ICD-10-CM

## 2021-01-15 PROCEDURE — 99213 OFFICE O/P EST LOW 20 MIN: CPT | Performed by: INTERNAL MEDICINE

## 2021-01-15 ASSESSMENT — FIBROSIS 4 INDEX: FIB4 SCORE: 1.52

## 2021-01-15 NOTE — PROGRESS NOTES
"Chief Complaint   Patient presents with   • Follow-Up     Last seen 20 by Dr. Mejia for SHAKA, CHF     HPI: This patient is a 81 y.o. female who returns for obstructive sleep apnea management.  She was diagnosed with SHAKA in [place], [place] approximately 5 years ago prescribed BiPAP: 13/9 cm of water.  She has an extensive cardiovascular history including systolic and diastolic heart failure, coronary artery disease and atrial fibrillation.  She \"loves\" her BiPAP machine and compliance card confirms 100% use for >7 hours nightly.  Her AHI is 6.  She sleeps well through the night and awakens rested.  She denies daytime somnolence.  Replacing her CPAP supplies routinely.      Past Medical History:   Diagnosis Date   • Apnea, sleep    • Atrial fibrillation (HCC)    • Gasping for breath    • Heart attack (HCC)    • Hyperlipidemia    • Hypertension    • Thyroid disease        Social History     Socioeconomic History   • Marital status:      Spouse name: Not on file   • Number of children: Not on file   • Years of education: Not on file   • Highest education level: Not on file   Occupational History     Comment: [employer]   Social Needs   • Financial resource strain: Not on file   • Food insecurity     Worry: Not on file     Inability: Not on file   • Transportation needs     Medical: Not on file     Non-medical: Not on file   Tobacco Use   • Smoking status: Former Smoker     Packs/day: 1.00     Years: 31.00     Pack years: 31.00     Quit date:      Years since quittin.0   • Smokeless tobacco: Never Used   Substance and Sexual Activity   • Alcohol use: No   • Drug use: No   • Sexual activity: Not Currently     Partners: Male   Lifestyle   • Physical activity     Days per week: Not on file     Minutes per session: Not on file   • Stress: Not on file   Relationships   • Social connections     Talks on phone: Not on file     Gets together: Not on file     Attends Faith service: Not on file     " Active member of club or organization: Not on file     Attends meetings of clubs or organizations: Not on file     Relationship status: Not on file   • Intimate partner violence     Fear of current or ex partner: Not on file     Emotionally abused: Not on file     Physically abused: Not on file     Forced sexual activity: Not on file   Other Topics Concern   • Not on file   Social History Narrative   • Not on file       Family History   Problem Relation Age of Onset   • Cancer Mother         cancer, smoker   • Stroke Maternal Grandmother    • Other Other         Crohn   • Kidney Disease Other         kidney transplant       Current Outpatient Medications on File Prior to Visit   Medication Sig Dispense Refill   • spironolactone (ALDACTONE) 25 MG Tab Take 2 Tabs by mouth every day. 180 Tab 3   • losartan (COZAAR) 50 MG Tab TAKE 1 TABLET BY MOUTH DAILY 90 Tab 3   • atorvastatin (LIPITOR) 40 MG Tab TAKE 1 TABLET BY MOUTH AT  BEDTIME 90 Tab 3   • ferrous sulfate 325 (65 Fe) MG tablet Take 1 Tab by mouth 2 Times a Day. 60 Tab 3   • ascorbic acid (VITAMIN C) 500 MG tablet Take 1 Tab by mouth 2 times a day. 60 Tab 3   • furosemide (LASIX) 40 MG Tab TAKE 1 TABLET BY MOUTH DAILY 90 Tab 3   • clobetasol (TEMOVATE) 0.05 % Ointment Apply 1 g topically 2 times a day. 60 g 1   • metoprolol (LOPRESSOR) 50 MG Tab TAKE 1 TABLET BY MOUTH TWICE DAILY 180 Tab 1   • omeprazole (PRILOSEC) 20 MG delayed-release capsule TAKE 1 CAPSULE BY MOUTH  EVERY DAY. 90 Cap 3   • LEVOXYL 150 MCG Tab Take 1 Tab by mouth Every morning on an empty stomach. 90 Tab 1   • warfarin (COUMADIN) 6 MG Tab Take one tablet daily as directed by [facility]  Indications: Obstructing Blood Clot with Chronic Atrial Fibrillation 90 Tab 3   • triamcinolone acetonide (KENALOG) 0.1 % Cream MAGNUS EXT AA BID FOR 7 TO 10 DAYS THEN PRF FLARES     • vitamin k 100 MCG tablet Take 100 mcg by mouth every day.     • vitamin D (CHOLECALCIFEROL) 1000 UNIT Tab Take  "4,000 Units by mouth every day.       No current facility-administered medications on file prior to visit.        Allergies: Patient has no known allergies.    ROS:   Constitutional: Denies fevers, chills, night sweats, fatigue or weight loss  Eyes: Denies vision loss, pain, drainage, double vision  Ears, Nose, Throat: Denies earache, difficulty hearing, tinnitus, nasal congestion, hoarseness  Cardiovascular: Denies chest pain, tightness, palpitations, orthopnea or edema  Respiratory: Denies shortness of breath, cough, wheezing, hemoptysis  Sleep: Denies daytime sleepiness, snoring, apneas, insomnia, morning headaches  GI: Denies heartburn, dysphagia, nausea, abdominal pain, diarrhea or constipation  : Denies frequent urination, hematuria, discharge or painful urination  Musculoskeletal: Denies back pain, painful joints, sore muscles  Neurological: Denies weakness or headaches  Skin: No rashes    /70 (BP Location: Right arm, Patient Position: Sitting, BP Cuff Size: Large adult)   Pulse 64   Resp 16   Ht 1.6 m (5' 3\")   Wt 93 kg (205 lb)   SpO2 96%     Physical Exam:  Appearance: Well-nourished, well-developed, in no acute distress  HEENT: Normocephalic, atraumatic, white sclera, PERRLA, Mallampati 3  Neck: No masses  Respiratory: no intercostal retractions or accessory muscle use  Lungs auscultation: no wheezing  Cardiovascular: No LE edema  Abdomen: Nondistended  Gait: Normal  Digits: No clubbing, cyanosis  Motor: No focal deficits  Orientation: Oriented to time, person and place    Diagnosis:  1. Obstructive sleep apnea syndrome     2. Chronic combined systolic and diastolic congestive heart failure (HCC)     3. Essential hypertension     4. Paroxysmal atrial fibrillation (HCC)         Plan:  The patient shows excellent compliance and response to BiPAP therapy and is benefiting from treatment.    Continue BiPAP: 13/9 cm of water.  Replace CPAP mask as permitted by DME.  Return in about 6 months " (around 7/15/2021).

## 2021-01-20 ENCOUNTER — TELEPHONE (OUTPATIENT)
Dept: VASCULAR LAB | Facility: MEDICAL CENTER | Age: 81
End: 2021-01-20

## 2021-01-20 ENCOUNTER — ANTICOAGULATION MONITORING (OUTPATIENT)
Dept: VASCULAR LAB | Facility: MEDICAL CENTER | Age: 81
End: 2021-01-20

## 2021-01-20 DIAGNOSIS — I48.0 PAROXYSMAL ATRIAL FIBRILLATION (HCC): ICD-10-CM

## 2021-01-20 LAB
INR PPP: 1.7 (ref 2–3.5)
INR PPP: 1.7 (ref 2–3.5)

## 2021-01-20 NOTE — PROGRESS NOTES
Anticoagulation Summary  As of 1/20/2021    INR goal:  2.0-3.0   TTR:  59.4 % (9.8 mo)   INR used for dosing:     Warfarin maintenance plan:  9 mg (6 mg x 1.5) every Wed; 6 mg (6 mg x 1) all other days   Weekly warfarin total:  45 mg   Plan last modified:  Taylor Cabrales (1/20/2021)   Next INR check:  2/3/2021   Target end date:  Indefinite    Indications    Paroxysmal atrial fibrillation (HCC) [I48.0]             Anticoagulation Episode Summary     INR check location:      Preferred lab:      Send INR reminders to:      Comments:  Elisa      Anticoagulation Care Providers     Provider Role Specialty Phone number    La Paredes M.D. Referring Internal Medicine [phone]    [facility] Anticoagulation Services Responsible  [phone]        Anticoagulation Patient Findings          Spoke with Aleshia to report a sub therapeutic INR of 1.7.Continue current dosing regimen.  Follow up in 2 weeks, to reduce the risk of adverse events related to this high risk medication, warfarin.    Taylor Cabrales, Clinical Pharmacist

## 2021-01-25 ENCOUNTER — APPOINTMENT (OUTPATIENT)
Dept: CARDIOLOGY | Facility: MEDICAL CENTER | Age: 81
End: 2021-01-25
Attending: INTERNAL MEDICINE
Payer: MEDICARE

## 2021-01-28 RX ORDER — CLOBETASOL PROPIONATE 0.5 MG/G
OINTMENT TOPICAL
Qty: 60 G | Refills: 1 | Status: SHIPPED | OUTPATIENT
Start: 2021-01-28 | End: 2021-01-28 | Stop reason: SDUPTHER

## 2021-01-28 RX ORDER — CLOBETASOL PROPIONATE 0.5 MG/G
1 OINTMENT TOPICAL 2 TIMES DAILY
Qty: 60 G | Refills: 1 | Status: SHIPPED | OUTPATIENT
Start: 2021-01-28 | End: 2021-03-01

## 2021-02-01 ENCOUNTER — ANTICOAGULATION MONITORING (OUTPATIENT)
Dept: VASCULAR LAB | Facility: MEDICAL CENTER | Age: 81
End: 2021-02-01

## 2021-02-01 DIAGNOSIS — I48.0 PAROXYSMAL ATRIAL FIBRILLATION (HCC): ICD-10-CM

## 2021-02-01 LAB — INR PPP: 1.7 (ref 2–3.5)

## 2021-02-01 NOTE — PROGRESS NOTES
OP   Telephone Anticoagulation Service Note      Anticoagulation Summary  As of 2021    INR goal:  2.0-3.0   TTR:  57.1 % (10.2 mo)   INR used for dosin.70 (2021)   Warfarin maintenance plan:  9 mg (6 mg x 1.5) every Mon, Wed; 6 mg (6 mg x 1) all other days   Weekly warfarin total:  48 mg   Plan last modified:  Dorina Rodriguez (2021)   Next INR check:  2021   Target end date:  Indefinite    Indications    Paroxysmal atrial fibrillation (HCC) [I48.0]             Anticoagulation Episode Summary     INR check location:      Preferred lab:      Send INR reminders to:      Comments:  Elisa      Anticoagulation Care Providers     Provider Role Specialty Phone number    La Paredes M.D. Referring Internal Medicine [phone]    [facility] Anticoagulation Services Responsible  [phone]        Anticoagulation Patient Findings    Spoke with the patient on the phone today, reporting a SUB-therapeutic INR of 1.7.  Confirmed the current warfarin dosing regimen and patient compliance.  Patient denies any missed doses.  Patient denies any interval changes to diet and/or medications. Patient denies any signs/symptoms of bleeding or clotting.  Patient will begin increased weekly regimen of 9mg on Mon and Wed and 6mg ROW.   Patient will retest again in 1 week.     Linda MelgozaD

## 2021-02-02 ENCOUNTER — APPOINTMENT (OUTPATIENT)
Dept: RADIOLOGY | Facility: MEDICAL CENTER | Age: 81
End: 2021-02-02
Attending: INTERNAL MEDICINE
Payer: MEDICARE

## 2021-02-08 ENCOUNTER — HOSPITAL ENCOUNTER (OUTPATIENT)
Dept: RADIOLOGY | Facility: MEDICAL CENTER | Age: 81
End: 2021-02-08
Attending: INTERNAL MEDICINE
Payer: MEDICARE

## 2021-02-08 ENCOUNTER — ANTICOAGULATION MONITORING (OUTPATIENT)
Dept: VASCULAR LAB | Facility: MEDICAL CENTER | Age: 81
End: 2021-02-08

## 2021-02-08 DIAGNOSIS — K74.69 FLORID CIRRHOSIS (HCC): ICD-10-CM

## 2021-02-08 DIAGNOSIS — I48.0 PAROXYSMAL ATRIAL FIBRILLATION (HCC): ICD-10-CM

## 2021-02-08 LAB — INR PPP: 2.2 (ref 2–3.5)

## 2021-02-08 PROCEDURE — 76705 ECHO EXAM OF ABDOMEN: CPT

## 2021-02-08 NOTE — PROGRESS NOTES
OP   Telephone Anticoagulation Service Note      Anticoagulation Summary  As of 2021    INR goal:  2.0-3.0   TTR:  56.7 % (10.4 mo)   INR used for dosin.20 (2021)   Warfarin maintenance plan:  9 mg (6 mg x 1.5) every Mon, Wed; 6 mg (6 mg x 1) all other days   Weekly warfarin total:  48 mg   Plan last modified:  Dorina Rodriguez (2021)   Next INR check:  2021   Target end date:  Indefinite    Indications    Paroxysmal atrial fibrillation (HCC) [I48.0]             Anticoagulation Episode Summary     INR check location:      Preferred lab:      Send INR reminders to:      Comments:  Elisa      Anticoagulation Care Providers     Provider Role Specialty Phone number    La Paredes M.D. Referring Internal Medicine [phone]    [facility] Anticoagulation Services Responsible  [phone]        Anticoagulation Patient Findings     Left a message on the patient's voicemail today, informing the patient of a therapeutic INR of 2.2.   Instructed the patient to call the clinic with any changes to diet or medications, with any signs/sx of bleeding or clotting or with any questions or concerns.     Patient instructed to continue with the current warfarin dosing regimen, and asked to follow up again in 2 weeks.     Linda MelgozaD

## 2021-02-10 ENCOUNTER — HOSPITAL ENCOUNTER (OUTPATIENT)
Dept: RADIOLOGY | Facility: MEDICAL CENTER | Age: 81
End: 2021-02-10
Payer: MEDICARE

## 2021-02-10 DIAGNOSIS — R92.8 ABNORMAL MAMMOGRAM: ICD-10-CM

## 2021-02-11 DIAGNOSIS — E03.9 HYPOTHYROIDISM, UNSPECIFIED TYPE: ICD-10-CM

## 2021-02-12 RX ORDER — LEVOTHYROXINE SODIUM 150 UG/1
TABLET ORAL
Qty: 90 TABLET | Refills: 2 | Status: SHIPPED | OUTPATIENT
Start: 2021-02-12 | End: 2021-06-28 | Stop reason: SDUPTHER

## 2021-02-12 NOTE — TELEPHONE ENCOUNTER
Last seen by PCP 12/29/2020.     TSH   Date Value Ref Range Status   09/09/2020 1.560 0.380 - 5.330 uIU/mL Final     Comment:     Please note new reference ranges effective 12/14/2017 10:00 AM  Pregnant Females, 1st Trimester  0.050-3.700  Pregnant Females, 2nd Trimester  0.310-4.350  Pregnant Females, 3rd Trimester  0.410-5.180         Will send 9 month(s) to the pharmacy

## 2021-02-15 LAB — INR PPP: 2 (ref 2–3.5)

## 2021-02-16 ENCOUNTER — ANTICOAGULATION MONITORING (OUTPATIENT)
Dept: VASCULAR LAB | Facility: MEDICAL CENTER | Age: 81
End: 2021-02-16

## 2021-02-16 DIAGNOSIS — I48.0 PAROXYSMAL ATRIAL FIBRILLATION (HCC): ICD-10-CM

## 2021-02-17 NOTE — PROGRESS NOTES
Anticoagulation Summary  As of 2021    INR goal:  2.0-3.0   TTR:  57.7 % (10.7 mo)   INR used for dosin.00 (2/15/2021)   Warfarin maintenance plan:  9 mg (6 mg x 1.5) every Mon, Wed; 6 mg (6 mg x 1) all other days   Weekly warfarin total:  48 mg   Plan last modified:  Dorina Rodriguez (2021)   Next INR check:  2021   Target end date:  Indefinite    Indications    Paroxysmal atrial fibrillation (HCC) [I48.0]             Anticoagulation Episode Summary     INR check location:      Preferred lab:      Send INR reminders to:      Comments:  Elisa      Anticoagulation Care Providers     Provider Role Specialty Phone number    La Paredes M.D. Referring Internal Medicine [phone]    [facility] Anticoagulation Services Responsible  [phone]        Anticoagulation Patient Findings  Patient Findings     Negatives:  Signs/symptoms of thrombosis, Signs/symptoms of bleeding, Laboratory test error suspected, Change in health, Change in alcohol use, Change in activity, Upcoming invasive procedure, Emergency department visit, Upcoming dental procedure, Missed doses, Extra doses, Change in medications, Change in diet/appetite, Hospital admission, Bruising, Other complaints        Spoke with patient today regarding therapeutic INR of 2.0.  Patient denies any signs/symptoms of bruising or bleeding or any changes in diet and medications.  Instructed patient to call clinic with any questions or concerns.  Pt is to continue with current warfarin dosing regimen.  Follow up in 1 weeks, to reduce risk of adverse events related to this high risk medication,  Warfarin.    Christian Jordan, PharmD, BCACP

## 2021-02-22 ENCOUNTER — ANTICOAGULATION MONITORING (OUTPATIENT)
Dept: MEDICAL GROUP | Facility: PHYSICIAN GROUP | Age: 81
End: 2021-02-22

## 2021-02-22 DIAGNOSIS — I10 ESSENTIAL HYPERTENSION: ICD-10-CM

## 2021-02-22 DIAGNOSIS — I48.0 PAROXYSMAL ATRIAL FIBRILLATION (HCC): ICD-10-CM

## 2021-02-22 LAB — INR PPP: 2.6 (ref 2–3.5)

## 2021-02-23 ENCOUNTER — HOSPITAL ENCOUNTER (OUTPATIENT)
Dept: LAB | Facility: MEDICAL CENTER | Age: 81
End: 2021-02-23
Attending: INTERNAL MEDICINE
Payer: MEDICARE

## 2021-02-23 DIAGNOSIS — I27.20 MODERATE TO SEVERE PULMONARY HYPERTENSION (HCC): ICD-10-CM

## 2021-02-23 DIAGNOSIS — N17.9 AKI (ACUTE KIDNEY INJURY) (HCC): ICD-10-CM

## 2021-02-23 DIAGNOSIS — D64.9 ANEMIA, UNSPECIFIED TYPE: ICD-10-CM

## 2021-02-23 DIAGNOSIS — Z86.39 HISTORY OF VITAMIN D DEFICIENCY: ICD-10-CM

## 2021-02-23 DIAGNOSIS — I10 ESSENTIAL HYPERTENSION: ICD-10-CM

## 2021-02-23 LAB
25(OH)D3 SERPL-MCNC: 58 NG/ML (ref 30–100)
ALBUMIN SERPL BCP-MCNC: 3.9 G/DL (ref 3.2–4.9)
ALBUMIN SERPL BCP-MCNC: 4 G/DL (ref 3.2–4.9)
ALBUMIN/GLOB SERPL: 1.1 G/DL
ALP SERPL-CCNC: 95 U/L (ref 30–99)
ALT SERPL-CCNC: 15 U/L (ref 2–50)
ANION GAP SERPL CALC-SCNC: 8 MMOL/L (ref 7–16)
ANION GAP SERPL CALC-SCNC: 9 MMOL/L (ref 7–16)
APPEARANCE UR: CLEAR
AST SERPL-CCNC: 19 U/L (ref 12–45)
BILIRUB SERPL-MCNC: 0.6 MG/DL (ref 0.1–1.5)
BILIRUB UR QL STRIP.AUTO: NEGATIVE
BUN SERPL-MCNC: 27 MG/DL (ref 8–22)
BUN SERPL-MCNC: 27 MG/DL (ref 8–22)
CALCIUM SERPL-MCNC: 9.7 MG/DL (ref 8.5–10.5)
CALCIUM SERPL-MCNC: 9.7 MG/DL (ref 8.5–10.5)
CHLORIDE SERPL-SCNC: 102 MMOL/L (ref 96–112)
CHLORIDE SERPL-SCNC: 102 MMOL/L (ref 96–112)
CO2 SERPL-SCNC: 27 MMOL/L (ref 20–33)
CO2 SERPL-SCNC: 27 MMOL/L (ref 20–33)
COLOR UR: YELLOW
CREAT SERPL-MCNC: 1.34 MG/DL (ref 0.5–1.4)
CREAT SERPL-MCNC: 1.39 MG/DL (ref 0.5–1.4)
CREAT UR-MCNC: 44.35 MG/DL
CREAT UR-MCNC: 44.69 MG/DL
ERYTHROCYTE [DISTWIDTH] IN BLOOD BY AUTOMATED COUNT: 47.7 FL (ref 35.9–50)
FERRITIN SERPL-MCNC: 44.6 NG/ML (ref 10–291)
GLOBULIN SER CALC-MCNC: 3.4 G/DL (ref 1.9–3.5)
GLUCOSE SERPL-MCNC: 94 MG/DL (ref 65–99)
GLUCOSE SERPL-MCNC: 98 MG/DL (ref 65–99)
GLUCOSE UR STRIP.AUTO-MCNC: NEGATIVE MG/DL
HCT VFR BLD AUTO: 40.2 % (ref 37–47)
HGB BLD-MCNC: 12.1 G/DL (ref 12–16)
IRON SATN MFR SERPL: 12 % (ref 15–55)
IRON SERPL-MCNC: 46 UG/DL (ref 40–170)
KETONES UR STRIP.AUTO-MCNC: NEGATIVE MG/DL
LEUKOCYTE ESTERASE UR QL STRIP.AUTO: NEGATIVE
MCH RBC QN AUTO: 27.3 PG (ref 27–33)
MCHC RBC AUTO-ENTMCNC: 30.1 G/DL (ref 33.6–35)
MCV RBC AUTO: 90.5 FL (ref 81.4–97.8)
MICRO URNS: NORMAL
MICROALBUMIN UR-MCNC: <1.2 MG/DL
MICROALBUMIN/CREAT UR: NORMAL MG/G (ref 0–30)
NITRITE UR QL STRIP.AUTO: NEGATIVE
PH UR STRIP.AUTO: 6.5 [PH] (ref 5–8)
PHOSPHATE SERPL-MCNC: 3.7 MG/DL (ref 2.5–4.5)
PLATELET # BLD AUTO: 264 K/UL (ref 164–446)
PMV BLD AUTO: 11.6 FL (ref 9–12.9)
POTASSIUM SERPL-SCNC: 4.6 MMOL/L (ref 3.6–5.5)
POTASSIUM SERPL-SCNC: 4.9 MMOL/L (ref 3.6–5.5)
PROT SERPL-MCNC: 7.3 G/DL (ref 6–8.2)
PROT UR QL STRIP: NEGATIVE MG/DL
PROT UR-MCNC: 4 MG/DL (ref 0–15)
PROT/CREAT UR: 90 MG/G (ref 10–107)
PTH-INTACT SERPL-MCNC: 34.3 PG/ML (ref 14–72)
RBC # BLD AUTO: 4.44 M/UL (ref 4.2–5.4)
RBC UR QL AUTO: NEGATIVE
SODIUM SERPL-SCNC: 137 MMOL/L (ref 135–145)
SODIUM SERPL-SCNC: 138 MMOL/L (ref 135–145)
SP GR UR STRIP.AUTO: 1.01
TIBC SERPL-MCNC: 369 UG/DL (ref 250–450)
UIBC SERPL-MCNC: 323 UG/DL (ref 110–370)
UROBILINOGEN UR STRIP.AUTO-MCNC: 0.2 MG/DL
WBC # BLD AUTO: 7.5 K/UL (ref 4.8–10.8)

## 2021-02-23 PROCEDURE — 80048 BASIC METABOLIC PNL TOTAL CA: CPT

## 2021-02-23 PROCEDURE — 84100 ASSAY OF PHOSPHORUS: CPT

## 2021-02-23 PROCEDURE — 82043 UR ALBUMIN QUANTITATIVE: CPT

## 2021-02-23 PROCEDURE — 81003 URINALYSIS AUTO W/O SCOPE: CPT

## 2021-02-23 PROCEDURE — 82570 ASSAY OF URINE CREATININE: CPT

## 2021-02-23 PROCEDURE — 82306 VITAMIN D 25 HYDROXY: CPT

## 2021-02-23 PROCEDURE — 85027 COMPLETE CBC AUTOMATED: CPT

## 2021-02-23 PROCEDURE — 83970 ASSAY OF PARATHORMONE: CPT

## 2021-02-23 PROCEDURE — 84156 ASSAY OF PROTEIN URINE: CPT

## 2021-02-23 PROCEDURE — 83540 ASSAY OF IRON: CPT

## 2021-02-23 PROCEDURE — 80053 COMPREHEN METABOLIC PANEL: CPT

## 2021-02-23 PROCEDURE — 36415 COLL VENOUS BLD VENIPUNCTURE: CPT

## 2021-02-23 PROCEDURE — 82040 ASSAY OF SERUM ALBUMIN: CPT

## 2021-02-23 PROCEDURE — 83550 IRON BINDING TEST: CPT

## 2021-02-23 PROCEDURE — 82105 ALPHA-FETOPROTEIN SERUM: CPT

## 2021-02-23 PROCEDURE — 82728 ASSAY OF FERRITIN: CPT

## 2021-02-23 RX ORDER — LOSARTAN POTASSIUM 50 MG/1
TABLET ORAL
Qty: 100 TABLET | Refills: 0 | Status: SHIPPED | OUTPATIENT
Start: 2021-02-23 | End: 2021-06-02 | Stop reason: SDUPTHER

## 2021-02-23 NOTE — PROGRESS NOTES
Anticoagulation Summary  As of 2021    INR goal:  2.0-3.0   TTR:  58.6 % (10.9 mo)   INR used for dosin.60 (2021)   Warfarin maintenance plan:  9 mg (6 mg x 1.5) every Mon, Wed; 6 mg (6 mg x 1) all other days   Weekly warfarin total:  48 mg   Plan last modified:  Dorina Rodriguez (2021)   Next INR check:  3/1/2021   Target end date:  Indefinite    Indications    Paroxysmal atrial fibrillation (HCC) [I48.0]             Anticoagulation Episode Summary     INR check location:      Preferred lab:      Send INR reminders to:      Comments:  [facility]      Anticoagulation Care Providers     Provider Role Specialty Phone number    La Paredes M.D. Referring Internal Medicine [phone]    [facility] Anticoagulation Services Responsible  [phone]        Anticoagulation Patient Findings       Spoke with patient today regarding therapeutic INR of 2.6.    Pt confirmed current dosing regimen.  Patient denies any signs/symptoms of bruising or bleeding or any changes in diet and medications.  Instructed patient to call clinic with any questions or concerns.  Pt is to continue with current warfarin dosing regimen.  Follow up in 1 week, to reduce risk of adverse events related to this high risk medication,  Warfarin.    Ed Lopez, Pharmacy Intern

## 2021-02-24 ENCOUNTER — HOSPITAL ENCOUNTER (OUTPATIENT)
Dept: CARDIOLOGY | Facility: MEDICAL CENTER | Age: 81
End: 2021-02-24
Attending: INTERNAL MEDICINE
Payer: MEDICARE

## 2021-02-24 DIAGNOSIS — I50.42 CHRONIC COMBINED SYSTOLIC AND DIASTOLIC CONGESTIVE HEART FAILURE (HCC): ICD-10-CM

## 2021-02-24 DIAGNOSIS — E78.2 MIXED HYPERLIPIDEMIA: ICD-10-CM

## 2021-02-24 DIAGNOSIS — I27.20 MODERATE TO SEVERE PULMONARY HYPERTENSION (HCC): ICD-10-CM

## 2021-02-24 LAB — AFP-TM SERPL-MCNC: 3 NG/ML (ref 0–9)

## 2021-02-24 PROCEDURE — 93306 TTE W/DOPPLER COMPLETE: CPT

## 2021-02-25 LAB
LV EJECT FRACT  99904: 55
LV EJECT FRACT MOD 2C 99903: 44.51
LV EJECT FRACT MOD 4C 99902: 49.56
LV EJECT FRACT MOD BP 99901: 47.44

## 2021-02-25 PROCEDURE — 93306 TTE W/DOPPLER COMPLETE: CPT | Mod: 26 | Performed by: INTERNAL MEDICINE

## 2021-02-26 ENCOUNTER — OFFICE VISIT (OUTPATIENT)
Dept: NEPHROLOGY | Facility: MEDICAL CENTER | Age: 81
End: 2021-02-26
Payer: MEDICARE

## 2021-02-26 VITALS
BODY MASS INDEX: 36.82 KG/M2 | HEIGHT: 63 IN | HEART RATE: 61 BPM | WEIGHT: 207.8 LBS | DIASTOLIC BLOOD PRESSURE: 62 MMHG | OXYGEN SATURATION: 99 % | SYSTOLIC BLOOD PRESSURE: 102 MMHG | TEMPERATURE: 97.7 F

## 2021-02-26 DIAGNOSIS — Z86.39 HISTORY OF VITAMIN D DEFICIENCY: ICD-10-CM

## 2021-02-26 DIAGNOSIS — I48.0 PAROXYSMAL ATRIAL FIBRILLATION (HCC): ICD-10-CM

## 2021-02-26 DIAGNOSIS — N18.32 STAGE 3B CHRONIC KIDNEY DISEASE: ICD-10-CM

## 2021-02-26 DIAGNOSIS — I10 ESSENTIAL HYPERTENSION: Chronic | ICD-10-CM

## 2021-02-26 DIAGNOSIS — I50.42 CHRONIC COMBINED SYSTOLIC AND DIASTOLIC CONGESTIVE HEART FAILURE (HCC): ICD-10-CM

## 2021-02-26 DIAGNOSIS — D64.9 ANEMIA, UNSPECIFIED TYPE: ICD-10-CM

## 2021-02-26 PROCEDURE — 99214 OFFICE O/P EST MOD 30 MIN: CPT | Performed by: INTERNAL MEDICINE

## 2021-02-26 ASSESSMENT — ENCOUNTER SYMPTOMS
SHORTNESS OF BREATH: 0
FEVER: 0
ABDOMINAL PAIN: 0

## 2021-02-26 ASSESSMENT — FIBROSIS 4 INDEX: FIB4 SCORE: 1.47

## 2021-02-26 NOTE — PROGRESS NOTES
Chief Complaint   Patient presents with   • Follow-Up       CC: f/u CKD    HPI:  Aleshia Crooks is a 81 y.o. female with HTN, CKD3b who presents today for follow up.    Re: HTN, diagnosed 1992 when she had a heart attack. BP control over the years has been well controlled. She doesn't check BP at home, but well controlled at doctor's visits. She was taken off furosemide for a month because she said her legs weren't swollen. She is on spironolactone 50mg daily.     Re: CHF, noted in 2017 and 2020. She follows with Dr. Villar. She might have leaky valves. She is on spironolactone 50mg daily and feels a diuretic effect. She isn't on lasix now.     Re: CKD, notably worsening in the last year. She denies urinary difficulties. She denies NSAIDs, takes Tylenol PRN.     Re: Anemia, she denies bleeding. She is scheduled for c-scope soon so currently off of it.       Past Medical History:   Diagnosis Date   • Apnea, sleep    • Atrial fibrillation (HCC)    • Gasping for breath    • Heart attack (HCC)    • Hyperlipidemia    • Hypertension    • Thyroid disease        Past Surgical History:   Procedure Laterality Date   • CHOLECYSTECTOMY     • EYE SURGERY Bilateral     cataracts   • MULTIPLE CORONARY ARTERY BYPASS      1 bypass   • THYROIDECTOMY TOTAL          Outpatient Encounter Medications as of 2/26/2021   Medication Sig Dispense Refill   • losartan (COZAAR) 50 MG Tab TAKE 1 TABLET BY MOUTH DAILY 100 tablet 0   • LEVOXYL 150 MCG Tab TAKE 1 TABLET BY MOUTH EVERY MORNING ON AN EMPTY STOMACH 90 tablet 2   • clobetasol (TEMOVATE) 0.05 % Ointment Apply 1 g topically 2 times a day. 60 g 1   • spironolactone (ALDACTONE) 25 MG Tab Take 2 Tabs by mouth every day. 180 Tab 3   • atorvastatin (LIPITOR) 40 MG Tab TAKE 1 TABLET BY MOUTH AT  BEDTIME 90 Tab 3   • ferrous sulfate 325 (65 Fe) MG tablet Take 1 Tab by mouth 2 Times a Day. 60 Tab 3   • ascorbic acid (VITAMIN C) 500 MG tablet Take 1 Tab by mouth 2 times a day. 60 Tab 3   •  "furosemide (LASIX) 40 MG Tab TAKE 1 TABLET BY MOUTH DAILY 90 Tab 3   • metoprolol (LOPRESSOR) 50 MG Tab TAKE 1 TABLET BY MOUTH TWICE DAILY 180 Tab 1   • omeprazole (PRILOSEC) 20 MG delayed-release capsule TAKE 1 CAPSULE BY MOUTH  EVERY DAY. 90 Cap 3   • warfarin (COUMADIN) 6 MG Tab Take one tablet daily as directed by [facility]  Indications: Obstructing Blood Clot with Chronic Atrial Fibrillation 90 Tab 3   • triamcinolone acetonide (KENALOG) 0.1 % Cream MAGNUS EXT AA BID FOR 7 TO 10 DAYS THEN PRF FLARES     • vitamin k 100 MCG tablet Take 100 mcg by mouth every day.     • vitamin D (CHOLECALCIFEROL) 1000 UNIT Tab Take 4,000 Units by mouth every day.       No facility-administered encounter medications on file as of 2/26/2021.        No Known Allergies        Family History   Problem Relation Age of Onset   • Cancer Mother         cancer, smoker   • Stroke Maternal Grandmother    • Other Other         Crohn   • Kidney Disease Other         kidney transplant       Review of Systems   Constitutional: Negative for fever.   Respiratory: Negative for shortness of breath.    Cardiovascular: Negative for chest pain.   Gastrointestinal: Negative for abdominal pain.   Genitourinary: Negative for dysuria.   All other systems reviewed and are negative.      /62 (BP Location: Right arm, Patient Position: Sitting, BP Cuff Size: Adult)   Pulse 61   Temp 36.5 °C (97.7 °F) (Temporal)   Ht 1.6 m (5' 3\")   Wt 94.3 kg (207 lb 12.8 oz)   LMP  (LMP Unknown)   SpO2 99%   BMI 36.81 kg/m²     Physical Exam   Constitutional: She is oriented to person, place, and time and well-developed, well-nourished, and in no distress. No distress.   HENT:   Mouth/Throat: Oropharynx is clear and moist. No oropharyngeal exudate.   Eyes: EOM are normal. No scleral icterus.   Neck: No tracheal deviation present.   Cardiovascular: Normal rate and normal heart sounds.   No murmur heard.  irreg irreg   Pulmonary/Chest: Effort " normal and breath sounds normal. No stridor. She has no rales.   Abdominal: Soft. Bowel sounds are normal. There is no abdominal tenderness.   Obese    Musculoskeletal:         General: Edema (trace-1+ bilat LE) present. Normal range of motion.      Cervical back: Neck supple.   Neurological: She is alert and oriented to person, place, and time.   Skin: Skin is warm and dry. No rash noted.   Psychiatric: Mood and affect normal.         Labs reviewed.    Recent Labs     04/08/20  1940 04/20/20  0817 04/20/20  0819 09/09/20  1230 10/31/20  1054 11/25/20  1215 02/23/21  0838 02/23/21  0940   ALBUMIN  --   --  4.1 4.6 4.0  --  4.0 3.9   HDL  --  36* 35* 35*  --   --   --   --    TRIGLYCERIDE  --  145 141 140  --   --   --   --    SODIUM   < > 142  --  137 138 137 137 138   POTASSIUM   < > 3.7  --  4.3 4.6 4.1 4.9 4.6   CHLORIDE   < > 103  --  98 100 100 102 102   CO2   < > 25  --  25 24 25 27 27   BUN   < > 23*  --  24* 26* 24* 27* 27*   CREATININE   < > 1.04  --  1.35 1.52* 1.40 1.39 1.34   PHOSPHORUS  --   --   --   --   --   --  3.7  --     < > = values in this interval not displayed.       Lab Results   Component Value Date/Time    WBC 7.5 02/23/2021 08:38 AM    RBC 4.44 02/23/2021 08:38 AM    HEMOGLOBIN 12.1 02/23/2021 08:38 AM    HEMATOCRIT 40.2 02/23/2021 08:38 AM    MCV 90.5 02/23/2021 08:38 AM    MCH 27.3 02/23/2021 08:38 AM    MCHC 30.1 (L) 02/23/2021 08:38 AM    MPV 11.6 02/23/2021 08:38 AM             URINALYSIS:  Lab Results   Component Value Date/Time    COLORURINE Yellow 02/23/2021 0838    CLARITY Clear 02/23/2021 0838    SPECGRAVITY 1.015 02/23/2021 0838    PHURINE 6.5 02/23/2021 0838    KETONES Negative 02/23/2021 0838    PROTEINURIN Negative 02/23/2021 0838    BILIRUBINUR Negative 02/23/2021 0838    UROBILU 0.2 02/23/2021 0838    NITRITE Negative 02/23/2021 0838    LEUKESTERAS Negative 02/23/2021 0838    OCCULTBLOOD Negative 02/23/2021 0838       [facility]  [place]   Component Value Date/Time     TOTPROTUR 4.0 02/23/2021 0838      Lab Results   Component Value Date/Time    CREATININEU 44.35 02/23/2021 0838       Imaging reviewed  No orders to display         Assessment:  Aleshia Crooks is a 81 y.o. female with HTN, CKD3b who presents today for follow up.    Plan:  1. CKD 3b  -CKD labs stable.  Urinalysis benign.  CKD likely due to history of hypertension, and age-related kidney decline.  I explained the importance of blood pressure control to help slow CKD progression.  Avoid NSAIDs and other nephrotoxins.  Low-salt diet.    2. Essential hypertension  -Controlled.  Continue losartan and other regimen as above.    3. Moderate to severe pulmonary hypertension (HCC)  -Stable.  Likely due to left-sided heart failure, and valvular disease from tricuspid and mitral regurgitation.  Continue management with cardiology.    4. Chronic combined systolic and diastolic congestive heart failure (HCC)  -Stable.  Patient appears euvolemic on exam today.  Patient stable without Lasix, but should be instructed to resume Lasix 40 mg p.o. twice daily in the future if lower extremity edema or hypertension worsens.    5. Anemia, unspecified type  -Improving with oral iron supplementation.  Patient has colonoscopy scheduled soon.  Further management per primary care and gastroenterology.      RTC 6 months with preclinic labs    Ryder Davis MD  Nephrology

## 2021-03-01 ENCOUNTER — TELEMEDICINE (OUTPATIENT)
Dept: MEDICAL GROUP | Facility: PHYSICIAN GROUP | Age: 81
End: 2021-03-01
Payer: MEDICARE

## 2021-03-01 ENCOUNTER — ANTICOAGULATION MONITORING (OUTPATIENT)
Dept: VASCULAR LAB | Facility: MEDICAL CENTER | Age: 81
End: 2021-03-01

## 2021-03-01 VITALS — BODY MASS INDEX: 36.68 KG/M2 | HEIGHT: 63 IN | WEIGHT: 207 LBS | RESPIRATION RATE: 12 BRPM

## 2021-03-01 DIAGNOSIS — E78.2 MIXED HYPERLIPIDEMIA: ICD-10-CM

## 2021-03-01 DIAGNOSIS — I50.42 CHRONIC COMBINED SYSTOLIC AND DIASTOLIC CONGESTIVE HEART FAILURE (HCC): ICD-10-CM

## 2021-03-01 DIAGNOSIS — D64.9 ANEMIA, UNSPECIFIED TYPE: ICD-10-CM

## 2021-03-01 DIAGNOSIS — C43.62 MALIGNANT MELANOMA OF LEFT UPPER EXTREMITY INCLUDING SHOULDER (HCC): ICD-10-CM
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
## 2021-03-01 DIAGNOSIS — I48.0 PAROXYSMAL ATRIAL FIBRILLATION (HCC): ICD-10-CM

## 2021-03-01 DIAGNOSIS — N39.3 STRESS INCONTINENCE: ICD-10-CM

## 2021-03-01 DIAGNOSIS — L30.9 DERMATITIS: ICD-10-CM

## 2021-03-01 DIAGNOSIS — E03.9 HYPOTHYROIDISM, UNSPECIFIED TYPE: ICD-10-CM

## 2021-03-01 PROBLEM — R29.6 FALLS: Status: ACTIVE | Noted: 2021-03-01

## 2021-03-01 LAB — INR PPP: 2.4 (ref 2–3.5)

## 2021-03-01 PROCEDURE — 8041 PR SCP AHA: Mod: 95,CR | Performed by: PHYSICIAN ASSISTANT

## 2021-03-01 PROCEDURE — 99214 OFFICE O/P EST MOD 30 MIN: CPT | Mod: 95,CR | Performed by: PHYSICIAN ASSISTANT

## 2021-03-01 RX ORDER — BETAMETHASONE DIPROPIONATE 0.05 %
1 OINTMENT (GRAM) TOPICAL DAILY
Qty: 50 G | Refills: 1 | Status: SHIPPED | OUTPATIENT
Start: 2021-03-01 | End: 2021-04-20

## 2021-03-01 ASSESSMENT — PATIENT HEALTH QUESTIONNAIRE - PHQ9: CLINICAL INTERPRETATION OF PHQ2 SCORE: 0

## 2021-03-01 ASSESSMENT — FIBROSIS 4 INDEX: FIB4 SCORE: 1.47

## 2021-03-01 NOTE — ASSESSMENT & PLAN NOTE
Chronic condition. Stable. Patient continues to be followed by cardiology. On metoprolol for rate control. Continues on Coumadin and is followed by the Coumadin clinic.Last follow-up with cardiology was on December 22, 2020.

## 2021-03-01 NOTE — ASSESSMENT & PLAN NOTE
Some trips and falls in the last year- tripped over computer wires. And another one playing with dog and tripped over dog. Another one when taking trash out- and turned over to turn light on, and was father than she expected. No lingering injuries from falls. All were accidents.

## 2021-03-01 NOTE — PROGRESS NOTES
OP   Telephone Anticoagulation Service Note      Anticoagulation Summary  As of 3/1/2021    INR goal:  2.0-3.0   TTR:  59.4 % (11.1 mo)   INR used for dosin.40 (3/1/2021)   Warfarin maintenance plan:  9 mg (6 mg x 1.5) every Mon, Wed; 6 mg (6 mg x 1) all other days   Weekly warfarin total:  48 mg   Plan last modified:  Dorina Rodriguez (2021)   Next INR check:  3/8/2021   Target end date:  Indefinite    Indications    Paroxysmal atrial fibrillation (HCC) [I48.0]             Anticoagulation Episode Summary     INR check location:      Preferred lab:      Send INR reminders to:      Comments:  Elisa      Anticoagulation Care Providers     Provider Role Specialty Phone number    La Paredes M.D. Referring Internal Medicine [phone]    [facility] Anticoagulation Services Responsible  [phone]        Anticoagulation Patient Findings  Patient Findings     Negatives:  Signs/symptoms of thrombosis, Signs/symptoms of bleeding, Laboratory test error suspected, Change in health, Change in alcohol use, Change in activity, Upcoming invasive procedure, Emergency department visit, Upcoming dental procedure, Missed doses, Extra doses, Change in medications, Change in diet/appetite, Hospital admission, Bruising, Other complaints        Spoke with the patient on the phone today, reporting a therapeutic INR of 2.4.  Confirmed the current warfarin dosing regimen and patient compliance.  Patient denies any interval changes to diet and/or medications. Patient denies any signs/symptoms of bleeding or clotting.  Patient instructed to continue with the current warfarin dosing regimen, and asked to follow up again in 1 week.     Linda MelgozaD

## 2021-03-01 NOTE — ASSESSMENT & PLAN NOTE
Some stress incontinence with sneezing, coughing and laughing. Not interfering with day to day activities.

## 2021-03-01 NOTE — ASSESSMENT & PLAN NOTE
Complains of dry, itching skin, on arm and legs. They seem to get bigger. She thinks eczema or psoriasis. Sometimes has like scaling silver surface. Asking for dermatologist.     Has seen dermatology- Dr. Tirado?

## 2021-03-01 NOTE — NON-PROVIDER
Annual Health Assessment Questions:    1.  Are you currently engaging in any exercise or physical activity? No    2.  How would you describe your mood or emotional well-being today? good    3.  Have you had any falls in the last year? Yes    4.  Have you noticed any problems with your balance or had difficulty walking? Yes    5.  In the last six months have you experienced any leakage of urine? Yes    6. DPA/Advanced Directive: Patient does not have an Advanced Directive.  A packet and workshop information was given on Advanced Directives.

## 2021-03-01 NOTE — PROGRESS NOTES
Virtual Visit: Established Patient   This visit was conducted via Zoom using secure and encrypted videoconferencing technology. The patient was in a private location in the state of [place].    The patient's identity was confirmed and verbal consent was obtained for this virtual visit.    Subjective:   CC:   Chief Complaint   Patient presents with   • Skin Lesion     Arms and Legs x 2 months       Aleshia Crooks is a 81 y.o. female presenting for evaluation and management of:    Health Maintenance:   Colonoscopy- Dr. Gomez- considering repeat because of anemia but needs clearance from Dr. Villar.     Paroxysmal atrial fibrillation (HCC)  Chronic condition. Stable. Patient continues to be followed by cardiology. On metoprolol for rate control. Continues on Coumadin and is followed by the Coumadin clinic.Last follow-up with cardiology was on December 22, 2020.    Chronic combined systolic and diastolic congestive heart failure (HCC)  For her congestive heart failure, [facility]. Last visit December 22, 2020. Dr. Villar ordered an echocardiogram, continue to her losartan for CHF. She is also now on spironolactone 25 mg daily.    Dermatitis  Complains of dry, itching skin, on arm and legs. They seem to get bigger. She thinks eczema or psoriasis. Sometimes has like scaling silver surface. Asking for dermatologist.     Has seen dermatology- Dr. Tirado?      Anemia  Anemia improved, followed by Nephrology and Dr. Gomez at GI. They may do colonoscopy to r/o slow GI bleed.     Falls  Some trips and falls in the last year- tripped over computer wires. And another one playing with dog and tripped over dog. Another one when taking trash out- and turned over to turn light on, and was father than she expected. No lingering injuries from falls. All were accidents.     Stress incontinence  Some stress incontinence with sneezing, coughing and laughing. Not interfering with day to day activities.        ROS   Denies any recent fevers or chills. No nausea or vomiting. No chest pains or shortness of breath.     No Known Allergies    Current medicines (including changes today)  Current Outpatient Medications   Medication Sig Dispense Refill   • betamethasone dipropionate (DIPROLENE) 0.05 % Ointment Apply 1 Application topically every day. 50 g 1   • losartan (COZAAR) 50 MG Tab TAKE 1 TABLET BY MOUTH DAILY 100 tablet 0   • LEVOXYL 150 MCG Tab TAKE 1 TABLET BY MOUTH EVERY MORNING ON AN EMPTY STOMACH 90 tablet 2   • spironolactone (ALDACTONE) 25 MG Tab Take 2 Tabs by mouth every day. 180 Tab 3   • atorvastatin (LIPITOR) 40 MG Tab TAKE 1 TABLET BY MOUTH AT  BEDTIME 90 Tab 3   • ferrous sulfate 325 (65 Fe) MG tablet Take 1 Tab by mouth 2 Times a Day. 60 Tab 3   • ascorbic acid (VITAMIN C) 500 MG tablet Take 1 Tab by mouth 2 times a day. 60 Tab 3   • metoprolol (LOPRESSOR) 50 MG Tab TAKE 1 TABLET BY MOUTH TWICE DAILY 180 Tab 1   • omeprazole (PRILOSEC) 20 MG delayed-release capsule TAKE 1 CAPSULE BY MOUTH  EVERY DAY. 90 Cap 3   • warfarin (COUMADIN) 6 MG Tab Take one tablet daily as directed by [facility]  Indications: Obstructing Blood Clot with Chronic Atrial Fibrillation 90 Tab 3   • triamcinolone acetonide (KENALOG) 0.1 % Cream MAGNUS EXT AA BID FOR 7 TO 10 DAYS THEN PRF FLARES     • vitamin k 100 MCG tablet Take 100 mcg by mouth every day.     • vitamin D (CHOLECALCIFEROL) 1000 UNIT Tab Take 4,000 Units by mouth every day.       No current facility-administered medications for this visit.       Patient Active Problem List    Diagnosis Date Noted   • Microcytic anemia 07/13/2017     Priority: Low   • Dermatitis 03/01/2021   • Falls 03/01/2021   • Stress incontinence 03/01/2021   • Stage 3b chronic kidney disease 09/11/2020   • Gastroesophageal reflux disease without esophagitis 06/08/2020   • Abnormal CBC 06/08/2020   • Malignant melanoma of left upper extremity including shoulder (HCC)  "06/08/2020   • Abnormal CT of the abdomen 04/17/2020   • Mixed hyperlipidemia 04/17/2020   • Hepatic steatosis 04/17/2020   • Elevated alkaline phosphatase level 04/17/2020   • Elevated troponin 04/08/2020   • Anemia 04/08/2020   • Hx of melanoma excision 03/12/2020   • Skin lesions 03/12/2020   • Essential hypertension 03/12/2020   • Hypothyroidism 03/12/2020   • Obesity (BMI 35.0-39.9 without comorbidity) (HCC) 03/12/2020   • Obstructive sleep apnea syndrome 03/12/2020   • Severe mitral regurgitation 07/14/2017   • Moderate to severe pulmonary hypertension (HCC) 07/14/2017   • Chronic combined systolic and diastolic congestive heart failure (HCC) 07/14/2017   • Paroxysmal atrial fibrillation (HCC) 07/14/2017   • Coronary artery disease involving native coronary artery of native heart without angina pectoris, s/p 1V CABG 1992 07/14/2017   • Anxiety 07/13/2017       Family History   Problem Relation Age of Onset   • Cancer Mother         cancer, smoker   • Stroke Maternal Grandmother    • Other Other         Crohn   • Kidney Disease Other         kidney transplant       She  has a past medical history of Apnea, sleep, Atrial fibrillation (HCC), Gasping for breath, Heart attack (HCC), Hyperlipidemia, Hypertension, and Thyroid disease. She also has no past medical history of Chills, Daytime sleepiness, Fatigue, Fever, Insomnia, Morning headache, Snoring, Sweat, sweating, excessive, or Weight loss.  She  has a past surgical history that includes thyroidectomy total; cholecystectomy; multiple coronary artery bypass; and eye surgery (Bilateral).       Objective:   Resp 12   Ht 1.6 m (5' 3\")   Wt 93.9 kg (207 lb)   LMP  (LMP Unknown)   BMI 36.67 kg/m²     Physical Exam:  Constitutional: Alert, no distress, well-groomed.  Skin: No rashes in visible areas.  Eye: Round. Conjunctiva clear, lids normal. No icterus.   ENMT: Lips pink without lesions, good dentition, moist mucous membranes. Phonation normal.  Neck: No " masses, no thyromegaly. Moves freely without pain.  Respiratory: Unlabored respiratory effort, no cough or audible wheeze  Psych: Alert and oriented x3, normal affect and mood.       Assessment and Plan:   The following treatment plan was discussed:     1. Paroxysmal atrial fibrillation (HCC)  Chronic condition.  Stable.  Continue to [facility].  Rate controlled, continues to go to warfarin clinic.  2. Chronic combined systolic and diastolic congestive heart failure (HCC)  Chronic condition.  Stable.  Continue follow-[facility].  Patient continues to be on losartan and spironolactone diuretic.  3. Dermatitis  - REFERRAL TO DERMATOLOGY  Suspect eczema versus psoriasis over her arms and legs.  Clobetasol ineffective will try betamethasone ointment to see if this is more effective.  If this is not helpful instructed patient to call and let me know can call in an alternative formula.  Also referred her back to dermatology.  4. Malignant melanoma of left upper extremity including shoulder (HCC)  - REFERRAL TO DERMATOLOGY    5. Anemia, unspecified type  Chronic condition, improved from her labs from February 2021.  Followed by nephrology.  Has a follow-up with nephrologist in 6 months.  Patient continues iron supplementation with vitamin C.  Continues to follow-up with GI, they are still considering a colonoscopy to rule out slow GI bleed.  After discussing some further information with the patient, may be a mild malabsorption process.  Has essentially a dumping syndrome since getting her gallbladder out a few years ago.  6. Hypothyroidism, unspecified type  - TSH; Future  Chronic condition.  Stable.  Continue Levoxyl 150 mcg daily.  Due for updated TSH added to her next at a labs.  7. Mixed hyperlipidemia  - Lipid Profile; Future  Chronic condition.  Stable.  Continue atorvastatin 40 mg daily.  Due for updated labs, added to her next lab order.  8. Fall, initial encounter  Reviewed  "patient's AHA screen with her today.  Patient has had some \"trip and falls\" over the last year.  No leg weakness, no syncopal events.  See HPI for full details.  No injuries from falls.  9. Stress incontinence  Patient has occasional stress incontinence induced with coughing, sneezing and laughing.  Does not bother her on a day-to-day basis.  Other orders  - betamethasone dipropionate (DIPROLENE) 0.05 % Ointment; Apply 1 Application topically every day.  Dispense: 50 g; Refill: 1        Follow-up: Return in about 6 months (around 9/1/2021) for Follow up on labs.        The patient verbalized agreement and understanding of current plan. All questions and concerns were addressed at time of visit.    Please note that this dictation was created using voice recognition software. I have made every reasonable attempt to correct obvious errors, but I expect that there are errors of grammar and possibly content that I did not discover before finalizing the note.    "

## 2021-03-01 NOTE — ASSESSMENT & PLAN NOTE
For her congestive heart failure, continues [facility]. Last visit December 22, 2020. Dr. Villar ordered an echocardiogram, continue to her losartan for CHF. She is also now on spironolactone 25 mg daily.

## 2021-03-01 NOTE — ASSESSMENT & PLAN NOTE
Anemia improved, followed by Nephrology and Dr. Gomez at GI. They may do colonoscopy to r/o slow GI bleed.

## 2021-03-03 ENCOUNTER — HOSPITAL ENCOUNTER (OUTPATIENT)
Dept: RADIOLOGY | Facility: MEDICAL CENTER | Age: 81
End: 2021-03-03
Attending: INTERNAL MEDICINE
Payer: MEDICARE

## 2021-03-03 DIAGNOSIS — Z12.31 ENCOUNTER FOR SCREENING MAMMOGRAM FOR BREAST CANCER: ICD-10-CM

## 2021-03-03 PROCEDURE — 77063 BREAST TOMOSYNTHESIS BI: CPT

## 2021-03-04 ENCOUNTER — TELEPHONE (OUTPATIENT)
Dept: MEDICAL GROUP | Facility: PHYSICIAN GROUP | Age: 81
End: 2021-03-04

## 2021-03-04 DIAGNOSIS — Z79.01 CHRONIC ANTICOAGULATION: ICD-10-CM

## 2021-03-04 NOTE — TELEPHONE ENCOUNTER
----- Message from Shiela Carpenter P.A.-C. sent at 3/4/2021  9:45 AM PST -----  Please call patient about their results.     Results showed: Your recent mammogram has been reviewed and has normal findings, recommend we repeat the mammogram in 1 year.  We will discuss in detail at your next follow-up visit.      Thank you,    Shiela Carpenter PA-C

## 2021-03-05 ENCOUNTER — TELEPHONE (OUTPATIENT)
Dept: CARDIOLOGY | Facility: MEDICAL CENTER | Age: 81
End: 2021-03-05

## 2021-03-05 NOTE — TELEPHONE ENCOUNTER
[facility] from [facility].    Procedure: EDG and Colonoscopy with Deep Sedation on 4/5/21.    To RO  Ok to proceed at low risk?    To Coumadin Clinic  Ok to hold Coumadin for 5 days prior? Any bridging therapy required?

## 2021-03-08 ENCOUNTER — ANTICOAGULATION MONITORING (OUTPATIENT)
Dept: VASCULAR LAB | Facility: MEDICAL CENTER | Age: 81
End: 2021-03-08

## 2021-03-08 DIAGNOSIS — I48.0 PAROXYSMAL ATRIAL FIBRILLATION (HCC): ICD-10-CM

## 2021-03-08 LAB — INR PPP: 2.7 (ref 2–3.5)

## 2021-03-08 NOTE — PROGRESS NOTES
OP   Telephone Anticoagulation Service Note      Anticoagulation Summary  As of 3/8/2021    INR goal:  2.0-3.0   TTR:  60.3 % (11.4 mo)   INR used for dosin.70 (3/8/2021)   Warfarin maintenance plan:  9 mg (6 mg x 1.5) every Mon, Wed; 6 mg (6 mg x 1) all other days   Weekly warfarin total:  48 mg   Plan last modified:  Dorina Rodriguez (2021)   Next INR check:  3/15/2021   Target end date:  Indefinite    Indications    Paroxysmal atrial fibrillation (HCC) [I48.0]             Anticoagulation Episode Summary     INR check location:      Preferred lab:      Send INR reminders to:      Comments:  [facility]      Anticoagulation Care Providers     Provider Role Specialty Phone number    La Paredes M.D. Referring Internal Medicine [phone]    [facility] Responsible  [phone]        Anticoagulation Patient Findings  Patient Findings     Negatives:  Signs/symptoms of thrombosis, Signs/symptoms of bleeding, Laboratory test error suspected, Change in health, Change in alcohol use, Change in activity, Upcoming invasive procedure, Emergency department visit, Upcoming dental procedure, Missed doses, Extra doses, Change in medications, Change in diet/appetite, Hospital admission, Bruising, Other complaints         Spoke with the patient on the phone today, reporting a therapeutic INR of 2.7.  Confirmed the current warfarin dosing regimen and patient compliance.  Patient denies any interval changes to diet and/or medications. Patient denies any signs/symptoms of bleeding or clotting.  Patient instructed to continue with the current warfarin dosing regimen, and asked to follow up again in 1 week.     Linda MelgozaD

## 2021-03-09 NOTE — TELEPHONE ENCOUNTER
Faxed to [phone], transmission complete. Provided phone number for [facility] Clinic regarding holding vs bridging.

## 2021-03-15 ENCOUNTER — ANTICOAGULATION MONITORING (OUTPATIENT)
Dept: VASCULAR LAB | Facility: MEDICAL CENTER | Age: 81
End: 2021-03-15

## 2021-03-15 DIAGNOSIS — I48.0 PAROXYSMAL ATRIAL FIBRILLATION (HCC): ICD-10-CM

## 2021-03-15 LAB — INR PPP: 2.9 (ref 2–3.5)

## 2021-03-15 NOTE — PROGRESS NOTES
OP   Telephone Anticoagulation Service Note      Anticoagulation Summary  As of 3/15/2021    INR goal:  2.0-3.0   TTR:  61.1 % (11.6 mo)   INR used for dosin.90 (3/15/2021)   Warfarin maintenance plan:  9 mg (6 mg x 1.5) every Mon, Wed; 6 mg (6 mg x 1) all other days   Weekly warfarin total:  48 mg   Plan last modified:  Dorina Rodriguez (2021)   Next INR check:  3/22/2021   Target end date:  Indefinite    Indications    Paroxysmal atrial fibrillation (HCC) [I48.0]             Anticoagulation Episode Summary     INR check location:      Preferred lab:      Send INR reminders to:      Comments:  [facility]      Anticoagulation Care Providers     Provider Role Specialty Phone number    La Paredes M.D. Referring Internal Medicine [phone]    [facility] Responsible  [phone]        Anticoagulation Patient Findings  Patient Findings     Positives:  Bruising        Spoke with the patient on the phone today, reporting a therapeutic INR of 2.9.  Confirmed the current warfarin dosing regimen and patient compliance.  Patient reports a lot of bruising and says she bleeds so much from just a small poke and feels like she is taking to much warfarin as she is already eating so much greens.   Patient denies any interval changes to medications. Patient denies any signs/symptoms of bleeding or clotting.  Patient would like a trial on a lower weekly regimen. Patient will begin taking 9mg on Wed and 6mg ROW.   Patient will retest again in 1 week.     Linda MelgozaD

## 2021-03-17 RX ORDER — ATORVASTATIN CALCIUM 40 MG/1
TABLET, FILM COATED ORAL
Qty: 100 TABLET | Refills: 2 | Status: SHIPPED | OUTPATIENT
Start: 2021-03-17 | End: 2021-06-22 | Stop reason: SDUPTHER

## 2021-03-17 NOTE — TELEPHONE ENCOUNTER
Last appointment was on 03/1/2021  Lab Results   Component Value Date/Time    CHOLSTRLTOT 140 09/09/2020 12:30 PM    LDL 77 09/09/2020 12:30 PM    HDL 35 (A) 09/09/2020 12:30 PM    TRIGLYCERIDE 140 09/09/2020 12:30 PM       Lab Results   Component Value Date/Time    SODIUM 138 02/23/2021 09:40 AM    POTASSIUM 4.6 02/23/2021 09:40 AM    CHLORIDE 102 02/23/2021 09:40 AM    CO2 27 02/23/2021 09:40 AM    GLUCOSE 94 02/23/2021 09:40 AM    BUN 27 (H) 02/23/2021 09:40 AM    CREATININE 1.34 02/23/2021 09:40 AM     Lab Results   Component Value Date/Time    ALKPHOSPHAT 95 02/23/2021 09:40 AM    ASTSGOT 19 02/23/2021 09:40 AM    ALTSGPT 15 02/23/2021 09:40 AM    TBILIRUBIN 0.6 02/23/2021 09:40 AM      Ins wants 100 day supply.

## 2021-03-19 ENCOUNTER — OFFICE VISIT (OUTPATIENT)
Dept: DERMATOLOGY | Facility: IMAGING CENTER | Age: 81
End: 2021-03-19
Payer: MEDICARE

## 2021-03-19 DIAGNOSIS — R21 RASH: ICD-10-CM

## 2021-03-19 DIAGNOSIS — L29.9 ITCHING: ICD-10-CM

## 2021-03-19 PROCEDURE — 99203 OFFICE O/P NEW LOW 30 MIN: CPT | Performed by: DERMATOLOGY

## 2021-03-19 RX ORDER — TRIAMCINOLONE ACETONIDE 1 MG/G
OINTMENT TOPICAL
Qty: 454 G | Refills: 0 | Status: SHIPPED | OUTPATIENT
Start: 2021-03-19 | End: 2021-04-20

## 2021-03-19 NOTE — PROGRESS NOTES
"CC:  Rash     Subjective: new patient here for rash eval and treatment.     HPI: Rash   \"All over body\", May have started in small sites on back, legs.  Some sites improved but others have worsened.  States Betamethasone oint has made rash worse in the last 2 weeks   Discontinued about 3 days ago.  Has tried and failed triamcinolone, no improvement.  Using some topical, name not known, that is compounded in lotion.     Denies known oral steroids.  May have started after spironolactone prescribed.  Reports taking metoprolol for at least 3 years; could have had smaller sites noted after this medication was begun, unsure.     Onset: at least 7 months has progressively  has gotten worse in the last 2 weeks . Thinks after flu vaccine in September started to developed  symptoms . Was evaluated by another Dermatologist 6 months ago . Bx done . Here for a second opinion.     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  History of blistering/severe sunburns:Yes, Details: .  Family history of skin cancer:No  Family history of atypical moles:No    ROS: no fevers/chills. ++ itch.  No cough. Denies joint pain.   DermPMH: hx melanoma left shoulder, 2014? Per records  No problem-specific Assessment & Plan notes found for this encounter.    Relevant PMH: many med comorbidities  Social: former smoker    PE: Gen:WDWN female in NAD.  Skin: limited exam today.  Face - pink scaly papule left periorbital face.  Few small plaques on torso - abd and back.  Hands/arms - few small plaques.  Inter gluteal cleft with erythematous plaque.  Legs - diffuse lower leg plaques, red, scaly and marks of excoriation.     A/P: Papulosquamous rash, suspect PSO:  -will work to get biopsy results from " local derm. Patient thinks [facility].   -consider possible medication effect - metoprolol known association with PSO.  If confirm PSO, may work with PCP to use substitute  -rec brings all meds to next appt for verification of what she is taking.    Itching:  - Rec TAC 0.1% oint BID   - Dry skin care  - sarna with menthol, focal trial  - Benadryl 25mg QHS  - OTC daytime antihistamines  -consider future gabapentin, other    I have reviewed medications relevant to my specialty.    F/u 2-3 weeks

## 2021-03-22 ENCOUNTER — ANTICOAGULATION MONITORING (OUTPATIENT)
Dept: VASCULAR LAB | Facility: MEDICAL CENTER | Age: 81
End: 2021-03-22

## 2021-03-22 ENCOUNTER — TELEPHONE (OUTPATIENT)
Dept: VASCULAR LAB | Facility: MEDICAL CENTER | Age: 81
End: 2021-03-22

## 2021-03-22 DIAGNOSIS — I48.0 PAROXYSMAL ATRIAL FIBRILLATION (HCC): ICD-10-CM

## 2021-03-22 LAB — INR PPP: 2.4 (ref 2–3.5)

## 2021-03-22 NOTE — PROGRESS NOTES
OP   Telephone Anticoagulation Service Note      Anticoagulation Summary  As of 3/22/2021    INR goal:  2.0-3.0   TTR:  61.8 % (11.8 mo)   INR used for dosin.40 (3/22/2021)   Warfarin maintenance plan:  9 mg (6 mg x 1.5) every Wed; 6 mg (6 mg x 1) all other days   Weekly warfarin total:  45 mg   Plan last modified:  Dorina Rodriguez (3/22/2021)   Next INR check:  3/29/2021   Target end date:  Indefinite    Indications    Paroxysmal atrial fibrillation (HCC) [I48.0]             Anticoagulation Episode Summary     INR check location:      Preferred lab:      Send INR reminders to:      Comments:  [facility]      Anticoagulation Care Providers     Provider Role Specialty Phone number    La Paredes M.D. Referring Internal Medicine [phone]    [facility]ticoagulation [facility] Responsible  [phone]        Anticoagulation Patient Findings    Spoke with the patient on the phone today, reporting a therapeutic INR of 2.4.   Confirmed the current warfarin dosing regimen and patient compliance.  Patient reports bruising, but thinks it is better on the reduced regimen of 9mg on Wed and 6mg ROW.   Patient denies any interval changes to diet and/or medications. Patient denies any signs/symptoms of bleeding or clotting.  Patient instructed to continue with the current warfarin dosing regimen, and asked to follow up again in 1 week.     Linda MelgozaD

## 2021-03-22 NOTE — TELEPHONE ENCOUNTER
Called pt regarding upcoming colonoscopy. Pt is scheduled for this procedure on 4/29/21.     Pt will require bridge therapy. [facility] will provide bridging instructions ~1-2 weeks prior to procedure as her dose of warfarin may fluctuate between now and then.    James Sr, AbadD

## 2021-03-25 ENCOUNTER — OFFICE VISIT (OUTPATIENT)
Dept: MEDICAL GROUP | Facility: MEDICAL CENTER | Age: 81
End: 2021-03-25
Payer: MEDICARE

## 2021-03-25 VITALS
OXYGEN SATURATION: 98 % | BODY MASS INDEX: 37.21 KG/M2 | WEIGHT: 210 LBS | SYSTOLIC BLOOD PRESSURE: 106 MMHG | RESPIRATION RATE: 14 BRPM | HEART RATE: 89 BPM | HEIGHT: 63 IN | DIASTOLIC BLOOD PRESSURE: 62 MMHG

## 2021-03-25 DIAGNOSIS — L60.2 THICKENED NAILS: ICD-10-CM

## 2021-03-25 DIAGNOSIS — L98.8 EOSINOPHILIC SPONGIOSIS: ICD-10-CM

## 2021-03-25 DIAGNOSIS — I10 ESSENTIAL HYPERTENSION: ICD-10-CM

## 2021-03-25 DIAGNOSIS — N18.32 STAGE 3B CHRONIC KIDNEY DISEASE: ICD-10-CM

## 2021-03-25 DIAGNOSIS — G47.33 OBSTRUCTIVE SLEEP APNEA TREATED WITH CONTINUOUS POSITIVE AIRWAY PRESSURE (CPAP): ICD-10-CM

## 2021-03-25 DIAGNOSIS — L28.2 PRURITIC RASH: ICD-10-CM

## 2021-03-25 DIAGNOSIS — K74.60 LIVER CIRRHOSIS SECONDARY TO NASH (NONALCOHOLIC STEATOHEPATITIS) (HCC): ICD-10-CM

## 2021-03-25 DIAGNOSIS — K76.0 HEPATIC STEATOSIS: ICD-10-CM

## 2021-03-25 DIAGNOSIS — K75.81 LIVER CIRRHOSIS SECONDARY TO NASH (NONALCOHOLIC STEATOHEPATITIS) (HCC): ICD-10-CM

## 2021-03-25 DIAGNOSIS — E03.9 HYPOTHYROIDISM, UNSPECIFIED TYPE: ICD-10-CM

## 2021-03-25 DIAGNOSIS — D72.18 EOSINOPHILIC SPONGIOSIS: ICD-10-CM

## 2021-03-25 DIAGNOSIS — I48.0 PAROXYSMAL ATRIAL FIBRILLATION (HCC): ICD-10-CM

## 2021-03-25 PROCEDURE — 99214 OFFICE O/P EST MOD 30 MIN: CPT | Performed by: FAMILY MEDICINE

## 2021-03-25 RX ORDER — LORATADINE 10 MG/1
10 TABLET ORAL 2 TIMES DAILY
Qty: 60 TABLET | Refills: 5 | COMMUNITY
Start: 2021-03-25 | End: 2023-04-10

## 2021-03-25 RX ORDER — FAMOTIDINE 40 MG/1
40 TABLET, FILM COATED ORAL DAILY
Qty: 30 TABLET | Refills: 6 | Status: SHIPPED | OUTPATIENT
Start: 2021-03-25 | End: 2021-04-20

## 2021-03-25 RX ORDER — METOPROLOL TARTRATE 50 MG/1
50 TABLET, FILM COATED ORAL 2 TIMES DAILY
Qty: 180 TABLET | Refills: 3 | Status: SHIPPED | OUTPATIENT
Start: 2021-03-25 | End: 2021-05-12

## 2021-03-25 RX ORDER — SPIRONOLACTONE 25 MG/1
50 TABLET ORAL DAILY
Qty: 180 TABLET | Refills: 3 | Status: SHIPPED | OUTPATIENT
Start: 2021-03-25 | End: 2021-10-20

## 2021-03-25 SDOH — ECONOMIC STABILITY: TRANSPORTATION INSECURITY
IN THE PAST 12 MONTHS, HAS LACK OF RELIABLE TRANSPORTATION KEPT YOU FROM MEDICAL APPOINTMENTS, MEETINGS, WORK OR FROM GETTING THINGS NEEDED FOR DAILY LIVING?: NO

## 2021-03-25 SDOH — ECONOMIC STABILITY: HOUSING INSECURITY
IN THE LAST 12 MONTHS, WAS THERE A TIME WHEN YOU DID NOT HAVE A STEADY PLACE TO SLEEP OR SLEPT IN A SHELTER (INCLUDING NOW)?: NO

## 2021-03-25 SDOH — HEALTH STABILITY: MENTAL HEALTH
STRESS IS WHEN SOMEONE FEELS TENSE, NERVOUS, ANXIOUS, OR CAN'T SLEEP AT NIGHT BECAUSE THEIR MIND IS TROUBLED. HOW STRESSED ARE YOU?: NOT AT ALL

## 2021-03-25 SDOH — ECONOMIC STABILITY: INCOME INSECURITY: IN THE LAST 12 MONTHS, WAS THERE A TIME WHEN YOU WERE NOT ABLE TO PAY THE MORTGAGE OR RENT ON TIME?: NO

## 2021-03-25 SDOH — HEALTH STABILITY: PHYSICAL HEALTH: ON AVERAGE, HOW MANY DAYS PER WEEK DO YOU ENGAGE IN MODERATE TO STRENUOUS EXERCISE (LIKE A BRISK WALK)?: 0 DAYS

## 2021-03-25 SDOH — ECONOMIC STABILITY: TRANSPORTATION INSECURITY
IN THE PAST 12 MONTHS, HAS THE LACK OF TRANSPORTATION KEPT YOU FROM MEDICAL APPOINTMENTS OR FROM GETTING MEDICATIONS?: NO

## 2021-03-25 SDOH — ECONOMIC STABILITY: HOUSING INSECURITY: IN THE LAST 12 MONTHS, HOW MANY PLACES HAVE YOU LIVED?: 1

## 2021-03-25 SDOH — HEALTH STABILITY: PHYSICAL HEALTH: ON AVERAGE, HOW MANY MINUTES DO YOU ENGAGE IN EXERCISE AT THIS LEVEL?: 0 MINUTES

## 2021-03-25 ASSESSMENT — FIBROSIS 4 INDEX: FIB4 SCORE: 1.47

## 2021-03-25 ASSESSMENT — SOCIAL DETERMINANTS OF HEALTH (SDOH)
IN A TYPICAL WEEK, HOW MANY TIMES DO YOU TALK ON THE PHONE WITH FAMILY, FRIENDS, OR NEIGHBORS?: MORE THAN THREE TIMES A WEEK
WITHIN THE PAST 12 MONTHS, YOU WORRIED THAT YOUR FOOD WOULD RUN OUT BEFORE YOU GOT THE MONEY TO BUY MORE: NEVER TRUE
HOW HARD IS IT FOR YOU TO PAY FOR THE VERY BASICS LIKE FOOD, HOUSING, MEDICAL CARE, AND HEATING?: NOT VERY HARD
HOW OFTEN DO YOU GET TOGETHER WITH FRIENDS OR RELATIVES?: NEVER
WITHIN THE PAST 12 MONTHS, THE FOOD YOU BOUGHT JUST DIDN'T LAST AND YOU DIDN'T HAVE MONEY TO GET MORE: NEVER TRUE
DO YOU BELONG TO ANY CLUBS OR ORGANIZATIONS SUCH AS CHURCH GROUPS UNIONS, FRATERNAL OR ATHLETIC GROUPS, OR SCHOOL GROUPS?: NO
HOW OFTEN DO YOU ATTENT MEETINGS OF THE CLUB OR ORGANIZATION YOU BELONG TO?: NEVER
HOW MANY DRINKS CONTAINING ALCOHOL DO YOU HAVE ON A TYPICAL DAY WHEN YOU ARE DRINKING: PATIENT DECLINED
HOW OFTEN DO YOU ATTEND CHURCH OR RELIGIOUS SERVICES?: DECLINE
HOW OFTEN DO YOU HAVE SIX OR MORE DRINKS ON ONE OCCASION: NEVER

## 2021-03-25 NOTE — PROGRESS NOTES
Chief Complaint   Patient presents with   • Establish Care       Subjective:     HPI:   Aleshia Crooks presents today with the following: She is establishing care with our office as is her .  She is quite a complex patient.    1. Pruritic rash/Eosinophilic spongiosis  She has a pruritic rash which had abrupt onset several months ago.  She had a biopsy done which showed subacute spongiotic dermatitis with eosinophils.  Patient was placed on clobetasol which would have been expected to be helpful.  However, the rash actually worsened.  Patient has seen the dermatologist and was placed on triamcinolone which is not worsening the rash but does not seem to be helping.  She will be seeing dermatology again soon.  The dermatologist had recommended that she begin Claritin.  Patient was not sure if she should do that with her other medical problems.  I have recommended that she take the Claritin twice daily.  Benadryl had been recommended at nighttime but unfortunately the patient already has very dry mouth.  I have asked her to try fluocinonide cream and see if that is helpful and also to add famotidine as an H2 blocker to hopefully blood the histamine response from the eosinophils.  Recent CBC did not show an elevated white count at all.  Her last CBC with differential in December showed mild anemia which is resolved on her most recent CBC and no eosinophilia.    2. Stage 3b chronic kidney disease  Patient does have moderate chronic kidney disease.  This does appear to be stable.  There may be an element of relative dehydration.  Patient is not currently on nonsteroidal anti-inflammatory drugs or loop diuretics.  She is on a modest dose of the spironolactone.  Patient avoids nonsteroidal anti-inflammatory drugs as she is on warfarin    3. 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  Recent AFP tumor marker was in the normal range.  She follows with GI.  An endoscopy and colonoscopy are scheduled soon, I assume to look for varices.    4. Obstructive sleep apnea treated with continuous positive airway pressure (CPAP)  Patient is highly compliant with her CPAP and her obstructive sleep apnea treatment.  She follows with pulmonology.    5. Thickened nails  Patient has very thickened toenails bilaterally.  I believe there may be some onychomycosis.  Referral to podiatry as discussed and placed.    6. Paroxysmal atrial fibrillation (HCC)  Patient does have paroxysmal atrial fibrillation.  She is on chronic warfarin anticoagulation.  She follows with Dr. Deshpande cardiology.  He follows her mitral regurgitation.  She will be following up again with him in June.  She does have pulmonary hypertension as well, moderate.  She does have a mildly elevated troponin which is less likely to be due to any acute ischemia but more chronic multifactorial cardiac disease.    7. Essential hypertension  HTN - Chronic condition stable. Currently taking all meds as directed.   She is not taking baby aspirin daily.  She is on warfarin.  She is monitoring BP at home.  Readings have been good.  Denies symptoms low BP: light-headed, tunnel-vision, unusual fatigue.   Denies symptoms high BP:pounding headache, visual changes, palpitations, flushed face.   Denies medicine side effects: unusual fatigue, slow heartbeat, foot/leg swelling, cough.    8. Hypothyroidism, unspecified type  Patient reports good energy level on the medication. Patient denies insomnia, tremor or change in appetite.  Patient is taking the medication on an empty stomach in the morning and waiting at least 30 minutes before eating.  Last TSH in September 2020 was at target.  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 shoulder ([facility]) 06/08/2020   • Abnormal CT of the abdomen 04/17/2020   • Mixed hyperlipidemia 04/17/2020   • Hepatic steatosis 04/17/2020   • Elevated alkaline phosphatase level 04/17/2020   • Elevated troponin 04/08/2020   • Anemia 04/08/2020   • Hx of melanoma excision 03/12/2020   • Skin lesions 03/12/2020   • Essential hypertension 03/12/2020   • Hypothyroidism 03/12/2020   • Obesity (BMI 35.0-39.9 without comorbidity) ([facility]) 03/12/2020   • Obstructive sleep apnea syndrome 03/12/2020   • Severe mitral regurgitation 07/14/2017   • Moderate to severe pulmonary hypertension ([facility]) 07/14/2017   • Chronic combined systolic and diastolic congestive heart failure ([facility]) 07/14/2017   • Paroxysmal atrial fibrillation ([facility]) 07/14/2017   • Coronary artery disease involving native coronary artery of native heart without angina pectoris, s/p 1V CABG 1992 07/14/2017   • Anxiety 07/13/2017       Current medicines (including changes today)  Current Outpatient Medications   Medication Sig Dispense Refill   • loratadine (CLARITIN) 10 MG Tab Take 1 tablet by mouth 2 Times a Day. 60 tablet 5   • fluocinonide (LIDEX) 0.05 % Cream Apply to affected areas bid 120 g 4   • famotidine (PEPCID) 40 MG Tab Take 1 tablet by mouth every day. 30 tablet 6   • metoprolol tartrate (LOPRESSOR) 50 MG Tab Take 1 tablet by mouth 2 times a day. 180 tablet 3   • spironolactone (ALDACTONE) 25 MG Tab Take 2 Tablets by mouth every day. 180 tablet 3   • triamcinolone acetonide (KENALOG) 0.1 % Ointment AAA legs/ and focal truncal skin BID for rash.  Do not use on face, axilla, groin.  Can wrap legs in saran wrap for better absorption. 454  "g 0   • atorvastatin (LIPITOR) 40 MG Tab TAKE 1 TABLET BY MOUTH AT  BEDTIME 100 tablet 2   • betamethasone dipropionate (DIPROLENE) 0.05 % Ointment Apply 1 Application topically every day. (Patient not taking: Reported on 3/19/2021) 50 g 1   • losartan (COZAAR) 50 MG Tab TAKE 1 TABLET BY MOUTH DAILY 100 tablet 0   • LEVOXYL 150 MCG Tab TAKE 1 TABLET BY MOUTH EVERY MORNING ON AN EMPTY STOMACH 90 tablet 2   • ferrous sulfate 325 (65 Fe) MG tablet Take 1 Tab by mouth 2 Times a Day. 60 Tab 3   • ascorbic acid (VITAMIN C) 500 MG tablet Take 1 Tab by mouth 2 times a day. 60 Tab 3   • omeprazole (PRILOSEC) 20 MG delayed-release capsule TAKE 1 CAPSULE BY MOUTH  EVERY DAY. 90 Cap 3   • warfarin (COUMADIN) 6 MG Tab Take one tablet daily as directed by [facility]  Indications: Obstructing Blood Clot with Chronic Atrial Fibrillation 90 Tab 3   • vitamin k 100 MCG tablet Take 100 mcg by mouth every day.     • vitamin D (CHOLECALCIFEROL) 1000 UNIT Tab Take 4,000 Units by mouth every day.       No current facility-administered medications for this visit.       No Known Allergies    ROS: As per HPI       Objective:     /62   Pulse 89   Resp 14   Ht 1.6 m (5' 3\")   Wt 95.3 kg (210 lb)   SpO2 98%  Body mass index is 37.2 kg/m².    Physical Exam:  Constitutional: Well-developed and well-nourished. Not diaphoretic. No distress. Lucid and fluent.  Patient, spouse, physician and staff all wearing masks.  Skin: Skin is warm and dry. No rash noted.  Head: Atraumatic without lesions.  Eyes: Conjunctivae and extraocular motions are normal. Pupils are equal, round, and reactive to light. No scleral icterus.   Ears:  External ears unremarkable.   Neck: Supple, trachea midline. No thyromegaly present. No cervical or supraclavicular lymphadenopathy. No JVD or carotid bruits appreciated  Cardiovascular: Regular rate and rhythm.  Normal S1, S2 without murmur appreciated.  Chest: Effort normal. Clear to auscultation " throughout. No adventitious sounds.   Abdomen: Soft, non tender, and without distention. Active bowel sounds in all four quadrants. No rebound, guarding, masses or hepatosplenomegaly.  Extremities: No cyanosis, clubbing, erythema, nor edema.   Neurological: Alert and oriented x 3.  No tremor noted.  Psychiatric:  Behavior, mood, and affect are appropriate.       Assessment and Plan:     81 y.o. female with the following issues:    1. Pruritic rash  loratadine (CLARITIN) 10 MG Tab    fluocinonide (LIDEX) 0.05 % Cream   2. Eosinophilic spongiosis  loratadine (CLARITIN) 10 MG Tab    fluocinonide (LIDEX) 0.05 % Cream    famotidine (PEPCID) 40 MG Tab   3. Stage 3b chronic kidney disease     4. Hepatic steatosis     5. 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 Obstructive sleep apnea treated with continuous positive airway pressure (CPAP)  spironolactone (ALDACTONE) 25 MG Tab   7. Thickened nails  REFERRAL TO PODIATRY   8. Paroxysmal atrial fibrillation (HCC)  metoprolol tartrate (LOPRESSOR) 50 MG Tab    spironolactone (ALDACTONE) 25 MG Tab   9. Essential hypertension  spironolactone (ALDACTONE) 25 MG Tab   10. Hypothyroidism, unspecified type  TSH         Followup: Return in about 3 months (around 6/25/2021), or if symptoms worsen or fail to improve.

## 2021-03-30 ENCOUNTER — ANTICOAGULATION MONITORING (OUTPATIENT)
Dept: VASCULAR LAB | Facility: MEDICAL CENTER | Age: 81
End: 2021-03-30

## 2021-03-30 DIAGNOSIS — I48.0 PAROXYSMAL ATRIAL FIBRILLATION (HCC): ICD-10-CM

## 2021-03-30 LAB — INR PPP: 2.2 (ref 2–3.5)

## 2021-03-30 NOTE — PROGRESS NOTES
Anticoagulation Summary  As of 3/30/2021    INR goal:  2.0-3.0   TTR:  62.7 % (1 y)   INR used for dosin.20 (3/30/2021)   Warfarin maintenance plan:  9 mg (6 mg x 1.5) every Wed; 6 mg (6 mg x 1) all other days   Weekly warfarin total:  45 mg   Plan last modified:  Dorina Rodriguez (3/22/2021)   Next INR check:  2021   Target end date:  Indefinite    Indications    Paroxysmal atrial fibrillation (HCC) [I48.0]             Anticoagulation Episode Summary     INR check location:      Preferred lab:      Send INR reminders to:      Comments:  Elisa      Anticoagulation Care Providers     Provider Role Specialty Phone number    La Paredes M.D. Referring Internal Medicine [phone]    [facility] Anticoagulation Services Responsible  [phone]        Anticoagulation Patient Findings    Spoke with patient today regarding therapeutic INR of 2.20.  Patient denies any signs/symptoms of bruising or bleeding or any changes in diet. Patient started taking Famotidine 40 mg QD, Claratin 10 mg QD and Magnesium Citrate 400 mg QD. No interactions with warfarin but we will be monitoring INR to adjust dose as needed.     Instructed patient to call clinic with any questions or concerns.  Follow up in 1 weeks, to reduce risk of adverse events related to this high risk medication,  Warfarin.    Juan Ontiveros - Student Pharmacist

## 2021-04-01 ENCOUNTER — TELEPHONE (OUTPATIENT)
Dept: MEDICAL GROUP | Facility: MEDICAL CENTER | Age: 81
End: 2021-04-01

## 2021-04-02 ENCOUNTER — OFFICE VISIT (OUTPATIENT)
Dept: DERMATOLOGY | Facility: IMAGING CENTER | Age: 81
End: 2021-04-02
Payer: MEDICARE

## 2021-04-02 DIAGNOSIS — L29.9 ITCHING: ICD-10-CM

## 2021-04-02 DIAGNOSIS — Z79.899 HIGH RISK MEDICATION USE: ICD-10-CM

## 2021-04-02 DIAGNOSIS — R21 RASH: ICD-10-CM

## 2021-04-02 PROCEDURE — 99214 OFFICE O/P EST MOD 30 MIN: CPT | Performed by: DERMATOLOGY

## 2021-04-02 NOTE — Clinical Note
Dr. Pink:  Curious if metoprolol could be exchanged for an alternate medication.  Prior path/rash could be consistent with drug rash, and this may be most likely culprit.  Also working to start MTX, if tolerated.  Many thanks for your help in care of patient! AW

## 2021-04-02 NOTE — PROGRESS NOTES
"CC:  Rash     Subjective: follow here for rash eval and treatment.  Following up for rash. Previously treated with fluocinonide 0.05% which has not been effective. Also treated with triamcinolone which was also not effective. Would like to discuss alternative Tx.    Was able to have path records sent to Derm in interim: see below.    Previous note:  HPI: Rash   \"All over body\", May have started in small sites on back, legs.  Some sites improved but others have worsened.  States Betamethasone oint has made rash worse in the last 2 weeks   Discontinued about 3 days ago.  Has tried and failed triamcinolone, no improvement.  Using some topical, name not known, that is compounded in lotion.     Denies known oral steroids.  May have started after spironolactone prescribed.  Reports taking metoprolol for at least 3 years; could have had smaller sites noted after this medication was begun, unsure.     Onset: at least 7 months has progressively  has gotten worse in the last 2 weeks . Thinks after flu vaccine in September started to developed  symptoms . Was evaluated by another Dermatologist 6 months ago . Bx done . Here for a second opinion.     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  History of blistering/severe sunburns:Yes, Details: .  Family history of skin cancer:No  Family history of atypical moles:No    ROS: no fevers/chills. ++ itch.  No cough. Denies joint pain.  Hx diarrhea - intermittent (started after thyroid dz)  DermPMH: hx melanoma left shoulder, 2014? Per records  No problem-specific Assessment & Plan notes found for this encounter.    Relevant PMH: many med comorbidities  Social: former smoker; denies travel abroad.  Daughter with well water which she " doesn't drink due to taste.    PE: Gen:WDWN female in NAD.  Skin: limited exam today. Hands/arms - few small plaques.  Legs - diffuse lower leg plaques, red, scaly and marks of excoriation.     SCI Prior path - see scans = spongiotic dermatitis + eosinophils. Consider ACD, Nummular dermatitis or drug reaction    Labs:   TSH WNL 2020  Sodium 135 - 145 mmol/L 137  137  138  137    Potassium 3.6 - 5.5 mmol/L 4.9  4.1  4.6  4.3    Chloride 96 - 112 mmol/L 102  100  100  98    Co2 20 - 33 mmol/L 27  25  24  25    Glucose 65 - 99 mg/dL 98  114High   95  83    Bun 8 - 22 mg/dL 27High   24High   26High   24High     Creatinine 0.50 - 1.40 mg/dL 1.39  1.40  1.52High   1.35    Calcium 8.5 - 10.5 mg/dL 9.7  9.5  9.0  10.0    Anion Gap 7.0 - 16.0 8.0  12.0  14.0  14.0      WBC 4.8 - 10.8 K/uL 7.5  7.7  7.7  8.2    RBC 4.20 - 5.40 M/uL 4.44  4.01Low   3.42Low   4.63    Hemoglobin 12.0 - 16.0 g/dL 12.1  10.5Low   8.1Low   11.6Low     Hematocrit 37.0 - 47.0 % 40.2  35.8Low   28.0Low   38.0    MCV 81.4 - 97.8 fL 90.5  89.3  81.9  82.1    MCH 27.0 - 33.0 pg 27.3  26.2Low   23.7Low   25.1Low     MCHC 33.6 - 35.0 g/dL 30.1Low   29.3Low   28.9Low   30.5Low     RDW 35.9 - 50.0 fL 47.7  73.1High   50.8High   46.5    Platelet Count 164 - 446 K/uL 264  302  369  340    MPV 9.0 - 12.9 fL 11.6  10.7  11.2  11.0          A/P: Papulosquamous rash, prior path showing spongiotic dermatitis + eosinophils.  Suspicion for drug reaction:  -consider possible medication effect - metoprolol known association with papulosquamous rash/PSO.  Could consider other drug causes  -will message PCP to trial substitute for metoprolol  -rec again to brings all meds to next appt for verification of what she is taking.  -will start test dose MTX today; repeat labs in 1 week.  If remains WNL, will increase to therapeutic dose = 15mg PO Qweek + folic acid 1 mg PO Qday not taking MTX.; se reviewed    High risk medication: monitoring for safety prior to therapeutic  doses  -rec stool ova/parasites  -rec CBC with diff for anemia/blood counts  -rec CMP - liver/BUN/Cr     Continue prior itching care:  - Rec TAC 0.1% oint BID   - Dry skin care  - sarna with menthol, focal trial  - Benadryl 25mg QHS  - OTC daytime antihistamines  -consider future gabapentin, other    I have reviewed medications relevant to my specialty.    F/u 3 weeks    Rec CARL at future visit

## 2021-04-05 ENCOUNTER — ANTICOAGULATION MONITORING (OUTPATIENT)
Dept: VASCULAR LAB | Facility: MEDICAL CENTER | Age: 81
End: 2021-04-05

## 2021-04-05 DIAGNOSIS — I48.0 PAROXYSMAL ATRIAL FIBRILLATION (HCC): ICD-10-CM

## 2021-04-05 LAB — INR PPP: 2.3 (ref 2–3.5)

## 2021-04-05 NOTE — PROGRESS NOTES
OP Anticoagulation Service Note    Date: 2021    Anticoagulation Summary  As of 2021    INR goal:  2.0-3.0   TTR:  63.3 % (1 y)   INR used for dosin.30 (2021)   Warfarin maintenance plan:  9 mg (6 mg x 1.5) every Wed; 6 mg (6 mg x 1) all other days   Weekly warfarin total:  45 mg   Plan last modified:  Dorina Rodriguez (3/22/2021)   Next INR check:  2021   Target end date:  Indefinite    Indications    Paroxysmal atrial fibrillation (HCC) [I48.0]             Anticoagulation Episode Summary     INR check location:      Preferred lab:      Send INR reminders to:      Comments:  [facility]      Anticoagulation Care Providers     Provider Role Specialty Phone number    La Paredes M.D. Referring Internal Medicine [phone]    [facility] Anticoagulation Services Responsible  [phone]        Anticoagulation Patient Findings      Voice message for patient regarding their anticoagulant.     HPI:   The reason for today's call is to prevent morbidity and mortality from a blood clot and/or stroke and to reduce the risk of bleeding while on a anticoagulant.     PCP:  Asael Pink M.D.  [address] [place] [id number]    Assessment:     • INR  therapeutic.       Current Outpatient Medications:   •  methotrexate, Take 2 tabs by mouth once.  Then go to [facility] labs for bloodwork one week later.  Wait for results to increase dose from MD.  •  loratadine, 10 mg, Oral, BID  •  fluocinonide, Apply to affected areas bid  •  famotidine, 40 mg, Oral, DAILY  •  metoprolol tartrate, 50 mg, Oral, BID  •  spironolactone, 50 mg, Oral, DAILY  •  triamcinolone acetonide, AAA legs/ and focal truncal skin BID for rash.  Do not use on face, axilla, groin.  Can wrap legs in saran wrap for better absorption.  •  atorvastatin, TAKE 1 TABLET BY MOUTH AT  BEDTIME  •  betamethasone dipropionate, 1 Application, Topical, DAILY (Patient not taking: Reported on 3/19/2021)  •  losartan, TAKE 1 TABLET BY  MOUTH DAILY  •  Levoxyl, TAKE 1 TABLET BY MOUTH EVERY MORNING ON AN EMPTY STOMACH  •  ferrous sulfate, 325 mg, Oral, BID (Patient not taking: Reported on 4/2/2021)  •  ascorbic acid, 500 mg, Oral, BID (Patient not taking: Reported on 4/2/2021)  •  omeprazole, TAKE 1 CAPSULE BY MOUTH  EVERY DAY.  •  warfarin, Take one tablet daily as [facility]  Indications: Obstructing Blood Clot with Chronic Atrial Fibrillation  •  vitamin k, 100 mcg, Oral, DAILY  •  vitamin D, 4,000 Units, Oral, DAILY      Plan:     • Continue the same warfarin dose, as noted above.       Follow-up:     • Our protocol suggests we test in 1 weeks.        Additional information discussed with patient:     • Asked patient to please call the anticoagulation clinic if they have any signs/symptoms of bleeding and/or thrombosis or any changes to diet or medications.      National recommendations regarding anticoagulation therapy:     The CHEST guidelines recommends frequent INR monitoring at regular intervals (a few days up to a max of 12 weeks) to ensure patients are on the proper dose of warfarin, and patients are not having any complications from therapy.  INRs can dramatically change over a short time period due to diet, medications, and medical conditions.       Dallin Viveros, PharmD, MS, BCACP, [facility]  Phone [phone] fax [phone]    This note was created using voice recognition software (Dragon). The accuracy of the dictation is limited by the abilities of the software. I have reviewed the note prior to signing, however some errors in grammar and context are still possible. If you have any questions related to this note please do not hesitate to contact our office.

## 2021-04-08 ENCOUNTER — TELEPHONE (OUTPATIENT)
Dept: DERMATOLOGY | Facility: IMAGING CENTER | Age: 81
End: 2021-04-08

## 2021-04-08 NOTE — TELEPHONE ENCOUNTER
Tried contacting pt on update about prescription, Pt can pay Out of pocket of we can try to get a PA

## 2021-04-12 ENCOUNTER — TELEPHONE (OUTPATIENT)
Dept: VASCULAR LAB | Facility: MEDICAL CENTER | Age: 81
End: 2021-04-12

## 2021-04-12 ENCOUNTER — ANTICOAGULATION MONITORING (OUTPATIENT)
Dept: VASCULAR LAB | Facility: MEDICAL CENTER | Age: 81
End: 2021-04-12

## 2021-04-12 DIAGNOSIS — I48.0 PAROXYSMAL ATRIAL FIBRILLATION (HCC): ICD-10-CM

## 2021-04-12 LAB — INR PPP: 2.1 (ref 2–3.5)

## 2021-04-12 NOTE — TELEPHONE ENCOUNTER
[facility]    ___________________________________  Patient Call  Received: Today  Message Contents   Tiki Scott sent to [facility]   Pt called and wanted to let clinic know that she's on 2 new prescriptions. She didn't mention what they were in VM.   ___________________________________    Called patient back and she reports being put on Famotidine 40mg QD and Methotrexate 2.5mg QD.  Neither drug should have a significant impact on her INR but will monitor.   Her INR today is therapeutic so she will continue with the current dosing regimen and retest again in 1 week.       Linda MelgozaD

## 2021-04-13 ENCOUNTER — TELEPHONE (OUTPATIENT)
Dept: VASCULAR LAB | Facility: MEDICAL CENTER | Age: 81
End: 2021-04-13

## 2021-04-13 NOTE — TELEPHONE ENCOUNTER
[facility]    Received anticoagulation clearance from [facility] regarding upcoming colonoscopy.    Procedure date 4/29/21    Pt is on warfarin for atrial fibrillation.  D/t CHADsVASC = 6, bridging with Lovenox is indicated. Pt will need to hold warfarin 5 days prior to procedure    Will d/w pt in 1 week    Faxed clearance to [phone]; (will be filed in the back office)    Nedra Mora, AbadD

## 2021-04-14 ENCOUNTER — TELEPHONE (OUTPATIENT)
Dept: SLEEP MEDICINE | Facility: MEDICAL CENTER | Age: 81
End: 2021-04-14

## 2021-04-14 DIAGNOSIS — G47.33 OBSTRUCTIVE SLEEP APNEA SYNDROME: ICD-10-CM

## 2021-04-14 NOTE — TELEPHONE ENCOUNTER
Patient's insurance switched to [employer] - we need to send a new order to [facility].  Pended mask and supply order

## 2021-04-15 ENCOUNTER — HOSPITAL ENCOUNTER (OUTPATIENT)
Dept: LAB | Facility: MEDICAL CENTER | Age: 81
End: 2021-04-15
Attending: DERMATOLOGY
Payer: MEDICARE

## 2021-04-15 ENCOUNTER — HOSPITAL ENCOUNTER (OUTPATIENT)
Facility: MEDICAL CENTER | Age: 81
End: 2021-04-15
Attending: DERMATOLOGY
Payer: MEDICARE

## 2021-04-15 ENCOUNTER — HOSPITAL ENCOUNTER (OUTPATIENT)
Dept: LAB | Facility: MEDICAL CENTER | Age: 81
End: 2021-04-15
Attending: FAMILY MEDICINE
Payer: MEDICARE

## 2021-04-15 DIAGNOSIS — Z79.899 HIGH RISK MEDICATION USE: ICD-10-CM

## 2021-04-15 DIAGNOSIS — R21 RASH: ICD-10-CM

## 2021-04-15 DIAGNOSIS — E03.9 HYPOTHYROIDISM, UNSPECIFIED TYPE: ICD-10-CM

## 2021-04-15 LAB
ALBUMIN SERPL BCP-MCNC: 4.1 G/DL (ref 3.2–4.9)
ALBUMIN/GLOB SERPL: 1.3 G/DL
ALP SERPL-CCNC: 93 U/L (ref 30–99)
ALT SERPL-CCNC: 16 U/L (ref 2–50)
ANION GAP SERPL CALC-SCNC: 10 MMOL/L (ref 7–16)
AST SERPL-CCNC: 20 U/L (ref 12–45)
BASOPHILS # BLD AUTO: 0.8 % (ref 0–1.8)
BASOPHILS # BLD: 0.06 K/UL (ref 0–0.12)
BILIRUB SERPL-MCNC: 0.7 MG/DL (ref 0.1–1.5)
BUN SERPL-MCNC: 23 MG/DL (ref 8–22)
CALCIUM SERPL-MCNC: 9.4 MG/DL (ref 8.5–10.5)
CHLORIDE SERPL-SCNC: 104 MMOL/L (ref 96–112)
CO2 SERPL-SCNC: 27 MMOL/L (ref 20–33)
CREAT SERPL-MCNC: 1.52 MG/DL (ref 0.5–1.4)
EOSINOPHIL # BLD AUTO: 0.29 K/UL (ref 0–0.51)
EOSINOPHIL NFR BLD: 4.1 % (ref 0–6.9)
ERYTHROCYTE [DISTWIDTH] IN BLOOD BY AUTOMATED COUNT: 45.4 FL (ref 35.9–50)
GLOBULIN SER CALC-MCNC: 3.2 G/DL (ref 1.9–3.5)
GLUCOSE SERPL-MCNC: 102 MG/DL (ref 65–99)
HCT VFR BLD AUTO: 37.4 % (ref 37–47)
HGB BLD-MCNC: 11.6 G/DL (ref 12–16)
IMM GRANULOCYTES # BLD AUTO: 0.04 K/UL (ref 0–0.11)
IMM GRANULOCYTES NFR BLD AUTO: 0.6 % (ref 0–0.9)
LYMPHOCYTES # BLD AUTO: 0.88 K/UL (ref 1–4.8)
LYMPHOCYTES NFR BLD: 12.4 % (ref 22–41)
MCH RBC QN AUTO: 27.2 PG (ref 27–33)
MCHC RBC AUTO-ENTMCNC: 31 G/DL (ref 33.6–35)
MCV RBC AUTO: 87.8 FL (ref 81.4–97.8)
MONOCYTES # BLD AUTO: 0.59 K/UL (ref 0–0.85)
MONOCYTES NFR BLD AUTO: 8.3 % (ref 0–13.4)
NEUTROPHILS # BLD AUTO: 5.22 K/UL (ref 2–7.15)
NEUTROPHILS NFR BLD: 73.8 % (ref 44–72)
NRBC # BLD AUTO: 0 K/UL
NRBC BLD-RTO: 0 /100 WBC
PLATELET # BLD AUTO: 291 K/UL (ref 164–446)
PMV BLD AUTO: 11.1 FL (ref 9–12.9)
POTASSIUM SERPL-SCNC: 5.1 MMOL/L (ref 3.6–5.5)
PROT SERPL-MCNC: 7.3 G/DL (ref 6–8.2)
RBC # BLD AUTO: 4.26 M/UL (ref 4.2–5.4)
SODIUM SERPL-SCNC: 141 MMOL/L (ref 135–145)
TSH SERPL DL<=0.005 MIU/L-ACNC: 2.59 UIU/ML (ref 0.38–5.33)
WBC # BLD AUTO: 7.1 K/UL (ref 4.8–10.8)

## 2021-04-15 PROCEDURE — 85025 COMPLETE CBC W/AUTO DIFF WBC: CPT

## 2021-04-15 PROCEDURE — 36415 COLL VENOUS BLD VENIPUNCTURE: CPT

## 2021-04-15 PROCEDURE — 80053 COMPREHEN METABOLIC PANEL: CPT

## 2021-04-15 PROCEDURE — 87177 OVA AND PARASITES SMEARS: CPT

## 2021-04-15 PROCEDURE — 87209 SMEAR COMPLEX STAIN: CPT

## 2021-04-15 PROCEDURE — 84443 ASSAY THYROID STIM HORMONE: CPT

## 2021-04-16 ENCOUNTER — TELEPHONE (OUTPATIENT)
Dept: DERMATOLOGY | Facility: IMAGING CENTER | Age: 81
End: 2021-04-16

## 2021-04-16 DIAGNOSIS — Z79.899 HIGH RISK MEDICATION USE: ICD-10-CM

## 2021-04-16 NOTE — TELEPHONE ENCOUNTER
Called patient to discuss results of MTX test labs.     Took 5mg MTX 1 week ago with labs resulting yesterday/today.  Mild drop in H:H and renal dz evident - estimated GFR 33 -lowest of recent trends.  Advised to not take any further doses.  Though high-dose poses more risk than low-dose used for skin conditions, there may be increased toxicities with reduced renal function.    Will message PCP again to try to substitute out metoprolol which may be leading possible drug culprit for dermatitis.      Will work with staff to try to get approval for biologic, as it is not clear whether any current systemic medications would be well tolerated in setting of med history: CHF, GONZALEZ-transaminitis now normal, anemia, advanced stage kidney dz.  Not likely able to tolerate MTX.      Consider Consentyx >> Stelara with rash more clinically consistent with PSOsiform.

## 2021-04-19 ENCOUNTER — ANTICOAGULATION MONITORING (OUTPATIENT)
Dept: VASCULAR LAB | Facility: MEDICAL CENTER | Age: 81
End: 2021-04-19

## 2021-04-19 DIAGNOSIS — I48.0 PAROXYSMAL ATRIAL FIBRILLATION (HCC): ICD-10-CM

## 2021-04-19 LAB — INR PPP: 1.8 (ref 2–3.5)

## 2021-04-19 ASSESSMENT — CHA2DS2 SCORE
CHA2DS2 VASC SCORE: 5
SEX: FEMALE
DIABETES: NO
AGE 75 OR GREATER: YES
HYPERTENSION: YES
PRIOR STROKE OR TIA OR THROMBOEMBOLISM: NO
CHF OR LEFT VENTRICULAR DYSFUNCTION: YES
CHA2DS2 VASC SCORE: 6
HYPERTENSION: YES
CHF OR LEFT VENTRICULAR DYSFUNCTION: NO
PRIOR STROKE OR TIA OR THROMBOEMBOLISM: NO
VASCULAR DISEASE: YES
AGE 75 OR GREATER: YES
SEX: FEMALE
AGE 65 TO 74: NO
VASCULAR DISEASE: YES
AGE 65 TO 74: NO
DIABETES: NO

## 2021-04-19 NOTE — PROGRESS NOTES
OP   Telephone Anticoagulation Service Note      Anticoagulation Summary  As of 2021    INR goal:  2.0-3.0   TTR:  63.4 % (1 y)   INR used for dosin.80 (2021)   Warfarin maintenance plan:  9 mg (6 mg x 1.5) every Wed; 6 mg (6 mg x 1) all other days   Weekly warfarin total:  45 mg   Plan last modified:  Dorina Rodriguez (3/22/2021)   Next INR check:  2021   Target end date:  Indefinite    Indications    Paroxysmal atrial fibrillation (HCC) [I48.0]             Anticoagulation Episode Summary     INR check location:      Preferred lab:      Send INR reminders to:      Comments:  [facility]      Anticoagulation Care Providers     Provider Role Specialty Phone number    La Paredes M.D. Referring Internal Medicine [phone]    [facility] Anticoagulation Services Responsible  [phone]        Anticoagulation Patient Findings    Spoke with the patient on the phone today, reporting a therapeutic INR of 1.8.  Confirmed the current warfarin dosing regimen and patient compliance.  Patient denies any missed doses, but thinks she may have eaten too many greens this past week.   Patient denies any interval changes to medications. Patient denies any signs/symptoms of bleeding or clotting.  Patient instructed to bolus with 9mg TONIGHT, as a one time boost dose, then to resume her current dosing regimen.   Patient asked to retest again in 1 week.    Patient reports that she will be having a colonoscopy coming up and wanted to make an appointment to discuss bridging with Lovenox.   Patient's ChadsVasc = 6. No prior clots or stroke reported, but pt prefers to bridge and wants to discuss in person. Appt made for tomorrow.       Linda MelgozaD

## 2021-04-20 ENCOUNTER — ANTICOAGULATION VISIT (OUTPATIENT)
Dept: VASCULAR LAB | Facility: MEDICAL CENTER | Age: 81
End: 2021-04-20
Attending: INTERNAL MEDICINE
Payer: MEDICARE

## 2021-04-20 DIAGNOSIS — I48.0 PAROXYSMAL ATRIAL FIBRILLATION (HCC): ICD-10-CM

## 2021-04-20 LAB — OVA AND PARASITE, FECAL INTERPRETATION Q0595: NEGATIVE

## 2021-04-20 PROCEDURE — 99213 OFFICE O/P EST LOW 20 MIN: CPT

## 2021-04-20 NOTE — PROGRESS NOTES
Anticoagulation Summary  As of 4/20/2021    INR goal:  2.0-3.0   TTR:  63.4 % (1 y)   INR used for dosing:  No new INR was available at the time of this encounter.   Warfarin maintenance plan:  9 mg (6 mg x 1.5) every Wed; 6 mg (6 mg x 1) all other days   Weekly warfarin total:  45 mg   Plan last modified:  Dorina Rodriguez (3/22/2021)   Next INR check:  5/3/2021   Target end date:  Indefinite    Indications    Paroxysmal atrial fibrillation (HCC) [I48.0]             Anticoagulation Episode Summary     INR check location:      Preferred lab:      Send INR reminders to:      Comments:  [facility]      Anticoagulation Care Providers     Provider Role Specialty Phone number    La Paredes M.D. Referring Internal Medicine [phone]    [facility] Anticoagulation Services Responsible  [phone]             Anticoagulation Patient Findings  Patient Findings     Positives:  Upcoming invasive procedure (4/29 colonoscopy)    Negatives:  Signs/symptoms of thrombosis, Signs/symptoms of bleeding, Laboratory test error suspected, Change in health, Change in alcohol use, Change in activity, Emergency department visit, Upcoming dental procedure, Missed doses, Extra doses, Change in medications, Change in diet/appetite, Hospital admission, Bruising, Other complaints          HPI:  Aleshia Crooks seen in clinic today, on anticoagulation therapy with warfarin for atrial fibrillation    Pt tests her INR at home via [employer] and is in clinic today for bridging instructions for her upcoming colonoscopy.    Changes to current medical/health status since last appt: see above  Denies signs/symptoms of bleeding and/or thrombosis since the last appt.    Denies any interval changes to diet  Denies any interval changes to medications since last appt.   Denies any complications or cost restrictions with current therapy.   Verified current warfarin dosing schedule.   BP declined d/t COVID-19 concerns.  Medications reconciled        A/P   Pt to have procedure on 4/29. Lovenox bridging is indicated as pt is on warfarin for atrial fibrillation with CHADsVASC of 6.     Pt to follow instructions as below, after procedure to ask operating MD when safe to resume anticoagulation, if no instructions given, pt will follow as below.     Current weight: 95kg  CrCl = 44 ml/min  Lab Results   Component Value Date/Time    BUN 23 (H) 04/15/2021 09:13 AM    CREATININE 1.52 (H) 04/15/2021 09:13 AM        Per weight based dosing of Lovenox, pt can be on either Lovenox 100 mg SC BID or 150 mg SC daily. However, Pt has a box of Lovenox 120 mg syringes. Will utilize this as pt's copay was expensive and declining renal fxn        Date  Warfarin dosing PM Lovenox   5 Days before procedure Saturday 4/24 0mg NONE   4 Days before procedure Sunday 4/25 0mg Lovenox injection   3 Days before procedure Monday 4/26 0mg Lovenox injection   2 Days before procedure Tuesday 4/27 0mg Lovenox injection   1 Days before procedure Wednesday 4/28 0mg NONE   PROCEDURE Thursday 4/29 9 mg NONE   1 Day after procedure Friday 4/30 9 mg Restart Lovenox injections      2 Days after procedure Saturday 5/1 6 mg Lovenox injection   3 Days after procedure Sunday 5/2 6 mg Lovenox injection   4 Days after procedure Monday  5/3  GET INR checked     Nedra Mora, AbadD

## 2021-04-22 DIAGNOSIS — L98.8 EOSINOPHILIC SPONGIOSIS: ICD-10-CM

## 2021-04-22 DIAGNOSIS — D72.18 EOSINOPHILIC SPONGIOSIS: ICD-10-CM

## 2021-04-22 RX ORDER — FAMOTIDINE 40 MG/1
40 TABLET, FILM COATED ORAL DAILY
Qty: 90 TABLET | Refills: 3 | Status: SHIPPED | OUTPATIENT
Start: 2021-04-22 | End: 2021-05-21

## 2021-04-22 RX ORDER — OMEPRAZOLE 20 MG/1
20 CAPSULE, DELAYED RELEASE ORAL
Qty: 90 CAPSULE | Refills: 3 | Status: SHIPPED | OUTPATIENT
Start: 2021-04-22 | End: 2021-05-06 | Stop reason: SDUPTHER

## 2021-04-23 ENCOUNTER — OFFICE VISIT (OUTPATIENT)
Dept: DERMATOLOGY | Facility: IMAGING CENTER | Age: 81
End: 2021-04-23
Payer: MEDICARE

## 2021-04-23 DIAGNOSIS — L29.9 ITCHING: ICD-10-CM

## 2021-04-23 DIAGNOSIS — R21 RASH: ICD-10-CM

## 2021-04-23 PROCEDURE — 99214 OFFICE O/P EST MOD 30 MIN: CPT | Performed by: DERMATOLOGY

## 2021-04-23 NOTE — PROGRESS NOTES
"CC:  Rash     Subjective: follow here for rash eval and treatment.  Following up for rash.   Pt could not take MTX only took 2 pills, labs showing mild renal changes in setting of chronic renal dz    Not using topical currently as ran out of medication. Pharmacy reportedly told patient she could  next Rx supply today.  Has \"tubs of something\" at home, but not using.   Reportedly, topical medications do help, when used.       HPI: Rash   \"All over body\", May have started in small sites on back, legs.  Some sites improved but others have worsened.  States Betamethasone oint has made rash worse in the last 2 weeks   Discontinued about 3 days ago.  Has tried and failed triamcinolone, no improvement.  Using some topical, name not known, that is compounded in lotion.     Denies known oral steroids.  May have started after spironolactone prescribed.  Reports taking metoprolol for at least 3 years; could have had smaller sites noted after this medication was begun, unsure.     Onset: at least 7 months has progressively  has gotten worse in the last 2 weeks . Thinks after flu vaccine in September started to developed  symptoms . Was evaluated by another Dermatologist 6 months ago . Bx done . Here for a second opinion.     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  History of blistering/severe sunburns:Yes, Details: .  Family history of skin cancer:No  Family history of atypical moles:No    ROS: no fevers/chills. ++ itch.  No cough. Denies joint pain.  Hx diarrhea - intermittent (started after thyroid dz)  DermPMH: hx melanoma left shoulder, 2014? Per records  No problem-specific Assessment & Plan notes found for this encounter.    Relevant PMH: many med " comorbidities.  GONZALEZ, CHF, kidney Dz, hypothyroidism  Social: former smoker; denies travel abroad.  Daughter with well water which she doesn't drink due to taste.  FmHx:  not affected    PE: Gen:WDWN female in NAD.  Skin: limited exam today. Back/abdomen - guttate papules.  Hands/arms - few small plaques.  No notable burrows/interdigit papules Legs - diffuse lower leg plaques, red, scaly and marks of excoriation.     SCI Prior path - see scans = spongiotic dermatitis + eosinophils. Consider ACD, Nummular dermatitis or drug reaction    Labs: Hep B/C (Ab X5) April 2020 - WNL    O&P - negative    A/P: Papulosquamous rash, prior path showing spongiotic dermatitis + eosinophils.  Suspicion for drug reaction and possible MTX intolerance: kidney dz and elevated Cr after test dose.  Steroid responsive by patient report.  Cannot exclude PSO  -have communicated with PCP re: possible metoprolol substitution, not yet trialed off  -paperwork started for Cosentyx as alternate.  Reviewed se today and anticipatory guidance given for side effects/reactions  -cont current home supply topicals - Lidex cream vs betamethasone oint BID severe sites and TAC oint BID less severe sites    High risk medication: monitoring for safety prior to therapeutic doses  -TB test ordered     Continue prior itching care:  - antihistamines daytime/Benadryl prev reviewed  - Dry skin care  - consider future gabapentin, other    I have reviewed medications relevant to my specialty.    F/u 3-4 weeks to start Cosentyx    Rec CARL at future visit

## 2021-04-26 ENCOUNTER — TELEPHONE (OUTPATIENT)
Dept: VASCULAR LAB | Facility: MEDICAL CENTER | Age: 81
End: 2021-04-26

## 2021-04-26 ENCOUNTER — ANTICOAGULATION MONITORING (OUTPATIENT)
Dept: VASCULAR LAB | Facility: MEDICAL CENTER | Age: 81
End: 2021-04-26

## 2021-04-26 DIAGNOSIS — I48.0 PAROXYSMAL ATRIAL FIBRILLATION (HCC): ICD-10-CM

## 2021-04-26 LAB — INR PPP: 1.6 (ref 2–3.5)

## 2021-04-26 NOTE — PROGRESS NOTES
Anticoagulation Summary  As of 2021    INR goal:  2.0-3.0   TTR:  62.3 % (1.1 y)   INR used for dosin.60 (2021)   Warfarin maintenance plan:  9 mg (6 mg x 1.5) every Wed; 6 mg (6 mg x 1) all other days   Weekly warfarin total:  45 mg   Plan last modified:  Dorina Rodriguez (3/22/2021)   Next INR check:  5/3/2021   Target end date:  Indefinite    Indications    Paroxysmal atrial fibrillation (HCC) [I48.0]             Anticoagulation Episode Summary     INR check location:      Preferred lab:      Send INR reminders to:      Comments:  [facility]      Anticoagulation Care Providers     Provider Role Specialty Phone number    Slimeaisha Christian Paredes M.D. Referring Internal Medicine [phone]    [facility] Anticoagulation Services Responsible  [phone]        Anticoagulation Patient Findings    Left voicemail message to report a subtherapeutic INR of 1.6.  Requested pt contact the clinic for any s/s of unusual bleeding, bruising, clotting or any changes to diet or medication.   Patient currently holding warfarin and bridging with lovenox, instructed her to continue with that schedule.  FU 1 weeks.  Christian Jordan, AbadD, BCACP

## 2021-04-26 NOTE — TELEPHONE ENCOUNTER
----- Message from Nedra Mora PharmD sent at 4/26/2021  7:59 AM PDT -----  Regarding: Needs clarification on bridging instructions

## 2021-04-26 NOTE — TELEPHONE ENCOUNTER
Pt initially lost her bridging instructions but found it.  Pt will test her INR via [employer] today.  Nedra Mora, AbadD

## 2021-05-03 ENCOUNTER — ANTICOAGULATION MONITORING (OUTPATIENT)
Dept: VASCULAR LAB | Facility: MEDICAL CENTER | Age: 81
End: 2021-05-03

## 2021-05-03 DIAGNOSIS — I48.0 PAROXYSMAL ATRIAL FIBRILLATION (HCC): ICD-10-CM

## 2021-05-03 LAB — INR PPP: 1.5 (ref 2–3.5)

## 2021-05-03 NOTE — PROGRESS NOTES
Anticoagulation Summary  As of 5/3/2021    INR goal:  2.0-3.0   TTR:  61.2 % (1.1 y)   INR used for dosin.50 (5/3/2021)   Warfarin maintenance plan:  9 mg (6 mg x 1.5) every Wed; 6 mg (6 mg x 1) all other days   Weekly warfarin total:  45 mg   Plan last modified:  Dorina Rodriguez (3/22/2021)   Next INR check:  2021   Target end date:  Indefinite    Indications    Paroxysmal atrial fibrillation (HCC) [I48.0]             Anticoagulation Episode Summary     INR check location:      Preferred lab:      Send INR reminders to:      Comments:  [facility]      Anticoagulation Care Providers     Provider Role Specialty Phone number    Slimeaisha Christian Paredes M.D. Referring Internal Medicine [phone]    [facility] Anticoagulation Services Responsible  [phone]        Anticoagulation Patient Findings      Spoke with pt.  INR is subtherapeutic.   Pt denies any unusual s/s of bleeding, bruising, clotting or any changes to diet or medications. Denies any etoh, cranberries, supplements, or illness.   Pt verifies warfarin weekly dosing. Pt has been bridging with Lovenox post-op.    Will have pt continue with increased dose of 9 mg daily + continue Lovenox bridging    Repeat INR in 2 days.     Nedra Mora, AbadD

## 2021-05-04 ENCOUNTER — HOSPITAL ENCOUNTER (OUTPATIENT)
Dept: LAB | Facility: MEDICAL CENTER | Age: 81
End: 2021-05-04
Attending: DERMATOLOGY
Payer: MEDICARE

## 2021-05-04 DIAGNOSIS — Z79.899 HIGH RISK MEDICATION USE: ICD-10-CM

## 2021-05-04 PROCEDURE — 36415 COLL VENOUS BLD VENIPUNCTURE: CPT

## 2021-05-04 PROCEDURE — 86480 TB TEST CELL IMMUN MEASURE: CPT

## 2021-05-05 ENCOUNTER — PATIENT MESSAGE (OUTPATIENT)
Dept: MEDICAL GROUP | Facility: MEDICAL CENTER | Age: 81
End: 2021-05-05

## 2021-05-05 ENCOUNTER — ANTICOAGULATION MONITORING (OUTPATIENT)
Dept: VASCULAR LAB | Facility: MEDICAL CENTER | Age: 81
End: 2021-05-05

## 2021-05-05 DIAGNOSIS — I48.0 PAROXYSMAL ATRIAL FIBRILLATION (HCC): ICD-10-CM

## 2021-05-05 DIAGNOSIS — D50.0 IRON DEFICIENCY ANEMIA DUE TO CHRONIC BLOOD LOSS: ICD-10-CM

## 2021-05-05 LAB
GAMMA INTERFERON BACKGROUND BLD IA-ACNC: 0.1 IU/ML
INR PPP: 1.9 (ref 2–3.5)
M TB IFN-G BLD-IMP: NEGATIVE
M TB IFN-G CD4+ BCKGRND COR BLD-ACNC: -0.01 IU/ML
MITOGEN IGNF BCKGRD COR BLD-ACNC: 5.8 IU/ML
QFT TB2 - NIL TBQ2: 0.01 IU/ML

## 2021-05-05 RX ORDER — FERROUS SULFATE 325(65) MG
325 TABLET ORAL DAILY
Qty: 90 TABLET | Refills: 2 | Status: SHIPPED | OUTPATIENT
Start: 2021-05-05 | End: 2022-01-27

## 2021-05-05 RX ORDER — ASCORBIC ACID 500 MG
500 TABLET ORAL DAILY
Qty: 100 TABLET | Refills: 2 | Status: SHIPPED | OUTPATIENT
Start: 2021-05-05 | End: 2021-12-27

## 2021-05-05 NOTE — PROGRESS NOTES
Anticoagulation Summary  As of 2021    INR goal:  2.0-3.0   TTR:  60.9 % (1.1 y)   INR used for dosin.90 (2021)   Warfarin maintenance plan:  9 mg (6 mg x 1.5) every Wed; 6 mg (6 mg x 1) all other days   Weekly warfarin total:  45 mg   Plan last modified:  Dorina Rodriguez (3/22/2021)   Next INR check:  2021   Target end date:  Indefinite    Indications    Paroxysmal atrial fibrillation (HCC) [I48.0]             Anticoagulation Episode Summary     INR check location:      Preferred lab:      Send INR reminders to:      Comments:  [facility]      Anticoagulation Care Providers     Provider Role Specialty Phone number    La Paredes M.D. Referring Internal Medicine [phone]    [facility] Anticoagulation Services Responsible  [phone]        Anticoagulation Patient Findings    Spoke with patient today regarding subtherapeutic INR of 1.9.  Patient denies any signs/symptoms of bruising or bleeding or any changes in diet and medications.  Instructed patient to call clinic with any questions or concerns.  Instructed patient to bolus with 12mg X 1, then resume current warfarin regimen.  She is to continue Lovenox injections until next INR as well.  Follow up in 2 days, to reduce risk of adverse events related to this high risk medication,  Warfarin.    Christian Jordan, PharmD, BCACP

## 2021-05-06 RX ORDER — OMEPRAZOLE 20 MG/1
20 CAPSULE, DELAYED RELEASE ORAL
Qty: 100 CAPSULE | Refills: 3 | Status: SHIPPED | OUTPATIENT
Start: 2021-05-06 | End: 2022-07-02

## 2021-05-06 NOTE — PATIENT COMMUNICATION
Received request via: Patient    Was the patient seen in the last year in this department? Yes    Does the patient have an active prescription (recently filled or refills available) for medication(s) requested? No     Requested Prescriptions     Pending Prescriptions Disp Refills   • omeprazole (PRILOSEC) 20 MG delayed-release capsule 100 capsule 3     Sig: Take 1 capsule by mouth every day.

## 2021-05-06 NOTE — TELEPHONE ENCOUNTER
From: Aleshia Crooks  To: Physician Asael Pink  Sent: 5/5/2021 9:37 PM PDT  Subject: Prescription Question    I need the prescription OMEPRAZOLE filled

## 2021-05-07 ENCOUNTER — ANTICOAGULATION MONITORING (OUTPATIENT)
Dept: VASCULAR LAB | Facility: MEDICAL CENTER | Age: 81
End: 2021-05-07

## 2021-05-07 DIAGNOSIS — I48.0 PAROXYSMAL ATRIAL FIBRILLATION (HCC): ICD-10-CM

## 2021-05-07 LAB — INR PPP: 3.3 (ref 2–3.5)

## 2021-05-08 NOTE — PROGRESS NOTES
Anticoagulation Summary  As of 5/7/2021    INR goal:  2.0-3.0   TTR:  60.9 % (1.1 y)   INR used for dosing:  3.30 (5/7/2021)   Warfarin maintenance plan:  9 mg (6 mg x 1.5) every Wed; 6 mg (6 mg x 1) all other days   Weekly warfarin total:  45 mg   Plan last modified:  Dorina Rodriugez (3/22/2021)   Next INR check:  5/14/2021   Target end date:  Indefinite    Indications    Paroxysmal atrial fibrillation (HCC) [I48.0]             Anticoagulation Episode Summary     INR check location:      Preferred lab:      Send INR reminders to:      Comments:  [facility]      Anticoagulation Care Providers     Provider Role Specialty Phone number    La Paredes M.D. Referring Internal Medicine [phone]    [facility] Anticoagulation Services Responsible  [phone]        Anticoagulation Patient Findings          Spoke with Aleshia to report a supra therapeutic INR of 3.3.  Will reduce tonight's dose to 3mg then resume current dosing regimen. Follow up in 1 weeks, to reduce the risk of adverse events related to this high risk medication, warfarin.    Taylor Cabrales, Clinical Pharmacist

## 2021-05-10 DIAGNOSIS — I48.0 PAROXYSMAL ATRIAL FIBRILLATION (HCC): ICD-10-CM

## 2021-05-12 ENCOUNTER — TELEPHONE (OUTPATIENT)
Dept: DERMATOLOGY | Facility: IMAGING CENTER | Age: 81
End: 2021-05-12

## 2021-05-12 RX ORDER — METOPROLOL TARTRATE 50 MG/1
TABLET, FILM COATED ORAL
Qty: 200 TABLET | Refills: 1 | Status: SHIPPED | OUTPATIENT
Start: 2021-05-12 | End: 2021-05-19 | Stop reason: SINTOL

## 2021-05-12 NOTE — TELEPHONE ENCOUNTER
PA started for Cosentyx through [facility] , medication  Was approved. I  faxed all information to Cosentyx support program to up date them . Just waiting on response from program . All information scanned in media tab

## 2021-05-14 ENCOUNTER — ANTICOAGULATION MONITORING (OUTPATIENT)
Dept: VASCULAR LAB | Facility: MEDICAL CENTER | Age: 81
End: 2021-05-14

## 2021-05-14 DIAGNOSIS — I48.0 PAROXYSMAL ATRIAL FIBRILLATION (HCC): ICD-10-CM

## 2021-05-14 LAB — INR PPP: 2 (ref 2–3.5)

## 2021-05-14 NOTE — PROGRESS NOTES
Anticoagulation Summary  As of 2021    INR goal:  2.0-3.0   TTR:  61.2 % (1.1 y)   INR used for dosin.00 (2021)   Warfarin maintenance plan:  9 mg (6 mg x 1.5) every Wed; 6 mg (6 mg x 1) all other days   Weekly warfarin total:  45 mg   Plan last modified:  Taylor Cabrales (2021)   Next INR check:  2021   Target end date:  Indefinite    Indications    Paroxysmal atrial fibrillation (HCC) [I48.0]             Anticoagulation Episode Summary     INR check location:      Preferred lab:      Send INR reminders to:      Comments:  [facility]      Anticoagulation Care Providers     Provider Role Specialty Phone number    La Paredes M.D. Referring Internal Medicine [phone]    [facility] Anticoagulation Services Responsible  [phone]        Anticoagulation Patient Findings        HPI:  Aleshia Sonido Crooks, on anticoagulation therapy with warfarin for paroxysmal atrial fibrillation.  Changes to current medical/health status since last appt: None  Denies signs/symptoms of bleeding and/or thrombosis since the last appt.    Denies any interval changes to diet  Denies any interval changes to medications since last appt.   Denies any complications or cost restrictions with current therapy.     A/P   INR  -therapeutic.   Pt will continue current Warfarin dosing.     Next INR in 1 week(s).    Herber Xiong, Med Ass't     I have reviewed and concur with the above plan on 2021.  Taylor Cabrales, Clinical Pharmacist, CDE, CACP

## 2021-05-19 DIAGNOSIS — I25.10 CORONARY ARTERY DISEASE INVOLVING NATIVE CORONARY ARTERY OF NATIVE HEART WITHOUT ANGINA PECTORIS: ICD-10-CM

## 2021-05-19 DIAGNOSIS — I50.42 CHRONIC COMBINED SYSTOLIC AND DIASTOLIC CONGESTIVE HEART FAILURE (HCC): ICD-10-CM

## 2021-05-19 DIAGNOSIS — I10 ESSENTIAL HYPERTENSION: ICD-10-CM

## 2021-05-19 RX ORDER — BISOPROLOL FUMARATE 10 MG/1
10 TABLET, FILM COATED ORAL DAILY
Qty: 30 TABLET | Refills: 4 | Status: SHIPPED | OUTPATIENT
Start: 2021-05-19 | End: 2021-09-08 | Stop reason: SDUPTHER

## 2021-05-21 ENCOUNTER — OFFICE VISIT (OUTPATIENT)
Dept: DERMATOLOGY | Facility: IMAGING CENTER | Age: 81
End: 2021-05-21
Payer: MEDICARE

## 2021-05-21 ENCOUNTER — TELEPHONE (OUTPATIENT)
Dept: DERMATOLOGY | Facility: IMAGING CENTER | Age: 81
End: 2021-05-21

## 2021-05-21 DIAGNOSIS — L29.9 ITCHING: ICD-10-CM

## 2021-05-21 DIAGNOSIS — L40.9 PSORIASIS: ICD-10-CM

## 2021-05-21 PROCEDURE — 99213 OFFICE O/P EST LOW 20 MIN: CPT | Performed by: DERMATOLOGY

## 2021-05-21 NOTE — TELEPHONE ENCOUNTER
Spoke with [facility] support program this morning questioning status on medication. [employer] approved but the hold up has been trying clarify loading dosage approval. [employer] program representative  Aramis was going to call [employer] check on this . Awaiting response , I did emphasize the importance of patient getting started on medication as soon as possible

## 2021-05-21 NOTE — PROGRESS NOTES
"CC:  Rash       Subjective: prev seen patient follow here for rash eval and treatment. Less red, less raised, less itching.  No known changes/triggers that may have precipitated.     To start new heart/bp control medication Zebeta tomorrow.  Is stopping metoprolol when starts new medication.     From prior notes:  \"HPI: Rash   \"All over body\", May have started in small sites on back, legs.  Some sites improved but others have worsened.  States Betamethasone oint has made rash worse in the last 2 weeks   Discontinued about 3 days ago.  Has tried and failed triamcinolone, no improvement.  Using some topical, name not known, that is compounded in lotion.     Denies known oral steroids.  May have started after spironolactone prescribed.  Reports taking metoprolol for at least 3 years; could have had smaller sites noted after this medication was begun, unsure.     Onset: at least 7 months has progressively  has gotten worse in the last 2 weeks . Thinks after flu vaccine in September started to developed  symptoms . Was evaluated by another Dermatologist 6 months ago . Bx done . 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  History of blistering/severe sunburns:Yes, Details: .  Family history of skin cancer:No  Family history of atypical moles:No    ROS: no fevers/chills. ++ itch.  No cough. Denies joint pain.  Hx diarrhea - intermittent (started after thyroid dz)  DermPMH: hx melanoma left shoulder, 2014? Per records  No problem-specific Assessment & Plan notes found for this encounter.    Relevant PMH: many med comorbidities.  GONZALEZ, CHF, kidney Dz, hypothyroidism  Social: former smoker; denies travel abroad.  Daughter with well water which " she doesn't drink due to taste.  FmHx:  not affected    PE: Gen:WDWN female in NAD.  Skin: limited exam today.  Hands/arms - few small plaques/papules.  Legs - diffuse lower leg plaques, red, scaly and marks of excoriation.     SCI Prior path - see scans = spongiotic dermatitis + eosinophils. Consider ACD, Nummular dermatitis or drug reaction    Labs: Hep B/C (Ab X5) April 2020 - WNL  Quantiferon negative - May 2021  O&P - negative    A/P: Papulosquamous rash, prior path showing spongiotic dermatitis + eosinophils.  Suspicion for drug reaction and possible MTX intolerance: kidney dz and elevated Cr after test dose.  Steroid responsive by patient report.  Cannot exclude PSO.  Discussed waiting 2-6 months to see effects of medication change vs starting Cosentyx current with new medication changes. If improves, may not know which mechanism. In this setting would advocate to space out cosentyx to see if improvement sustains.  If no effect noted from spacing, could eventually stop altogether.    -approved for maintenance Cosentyx, awaiting loading approvals.  Reviewed se today and anticipatory guidance given for side effects/reactions.  Anticipate scheduling next week to initiate treatment.    -cont current home supply topicals - Lidex cream vs betamethasone oint BID severe sites and TAC oint BID less severe sites    High risk medication: monitoring for safety prior to therapeutic doses:  -Next TB test May 2022      Continue prior itching care:  - antihistamines daytime-Claritin/Benadryl prev reviewed  - Dry skin care  - consider future gabapentin, other    I have reviewed medications relevant to my specialty.    F/u 1 month MD to f/u effects of Cosentyx/drug change    Rec CARL at future visit

## 2021-05-22 LAB — INR PPP: 2.3 (ref 2–3.5)

## 2021-05-24 ENCOUNTER — ANTICOAGULATION MONITORING (OUTPATIENT)
Dept: VASCULAR LAB | Facility: MEDICAL CENTER | Age: 81
End: 2021-05-24

## 2021-05-24 DIAGNOSIS — I48.0 PAROXYSMAL ATRIAL FIBRILLATION (HCC): ICD-10-CM

## 2021-05-24 NOTE — PROGRESS NOTES
Anticoagulation Summary  As of 2021    INR goal:  2.0-3.0   TTR:  61.9 % (1.1 y)   INR used for dosin.30 (2021)   Warfarin maintenance plan:  9 mg (6 mg x 1.5) every Wed; 6 mg (6 mg x 1) all other days   Weekly warfarin total:  45 mg   Plan last modified:  Taylor M Filter (2021)   Next INR check:  2021   Target end date:  Indefinite    Indications    Paroxysmal atrial fibrillation (HCC) [I48.0]             Anticoagulation Episode Summary     INR check location:      Preferred lab:      Send INR reminders to:      Comments:  [facility]      Anticoagulation Care Providers     Provider Role Specialty Phone number    La Paredes M.D. Referring Internal Medicine [phone]    [facility] Anticoagulation Services Responsible  [phone]        Anticoagulation Patient Findings    Left a message on the patient's voicemail today, informing the patient of a therapeutic INR of 2.3.   Patient reported bruising on her arm earlier, but phone call got disconnected and had to leave a VM upon calling the patient back.  Instructed the patient to call the clinic with any changes to diet or medications, with any signs/sx of bleeding or clotting or with any questions or concerns.     Patient instructed to continue with the current warfarin dosing regimen, and asked to follow up again in 1 week.       Linda Rodriguez PharmD      Patient called clinic back and we discussed the bruising on her arms and seems to be stable at this time. Patient will watch the bruising to be sure it does not worsen and will continue with her current regimen and follow up with a retest on Friday.     Linda Rodriguez PharmD

## 2021-05-27 ENCOUNTER — TELEPHONE (OUTPATIENT)
Dept: DERMATOLOGY | Facility: IMAGING CENTER | Age: 81
End: 2021-05-27

## 2021-05-28 ENCOUNTER — ANTICOAGULATION MONITORING (OUTPATIENT)
Dept: MEDICAL GROUP | Facility: PHYSICIAN GROUP | Age: 81
End: 2021-05-28

## 2021-05-28 ENCOUNTER — NON-PROVIDER VISIT (OUTPATIENT)
Dept: DERMATOLOGY | Facility: IMAGING CENTER | Age: 81
End: 2021-05-28
Payer: MEDICARE

## 2021-05-28 DIAGNOSIS — I48.0 PAROXYSMAL ATRIAL FIBRILLATION (HCC): ICD-10-CM

## 2021-05-28 LAB — INR PPP: 3.6 (ref 2–3.5)

## 2021-05-29 NOTE — PROGRESS NOTES
OP   Telephone Anticoagulation Service Note      Anticoagulation Summary  As of 5/28/2021    INR goal:  2.0-3.0   TTR:  61.8 % (1.2 y)   INR used for dosing:  3.60 (5/28/2021)   Warfarin maintenance plan:  9 mg (6 mg x 1.5) every Wed; 6 mg (6 mg x 1) all other days   Weekly warfarin total:  45 mg   Plan last modified:  Taylor MACI Filter (5/7/2021)   Next INR check:  6/4/2021   Target end date:  Indefinite    Indications    Paroxysmal atrial fibrillation (HCC) [I48.0]             Anticoagulation Episode Summary     INR check location:      Preferred lab:      Send INR reminders to:      Comments:  [facility]      Anticoagulation Care Providers     Provider Role Specialty Phone number    La Paredes M.D. Referring Internal Medicine [phone]    [facility] Anticoagulation Services Responsible  [phone]        Anticoagulation Patient Findings  Patient Findings     Positives:  Change in diet/appetite         Spoke with the patient on the phone today, reporting a SUPRA-therapeutic INR of 3.6.   Confirmed the current warfarin dosing regimen and patient compliance.  Patient reports she has been eating less greens (Vit K) in diet over the last week.   Patient denies any interval changes to medications. Patient denies any signs/symptoms of bleeding or clotting.  Patient instructed to reduce dose to 3mg TONIGHT, as a one time dose adjustment, then to resume her current dosing regimen.   Patient asked to retest again in 1 week.     Linda MelgozaD     none noted

## 2021-06-02 DIAGNOSIS — I10 ESSENTIAL HYPERTENSION: ICD-10-CM

## 2021-06-02 RX ORDER — LOSARTAN POTASSIUM 50 MG/1
TABLET ORAL
Qty: 100 TABLET | Refills: 1 | Status: SHIPPED | OUTPATIENT
Start: 2021-06-02 | End: 2021-12-21 | Stop reason: SDUPTHER

## 2021-06-02 NOTE — TELEPHONE ENCOUNTER
----- Message from Aleshia Crooks sent at 6/1/2021 10:21 PM PDT -----  Regarding: Prescription Question  Contact: [phone]  I need my LOSARTAN 50mg refilled and I would like for all my prescriptions be sent to the [facility] on [address]. I would really appreciate it. Thank You Aleshia Crooks

## 2021-06-02 NOTE — NON-PROVIDER
Aleshia Crooks is a 81 y.o. female here for a non-provider visit for Cosentyx injection.    Reason for injection: Psoriases  Order in MAR?: No  Patient supplied?:Yes    Patient tolerated injection and no adverse effects were observed or reported: Yes

## 2021-06-03 DIAGNOSIS — I10 ESSENTIAL HYPERTENSION: ICD-10-CM

## 2021-06-04 ENCOUNTER — ANTICOAGULATION MONITORING (OUTPATIENT)
Dept: VASCULAR LAB | Facility: MEDICAL CENTER | Age: 81
End: 2021-06-04

## 2021-06-04 DIAGNOSIS — I48.0 PAROXYSMAL ATRIAL FIBRILLATION (HCC): ICD-10-CM

## 2021-06-04 LAB — INR PPP: 1.8 (ref 2–3.5)

## 2021-06-04 NOTE — PROGRESS NOTES
Anticoagulation Summary  As of 2021    INR goal:  2.0-3.0   TTR:  61.7 % (1.2 y)   INR used for dosin.80 (2021)   Warfarin maintenance plan:  9 mg (6 mg x 1.5) every Wed; 6 mg (6 mg x 1) all other days   Weekly warfarin total:  45 mg   Plan last modified:  Taylor Cabrales (2021)   Next INR check:  2021   Target end date:  Indefinite    Indications    Paroxysmal atrial fibrillation (HCC) [I48.0]             Anticoagulation Episode Summary     INR check location:      Preferred lab:      Send INR reminders to:      Comments:  [facility]      Anticoagulation Care Providers     Provider Role Specialty Phone number    La Paredes M.D. Referring Internal Medicine [phone]    [facility] Anticoagulation Services Responsible  [phone]        Anticoagulation Patient Findings      Spoke with patient.  INR is SUB therapeutic.   Pt denies any unusual s/s of bleeding, bruising, clotting or any changes to diet or medications. Denies any etoh, cranberries, supplements, or illness.   Pt reports only taking 3mg last night  Pt verifies warfarin weekly dosing.   Pt NOT on antiplatelet therapy   Will have pt take increased dose of 9mg x 1, then pt is to continue with current warfarin dosing regimen.      Repeat INR in 1 week(s).     Consulted with Taylor León prior to recommendation  Jenny Burns, Pharmacy Intern

## 2021-06-08 RX ORDER — LOSARTAN POTASSIUM 50 MG/1
TABLET ORAL
Qty: 100 TABLET | Refills: 1 | OUTPATIENT
Start: 2021-06-08

## 2021-06-11 ENCOUNTER — ANTICOAGULATION MONITORING (OUTPATIENT)
Dept: MEDICAL GROUP | Facility: PHYSICIAN GROUP | Age: 81
End: 2021-06-11

## 2021-06-11 DIAGNOSIS — I48.0 PAROXYSMAL ATRIAL FIBRILLATION (HCC): ICD-10-CM

## 2021-06-11 LAB — INR PPP: 2.7 (ref 2–3.5)

## 2021-06-11 NOTE — PROGRESS NOTES
Anticoagulation Summary  As of 2021    INR goal:  2.0-3.0   TTR:  62.0 % (1.2 y)   INR used for dosin.70 (2021)   Warfarin maintenance plan:  9 mg (6 mg x 1.5) every Wed; 6 mg (6 mg x 1) all other days   Weekly warfarin total:  45 mg   No change documented:  Becky Mi, Med Ass't   Plan last modified:  Taylor MACI Filter (2021)   Next INR check:     Target end date:  Indefinite    Indications    Paroxysmal atrial fibrillation (HCC) [I48.0]             Anticoagulation Episode Summary     INR check location:      Preferred lab:      Send INR reminders to:      Comments:  [facility]      Anticoagulation Care Providers     Provider Role Specialty Phone number    WinniemeirCameronRenay Christian Paredes M.D. Referring Internal Medicine [phone]    [facility] Anticoagulation Services Responsible  [phone]        Anticoagulation Patient Findings  Patient Findings     Negatives:  Signs/symptoms of thrombosis, Signs/symptoms of bleeding, Laboratory test error suspected, Change in health, Change in alcohol use, Change in activity, Upcoming invasive procedure, Emergency department visit, Upcoming dental procedure, Missed doses, Extra doses, Change in medications, Change in diet/appetite, Hospital admission, Bruising, Other complaints        Spoke with patient to report a therapeutic INR.  Pt instructed to continue with current warfarin dosing regimen. Pt denies any s/s of bleeding, bruising, clotting or any changes to diet or medication.  Will follow up in 1 week.    Becky Mi, Med Ass't    I have reviewed and concur with the above plan.     Dallin Viveros, PharmD, MS, BCACP, LCC  [facility]  Phone: [phone],  Fax: [phone]  On call: [phone]

## 2021-06-21 ENCOUNTER — TELEPHONE (OUTPATIENT)
Dept: MEDICAL GROUP | Facility: MEDICAL CENTER | Age: 81
End: 2021-06-21

## 2021-06-22 ENCOUNTER — OFFICE VISIT (OUTPATIENT)
Dept: CARDIOLOGY | Facility: MEDICAL CENTER | Age: 81
End: 2021-06-22
Payer: MEDICARE

## 2021-06-22 VITALS
HEART RATE: 84 BPM | SYSTOLIC BLOOD PRESSURE: 124 MMHG | WEIGHT: 212 LBS | BODY MASS INDEX: 37.56 KG/M2 | OXYGEN SATURATION: 96 % | DIASTOLIC BLOOD PRESSURE: 60 MMHG | HEIGHT: 63 IN

## 2021-06-22 DIAGNOSIS — E78.2 MIXED HYPERLIPIDEMIA: ICD-10-CM

## 2021-06-22 DIAGNOSIS — Z98.890 HX OF MELANOMA EXCISION: ICD-10-CM

## 2021-06-22 DIAGNOSIS — K74.60 LIVER CIRRHOSIS SECONDARY TO NASH (NONALCOHOLIC STEATOHEPATITIS) (HCC): ICD-10-CM

## 2021-06-22 DIAGNOSIS — R79.89 ELEVATED TROPONIN: ICD-10-CM

## 2021-06-22 DIAGNOSIS — I50.42 CHRONIC COMBINED SYSTOLIC AND DIASTOLIC CONGESTIVE HEART FAILURE (HCC): ICD-10-CM

## 2021-06-22 DIAGNOSIS — E66.9 OBESITY (BMI 35.0-39.9 WITHOUT COMORBIDITY): ICD-10-CM

## 2021-06-22 DIAGNOSIS — I48.0 PAROXYSMAL ATRIAL FIBRILLATION (HCC): ICD-10-CM

## 2021-06-22 DIAGNOSIS — I34.0 SEVERE MITRAL REGURGITATION: ICD-10-CM

## 2021-06-22 DIAGNOSIS — Z85.820 HX OF MELANOMA EXCISION: ICD-10-CM

## 2021-06-22 DIAGNOSIS — I27.20 MODERATE TO SEVERE PULMONARY HYPERTENSION (HCC): ICD-10-CM

## 2021-06-22 DIAGNOSIS — N18.32 STAGE 3B CHRONIC KIDNEY DISEASE: ICD-10-CM
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
## 2021-06-22 DIAGNOSIS — G47.33 OBSTRUCTIVE SLEEP APNEA SYNDROME: ICD-10-CM

## 2021-06-22 DIAGNOSIS — I25.10 CORONARY ARTERY DISEASE INVOLVING NATIVE CORONARY ARTERY OF NATIVE HEART WITHOUT ANGINA PECTORIS: ICD-10-CM

## 2021-06-22 DIAGNOSIS — I10 ESSENTIAL HYPERTENSION: Chronic | ICD-10-CM

## 2021-06-22 DIAGNOSIS — K75.81 LIVER CIRRHOSIS SECONDARY TO NASH (NONALCOHOLIC STEATOHEPATITIS) (HCC): ICD-10-CM

## 2021-06-22 DIAGNOSIS — K76.0 HEPATIC STEATOSIS: ICD-10-CM

## 2021-06-22 PROCEDURE — 99214 OFFICE O/P EST MOD 30 MIN: CPT | Performed by: INTERNAL MEDICINE

## 2021-06-22 RX ORDER — ATORVASTATIN CALCIUM 40 MG/1
TABLET, FILM COATED ORAL
Qty: 100 TABLET | Refills: 3 | Status: SHIPPED | OUTPATIENT
Start: 2021-06-22 | End: 2021-12-21 | Stop reason: SDUPTHER

## 2021-06-22 ASSESSMENT — MINNESOTA LIVING WITH HEART FAILURE QUESTIONNAIRE (MLHF)
DIFFICULTY TO CONCENTRATE OR REMEMBERING THINGS: 0
DIFFICULTY WITH RECREATIONAL PASTIMES, SPORTS, HOBBIES: 0
TIRED, FATIGUED OR LOW ON ENERGY: 0
MAKING YOU WORRY: 0
SWELLING IN ANKLES OR LEGS: 0
DIFFICULTY SOCIALIZING WITH FAMILY OR FRIENDS: 0
MAKING YOU FEEL DEPRESSED: 0
DIFFICULTY SLEEPING WELL AT NIGHT: 0
MAKING YOU SHORT OF BREATH: 2
EATING LESS FOODS YOU LIKE: 0
GIVING YOU SIDE EFFECTS FROM TREATMENTS: 0
HAVING TO SIT OR LIE DOWN DURING THE DAY: 0
MAKING YOU STAY IN A HOSPITAL: 0
DIFFICULTY WORKING TO EARN A LIVING: 0
DIFFICULTY WITH SEXUAL ACTIVITIES: 0
WORKING AROUND THE HOUSE OR YARD DIFFICULT: 0
LOSS OF SELF CONTROL IN YOUR LIFE: 0
DIFFICULTY GOING AWAY FROM HOME: 0
TOTAL_SCORE: 4
COSTING YOU MONEY FOR MEDICAL CARE: 0
WALKING ABOUT OR CLIMBING STAIRS DIFFICULT: 2
FEELING LIKE A BURDEN TO FAMILY AND FRIENDS: 0

## 2021-06-22 ASSESSMENT — ENCOUNTER SYMPTOMS
STRIDOR: 0
HEMOPTYSIS: 0
RESPIRATORY NEGATIVE: 1
FEVER: 0
CHILLS: 0
SPUTUM PRODUCTION: 0
PND: 0
MUSCULOSKELETAL NEGATIVE: 1
WEAKNESS: 0
SHORTNESS OF BREATH: 0
LOSS OF CONSCIOUSNESS: 0
WHEEZING: 0
GASTROINTESTINAL NEGATIVE: 1
CARDIOVASCULAR NEGATIVE: 1
DIZZINESS: 0
ORTHOPNEA: 0
BRUISES/BLEEDS EASILY: 0
EYES NEGATIVE: 1
CLAUDICATION: 0
PALPITATIONS: 0
NEUROLOGICAL NEGATIVE: 1
SORE THROAT: 0
COUGH: 0
CONSTITUTIONAL NEGATIVE: 1

## 2021-06-22 ASSESSMENT — FIBROSIS 4 INDEX: FIB4 SCORE: 1.36

## 2021-06-22 NOTE — PROGRESS NOTES
Chief Complaint   Patient presents with   • Coronary Artery Disease     follow up       Subjective:   Aleshia Crooks is a 80 y.o. female who presents today as a follow-up for her CAD status post CABG hypertension hyperlipidemia lower extremity edema and mitral valve regurgitation with paroxysmal atrial fibrillation.   Since she was last seen she had a repeat echocardiogram showed most of her valve disease is down to moderate.  She has no shortness of breath.  She only has occasional lower extremity with some well-managed.  Blood pressures otherwise controlled.    Past Medical History:   Diagnosis Date   • Apnea, sleep    • Atrial fibrillation (HCC)    • Gasping for breath    • Heart attack (HCC)    • Hyperlipidemia    • Hypertension    • 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) (HCC) 3/25/2021   • Thyroid disease      Past Surgical History:   Procedure Laterality Date   • CHOLECYSTECTOMY     • EYE SURGERY Bilateral     cataracts   • MULTIPLE CORONARY ARTERY BYPASS      1 bypass   • THYROIDECTOMY TOTAL       Family History   Problem Relation Age of Onset   • Cancer Mother         cancer, smoker   • Stroke Maternal Grandmother    • Other Other         Crohn   • Kidney Disease Other         kidney transplant     Social History     Socioeconomic History   • Marital status:      Spouse name: Not on file   • Number of children: Not on file   • Years of education: Not on file   • Highest education level: Not on file   Occupational History     Comment: [employer]   Tobacco Use   • Smoking status: Former Smoker     Packs/day: 1.00     Years: 31.00     Pack years: 31.00     Quit date:      Years since quittin.4   • Smokeless tobacco: Never Used   Vaping Use   • Vaping Use: Never used   Substance and Sexual Activity   • Alcohol use: No   • Drug use: No   • Sexual activity: Not Currently     Partners: Male   Other Topics Concern   • Not on file   Social History Narrative   • Not on  file     Social Determinants of Health     Financial Resource Strain: Low Risk    • Difficulty of Paying Living Expenses: Not very hard   Food Insecurity: No Food Insecurity   • Worried About Running Out of Food in the Last Year: Never true   • Ran Out of Food in the Last Year: Never true   Transportation Needs: No Transportation Needs   • Lack of Transportation (Medical): No   • Lack of Transportation (Non-Medical): No   Physical Activity: Inactive   • Days of Exercise per Week: 0 days   • Minutes of Exercise per Session: 0 min   Stress: No Stress Concern Present   • Feeling of Stress : Not at all   Social Connections: Unknown   • Frequency of Communication with Friends and Family: More than three times a week   • Frequency of Social Gatherings with Friends and Family: Never   • Attends [employer] Services: Patient refused   • Active Member of Clubs or Organizations: No   • Attends Club or Organization Meetings: Never   • Marital Status:    Intimate Partner Violence:    • Fear of Current or Ex-Partner:    • Emotionally Abused:    • Physically Abused:    • Sexually Abused:      No Known Allergies  Outpatient Encounter Medications as of 6/22/2021   Medication Sig Dispense Refill   • atorvastatin (LIPITOR) 40 MG Tab TAKE 1 TABLET BY MOUTH AT  BEDTIME 100 tablet 3   • losartan (COZAAR) 50 MG Tab TAKE 1 TABLET BY MOUTH DAILY 100 tablet 1   • bisoprolol (ZEBETA) 10 MG tablet Take 1 tablet by mouth every day. 30 tablet 4   • omeprazole (PRILOSEC) 20 MG delayed-release capsule Take 1 capsule by mouth every day. 100 capsule 3   • ferrous sulfate 325 (65 Fe) MG tablet Take 1 tablet by mouth every day. 90 tablet 2   • ascorbic acid ([employer] VITAMIN C) 500 MG tablet Take 1 tablet by mouth every day. 100 tablet 2   • loratadine (CLARITIN) 10 MG Tab Take 1 tablet by mouth 2 Times a Day. 60 tablet 5   • spironolactone (ALDACTONE) 25 MG Tab Take 2 Tablets by mouth every day. 180 tablet 3   • LEVOXYL 150 MCG Tab TAKE 1 TABLET BY  "MOUTH EVERY MORNING ON AN EMPTY STOMACH 90 tablet 2   • warfarin (COUMADIN) 6 MG Tab Take one tablet daily as directed by [facility]  Indications: Obstructing Blood Clot with Chronic Atrial Fibrillation 90 Tab 3   • vitamin D (CHOLECALCIFEROL) 1000 UNIT Tab Take 4,000 Units by mouth every day.     • [DISCONTINUED] fluocinonide (LIDEX) 0.05 % Cream Apply to affected areas bid (Patient not taking: Reported on 6/22/2021) 120 g 4   • [DISCONTINUED] atorvastatin (LIPITOR) 40 MG Tab TAKE 1 TABLET BY MOUTH AT  BEDTIME 100 tablet 2     No facility-administered encounter medications on file as of 6/22/2021.     Review of Systems   Constitutional: Negative.  Negative for chills, fever and malaise/fatigue.   HENT: Negative.  Negative for sore throat.    Eyes: Negative.    Respiratory: Negative.  Negative for cough, hemoptysis, sputum production, shortness of breath, wheezing and stridor.    Cardiovascular: Negative.  Negative for chest pain, palpitations, orthopnea, claudication, leg swelling and PND.   Gastrointestinal: Negative.    Genitourinary: Negative.    Musculoskeletal: Negative.    Skin: Negative.    Neurological: Negative.  Negative for dizziness, loss of consciousness and weakness.   Endo/Heme/Allergies: Negative.  Does not bruise/bleed easily.   All other systems reviewed and are negative.       Objective:   /60 (BP Location: Left arm, Patient Position: Sitting)   Pulse 84   Ht 1.6 m (5' 3\")   Wt 96.2 kg (212 lb)   LMP  (LMP Unknown)   SpO2 96%   BMI 37.55 kg/m²     Physical Exam   Constitutional: She appears well-developed. No distress.   HENT:   Head: Normocephalic and atraumatic.   Right Ear: External ear normal.   Left Ear: External ear normal.   Nose: Nose normal.   Mouth/Throat: No oropharyngeal exudate.   Eyes: Pupils are equal, round, and reactive to light. Conjunctivae are normal. Right eye exhibits no discharge. Left eye exhibits no discharge. No scleral icterus.   Neck: " No JVD present.   Cardiovascular: Normal rate and regular rhythm. Exam reveals no gallop and no friction rub.   No murmur heard.  Pulmonary/Chest: Effort normal. No stridor. No respiratory distress. She has no wheezes. She has no rales. She exhibits no tenderness.   Abdominal: Soft. She exhibits no distension. There is no guarding.   Musculoskeletal:         General: No tenderness or deformity. Normal range of motion.      Cervical back: Neck supple.   Neurological: She is alert. She has normal reflexes. She displays normal reflexes. No cranial nerve deficit. She exhibits normal muscle tone. Coordination normal.   Skin: Skin is warm and dry. No rash noted. She is not diaphoretic. No erythema. No pallor.   Psychiatric: Her behavior is normal. Judgment and thought content normal.   Nursing note and vitals reviewed.      Assessment:     1. 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 Hx of melanoma excision  Comp Metabolic Panel    CBC W/ DIFF W/O PLATELETS   3. Hepatic steatosis  Comp Metabolic Panel   4. Fall, initial encounter  Comp Metabolic Panel    Lipid Profile   5. Coronary artery disease involving native coronary artery of native heart without angina pectoris, s/p 1V CABG 1992     6. Chronic combined systolic and diastolic congestive heart failure (HCC)  Lipid Profile   7. Elevated troponin  Lipid Profile   8. Essential hypertension     9. Mixed hyperlipidemia     10. Moderate to severe pulmonary hypertension (HCC)     11. Obesity (BMI 35.0-39.9 without comorbidity) (HCC)  CBC W/ DIFF W/O PLATELETS   12. Obstructive sleep apnea syndrome  CBC W/ DIFF W/O PLATELETS   13. Paroxysmal atrial fibrillation (HCC)     14. Severe mitral regurgitation  CBC W/ DIFF W/O PLATELETS   15. Stage 3b chronic kidney disease (HCC)  CBC W/ DIFF W/O PLATELETS       Medical Decision Making:  Today's Assessment / Status / Plan:    80-year-old female with paroxysmal atrial fibrillation and might regurgitation which appears to improved following diuresis.  It is monitoring she is doing very well.  I given her a slip to check her CBC CMP electrolytes and creatinine for her high risk medications.  We will see her back in 6 months.  Otherwise I refilled her medications.    1. Severe MR, now improved after diuresis    - repeat TTE showed moderate     2. COPD?    - obtain PFTs from prior pulmonologist     3. CAD s/p CABG    - cont atorva 40     4. A-fib    - cont metpo 50 BID    - cont warfarin     5. CHF, normalized    - per above    - cont losartan 50     6. Edema    - cont furosemide 40    - cont spiro25

## 2021-06-25 ENCOUNTER — ANTICOAGULATION MONITORING (OUTPATIENT)
Dept: VASCULAR LAB | Facility: MEDICAL CENTER | Age: 81
End: 2021-06-25

## 2021-06-25 DIAGNOSIS — I48.0 PAROXYSMAL ATRIAL FIBRILLATION (HCC): ICD-10-CM

## 2021-06-25 LAB — INR PPP: 2.1 (ref 2–3.5)

## 2021-06-25 NOTE — PROGRESS NOTES
Anticoagulation Summary  As of 2021    INR goal:  2.0-3.0   TTR:  63.2 % (1.2 y)   INR used for dosin.10 (2021)   Warfarin maintenance plan:  9 mg (6 mg x 1.5) every Wed; 6 mg (6 mg x 1) all other days   Weekly warfarin total:  45 mg   Plan last modified:  Taylor M Filter (2021)   Next INR check:  2021   Target end date:  Indefinite    Indications    Paroxysmal atrial fibrillation (HCC) [I48.0]             Anticoagulation Episode Summary     INR check location:      Preferred lab:      Send INR reminders to:      Comments:  [facility]      Anticoagulation Care Providers     Provider Role Specialty Phone number    La Paredes M.D. Referring Internal Medicine [phone]    [facility] Anticoagulation Services Responsible  [phone]        Anticoagulation Patient Findings  Patient Findings     Negatives:  Signs/symptoms of thrombosis, Signs/symptoms of bleeding, Laboratory test error suspected, Change in health, Change in alcohol use, Change in activity, Upcoming invasive procedure, Emergency department visit, Upcoming dental procedure, Missed doses, Extra doses, Change in medications, Change in diet/appetite, Hospital admission, Bruising, Other complaints              Spoke with patient today regarding therapeutic INR of 2.1.  Patient denies any signs/symptoms of bruising or bleeding or any changes in diet and medications.  Instructed patient to call clinic with any questions or concerns.    Pt is to continue with current warfarin dosing regimen.    Follow up in 2 weeks, to reduce risk of adverse events related to this high risk medication,  Warfarin.    Michaela Sloan, Pharmacy Intern

## 2021-06-28 ENCOUNTER — OFFICE VISIT (OUTPATIENT)
Dept: MEDICAL GROUP | Facility: MEDICAL CENTER | Age: 81
End: 2021-06-28
Payer: MEDICARE

## 2021-06-28 VITALS
SYSTOLIC BLOOD PRESSURE: 124 MMHG | RESPIRATION RATE: 16 BRPM | HEART RATE: 79 BPM | WEIGHT: 211 LBS | BODY MASS INDEX: 37.39 KG/M2 | TEMPERATURE: 98.6 F | HEIGHT: 63 IN | OXYGEN SATURATION: 95 % | DIASTOLIC BLOOD PRESSURE: 62 MMHG

## 2021-06-28 DIAGNOSIS — E03.9 HYPOTHYROIDISM, UNSPECIFIED TYPE: ICD-10-CM

## 2021-06-28 DIAGNOSIS — Z79.01 CHRONIC ANTICOAGULATION: ICD-10-CM

## 2021-06-28 DIAGNOSIS — Z23 NEED FOR SHINGLES VACCINE: ICD-10-CM

## 2021-06-28 PROCEDURE — 90750 HZV VACC RECOMBINANT IM: CPT | Performed by: FAMILY MEDICINE

## 2021-06-28 PROCEDURE — 99213 OFFICE O/P EST LOW 20 MIN: CPT | Mod: 25 | Performed by: FAMILY MEDICINE

## 2021-06-28 PROCEDURE — 90471 IMMUNIZATION ADMIN: CPT | Performed by: FAMILY MEDICINE

## 2021-06-28 RX ORDER — LEVOTHYROXINE SODIUM 150 UG/1
150 TABLET ORAL
Qty: 90 TABLET | Refills: 3 | Status: SHIPPED | OUTPATIENT
Start: 2021-06-28 | End: 2021-09-23 | Stop reason: SDUPTHER

## 2021-06-28 RX ORDER — SECUKINUMAB 150 MG/ML
150 INJECTION SUBCUTANEOUS
COMMUNITY
Start: 2021-05-28 | End: 2022-04-21 | Stop reason: SDUPTHER

## 2021-06-28 ASSESSMENT — FIBROSIS 4 INDEX: FIB4 SCORE: 1.36

## 2021-06-28 NOTE — PROGRESS NOTES
Chief Complaint   Patient presents with   • Hypothyroidism   • Immunizations       Subjective:     HPI:   Aleshia Crooks presents today with the followin. Need for shingles vaccine  shingrix #1    2. Hypothyroidism, unspecified type  Patient reports good energy level on the medication. Patient denies insomnia, tremor or change in appetite.  Patient is taking the medication on an empty stomach in the morning and waiting at least 30 minutes before eating.  Last TSH in April this year was at target. She uses brand Levoxyl. She does not get good results with the generics. This is renewed.    3. Chronic anticoagulation  Has been using the INR machine at home.  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) (HCC) 2021   • Dermatitis 2021   • Falls 2021   • Stress incontinence 2021   • Stage 3b chronic kidney disease (HCC) 2020   • Gastroesophageal reflux disease without esophagitis 2020   • Abnormal CBC 2020   • Malignant melanoma of left upper extremity including shoulder (HCC) 2020   • Abnormal CT of the abdomen 2020   • Mixed hyperlipidemia 2020   • Hepatic steatosis 2020   • Elevated alkaline phosphatase level 2020   • Elevated troponin 2020   • Anemia 2020   • Hx of melanoma excision 2020   • Skin lesions 2020   • Essential hypertension 2020   • Hypothyroidism 2020   • Obesity (BMI 35.0-39.9 without comorbidity) (HCC) 2020   • Obstructive sleep apnea syndrome 2020   • Severe mitral regurgitation 2017   • Moderate to severe pulmonary hypertension (HCC) 2017   • Chronic combined systolic and diastolic congestive heart failure (HCC) 2017   • Paroxysmal atrial fibrillation (HCC) 2017   • Coronary artery disease involving native coronary artery of native  "heart without angina pectoris, s/p 1V CABG 1992 07/14/2017   • Microcytic anemia 07/13/2017   • Anxiety 07/13/2017       Current medicines (including changes today)  Current Outpatient Medications   Medication Sig Dispense Refill   • Secukinumab (COSENTYX SENSOREADY PEN) 150 MG/ML Solution Auto-injector Inject 150 mg into the shoulder, thigh, or buttocks.     • LEVOXYL 150 MCG Tab Take 1 tablet by mouth every morning on an empty stomach. 90 tablet 3   • atorvastatin (LIPITOR) 40 MG Tab TAKE 1 TABLET BY MOUTH AT  BEDTIME 100 tablet 3   • losartan (COZAAR) 50 MG Tab TAKE 1 TABLET BY MOUTH DAILY 100 tablet 1   • bisoprolol (ZEBETA) 10 MG tablet Take 1 tablet by mouth every day. 30 tablet 4   • omeprazole (PRILOSEC) 20 MG delayed-release capsule Take 1 capsule by mouth every day. 100 capsule 3   • ferrous sulfate 325 (65 Fe) MG tablet Take 1 tablet by mouth every day. 90 tablet 2   • ascorbic acid ([employer]S VITAMIN C) 500 MG tablet Take 1 tablet by mouth every day. 100 tablet 2   • loratadine (CLARITIN) 10 MG Tab Take 1 tablet by mouth 2 Times a Day. 60 tablet 5   • spironolactone (ALDACTONE) 25 MG Tab Take 2 Tablets by mouth every day. 180 tablet 3   • warfarin (COUMADIN) 6 MG Tab Take one tablet daily as directed by [facility]  Indications: Obstructing Blood Clot with Chronic Atrial Fibrillation 90 Tab 3   • vitamin D (CHOLECALCIFEROL) 1000 UNIT Tab Take 4,000 Units by mouth every day.       No current facility-administered medications for this visit.       No Known Allergies    ROS: As per HPI       Objective:     /62   Pulse 79   Temp 37 °C (98.6 °F)   Resp 16   Ht 1.6 m (5' 3\")   Wt 95.7 kg (211 lb)   SpO2 95%  Body mass index is 37.38 kg/m².    Physical Exam:  Constitutional: Well-developed and well-nourished. Not diaphoretic. No distress. Lucid and fluent.  Patient, spouse, physician and staff all wearing masks.  Skin: Skin is warm and dry. No rash noted.  Head: Atraumatic without " lesions.  Eyes: Conjunctivae and extraocular motions are normal. Pupils are equal, round, and reactive to light. No scleral icterus.   Ears:  External ears unremarkable.  Neck: Supple, trachea midline. No thyromegaly present. No cervical or supraclavicular lymphadenopathy. No JVD or carotid bruits appreciated  Cardiovascular: Regular rate and rhythm.  Normal S1, S2 without murmur appreciated.  Chest: Effort normal. Clear to auscultation throughout. No adventitious sounds.   Abdomen: Soft, non tender, and without distention. Active bowel sounds in all four quadrants. No rebound, guarding, masses or hepatosplenomegaly.  Extremities: No cyanosis, clubbing, erythema, nor edema.   Neurological: Alert and oriented x 3. No tremor noted  Psychiatric:  Behavior, mood, and affect are appropriate.       Assessment and Plan:     81 y.o. female with the following issues:    1. Need for shingles vaccine  Shingles Vaccine (Shingrix)   2. Hypothyroidism, unspecified type  LEVOXYL 150 MCG Tab   3. Chronic anticoagulation           Followup: No follow-ups on file.

## 2021-07-02 ENCOUNTER — OFFICE VISIT (OUTPATIENT)
Dept: DERMATOLOGY | Facility: IMAGING CENTER | Age: 81
End: 2021-07-02
Payer: MEDICARE

## 2021-07-02 ENCOUNTER — ANTICOAGULATION MONITORING (OUTPATIENT)
Dept: VASCULAR LAB | Facility: MEDICAL CENTER | Age: 81
End: 2021-07-02

## 2021-07-02 DIAGNOSIS — I48.0 PAROXYSMAL ATRIAL FIBRILLATION (HCC): ICD-10-CM

## 2021-07-02 DIAGNOSIS — Z85.820 HX OF MELANOMA OF SKIN: ICD-10-CM

## 2021-07-02 DIAGNOSIS — D22.9 NEVUS: ICD-10-CM

## 2021-07-02 DIAGNOSIS — L82.1 SEBORRHEIC KERATOSIS: ICD-10-CM

## 2021-07-02 DIAGNOSIS — Z79.01 CHRONIC ANTICOAGULATION: Primary | ICD-10-CM

## 2021-07-02 DIAGNOSIS — L40.9 PSORIASIS: ICD-10-CM

## 2021-07-02 LAB — INR PPP: 2.5 (ref 2–3.5)

## 2021-07-02 PROCEDURE — 99213 OFFICE O/P EST LOW 20 MIN: CPT | Performed by: DERMATOLOGY

## 2021-07-02 RX ORDER — WARFARIN SODIUM 6 MG/1
TABLET ORAL
Qty: 90 TABLET | Refills: 3 | Status: SHIPPED | OUTPATIENT
Start: 2021-07-02 | End: 2022-07-05

## 2021-07-02 NOTE — PROGRESS NOTES
Anticoagulation Summary  As of 2021    INR goal:  2.0-3.0   TTR:  63.7 % (1.3 y)   INR used for dosin.50 (2021)   Warfarin maintenance plan:  9 mg (6 mg x 1.5) every Wed; 6 mg (6 mg x 1) all other days   Weekly warfarin total:  45 mg   Plan last modified:  Taylor LUX Filter (2021)   Next INR check:  2021   Target end date:  Indefinite    Indications    Paroxysmal atrial fibrillation (HCC) [I48.0]             Anticoagulation Episode Summary     INR check location:      Preferred lab:      Send INR reminders to:      Comments:  [facility]      Anticoagulation Care Providers     Provider Role Specialty Phone number    La Paredes M.D. Referring Internal Medicine [phone]    [facility] Anticoagulation Services Responsible  [phone]        Anticoagulation Patient Findings      Left voicemail message to report a therapeutic INR of 2.5.      Pt to continue with current warfarin dosing regimen. Requested pt contact the clinic for any s/s of unusual bleeding, bruising, clotting or any changes to diet or medication.    FU INR in 2 week(s).    Zachary Gundersen, Pharmacy Intern

## 2021-07-02 NOTE — PROGRESS NOTES
"CC:  Follow up Psoriasis       Subjective: prev seen patient follow here for rash eval and treatment. Per patient has improved , rash is almost cleared. Currently on cosentyx - had finished loading dose.  Due for next injection last week of July.   No new concerns.    From prior notes:  \"HPI: Rash   \"All over body\", May have started in small sites on back, legs.  Some sites improved but others have worsened.  States Betamethasone oint has made rash worse in the last 2 weeks   Discontinued about 3 days ago.  Has tried and failed triamcinolone, no improvement.  Using some topical, name not known, that is compounded in lotion.     Denies known oral steroids.  May have started after spironolactone prescribed.  Reports taking metoprolol for at least 3 years; could have had smaller sites noted after this medication was begun, unsure.     Onset: at least 7 months has progressively  has gotten worse in the last 2 weeks . Thinks after flu vaccine in September started to developed  symptoms . Was evaluated by another Dermatologist 6 months ago . Bx done . 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  History of blistering/severe sunburns:Yes, Details: .  Family history of skin cancer:No  Family history of atypical moles:No    ROS: no fevers/chills. +/- itch.  No cough. Denies joint pain.  Hx diarrhea - intermittent (started after thyroid dz)  DermPMH: hx melanoma left shoulder, 2014? Per records  No problem-specific Assessment & Plan notes found for this encounter.    Relevant PMH: many med comorbidities.  GONZALEZ, CHF, kidney Dz, hypothyroidism  Social: former smoker; denies travel abroad.  Daughter with well water which she doesn't drink due to " taste.  FmHx:  not affected    PE: Gen:WDWN female in NAD.  Skin: limited exam today. Back - scattered waxy papules, few skin-colored papules appearing benign and xerosis. Left arm scar well healed without pigment/nodularity.  No left axilla nodes palpated.  Arms/legs without notable PSO plaques/scaling.  Subtle erythematous patches present.  Few excoriated papules/vesicles on lower left leg    SCI Prior path - see scans = spongiotic dermatitis + eosinophils. Consider ACD, Nummular dermatitis or drug reaction    Labs: Hep B/C (Ab X5) April 2020 - WNL  Quantiferon negative - May 2021  O&P - negative    A/P: PSO: Papulosquamous rash, prior path showing spongiotic dermatitis + eosinophils.  Suspicion for drug reaction and possible MTX intolerance: kidney dz and elevated Cr after test dose.  Steroid responsive by patient report.  Cannot exclude PSO. Started on Cosentyx with notable improvement     -cont cosentyx 300mg SubCut Qmonth   -cont current home supply topicals - Lidex cream vs betamethasone oint BID severe sites and TAC oint BID less severe sites PRN.    High risk medication: monitoring for safety prior to therapeutic doses:  -Next TB test May 2022    Hx of skin cancer: NER - left arm melanoma  -cont'd sunprotection and skin cancer surveillance  -Q 6mo-annual exam recommended; f/u suspicious lesions PRN    SKs/nevi, back:  -b/r    I have reviewed medications relevant to my specialty.    Rec CARL at future visit

## 2021-07-06 ENCOUNTER — TELEPHONE (OUTPATIENT)
Dept: SLEEP MEDICINE | Facility: MEDICAL CENTER | Age: 81
End: 2021-07-06

## 2021-07-09 ENCOUNTER — ANTICOAGULATION MONITORING (OUTPATIENT)
Dept: MEDICAL GROUP | Facility: PHYSICIAN GROUP | Age: 81
End: 2021-07-09

## 2021-07-09 DIAGNOSIS — I48.0 PAROXYSMAL ATRIAL FIBRILLATION (HCC): ICD-10-CM

## 2021-07-09 LAB — INR PPP: 3.8 (ref 2–3.5)

## 2021-07-09 NOTE — PROGRESS NOTES
Anticoagulation Summary  As of 7/9/2021    INR goal:  2.0-3.0   TTR:  63.3 % (1.3 y)   INR used for dosing:  3.80 (7/9/2021)   Warfarin maintenance plan:  9 mg (6 mg x 1.5) every Wed; 6 mg (6 mg x 1) all other days   Weekly warfarin total:  45 mg   Plan last modified:  Taylor M Filter (5/7/2021)   Next INR check:  7/16/2021   Target end date:  Indefinite    Indications    Paroxysmal atrial fibrillation (HCC) [I48.0]             Anticoagulation Episode Summary     INR check location:      Preferred lab:      Send INR reminders to:      Comments:  [facility]      Anticoagulation Care Providers     Provider Role Specialty Phone number    La Paredes M.D. Referring Internal Medicine [phone]    [facility] Anticoagulation Services Responsible  [phone]        Anticoagulation Patient Findings          HPI:  Aleshia Head Crooks, on anticoagulation therapy with warfarin for PAF.   Changes to current medical/health status since last appt: none  Denies signs/symptoms of bleeding and/or thrombosis since the last appt.    Reports she ate less greens than usual.   Denies any interval changes to medications since last appt.   Denies any complications or cost restrictions with current therapy.     A/P   INR  SUPRA-therapeutic.   Hold warfarin today, begin eating greens again.     Next INR in 1 week(s).    Rudy Lowe, PharmD

## 2021-07-16 ENCOUNTER — ANTICOAGULATION MONITORING (OUTPATIENT)
Dept: VASCULAR LAB | Facility: MEDICAL CENTER | Age: 81
End: 2021-07-16

## 2021-07-16 ENCOUNTER — ANTICOAGULATION MONITORING (OUTPATIENT)
Dept: MEDICAL GROUP | Facility: PHYSICIAN GROUP | Age: 81
End: 2021-07-16

## 2021-07-16 DIAGNOSIS — I48.0 PAROXYSMAL ATRIAL FIBRILLATION (HCC): ICD-10-CM

## 2021-07-16 LAB — INR PPP: 3.6 (ref 2–3.5)

## 2021-07-16 NOTE — PROGRESS NOTES
OP Telephone Anticoagulation Service Note    Date: 7/16/2021      Anticoagulation Summary  As of 7/16/2021    INR goal:  2.0-3.0   TTR:  62.4 % (1.3 y)   INR used for dosing:  3.60 (7/16/2021)   Warfarin maintenance plan:  9 mg (6 mg x 1.5) every Wed; 6 mg (6 mg x 1) all other days   Weekly warfarin total:  45 mg   Plan last modified:  Taylor Cabrales (5/7/2021)   Next INR check:  7/23/2021   Target end date:  Indefinite    Indications    Paroxysmal atrial fibrillation (HCC) [I48.0]             Anticoagulation Episode Summary     INR check location:      Preferred lab:      Send INR reminders to:      Comments:  [facility]      Anticoagulation Care Providers     Provider Role Specialty Phone number    La Paredes M.D. Referring Internal Medicine [phone]    [facility] Anticoagulation Services Responsible  [phone]        Anticoagulation Patient Findings      INR SUPRA-therapeutic at 3.6.  Spoke w/ pt on phone.  Verified regimen w/ pt.  Instructed pt to hold x 1 dose and to then continue on w/ her current regimen.  NO s/s bleeding reported per pt.  Pt was on vacation and was eating less vegetables. She will increase her consumption back to baseline.  NO changes in medications reported per pt.  Check INR in 1 week(s).  Instructed pt to call clinic at [phone] if there are any questions.  Pt stated understanding.    Pt is not on antiplatelet therapy.    James Sr, AbadD

## 2021-07-23 LAB — INR PPP: 3.2 (ref 2–3.5)

## 2021-07-24 ENCOUNTER — ANTICOAGULATION MONITORING (OUTPATIENT)
Dept: VASCULAR LAB | Facility: MEDICAL CENTER | Age: 81
End: 2021-07-24

## 2021-07-24 DIAGNOSIS — I48.0 PAROXYSMAL ATRIAL FIBRILLATION (HCC): ICD-10-CM

## 2021-07-26 DIAGNOSIS — D72.18 EOSINOPHILIC SPONGIOSIS: ICD-10-CM

## 2021-07-26 DIAGNOSIS — L98.8 EOSINOPHILIC SPONGIOSIS: ICD-10-CM

## 2021-07-26 RX ORDER — FAMOTIDINE 40 MG/1
40 TABLET, FILM COATED ORAL DAILY
Qty: 90 TABLET | Refills: 3 | Status: SHIPPED | OUTPATIENT
Start: 2021-07-26 | End: 2022-07-02

## 2021-07-26 NOTE — PROGRESS NOTES
OP Telephone Anticoagulation Service Note    Date: 7/26/2021      Anticoagulation Summary  As of 7/24/2021    INR goal:  2.0-3.0   TTR:  61.5 % (1.3 y)   INR used for dosing:  3.20 (7/23/2021)   Warfarin maintenance plan:  6 mg (6 mg x 1) every day   Weekly warfarin total:  42 mg   Plan last modified:  Celina Diego PharmD (7/26/2021)   Next INR check:  8/2/2021   Target end date:  Indefinite    Indications    Paroxysmal atrial fibrillation (HCC) [I48.0]             Anticoagulation Episode Summary     INR check location:      Preferred lab:      Send INR reminders to:      Comments:  [facility]      Anticoagulation Care Providers     Provider Role Specialty Phone number    La Paredes M.D. Referring Internal Medicine [phone]    [facility] Anticoagulation Services Responsible  [phone]        Anticoagulation Patient Findings  Patient Findings     Positives:  Upcoming dental procedure    Negatives:  Signs/symptoms of thrombosis, Signs/symptoms of bleeding, Change in health, Change in alcohol use, Upcoming invasive procedure, Missed doses, Extra doses, Change in medications, Change in diet/appetite, Bruising    Comments:  Pt is back on her usual diet, ate lots of vegetables this week             Plan: Spoke with patient on the phone.   Patient is  therapeutic today.   Confirmed dosing.   Pt is not on antiplatelet agent  Instructed patient to call clinic if any unusual bleeding or bruising occurs.   Will lower weekly regimen  Will follow-up with patient in 1 week(s).          Celina Diego PharmD

## 2021-08-02 ENCOUNTER — ANTICOAGULATION MONITORING (OUTPATIENT)
Dept: CARDIOLOGY | Facility: MEDICAL CENTER | Age: 81
End: 2021-08-02

## 2021-08-02 DIAGNOSIS — I48.0 PAROXYSMAL ATRIAL FIBRILLATION (HCC): ICD-10-CM

## 2021-08-02 LAB — INR PPP: 2.1 (ref 2–3.5)

## 2021-08-02 NOTE — PROGRESS NOTES
Anticoagulation Summary  As of 2021    INR goal:  2.0-3.0   TTR:  61.9 % (1.3 y)   INR used for dosin.10 (2021)   Warfarin maintenance plan:  6 mg (6 mg x 1) every day   Weekly warfarin total:  42 mg   Plan last modified:  Celina Diego PharmD (2021)   Next INR check:  2021   Target end date:  Indefinite    Indications    Paroxysmal atrial fibrillation (HCC) [I48.0]             Anticoagulation Episode Summary     INR check location:      Preferred lab:      Send INR reminders to:      Comments:  [facility]      Anticoagulation Care Providers     Provider Role Specialty Phone number    La Paredes M.D. Referring Internal Medicine [phone]    [facility] Anticoagulation Services Responsible  [phone]          Refer to Anticoagulation Patient Findings for HPI  Patient Findings     Negatives:  Signs/symptoms of thrombosis, Signs/symptoms of bleeding, Laboratory test error suspected, Change in health, Change in alcohol use, Change in activity, Upcoming invasive procedure, Emergency department visit, Upcoming dental procedure, Missed doses, Extra doses, Change in medications, Change in diet/appetite, Hospital admission, Bruising, Other complaints          Spoke with patient  to report a therapeutic INR.      Pt is NOT on antiplatelet therapy    Pt instructed to continue with current warfarin dosing regimen, confirms dosing.   Will follow up in 2 week(s).     Nedra Mora, Mau

## 2021-08-09 ENCOUNTER — ANTICOAGULATION MONITORING (OUTPATIENT)
Dept: MEDICAL GROUP | Facility: MEDICAL CENTER | Age: 81
End: 2021-08-09

## 2021-08-09 DIAGNOSIS — I48.0 PAROXYSMAL ATRIAL FIBRILLATION (HCC): ICD-10-CM

## 2021-08-09 LAB — INR PPP: 2.7 (ref 2–3.5)

## 2021-08-10 NOTE — PROGRESS NOTES
Anticoagulation Summary  As of 2021    INR goal:  2.0-3.0   TTR:  62.4 % (1.4 y)   INR used for dosin.70 (2021)   Warfarin maintenance plan:  6 mg (6 mg x 1) every day   Weekly warfarin total:  42 mg   Plan last modified:  Celina Diego PharmD (2021)   Next INR check:  2021   Target end date:  Indefinite    Indications    Paroxysmal atrial fibrillation (HCC) [I48.0]             Anticoagulation Episode Summary     INR check location:      Preferred lab:      Send INR reminders to:      Comments:  [facility]      Anticoagulation Care Providers     Provider Role Specialty Phone number    WinniemeirCameronRenay Christian Paredes M.D. Referring Internal Medicine [phone]    [facility] Anticoagulation Services Responsible  [phone]        Anticoagulation Patient Findings  Patient Findings     Negatives:  Signs/symptoms of thrombosis, Signs/symptoms of bleeding, Laboratory test error suspected, Change in health, Change in alcohol use, Change in activity, Upcoming invasive procedure, Emergency department visit, Upcoming dental procedure, Missed doses, Extra doses, Change in medications, Change in diet/appetite, Hospital admission, Bruising, Other complaints        Spoke with patient today regarding therapeutic INR of 2.7.  Patient denies any signs/symptoms of bruising or bleeding or any changes in diet and medications.  Instructed patient to call clinic with any questions or concerns.  Pt is to continue with current warfarin dosing regimen.  Follow up in 2 weeks, to reduce risk of adverse events related to this high risk medication,  Warfarin.    Christian Jordan, PharmD, BCACP

## 2021-08-16 ENCOUNTER — ANTICOAGULATION MONITORING (OUTPATIENT)
Dept: VASCULAR LAB | Facility: MEDICAL CENTER | Age: 81
End: 2021-08-16

## 2021-08-16 DIAGNOSIS — I48.0 PAROXYSMAL ATRIAL FIBRILLATION (HCC): ICD-10-CM

## 2021-08-16 LAB — INR PPP: 2.4 (ref 2–3.5)

## 2021-08-16 NOTE — PROGRESS NOTES
Anticoagulation Summary  As of 2021    INR goal:  2.0-3.0   TTR:  63.0 % (1.4 y)   INR used for dosin.40 (2021)   Warfarin maintenance plan:  6 mg (6 mg x 1) every day   Weekly warfarin total:  42 mg   Plan last modified:  Celina Diego, PharmD (2021)   Next INR check:  2021   Target end date:  Indefinite    Indications    Paroxysmal atrial fibrillation (HCC) [I48.0]             Anticoagulation Episode Summary     INR check location:      Preferred lab:      Send INR reminders to:      Comments:  [facility]      Anticoagulation Care Providers     Provider Role Specialty Phone number    La Paredes M.D. Referring Internal Medicine [phone]    [facility] Anticoagulation Services Responsible  [phone]        Anticoagulation Patient Findings  Patient Findings     Negatives:  Signs/symptoms of thrombosis, Signs/symptoms of bleeding, Laboratory test error suspected, Change in health, Change in alcohol use, Change in activity, Upcoming invasive procedure, Emergency department visit, Upcoming dental procedure, Missed doses, Extra doses, Change in medications, Change in diet/appetite, Hospital admission, Bruising, Other complaints         Spoke with patient today regarding is therapeutic INR of 2.4.  Patient denies any signs/symptoms of bruising or bleeding or any changes in diet and medications.  Instructed patient to call clinic with any questions or concerns.    Pt is to continue with current warfarin dosing regimen.    Follow up in 1 weeks, to reduce risk of adverse events related to this high risk medication,  Warfarin.    Jesús Malave, Pharmacy Intern

## 2021-08-20 ENCOUNTER — SLEEP CENTER VISIT (OUTPATIENT)
Dept: SLEEP MEDICINE | Facility: MEDICAL CENTER | Age: 81
End: 2021-08-20
Payer: MEDICARE

## 2021-08-20 VITALS
SYSTOLIC BLOOD PRESSURE: 128 MMHG | WEIGHT: 210 LBS | BODY MASS INDEX: 37.21 KG/M2 | DIASTOLIC BLOOD PRESSURE: 76 MMHG | HEART RATE: 81 BPM | HEIGHT: 63 IN | OXYGEN SATURATION: 95 %

## 2021-08-20 DIAGNOSIS — I48.0 PAROXYSMAL ATRIAL FIBRILLATION (HCC): ICD-10-CM

## 2021-08-20 DIAGNOSIS — I27.20 MODERATE TO SEVERE PULMONARY HYPERTENSION (HCC): ICD-10-CM

## 2021-08-20 DIAGNOSIS — I10 ESSENTIAL HYPERTENSION: ICD-10-CM

## 2021-08-20 DIAGNOSIS — Z79.01 ANTICOAGULATED: ICD-10-CM

## 2021-08-20 DIAGNOSIS — G47.33 OBSTRUCTIVE SLEEP APNEA SYNDROME: ICD-10-CM

## 2021-08-20 DIAGNOSIS — I50.42 CHRONIC COMBINED SYSTOLIC AND DIASTOLIC CONGESTIVE HEART FAILURE (HCC): ICD-10-CM

## 2021-08-20 PROCEDURE — 99213 OFFICE O/P EST LOW 20 MIN: CPT | Performed by: NURSE PRACTITIONER

## 2021-08-20 ASSESSMENT — FIBROSIS 4 INDEX: FIB4 SCORE: 1.36

## 2021-08-20 NOTE — PROGRESS NOTES
Chief Complaint   Patient presents with   • Follow-Up     SHAKA-Last seen 01/15/2021       HPI:      Mrs. Crooks is a 80 y/o female patient who is in today for SHAKA f/u. 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    Patient was set up with a [employer] BiPAP machine in May 2019 in [facility].  Patient states she has registered the appliance and has noted white particles in the water chamber, and has also noted a cough.  Patient has new medical health insurance through [employer].  Compliance report from 7/21/21-8/19/21 was downloaded and reviewed with the patient which showed BiPAP IPAP/EPAP 13/9 cmH2O, Biflex 2, 100% compliance, 6 hrs 53 min use, AHI of 8.0, no mask leak. She is tolerating the pressure and mask well. She goes to bed at 11:30 pm and wakes up at 7 am. She denies morning headache or snoring.  Patient follows up with cardiology Dr. Villar.  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  She denies any new health problems or medications.    Sleep Study History:   Patient was diagnosed with SHAKA in [place], [place] around 2016 and was placed on BiPAP 13/9 cmH2O. Medical records have been requested.    ROS:    Constitutional: Denies fevers, Denies weight changes  Eyes: Denies changes in vision, no eye pain  Ears/Nose/Throat/Mouth: Denies nasal congestion or sore throat   Cardiovascular: Denies chest pain or palpitations   Respiratory: Denies shortness of breath , Denies cough  Gastrointestinal/Hepatic: Denies abdominal pain, nausea, vomiting,   Skin/Breast: Denies rash,   Neurological: Denies headache, confusion,   Psychiatric: denies mood disorder    Sleep: denies snoring       Past Medical History:   Diagnosis Date   • Apnea, sleep    • Atrial fibrillation (HCC)    • Gasping for breath    • Heart attack (HCC)    • Hyperlipidemia    • Hypertension    • 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) (HCC) 3/25/2021   • Thyroid disease        Past Surgical History:   Procedure Laterality Date   • CHOLECYSTECTOMY     • EYE SURGERY Bilateral     cataracts   • MULTIPLE CORONARY ARTERY BYPASS      1 bypass   • THYROIDECTOMY TOTAL         Family History   Problem Relation Age of Onset   • Cancer Mother         cancer, smoker   • Stroke Maternal Grandmother    • Other Other         Crohn   • Kidney Disease Other         kidney transplant       Social History     Socioeconomic History   • Marital status:      Spouse name: Not on file   • Number of children: Not on file   • Years of education: Not on file   • Highest education level: Not on file   Occupational History     Comment: [employer]   Tobacco Use   • Smoking status: Former Smoker     Packs/day: 1.00     Years: 31.00     Pack years: 31.00     Quit date:      Years since quittin.   • Smokeless tobacco: Never Used   Vaping Use   • Vaping Use: Never used   Substance and Sexual Activity   • Alcohol use: No   • Drug use: No   • Sexual activity: Not Currently     Partners: Male   Other Topics Concern   • Not on file   Social History Narrative   • Not on file     Social Determinants of Health     Financial Resource Strain: Low Risk    • Difficulty of Paying Living Expenses: Not very hard   Food Insecurity: No Food Insecurity   • Worried About Running Out of Food in the Last Year: Never true   • Ran Out of Food in the Last Year: Never true   Transportation Needs: No Transportation Needs   • Lack of Transportation (Medical): No   • Lack of Transportation (Non-Medical): No   Physical Activity: Inactive   • Days of Exercise per Week: 0 days   • Minutes of Exercise per Session: 0 min    Stress: No Stress Concern Present   • Feeling of Stress : Not at all   Social Connections: Unknown   • Frequency of Communication with Friends and Family: More than three times a week   • Frequency of Social Gatherings with Friends and Family: Never   • Attends Mu-ism Services: Patient refused   • Active Member of Clubs or Organizations: No   • Attends Club or Organization Meetings: Never   • Marital Status:    Intimate Partner Violence:    • Fear of Current or Ex-Partner:    • Emotionally Abused:    • Physically Abused:    • Sexually Abused:        Allergies as of 08/20/2021   • (No Known Allergies)        Vitals:  Vitals:    08/20/21 1334   BP: 128/76   Pulse: 81   SpO2: 95%       Current medications as of today   Current Outpatient Medications   Medication Sig Dispense Refill   • famotidine (PEPCID) 40 MG Tab Take 1 tablet by mouth every day. 90 tablet 3   • warfarin (COUMADIN) 6 MG Tab Take one tablet daily as directed by [facility]  Indications: Obstructing Blood Clot with Chronic Atrial Fibrillation 90 tablet 3   • Secukinumab (COSENTYX SENSOREADY PEN) 150 MG/ML Solution Auto-injector Inject 150 mg into the shoulder, thigh, or buttocks.     • LEVOXYL 150 MCG Tab Take 1 tablet by mouth every morning on an empty stomach. 90 tablet 3   • atorvastatin (LIPITOR) 40 MG Tab TAKE 1 TABLET BY MOUTH AT  BEDTIME 100 tablet 3   • losartan (COZAAR) 50 MG Tab TAKE 1 TABLET BY MOUTH DAILY 100 tablet 1   • bisoprolol (ZEBETA) 10 MG tablet Take 1 tablet by mouth every day. 30 tablet 4   • omeprazole (PRILOSEC) 20 MG delayed-release capsule Take 1 capsule by mouth every day. 100 capsule 3   • ferrous sulfate 325 (65 Fe) MG tablet Take 1 tablet by mouth every day. 90 tablet 2   • ascorbic acid ([employer] VITAMIN C) 500 MG tablet Take 1 tablet by mouth every day. 100 tablet 2   • loratadine (CLARITIN) 10 MG Tab Take 1 tablet by mouth 2 Times a Day. 60 tablet 5   • spironolactone (ALDACTONE) 25 MG Tab  Take 2 Tablets by mouth every day. 180 tablet 3   • vitamin D (CHOLECALCIFEROL) 1000 UNIT Tab Take 4,000 Units by mouth every day.       No current facility-administered medications for this visit.         Physical Exam: Limited by COVID-19 precautions.  Appearance: Well developed, well nourished, no acute distress  Eyes: PERRL, EOM intact, sclera white, conjunctiva moist  Ears: no lesions or deformities  Hearing: grossly intact  Nose: no lesions or deformities  Respiratory effort: no intercostal retractions or use of accessory muscles  Extremities: no cyanosis or edema  Abdomen: soft   Gait and Station: normal  Digits and nails: no clubbing, cyanosis, petechiae or nodes.  Cranial nerves: grossly intact  Skin: no visible rashes, lesions or ulcers noted  Orientation: Oriented to time, person and place  Mood and affect: mood and affect appropriate, normal interaction with examiner  Judgement: Intact    Assessment:  1. Obstructive sleep apnea syndrome     2. Moderate to severe pulmonary hypertension (HCC)     3. Paroxysmal atrial fibrillation (HCC)     4. Chronic combined systolic and diastolic congestive heart failure (HCC)     5. Essential hypertension     6. Anticoagulated     7. BMI 37.0-37.9, adult           Plan  Discussed the cardiovascular and neuropsychiatric risks of untreated SHAKA; including but not limited to: HTN, DM, MI, ASCVD, CVA, CHF, traffic accidents.     1. Compliance report from 7/21/21-8/19/21 was downloaded and reviewed with the patient which showed BiPAP IPAP/EPAP 13/9 cmH2O, Biflex 2, 100% compliance, 6 hrs 53 min use, AHI of 8.0, no mask leak. Continue BiPAP. Patient is compliant and benefiting from BiPAP therapy for management of SHAKA. Advised patient to continue to use the CPAP every night for more than four hours for optimal health benefit and to meet the health insurance 70% compliance guideline.     *DME order ([facility]) for new ResMed BiPAP IPAP/EPAP 14/10 cmH2O, mask (small Airtouch nasal mask  or MOC) and supplies was provided today. Continue to clean mask and supplies weekly with soap and water, and change supplies per insurance guidelines.     *Sleep hygiene discussed. Recommend keeping a set sleep/wake schedule. Logging enough hours of sleep. Limiting/Avoiding naps. No caffeine after noon and no heavy meals in the evening.     **Recall of Radha Respironics BiPAP machines was reviewed with the patient today.    What I have been recommending to the patients is to call their supplying companies to figure out exact model of their machine. You can self register your machine on [employer] website listed below. We do not know if Resp[employer]ics will be giving out replacement machines or just repairing it. This is all new for us to let you know how this is going to pan out. The recent recall from [employer] is due to disintegration of the polyurethane foam. This is a 0.03% risk of break down of an inner foam in device, which is a small risk. Currently I recommend to continue using your machine until or unless you experience black debris/particles within the air path or experiencing headaches, upper airway irritation, cough, chest pressure, sinus infection, irritation to skin/eyes/respiratory tract, inflammatory response, asthma, nausea/vomiting to stop using the PAP therapy immediately and contact the office. It is ultimately your decision on whether you choose to continue using the BiPAP machine. Patient is also recommended to reach out to their DME to let them know that they are using Respironics machine, therefore when and if needed those machines can be replaced by their DME's. We will keep you posted as we get more information from the  for the DME. Advised patient to not use So-Clean or any other type of Ozone .  Patient may also obtain an inline filter.    [place] your device on the recall website at www.[employer]/src-update     2-6. Continue to f/u with cardiology, Dr. Villar.  7.  Continue to stay active.   8. Follow up with the appropriate healthcare practitioners for all other medical problems.  9. F/u in 3 months for first compliance, SHAKA.       SHONA Cruz.      This dictation was created using voice recognition software. The accuracy of the dictation is limited to the abilities of the software. I expect there may be some errors of grammar and possibly content.

## 2021-08-23 ENCOUNTER — ANTICOAGULATION MONITORING (OUTPATIENT)
Dept: VASCULAR LAB | Facility: MEDICAL CENTER | Age: 81
End: 2021-08-23

## 2021-08-23 DIAGNOSIS — I48.0 PAROXYSMAL ATRIAL FIBRILLATION (HCC): ICD-10-CM

## 2021-08-23 LAB
INR PPP: 2.8 (ref 2–3.5)
INR PPP: 2.8 (ref 2–3.5)

## 2021-08-23 NOTE — PROGRESS NOTES
Anticoagulation Summary  As of 2021    INR goal:  2.0-3.0   TTR:  63.5 % (1.4 y)   INR used for dosin.80 (2021)   Warfarin maintenance plan:  6 mg (6 mg x 1) every day   Weekly warfarin total:  42 mg   Plan last modified:  Celina Diego, PharmD (2021)   Next INR check:  2021   Target end date:  Indefinite    Indications    Paroxysmal atrial fibrillation (HCC) [I48.0]             Anticoagulation Episode Summary     INR check location:      Preferred lab:      Send INR reminders to:      Comments:  [facility]      Anticoagulation Care Providers     Provider Role Specialty Phone number    La Paredes M.D. Referring Internal Medicine [phone]    [facility] Anticoagulation Services Responsible  [phone]        Anticoagulation Patient Findings  Patient Findings     Negatives:  Signs/symptoms of thrombosis, Signs/symptoms of bleeding, Laboratory test error suspected, Change in health, Change in alcohol use, Change in activity, Upcoming invasive procedure, Emergency department visit, Upcoming dental procedure, Missed doses, Extra doses, Change in medications, Change in diet/appetite, Hospital admission, Bruising, Other complaints        Spoke with patient today regarding is therapeutic INR of 2.8.  Patient denies any signs/symptoms of bruising or bleeding or any changes in diet and medications.  Instructed patient to call clinic with any questions or concerns.    Pt is to continue with current warfarin dosing regimen.    Follow up in 1 week, to reduce risk of adverse events related to this high risk medication,  Warfarin.    Jesús Malave, Pharmacy Intern

## 2021-08-30 ENCOUNTER — ANTICOAGULATION MONITORING (OUTPATIENT)
Dept: VASCULAR LAB | Facility: MEDICAL CENTER | Age: 81
End: 2021-08-30

## 2021-08-30 DIAGNOSIS — I48.0 PAROXYSMAL ATRIAL FIBRILLATION (HCC): ICD-10-CM

## 2021-08-30 LAB — INR PPP: 2.9 (ref 2–3.5)

## 2021-08-30 NOTE — PROGRESS NOTES
Anticoagulation Summary  As of 2021    INR goal:  2.0-3.0   TTR:  64.0 % (1.4 y)   INR used for dosin.90 (2021)   Warfarin maintenance plan:  6 mg (6 mg x 1) every day   Weekly warfarin total:  42 mg   Plan last modified:  Celina Diego, PharmD (2021)   Next INR check:  2021   Target end date:  Indefinite    Indications    Paroxysmal atrial fibrillation (HCC) [I48.0]             Anticoagulation Episode Summary     INR check location:      Preferred lab:      Send INR reminders to:      Comments:  [facility]      Anticoagulation Care Providers     Provider Role Specialty Phone number    La Paredes M.D. Referring Internal Medicine [phone]    [facility] Anticoagulation Services Responsible  [phone]        Anticoagulation Patient Findings  Patient Findings     Negatives:  Signs/symptoms of thrombosis, Signs/symptoms of bleeding, Laboratory test error suspected, Change in health, Change in alcohol use, Change in activity, Upcoming invasive procedure, Emergency department visit, Upcoming dental procedure, Missed doses, Extra doses, Change in medications, Change in diet/appetite, Hospital admission, Bruising, Other complaints              Spoke with patient today regarding a therapeutic INR of 2.9.  Patient denies any signs/symptoms of bruising or bleeding or any changes in diet and medications.  Instructed patient to call clinic with any questions or concerns.    Pt is to continue with current warfarin dosing regimen.    Follow up in 1 week, to reduce risk of adverse events related to this high risk medication,  Warfarin.    Jesús Malave, Pharmacy Intern

## 2021-09-06 LAB — INR PPP: 2.6 (ref 2–3.5)

## 2021-09-07 ENCOUNTER — ANTICOAGULATION MONITORING (OUTPATIENT)
Dept: VASCULAR LAB | Facility: MEDICAL CENTER | Age: 81
End: 2021-09-07

## 2021-09-07 DIAGNOSIS — I48.0 PAROXYSMAL ATRIAL FIBRILLATION (HCC): ICD-10-CM

## 2021-09-07 NOTE — PROGRESS NOTES
Anticoagulation Summary  As of 2021    INR goal:  2.0-3.0   TTR:  64.5 % (1.4 y)   INR used for dosin.60 (2021)   Warfarin maintenance plan:  6 mg (6 mg x 1) every day   Weekly warfarin total:  42 mg   Plan last modified:  Abad DianeD (2021)   Next INR check:     Target end date:  Indefinite    Indications    Paroxysmal atrial fibrillation (HCC) [I48.0]             Anticoagulation Episode Summary     INR check location:      Preferred lab:      Send INR reminders to:      Comments:  [facility]      Anticoagulation Care Providers     Provider Role Specialty Phone number    La Paredes M.D. Referring Internal Medicine [phone]    [facility] Anticoagulation Services Responsible  [phone]        Anticoagulation Patient Findings    Left voicemail message to report a therapeutic INR of 2.6.    Pt to continue with current warfarin dosing regimen. Requested pt contact the clinic for any s/s of unusual bleeding, bruising, clotting or any changes to diet or medication.    FU INR in 1 week(s).    Ren Acharya, PharmD

## 2021-09-08 ENCOUNTER — PATIENT MESSAGE (OUTPATIENT)
Dept: MEDICAL GROUP | Facility: MEDICAL CENTER | Age: 81
End: 2021-09-08

## 2021-09-08 DIAGNOSIS — I25.10 CORONARY ARTERY DISEASE INVOLVING NATIVE CORONARY ARTERY OF NATIVE HEART WITHOUT ANGINA PECTORIS: ICD-10-CM

## 2021-09-08 DIAGNOSIS — I50.42 CHRONIC COMBINED SYSTOLIC AND DIASTOLIC CONGESTIVE HEART FAILURE (HCC): ICD-10-CM

## 2021-09-08 DIAGNOSIS — I10 ESSENTIAL HYPERTENSION: ICD-10-CM

## 2021-09-08 RX ORDER — BISOPROLOL FUMARATE 10 MG/1
10 TABLET, FILM COATED ORAL DAILY
Qty: 30 TABLET | Refills: 4 | Status: SHIPPED | OUTPATIENT
Start: 2021-09-08 | End: 2021-12-21 | Stop reason: SDUPTHER

## 2021-09-08 NOTE — PATIENT COMMUNICATION
Received request via: Patient    Was the patient seen in the last year in this department? Yes    Does the patient have an active prescription (recently filled or refills available) for medication(s) requested? No     Requested Prescriptions     Pending Prescriptions Disp Refills   • bisoprolol (ZEBETA) 10 MG tablet 30 Tablet 4     Sig: Take 1 Tablet by mouth every day.

## 2021-09-11 DIAGNOSIS — E03.9 HYPOTHYROIDISM, UNSPECIFIED TYPE: ICD-10-CM

## 2021-09-13 ENCOUNTER — ANTICOAGULATION MONITORING (OUTPATIENT)
Dept: MEDICAL GROUP | Facility: PHYSICIAN GROUP | Age: 81
End: 2021-09-13

## 2021-09-13 DIAGNOSIS — I48.0 PAROXYSMAL ATRIAL FIBRILLATION (HCC): ICD-10-CM

## 2021-09-13 LAB — INR PPP: 2.6 (ref 2–3.5)

## 2021-09-13 RX ORDER — LEVOTHYROXINE SODIUM 150 UG/1
TABLET ORAL
Qty: 90 TABLET | Refills: 1 | OUTPATIENT
Start: 2021-09-13

## 2021-09-14 NOTE — PROGRESS NOTES
OP Telephone Anticoagulation Service Note    Date: 2021      Anticoagulation Summary  As of 2021    INR goal:  2.0-3.0   TTR:  64.9 % (1.5 y)   INR used for dosin.60 (2021)   Warfarin maintenance plan:  6 mg (6 mg x 1) every day   Weekly warfarin total:  42 mg   Plan last modified:  Celina Diego PharmD (2021)   Next INR check:  2021   Target end date:  Indefinite    Indications    Paroxysmal atrial fibrillation (HCC) [I48.0]             Anticoagulation Episode Summary     INR check location:      Preferred lab:      Send INR reminders to:      Comments:  [facility]      Anticoagulation Care Providers     Provider Role Specialty Phone number    La Paredes M.D. Referring Internal Medicine [phone]    [facility] Anticoagulation Services Responsible  [phone]        Anticoagulation Patient Findings      INR therapeutic at 2.6.  Spoke w/ pt on phone.  Verified regimen w/ pt.  Instructed pt to continue on with current regimen.  NO s/s bleeding reported per pt.  NO changes in diet reported per pt.  NO changes in medications reported per pt.  Check INR in 1 week(s).  Instructed pt to call clinic at [phone] if there are any questions.  Pt stated understanding.    Pt is not on antiplatelet therapy.    James Sr, AbadD

## 2021-09-20 ENCOUNTER — ANTICOAGULATION MONITORING (OUTPATIENT)
Dept: VASCULAR LAB | Facility: MEDICAL CENTER | Age: 81
End: 2021-09-20

## 2021-09-20 DIAGNOSIS — I48.0 PAROXYSMAL ATRIAL FIBRILLATION (HCC): ICD-10-CM

## 2021-09-20 LAB — INR PPP: 2.8 (ref 2–3.5)

## 2021-09-20 NOTE — PROGRESS NOTES
Anticoagulation Summary  As of 2021    INR goal:  2.0-3.0   TTR:  65.4 % (1.5 y)   INR used for dosin.80 (2021)   Warfarin maintenance plan:  6 mg (6 mg x 1) every day   Weekly warfarin total:  42 mg   Plan last modified:  Celina Diego PharmD (2021)   Next INR check:  2021   Target end date:  Indefinite    Indications    Paroxysmal atrial fibrillation (HCC) [I48.0]             Anticoagulation Episode Summary     INR check location:      Preferred lab:      Send INR reminders to:      Comments:  [facility]      Anticoagulation Care Providers     Provider Role Specialty Phone number    La Paredes M.D. Referring Internal Medicine [phone]    [facility] Anticoagulation Services Responsible  [phone]          Refer to Anticoagulation Patient Findings for HPI  Patient Findings     Negatives:  Signs/symptoms of thrombosis, Signs/symptoms of bleeding, Laboratory test error suspected, Change in health, Change in alcohol use, Change in activity, Upcoming invasive procedure, Emergency department visit, Upcoming dental procedure, Missed doses, Extra doses, Change in medications, Change in diet/appetite, Hospital admission, Bruising, Other complaints          Spoke with patient to report a therapeutic INR.        Pt instructed to continue with current warfarin dosing regimen, confirms dosing.   Will follow up in 1 week(s).     Nedra Mora PharmD

## 2021-09-21 ENCOUNTER — APPOINTMENT (OUTPATIENT)
Dept: NEPHROLOGY | Facility: MEDICAL CENTER | Age: 81
End: 2021-09-21
Payer: MEDICARE

## 2021-09-23 DIAGNOSIS — E03.9 HYPOTHYROIDISM, UNSPECIFIED TYPE: ICD-10-CM

## 2021-09-23 RX ORDER — LEVOTHYROXINE SODIUM 150 UG/1
150 TABLET ORAL
Qty: 100 TABLET | Refills: 3 | Status: SHIPPED | OUTPATIENT
Start: 2021-09-23 | End: 2022-01-24

## 2021-09-23 NOTE — TELEPHONE ENCOUNTER
Received request via: Patient    Was the patient seen in the last year in this department? Yes    Does the patient have an active prescription (recently filled or refills available) for medication(s) requested? No per patient       Requested Prescriptions     Pending Prescriptions Disp Refills   • LEVOXYL 150 MCG Tab 100 Tablet 2     Sig: Take 1 Tablet by mouth every morning on an empty stomach.

## 2021-09-28 ENCOUNTER — ANTICOAGULATION MONITORING (OUTPATIENT)
Dept: VASCULAR LAB | Facility: MEDICAL CENTER | Age: 81
End: 2021-09-28

## 2021-09-28 DIAGNOSIS — I48.0 PAROXYSMAL ATRIAL FIBRILLATION (HCC): ICD-10-CM

## 2021-09-28 LAB — INR PPP: 2.7 (ref 2–3.5)

## 2021-09-28 NOTE — PROGRESS NOTES
Anticoagulation Summary  As of 2021    INR goal:  2.0-3.0   TTR:  65.9 % (1.5 y)   INR used for dosin.70 (2021)   Warfarin maintenance plan:  6 mg (6 mg x 1) every day   Weekly warfarin total:  42 mg   Plan last modified:  Celina Diego PharmD (2021)   Next INR check:  10/12/2021   Target end date:  Indefinite    Indications    Paroxysmal atrial fibrillation (HCC) [I48.0]             Anticoagulation Episode Summary     INR check location:      Preferred lab:      Send INR reminders to:      Comments:  [facility]      Anticoagulation Care Providers     Provider Role Specialty Phone number    WinniemeirCameronRenay Christian Paredes M.D. Referring Internal Medicine [phone]    [facility] Anticoagulation Services Responsible  [phone]        Anticoagulation Patient Findings  Patient Findings     Negatives:  Signs/symptoms of thrombosis, Signs/symptoms of bleeding, Laboratory test error suspected, Change in health, Change in alcohol use, Change in activity, Upcoming invasive procedure, Emergency department visit, Upcoming dental procedure, Missed doses, Extra doses, Change in medications, Change in diet/appetite, Hospital admission, Bruising, Other complaints        Spoke with patient today regarding therapeutic INR of 2.7.  Patient denies any signs/symptoms of bruising or bleeding or any changes in diet and medications.  Instructed patient to call clinic with any questions or concerns.  Pt is to continue with current warfarin dosing regimen.  Follow up in 2 weeks, to reduce risk of adverse events related to this high risk medication,  Warfarin.    Christian Jordan, AbadD, BCACP

## 2021-10-04 ENCOUNTER — ANTICOAGULATION MONITORING (OUTPATIENT)
Dept: VASCULAR LAB | Facility: MEDICAL CENTER | Age: 81
End: 2021-10-04

## 2021-10-04 DIAGNOSIS — I48.0 PAROXYSMAL ATRIAL FIBRILLATION (HCC): ICD-10-CM

## 2021-10-04 LAB — INR PPP: 2.8 (ref 2–3.5)

## 2021-10-04 NOTE — PROGRESS NOTES
Anticoagulation Summary  As of 10/4/2021    INR goal:  2.0-3.0   TTR:  66.3 % (1.5 y)   INR used for dosin.80 (10/4/2021)   Warfarin maintenance plan:  6 mg (6 mg x 1) every day   Weekly warfarin total:  42 mg   Plan last modified:  Celina Diego PharmD (2021)   Next INR check:  10/18/2021   Target end date:  Indefinite    Indications    Paroxysmal atrial fibrillation (HCC) [I48.0]             Anticoagulation Episode Summary     INR check location:      Preferred lab:      Send INR reminders to:      Comments:  [facility]      Anticoagulation Care Providers     Provider Role Specialty Phone number    WinniemeirCameronRenay Christian Paredes M.D. Referring Internal Medicine [phone]    [facility] Anticoagulation Services Responsible  [phone]        Anticoagulation Patient Findings    Spoke with patient today regarding therapeutic INR of 2.8.  Patient denies any signs/symptoms of bruising or bleeding or any changes in diet and medications.  Instructed patient to call clinic with any questions or concerns.    Pt is not on antiplatelet therapy    Pt is to continue with current warfarin dosing regimen.  Follow up in 2 weeks, to reduce risk of adverse events related to this high risk medication,  Warfarin.    Christian Jordan, Mau, BCACP

## 2021-10-11 ENCOUNTER — ANTICOAGULATION MONITORING (OUTPATIENT)
Dept: MEDICAL GROUP | Facility: MEDICAL CENTER | Age: 81
End: 2021-10-11

## 2021-10-11 DIAGNOSIS — I48.0 PAROXYSMAL ATRIAL FIBRILLATION (HCC): ICD-10-CM

## 2021-10-11 LAB — INR PPP: 2.5 (ref 2–3.5)

## 2021-10-11 NOTE — PROGRESS NOTES
Anticoagulation Summary  As of 10/11/2021    INR goal:  2.0-3.0   TTR:  66.7 % (1.5 y)   INR used for dosin.50 (10/11/2021)   Warfarin maintenance plan:  6 mg (6 mg x 1) every day   Weekly warfarin total:  42 mg   Plan last modified:  Celina Diego PharmD (2021)   Next INR check:  10/18/2021   Target end date:  Indefinite    Indications    Paroxysmal atrial fibrillation (HCC) [I48.0]             Anticoagulation Episode Summary     INR check location:      Preferred lab:      Send INR reminders to:      Comments:  [facility]      Anticoagulation Care Providers     Provider Role Specialty Phone number    La Paredes M.D. Referring Internal Medicine [phone]    [facility] Anticoagulation Services Responsible  [phone]          Refer to Anticoagulation Patient Findings for HPI  Patient Findings     Negatives:  Signs/symptoms of thrombosis, Signs/symptoms of bleeding, Laboratory test error suspected, Change in health, Change in alcohol use, Change in activity, Upcoming invasive procedure, Emergency department visit, Upcoming dental procedure, Missed doses, Extra doses, Change in medications, Change in diet/appetite, Hospital admission, Bruising, Other complaints        Spoke with patient Aleshia to report a therapeutic INR.      Pt is NOT on antiplatelet therapy    Pt instructed to continue with current warfarin dosing regimen, confirms dosing.   Will follow up in 1 week.     Ren Acharya, PharmD

## 2021-10-12 ENCOUNTER — HOSPITAL ENCOUNTER (OUTPATIENT)
Dept: LAB | Facility: MEDICAL CENTER | Age: 81
End: 2021-10-12
Attending: INTERNAL MEDICINE
Payer: MEDICARE

## 2021-10-12 DIAGNOSIS — K75.81 LIVER CIRRHOSIS SECONDARY TO NASH (NONALCOHOLIC STEATOHEPATITIS) (HCC): ICD-10-CM
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
## 2021-10-12 DIAGNOSIS — R79.89 ELEVATED TROPONIN: ICD-10-CM

## 2021-10-12 DIAGNOSIS — G47.33 OBSTRUCTIVE SLEEP APNEA SYNDROME: ICD-10-CM

## 2021-10-12 DIAGNOSIS — Z98.890 HX OF MELANOMA EXCISION: ICD-10-CM

## 2021-10-12 DIAGNOSIS — E66.9 OBESITY (BMI 35.0-39.9 WITHOUT COMORBIDITY): ICD-10-CM

## 2021-10-12 DIAGNOSIS — I34.0 SEVERE MITRAL REGURGITATION: ICD-10-CM

## 2021-10-12 DIAGNOSIS — K74.60 LIVER CIRRHOSIS SECONDARY TO NASH (NONALCOHOLIC STEATOHEPATITIS) (HCC): ICD-10-CM

## 2021-10-12 DIAGNOSIS — I50.42 CHRONIC COMBINED SYSTOLIC AND DIASTOLIC CONGESTIVE HEART FAILURE (HCC): ICD-10-CM

## 2021-10-12 DIAGNOSIS — N18.32 STAGE 3B CHRONIC KIDNEY DISEASE: ICD-10-CM

## 2021-10-12 DIAGNOSIS — K76.0 HEPATIC STEATOSIS: ICD-10-CM

## 2021-10-12 DIAGNOSIS — Z85.820 HX OF MELANOMA EXCISION: ICD-10-CM

## 2021-10-12 LAB
ALBUMIN SERPL BCP-MCNC: 4.4 G/DL (ref 3.2–4.9)
ALBUMIN/GLOB SERPL: 1.3 G/DL
ALP SERPL-CCNC: 87 U/L (ref 30–99)
ALT SERPL-CCNC: 10 U/L (ref 2–50)
ANION GAP SERPL CALC-SCNC: 12 MMOL/L (ref 7–16)
AST SERPL-CCNC: 15 U/L (ref 12–45)
BASOPHILS # BLD AUTO: 1 % (ref 0–1.8)
BASOPHILS # BLD: 0.07 K/UL (ref 0–0.12)
BILIRUB SERPL-MCNC: 0.6 MG/DL (ref 0.1–1.5)
BUN SERPL-MCNC: 23 MG/DL (ref 8–22)
CALCIUM SERPL-MCNC: 9.8 MG/DL (ref 8.5–10.5)
CHLORIDE SERPL-SCNC: 102 MMOL/L (ref 96–112)
CHOLEST SERPL-MCNC: 149 MG/DL (ref 100–199)
CO2 SERPL-SCNC: 24 MMOL/L (ref 20–33)
CREAT SERPL-MCNC: 1.97 MG/DL (ref 0.5–1.4)
EOSINOPHIL # BLD AUTO: 0.36 K/UL (ref 0–0.51)
EOSINOPHIL NFR BLD: 4.9 % (ref 0–6.9)
ERYTHROCYTE [DISTWIDTH] IN BLOOD BY AUTOMATED COUNT: 46.8 FL (ref 35.9–50)
FASTING STATUS PATIENT QL REPORTED: NORMAL
GLOBULIN SER CALC-MCNC: 3.3 G/DL (ref 1.9–3.5)
GLUCOSE SERPL-MCNC: 105 MG/DL (ref 65–99)
HCT VFR BLD AUTO: 40.2 % (ref 37–47)
HDLC SERPL-MCNC: 33 MG/DL
HGB BLD-MCNC: 12.5 G/DL (ref 12–16)
IMM GRANULOCYTES # BLD AUTO: 0.03 K/UL (ref 0–0.11)
IMM GRANULOCYTES NFR BLD AUTO: 0.4 % (ref 0–0.9)
LDLC SERPL CALC-MCNC: 79 MG/DL
LYMPHOCYTES # BLD AUTO: 0.9 K/UL (ref 1–4.8)
LYMPHOCYTES NFR BLD: 12.2 % (ref 22–41)
MCH RBC QN AUTO: 28 PG (ref 27–33)
MCHC RBC AUTO-ENTMCNC: 31.1 G/DL (ref 33.6–35)
MCV RBC AUTO: 89.9 FL (ref 81.4–97.8)
MONOCYTES # BLD AUTO: 0.7 K/UL (ref 0–0.85)
MONOCYTES NFR BLD AUTO: 9.5 % (ref 0–13.4)
NEUTROPHILS # BLD AUTO: 5.3 K/UL (ref 2–7.15)
NEUTROPHILS NFR BLD: 72 % (ref 44–72)
NRBC # BLD AUTO: 0 K/UL
NRBC BLD-RTO: 0 /100 WBC
POTASSIUM SERPL-SCNC: 5.2 MMOL/L (ref 3.6–5.5)
PROT SERPL-MCNC: 7.7 G/DL (ref 6–8.2)
RBC # BLD AUTO: 4.47 M/UL (ref 4.2–5.4)
SODIUM SERPL-SCNC: 138 MMOL/L (ref 135–145)
TRIGL SERPL-MCNC: 185 MG/DL (ref 0–149)
WBC # BLD AUTO: 7.4 K/UL (ref 4.8–10.8)

## 2021-10-12 PROCEDURE — 85041 AUTOMATED RBC COUNT: CPT

## 2021-10-12 PROCEDURE — 36415 COLL VENOUS BLD VENIPUNCTURE: CPT

## 2021-10-12 PROCEDURE — 85048 AUTOMATED LEUKOCYTE COUNT: CPT

## 2021-10-12 PROCEDURE — 80053 COMPREHEN METABOLIC PANEL: CPT

## 2021-10-12 PROCEDURE — 85014 HEMATOCRIT: CPT

## 2021-10-12 PROCEDURE — 80061 LIPID PANEL: CPT

## 2021-10-12 PROCEDURE — 85018 HEMOGLOBIN: CPT

## 2021-10-18 ENCOUNTER — ANTICOAGULATION MONITORING (OUTPATIENT)
Dept: MEDICAL GROUP | Facility: MEDICAL CENTER | Age: 81
End: 2021-10-18

## 2021-10-18 DIAGNOSIS — I48.0 PAROXYSMAL ATRIAL FIBRILLATION (HCC): ICD-10-CM

## 2021-10-18 LAB — INR PPP: 2.3 (ref 2–3.5)

## 2021-10-18 NOTE — PROGRESS NOTES
OP Telephone Anticoagulation Service Note    Date: 10/18/2021      Anticoagulation Summary  As of 10/18/2021    INR goal:  2.0-3.0   TTR:  67.1 % (1.5 y)   INR used for dosin.30 (10/18/2021)   Warfarin maintenance plan:  6 mg (6 mg x 1) every day   Weekly warfarin total:  42 mg   Plan last modified:  Celina Diego PharmD (2021)   Next INR check:  10/25/2021   Target end date:  Indefinite    Indications    Paroxysmal atrial fibrillation (HCC) [I48.0]             Anticoagulation Episode Summary     INR check location:      Preferred lab:      Send INR reminders to:      Comments:  [facility]      Anticoagulation Care Providers     Provider Role Specialty Phone number    La Paredes M.D. Referring Internal Medicine [phone]    [facility] Anticoagulation Services Responsible  [phone]        Anticoagulation Patient Findings      INR therapeutic at 2.3.  Spoke w/ pt on phone.  Verified regimen w/ pt.  Instructed pt to continue on with current regimen.  NO s/s bleeding reported per pt.  NO changes in diet reported per pt.  NO changes in medications reported per pt.  Check INR in 1 week(s).  Instructed pt to call clinic at [phone] if there are any questions.  Pt stated understanding.    Pt is not on antiplatelet therapy.    James Sr, AbadD

## 2021-10-20 ENCOUNTER — TELEPHONE (OUTPATIENT)
Dept: CARDIOLOGY | Facility: MEDICAL CENTER | Age: 81
End: 2021-10-20

## 2021-10-20 DIAGNOSIS — K75.81 LIVER CIRRHOSIS SECONDARY TO NASH (NONALCOHOLIC STEATOHEPATITIS) (HCC): ICD-10-CM

## 2021-10-20 DIAGNOSIS — I48.0 PAROXYSMAL ATRIAL FIBRILLATION (HCC): ICD-10-CM

## 2021-10-20 DIAGNOSIS — Z79.899 HIGH RISK MEDICATION USE: ICD-10-CM

## 2021-10-20 DIAGNOSIS — K74.60 LIVER CIRRHOSIS SECONDARY TO NASH (NONALCOHOLIC STEATOHEPATITIS) (HCC): ICD-10-CM

## 2021-10-20 DIAGNOSIS — G47.33 OBSTRUCTIVE SLEEP APNEA TREATED WITH CONTINUOUS POSITIVE AIRWAY PRESSURE (CPAP): ICD-10-CM

## 2021-10-20 DIAGNOSIS — I10 ESSENTIAL HYPERTENSION: ICD-10-CM

## 2021-10-20 RX ORDER — SPIRONOLACTONE 25 MG/1
25 TABLET ORAL DAILY
Qty: 180 TABLET | Refills: 3 | COMMUNITY
Start: 2021-10-20 | End: 2021-12-21 | Stop reason: SDUPTHER

## 2021-10-20 NOTE — TELEPHONE ENCOUNTER
S/W pt, aware of medication change, will reestablish with neph as advised by LULA, she had an appointment but then cancelled it.     Med rec updated and lab order placed.

## 2021-10-25 ENCOUNTER — ANTICOAGULATION MONITORING (OUTPATIENT)
Dept: VASCULAR LAB | Facility: MEDICAL CENTER | Age: 81
End: 2021-10-25

## 2021-10-25 DIAGNOSIS — I48.0 PAROXYSMAL ATRIAL FIBRILLATION (HCC): ICD-10-CM

## 2021-10-25 LAB — INR PPP: 2.9 (ref 2–3.5)

## 2021-10-26 ENCOUNTER — ANTICOAGULATION MONITORING (OUTPATIENT)
Dept: VASCULAR LAB | Facility: MEDICAL CENTER | Age: 81
End: 2021-10-26

## 2021-10-26 ENCOUNTER — TELEPHONE (OUTPATIENT)
Dept: MEDICAL GROUP | Facility: MEDICAL CENTER | Age: 81
End: 2021-10-26

## 2021-10-26 DIAGNOSIS — I48.0 PAROXYSMAL ATRIAL FIBRILLATION (HCC): ICD-10-CM

## 2021-10-26 NOTE — PROGRESS NOTES
Anticoagulation Summary  As of 10/25/2021    INR goal:  2.0-3.0   TTR:  67.5 % (1.6 y)   INR used for dosin.90 (10/25/2021)   Warfarin maintenance plan:  6 mg (6 mg x 1) every day   Weekly warfarin total:  42 mg   Plan last modified:  Celina Diego, PharmD (2021)   Next INR check:  2021   Target end date:  Indefinite    Indications    Paroxysmal atrial fibrillation (HCC) [I48.0]             Anticoagulation Episode Summary     INR check location:      Preferred lab:      Send INR reminders to:      Comments:  [facility]      Anticoagulation Care Providers     Provider Role Specialty Phone number    La Paredes M.D. Referring Internal Medicine [phone]    [facility] Anticoagulation Services Responsible  [phone]        Anticoagulation Patient Findings          Left voicemail message to report a   therapeutic INR of 2.9.  Pt to continue with current warfarin dosing regimen. Requested pt contact the clinic for any s/s of unusual bleeding, bruising, clotting or any changes to diet or medication.  Follow up in 2 weeks, to reduce the risk of adverse events related to this high risk medication, warfarin.    Taylor Cabrales, Clinical Pharmacist

## 2021-10-26 NOTE — TELEPHONE ENCOUNTER
ESTABLISHED PATIENT PRE-VISIT PLANNING     Patient was NOT contacted to complete PVP.     Note: Patient will not be contacted if there is no indication to call.     1.  Reviewed notes from the last few office visits within the medical group: Yes    2.  If any orders were placed at last visit or intended to be done for this visit (i.e. 6 mos follow-up), do we have Results/Consult Notes?         •  Labs - Labs were not ordered at last office visit.  Note: If patient appointment is for lab review and patient did not complete labs, check with provider if OK to reschedule patient until labs completed.       •  Imaging - Imaging ordered, NOT completed.    -US-RUQ: Scheduled       •  Referrals - Referral ordered, patient was seen and consult notes were requested from Dr. Maulik Nash's office. Care Teams updated  YES.    -Podiatry: Called and spoke to Dr. Maulik Nash's office. Per staff, patient was seen on 09/20/21. Medical records requested.    3. Is this appointment scheduled as a Hospital Follow-Up? No    4.  Immunizations were updated in Epic using Reconcile Outside Information activity? Yes    5.  Patient is due for the following Health Maintenance Topics:   Health Maintenance Due   Topic Date Due   • Annual Wellness Visit  Never done   • IMM HEP B VACCINE (1 of 3 - Risk 3-dose series) Never done   • BONE DENSITY  Never done   • IMM ZOSTER VACCINES (2 of 2) 08/23/2021   • IMM INFLUENZA (1) 09/01/2021     6.  AHA (Pulse8) form printed for Provider? No, patient does not have any open alerts

## 2021-10-26 NOTE — LETTER
[facility]  Asael Pink M.D.  [address]  [place] [address]  Fax: [phone]   Authorization for Release/Disclosure of   Protected Health Information   Name: ALESHIA REICH : 1940 SSN: [id number]   Address: [address] [place] [address] [address] [place] [address] Phone:    [phone] (home)    I authorize the entity listed below to release/disclose the PHI below to:   [facility]/Asael Pink M.D. and Asael Pink M.D.   Provider or Entity Name:  Dr.Raymond Rich   Address   [facility], [address]  [address]  [place] [address] Phone:  [phone]    Fax:  [phone]   Reason for request: continuity of care   Information to be released:    [  ] LAST COLONOSCOPY,  including any PATH REPORT and follow-up  [  ] LAST FIT/COLOGUARD RESULT [  ] LAST DEXA  [  ] LAST MAMMOGRAM  [  ] LAST PAP  [  ] LAST LABS [  ] RETINA EXAM REPORT  [  ] IMMUNIZATION RECORDS  [XXXX] Release all info      [  ] Check here and initial the line next to each item to release ALL health information INCLUDING  _____ Care and treatment for drug and / or alcohol abuse  _____ HIV testing, infection status, or AIDS  _____ Genetic Testing    DATES OF SERVICE OR TIME PERIOD TO BE DISCLOSED: _____________  I understand and acknowledge that:  * This Authorization may be revoked at any time by you in writing, except if your health information has already been used or disclosed.  * Your health information that will be used or disclosed as a result of you signing this authorization could be re-disclosed by the recipient. If this occurs, your re-disclosed health information may no longer be protected by State or Federal laws.  * You may refuse to sign this Authorization. Your refusal will not affect your ability to obtain treatment.  * This Authorization becomes effective upon signing and will  on (date) __________.      If no date is indicated, this Authorization will  one (1) year from the signature date.    Name: Aleshia Head  Daksha    Signature: Continuity of Care   Date:     10/26/2021       PLEASE FAX REQUESTED RECORDS BACK TO: [phone]

## 2021-10-27 ENCOUNTER — OFFICE VISIT (OUTPATIENT)
Dept: MEDICAL GROUP | Facility: MEDICAL CENTER | Age: 81
End: 2021-10-27
Payer: MEDICARE

## 2021-10-27 VITALS
HEART RATE: 82 BPM | TEMPERATURE: 97.4 F | DIASTOLIC BLOOD PRESSURE: 54 MMHG | RESPIRATION RATE: 14 BRPM | OXYGEN SATURATION: 97 % | SYSTOLIC BLOOD PRESSURE: 104 MMHG

## 2021-10-27 DIAGNOSIS — Z23 NEED FOR VACCINATION: ICD-10-CM

## 2021-10-27 DIAGNOSIS — N18.4 CKD (CHRONIC KIDNEY DISEASE) STAGE 4, GFR 15-29 ML/MIN (HCC): ICD-10-CM

## 2021-10-27 DIAGNOSIS — R45.4 IRRITABILITY: ICD-10-CM

## 2021-10-27 DIAGNOSIS — F43.9 SITUATIONAL STRESS: ICD-10-CM

## 2021-10-27 DIAGNOSIS — Z63.6 CAREGIVER STRESS: ICD-10-CM

## 2021-10-27 PROCEDURE — 90662 IIV NO PRSV INCREASED AG IM: CPT | Performed by: FAMILY MEDICINE

## 2021-10-27 PROCEDURE — 99213 OFFICE O/P EST LOW 20 MIN: CPT | Mod: 25 | Performed by: FAMILY MEDICINE

## 2021-10-27 PROCEDURE — G0008 ADMIN INFLUENZA VIRUS VAC: HCPCS | Performed by: FAMILY MEDICINE

## 2021-10-27 SDOH — SOCIAL STABILITY - SOCIAL INSECURITY: DEPENDENT RELATIVE NEEDING CARE AT HOME: Z63.6

## 2021-10-28 PROCEDURE — 90750 HZV VACC RECOMBINANT IM: CPT | Performed by: FAMILY MEDICINE

## 2021-10-28 PROCEDURE — 90471 IMMUNIZATION ADMIN: CPT | Performed by: FAMILY MEDICINE

## 2021-10-28 NOTE — PROGRESS NOTES
Chief Complaint   Patient presents with   • Other      said to come in, irritability   • Immunizations       Subjective:     HPI:   Aleshia Crooks presents today with the followin. Need for vaccination  Flu vaccine and Second Shingrix vaccine administered today.    2. Caregiver stress/Situational stress/irritablity  Patient's  has permanent hemiplegia on the right side on the leg from a past stroke.  He feels that if he had had physical therapy in an adequate amount at the correct time he would have more use of his right leg and not be so dependent on his wife and daughter.  He has had some struggles with depression and resentment.  She is having no struggles to and finds that whenever he needs assistance on the toilet she feels a lot of rage and frustration.  She tries not to take that out on him but it is a difficult situation.  She has a Cathi lift that helps quite a bit.  Home health is trying to help but his physical limitations are not going to improve.  Discussed the situational stress and irritability.  They are trying to get out and about a little more.  He cannot travel in the car or even to visit his children as they do not have an outside ramp or a Cathi lift at their home to help him.  Discussed medications which I feel have limited utility in this situation.  They are continuing to work with home health.    3. Chronic kidney disease stage IV  Patient's recent kidney function was quite reduced.  She has had moderate disease but recently it is much worse.  She has discussed this with her cardiologist and will be doing follow-up testing in a few weeks.        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) (HCC) 2021   •  Dermatitis 03/01/2021   • Falls 03/01/2021   • Stress incontinence 03/01/2021   • Stage 3b chronic kidney disease ([facility]) 09/11/2020   • Gastroesophageal reflux disease without esophagitis 06/08/2020   • Abnormal CBC 06/08/2020   • Malignant melanoma of left upper extremity including shoulder ([facility]) 06/08/2020   • Abnormal CT of the abdomen 04/17/2020   • Mixed hyperlipidemia 04/17/2020   • Hepatic steatosis 04/17/2020   • Elevated alkaline phosphatase level 04/17/2020   • Elevated troponin 04/08/2020   • Anemia 04/08/2020   • Hx of melanoma excision 03/12/2020   • Skin lesions 03/12/2020   • Essential hypertension 03/12/2020   • Hypothyroidism 03/12/2020   • Obesity (BMI 35.0-39.9 without comorbidity) ([facility]) 03/12/2020   • Obstructive sleep apnea syndrome 03/12/2020   • Severe mitral regurgitation 07/14/2017   • Moderate to severe pulmonary hypertension ([facility]) 07/14/2017   • Chronic combined systolic and diastolic congestive heart failure ([facility]) 07/14/2017   • Paroxysmal atrial fibrillation ([facility]) 07/14/2017   • Coronary artery disease involving native coronary artery of native heart without angina pectoris, s/p 1V CABG 1992 07/14/2017   • Microcytic anemia 07/13/2017   • Anxiety 07/13/2017       Current medicines (including changes today)  Current Outpatient Medications   Medication Sig Dispense Refill   • spironolactone (ALDACTONE) 25 MG Tab Take 1 Tablet by mouth every day. 180 Tablet 3   • LEVOXYL 150 MCG Tab Take 1 Tablet by mouth every morning on an empty stomach. 100 Tablet 3   • bisoprolol (ZEBETA) 10 MG tablet Take 1 Tablet by mouth every day. 30 Tablet 4   • famotidine (PEPCID) 40 MG Tab Take 1 tablet by mouth every day. 90 tablet 3   • warfarin (COUMADIN) 6 MG Tab Take one tablet daily as directed by [facility]  Indications: Obstructing Blood Clot with Chronic Atrial Fibrillation 90 tablet 3   • Secukinumab (COSENTYX SENSOREADY PEN) 150 MG/ML Solution Auto-injector Inject 150 mg into the  shoulder, thigh, or buttocks.     • atorvastatin (LIPITOR) 40 MG Tab TAKE 1 TABLET BY MOUTH AT  BEDTIME 100 tablet 3   • losartan (COZAAR) 50 MG Tab TAKE 1 TABLET BY MOUTH DAILY 100 tablet 1   • omeprazole (PRILOSEC) 20 MG delayed-release capsule Take 1 capsule by mouth every day. 100 capsule 3   • ferrous sulfate 325 (65 Fe) MG tablet Take 1 tablet by mouth every day. 90 tablet 2   • ascorbic acid ([employer] VITAMIN C) 500 MG tablet Take 1 tablet by mouth every day. 100 tablet 2   • loratadine (CLARITIN) 10 MG Tab Take 1 tablet by mouth 2 Times a Day. 60 tablet 5   • vitamin D (CHOLECALCIFEROL) 1000 UNIT Tab Take 4,000 Units by mouth every day.       No current facility-administered medications for this visit.       No Known Allergies    ROS: As per HPI       Objective:     /54   Pulse 82   Temp 36.3 °C (97.4 °F)   Resp 14   SpO2 97%  There is no height or weight on file to calculate BMI.    Physical Exam:  Constitutional: Well-developed and well-nourished. Not diaphoretic. No distress. Lucid and fluent.  Patient, spouse, physician and staff all wearing masks.  Skin: Skin is warm and dry. No rash noted.  Head: Atraumatic without lesions.  Eyes: Conjunctivae and extraocular motions are normal. Pupils are equal, round, and reactive to light. No scleral icterus.   Ears:  External ears unremarkable.   Neck: Supple, trachea midline. No thyromegaly present. No cervical or supraclavicular lymphadenopathy. No JVD or carotid bruits appreciated  Cardiovascular: Regular rate and rhythm.  Normal S1, S2 without murmur appreciated.  Chest: Effort normal. Clear to auscultation throughout. No adventitious sounds.   Extremities: No cyanosis, clubbing, erythema, nor edema.   Neurological: Alert and oriented x 3.  No tremor appreciated.  Good range of motion.  Psychiatric:  Behavior, mood, and affect are appropriate.       Assessment and Plan:     81 y.o. female with the following issues:    1. Need for vaccination  Influenza  Vaccine, High Dose (65+ Only)    Shingles Vaccine (Shingrix)   2. Caregiver stress     3. Situational stress     4. Irritability     5. CKD (chronic kidney disease) stage 4, GFR 15-29 ml/min (HCC)           Followup: Return in about 6 months (around 4/27/2022), or if symptoms worsen or fail to improve.

## 2021-11-01 ENCOUNTER — ANTICOAGULATION MONITORING (OUTPATIENT)
Dept: VASCULAR LAB | Facility: MEDICAL CENTER | Age: 81
End: 2021-11-01

## 2021-11-01 DIAGNOSIS — I48.0 PAROXYSMAL ATRIAL FIBRILLATION (HCC): ICD-10-CM

## 2021-11-01 LAB — INR PPP: 2.1 (ref 2–3.5)

## 2021-11-01 NOTE — PROGRESS NOTES
Anticoagulation Summary  As of 2021    INR goal:  2.0-3.0   TTR:  67.9 % (1.6 y)   INR used for dosin.10 (2021)   Warfarin maintenance plan:  6 mg (6 mg x 1) every day   Weekly warfarin total:  42 mg   Plan last modified:  Celina Diego PharmD (2021)   Next INR check:  2021   Target end date:  Indefinite    Indications    Paroxysmal atrial fibrillation (HCC) [I48.0]             Anticoagulation Episode Summary     INR check location:      Preferred lab:      Send INR reminders to:      Comments:  [facility]      Anticoagulation Care Providers     Provider Role Specialty Phone number    WinniewtoCameronRenay Christian Paredes M.D. Referring Internal Medicine [phone]    [facility] Anticoagulation Services Responsible  [phone]        Anticoagulation Patient Findings    Left voicemail message to report a therapeutic INR of 2.1.  Requested pt contact the clinic for any s/s of unusual bleeding, bruising, clotting or any changes to diet or medication.   Pt is to continue with current warfarin dosing regimen.    Pt is not on antiplatelet therapy      FU 1 weeks.  Christian Jordan, AbadD, BCACP

## 2021-11-02 ENCOUNTER — HOSPITAL ENCOUNTER (OUTPATIENT)
Dept: LAB | Facility: MEDICAL CENTER | Age: 81
End: 2021-11-02
Attending: INTERNAL MEDICINE
Payer: MEDICARE

## 2021-11-02 ENCOUNTER — HOSPITAL ENCOUNTER (OUTPATIENT)
Dept: RADIOLOGY | Facility: MEDICAL CENTER | Age: 81
End: 2021-11-02
Attending: INTERNAL MEDICINE
Payer: MEDICARE

## 2021-11-02 DIAGNOSIS — D50.9 IRON DEFICIENCY ANEMIA, UNSPECIFIED IRON DEFICIENCY ANEMIA TYPE: ICD-10-CM

## 2021-11-02 DIAGNOSIS — K74.69 FLORID CIRRHOSIS (HCC): ICD-10-CM

## 2021-11-02 DIAGNOSIS — Z79.899 HIGH RISK MEDICATION USE: ICD-10-CM

## 2021-11-02 DIAGNOSIS — K76.0 NAFL (NONALCOHOLIC FATTY LIVER): ICD-10-CM

## 2021-11-02 LAB
ANION GAP SERPL CALC-SCNC: 11 MMOL/L (ref 7–16)
BUN SERPL-MCNC: 19 MG/DL (ref 8–22)
CALCIUM SERPL-MCNC: 9.7 MG/DL (ref 8.5–10.5)
CHLORIDE SERPL-SCNC: 104 MMOL/L (ref 96–112)
CO2 SERPL-SCNC: 27 MMOL/L (ref 20–33)
CREAT SERPL-MCNC: 1.65 MG/DL (ref 0.5–1.4)
GLUCOSE SERPL-MCNC: 111 MG/DL (ref 65–99)
POTASSIUM SERPL-SCNC: 5.3 MMOL/L (ref 3.6–5.5)
SODIUM SERPL-SCNC: 142 MMOL/L (ref 135–145)

## 2021-11-02 PROCEDURE — 80048 BASIC METABOLIC PNL TOTAL CA: CPT

## 2021-11-02 PROCEDURE — 76705 ECHO EXAM OF ABDOMEN: CPT

## 2021-11-02 PROCEDURE — 36415 COLL VENOUS BLD VENIPUNCTURE: CPT

## 2021-11-15 ENCOUNTER — OFFICE VISIT (OUTPATIENT)
Dept: SLEEP MEDICINE | Facility: MEDICAL CENTER | Age: 81
End: 2021-11-15
Payer: MEDICARE

## 2021-11-15 ENCOUNTER — ANTICOAGULATION MONITORING (OUTPATIENT)
Dept: VASCULAR LAB | Facility: MEDICAL CENTER | Age: 81
End: 2021-11-15

## 2021-11-15 VITALS
OXYGEN SATURATION: 92 % | BODY MASS INDEX: 38.09 KG/M2 | DIASTOLIC BLOOD PRESSURE: 74 MMHG | HEIGHT: 63 IN | HEART RATE: 81 BPM | SYSTOLIC BLOOD PRESSURE: 128 MMHG | WEIGHT: 215 LBS

## 2021-11-15 DIAGNOSIS — I50.42 CHRONIC COMBINED SYSTOLIC AND DIASTOLIC CONGESTIVE HEART FAILURE (HCC): ICD-10-CM

## 2021-11-15 DIAGNOSIS — I10 ESSENTIAL HYPERTENSION: ICD-10-CM

## 2021-11-15 DIAGNOSIS — Z79.01 ANTICOAGULATED: ICD-10-CM

## 2021-11-15 DIAGNOSIS — I48.0 PAROXYSMAL ATRIAL FIBRILLATION (HCC): ICD-10-CM

## 2021-11-15 DIAGNOSIS — G47.33 OBSTRUCTIVE SLEEP APNEA SYNDROME: ICD-10-CM

## 2021-11-15 DIAGNOSIS — I27.20 MODERATE TO SEVERE PULMONARY HYPERTENSION (HCC): ICD-10-CM

## 2021-11-15 LAB — INR PPP: 2.4 (ref 2–3.5)

## 2021-11-15 PROCEDURE — 99213 OFFICE O/P EST LOW 20 MIN: CPT | Performed by: NURSE PRACTITIONER

## 2021-11-15 ASSESSMENT — FIBROSIS 4 INDEX: FIB4 SCORE: 1.29

## 2021-11-15 NOTE — PATIENT INSTRUCTIONS
Obesity, Adult  Obesity is having too much body fat. Being obese means that your weight is more than what is healthy for you.  BMI is a number that explains how much body fat you have. If you have a BMI of 30 or more, you are obese. Obesity is often caused by eating or drinking more calories than your body uses. Changing your lifestyle can help you lose weight.  Obesity can cause serious health problems, such as:  · Stroke.  · Coronary artery disease (CAD).  · Type 2 diabetes.  · Some types of cancer, including cancers of the colon, breast, uterus, and gallbladder.  · Osteoarthritis.  · High blood pressure (hypertension).  · High cholesterol.  · Sleep apnea.  · Gallbladder stones.  · Infertility problems.  What are the causes?  · Eating meals each day that are high in calories, sugar, and fat.  · Being born with genes that may make you more likely to become obese.  · Having a medical condition that causes obesity.  · Taking certain medicines.  · Sitting a lot (having a sedentary lifestyle).  · Not getting enough sleep.  · Drinking a lot of drinks that have sugar in them.  What increases the risk?  · Having a family history of obesity.  · Being an  woman.  · Being a  man.  · Living in an area with limited access to:  ? [place], recreation centers, or sidewalks.  ? Healthy food choices, such as grocery stores and my[employer] markets.  What are the signs or symptoms?  The main sign is having too much body fat.  How is this treated?  · Treatment for this condition often includes changing your lifestyle. Treatment may include:  ? Changing your diet. This may include making a healthy meal plan.  ? Exercise. This may include activity that causes your heart to beat faster (aerobic exercise) and strength training. Work with your doctor to design a program that works for you.  ? Medicine to help you lose weight. This may be used if you are not able to lose 1 pound a week after 6 weeks of healthy eating and  more exercise.  ? Treating conditions that cause the obesity.  ? Surgery. Options may include gastric banding and gastric bypass. This may be done if:  § Other treatments have not helped to improve your condition.  § You have a BMI of 40 or higher.  § You have life-threatening health problems related to obesity.  Follow these instructions at home:  Eating and drinking    · Follow advice from your doctor about what to eat and drink. Your doctor may tell you to:  ? Limit fast food, sweets, and processed snack foods.  ? Choose low-fat options. For example, choose low-fat milk instead of whole milk.  ? Eat 5 or more servings of fruits or vegetables each day.  ? Eat at home more often. This gives you more control over what you eat.  ? Choose healthy foods when you eat out.  ? Learn to read food labels. This will help you learn how much food is in 1 serving.  ? Keep low-fat snacks available.  ? Avoid drinks that have a lot of sugar in them. These include soda, fruit juice, iced tea with sugar, and flavored milk.  · Drink enough water to keep your pee (urine) pale yellow.  · Do not go on fad diets.  Physical activity  · Exercise often, as told by your doctor. Most adults should get up to 150 minutes of moderate-intensity exercise every week.Ask your doctor:  ? What types of exercise are safe for you.  ? How often you should exercise.  · Warm up and stretch before being active.  · Do slow stretching after being active (cool down).  · Rest between times of being active.  Lifestyle  · Work with your doctor and a food expert (dietitian) to set a weight-loss goal that is best for you.  · Limit your screen time.  · Find ways to reward yourself that do not involve food.  · Do not drink alcohol if:  ? Your doctor tells you not to drink.  ? You are pregnant, may be pregnant, or are planning to become pregnant.  · If you drink alcohol:  ? Limit how much you use to:  § 0-1 drink a day for women.  § 0-2 drinks a day for men.  ? Be  aware of how much alcohol is in your drink. In the [place] one drink equals one 12 oz bottle of beer (355 mL), one 5 oz glass of wine (148 mL), or one 1½ oz glass of hard liquor (44 mL).  General instructions  · Keep a weight-loss journal. This can help you keep track of:  ? The food that you eat.  ? How much exercise you get.  · Take over-the-counter and prescription medicines only as told by your doctor.  · Take vitamins and supplements only as told by your doctor.  · Think about joining a support group.  · Keep all follow-up visits as told by your doctor. This is important.  Contact a doctor if:  · You cannot meet your weight loss goal after you have changed your diet and lifestyle for 6 weeks.  Get help right away if you:  · Are having trouble breathing.  · Are having thoughts of harming yourself.  Summary  · Obesity is having too much body fat.  · Being obese means that your weight is more than what is healthy for you.  · Work with your doctor to set a weight-loss goal.  · Get regular exercise as told by your doctor.  This information is not intended to replace advice given to you by your health care provider. Make sure you discuss any questions you have with your health care provider.  Document Released: 03/11/2013 Document Revised: 08/22/2019 Document Reviewed: 08/22/2019  Elsevier Patient Education © 2020 [employer]

## 2021-11-15 NOTE — PROGRESS NOTES
OP Telephone Anticoagulation Service Note    Date: 11/15/2021      Anticoagulation Summary  As of 11/15/2021    INR goal:  2.0-3.0   TTR:  68.6 % (1.6 y)   INR used for dosin.40 (11/15/2021)   Warfarin maintenance plan:  6 mg (6 mg x 1) every day   Weekly warfarin total:  42 mg   Plan last modified:  Celina Diego PharmD (2021)   Next INR check:  2021   Target end date:  Indefinite    Indications    Paroxysmal atrial fibrillation (HCC) [I48.0]             Anticoagulation Episode Summary     INR check location:      Preferred lab:      Send INR reminders to:      Comments:  [facility]      Anticoagulation Care Providers     Provider Role Specialty Phone number    La Paredes M.D. Referring Internal Medicine [phone]    [facility]agulation [facility] Responsible  [phone]        Anticoagulation Patient Findings        Plan: Spoke with patient on the phone.   Patient is  therapeutic today.   Confirmed dosing.   Pt is not on antiplatelet agent  Instructed patient to call clinic if any unusual bleeding or bruising occurs.   Will continue dosing as outlined.   Will follow-up with patient in 2 week(s).          Celina Diego PharmD

## 2021-11-15 NOTE — PROGRESS NOTES
Chief Complaint   Patient presents with   • Follow-Up     SHAKA-Last seen 08/20/2021       HPI:      Mrs. Crooks is a 82 y/o female patient who is in today for SHAKA f/u. PMH includes severe mitral regurgitation, moderate to severe pulmonary hypertension, chronic combined systolic and diastolic CHF, A. 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    Patient received a new ResMed BiPAP machine on 9/27/2021.  First compliance report from 10/16/21-11/14/21 was downloaded and reviewed with the patient which showed BiPAP IPAP/EPAP 14/10 cmH2O, 100% compliance, 7 hrs 23 min use, AHI of 1.7. She is tolerating the pressure and mask well, and has switched to a full facemask which seems to lower the mask leak. She goes to bed at 11:30 pm and wakes up at 7 am. She denies morning headache or snoring. Patient follows up with cardiology Dr. Villar.  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 She denies any new health problems or medications.    Sleep Study History:   Patient was diagnosed with SHAKA in [place], [place] around 2016 and was placed on BiPAP 13/9 cmH2O. Medical records have been requested.    ROS:    Constitutional: Denies fevers, Denies weight changes  Eyes: Denies changes in vision, no eye pain  Ears/Nose/Throat/Mouth: Denies nasal congestion or sore throat   Cardiovascular: Denies chest pain or palpitations   Respiratory: Denies shortness of breath , Denies cough  Gastrointestinal/Hepatic: Denies abdominal pain, nausea, vomiting,   Skin/Breast: Denies rash,   Neurological: Denies headache, confusion,   Psychiatric: denies mood disorder   Sleep: denies snoring       Past Medical History:   Diagnosis Date   • Apnea, sleep    • Atrial fibrillation (HCC)    • Gasping for breath    • Heart attack (HCC)    •  Hyperlipidemia    • Hypertension    • 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) (HCC) 3/25/2021   • Thyroid disease        Past Surgical History:   Procedure Laterality Date   • CHOLECYSTECTOMY     • EYE SURGERY Bilateral     cataracts   • MULTIPLE CORONARY ARTERY BYPASS      1 bypass   • THYROIDECTOMY TOTAL         Family History   Problem Relation Age of Onset   • Cancer Mother         cancer, smoker   • Stroke Maternal Grandmother    • Other Other         Crohn   • Kidney Disease Other         kidney transplant       Social History     Socioeconomic History   • Marital status:      Spouse name: Not on file   • Number of children: Not on file   • Years of education: Not on file   • Highest education level: Not on file   Occupational History     Comment: [employer]   Tobacco Use   • Smoking status: Former Smoker     Packs/day: 1.00     Years: 31.00     Pack years: 31.00     Quit date:      Years since quittin.8   • Smokeless tobacco: Never Used   Vaping Use   • Vaping Use: Never used   Substance and Sexual Activity   • Alcohol use: No   • Drug use: No   • Sexual activity: Not Currently     Partners: Male   Other Topics Concern   • Not on file   Social History Narrative   • Not on file     Social Determinants of Health     Financial Resource Strain: Low Risk    • Difficulty of Paying Living Expenses: Not very hard   Food Insecurity: No Food Insecurity   • Worried About Running Out of Food in the Last Year: Never true   • Ran Out of Food in the Last Year: Never true   Transportation Needs: No Transportation Needs   • Lack of Transportation (Medical): No   • Lack of Transportation (Non-Medical): No   Physical Activity: Inactive   • Days of Exercise per Week: 0 days   • Minutes of Exercise per Session: 0 min   Stress: No Stress Concern Present   • Feeling of Stress : Not at all   Social Connections: Unknown   • Frequency of Communication with Friends and Family: More than three  times a week   • Frequency of Social Gatherings with Friends and Family: Never   • Attends [employer] Services: Patient refused   • Active Member of Clubs or Organizations: No   • Attends Club or Organization Meetings: Never   • Marital Status:    Intimate Partner Violence:    • Fear of Current or Ex-Partner: Not on file   • Emotionally Abused: Not on file   • Physically Abused: Not on file   • Sexually Abused: Not on file   Housing Stability: Low Risk    • Unable to Pay for Housing in the Last Year: No   • Number of Places Lived in the Last Year: 1   • Unstable Housing in the Last Year: No       Allergies as of 11/15/2021   • (No Known Allergies)        Vitals:  Vitals:    11/15/21 1332   BP: 128/74   Pulse: 81   SpO2: 92%       Current medications as of today   Current Outpatient Medications   Medication Sig Dispense Refill   • spironolactone (ALDACTONE) 25 MG Tab Take 1 Tablet by mouth every day. 180 Tablet 3   • LEVOXYL 150 MCG Tab Take 1 Tablet by mouth every morning on an empty stomach. 100 Tablet 3   • bisoprolol (ZEBETA) 10 MG tablet Take 1 Tablet by mouth every day. 30 Tablet 4   • famotidine (PEPCID) 40 MG Tab Take 1 tablet by mouth every day. 90 tablet 3   • warfarin (COUMADIN) 6 MG Tab Take one tablet daily as directed by [facility]  Indications: Obstructing Blood Clot with Chronic Atrial Fibrillation 90 tablet 3   • Secukinumab (COSENTYX SENSOREADY PEN) 150 MG/ML Solution Auto-injector Inject 150 mg into the shoulder, thigh, or buttocks.     • atorvastatin (LIPITOR) 40 MG Tab TAKE 1 TABLET BY MOUTH AT  BEDTIME 100 tablet 3   • losartan (COZAAR) 50 MG Tab TAKE 1 TABLET BY MOUTH DAILY 100 tablet 1   • omeprazole (PRILOSEC) 20 MG delayed-release capsule Take 1 capsule by mouth every day. 100 capsule 3   • ferrous sulfate 325 (65 Fe) MG tablet Take 1 tablet by mouth every day. 90 tablet 2   • ascorbic acid ([employer] VITAMIN C) 500 MG tablet Take 1 tablet by mouth every day. 100 tablet  2   • loratadine (CLARITIN) 10 MG Tab Take 1 tablet by mouth 2 Times a Day. 60 tablet 5   • vitamin D (CHOLECALCIFEROL) 1000 UNIT Tab Take 4,000 Units by mouth every day.       No current facility-administered medications for this visit.         Physical Exam: Limited by COVID-19 precautions.  Appearance: Well developed, well nourished, no acute distress  Eyes: PERRL, EOM intact, sclera white, conjunctiva moist  Ears: no lesions or deformities  Hearing: grossly intact  Nose: no lesions or deformities  Respiratory effort: no intercostal retractions or use of accessory muscles  Extremities: no cyanosis or edema  Abdomen: soft   Gait and Station: normal  Digits and nails: no clubbing, cyanosis, petechiae or nodes.  Cranial nerves: grossly intact  Skin: no visible rashes, lesions or ulcers noted  Orientation: Oriented to time, person and place  Mood and affect: mood and affect appropriate, normal interaction with examiner  Judgement: Intact    Assessment:  1. Obstructive sleep apnea syndrome  DME Mask and Supplies   2. Moderate to severe pulmonary hypertension (HCC)     3. Paroxysmal atrial fibrillation (HCC)     4. Chronic combined systolic and diastolic congestive heart failure (HCC)     5. Essential hypertension     6. Anticoagulated     7. BMI 38.0-38.9,adult  Patient identified as having weight management issue.  Appropriate orders and counseling given.         Plan  Discussed the cardiovascular and neuropsychiatric risks of untreated SHAKA; including but not limited to: HTN, DM, MI, ASCVD, CVA, CHF, traffic accidents.     1. First compliance report from 10/16/21-11/14/21 was downloaded and reviewed with the patient which showed BiPAP IPAP/EPAP 14/10 cmH2O, 100% compliance, 7 hrs 23 min use, AHI of 1.7. Continue BiPAP. Patient is compliant and benefiting from BiPAP therapy for management of SHAKA. Advised patient to use the CPAP every night for more than four hours for optimal health benefit and to meet the health  insurance 70% compliance guideline.     *DME order ([facility]) for mask (medium Airtouch F20 FFM or MOC) and supplies was provided today. Continue to clean mask and supplies weekly with soap and water, and change supplies per insurance guidelines.     *Sleep hygiene discussed. Recommend keeping a set sleep/wake schedule. Logging enough hours of sleep. Limiting/Avoiding naps. No caffeine after noon and no heavy meals in the evening.    2-6. Continue to f/u with cardiology, Dr. Villar.  7. Encouraged a healthy diet and exercise to help with weight loss and management.   If your BMI is 25-29.9 you are overweight. If your BMI is 30 or greater you are obese. To lose weight eat less, move more, or both. Any diet that reduces caloric intake can help with weight loss. Extra weight may reduce your lifespan. Avoid dramatic unsustainable dietary changes that result in the yo-yo effect (down then back up.)  Usually small modifications in diet and exercise are easier to stick with.  8. Follow up with the appropriate healthcare practitioners for all other medical problems.  9. F/u in 1 year for SHAKA, sooner if needed.       SHONA Cruz.      This dictation was created using voice recognition software. The accuracy of the dictation is limited to the abilities of the software. I expect there may be some errors of grammar and possibly content.

## 2021-11-16 DIAGNOSIS — G47.33 OSA (OBSTRUCTIVE SLEEP APNEA): ICD-10-CM

## 2021-11-22 ENCOUNTER — ANTICOAGULATION MONITORING (OUTPATIENT)
Dept: VASCULAR LAB | Facility: MEDICAL CENTER | Age: 81
End: 2021-11-22

## 2021-11-22 DIAGNOSIS — I48.0 PAROXYSMAL ATRIAL FIBRILLATION (HCC): ICD-10-CM

## 2021-11-22 LAB — INR PPP: 2.7 (ref 2–3.5)

## 2021-11-23 NOTE — PROGRESS NOTES
OP Anticoagulation Service Note    Date: 2021    Anticoagulation Summary  As of 2021    INR goal:  2.0-3.0   TTR:  69.0 % (1.6 y)   INR used for dosin.70 (2021)   Warfarin maintenance plan:  6 mg (6 mg x 1) every day   Weekly warfarin total:  42 mg   Plan last modified:  Celina Diego, PharmD (2021)   Next INR check:  2021   Target end date:  Indefinite    Indications    Paroxysmal atrial fibrillation (HCC) [I48.0]             Anticoagulation Episode Summary     INR check location:      Preferred lab:      Send INR reminders to:      Comments:  [facility]      Anticoagulation Care Providers     Provider Role Specialty Phone number    WinniemeirCameronRenay Christian Paredes M.D. Referring Internal Medicine [phone]    [facility] Anticoagulation Services Responsible  [phone]        Anticoagulation Patient Findings      HPI:   The reason for today's call is to prevent morbidity and mortality from a blood clot and/or stroke and to reduce the risk of bleeding while on a anticoagulant.     PCP:  Asael Pink M.D.  [address] [place] [phone]    Assessment:     • INR  therapeutic.       Current Outpatient Medications:   •  spironolactone, 25 mg, Oral, DAILY  •  Levoxyl, 150 mcg, Oral, AM ES  •  bisoprolol, 10 mg, Oral, DAILY  •  famotidine, 40 mg, Oral, DAILY  •  warfarin, Take one tablet daily as directed by [facility]  Indications: Obstructing Blood Clot with Chronic Atrial Fibrillation  •  Cosentyx Sensoready Pen, Inject 150 mg into the shoulder, thigh, or buttocks.  •  atorvastatin, TAKE 1 TABLET BY MOUTH AT  BEDTIME  •  losartan, TAKE 1 TABLET BY MOUTH DAILY  •  omeprazole, 20 mg, Oral, QDAY  •  ferrous sulfate, 325 mg, Oral, DAILY  •  ascorbic acid, 500 mg, Oral, DAILY  •  loratadine, 10 mg, Oral, BID  •  vitamin D, 4,000 Units, Oral, DAILY      Plan:     • Continue the same warfarin dose, as noted above.       Follow-up:     • Our protocol suggests we test  in 2 weeks.        Additional information discussed with patient:     • Asked patient to please call the anticoagulation clinic if they have any signs/symptoms of bleeding and/or thrombosis or any changes to diet or medications.      National recommendations regarding anticoagulation therapy:     The CHEST guidelines recommends frequent INR monitoring at regular intervals (a few days up to a max of 12 weeks) to ensure patients are on the proper dose of warfarin, and patients are not having any complications from therapy.  INRs can dramatically change over a short time period due to diet, medications, and medical conditions.     [facility]  Phone [phone] fax [phone]    This note was created using voice recognition software (Dragon). The accuracy of the dictation is limited by the abilities of the software. I have reviewed the note prior to signing, however some errors in grammar and context are still possible. If you have any questions related to this note please do not hesitate to contact our office.

## 2021-11-30 ENCOUNTER — ANTICOAGULATION MONITORING (OUTPATIENT)
Dept: VASCULAR LAB | Facility: MEDICAL CENTER | Age: 81
End: 2021-11-30

## 2021-11-30 DIAGNOSIS — I48.0 PAROXYSMAL ATRIAL FIBRILLATION (HCC): ICD-10-CM

## 2021-11-30 LAB — INR PPP: 2.7 (ref 2–3.5)

## 2021-11-30 NOTE — PROGRESS NOTES
Anticoagulation Summary  As of 2021    INR goal:  2.0-3.0   TTR:  69.4 % (1.7 y)   INR used for dosin.70 (2021)   Warfarin maintenance plan:  6 mg (6 mg x 1) every day   Weekly warfarin total:  42 mg   Plan last modified:  Celina Diego PharmD (2021)   Next INR check:  2021   Target end date:  Indefinite    Indications    Paroxysmal atrial fibrillation (HCC) [I48.0]             Anticoagulation Episode Summary     INR check location:      Preferred lab:      Send INR reminders to:      Comments:  [facility]      Anticoagulation Care Providers     Provider Role Specialty Phone number    WinniedubCameronRenay Christian Paredes M.D. Referring Internal Medicine [phone]    [facility] Anticoagulation Services Responsible  [phone]        Anticoagulation Patient Findings    Left voicemail message to report a therapeutic INR of 2.7.  Requested pt contact the clinic for any s/s of unusual bleeding, bruising, clotting or any changes to diet or medication.   Pt is to continue with current warfarin dosing regimen.    Pt is not on antiplatelet therapy      FU 1 weeks.  Christian Jordan, AbadD, BCACP

## 2021-12-03 ENCOUNTER — HOSPITAL ENCOUNTER (EMERGENCY)
Facility: MEDICAL CENTER | Age: 81
End: 2021-12-03
Attending: EMERGENCY MEDICINE
Payer: MEDICARE

## 2021-12-03 ENCOUNTER — APPOINTMENT (OUTPATIENT)
Dept: RADIOLOGY | Facility: MEDICAL CENTER | Age: 81
End: 2021-12-03
Attending: EMERGENCY MEDICINE
Payer: MEDICARE

## 2021-12-03 VITALS
BODY MASS INDEX: 38.09 KG/M2 | HEART RATE: 76 BPM | HEIGHT: 63 IN | SYSTOLIC BLOOD PRESSURE: 141 MMHG | TEMPERATURE: 98.2 F | OXYGEN SATURATION: 97 % | DIASTOLIC BLOOD PRESSURE: 63 MMHG | WEIGHT: 215 LBS | RESPIRATION RATE: 18 BRPM

## 2021-12-03 DIAGNOSIS — M25.561 ACUTE PAIN OF RIGHT KNEE: ICD-10-CM

## 2021-12-03 PROCEDURE — 700102 HCHG RX REV CODE 250 W/ 637 OVERRIDE(OP): Performed by: EMERGENCY MEDICINE

## 2021-12-03 PROCEDURE — A9270 NON-COVERED ITEM OR SERVICE: HCPCS | Performed by: EMERGENCY MEDICINE

## 2021-12-03 PROCEDURE — 73562 X-RAY EXAM OF KNEE 3: CPT | Mod: RT

## 2021-12-03 PROCEDURE — 99284 EMERGENCY DEPT VISIT MOD MDM: CPT

## 2021-12-03 RX ORDER — OXYCODONE HYDROCHLORIDE 5 MG/1
5 TABLET ORAL EVERY 4 HOURS PRN
Qty: 10 TABLET | Refills: 0 | Status: SHIPPED | OUTPATIENT
Start: 2021-12-03 | End: 2021-12-06

## 2021-12-03 RX ORDER — OXYCODONE HYDROCHLORIDE 5 MG/1
5 TABLET ORAL ONCE
Status: COMPLETED | OUTPATIENT
Start: 2021-12-03 | End: 2021-12-03

## 2021-12-03 RX ADMIN — OXYCODONE 5 MG: 5 TABLET ORAL at 10:23

## 2021-12-03 ASSESSMENT — PAIN DESCRIPTION - PAIN TYPE: TYPE: ACUTE PAIN

## 2021-12-03 ASSESSMENT — FIBROSIS 4 INDEX: FIB4 SCORE: 1.29

## 2021-12-03 NOTE — ED PROVIDER NOTES
ED Provider Note    Scribed for Leah Molina M.D. by Rome Hernandez. 12/3/2021, 8:37 AM.    Primary care provider: Asael Pink M.D.  Means of arrival: EMS  History obtained from: Patient  History limited by: none    CHIEF COMPLAINT  Chief Complaint   Patient presents with   • Knee Pain     pt BIB [facility] with c/o R knee pain after she stood on it this morning; denies falling; pt unable to bear weight d/t pain       HPI  Aleshia Crooks is a 81 y.o. female who presents to the Emergency Department via EMS for evaluation of right knee pain onset one week ago. Patient was getting dressed this morning and had difficulty and pain when she stood on her right leg. She states her pain is worst behind her knee. She admits to associated symptoms of difficulty ambulating, but denies pain with palpation. Patient uses a cane to walk. No alleviating factors were reported. She is unsure if she has any arthritis.  No history of fevers or chills no history of trauma.  Patient is on warfarin for Afib. Patient does not see an Orthopedic specialist.    REVIEW OF SYSTEMS  Pertinent positives include right knee pain, difficulty ambulating. Pertinent negatives include no pain with palpation.      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    SURGICAL HISTORY   has a past surgical history that includes thyroidectomy total; cholecystectomy; multiple coronary artery bypass; and eye surgery (Bilateral).    SOCIAL HISTORY  Social History     Tobacco Use   • Smoking status: Former Smoker     Packs/day: 1.00     Years: 31.00     Pack years: 31.00     Quit date:      Years since quittin.9   • Smokeless tobacco: Never Used   Vaping Use   • Vaping Use: Never used   Substance Use Topics   • Alcohol use: No  "  • Drug use: No      Social History     Substance and Sexual Activity   Drug Use No       FAMILY HISTORY  Family History   Problem Relation Age of Onset   • Cancer Mother         cancer, smoker   • Stroke Maternal Grandmother    • Other Other         Crohn   • Kidney Disease Other         kidney transplant       CURRENT MEDICATIONS  Home Medications     Reviewed by Kee Esquivel R.N. (Registered Nurse) on 12/03/21 at 0825  Med List Status: Not Addressed   Medication Last Dose Status   ascorbic acid ([employer] VITAMIN C) 500 MG tablet  Active   atorvastatin (LIPITOR) 40 MG Tab  Active   bisoprolol (ZEBETA) 10 MG tablet  Active   famotidine (PEPCID) 40 MG Tab  Active   ferrous sulfate 325 (65 Fe) MG tablet  Active   LEVOXYL 150 MCG Tab  Active   loratadine (CLARITIN) 10 MG Tab  Active   losartan (COZAAR) 50 MG Tab  Active   omeprazole (PRILOSEC) 20 MG delayed-release capsule  Active   Secukinumab (COSENTYX SENSOREADY PEN) 150 MG/ML Solution Auto-injector  Active   spironolactone (ALDACTONE) 25 MG Tab  Active   vitamin D (CHOLECALCIFEROL) 1000 UNIT Tab  Active   warfarin (COUMADIN) 6 MG Tab  Active                ALLERGIES  No Known Allergies    PHYSICAL EXAM  VITAL SIGNS: BP (!) 175/73   Pulse 80   Temp 36.7 °C (98 °F) (Temporal)   Resp 16   Ht 1.6 m (5' 3\")   Wt 97.5 kg (215 lb)   LMP  (LMP Unknown)   SpO2 94%   BMI 38.09 kg/m²   Constitutional: Alert in no apparent distress. Well apearing  HENT: Normocephalic, Atraumatic, Bilateral external ears normal. Nose normal.   Eyes:  Conjunctiva normal, non-icteric.   Lungs: Non-labored respirations  Skin: Warm, Dry, No erythema, No rash.   Neurologic: Alert, Grossly non-focal.   Psychiatric: Affect normal, Judgment normal, Mood normal, Appears appropriate and not intoxicated.   Extremities: right knee: minimal effusion, no point tenderness, pain with passive range of motion     RADIOLOGY  DX-KNEE 3 VIEWS RIGHT   Final Result      1.  Suspect knee joint effusion.   2.  " Otherwise, right knee series within normal limits for the patient's age.        The radiologist's interpretation of all radiological studies have been reviewed by me.    COURSE & MEDICAL DECISION MAKING  Pertinent Labs & Imaging studies reviewed. (See chart for details)    8:37 AM - Patient seen and examined at bedside. I informed patient of plan of care which includes obtaining further labs and imaging. Ordered DX-Knee right to evaluate her symptoms.     9:57 AM Ordered oxycodone immediate-release to treat patient.    10:14 AM - I reevaluated the patient at bedside. I discussed the patient's diagnostic study results which show no fractures or evidence of infection. Patient will be placed in an immobilizer. She should manage pain and symptoms with prescribed medication and ice. She will follow up with Orthopedist. I discussed plan for discharge and follow up as outlined below. The patient verbalizes they feel comfortable going home. The patient is stable for discharge at this time and will return for any new or worsening symptoms. Patient verbalizes understanding and support with my plan for discharge.     I reviewed the [place] Prescription Monitoring Program.    In prescribing controlled substances to this patient, I certify that I have obtained and reviewed the medical history of Aleshia Crooks. I have also made a good luke effort to obtain applicable records from other providers who have treated the patient and records did not demonstrate any increased risk of substance abuse that would prevent me from prescribing controlled substances.     I have conducted a physical exam and documented it. I have reviewed Ms. Crooks’s prescription history as maintained by the [place] Prescription Monitoring Program.     I have assessed the patient’s risk for abuse, dependency, and addiction using the validated Opioid Risk Tool available at https://www.mdcalc.com/wawund-tyxw-wrhf-ort-narcotic-abuse.     Given the above, I  believe the benefits of controlled substance therapy outweigh the risks. The reasons for prescribing controlled substances include non-narcotic, oral analgesic alternatives have been inadequate for pain control. Accordingly, I have discussed the risk and benefits, treatment plan, and alternative therapies with the patient.       The patient will not drink alcohol nor drive with prescribed medications. The patient will return for new or worsening symptoms and is stable at the time of discharge. Patient was given return precautions. Patient and/or family member verbalizes understanding and will comply.    DISPOSITION:  Patient will be discharged home in stable condition.    FOLLOW UP:  Guillermo Patterson M.D.  [address]  [facility] [id number] [phone]      Follow up with orthopedics    [facility], Emergency Dept  [address] [facility] [address] [phone]    Return for worsening pain, fever or other concerns      OUTPATIENT MEDICATIONS:  Discharge Medication List as of 12/3/2021 10:29 AM      START taking these medications    Details   oxyCODONE immediate-release (ROXICODONE) 5 MG Tab Take 1 Tablet by mouth every four hours as needed for Severe Pain for up to 3 days., Disp-10 Tablet, R-0, Normal               FINAL IMPRESSION  1. Acute pain of right knee           This dictation has been created using voice recognition software and/or scribes. The accuracy of the dictation is limited by the abilities of the software and the expertise of the scribes. I expect there may be some errors of grammar and possibly content. I made every attempt to manually correct the errors within my dictation. However, errors related to voice recognition software and/or scribes may still exist and should be interpreted within the appropriate context.     I, Rome Hernandez (Emmy), am scribing for, and in the presence of, Leah Molina M.D..    Electronically signed by: Rome Hernandez  (Scribe), 12/3/2021    ILeah M.D. personally performed the services described in this documentation, as scribed by Rome Hernandez in my presence, and it is both accurate and complete. C    The note accurately reflects work and decisions made by me.  Leah Molina M.D.  12/3/2021  2:17 PM

## 2021-12-03 NOTE — ED TRIAGE NOTES
"Chief Complaint   Patient presents with   • Knee Pain     pt BIB [facility] with c/o R knee pain after she stood on it this morning; denies falling; pt unable to bear weight d/t pain     BP (!) 175/73   Pulse 80   Temp 36.7 °C (98 °F) (Temporal)   Resp 16   Ht 1.6 m (5' 3\")   Wt 97.5 kg (215 lb)   SpO2 94%      Pt with air splint on R leg placed by EMS. Pt received 1000 mg tylenol by EMS. Movement and sensation intact.  "

## 2021-12-06 ENCOUNTER — ANTICOAGULATION MONITORING (OUTPATIENT)
Dept: VASCULAR LAB | Facility: MEDICAL CENTER | Age: 81
End: 2021-12-06

## 2021-12-06 DIAGNOSIS — I48.0 PAROXYSMAL ATRIAL FIBRILLATION (HCC): ICD-10-CM

## 2021-12-06 LAB
INR PPP: 3.3 (ref 2–3.5)
INR PPP: 3.3 (ref 2–3.5)

## 2021-12-06 NOTE — PROGRESS NOTES
Anticoagulation Summary  As of 12/6/2021    INR goal:  2.0-3.0   TTR:  69.1 % (1.7 y)   INR used for dosing:  3.30 (12/6/2021)   Warfarin maintenance plan:  3 mg (6 mg x 0.5) every Mon, Fri; 6 mg (6 mg x 1) all other days   Weekly warfarin total:  36 mg   Plan last modified:  SHERMAN Escalante (12/6/2021)   Next INR check:     Target end date:  Indefinite    Indications    Paroxysmal atrial fibrillation (HCC) [I48.0]             Anticoagulation Episode Summary     INR check location:      Preferred lab:      Send INR reminders to:      Comments:  Elisa      Anticoagulation Care Providers     Provider Role Specialty Phone number    La Paredes M.D. Referring Internal Medicine [phone]    [facility]tico[facility] Responsible  [phone]        Anticoagulation Patient Findings        Spoke with patient by phone. Patient is supra- therapeutic. Denies any medication or diet changes. No current symptoms of bleeding or thrombosis reported. Continue current regimen. Follow up in 1 weeks.       Changes to current medical/health status since last appt: none.   Denies signs/symptoms of bleeding and/or thrombosis since the last appt.   She has not been able to eat as many of the green veg she usually eats due to a pulled muscle in her lag she is not cooking . She would like to decrease her dose until she is able to cook again   Denies any interval changes to medications since last appt.   Denies any complications or cost restrictions with current therapy        Decrease dose to 3 mg tonight and Friday test next week  Pt is not on antiplatelet therapy        The patient is on a high risk medication and is supra-therapeudic. This increases the patients risk of bleeding and therefore requires frequent monitoring and follow up.           CHEST guidelines recommend frequent INR monitoring at regular intervals (a few days up to a max of 12 weeks) to ensure they are on the proper dose of warfarin and not  having any complications from therapy.  INRs can dramatically change over a short time period due to diet, medications, and medical conditions.   The patient instructed to go to the ER for falls with a head injury,  blood in urine or stool or any bleeding that last longer than 20 min.      RAYNA Escalante.

## 2021-12-10 ENCOUNTER — OFFICE VISIT (OUTPATIENT)
Dept: DERMATOLOGY | Facility: IMAGING CENTER | Age: 81
End: 2021-12-10
Payer: MEDICARE

## 2021-12-10 DIAGNOSIS — L90.8 SKIN AGING: ICD-10-CM

## 2021-12-10 DIAGNOSIS — Z12.83 SKIN CANCER SCREENING: ICD-10-CM

## 2021-12-10 DIAGNOSIS — Z85.820 HX OF MELANOMA OF SKIN: ICD-10-CM

## 2021-12-10 DIAGNOSIS — D22.9 NEVUS: ICD-10-CM

## 2021-12-10 DIAGNOSIS — T14.8XXA ABRASION: ICD-10-CM

## 2021-12-10 DIAGNOSIS — L81.4 LENTIGO: ICD-10-CM

## 2021-12-10 DIAGNOSIS — L82.1 SEBORRHEIC KERATOSIS: ICD-10-CM

## 2021-12-10 DIAGNOSIS — L40.9 PSORIASIS: ICD-10-CM

## 2021-12-10 PROCEDURE — 99214 OFFICE O/P EST MOD 30 MIN: CPT | Performed by: DERMATOLOGY

## 2021-12-10 NOTE — PROGRESS NOTES
"CC:  Follow up Psoriasis       Subjective: prev seen patient follow here for  5 mth full skin exam/ PSO. Per patient has improved , rash controlled still breakouts once in awhile  Currently on cosentyx - had finished loading dose.  Due for next injection 12/15/21    Denies new, growing, changing, itching or bleeding skin lesions today.  Denies side effects of cosentyx -     A few scratches from puppy on arms and abrasion on nose from breatheright strips/CPAP.  Itching spot on buttocks.     From prior notes:  \"HPI: Rash   \"All over body\", May have started in small sites on back, legs.  Some sites improved but others have worsened.  States Betamethasone oint has made rash worse in the last 2 weeks   Discontinued about 3 days ago.  Has tried and failed triamcinolone, no improvement.  Using some topical, name not known, that is compounded in lotion.     Denies known oral steroids.  May have started after spironolactone prescribed.  Reports taking metoprolol for at least 3 years; could have had smaller sites noted after this medication was begun, unsure.     Onset: at least 7 months has progressively  has gotten worse in the last 2 weeks . Thinks after flu vaccine in September started to developed  symptoms . Was evaluated by another Dermatologist 6 months ago . Bx done . 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  History of blistering/severe sunburns:Yes, Details: .  Family history of skin cancer:No  Family history of atypical moles:No    ROS: no fevers/chills. +/- itch.  No cough. No LAD.  No weight loss.   DermPMH: hx melanoma left shoulder, 2014? Per records  No problem-specific Assessment & Plan notes found for this " encounter.    Relevant PMH: many med comorbidities.  GONZALEZ, CHF, kidney Dz, hypothyroidism  Social: former smoker; denies travel abroad.  Daughter with well water which she doesn't drink due to taste.  FmHx:  not affected    PE: Gen:WDWN female in NAD.  Skin: scalp/face/lips/neck/chest/back/abdomen/arms/hands/buttocks and posterior thighs evaluated today.  Declined additional exam.   -abrasion on nasal bridge.   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 Consider ACD, Nummular dermatitis or drug reaction    Labs: Hep B/C (Ab X5) April 2020 - WNL  Quantiferon negative - May 2021  O&P - negative    A/P: PSO: Papulosquamous rash, prior path showing spongiotic dermatitis + eosinophils.  Suspicion for drug reaction and possible MTX intolerance: kidney dz and elevated Cr after test dose.  Steroid responsive by patient report.  Cannot exclude PSO; NOW IMPROVED !! on Cosentyx, tolerating well       -cont cosentyx 300mg SubCut Qmonth   -cont current home supply topicals - Lidex cream vs betamethasone oint BID severe sites and TAC oint BID less severe sites PRN.    High risk medication: monitoring for safety prior to therapeutic doses:  -Next TB test May 2022    New rash/itching, buttocks:  -lidex cream BID PRN    Abrasion nose:  -rec vaseline to heal    Hx of skin cancer: NER - left arm melanoma  -cont'd sunprotection and skin cancer surveillance  -Q 6mo-annual exam recommended; f/u suspicious lesions PRN    Lentigo/SKs/nevi, back:  -b/r    I have reviewed medications relevant to my specialty.

## 2021-12-13 ENCOUNTER — ANTICOAGULATION MONITORING (OUTPATIENT)
Dept: VASCULAR LAB | Facility: MEDICAL CENTER | Age: 81
End: 2021-12-13

## 2021-12-13 DIAGNOSIS — I48.0 PAROXYSMAL ATRIAL FIBRILLATION (HCC): ICD-10-CM

## 2021-12-13 LAB — INR PPP: 2.9 (ref 2–3.5)

## 2021-12-13 NOTE — PROGRESS NOTES
OP Anticoagulation Service Note    Date: 2021    Anticoagulation Summary  As of 2021    INR goal:  2.0-3.0   TTR:  68.6 % (1.7 y)   INR used for dosin.90 (2021)   Warfarin maintenance plan:  3 mg (6 mg x 0.5) every Mon, Fri; 6 mg (6 mg x 1) all other days   Weekly warfarin total:  36 mg   Plan last modified:  SHERMAN Escalante (2021)   Next INR check:  2021   Target end date:  Indefinite    Indications    Paroxysmal atrial fibrillation (HCC) [I48.0]             Anticoagulation Episode Summary     INR check location:      Preferred lab:      Send INR reminders to:      Comments:  Elisa      Anticoagulation Care Providers     Provider Role Specialty Phone number    La Paredes M.D. Referring Internal Medicine [phone]    [facility] Anticoagulation [facility] Responsible  [phone]        Anticoagulation Patient Findings        HPI:   The reason for today's call is to prevent morbidity and mortality from a blood clot and/or stroke and to reduce the risk of bleeding while on a anticoagulant.     PCP:  Asael Pink M.D.  [address] [place] [id number]    Assessment:     • INR  therapeutic.       Current Outpatient Medications:   •  fluocinonide, AAA BID PRN itching  •  spironolactone, 25 mg, Oral, DAILY  •  Levoxyl, 150 mcg, Oral, AM ES  •  bisoprolol, 10 mg, Oral, DAILY  •  famotidine, 40 mg, Oral, DAILY  •  warfarin, Take one tablet daily as directed by [facility]  Indications: Obstructing Blood Clot with Chronic Atrial Fibrillation  •  Cosentyx Sensoready Pen, Inject 150 mg into the shoulder, thigh, or buttocks.  •  atorvastatin, TAKE 1 TABLET BY MOUTH AT  BEDTIME  •  losartan, TAKE 1 TABLET BY MOUTH DAILY  •  omeprazole, 20 mg, Oral, QDAY  •  ferrous sulfate, 325 mg, Oral, DAILY  •  ascorbic acid, 500 mg, Oral, DAILY  •  loratadine, 10 mg, Oral, BID  •  vitamin D, 4,000 Units, Oral, DAILY      Plan:     • Continue the same  warfarin dose, as noted above.       Follow-up:     • Our protocol suggests we test in 2 weeks.        Additional information discussed with patient:     • Asked patient to please call the anticoagulation clinic if they have any signs/symptoms of bleeding and/or thrombosis or any changes to diet or medications.      National recommendations regarding anticoagulation therapy:     The CHEST guidelines recommends frequent INR monitoring at regular intervals (a few days up to a max of 12 weeks) to ensure patients are on the proper dose of warfarin, and patients are not having any complications from therapy.  INRs can dramatically change over a short time period due to diet, medications, and medical conditions.     [facility]  Phone [phone] fax [phone]    This note was created using voice recognition software (Dragon). The accuracy of the dictation is limited by the abilities of the software. I have reviewed the note prior to signing, however some errors in grammar and context are still possible. If you have any questions related to this note please do not hesitate to contact our office.

## 2021-12-14 DIAGNOSIS — E89.0 POSTSURGICAL HYPOTHYROIDISM: ICD-10-CM

## 2021-12-20 ENCOUNTER — ANTICOAGULATION MONITORING (OUTPATIENT)
Dept: VASCULAR LAB | Facility: MEDICAL CENTER | Age: 81
End: 2021-12-20

## 2021-12-20 ENCOUNTER — HOSPITAL ENCOUNTER (OUTPATIENT)
Dept: LAB | Facility: MEDICAL CENTER | Age: 81
End: 2021-12-20
Attending: FAMILY MEDICINE
Payer: MEDICARE

## 2021-12-20 DIAGNOSIS — E89.0 POSTSURGICAL HYPOTHYROIDISM: ICD-10-CM

## 2021-12-20 DIAGNOSIS — I48.0 PAROXYSMAL ATRIAL FIBRILLATION (HCC): ICD-10-CM

## 2021-12-20 LAB
INR PPP: 2.8 (ref 2–3.5)
TSH SERPL DL<=0.005 MIU/L-ACNC: 1.23 UIU/ML (ref 0.38–5.33)

## 2021-12-20 PROCEDURE — 84443 ASSAY THYROID STIM HORMONE: CPT

## 2021-12-20 PROCEDURE — 36415 COLL VENOUS BLD VENIPUNCTURE: CPT

## 2021-12-20 NOTE — PROGRESS NOTES
Anticoagulation Summary  As of 2021    INR goal:  2.0-3.0   TTR:  69.0 % (1.7 y)   INR used for dosin.80 (2021)   Warfarin maintenance plan:  3 mg (6 mg x 0.5) every Mon, Fri; 6 mg (6 mg x 1) all other days   Weekly warfarin total:  36 mg   Plan last modified:  SHERMAN Escalante (2021)   Next INR check:  2021   Target end date:  Indefinite    Indications    Paroxysmal atrial fibrillation (HCC) [I48.0]             Anticoagulation Episode Summary     INR check location:      Preferred lab:      Send INR reminders to:      Comments:  [facility]      Anticoagulation Care Providers     Provider Role Specialty Phone number    La Paredes M.D. Referring Internal Medicine [phone]    [facility] Anticoagulation Services Responsible  [phone]          Refer to Anticoagulation Patient Findings for HPI  Patient Findings     Negatives:  Signs/symptoms of thrombosis, Signs/symptoms of bleeding, Laboratory test error suspected, Change in health, Change in alcohol use, Change in activity, Upcoming invasive procedure, Emergency department visit, Upcoming dental procedure, Missed doses, Extra doses, Change in medications, Change in diet/appetite, Hospital admission, Bruising, Other complaints          Spoke with patient Aleshia to report a therapeutic INR.      Pt is NOT on antiplatelet therapy    Pt instructed to continue with current warfarin dosing regimen, confirms dosing.   Will follow up in one week per patient preference.     Ren Acharya, PharmD

## 2021-12-21 ENCOUNTER — OFFICE VISIT (OUTPATIENT)
Dept: CARDIOLOGY | Facility: MEDICAL CENTER | Age: 81
End: 2021-12-21
Payer: MEDICARE

## 2021-12-21 VITALS
WEIGHT: 218.4 LBS | HEIGHT: 63 IN | OXYGEN SATURATION: 96 % | BODY MASS INDEX: 38.7 KG/M2 | RESPIRATION RATE: 16 BRPM | DIASTOLIC BLOOD PRESSURE: 80 MMHG | SYSTOLIC BLOOD PRESSURE: 138 MMHG | HEART RATE: 84 BPM

## 2021-12-21 DIAGNOSIS — N18.4 CKD (CHRONIC KIDNEY DISEASE) STAGE 4, GFR 15-29 ML/MIN (HCC): ICD-10-CM

## 2021-12-21 DIAGNOSIS — I25.10 CORONARY ARTERY DISEASE INVOLVING NATIVE CORONARY ARTERY OF NATIVE HEART WITHOUT ANGINA PECTORIS: ICD-10-CM

## 2021-12-21 DIAGNOSIS — K76.0 HEPATIC STEATOSIS: ICD-10-CM

## 2021-12-21 DIAGNOSIS — K75.81 LIVER CIRRHOSIS SECONDARY TO NASH (NONALCOHOLIC STEATOHEPATITIS) (HCC): ICD-10-CM

## 2021-12-21 DIAGNOSIS — I50.42 CHRONIC COMBINED SYSTOLIC AND DIASTOLIC CONGESTIVE HEART FAILURE (HCC): ICD-10-CM

## 2021-12-21 DIAGNOSIS — N18.32 STAGE 3B CHRONIC KIDNEY DISEASE: ICD-10-CM

## 2021-12-21 DIAGNOSIS — R93.5 ABNORMAL CT OF THE ABDOMEN: ICD-10-CM

## 2021-12-21 DIAGNOSIS — Z63.6 CAREGIVER STRESS: ICD-10-CM

## 2021-12-21 DIAGNOSIS — E89.0 POSTSURGICAL HYPOTHYROIDISM: ICD-10-CM

## 2021-12-21 DIAGNOSIS — I34.0 SEVERE MITRAL REGURGITATION: ICD-10-CM

## 2021-12-21 DIAGNOSIS — E78.2 MIXED HYPERLIPIDEMIA: ICD-10-CM

## 2021-12-21 DIAGNOSIS — I27.20 MODERATE TO SEVERE PULMONARY HYPERTENSION (HCC): ICD-10-CM

## 2021-12-21 DIAGNOSIS — K74.60 LIVER CIRRHOSIS SECONDARY TO NASH (NONALCOHOLIC STEATOHEPATITIS) (HCC): ICD-10-CM

## 2021-12-21 DIAGNOSIS — I48.0 PAROXYSMAL ATRIAL FIBRILLATION (HCC): ICD-10-CM

## 2021-12-21 DIAGNOSIS — R79.89 ABNORMAL CBC: ICD-10-CM

## 2021-12-21 DIAGNOSIS — G47.33 OBSTRUCTIVE SLEEP APNEA TREATED WITH CONTINUOUS POSITIVE AIRWAY PRESSURE (CPAP): ICD-10-CM

## 2021-12-21 DIAGNOSIS — G47.33 OBSTRUCTIVE SLEEP APNEA SYNDROME: ICD-10-CM

## 2021-12-21 DIAGNOSIS — D50.9 MICROCYTIC ANEMIA: ICD-10-CM

## 2021-12-21 DIAGNOSIS — I10 ESSENTIAL HYPERTENSION: Chronic | ICD-10-CM

## 2021-12-21 PROCEDURE — 99214 OFFICE O/P EST MOD 30 MIN: CPT | Performed by: INTERNAL MEDICINE

## 2021-12-21 RX ORDER — SPIRONOLACTONE 25 MG/1
25 TABLET ORAL DAILY
Qty: 180 TABLET | Refills: 3 | Status: SHIPPED | OUTPATIENT
Start: 2021-12-21 | End: 2022-04-07

## 2021-12-21 RX ORDER — LOSARTAN POTASSIUM 50 MG/1
TABLET ORAL
Qty: 100 TABLET | Refills: 1 | Status: SHIPPED | OUTPATIENT
Start: 2021-12-21 | End: 2022-06-27

## 2021-12-21 RX ORDER — ATORVASTATIN CALCIUM 40 MG/1
TABLET, FILM COATED ORAL
Qty: 100 TABLET | Refills: 3 | Status: SHIPPED | OUTPATIENT
Start: 2021-12-21 | End: 2022-11-16 | Stop reason: SDUPTHER

## 2021-12-21 RX ORDER — BISOPROLOL FUMARATE 10 MG/1
10 TABLET, FILM COATED ORAL DAILY
Qty: 90 TABLET | Refills: 4 | Status: SHIPPED | OUTPATIENT
Start: 2021-12-21 | End: 2022-04-19

## 2021-12-21 SDOH — SOCIAL STABILITY - SOCIAL INSECURITY: DEPENDENT RELATIVE NEEDING CARE AT HOME: Z63.6

## 2021-12-21 ASSESSMENT — MINNESOTA LIVING WITH HEART FAILURE QUESTIONNAIRE (MLHF)
FEELING LIKE A BURDEN TO FAMILY AND FRIENDS: 0
TOTAL_SCORE: 0
LOSS OF SELF CONTROL IN YOUR LIFE: 0
DIFFICULTY WITH SEXUAL ACTIVITIES: 0
MAKING YOU WORRY: 0
WALKING ABOUT OR CLIMBING STAIRS DIFFICULT: 0
MAKING YOU STAY IN A HOSPITAL: 0
COSTING YOU MONEY FOR MEDICAL CARE: 0
DIFFICULTY WITH RECREATIONAL PASTIMES, SPORTS, HOBBIES: 0
DIFFICULTY SLEEPING WELL AT NIGHT: 0
EATING LESS FOODS YOU LIKE: 0
WORKING AROUND THE HOUSE OR YARD DIFFICULT: 0
GIVING YOU SIDE EFFECTS FROM TREATMENTS: 0
DIFFICULTY WORKING TO EARN A LIVING: 0
DIFFICULTY TO CONCENTRATE OR REMEMBERING THINGS: 0
DIFFICULTY SOCIALIZING WITH FAMILY OR FRIENDS: 0
MAKING YOU FEEL DEPRESSED: 0
SWELLING IN ANKLES OR LEGS: 0
TIRED, FATIGUED OR LOW ON ENERGY: 0
HAVING TO SIT OR LIE DOWN DURING THE DAY: 0
MAKING YOU SHORT OF BREATH: 0
DIFFICULTY GOING AWAY FROM HOME: 0

## 2021-12-21 ASSESSMENT — ENCOUNTER SYMPTOMS
COUGH: 0
ORTHOPNEA: 0
WHEEZING: 0
CARDIOVASCULAR NEGATIVE: 1
SHORTNESS OF BREATH: 0
WEAKNESS: 0
SORE THROAT: 0
HEMOPTYSIS: 0
CHILLS: 0
CONSTITUTIONAL NEGATIVE: 1
BRUISES/BLEEDS EASILY: 0
PND: 0
DIZZINESS: 0
STRIDOR: 0
NEUROLOGICAL NEGATIVE: 1
CLAUDICATION: 0
MUSCULOSKELETAL NEGATIVE: 1
RESPIRATORY NEGATIVE: 1
LOSS OF CONSCIOUSNESS: 0
GASTROINTESTINAL NEGATIVE: 1
PALPITATIONS: 0
EYES NEGATIVE: 1
SPUTUM PRODUCTION: 0
FEVER: 0

## 2021-12-21 ASSESSMENT — FIBROSIS 4 INDEX: FIB4 SCORE: 1.29

## 2021-12-21 NOTE — PROGRESS NOTES
Chief Complaint   Patient presents with   • Coronary Artery Disease     F/V Dx: Coronary artery disease involving native coronary artery of native heart without angina pectoris, s/p 1V CABG        Subjective:   Aleshia Crooks is a 80 y.o. female who presents today as a follow-up for her CAD status post CABG hypertension hyperlipidemia lower extremity edema and mitral valve regurgitation with paroxysmal atrial fibrillation.   Since she was last seen she continues to be stable.  She denies any chest pain shortness of breath or PND.  Blood pressures been stable.  She has no lower extremity edema.    Past Medical History:   Diagnosis Date   • Apnea, sleep    • Atrial fibrillation (HCC)    • Gasping for breath    • Heart attack (HCC)    • Hyperlipidemia    • Hypertension    • 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) (HCC) 3/25/2021   • Thyroid disease      Past Surgical History:   Procedure Laterality Date   • CHOLECYSTECTOMY     • EYE SURGERY Bilateral     cataracts   • MULTIPLE CORONARY ARTERY BYPASS      1 bypass   • THYROIDECTOMY TOTAL       Family History   Problem Relation Age of Onset   • Cancer Mother         cancer, smoker   • Stroke Maternal Grandmother    • Other Other         Crohn   • Kidney Disease Other         kidney transplant     Social History     Socioeconomic History   • Marital status:      Spouse name: Not on file   • Number of children: Not on file   • Years of education: Not on file   • Highest education level: Not on file   Occupational History     Comment: [employer]   Tobacco Use   • Smoking status: Former Smoker     Packs/day: 1.00     Years: 31.00     Pack years: 31.00     Quit date:      Years since quittin.9   • Smokeless tobacco: Never Used   Vaping Use   • Vaping Use: Never used   Substance and Sexual Activity   • Alcohol use: No   • Drug use: No   • Sexual activity: Not Currently     Partners: Male   Other Topics Concern   • Not on file    Social History Narrative   • Not on file     Social Determinants of Health     Financial Resource Strain: Low Risk    • Difficulty of Paying Living Expenses: Not very hard   Food Insecurity: No Food Insecurity   • Worried About Running Out of Food in the Last Year: Never true   • Ran Out of Food in the Last Year: Never true   Transportation Needs: No Transportation Needs   • Lack of Transportation (Medical): No   • Lack of Transportation (Non-Medical): No   Physical Activity: Inactive   • Days of Exercise per Week: 0 days   • Minutes of Exercise per Session: 0 min   Stress: No Stress Concern Present   • Feeling of Stress : Not at all   Social Connections: Unknown   • Frequency of Communication with Friends and Family: More than three times a week   • Frequency of Social Gatherings with Friends and Family: Never   • Attends Orthodoxy Services: Patient refused   • Active Member of Clubs or Organizations: No   • Attends Club or Organization Meetings: Never   • Marital Status:    Intimate Partner Violence:    • Fear of Current or Ex-Partner: Not on file   • Emotionally Abused: Not on file   • Physically Abused: Not on file   • Sexually Abused: Not on file   Housing Stability: Low Risk    • Unable to Pay for Housing in the Last Year: No   • Number of Places Lived in the Last Year: 1   • Unstable Housing in the Last Year: No     No Known Allergies  Outpatient Encounter Medications as of 12/21/2021   Medication Sig Dispense Refill   • atorvastatin (LIPITOR) 40 MG Tab TAKE 1 TABLET BY MOUTH AT  BEDTIME 100 Tablet 3   • bisoprolol (ZEBETA) 10 MG tablet Take 1 Tablet by mouth every day. 90 Tablet 4   • losartan (COZAAR) 50 MG Tab TAKE 1 TABLET BY MOUTH DAILY 100 Tablet 1   • spironolactone (ALDACTONE) 25 MG Tab Take 1 Tablet by mouth every day. 180 Tablet 3   • fluocinonide (LIDEX) 0.05 % Cream AAA BID PRN itching 60 g 0   • LEVOXYL 150 MCG Tab Take 1 Tablet by mouth every morning on an empty stomach. 100 Tablet 3    • famotidine (PEPCID) 40 MG Tab Take 1 tablet by mouth every day. 90 tablet 3   • warfarin (COUMADIN) 6 MG Tab Take one tablet daily as directed by [facility]  Indications: Obstructing Blood Clot with Chronic Atrial Fibrillation 90 tablet 3   • Secukinumab (COSENTYX SENSOREADY PEN) 150 MG/ML Solution Auto-injector Inject 150 mg into the shoulder, thigh, or buttocks.     • omeprazole (PRILOSEC) 20 MG delayed-release capsule Take 1 capsule by mouth every day. 100 capsule 3   • ferrous sulfate 325 (65 Fe) MG tablet Take 1 tablet by mouth every day. 90 tablet 2   • ascorbic acid ([employer] VITAMIN C) 500 MG tablet Take 1 tablet by mouth every day. 100 tablet 2   • loratadine (CLARITIN) 10 MG Tab Take 1 tablet by mouth 2 Times a Day. 60 tablet 5   • vitamin D (CHOLECALCIFEROL) 1000 UNIT Tab Take 4,000 Units by mouth every day.     • [DISCONTINUED] spironolactone (ALDACTONE) 25 MG Tab Take 1 Tablet by mouth every day. 180 Tablet 3   • [DISCONTINUED] bisoprolol (ZEBETA) 10 MG tablet Take 1 Tablet by mouth every day. 30 Tablet 4   • [DISCONTINUED] atorvastatin (LIPITOR) 40 MG Tab TAKE 1 TABLET BY MOUTH AT  BEDTIME 100 tablet 3   • [DISCONTINUED] losartan (COZAAR) 50 MG Tab TAKE 1 TABLET BY MOUTH DAILY 100 tablet 1     No facility-administered encounter medications on file as of 12/21/2021.     Review of Systems   Constitutional: Negative.  Negative for chills, fever and malaise/fatigue.   HENT: Negative.  Negative for sore throat.    Eyes: Negative.    Respiratory: Negative.  Negative for cough, hemoptysis, sputum production, shortness of breath, wheezing and stridor.    Cardiovascular: Negative.  Negative for chest pain, palpitations, orthopnea, claudication, leg swelling and PND.   Gastrointestinal: Negative.    Genitourinary: Negative.    Musculoskeletal: Negative.    Skin: Negative.    Neurological: Negative.  Negative for dizziness, loss of consciousness and weakness.   Endo/Heme/Allergies: Negative.   "Does not bruise/bleed easily.   All other systems reviewed and are negative.       Objective:   /80 (BP Location: Right arm, Patient Position: Sitting, BP Cuff Size: Adult)   Pulse 84   Resp 16   Ht 1.6 m (5' 3\")   Wt 99.1 kg (218 lb 6.4 oz)   LMP  (LMP Unknown)   SpO2 96%   BMI 38.69 kg/m²     Physical Exam  Vitals and nursing note reviewed.   Constitutional:       General: She is not in acute distress.     Appearance: She is well-developed. She is not diaphoretic.   HENT:      Head: Normocephalic and atraumatic.      Right Ear: External ear normal.      Left Ear: External ear normal.      Nose: Nose normal.      Mouth/Throat:      Pharynx: No oropharyngeal exudate.   Eyes:      General: No scleral icterus.        Right eye: No discharge.         Left eye: No discharge.      Conjunctiva/sclera: Conjunctivae normal.      Pupils: Pupils are equal, round, and reactive to light.   Neck:      Vascular: No JVD.   Cardiovascular:      Rate and Rhythm: Normal rate and regular rhythm.      Heart sounds: No murmur heard.  No friction rub. No gallop.    Pulmonary:      Effort: Pulmonary effort is normal. No respiratory distress.      Breath sounds: No stridor. No wheezing or rales.   Chest:      Chest wall: No tenderness.   Abdominal:      General: There is no distension.      Palpations: Abdomen is soft.      Tenderness: There is no guarding.   Musculoskeletal:         General: No tenderness or deformity. Normal range of motion.      Cervical back: Neck supple.   Skin:     General: Skin is warm and dry.      Coloration: Skin is not pale.      Findings: No erythema or rash.   Neurological:      Mental Status: She is alert.      Cranial Nerves: No cranial nerve deficit.      Motor: No abnormal muscle tone.      Coordination: Coordination normal.      Deep Tendon Reflexes: Reflexes are normal and symmetric. Reflexes normal.   Psychiatric:         Behavior: Behavior normal.         Thought Content: Thought content " normal.         Judgment: Judgment normal.         Assessment:     1. 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) (HCC)  spironolactone (ALDACTONE) 25 MG Tab   2. Caregiver stress     3. CKD (chronic kidney disease) stage 4, GFR 15-29 ml/min (HCC)     4. Postsurgical hypothyroidism     5. Microcytic anemia     6. Severe mitral regurgitation  atorvastatin (LIPITOR) 40 MG Tab   7. Moderate to severe pulmonary hypertension (HCC)  atorvastatin (LIPITOR) 40 MG Tab   8. Chronic combined systolic and diastolic congestive heart failure (HCC)  atorvastatin (LIPITOR) 40 MG Tab    bisoprolol (ZEBETA) 10 MG tablet   9. Paroxysmal atrial fibrillation (HCC)  atorvastatin (LIPITOR) 40 MG Tab    spironolactone (ALDACTONE) 25 MG Tab   10. Coronary artery disease involving native coronary artery of native heart without angina pectoris, s/p 1V CABG 1992  atorvastatin (LIPITOR) 40 MG Tab   11. Essential hypertension  atorvastatin (LIPITOR) 40 MG Tab    bisoprolol (ZEBETA) 10 MG tablet    losartan (COZAAR) 50 MG Tab    spironolactone (ALDACTONE) 25 MG Tab   12. Obstructive sleep apnea syndrome     13. Abnormal CT of the abdomen     14. Mixed hyperlipidemia     15. Hepatic steatosis     16. Abnormal CBC     17. Stage 3b chronic kidney disease (HCC)     18. Coronary artery disease involving native coronary artery of native heart without angina pectoris  bisoprolol (ZEBETA) 10 MG tablet   19. Obstructive sleep apnea treated with continuous positive airway pressure (CPAP)  spironolactone (ALDACTONE) 25 MG Tab       Medical Decision Making:  Today's Assessment / Status / Plan:   80-year-old female with paroxysmal atrial fibrillation and might regurgitation which appears to improved following diuresis.  At this point very happy with how she is doing.  I will make no changes to her medical therapy.  I refilled her medications.  I will see her back in 6 months.    1. Severe MR, now improved after diuresis    - repeat TTE  showed moderate     2. COPD?    - obtain PFTs from prior pulmonologist     3. CAD s/p CABG    - cont atorva 40     4. A-fib    - cont metpo 50 BID    - cont warfarin     5. CHF, normalized    - per above    - cont losartan 50     6. Edema    - cont furosemide 40    - cont spiro25

## 2021-12-27 ENCOUNTER — ANTICOAGULATION MONITORING (OUTPATIENT)
Dept: MEDICAL GROUP | Facility: MEDICAL CENTER | Age: 81
End: 2021-12-27

## 2021-12-27 DIAGNOSIS — D50.0 IRON DEFICIENCY ANEMIA DUE TO CHRONIC BLOOD LOSS: ICD-10-CM

## 2021-12-27 DIAGNOSIS — I48.0 PAROXYSMAL ATRIAL FIBRILLATION (HCC): ICD-10-CM

## 2021-12-27 LAB — INR PPP: 2.7 (ref 2–3.5)

## 2021-12-27 RX ORDER — LORATADINE 10 MG
TABLET ORAL
Qty: 90 TABLET | Refills: 3 | Status: SHIPPED | OUTPATIENT
Start: 2021-12-27 | End: 2022-12-14

## 2021-12-27 NOTE — PROGRESS NOTES
OP Anticoagulation Service Note    Date: 2021    Anticoagulation Summary  As of 2021    INR goal:  2.0-3.0   TTR:  69.3 % (1.7 y)   INR used for dosin.70 (2021)   Warfarin maintenance plan:  3 mg (6 mg x 0.5) every Mon, Fri; 6 mg (6 mg x 1) all other days   Weekly warfarin total:  36 mg   Plan last modified:  SHERMAN Escalanet (2021)   Next INR check:  2022   Target end date:  Indefinite    Indications    Paroxysmal atrial fibrillation (HCC) [I48.0]             Anticoagulation Episode Summary     INR check location:      Preferred lab:      Send INR reminders to:      Comments:  [facility]      Anticoagulation Care Providers     Provider Role Specialty Phone number    La Paredes M.D. Referring Internal Medicine [phone]    [facility] Responsible  [phone]        Anticoagulation Patient Findings      Voice message for patient regarding their anticoagulant.     HPI:   The reason for today's call is to prevent morbidity and mortality from a blood clot and/or stroke and to reduce the risk of bleeding while on a anticoagulant.     PCP:  Asael Pink M.D.  [address] [place] [phone]    Assessment:     • INR  therapeutic.       Current Outpatient Medications:   •  atorvastatin, TAKE 1 TABLET BY MOUTH AT  BEDTIME  •  bisoprolol, 10 mg, Oral, DAILY  •  losartan, TAKE 1 TABLET BY MOUTH DAILY  •  spironolactone, 25 mg, Oral, DAILY  •  fluocinonide, AAA BID PRN itching  •  Levoxyl, 150 mcg, Oral, AM ES  •  famotidine, 40 mg, Oral, DAILY  •  warfarin, Take one tablet daily as directed by [facility]  Indications: Obstructing Blood Clot with Chronic Atrial Fibrillation  •  Cosentyx Sensoready Pen, Inject 150 mg into the shoulder, thigh, or buttocks.  •  omeprazole, 20 mg, Oral, QDAY  •  ferrous sulfate, 325 mg, Oral, DAILY  •  ascorbic acid, 500 mg, Oral, DAILY  •  loratadine, 10 mg, Oral, BID  •  vitamin D, 4,000  Units, Oral, DAILY      Plan:     • Continue the same warfarin dose, as noted above.       Follow-up:     • Our protocol suggests we test in 4 weeks.        Additional information discussed with patient:     • Asked patient to please call the anticoagulation clinic if they have any signs/symptoms of bleeding and/or thrombosis or any changes to diet or medications.      National recommendations regarding anticoagulation therapy:     The CHEST guidelines recommends frequent INR monitoring at regular intervals (a few days up to a max of 12 weeks) to ensure patients are on the proper dose of warfarin, and patients are not having any complications from therapy.  INRs can dramatically change over a short time period due to diet, medications, and medical conditions.     [facility]  Phone [phone] fax [phone]    This note was created using voice recognition software (Dragon). The accuracy of the dictation is limited by the abilities of the software. I have reviewed the note prior to signing, however some errors in grammar and context are still possible. If you have any questions related to this note please do not hesitate to contact our office.

## 2022-01-03 ENCOUNTER — ANTICOAGULATION MONITORING (OUTPATIENT)
Dept: MEDICAL GROUP | Facility: PHYSICIAN GROUP | Age: 82
End: 2022-01-03

## 2022-01-03 DIAGNOSIS — I48.0 PAROXYSMAL ATRIAL FIBRILLATION (HCC): ICD-10-CM

## 2022-01-03 LAB — INR PPP: 2.5 (ref 2–3.5)

## 2022-01-04 NOTE — PROGRESS NOTES
Anticoagulation Summary  As of 1/3/2022    INR goal:  2.0-3.0   TTR:  69.7 % (1.8 y)   INR used for dosin.50 (1/3/2022)   Warfarin maintenance plan:  3 mg (6 mg x 0.5) every Mon, Fri; 6 mg (6 mg x 1) all other days   Weekly warfarin total:  36 mg   Plan last modified:  Dallin Viveros, PharmD (2021)   Next INR check:  2022   Target end date:  Indefinite    Indications    Paroxysmal atrial fibrillation (HCC) [I48.0]             Anticoagulation Episode Summary     INR check location:      Preferred lab:      Send INR reminders to:      Comments:  [facility]      Anticoagulation Care Providers     Provider Role Specialty Phone number    La Paredes M.D. Referring Internal Medicine [phone]    [facility] Responsible  [phone]          Refer to Anticoagulation Patient Findings for HPI  Patient Findings     Negatives:  Signs/symptoms of thrombosis, Signs/symptoms of bleeding, Laboratory test error suspected, Change in health, Change in alcohol use, Change in activity, Upcoming invasive procedure, Emergency department visit, Upcoming dental procedure, Missed doses, Extra doses, Change in medications, Change in diet/appetite, Hospital admission, Bruising, Other complaints          Spoke with patient to report a therapeutic INR.      Pt is NOT on antiplatelet therapy     Pt instructed to continue with current warfarin dosing regimen, confirms dosing.     Will follow up in 1 week(s).    Rosanna Rich, Pharmacy Intern

## 2022-01-10 ENCOUNTER — ANTICOAGULATION MONITORING (OUTPATIENT)
Dept: VASCULAR LAB | Facility: MEDICAL CENTER | Age: 82
End: 2022-01-10

## 2022-01-10 DIAGNOSIS — I48.0 PAROXYSMAL ATRIAL FIBRILLATION (HCC): ICD-10-CM

## 2022-01-10 LAB — INR PPP: 2.3 (ref 2–3.5)

## 2022-01-11 NOTE — PROGRESS NOTES
Anticoagulation Summary  As of 1/10/2022    INR goal:  2.0-3.0   TTR:  70.0 % (1.8 y)   INR used for dosin.30 (1/10/2022)   Warfarin maintenance plan:  3 mg (6 mg x 0.5) every Mon, Fri; 6 mg (6 mg x 1) all other days   Weekly warfarin total:  36 mg   Plan last modified:  Dallin Viveros PharmD (2021)   Next INR check:  2022   Target end date:  Indefinite    Indications    Paroxysmal atrial fibrillation (HCC) [I48.0]             Anticoagulation Episode Summary     INR check location:      Preferred lab:      Send INR reminders to:      Comments:  [facility]      Anticoagulation Care Providers     Provider Role Specialty Phone number    MariahJohnathanaisha Paredes M.D. Referring Internal Medicine [phone]    [facility] Responsible  [phone]        Anticoagulation Patient Findings  Patient Findings     Negatives:  Signs/symptoms of thrombosis, Signs/symptoms of bleeding, Laboratory test error suspected, Change in health, Change in alcohol use, Change in activity, Upcoming invasive procedure, Emergency department visit, Upcoming dental procedure, Missed doses, Extra doses, Change in medications, Change in diet/appetite, Hospital admission, Bruising, Other complaints         Spoke with patient today regarding therapeutic INR of 2.3.  Patient denies any signs/symptoms of bruising or bleeding or any changes in diet and medications.  Instructed patient to call clinic with any questions or concerns.    Pt is not on antiplatelet therapy    Pt is to continue with current warfarin dosing regimen.  Follow up in 1 weeks, to reduce risk of adverse events related to this high risk medication,  Warfarin.    Christian Jordan, PharmD, BCACP

## 2022-01-23 DIAGNOSIS — E03.9 HYPOTHYROIDISM, UNSPECIFIED TYPE: ICD-10-CM

## 2022-01-24 ENCOUNTER — ANTICOAGULATION MONITORING (OUTPATIENT)
Dept: VASCULAR LAB | Facility: MEDICAL CENTER | Age: 82
End: 2022-01-24

## 2022-01-24 DIAGNOSIS — I48.0 PAROXYSMAL ATRIAL FIBRILLATION (HCC): ICD-10-CM

## 2022-01-24 LAB — INR PPP: 2.3 (ref 2–3.5)

## 2022-01-24 RX ORDER — LEVOTHYROXINE SODIUM 150 UG/1
TABLET ORAL
Qty: 90 TABLET | Refills: 1 | Status: SHIPPED | OUTPATIENT
Start: 2022-01-24 | End: 2022-05-17 | Stop reason: SDUPTHER

## 2022-01-24 NOTE — PROGRESS NOTES
Anticoagulation Summary  As of 2022    INR goal:  2.0-3.0   TTR:  70.6 % (1.8 y)   INR used for dosin.30 (2022)   Warfarin maintenance plan:  3 mg (6 mg x 0.5) every Mon, Fri; 6 mg (6 mg x 1) all other days   Weekly warfarin total:  36 mg   Plan last modified:  Dallin Viveros PharmD (2021)   Next INR check:  2022   Target end date:  Indefinite    Indications    Paroxysmal atrial fibrillation (HCC) [I48.0]             Anticoagulation Episode Summary     INR check location:      Preferred lab:      Send INR reminders to:      Comments:  [facility]      Anticoagulation Care Providers     Provider Role Specialty Phone number    MariahJohnathanaisha Paredes M.D. Referring Internal Medicine [phone]    [facility] Responsible  [phone]        Anticoagulation Patient Findings  Patient Findings     Negatives:  Signs/symptoms of thrombosis, Signs/symptoms of bleeding, Laboratory test error suspected, Change in health, Change in alcohol use, Change in activity, Upcoming invasive procedure, Emergency department visit, Upcoming dental procedure, Missed doses, Extra doses, Change in medications, Change in diet/appetite, Hospital admission, Bruising, Other complaints        Spoke with patient today regarding therapeutic INR of 2.3.  Patient denies any signs/symptoms of bruising or bleeding or any changes in diet and medications.  Instructed patient to call clinic with any questions or concerns.    Pt is not on antiplatelet therapy    Pt is to continue with current warfarin dosing regimen.  Follow up in 2 weeks, to reduce risk of adverse events related to this high risk medication,  Warfarin.    Christian Jordan, PharmD, BCACP

## 2022-01-27 DIAGNOSIS — D50.0 IRON DEFICIENCY ANEMIA DUE TO CHRONIC BLOOD LOSS: ICD-10-CM

## 2022-01-27 RX ORDER — FERROUS SULFATE 325(65) MG
325 TABLET ORAL DAILY
Qty: 90 TABLET | Refills: 2 | Status: SHIPPED | OUTPATIENT
Start: 2022-01-27 | End: 2022-06-23

## 2022-01-28 ENCOUNTER — TELEPHONE (OUTPATIENT)
Dept: SLEEP MEDICINE | Facility: MEDICAL CENTER | Age: 82
End: 2022-01-28

## 2022-01-28 DIAGNOSIS — G47.33 OSA (OBSTRUCTIVE SLEEP APNEA): ICD-10-CM

## 2022-01-28 NOTE — TELEPHONE ENCOUNTER
Patient is asking for a [facility] to be [facility] face [facility].  [phone]

## 2022-01-31 ENCOUNTER — ANTICOAGULATION MONITORING (OUTPATIENT)
Dept: MEDICAL GROUP | Facility: PHYSICIAN GROUP | Age: 82
End: 2022-01-31

## 2022-01-31 DIAGNOSIS — I48.0 PAROXYSMAL ATRIAL FIBRILLATION (HCC): ICD-10-CM

## 2022-01-31 LAB — INR PPP: 2.1 (ref 2–3.5)

## 2022-02-01 NOTE — PROGRESS NOTES
Anticoagulation Summary  As of 2022    INR goal:  2.0-3.0   TTR:  70.9 % (1.8 y)   INR used for dosin.10 (2022)   Warfarin maintenance plan:  3 mg (6 mg x 0.5) every Mon, Fri; 6 mg (6 mg x 1) all other days   Weekly warfarin total:  36 mg   Plan last modified:  Abad BarbaD (2021)   Next INR check:     Target end date:  Indefinite    Indications    Paroxysmal atrial fibrillation (HCC) [I48.0]             Anticoagulation Episode Summary     INR check location:      Preferred lab:      Send INR reminders to:      Comments:  [facility]      Anticoagulation Care Providers     Provider Role Specialty Phone number    La Paredes M.D. Referring Internal Medicine [phone]    [facility] Responsible  [phone]          Refer to Anticoagulation Patient Findings for HPI      Spoke with patient to report a therapeutic INR.      Pt is NOT on antiplatelet therapy    Pt instructed to continue with current warfarin dosing regimen, confirms dosing.   Will follow up in one week per patient preference.     Ren Acharya, PharmD

## 2022-02-07 ENCOUNTER — ANTICOAGULATION MONITORING (OUTPATIENT)
Dept: VASCULAR LAB | Facility: MEDICAL CENTER | Age: 82
End: 2022-02-07

## 2022-02-07 DIAGNOSIS — I48.0 PAROXYSMAL ATRIAL FIBRILLATION (HCC): ICD-10-CM

## 2022-02-07 LAB — INR PPP: 2.1 (ref 2–3.5)

## 2022-02-07 NOTE — PROGRESS NOTES
OP   Telephone Anticoagulation Service Note      Anticoagulation Summary  As of 2022    INR goal:  2.0-3.0   TTR:  71.2 % (1.9 y)   INR used for dosin.10 (2022)   Warfarin maintenance plan:  3 mg (6 mg x 0.5) every Mon, Fri; 6 mg (6 mg x 1) all other days   Weekly warfarin total:  36 mg   Plan last modified:  Dallin Viveros PharmD (2021)   Next INR check:  2022   Target end date:  Indefinite    Indications    Paroxysmal atrial fibrillation (HCC) [I48.0]             Anticoagulation Episode Summary     INR check location:      Preferred lab:      Send INR reminders to:      Comments:  [facility]      Anticoagulation Care Providers     Provider Role Specialty Phone number    La Paredes M.D. Referring Internal Medicine [phone]    [facility]ticoagulation Services Responsible  [phone]        Anticoagulation Patient Findings  Patient Findings     Negatives:  Signs/symptoms of thrombosis, Signs/symptoms of bleeding, Laboratory test error suspected, Change in health, Change in alcohol use, Change in activity, Upcoming invasive procedure, Emergency department visit, Upcoming dental procedure, Missed doses, Extra doses, Change in medications, Change in diet/appetite, Hospital admission, Bruising, Other complaints        Spoke with the patient on the phone today, reporting a therapeutic INR of 2.1.   Confirmed the current warfarin dosing regimen and patient compliance.  Patient denies any interval changes to diet and/or medications. Patient denies any signs/symptoms of bleeding or clotting.  Patient instructed to continue with the current warfarin dosing regimen, and asked to follow up again in 1 week.     Linda MelgozaD     Well developed Well developed Well developed Well developed Well developed

## 2022-02-09 NOTE — TELEPHONE ENCOUNTER
Faxed OV, Orders, ID/Insurance to DME:  [facility] / [facility] [phone] / lauren [phone].  Fax Confirmed 02/09/22  CATHERINE

## 2022-02-14 LAB — INR PPP: 2.1 (ref 2–3.5)

## 2022-02-15 ENCOUNTER — ANTICOAGULATION MONITORING (OUTPATIENT)
Dept: VASCULAR LAB | Facility: MEDICAL CENTER | Age: 82
End: 2022-02-15

## 2022-02-15 DIAGNOSIS — I48.0 PAROXYSMAL ATRIAL FIBRILLATION (HCC): ICD-10-CM

## 2022-02-15 NOTE — PROGRESS NOTES
OP Anticoagulation Service Note    Date: 2/15/2022    Anticoagulation Summary  As of 2/15/2022    INR goal:  2.0-3.0   TTR:  71.5 % (1.9 y)   INR used for dosin.10 (2022)   Warfarin maintenance plan:  3 mg (6 mg x 0.5) every Mon, Fri; 6 mg (6 mg x 1) all other days   Weekly warfarin total:  36 mg   Plan last modified:  Dallin Viveros, PharmD (2021)   Next INR check:  3/15/2022   Target end date:  Indefinite    Indications    Paroxysmal atrial fibrillation (HCC) [I48.0]             Anticoagulation Episode Summary     INR check location:      Preferred lab:      Send INR reminders to:      Comments:  [facility]      Anticoagulation Care Providers     Provider Role Specialty Phone number    La Christian Paredes M.D. Referring Internal Medicine [phone]    [facility] Anticoagulation Services Responsible  [phone]        Anticoagulation Patient Findings    HPI:   The reason for today's call is to prevent morbidity and mortality from a blood clot and/or stroke and to reduce the risk of bleeding while on a anticoagulant.     PCP:  Asael Pink M.D.  [address] [place] [id number]    Assessment:     • INR  therapeutic.       Current Outpatient Medications:   •  ferrous sulfate, 325 mg, Oral, DAILY  •  Levoxyl, TAKE 1 TABLET BY MOUTH EVERY DAY IN THE MORNING ON AN EMPTY STOMACH  •  fluocinonide, APPLY TO AFFECTED AREA TWICE A DAY AS NEEDED FOR ITCHING  •  [employer] Vitamin C, TAKE 1 TABLET BY MOUTH EVERY DAY  •  atorvastatin, TAKE 1 TABLET BY MOUTH AT  BEDTIME  •  bisoprolol, 10 mg, Oral, DAILY  •  losartan, TAKE 1 TABLET BY MOUTH DAILY  •  spironolactone, 25 mg, Oral, DAILY  •  famotidine, 40 mg, Oral, DAILY  •  warfarin, Take one tablet daily as directed by [facility] Anticoagulation Serivces  Indications: Obstructing Blood Clot with Chronic Atrial Fibrillation  •  Cosentyx Sensoready Pen, Inject 150 mg into the shoulder, thigh, or buttocks.  •  omeprazole, 20 mg, Oral, QDAY  •  loratadine, 10  mg, Oral, BID  •  vitamin D, 4,000 Units, Oral, DAILY      Plan:     • Continue the same warfarin dose, as noted above.       Follow-up:     • Our protocol suggests we test in 3-4 weeks.        Additional information discussed with patient:     • Asked patient to please call the anticoagulation clinic if they have any signs/symptoms of bleeding and/or thrombosis or any changes to diet or medications.      National recommendations regarding anticoagulation therapy:     The CHEST guidelines recommends frequent INR monitoring at regular intervals (a few days up to a max of 12 weeks) to ensure patients are on the proper dose of warfarin, and patients are not having any complications from therapy.  INRs can dramatically change over a short time period due to diet, medications, and medical conditions.     [facility]  Phone [phone] fax [phone]    This note was created using voice recognition software (Dragon). The accuracy of the dictation is limited by the abilities of the software. I have reviewed the note prior to signing, however some errors in grammar and context are still possible. If you have any questions related to this note please do not hesitate to contact our office.

## 2022-02-18 DIAGNOSIS — Z79.01 CHRONIC ANTICOAGULATION: ICD-10-CM

## 2022-02-21 LAB
INR PPP: 2.1 (ref 2–3.5)
INR PPP: 2.1 (ref 2–3.5)

## 2022-02-22 ENCOUNTER — ANTICOAGULATION MONITORING (OUTPATIENT)
Dept: VASCULAR LAB | Facility: MEDICAL CENTER | Age: 82
End: 2022-02-22
Payer: MEDICARE

## 2022-02-22 DIAGNOSIS — I48.0 PAROXYSMAL ATRIAL FIBRILLATION (HCC): ICD-10-CM

## 2022-02-23 NOTE — PROGRESS NOTES
OP   Telephone Anticoagulation Service Note      Anticoagulation Summary  As of 2022    INR goal:  2.0-3.0   TTR:  71.8 % (1.9 y)   INR used for dosin.10 (2022)   Warfarin maintenance plan:  3 mg (6 mg x 0.5) every Mon, Fri; 6 mg (6 mg x 1) all other days   Weekly warfarin total:  36 mg   Plan last modified:  Dallin Viveros PharmD (2021)   Next INR check:  2022   Target end date:  Indefinite    Indications    Paroxysmal atrial fibrillation (HCC) [I48.0]             Anticoagulation Episode Summary     INR check location:      Preferred lab:      Send INR reminders to:      Comments:  [facility]      Anticoagulation Care Providers     Provider Role Specialty Phone number    La Paredes M.D. Referring Internal Medicine [phone]    [facility] Anticoagulation Services Responsible  [phone]        Anticoagulation Patient Findings     Left a message on the patient's voicemail today, informing the patient of a therapeutic INR of 2.1.   Instructed the patient to call the clinic with any changes to diet or medications, with any signs/sx of bleeding or clotting or with any questions or concerns.     Patient instructed to continue with the current warfarin dosing regimen, and asked to follow up again in 1 week.     Linda Rodriguez PharmD

## 2022-02-25 ENCOUNTER — ANTICOAGULATION MONITORING (OUTPATIENT)
Dept: VASCULAR LAB | Facility: MEDICAL CENTER | Age: 82
End: 2022-02-25
Payer: MEDICARE

## 2022-02-25 DIAGNOSIS — I48.0 PAROXYSMAL ATRIAL FIBRILLATION (HCC): ICD-10-CM

## 2022-03-01 ENCOUNTER — ANTICOAGULATION MONITORING (OUTPATIENT)
Dept: VASCULAR LAB | Facility: MEDICAL CENTER | Age: 82
End: 2022-03-01
Payer: MEDICARE

## 2022-03-01 ENCOUNTER — TELEPHONE (OUTPATIENT)
Dept: DERMATOLOGY | Facility: IMAGING CENTER | Age: 82
End: 2022-03-01
Payer: MEDICARE

## 2022-03-01 DIAGNOSIS — I48.0 PAROXYSMAL ATRIAL FIBRILLATION (HCC): ICD-10-CM

## 2022-03-01 LAB — INR PPP: 1.6 (ref 2–3.5)

## 2022-03-01 NOTE — TELEPHONE ENCOUNTER
----- Message from Aleshia Crooks sent at 2022 11:21 AM PST -----  Regarding: Cosentyx  [facility] is wanting a reenrollment aleja. from Dr. Downs, they tried to send you one by fax and your number was busy. My cosentyx is due the , can you get back to me on this, tried calling and was on phone over 30 min. and still no one picked up.   Thank You   Aleshia Crooks  () 1940

## 2022-03-02 NOTE — PROGRESS NOTES
Anticoagulation Summary  As of 3/1/2022    INR goal:  2.0-3.0   TTR:  71.2 % (1.9 y)   INR used for dosin.60 (3/1/2022)   Warfarin maintenance plan:  3 mg (6 mg x 0.5) every Mon, Fri; 6 mg (6 mg x 1) all other days   Weekly warfarin total:  36 mg   Plan last modified:  Dallin Viveros PharmD (2021)   Next INR check:  3/8/2022   Target end date:  Indefinite    Indications    Paroxysmal atrial fibrillation (HCC) [I48.0]             Anticoagulation Episode Summary     INR check location:      Preferred lab:      Send INR reminders to:      Comments:  [facility]      Anticoagulation Care Providers     Provider Role Specialty Phone number    MariahRenay Christian Paredes M.D. Referring Internal Medicine [phone]    [facility] Anticoagulation Services Responsible  [phone]        Anticoagulation Patient Findings  Patient Findings     Positives:  Change in diet/appetite (more greens recently )    Negatives:  Signs/symptoms of thrombosis, Signs/symptoms of bleeding, Laboratory test error suspected, Change in health, Change in alcohol use, Change in activity, Upcoming invasive procedure, Emergency department visit, Upcoming dental procedure, Missed doses, Extra doses, Change in medications, Hospital admission, Bruising, Other complaints        Spoke with patient today regarding subtherapeutic INR of 1.6.  Patient denies any signs/symptoms of bruising or bleeding or any changes in medications.  Instructed patient to call clinic with any questions or concerns.  Eating more greens recently, does not plan to continue.    Pt is not on antiplatelet therapy    Instructed patient to bolus with 9mg X 1, then resume current warfarin regimen.  Follow up in 1 weeks, to reduce risk of adverse events related to this high risk medication,  Warfarin.    Christian Jordan, PharmD, BCACP

## 2022-03-07 ENCOUNTER — TELEPHONE (OUTPATIENT)
Dept: DERMATOLOGY | Facility: IMAGING CENTER | Age: 82
End: 2022-03-07
Payer: MEDICARE

## 2022-03-07 NOTE — TELEPHONE ENCOUNTER
Spoke with [employer].  They are going to refax [employer] patient assistance enrollment form to be filled out and then sent with new Rx for Cosentyx.  Patient notified.

## 2022-03-10 ENCOUNTER — TELEPHONE (OUTPATIENT)
Dept: DERMATOLOGY | Facility: IMAGING CENTER | Age: 82
End: 2022-03-10
Payer: MEDICARE

## 2022-03-10 NOTE — TELEPHONE ENCOUNTER
LMVM for patient regarding Cosentyx paperwork.  Need dosing frequency, pen or pre-filled syringe, etc.  Patient to call clinic back

## 2022-03-14 ENCOUNTER — ANTICOAGULATION MONITORING (OUTPATIENT)
Dept: VASCULAR LAB | Facility: MEDICAL CENTER | Age: 82
End: 2022-03-14
Payer: MEDICARE

## 2022-03-14 DIAGNOSIS — I48.0 PAROXYSMAL ATRIAL FIBRILLATION (HCC): ICD-10-CM

## 2022-03-14 LAB — INR PPP: 2.6 (ref 2–3.5)

## 2022-03-15 NOTE — PROGRESS NOTES
OP Anticoagulation Service Note    Date: 3/14/2022    Anticoagulation Summary  As of 3/14/2022    INR goal:  2.0-3.0   TTR:  71.0 % (2 y)   INR used for dosin.60 (3/14/2022)   Warfarin maintenance plan:  3 mg (6 mg x 0.5) every Mon, Fri; 6 mg (6 mg x 1) all other days   Weekly warfarin total:  36 mg   Plan last modified:  Dallin Viveros, PharmD (2021)   Next INR check:  3/28/2022   Target end date:  Indefinite    Indications    Paroxysmal atrial fibrillation (HCC) [I48.0]             Anticoagulation Episode Summary     INR check location:      Preferred lab:      Send INR reminders to:      Comments:  [facility]      Anticoagulation Care Providers     Provider Role Specialty Phone number    La Paredes M.D. Referring Internal Medicine [phone]    [facility] Anticoagulation Services Responsible  [phone]        Anticoagulation Patient Findings      Voice message for patient regarding their anticoagulant.     HPI:   The reason for today's call is to prevent morbidity and mortality from a blood clot and/or stroke and to reduce the risk of bleeding while on a anticoagulant.     PCP:  Asael Pink M.D.  [address] [place] [id number]    Assessment:     • INR  therapeutic.       Current Outpatient Medications:   •  ferrous sulfate, 325 mg, Oral, DAILY  •  Levoxyl, TAKE 1 TABLET BY MOUTH EVERY DAY IN THE MORNING ON AN EMPTY STOMACH  •  fluocinonide, APPLY TO AFFECTED AREA TWICE A DAY AS NEEDED FOR ITCHING  •  [employer] Vitamin C, TAKE 1 TABLET BY MOUTH EVERY DAY  •  atorvastatin, TAKE 1 TABLET BY MOUTH AT  BEDTIME  •  bisoprolol, 10 mg, Oral, DAILY  •  losartan, TAKE 1 TABLET BY MOUTH DAILY  •  spironolactone, 25 mg, Oral, DAILY  •  famotidine, 40 mg, Oral, DAILY  •  warfarin, Take one tablet daily as directed by [facility] Anticoagulation Serivces  Indications: Obstructing Blood Clot with Chronic Atrial Fibrillation  •  Cosentyx Sensoready Pen, Inject 150 mg into the shoulder, thigh, or  buttocks.  •  omeprazole, 20 mg, Oral, QDAY  •  loratadine, 10 mg, Oral, BID  •  vitamin D, 4,000 Units, Oral, DAILY      Plan:     • Continue the same warfarin dose, as noted above.       Follow-up:     • Our protocol suggests we test in 2 weeks.        Additional information discussed with patient:     • Asked patient to please call the anticoagulation clinic if they have any signs/symptoms of bleeding and/or thrombosis or any changes to diet or medications.      National recommendations regarding anticoagulation therapy:     The CHEST guidelines recommends frequent INR monitoring at regular intervals (a few days up to a max of 12 weeks) to ensure patients are on the proper dose of warfarin, and patients are not having any complications from therapy.  INRs can dramatically change over a short time period due to diet, medications, and medical conditions.     [facility]  Phone [phone] fax [phone]    This note was created using voice recognition software (Dragon). The accuracy of the dictation is limited by the abilities of the software. I have reviewed the note prior to signing, however some errors in grammar and context are still possible. If you have any questions related to this note please do not hesitate to contact our office.

## 2022-03-21 ENCOUNTER — ANTICOAGULATION MONITORING (OUTPATIENT)
Dept: VASCULAR LAB | Facility: MEDICAL CENTER | Age: 82
End: 2022-03-21
Payer: MEDICARE

## 2022-03-21 DIAGNOSIS — I48.0 PAROXYSMAL ATRIAL FIBRILLATION (HCC): ICD-10-CM

## 2022-03-21 LAB — INR PPP: 2.3 (ref 2–3.5)

## 2022-03-21 NOTE — PROGRESS NOTES
Anticoagulation Summary  As of 3/21/2022    INR goal:  2.0-3.0   TTR:  71.3 % (2 y)   INR used for dosin.30 (3/21/2022)   Warfarin maintenance plan:  3 mg (6 mg x 0.5) every Mon, Fri; 6 mg (6 mg x 1) all other days   Weekly warfarin total:  36 mg   Plan last modified:  Dallin Viveros, PharmD (2021)   Next INR check:     Target end date:  Indefinite    Indications    Paroxysmal atrial fibrillation (HCC) [I48.0]             Anticoagulation Episode Summary     INR check location:      Preferred lab:      Send INR reminders to:      Comments:  [facility]      Anticoagulation Care Providers     Provider Role Specialty Phone number    La Paredes M.D. Referring Internal Medicine [phone]    [facility] Anticoagulation Services Responsible  [phone]        Anticoagulation Patient Findings  Patient Findings     Negatives:  Signs/symptoms of thrombosis, Signs/symptoms of bleeding, Laboratory test error suspected, Change in health, Change in alcohol use, Change in activity, Upcoming invasive procedure, Emergency department visit, Upcoming dental procedure, Missed doses, Extra doses, Change in medications, Change in diet/appetite, Hospital admission, Bruising, Other complaints        Spoke with patient today regarding therapeutic INR of 2.3.  Patient denies any signs/symptoms of bruising or bleeding or any changes in diet and medications.  Instructed patient to call clinic with any questions or concerns.    Pt is not on antiplatelet therapy    Pt is to continue with current warfarin dosing regimen.  Follow up in 2 weeks, to reduce risk of adverse events related to this high risk medication,  Warfarin.    Denis Plata, Pharmacy Intern

## 2022-03-28 ENCOUNTER — ANTICOAGULATION MONITORING (OUTPATIENT)
Dept: CARDIOLOGY | Facility: MEDICAL CENTER | Age: 82
End: 2022-03-28
Payer: MEDICARE

## 2022-03-28 DIAGNOSIS — I48.0 PAROXYSMAL ATRIAL FIBRILLATION (HCC): ICD-10-CM

## 2022-03-28 LAB — INR PPP: 3.2 (ref 2–3.5)

## 2022-03-28 NOTE — PROGRESS NOTES
OP Anticoagulation Service Note    Date: 3/28/2022    Anticoagulation Summary  As of 3/28/2022    INR goal:  2.0-3.0   TTR:  71.4 % (2 y)   INR used for dosing:  3.20 (3/28/2022)   Warfarin maintenance plan:  3 mg (6 mg x 0.5) every Mon, Fri; 6 mg (6 mg x 1) all other days   Weekly warfarin total:  36 mg   Plan last modified:  Dallin Viveros, PharmD (12/27/2021)   Next INR check:  4/4/2022   Target end date:  Indefinite    Indications    Paroxysmal atrial fibrillation (HCC) [I48.0]             Anticoagulation Episode Summary     INR check location:      Preferred lab:      Send INR reminders to:      Comments:  [facility]      Anticoagulation Care Providers     Provider Role Specialty Phone number    WinniemeirJazminaisha Christian Paredes M.D. Referring Internal Medicine [phone]    [facility] Anticoagulation Services Responsible  [phone]        Anticoagulation Patient Findings            HPI:   The reason for today's call is to prevent morbidity and mortality from a blood clot and/or stroke and to reduce the risk of bleeding while on a anticoagulant.     PCP:  Asael Pink M.D.  [address] [place] [phone]    Assessment:     • INR  supra-therapeutic.       Current Outpatient Medications:   •  ferrous sulfate, 325 mg, Oral, DAILY  •  Levoxyl, TAKE 1 TABLET BY MOUTH EVERY DAY IN THE MORNING ON AN EMPTY STOMACH  •  fluocinonide, APPLY TO AFFECTED AREA TWICE A DAY AS NEEDED FOR ITCHING  •  [employer] Vitamin C, TAKE 1 TABLET BY MOUTH EVERY DAY  •  atorvastatin, TAKE 1 TABLET BY MOUTH AT  BEDTIME  •  bisoprolol, 10 mg, Oral, DAILY  •  losartan, TAKE 1 TABLET BY MOUTH DAILY  •  spironolactone, 25 mg, Oral, DAILY  •  famotidine, 40 mg, Oral, DAILY  •  warfarin, Take one tablet daily as directed by [facility] Anticoagulation Serivces  Indications: Obstructing Blood Clot with Chronic Atrial Fibrillation  •  Cosentyx Sensoready Pen, Inject 150 mg into the shoulder, thigh, or buttocks.  •  omeprazole, 20 mg, Oral, QDAY  •   loratadine, 10 mg, Oral, BID  •  vitamin D, 4,000 Units, Oral, DAILY      Plan:     • Hold today then Continue the same warfarin dose, as noted above.       Follow-up:     • Our protocol suggests we test in 1 weeks.        Additional information discussed with patient:     • Asked patient to please call the anticoagulation clinic if they have any signs/symptoms of bleeding and/or thrombosis or any changes to diet or medications.      National recommendations regarding anticoagulation therapy:     The CHEST guidelines recommends frequent INR monitoring at regular intervals (a few days up to a max of 12 weeks) to ensure patients are on the proper dose of warfarin, and patients are not having any complications from therapy.  INRs can dramatically change over a short time period due to diet, medications, and medical conditions.     [facility]  Phone [phone] fax [phone]    This note was created using voice recognition software (Dragon). The accuracy of the dictation is limited by the abilities of the software. I have reviewed the note prior to signing, however some errors in grammar and context are still possible. If you have any questions related to this note please do not hesitate to contact our office.

## 2022-03-30 ENCOUNTER — TELEPHONE (OUTPATIENT)
Dept: DERMATOLOGY | Facility: IMAGING CENTER | Age: 82
End: 2022-03-30
Payer: MEDICARE

## 2022-03-30 NOTE — TELEPHONE ENCOUNTER
PA required for Cosentyx.    PA submitted CoverMyMeds  Key [id number].      Patient pharmacy benefits thru:    [facility]  ID #[id number]  Rx Bin [id number]  Rx [id number]  Ph. #[phone] opt. 2

## 2022-04-04 ENCOUNTER — ANTICOAGULATION MONITORING (OUTPATIENT)
Dept: VASCULAR LAB | Facility: MEDICAL CENTER | Age: 82
End: 2022-04-04
Payer: MEDICARE

## 2022-04-04 DIAGNOSIS — I48.0 PAROXYSMAL ATRIAL FIBRILLATION (HCC): ICD-10-CM

## 2022-04-04 LAB — INR PPP: 2.6 (ref 2–3.5)

## 2022-04-04 NOTE — PROGRESS NOTES
Anticoagulation Summary  As of 2022    INR goal:  2.0-3.0   TTR:  71.3 % (2 y)   INR used for dosin.60 (2022)   Warfarin maintenance plan:  3 mg (6 mg x 0.5) every Mon, Fri; 6 mg (6 mg x 1) all other days   Weekly warfarin total:  36 mg   Plan last modified:  Dallin Viveros PharmD (2021)   Next INR check:  2022   Target end date:  Indefinite    Indications    Paroxysmal atrial fibrillation (HCC) [I48.0]             Anticoagulation Episode Summary     INR check location:      Preferred lab:      Send INR reminders to:      Comments:  [facility]      Anticoagulation Care Providers     Provider Role Specialty Phone number    Slimeaisha Christian Paredes M.D. Referring Internal Medicine [phone]    [facility] Anticoagulation Services Responsible  [phone]        Anticoagulation Patient Findings     Left voicemail message to report a therapeutic INR of 2.6.  Pt to continue with current warfarin dosing regimen. Requested pt contact the clinic for any s/s of unusual bleeding, bruising, clotting or any changes to diet or medication. FU 2 weeks.    Rudy Lowe, PharmD

## 2022-04-07 DIAGNOSIS — G47.33 OBSTRUCTIVE SLEEP APNEA TREATED WITH CONTINUOUS POSITIVE AIRWAY PRESSURE (CPAP): ICD-10-CM

## 2022-04-07 DIAGNOSIS — I10 ESSENTIAL HYPERTENSION: Chronic | ICD-10-CM

## 2022-04-07 DIAGNOSIS — K75.81 LIVER CIRRHOSIS SECONDARY TO NASH (NONALCOHOLIC STEATOHEPATITIS) (HCC): ICD-10-CM

## 2022-04-07 DIAGNOSIS — I48.0 PAROXYSMAL ATRIAL FIBRILLATION (HCC): ICD-10-CM

## 2022-04-07 DIAGNOSIS — K74.60 LIVER CIRRHOSIS SECONDARY TO NASH (NONALCOHOLIC STEATOHEPATITIS) (HCC): ICD-10-CM

## 2022-04-07 RX ORDER — SPIRONOLACTONE 25 MG/1
TABLET ORAL
Qty: 180 TABLET | Refills: 0 | Status: SHIPPED | OUTPATIENT
Start: 2022-04-07 | End: 2022-05-17

## 2022-04-16 DIAGNOSIS — I25.10 CORONARY ARTERY DISEASE INVOLVING NATIVE CORONARY ARTERY OF NATIVE HEART WITHOUT ANGINA PECTORIS: ICD-10-CM

## 2022-04-16 DIAGNOSIS — I10 ESSENTIAL HYPERTENSION: Chronic | ICD-10-CM

## 2022-04-16 DIAGNOSIS — I50.42 CHRONIC COMBINED SYSTOLIC AND DIASTOLIC CONGESTIVE HEART FAILURE (HCC): ICD-10-CM

## 2022-04-18 ENCOUNTER — TELEPHONE (OUTPATIENT)
Dept: DERMATOLOGY | Facility: IMAGING CENTER | Age: 82
End: 2022-04-18
Payer: MEDICARE

## 2022-04-21 RX ORDER — SECUKINUMAB 150 MG/ML
INJECTION SUBCUTANEOUS
Qty: 1.96 ML | Refills: 6 | Status: SHIPPED | OUTPATIENT
Start: 2022-04-21 | End: 2022-12-02 | Stop reason: SDUPTHER

## 2022-04-25 ENCOUNTER — ANTICOAGULATION MONITORING (OUTPATIENT)
Dept: VASCULAR LAB | Facility: MEDICAL CENTER | Age: 82
End: 2022-04-25
Payer: MEDICARE

## 2022-04-25 DIAGNOSIS — I48.0 PAROXYSMAL ATRIAL FIBRILLATION (HCC): ICD-10-CM

## 2022-04-25 LAB — INR PPP: 2.6 (ref 2–3.5)

## 2022-04-25 NOTE — LETTER
Aleshia Crooks  [address] [place] [address]    04/26/22    Dear Aleshia Crooks ,    We have been unsuccessful in our attempts to contact you regarding your Anticoagulation Service appointments. Warfarin is a potent blood-thinning agent that requires monitoring to ensure that the dosage is correct for your body.  If it isn't, you could develop serious, sometimes life-threatening bleeding problems or life-threatening blood clots or stroke could result.    To monitor you effectively, we need to be able to communicate with you.  This is a requirement to be followed by our Service.     WE ARE UNABLE TO LEAVE YOU ANY MESSAGES, YOUR VOICEMAIL IS FULL.  PLEASE CALL US  If you repeatedly fail to keep your lab appointments, you are at risk of being discharged from the Anticoagulation Service.    It is extremely important that you contact the clinic as soon as possible to arrange appropriate follow up.  We are open Monday-Friday 8 am until 5 pm.  You may reach our Service at [phone].           Sincerely,           Rudy Lowe, PharmD, BCPS  Clinic Supervisor  [facility]  Outpatient Anticoagulation Service

## 2022-04-26 NOTE — PROGRESS NOTES
Anticoagulation Summary  As of 2022    INR goal:  2.0-3.0   TTR:  72.1 % (2.1 y)   INR used for dosin.60 (2022)   Warfarin maintenance plan:  3 mg (6 mg x 0.5) every Mon, Fri; 6 mg (6 mg x 1) all other days   Weekly warfarin total:  36 mg   Plan last modified:  Dallin Viveros, PharmD (2021)   Next INR check:  2022   Target end date:  Indefinite    Indications    Paroxysmal atrial fibrillation (HCC) [I48.0]             Anticoagulation Episode Summary     INR check location:      Preferred lab:      Send INR reminders to:      Comments:  [facility]      Anticoagulation Care Providers     Provider Role Specialty Phone number    La Paredes M.D. Referring Internal Medicine [phone]    [facility] Anticoagulation Services Responsible  [phone]        Anticoagulation Patient Findings    Unable to provide anticoagulation dosing.  VM remains full  Letter sent  Taylor Cabrales, Clinical Pharmacist, CDE, CACP

## 2022-04-27 RX ORDER — ATENOLOL 100 MG/1
100 TABLET ORAL DAILY
Qty: 90 TABLET | Refills: 2 | Status: SHIPPED
Start: 2022-04-27 | End: 2022-05-17

## 2022-04-28 ENCOUNTER — ANTICOAGULATION MONITORING (OUTPATIENT)
Dept: VASCULAR LAB | Facility: MEDICAL CENTER | Age: 82
End: 2022-04-28
Payer: MEDICARE

## 2022-04-28 DIAGNOSIS — I48.0 PAROXYSMAL ATRIAL FIBRILLATION (HCC): ICD-10-CM

## 2022-04-28 LAB — INR PPP: 2.6 (ref 2–3.5)

## 2022-04-28 NOTE — PROGRESS NOTES
Spoke with pt on the phone regarding microblading procedure. She is going to check her INR day of procedure and hold 3 mg day before.      Celina Diego, AbadD

## 2022-05-02 ENCOUNTER — ANTICOAGULATION MONITORING (OUTPATIENT)
Dept: VASCULAR LAB | Facility: MEDICAL CENTER | Age: 82
End: 2022-05-02
Payer: MEDICARE

## 2022-05-02 DIAGNOSIS — I48.0 PAROXYSMAL ATRIAL FIBRILLATION (HCC): ICD-10-CM

## 2022-05-02 LAB — INR PPP: 2.3 (ref 2–3.5)

## 2022-05-02 NOTE — PROGRESS NOTES
Anticoagulation Summary  As of 2022    INR goal:  2.0-3.0   TTR:  72.4 % (2.1 y)   INR used for dosin.30 (2022)   Warfarin maintenance plan:  3 mg (6 mg x 0.5) every Mon, Fri; 6 mg (6 mg x 1) all other days   Weekly warfarin total:  36 mg   Plan last modified:  Dallin Viveros PharmD (2021)   Next INR check:  2022   Target end date:  Indefinite    Indications    Paroxysmal atrial fibrillation (HCC) [I48.0]             Anticoagulation Episode Summary     INR check location:      Preferred lab:      Send INR reminders to:      Comments:  [facility]      Anticoagulation Care Providers     Provider Role Specialty Phone number    La Paredes M.D. Referring Internal Medicine [phone]    [facility] Anticoagulation Services Responsible  [phone]        Anticoagulation Patient Findings      Left voicemail message to report a therapeutic INR of 2.3.    Will have pt continue on with current warfarin dosing regimen.   Requested pt contact the clinic for any s/s of unusual bleeding, bruising, clotting or any changes to diet or medication.    FU INR in 2 week(s).    James Sr, PharmD, BCACP

## 2022-05-09 ENCOUNTER — ANTICOAGULATION MONITORING (OUTPATIENT)
Dept: MEDICAL GROUP | Facility: PHYSICIAN GROUP | Age: 82
End: 2022-05-09
Payer: MEDICARE

## 2022-05-09 DIAGNOSIS — I48.0 PAROXYSMAL ATRIAL FIBRILLATION (HCC): ICD-10-CM

## 2022-05-09 LAB — INR PPP: 2.9 (ref 2–3.5)

## 2022-05-09 NOTE — PROGRESS NOTES
OP Telephone Anticoagulation Service Note    Date: 2022      Anticoagulation Summary  As of 2022    INR goal:  2.0-3.0   TTR:  72.6 % (2.1 y)   INR used for dosin.90 (2022)   Warfarin maintenance plan:  3 mg (6 mg x 0.5) every Mon, Fri; 6 mg (6 mg x 1) all other days   Weekly warfarin total:  36 mg   Plan last modified:  Dallin Viveros PharmD (2021)   Next INR check:  2022   Target end date:  Indefinite    Indications    Paroxysmal atrial fibrillation (HCC) [I48.0]             Anticoagulation Episode Summary     INR check location:      Preferred lab:      Send INR reminders to:      Comments:  [facility]      Anticoagulation Care Providers     Provider Role Specialty Phone number    La Paredes M.D. Referring Internal Medicine [phone]    [facility] Anticoagulation Services Responsible  [phone]        Anticoagulation Patient Findings        Plan: Spoke with patient on the phone.   Patient is  therapeutic today.   Will continue dosing as outlined.   Will follow-up with patient in 1 week(s).          Celina Diego, PharmD

## 2022-05-16 ENCOUNTER — ANTICOAGULATION MONITORING (OUTPATIENT)
Dept: MEDICAL GROUP | Facility: PHYSICIAN GROUP | Age: 82
End: 2022-05-16
Payer: MEDICARE

## 2022-05-16 DIAGNOSIS — I48.0 PAROXYSMAL ATRIAL FIBRILLATION (HCC): ICD-10-CM

## 2022-05-16 LAB — INR PPP: 3 (ref 2–3.5)

## 2022-05-16 NOTE — PROGRESS NOTES
"Anticoagulation Summary  As of 5/16/2022    INR goal:  2.0-3.0   TTR:  72.6 % (2.1 y)   INR used for dosing:  3.00 (5/16/2022)   Warfarin maintenance plan:  3 mg (6 mg x 0.5) every Mon, Fri; 6 mg (6 mg x 1) all other days   Weekly warfarin total:  36 mg   Plan last modified:  Abad BarbaD (12/27/2021)   Next INR check:  5/23/2022   Target end date:  Indefinite    Indications    Paroxysmal atrial fibrillation (HCC) [I48.0]             Anticoagulation Episode Summary     INR check location:      Preferred lab:      Send INR reminders to:      Comments:  [facility]      Anticoagulation Care Providers     Provider Role Specialty Phone number    WinniemeirCameronRenay Christian Paredes M.D. Referring Internal Medicine [phone]    [facility] Anticoagulation Services Responsible  [phone]        Anticoagulation Patient Findings  Patient Findings     Positives:  Change in medications (started taking \"balance of nature\" supplement three days ago)    Negatives:  Signs/symptoms of thrombosis, Signs/symptoms of bleeding, Laboratory test error suspected, Change in health, Change in alcohol use, Change in activity, Upcoming invasive procedure, Emergency department visit, Upcoming dental procedure, Missed doses, Extra doses, Change in diet/appetite, Hospital admission, Bruising, Other complaints        Spoke with patient today regarding therapeutic INR of 3.0.  Patient denies any signs/symptoms of bruising or bleeding or any changes in diet and medications.  Instructed patient to call clinic with any questions or concerns.    Pt is not on antiplatelet therapy    Pt is to continue with current warfarin dosing regimen.  Follow up in 1 weeks, to reduce risk of adverse events related to this high risk medication,  Warfarin.    Christian Jordan, PharmD, BCACP        "

## 2022-05-17 ENCOUNTER — OFFICE VISIT (OUTPATIENT)
Dept: MEDICAL GROUP | Facility: MEDICAL CENTER | Age: 82
End: 2022-05-17
Payer: MEDICARE

## 2022-05-17 VITALS
HEART RATE: 70 BPM | BODY MASS INDEX: 37.67 KG/M2 | HEIGHT: 63 IN | DIASTOLIC BLOOD PRESSURE: 56 MMHG | SYSTOLIC BLOOD PRESSURE: 114 MMHG | TEMPERATURE: 98.5 F | OXYGEN SATURATION: 96 % | WEIGHT: 212.6 LBS

## 2022-05-17 DIAGNOSIS — I50.42 CHRONIC COMBINED SYSTOLIC AND DIASTOLIC CONGESTIVE HEART FAILURE (HCC): ICD-10-CM

## 2022-05-17 DIAGNOSIS — G47.33 OBSTRUCTIVE SLEEP APNEA TREATED WITH CONTINUOUS POSITIVE AIRWAY PRESSURE (CPAP): ICD-10-CM

## 2022-05-17 DIAGNOSIS — K74.60 LIVER CIRRHOSIS SECONDARY TO NASH (NONALCOHOLIC STEATOHEPATITIS) (HCC): ICD-10-CM

## 2022-05-17 DIAGNOSIS — I25.10 CORONARY ARTERY DISEASE INVOLVING NATIVE CORONARY ARTERY OF NATIVE HEART WITHOUT ANGINA PECTORIS: ICD-10-CM

## 2022-05-17 DIAGNOSIS — K75.81 LIVER CIRRHOSIS SECONDARY TO NASH (NONALCOHOLIC STEATOHEPATITIS) (HCC): ICD-10-CM

## 2022-05-17 DIAGNOSIS — E89.0 POSTSURGICAL HYPOTHYROIDISM: ICD-10-CM

## 2022-05-17 DIAGNOSIS — I27.20 MODERATE TO SEVERE PULMONARY HYPERTENSION (HCC): ICD-10-CM

## 2022-05-17 DIAGNOSIS — N18.4 CKD (CHRONIC KIDNEY DISEASE) STAGE 4, GFR 15-29 ML/MIN (HCC): ICD-10-CM

## 2022-05-17 DIAGNOSIS — I48.0 PAROXYSMAL ATRIAL FIBRILLATION (HCC): ICD-10-CM

## 2022-05-17 DIAGNOSIS — I10 ESSENTIAL HYPERTENSION: Chronic | ICD-10-CM

## 2022-05-17 DIAGNOSIS — M17.0 BILATERAL PRIMARY OSTEOARTHRITIS OF KNEE: ICD-10-CM

## 2022-05-17 DIAGNOSIS — Z85.820 HISTORY OF MALIGNANT MELANOMA: ICD-10-CM

## 2022-05-17 PROBLEM — C43.62 MALIGNANT MELANOMA OF LEFT UPPER EXTREMITY INCLUDING SHOULDER (HCC): Status: RESOLVED | Noted: 2020-06-08 | Resolved: 2022-05-17

## 2022-05-17 PROCEDURE — 99214 OFFICE O/P EST MOD 30 MIN: CPT | Performed by: FAMILY MEDICINE

## 2022-05-17 RX ORDER — LEVOTHYROXINE SODIUM 0.15 MG/1
150 TABLET ORAL
Qty: 90 TABLET | Refills: 3 | Status: SHIPPED | OUTPATIENT
Start: 2022-05-17 | End: 2023-01-23

## 2022-05-17 RX ORDER — BISOPROLOL FUMARATE 10 MG/1
10 TABLET, FILM COATED ORAL DAILY
Qty: 90 TABLET | Refills: 4 | Status: SHIPPED | OUTPATIENT
Start: 2022-05-17 | End: 2023-05-17 | Stop reason: SDUPTHER

## 2022-05-17 RX ORDER — SPIRONOLACTONE 25 MG/1
50 TABLET ORAL
Qty: 90 TABLET | Refills: 0
Start: 2022-05-17 | End: 2022-05-17

## 2022-05-17 RX ORDER — SPIRONOLACTONE 25 MG/1
25 TABLET ORAL
Qty: 90 TABLET | Refills: 0
Start: 2022-05-17 | End: 2022-06-23 | Stop reason: SDUPTHER

## 2022-05-17 RX ORDER — CHOLECALCIFEROL (VITAMIN D3) 125 MCG
5000 CAPSULE ORAL DAILY
Qty: 30 CAPSULE | Refills: 11 | COMMUNITY
Start: 2022-05-17 | End: 2023-04-10

## 2022-05-17 ASSESSMENT — FIBROSIS 4 INDEX: FIB4 SCORE: 1.3

## 2022-05-17 ASSESSMENT — PATIENT HEALTH QUESTIONNAIRE - PHQ9: CLINICAL INTERPRETATION OF PHQ2 SCORE: 0

## 2022-05-17 NOTE — PROGRESS NOTES
Chief Complaint   Patient presents with   • Knee Pain     Both knees x6m. R knee previous meniscus tear.    • Congestive Heart Failure       Subjective:     HPI:   Aleshia Crooks presents today with the followin. Postsurgical hypothyroidism  Patient needs a renewal of her Levoxyl medication.  Her most recent TSH in December was at target.  She is feeling stable on this regimen.  Renewal is sent.    2. Moderate to severe pulmonary hypertension (HCC)/Obstructive sleep apnea treated with continuous positive airway pressure (CPAP)  Patient continues to follow with pulmonology and is treated with CPAP.  Lab orders discussed and placed.  Spironolactone refill is sent.  The spironolactone was recently reduced to once per day by Dr. Juarez due to her chronic kidney disease.    3. Chronic combined systolic and diastolic congestive heart failure (HCC)/Coronary artery disease involving native coronary artery of native heart without angina pectoris, s/p 1V CABG   She continues to follow carefully with cardiology and has a follow-up appointment this .  Denies any chest pain, chest pressure, palpitations or increase in exertional shortness of breath.  Patient states that her pharmacy changed her beta-blocker from bisoprolol to atenolol due to insurance issues.  I see that they did communicate with Dr. Villar but it is unclear.  I have asked her to resume the bisoprolol and discuss this with Dr. Roman when she sees him next month.  That is sent to the pharmacy.    4. Paroxysmal atrial fibrillation (HCC)  She continues to follow with cardiology.    5. History of malignant melanoma  This was several years ago now, over a decade.  She continues to follow with dermatology appropriately.  Denies any new lesions that she is aware of.  She has a follow-up appointment with Dr. Downs in .    6. Essential hypertension  Bisoprolol rather than atenolol prescribed.  She will review this with cardiology next month.  Lab  orders are discussed and placed.  Spironolactone documentation and adjusted to show 1/day.    7. CKD (chronic kidney disease) stage 4, GFR 15-29 ml/min (HCC)  Patient continues to have stage IIIb to stage IV chronic kidney disease.  Follow-up lab order discussed and placed.  Patient is taking vitamin D supplementation as directed by her nephrologist.    8. 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) (HCC)  This is been clinically stable and generally asymptomatic.  CBC last year shows normal platelets.  She has had normal platelet levels throughout.  Follow-up lab orders discussed and placed.  - spironolactone (ALDACTONE) 25 MG Tab; Take 1 Tablet by mouth every day.  Dispense: 90 Tablet; Refill: 0  - Cholecalciferol (VITAMIN D) 125 MCG (5000 UT) Cap; Take 5,000 Units by mouth every day.  Dispense: 30 Capsule; Refill: 11    9. Bilateral primary osteoarthritis of knee  Patient has bilateral knee osteoarthritis.  She had good response to injections in the knee, particularly the left in the past.  She would like to see the orthopedic office again to see if she can get more injections in the knee.  Referral discussed and placed.  - Referral to Orthopedics      Patient Active Problem List    Diagnosis Date Noted   • Postsurgical hypothyroidism 12/14/2021   • Caregiver stress 10/27/2021   • Situational stress 10/27/2021   • Irritability 10/27/2021   • CKD (chronic kidney disease) stage 4, GFR 15-29 ml/min (HCC) 10/27/2021   • 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) (HCC) 03/25/2021   • Dermatitis 03/01/2021   • Falls 03/01/2021   • Stress incontinence 03/01/2021   • Stage 3b chronic kidney disease (HCC) 09/11/2020   • Gastroesophageal reflux disease without esophagitis 06/08/2020   • Abnormal CBC 06/08/2020   • Abnormal CT of the abdomen 04/17/2020   • Mixed hyperlipidemia 04/17/2020   • Hepatic steatosis 04/17/2020   • Elevated alkaline phosphatase level 04/17/2020   •  Elevated troponin 04/08/2020   • Anemia 04/08/2020   • History of malignant melanoma 03/12/2020   • Skin lesions 03/12/2020   • Essential hypertension 03/12/2020   • Hypothyroidism 03/12/2020   • Obesity (BMI 35.0-39.9 without comorbidity) ([facility]) 03/12/2020   • Obstructive sleep apnea treated with continuous positive airway pressure (CPAP) 03/12/2020   • Severe mitral regurgitation 07/14/2017   • Moderate to severe pulmonary hypertension ([facility]) 07/14/2017   • Chronic combined systolic and diastolic congestive heart failure ([facility]) 07/14/2017   • Paroxysmal atrial fibrillation ([facility]) 07/14/2017   • Coronary artery disease involving native coronary artery of native heart without angina pectoris, s/p 1V CABG 1992 07/14/2017   • Microcytic anemia 07/13/2017   • Anxiety 07/13/2017       Current medicines (including changes today)  Current Outpatient Medications   Medication Sig Dispense Refill   • levothyroxine (LEVOXYL) 150 MCG Tab Take 1 Tablet by mouth every morning on an empty stomach. 90 Tablet 3   • bisoprolol (ZEBETA) 10 MG tablet Take 1 Tablet by mouth every day. 90 Tablet 4   • spironolactone (ALDACTONE) 25 MG Tab Take 1 Tablet by mouth every day. 90 Tablet 0   • Cholecalciferol (VITAMIN D) 125 MCG (5000 UT) Cap Take 5,000 Units by mouth every day. 30 Capsule 11   • Secukinumab (COSENTYX SENSOREADY PEN) 150 MG/ML Solution Auto-injector Inject 300mg Sub Cut once monthly 1.96 mL 6   • ferrous sulfate 325 (65 Fe) MG tablet TAKE 1 TABLET BY MOUTH EVERY DAY 90 Tablet 2   • CVS VITAMIN C 500 MG tablet TAKE 1 TABLET BY MOUTH EVERY DAY 90 Tablet 3   • atorvastatin (LIPITOR) 40 MG Tab TAKE 1 TABLET BY MOUTH AT  BEDTIME 100 Tablet 3   • losartan (COZAAR) 50 MG Tab TAKE 1 TABLET BY MOUTH DAILY 100 Tablet 1   • famotidine (PEPCID) 40 MG Tab Take 1 tablet by mouth every day. 90 tablet 3   • warfarin (COUMADIN) 6 MG Tab Take one tablet daily as directed by [facility]  Indications: Obstructing Blood Clot with  "Chronic Atrial Fibrillation 90 tablet 3   • omeprazole (PRILOSEC) 20 MG delayed-release capsule Take 1 capsule by mouth every day. 100 capsule 3   • loratadine (CLARITIN) 10 MG Tab Take 1 tablet by mouth 2 Times a Day. 60 tablet 5     No current facility-administered medications for this visit.       No Known Allergies    ROS: As per HPI       Objective:     /56 (BP Location: Right arm, Patient Position: Sitting, BP Cuff Size: Adult long)   Pulse 70   Temp 36.9 °C (98.5 °F) (Temporal)   Ht 1.6 m (5' 3\")   Wt 96.4 kg (212 lb 9.6 oz)   SpO2 96%  Body mass index is 37.66 kg/m².    Physical Exam:  Constitutional: Well-developed and well-nourished. Not diaphoretic. No distress. Lucid and fluent.  Patient, physician and staff all wearing masks.  Skin: Skin is warm and dry. No rash noted.  Head: Atraumatic without lesions.  Eyes: Conjunctivae and extraocular motions are normal. Pupils are equal, round, and reactive to light. No scleral icterus.   Ears:  External ears unremarkable.   Neck: Supple, trachea midline. No thyromegaly present. No cervical or supraclavicular lymphadenopathy. No JVD or carotid bruits appreciated  Cardiovascular: Regular rate and rhythm.  Normal S1, S2 without murmur appreciated.  Chest: Effort normal. Clear to auscultation throughout. No adventitious sounds.   Abdomen: Soft, non tender, and without distention. Active bowel sounds in all four quadrants. No rebound, guarding, masses or hepatosplenomegaly.  Extremities: No cyanosis, clubbing, erythema, nor edema.   Neurological: Alert and oriented x 3.  Psychiatric:  Behavior, mood, and affect are appropriate.       Assessment and Plan:     82 y.o. female with the following issues:    1. Postsurgical hypothyroidism  levothyroxine (LEVOXYL) 150 MCG Tab   2. Moderate to severe pulmonary hypertension (HCC)  CBC WITHOUT DIFFERENTIAL   3. Obstructive sleep apnea treated with continuous positive airway pressure (CPAP)  spironolactone (ALDACTONE) " 25 MG Tab    DISCONTINUED: spironolactone (ALDACTONE) 25 MG Tab   4. Chronic combined systolic and diastolic congestive heart failure (HCC)  bisoprolol (ZEBETA) 10 MG tablet   5. Paroxysmal atrial fibrillation (HCC)  spironolactone (ALDACTONE) 25 MG Tab    DISCONTINUED: spironolactone (ALDACTONE) 25 MG Tab   6. Coronary artery disease involving native coronary artery of native heart without angina pectoris, s/p 1V CABG 1992     7. Coronary artery disease involving native coronary artery of native heart without angina pectoris  bisoprolol (ZEBETA) 10 MG tablet   8. History of malignant melanoma     9. Essential hypertension  bisoprolol (ZEBETA) 10 MG tablet    Comp Metabolic Panel    CBC WITHOUT DIFFERENTIAL    spironolactone (ALDACTONE) 25 MG Tab    DISCONTINUED: spironolactone (ALDACTONE) 25 MG Tab   10. CKD (chronic kidney disease) stage 4, GFR 15-29 ml/min (HCC)  Comp Metabolic Panel    CBC WITHOUT DIFFERENTIAL    Cholecalciferol (VITAMIN D) 125 MCG (5000 UT) Cap   11. 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) ([facility])  spironolactone (ALDACTONE) 25 MG Tab    Cholecalciferol (VITAMIN D) 125 MCG (5000 UT) Cap    DISCONTINUED: spironolactone (ALDACTONE) 25 MG Tab   12. Bilateral primary osteoarthritis of knee  Referral to Orthopedics         Followup: Return in about 6 months (around 11/17/2022), or if symptoms worsen or fail to improve.

## 2022-05-19 DIAGNOSIS — Z79.899 HIGH RISK MEDICATION USE: ICD-10-CM

## 2022-05-23 ENCOUNTER — ANTICOAGULATION MONITORING (OUTPATIENT)
Dept: VASCULAR LAB | Facility: MEDICAL CENTER | Age: 82
End: 2022-05-23
Payer: MEDICARE

## 2022-05-23 DIAGNOSIS — I48.0 PAROXYSMAL ATRIAL FIBRILLATION (HCC): ICD-10-CM

## 2022-05-23 LAB — INR PPP: 2.6 (ref 2–3.5)

## 2022-05-23 NOTE — PROGRESS NOTES
Anticoagulation Summary  As of 2022    INR goal:  2.0-3.0   TTR:  72.9 % (2.1 y)   INR used for dosin.60 (2022)   Warfarin maintenance plan:  3 mg (6 mg x 0.5) every Mon, Fri; 6 mg (6 mg x 1) all other days   Weekly warfarin total:  36 mg   Plan last modified:  Abad BarbaD (2021)   Next INR check:  2022   Target end date:  Indefinite    Indications    Paroxysmal atrial fibrillation (HCC) [I48.0]             Anticoagulation Episode Summary     INR check location:      Preferred lab:      Send INR reminders to:      Comments:  [facility]      Anticoagulation Care Providers     Provider Role Specialty Phone number    La Paredes M.D. Referring Internal Medicine [phone]    [facility] Responsible  [phone]        Anticoagulation Patient Findings      Left voicemail message to report a therapeutic INR of 2.6     Pt to continue with current warfarin dosing regimen. Requested pt contact the clinic for any s/s of unusual bleeding, bruising, clotting or any changes to diet or medication.    FU INR in 1 week(s).    Nedra Donald, AbadD

## 2022-05-27 NOTE — PROGRESS NOTES
OP   Telephone Anticoagulation Service Note      Anticoagulation Summary  As of 2021    INR goal:  2.0-3.0   TTR:  64.0 % (1 y)   INR used for dosin.10 (2021)   Warfarin maintenance plan:  9 mg (6 mg x 1.5) every Wed; 6 mg (6 mg x 1) all other days   Weekly warfarin total:  45 mg   Plan last modified:  Dorina Rodriguez (3/22/2021)   Next INR check:  2021   Target end date:  Indefinite    Indications    Paroxysmal atrial fibrillation (HCC) [I48.0]             Anticoagulation Episode Summary     INR check location:      Preferred lab:      Send INR reminders to:      Comments:  [facility]      Anticoagulation Care Providers     Provider Role Specialty Phone number    La Paredes M.D. Referring Internal Medicine [phone]    [facility] Responsible  [phone]        Anticoagulation Patient Findings     Left a message on the patient's voicemail today, informing the patient of a therapeutic INR of 2.1.  Instructed the patient to call the clinic with any changes to diet or medications, with any signs/sx of bleeding or clotting or with any questions or concerns.     Patient instructed to continue with the current warfarin dosing regimen, and asked to follow up again in 1 week.     Linda MelgozaD     room air

## 2022-05-30 LAB — INR PPP: 2.5 (ref 2–3.5)

## 2022-05-31 ENCOUNTER — ANTICOAGULATION MONITORING (OUTPATIENT)
Dept: MEDICAL GROUP | Facility: PHYSICIAN GROUP | Age: 82
End: 2022-05-31
Payer: MEDICARE

## 2022-05-31 DIAGNOSIS — I48.0 PAROXYSMAL ATRIAL FIBRILLATION (HCC): ICD-10-CM

## 2022-05-31 NOTE — PROGRESS NOTES
Anticoagulation Summary  As of 2022    INR goal:  2.0-3.0   TTR:  73.1 % (2.2 y)   INR used for dosin.50 (2022)   Warfarin maintenance plan:  3 mg (6 mg x 0.5) every Mon, Fri; 6 mg (6 mg x 1) all other days   Weekly warfarin total:  36 mg   Plan last modified:  Dallin Viveros PharmD (2021)   Next INR check:  2022   Target end date:  Indefinite    Indications    Paroxysmal atrial fibrillation (HCC) [I48.0]             Anticoagulation Episode Summary     INR check location:      Preferred lab:      Send INR reminders to:      Comments:  [facility]      Anticoagulation Care Providers     Provider Role Specialty Phone number    MariahJohnathanaisha Paredes M.D. Referring Internal Medicine [phone]    [facility] Responsible  [phone]        Anticoagulation Patient Findings  Patient Findings     Negatives:  Signs/symptoms of thrombosis, Signs/symptoms of bleeding, Laboratory test error suspected, Change in health, Change in alcohol use, Change in activity, Upcoming invasive procedure, Emergency department visit, Upcoming dental procedure, Missed doses, Extra doses, Change in medications, Change in diet/appetite, Hospital admission, Bruising, Other complaints        Spoke with patient today regarding therapeutic INR of 2.5.  Patient denies any signs/symptoms of bruising or bleeding or any changes in diet and medications.  Instructed patient to call clinic with any questions or concerns.    Pt is not on antiplatelet therapy    Pt is to continue with current warfarin dosing regimen.  Follow up in 1 weeks, to reduce risk of adverse events related to this high risk medication,  Warfarin.    Christian Jordan, PharmD, BCACP

## 2022-06-06 ENCOUNTER — HOSPITAL ENCOUNTER (OUTPATIENT)
Dept: LAB | Facility: MEDICAL CENTER | Age: 82
End: 2022-06-06
Attending: DERMATOLOGY
Payer: MEDICARE

## 2022-06-06 ENCOUNTER — HOSPITAL ENCOUNTER (OUTPATIENT)
Dept: LAB | Facility: MEDICAL CENTER | Age: 82
End: 2022-06-06
Attending: FAMILY MEDICINE
Payer: MEDICARE

## 2022-06-06 DIAGNOSIS — N18.4 CKD (CHRONIC KIDNEY DISEASE) STAGE 4, GFR 15-29 ML/MIN (HCC): ICD-10-CM

## 2022-06-06 DIAGNOSIS — Z79.899 HIGH RISK MEDICATION USE: ICD-10-CM

## 2022-06-06 DIAGNOSIS — I10 ESSENTIAL HYPERTENSION: Chronic | ICD-10-CM

## 2022-06-06 DIAGNOSIS — I27.20 MODERATE TO SEVERE PULMONARY HYPERTENSION (HCC): ICD-10-CM

## 2022-06-06 LAB
ALBUMIN SERPL BCP-MCNC: 4.3 G/DL (ref 3.2–4.9)
ALBUMIN/GLOB SERPL: 1.4 G/DL
ALP SERPL-CCNC: 81 U/L (ref 30–99)
ALT SERPL-CCNC: 36 U/L (ref 2–50)
ANION GAP SERPL CALC-SCNC: 12 MMOL/L (ref 7–16)
AST SERPL-CCNC: 25 U/L (ref 12–45)
BILIRUB SERPL-MCNC: 0.6 MG/DL (ref 0.1–1.5)
BUN SERPL-MCNC: 41 MG/DL (ref 8–22)
CALCIUM SERPL-MCNC: 8.7 MG/DL (ref 8.5–10.5)
CHLORIDE SERPL-SCNC: 106 MMOL/L (ref 96–112)
CO2 SERPL-SCNC: 22 MMOL/L (ref 20–33)
CREAT SERPL-MCNC: 1.56 MG/DL (ref 0.5–1.4)
ERYTHROCYTE [DISTWIDTH] IN BLOOD BY AUTOMATED COUNT: 49.7 FL (ref 35.9–50)
GFR SERPLBLD CREATININE-BSD FMLA CKD-EPI: 33 ML/MIN/1.73 M 2
GLOBULIN SER CALC-MCNC: 3 G/DL (ref 1.9–3.5)
GLUCOSE SERPL-MCNC: 114 MG/DL (ref 65–99)
HCT VFR BLD AUTO: 43.3 % (ref 37–47)
HGB BLD-MCNC: 13.4 G/DL (ref 12–16)
MCH RBC QN AUTO: 27.4 PG (ref 27–33)
MCHC RBC AUTO-ENTMCNC: 30.9 G/DL (ref 33.6–35)
MCV RBC AUTO: 88.5 FL (ref 81.4–97.8)
PLATELET # BLD AUTO: 292 K/UL (ref 164–446)
PMV BLD AUTO: 11.4 FL (ref 9–12.9)
POTASSIUM SERPL-SCNC: 4.8 MMOL/L (ref 3.6–5.5)
PROT SERPL-MCNC: 7.3 G/DL (ref 6–8.2)
RBC # BLD AUTO: 4.89 M/UL (ref 4.2–5.4)
SODIUM SERPL-SCNC: 140 MMOL/L (ref 135–145)
WBC # BLD AUTO: 11.5 K/UL (ref 4.8–10.8)

## 2022-06-06 PROCEDURE — 36415 COLL VENOUS BLD VENIPUNCTURE: CPT

## 2022-06-06 PROCEDURE — 80053 COMPREHEN METABOLIC PANEL: CPT

## 2022-06-06 PROCEDURE — 86480 TB TEST CELL IMMUN MEASURE: CPT

## 2022-06-06 PROCEDURE — 85027 COMPLETE CBC AUTOMATED: CPT

## 2022-06-07 LAB
GAMMA INTERFERON BACKGROUND BLD IA-ACNC: 0.02 IU/ML
M TB IFN-G BLD-IMP: NEGATIVE
M TB IFN-G CD4+ BCKGRND COR BLD-ACNC: 0 IU/ML
MITOGEN IGNF BCKGRD COR BLD-ACNC: 0.54 IU/ML
QFT TB2 - NIL TBQ2: 0 IU/ML

## 2022-06-10 ENCOUNTER — OFFICE VISIT (OUTPATIENT)
Dept: DERMATOLOGY | Facility: IMAGING CENTER | Age: 82
End: 2022-06-10
Payer: MEDICARE

## 2022-06-10 DIAGNOSIS — Z12.83 SKIN CANCER SCREENING: ICD-10-CM

## 2022-06-10 DIAGNOSIS — D22.9 NEVUS: ICD-10-CM

## 2022-06-10 DIAGNOSIS — L81.4 LENTIGO: ICD-10-CM

## 2022-06-10 DIAGNOSIS — Z79.899 HIGH RISK MEDICATION USE: ICD-10-CM

## 2022-06-10 DIAGNOSIS — L82.1 SEBORRHEIC KERATOSIS: ICD-10-CM

## 2022-06-10 DIAGNOSIS — L90.8 SKIN AGING: ICD-10-CM

## 2022-06-10 DIAGNOSIS — Z85.820 HX OF MELANOMA OF SKIN: ICD-10-CM

## 2022-06-10 DIAGNOSIS — L30.4 INTERTRIGO: ICD-10-CM

## 2022-06-10 DIAGNOSIS — L57.0 ACTINIC KERATOSIS: ICD-10-CM

## 2022-06-10 DIAGNOSIS — L40.9 PSORIASIS: ICD-10-CM

## 2022-06-10 PROCEDURE — 99213 OFFICE O/P EST LOW 20 MIN: CPT | Mod: 25 | Performed by: DERMATOLOGY

## 2022-06-10 PROCEDURE — 17000 DESTRUCT PREMALG LESION: CPT | Performed by: DERMATOLOGY

## 2022-06-10 NOTE — PROGRESS NOTES
"CC:  Follow up Psoriasis       Subjective: prev seen patient follow here for 6 mth full skin exam/ PSO. Denies new, growing, changing, itching or bleeding skin lesions today.    Few itchy spots, sometimes picks at sites.  Did pick at site on right cheek - removed some peeling, dry skin.   Hx of skin rash greatly improved on cosentyx for PSO    From prior notes:  \"HPI: Rash   \"All over body\", May have started in small sites on back, legs.  Some sites improved but others have worsened.  States Betamethasone oint has made rash worse in the last 2 weeks   Discontinued about 3 days ago.  Has tried and failed triamcinolone, no improvement.  Using some topical, name not known, that is compounded in lotion.     Denies known oral steroids.  May have started after spironolactone prescribed.  Reports taking metoprolol for at least 3 years; could have had smaller sites noted after this medication was begun, unsure.     Onset: at least 7 months has progressively  has gotten worse in the last 2 weeks . Thinks after flu vaccine in September started to developed  symptoms . Was evaluated by another Dermatologist 6 months ago . Bx done . 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  History of blistering/severe sunburns:Yes, Details: .  Family history of skin cancer:No  Family history of atypical moles:No    ROS: no fevers/chills. +/- itch.  No cough.  No weight loss.   DermPMH: hx melanoma left shoulder, 2014? Per records  Relevant PMH: many med comorbidities.  GONZALEZ, CHF, kidney Dz, hypothyroidism  Social: former smoker; denies travel abroad.  Daughter with well water which she doesn't drink due to taste.  FmHx:  not affected    PE: Gen:WDWN " female in NAD.  Skin:Scalp/face/eyes/lips/neck/chest/back/arms/legs/hands/feet/buttocks - without suspicious lesions noted.  Genitals exam declined  -thin HK/waxy papule with crusting on surface - right facial cheek  -scattered hyperpigmented macules/papules, appearing benign on torso and extremities  -pink macules, xerosis noted of legs    SCI Prior path - see scans = spongiotic dermatitis + eosinophils. Consider ACD, Nummular dermatitis or drug reaction    Labs: Hep B/C (Ab X5) April 2020 - WNL  Quantiferon negative - June 2022  O&P - negative    A/P: PSO: Papulosquamous rash, prior path showing spongiotic dermatitis + eosinophils.  Suspicion for drug reaction and possible MTX intolerance: kidney dz and elevated Cr after test dose.  Steroid responsive by patient report.  Cannot exclude PSO; NOW IMPROVED !! on Cosentyx, tolerating well  -cont cosentyx 300mg SubCut Qmonth vs q4 weeks - patient may try to reduce to [id number] to see if more optimal control - has a few eruptions on occasion  -cont current home supply topicals - Lidex cream vs betamethasone oint BID severe sites and TAC oint BID less severe sites PRN.    High risk medication: monitoring for safety prior to therapeutic doses:  -Next TB test June 2023    Hx of skin cancer: NER - left arm melanoma  -cont'd sunprotection and skin cancer surveillance  -Q 6mo-annual exam recommended; f/u suspicious lesions PRN    Lentigo/SKs/nevi, back:  -b/r    Xerosis, diffuse:  -counseled re: dx/tx  -OTC moisturizers recommended, supplied samples and handout today    AKs: r cheek  -counseled on diagnosis and treatment, including risks/benefits/alternatives of cyrotherapy  -LN2 25 sec X 2 cycles X 1lesions  -f/u if persists at 1 month    Intertrigo:  -reviewed dx/tx  -keep cool; air dry low setting, talc - gold bond's powder  -desitin cream PRN    I have reviewed medications relevant to my specialty.   Felecia Katz

## 2022-06-13 ENCOUNTER — ANTICOAGULATION MONITORING (OUTPATIENT)
Dept: VASCULAR LAB | Facility: MEDICAL CENTER | Age: 82
End: 2022-06-13
Payer: MEDICARE

## 2022-06-13 DIAGNOSIS — I48.0 PAROXYSMAL ATRIAL FIBRILLATION (HCC): ICD-10-CM

## 2022-06-13 LAB — INR PPP: 2.9 (ref 2–3.5)

## 2022-06-14 NOTE — PROGRESS NOTES
Anticoagulation Summary  As of 2022    INR goal:  2.0-3.0   TTR:  73.6 % (2.2 y)   INR used for dosin.90 (2022)   Warfarin maintenance plan:  3 mg (6 mg x 0.5) every Mon, Fri; 6 mg (6 mg x 1) all other days   Weekly warfarin total:  36 mg   Plan last modified:  Dallin Viveros, PharmD (2021)   Next INR check:  2022   Target end date:  Indefinite    Indications    Paroxysmal atrial fibrillation (HCC) [I48.0]             Anticoagulation Episode Summary     INR check location:      Preferred lab:      Send INR reminders to:      Comments:  [facility]      Anticoagulation Care Providers     Provider Role Specialty Phone number    La Paredes M.D. Referring Internal Medicine [phone]    [facility] Responsible  [phone]        Anticoagulation Patient Findings          Left voicemail message to report a therapeutic INR of 2.90.  Pt to continue with current warfarin dosing regimen. Requested pt contact the clinic for any s/s of unusual bleeding, bruising, clotting or any changes to diet or medication. FU 2 weeks.    Jason Shields, Pharmacy Intern

## 2022-06-20 ENCOUNTER — ANTICOAGULATION MONITORING (OUTPATIENT)
Dept: MEDICAL GROUP | Facility: MEDICAL CENTER | Age: 82
End: 2022-06-20
Payer: MEDICARE

## 2022-06-20 DIAGNOSIS — I48.0 PAROXYSMAL ATRIAL FIBRILLATION (HCC): ICD-10-CM

## 2022-06-20 LAB — INR PPP: 2.7 (ref 2–3.5)

## 2022-06-20 NOTE — PROGRESS NOTES
Anticoagulation Summary  As of 2022    INR goal:  2.0-3.0   TTR:  73.8 % (2.2 y)   INR used for dosin.70 (2022)   Warfarin maintenance plan:  3 mg (6 mg x 0.5) every Mon, Fri; 6 mg (6 mg x 1) all other days   Weekly warfarin total:  36 mg   Plan last modified:  Dallin Viveros, PharmD (2021)   Next INR check:  2022   Target end date:  Indefinite    Indications    Paroxysmal atrial fibrillation (HCC) [I48.0]             Anticoagulation Episode Summary     INR check location:      Preferred lab:      Send INR reminders to:      Comments:  [facility]      Anticoagulation Care Providers     Provider Role Specialty Phone number    La Paredes M.D. Referring Internal Medicine [phone]    [facility] Responsible  [phone]        Anticoagulation Patient Findings  Patient Findings     Negatives:  Signs/symptoms of thrombosis, Signs/symptoms of bleeding, Laboratory test error suspected, Change in health, Change in alcohol use, Change in activity, Upcoming invasive procedure, Emergency department visit, Upcoming dental procedure, Missed doses, Extra doses, Change in medications, Change in diet/appetite, Hospital admission, Bruising, Other complaints        Spoke with patient to report a therapeutic INR.  Pt instructed to continue with current warfarin dosing regimen. Pt denies any s/s of bleeding, bruising, clotting or any changes to diet or medication.  Will follow up in 1 week.    Becky Mi, Med Ass't      I have reviewed and concur with the above plan.       Current Outpatient Medications:   •  levothyroxine, 150 mcg, Oral, AM ES  •  bisoprolol, 10 mg, Oral, DAILY  •  spironolactone, 25 mg, Oral, QDAY  •  Vitamin D, 5,000 Units, Oral, DAILY  •  Cosentyx Sensoready Pen, Inject 300mg Sub Cut once monthly  •  ferrous sulfate, 325 mg, Oral, DAILY  •  [facility]S Vitamin C, TAKE 1 TABLET BY MOUTH EVERY DAY  •  atorvastatin, TAKE 1 TABLET BY MOUTH AT  BEDTIME  •   losartan, TAKE 1 TABLET BY MOUTH DAILY  •  famotidine, 40 mg, Oral, DAILY  •  warfarin, Take one tablet daily as directed by [facility]  Indications: Obstructing Blood Clot with Chronic Atrial Fibrillation  •  omeprazole, 20 mg, Oral, QDAY  •  loratadine, 10 mg, Oral, BID    Dallin Viveros, PharmD, MS, BCACP, C  [facility]  Phone: [phone],  Fax: [phone]  On call: [phone]

## 2022-06-23 ENCOUNTER — OFFICE VISIT (OUTPATIENT)
Dept: CARDIOLOGY | Facility: MEDICAL CENTER | Age: 82
End: 2022-06-23
Payer: MEDICARE

## 2022-06-23 VITALS
HEART RATE: 78 BPM | HEIGHT: 62 IN | BODY MASS INDEX: 39.36 KG/M2 | OXYGEN SATURATION: 99 % | WEIGHT: 213.9 LBS | RESPIRATION RATE: 14 BRPM | DIASTOLIC BLOOD PRESSURE: 70 MMHG | SYSTOLIC BLOOD PRESSURE: 122 MMHG

## 2022-06-23 DIAGNOSIS — K76.0 HEPATIC STEATOSIS: ICD-10-CM

## 2022-06-23 DIAGNOSIS — N18.4 CKD (CHRONIC KIDNEY DISEASE) STAGE 4, GFR 15-29 ML/MIN (HCC): ICD-10-CM

## 2022-06-23 DIAGNOSIS — G47.33 OBSTRUCTIVE SLEEP APNEA TREATED WITH CONTINUOUS POSITIVE AIRWAY PRESSURE (CPAP): ICD-10-CM

## 2022-06-23 DIAGNOSIS — R93.5 ABNORMAL CT OF THE ABDOMEN: ICD-10-CM

## 2022-06-23 DIAGNOSIS — N39.3 STRESS INCONTINENCE: ICD-10-CM

## 2022-06-23 DIAGNOSIS — R79.89 ELEVATED TROPONIN: ICD-10-CM

## 2022-06-23 DIAGNOSIS — K75.81 LIVER CIRRHOSIS SECONDARY TO NASH (NONALCOHOLIC STEATOHEPATITIS) (HCC): ICD-10-CM

## 2022-06-23 DIAGNOSIS — I50.42 CHRONIC COMBINED SYSTOLIC AND DIASTOLIC CONGESTIVE HEART FAILURE (HCC): ICD-10-CM

## 2022-06-23 DIAGNOSIS — I25.10 CORONARY ARTERY DISEASE INVOLVING NATIVE CORONARY ARTERY OF NATIVE HEART WITHOUT ANGINA PECTORIS: ICD-10-CM

## 2022-06-23 DIAGNOSIS — E78.2 MIXED HYPERLIPIDEMIA: ICD-10-CM

## 2022-06-23 DIAGNOSIS — K74.60 LIVER CIRRHOSIS SECONDARY TO NASH (NONALCOHOLIC STEATOHEPATITIS) (HCC): ICD-10-CM

## 2022-06-23 DIAGNOSIS — N18.32 STAGE 3B CHRONIC KIDNEY DISEASE: ICD-10-CM

## 2022-06-23 DIAGNOSIS — Z85.820 HISTORY OF MALIGNANT MELANOMA: ICD-10-CM

## 2022-06-23 DIAGNOSIS — I34.0 SEVERE MITRAL REGURGITATION: ICD-10-CM

## 2022-06-23 DIAGNOSIS — I10 ESSENTIAL HYPERTENSION: Chronic | ICD-10-CM

## 2022-06-23 DIAGNOSIS — I27.20 MODERATE TO SEVERE PULMONARY HYPERTENSION (HCC): ICD-10-CM

## 2022-06-23 DIAGNOSIS — I48.0 PAROXYSMAL ATRIAL FIBRILLATION (HCC): ICD-10-CM

## 2022-06-23 PROCEDURE — 99214 OFFICE O/P EST MOD 30 MIN: CPT | Performed by: INTERNAL MEDICINE

## 2022-06-23 RX ORDER — SPIRONOLACTONE 25 MG/1
25 TABLET ORAL
Qty: 90 TABLET | Refills: 3 | Status: SHIPPED | OUTPATIENT
Start: 2022-06-23 | End: 2022-12-22 | Stop reason: SDUPTHER

## 2022-06-23 ASSESSMENT — ENCOUNTER SYMPTOMS
HEMOPTYSIS: 0
NEUROLOGICAL NEGATIVE: 1
CONSTITUTIONAL NEGATIVE: 1
FEVER: 0
ORTHOPNEA: 0
CARDIOVASCULAR NEGATIVE: 1
LOSS OF CONSCIOUSNESS: 0
STRIDOR: 0
SHORTNESS OF BREATH: 0
SORE THROAT: 0
CLAUDICATION: 0
WHEEZING: 0
CHILLS: 0
BRUISES/BLEEDS EASILY: 0
RESPIRATORY NEGATIVE: 1
PALPITATIONS: 0
WEAKNESS: 0
PND: 0
GASTROINTESTINAL NEGATIVE: 1
EYES NEGATIVE: 1
COUGH: 0
SPUTUM PRODUCTION: 0
MUSCULOSKELETAL NEGATIVE: 1
DIZZINESS: 0

## 2022-06-23 ASSESSMENT — FIBROSIS 4 INDEX: FIB4 SCORE: 1.14

## 2022-06-23 NOTE — PROGRESS NOTES
No chief complaint on file.      Subjective:   Aleshia Crooks is a 80 y.o. female who presents today as a follow-up for her CAD status post CABG hypertension hyperlipidemia lower extremity edema and mitral valve regurgitation with paroxysmal atrial fibrillation.     Since she was last seen she complains of shortness of breath.  She thinks is because she gets hip pain and causes her to breathe deeply.  Otherwise her blood pressures been controlled.  We have not checked her lower echocardiogram since last year.  She also would like to get off the warfarin.    Past Medical History:   Diagnosis Date   • Apnea, sleep    • Atrial fibrillation (HCC)    • Gasping for breath    • Heart attack (HCC)    • Hyperlipidemia    • Hypertension    • 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) (HCC) 3/25/2021   • Malignant melanoma of left upper extremity including shoulder (HCC) 2020   • Thyroid disease      Past Surgical History:   Procedure Laterality Date   • CHOLECYSTECTOMY     • EYE SURGERY Bilateral     cataracts   • MULTIPLE CORONARY ARTERY BYPASS      1 bypass   • THYROIDECTOMY TOTAL       Family History   Problem Relation Age of Onset   • Cancer Mother         cancer, smoker   • Stroke Maternal Grandmother    • Other Other         Crohn   • Kidney Disease Other         kidney transplant     Social History     Socioeconomic History   • Marital status:      Spouse name: Not on file   • Number of children: Not on file   • Years of education: Not on file   • Highest education level: Not on file   Occupational History     Comment: [employer]   Tobacco Use   • Smoking status: Former Smoker     Packs/day: 1.00     Years: 31.00     Pack years: 31.00     Quit date:      Years since quittin.4   • Smokeless tobacco: Never Used   Vaping Use   • Vaping Use: Never used   Substance and Sexual Activity   • Alcohol use: No   • Drug use: No   • Sexual activity: Not Currently     Partners:  Male   Other Topics Concern   • Not on file   Social History Narrative   • Not on file     Social Determinants of Health     Financial Resource Strain: Not on file   Food Insecurity: Not on file   Transportation Needs: Not on file   Physical Activity: Not on file   Stress: Not on file   Social Connections: Not on file   Intimate Partner Violence: Not on file   Housing Stability: Not on file     No Known Allergies  Outpatient Encounter Medications as of 6/23/2022   Medication Sig Dispense Refill   • Non Formulary Request Indications: Balance Nature supplement     • rivaroxaban (XARELTO) 15 MG Tab tablet Take 1 Tablet by mouth with dinner. 90 Tablet 3   • spironolactone (ALDACTONE) 25 MG Tab Take 1 Tablet by mouth every day. 90 Tablet 3   • levothyroxine (LEVOXYL) 150 MCG Tab Take 1 Tablet by mouth every morning on an empty stomach. 90 Tablet 3   • bisoprolol (ZEBETA) 10 MG tablet Take 1 Tablet by mouth every day. 90 Tablet 4   • Cholecalciferol (VITAMIN D) 125 MCG (5000 UT) Cap Take 5,000 Units by mouth every day. 30 Capsule 11   • Secukinumab (COSENTYX SENSOREADY PEN) 150 MG/ML Solution Auto-injector Inject 300mg Sub Cut once monthly 1.96 mL 6   • CVS VITAMIN C 500 MG tablet TAKE 1 TABLET BY MOUTH EVERY DAY 90 Tablet 3   • atorvastatin (LIPITOR) 40 MG Tab TAKE 1 TABLET BY MOUTH AT  BEDTIME 100 Tablet 3   • losartan (COZAAR) 50 MG Tab TAKE 1 TABLET BY MOUTH DAILY 100 Tablet 1   • famotidine (PEPCID) 40 MG Tab Take 1 tablet by mouth every day. 90 tablet 3   • warfarin (COUMADIN) 6 MG Tab Take one tablet daily as directed by [facility]  Indications: Obstructing Blood Clot with Chronic Atrial Fibrillation 90 tablet 3   • omeprazole (PRILOSEC) 20 MG delayed-release capsule Take 1 capsule by mouth every day. 100 capsule 3   • loratadine (CLARITIN) 10 MG Tab Take 1 tablet by mouth 2 Times a Day. 60 tablet 5   • [DISCONTINUED] spironolactone (ALDACTONE) 25 MG Tab Take 1 Tablet by mouth every day. 90  "Tablet 0   • [DISCONTINUED] ferrous sulfate 325 (65 Fe) MG tablet TAKE 1 TABLET BY MOUTH EVERY DAY (Patient not taking: Reported on 6/23/2022) 90 Tablet 2     No facility-administered encounter medications on file as of 6/23/2022.     Review of Systems   Constitutional: Negative.  Negative for chills, fever and malaise/fatigue.   HENT: Negative.  Negative for sore throat.    Eyes: Negative.    Respiratory: Negative.  Negative for cough, hemoptysis, sputum production, shortness of breath, wheezing and stridor.    Cardiovascular: Negative.  Negative for chest pain, palpitations, orthopnea, claudication, leg swelling and PND.   Gastrointestinal: Negative.    Genitourinary: Negative.    Musculoskeletal: Negative.    Skin: Negative.    Neurological: Negative.  Negative for dizziness, loss of consciousness and weakness.   Endo/Heme/Allergies: Negative.  Does not bruise/bleed easily.   All other systems reviewed and are negative.       Objective:   /70 (BP Location: Left arm, Patient Position: Sitting, BP Cuff Size: Adult)   Pulse 78   Resp 14   Ht 1.575 m (5' 2\")   Wt 97 kg (213 lb 14.4 oz)   LMP  (LMP Unknown)   SpO2 99%   BMI 39.12 kg/m²     Physical Exam  Vitals and nursing note reviewed.   Constitutional:       General: She is not in acute distress.     Appearance: She is well-developed. She is not diaphoretic.   HENT:      Head: Normocephalic and atraumatic.      Right Ear: External ear normal.      Left Ear: External ear normal.      Nose: Nose normal.      Mouth/Throat:      Pharynx: No oropharyngeal exudate.   Eyes:      General: No scleral icterus.        Right eye: No discharge.         Left eye: No discharge.      Conjunctiva/sclera: Conjunctivae normal.      Pupils: Pupils are equal, round, and reactive to light.   Neck:      Vascular: No JVD.   Cardiovascular:      Rate and Rhythm: Normal rate and regular rhythm.      Heart sounds: No murmur heard.    No friction rub. No gallop.   Pulmonary:      " Effort: Pulmonary effort is normal. No respiratory distress.      Breath sounds: No stridor. No wheezing or rales.   Chest:      Chest wall: No tenderness.   Abdominal:      General: There is no distension.      Palpations: Abdomen is soft.      Tenderness: There is no guarding.   Musculoskeletal:         General: No tenderness or deformity. Normal range of motion.      Cervical back: Neck supple.   Skin:     General: Skin is warm and dry.      Coloration: Skin is not pale.      Findings: No erythema or rash.   Neurological:      Mental Status: She is alert.      Cranial Nerves: No cranial nerve deficit.      Motor: No abnormal muscle tone.      Coordination: Coordination normal.      Deep Tendon Reflexes: Reflexes are normal and symmetric. Reflexes normal.   Psychiatric:         Behavior: Behavior normal.         Thought Content: Thought content normal.         Judgment: Judgment normal.         Assessment:     1. Essential hypertension  spironolactone (ALDACTONE) 25 MG Tab   2. Severe mitral regurgitation     3. Moderate to severe pulmonary hypertension (HCC)     4. Chronic combined systolic and diastolic congestive heart failure (HCC)     5. Paroxysmal atrial fibrillation (HCC)  rivaroxaban (XARELTO) 15 MG Tab tablet    EC-ECHOCARDIOGRAM COMPLETE W/O CONT    spironolactone (ALDACTONE) 25 MG Tab   6. Coronary artery disease involving native coronary artery of native heart without angina pectoris, s/p 1V CABG 1992  rivaroxaban (XARELTO) 15 MG Tab tablet    EC-ECHOCARDIOGRAM COMPLETE W/O CONT   7. History of malignant melanoma  EC-ECHOCARDIOGRAM COMPLETE W/O CONT   8. Obstructive sleep apnea treated with continuous positive airway pressure (CPAP)  EC-ECHOCARDIOGRAM COMPLETE W/O CONT    spironolactone (ALDACTONE) 25 MG Tab   9. Elevated troponin     10. Abnormal CT of the abdomen     11. Mixed hyperlipidemia     12. Hepatic steatosis     13. Stage 3b chronic kidney disease (HCC)     14. Stress incontinence     15.  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) (HCC)  spironolactone (ALDACTONE) 25 MG Tab   16. CKD (chronic kidney disease) stage 4, GFR 15-29 ml/min (HCC)         Medical Decision Making:  Today's Assessment / Status / Plan:   80-year-old female with paroxysmal atrial fibrillation and might regurgitation which appears to improved following diuresis.  For her oral anticoagulation I will have her stop her warfarin and given her prescription for Xarelto 15 mg/day.  I calculated her creatinine clearance at 45.  Otherwise we will repeat her echocardiogram.  I will see her back in 6 months.      1. Severe MR, now improved after diuresis    - repeat TTE      2. COPD?    - obtain PFTs from prior pulmonologist     3. CAD s/p CABG    - cont atorva 40     4. A-fib    - cont metpo 50 BID    - stop warfarin    - start xarelto 15 mg, Cr Cl 45     5. CHF, normalized    - per above    - cont losartan 50     6. Edema    - cont furosemide 40    - cont spiro25

## 2022-06-23 NOTE — PATIENT INSTRUCTIONS
[email or web address]    If switching, stop warfarin for 3 days, then start xarelto

## 2022-06-27 ENCOUNTER — ANTICOAGULATION MONITORING (OUTPATIENT)
Dept: VASCULAR LAB | Facility: MEDICAL CENTER | Age: 82
End: 2022-06-27
Payer: MEDICARE

## 2022-06-27 DIAGNOSIS — I48.0 PAROXYSMAL ATRIAL FIBRILLATION (HCC): ICD-10-CM

## 2022-06-27 LAB — INR PPP: 2.8 (ref 2–3.5)

## 2022-06-27 NOTE — PROGRESS NOTES
Anticoagulation Summary  As of 2022    INR goal:  2.0-3.0   TTR:  74.1 % (2.2 y)   INR used for dosin.80 (2022)   Warfarin maintenance plan:  3 mg (6 mg x 0.5) every Mon, Fri; 6 mg (6 mg x 1) all other days   Weekly warfarin total:  36 mg   Plan last modified:  Abad BarbaD (2021)   Next INR check:  2022   Target end date:  Indefinite    Indications    Paroxysmal atrial fibrillation (HCC) [I48.0]             Anticoagulation Episode Summary     INR check location:      Preferred lab:      Send INR reminders to:      Comments:  [facility]      Anticoagulation Care Providers     Provider Role Specialty Phone number    La Paredes M.D. Referring Internal Medicine [phone]    [facility] Responsible  [phone]        Anticoagulation Patient Findings          Left voicemail message to report a therapeutic INR of 2.8.  Requested pt contact the clinic for any s/s of unusual bleeding, bruising, clotting or any changes to diet or medication.   Pt is to continue with current warfarin dosing regimen.    Pt is not on antiplatelet therapy      FU 1 week.  Claudia Sue, Pharmacy Intern     Called pt and left VM as per last cardiology note she may switch to Xarelto 15 mg daily, pt reports she is planning to switch in one month. Advised pt to let us know so we can give her instruction to switch.     Celina Diego, PharmD

## 2022-06-30 ENCOUNTER — OFFICE VISIT (OUTPATIENT)
Dept: SLEEP MEDICINE | Facility: MEDICAL CENTER | Age: 82
End: 2022-06-30
Payer: MEDICARE

## 2022-06-30 VITALS
DIASTOLIC BLOOD PRESSURE: 66 MMHG | BODY MASS INDEX: 36.5 KG/M2 | WEIGHT: 206 LBS | HEIGHT: 63 IN | RESPIRATION RATE: 12 BRPM | SYSTOLIC BLOOD PRESSURE: 124 MMHG | HEART RATE: 73 BPM | OXYGEN SATURATION: 95 %

## 2022-06-30 DIAGNOSIS — G47.33 OSA (OBSTRUCTIVE SLEEP APNEA): Primary | ICD-10-CM

## 2022-06-30 PROCEDURE — 99213 OFFICE O/P EST LOW 20 MIN: CPT | Performed by: STUDENT IN AN ORGANIZED HEALTH CARE EDUCATION/TRAINING PROGRAM

## 2022-06-30 ASSESSMENT — FIBROSIS 4 INDEX: FIB4 SCORE: 1.14

## 2022-06-30 NOTE — PROGRESS NOTES
[facility] Follow-up Visit    CC: Follow-up regarding SHAKA management      HPI:  Aleshia Crooks is a 82 y.o.female  with hypertension, atrial fibrillation, CKD stage IV, obesity, GONZALEZ, mitral regurgitation, CAD, pulmonary hypertension, and obstructive sleep apnea on BiPAP.  Presents today to follow-up to discuss obstructive sleep apnea management as she needs supply renewal.    She is using her machine nightly.  States she loves using her BiPAP machine.  She finds that using it she sleeps more soundly and feels more rested during the day.  Overall finds the mask and pressures comfortable.  States she prefers a memory foam mask which she is using however her DME is not giving her a replacement.  Due to this she would like to change DME's.      DME provider: currently [employer] but is unable  Device: aircurve 10 prescribed in 2021  Mask: Airtouch N20   Aerophagia: No   Snoring: No   Dry mouth: No   Leak: No   Skin irritation: No   Chin strap: No       Sleep History  PSG in [facility] showed obstructive sleep apnea in 2019.  Underwent titration study which showed improvement with BiPAP therapy.    Patient Active Problem List    Diagnosis Date Noted   • Postsurgical hypothyroidism 12/14/2021   • Caregiver stress 10/27/2021   • Situational stress 10/27/2021   • Irritability 10/27/2021   • CKD (chronic kidney disease) stage 4, GFR 15-29 ml/min ([facility]) 10/27/2021   • 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) (HCC) 03/25/2021   • Dermatitis 03/01/2021   • Falls 03/01/2021   • Stress incontinence 03/01/2021   • Stage 3b chronic kidney disease (HCC) 09/11/2020   • Gastroesophageal reflux disease without esophagitis 06/08/2020   • Abnormal CBC 06/08/2020   • Abnormal CT of the abdomen 04/17/2020   • Mixed hyperlipidemia 04/17/2020   • Hepatic steatosis 04/17/2020   • Elevated alkaline phosphatase level 04/17/2020   • Elevated troponin 04/08/2020   • Anemia 04/08/2020   • History of  malignant melanoma 2020   • Skin lesions 2020   • Essential hypertension 2020   • Hypothyroidism 2020   • Obesity (BMI 35.0-39.9 without comorbidity) (HCC) 2020   • Obstructive sleep apnea treated with continuous positive airway pressure (CPAP) 2020   • Severe mitral regurgitation 2017   • Moderate to severe pulmonary hypertension (HCC) 2017   • Chronic combined systolic and diastolic congestive heart failure (HCC) 2017   • Paroxysmal atrial fibrillation (HCC) 2017   • Coronary artery disease involving native coronary artery of native heart without angina pectoris, s/p 1V CABG 1992 2017   • Microcytic anemia 2017   • Anxiety 2017       Past Medical History:   Diagnosis Date   • Apnea, sleep    • Atrial fibrillation (HCC)    • Gasping for breath    • Heart attack (HCC)    • Hyperlipidemia    • Hypertension    • 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) ([facility]) 3/25/2021   • Malignant melanoma of left upper extremity including shoulder ([facility]) 2020   • Thyroid disease         Past Surgical History:   Procedure Laterality Date   • CHOLECYSTECTOMY     • EYE SURGERY Bilateral     cataracts   • MULTIPLE CORONARY ARTERY BYPASS      1 bypass   • THYROIDECTOMY TOTAL         Family History   Problem Relation Age of Onset   • Cancer Mother         cancer, smoker   • Stroke Maternal Grandmother    • Other Other         Crohn   • Kidney Disease Other         kidney transplant       Social History     Socioeconomic History   • Marital status:      Spouse name: Not on file   • Number of children: Not on file   • Years of education: Not on file   • Highest education level: Not on file   Occupational History     Comment: [employer]   Tobacco Use   • Smoking status: Former Smoker     Packs/day: 1.00     Years: 31.00     Pack years: 31.00     Quit date:      Years since quittin.5   • Smokeless tobacco: Never  Used   Vaping Use   • Vaping Use: Never used   Substance and Sexual Activity   • Alcohol use: No   • Drug use: No   • Sexual activity: Not Currently     Partners: Male   Other Topics Concern   • Not on file   Social History Narrative   • Not on file     Social Determinants of Health     Financial Resource Strain: Not on file   Food Insecurity: Not on file   Transportation Needs: Not on file   Physical Activity: Not on file   Stress: Not on file   Social Connections: Not on file   Intimate Partner Violence: Not on file   Housing Stability: Not on file       Current Outpatient Medications   Medication Sig Dispense Refill   • losartan (COZAAR) 50 MG Tab TAKE 1 TABLET BY MOUTH EVERY  Tablet 3   • Non Formulary Request Indications: Balance Nature supplement     • rivaroxaban (XARELTO) 15 MG Tab tablet Take 1 Tablet by mouth with dinner. 90 Tablet 3   • spironolactone (ALDACTONE) 25 MG Tab Take 1 Tablet by mouth every day. 90 Tablet 3   • levothyroxine (LEVOXYL) 150 MCG Tab Take 1 Tablet by mouth every morning on an empty stomach. 90 Tablet 3   • bisoprolol (ZEBETA) 10 MG tablet Take 1 Tablet by mouth every day. 90 Tablet 4   • Cholecalciferol (VITAMIN D) 125 MCG (5000 UT) Cap Take 5,000 Units by mouth every day. 30 Capsule 11   • Secukinumab (COSENTYX SENSOREADY PEN) 150 MG/ML Solution Auto-injector Inject 300mg Sub Cut once monthly 1.96 mL 6   • CVS VITAMIN C 500 MG tablet TAKE 1 TABLET BY MOUTH EVERY DAY 90 Tablet 3   • atorvastatin (LIPITOR) 40 MG Tab TAKE 1 TABLET BY MOUTH AT  BEDTIME 100 Tablet 3   • famotidine (PEPCID) 40 MG Tab Take 1 tablet by mouth every day. 90 tablet 3   • warfarin (COUMADIN) 6 MG Tab Take one tablet daily as directed by [facility]  Indications: Obstructing Blood Clot with Chronic Atrial Fibrillation 90 tablet 3   • omeprazole (PRILOSEC) 20 MG delayed-release capsule Take 1 capsule by mouth every day. 100 capsule 3   • loratadine (CLARITIN) 10 MG Tab Take 1 tablet  "by mouth 2 Times a Day. 60 tablet 5     No current facility-administered medications for this visit.        ALLERGIES: Patient has no known allergies.    ROS  Constitutional: Denies fevers, Denies weight changes  Ears/Nose/Throat/Mouth: Denies nasal congestion or sore throat   Cardiovascular: Denies chest pain  Respiratory: Denies shortness of breath, Denies cough  Gastrointestinal/Hepatic: Denies nausea, vomiting  Sleep: see HPI      PHYSICAL EXAM  /66 (BP Location: Left arm, Patient Position: Sitting, BP Cuff Size: Adult)   Pulse 73   Resp 12   Ht 1.6 m (5' 3\")   Wt 93.4 kg (206 lb)   LMP  (LMP Unknown)   SpO2 95%   BMI 36.49 kg/m²   Appearance: Well-nourished, well-developed, no acute distress  Eyes:  No scleral icterus , EOMI  ENMT: masked  Musculoskeletal:  Grossly normal; gait and station normal; digits and nails normal  Skin:  No rashes, petechiae, cyanosis  Neurologic: without focal signs; oriented to person, time, place, and purpose; judgement intact      Medical Decision Making   Assessment and Plan  Aleshia Crooks is a 82 y.o.female  with hypertension, atrial fibrillation, CKD stage IV, obesity, GONZLAEZ, mitral regurgitation, CAD, pulmonary hypertension, and obstructive sleep apnea on BiPAP.  Presents today to follow-up to discuss obstructive sleep apnea management as she needs supply renewal.    The medical record was reviewed.    Obstructive sleep apnea  Compliance data reviewed showing 100% usage > 4hours in last 30 days. Average AHI 2.7 events/hour.  Fixed pressure 14/10. Pt continues to use and benefit from machine.     She currently is not happy with DME.  She would like to change DME's to [facility].      PLAN:   -Order placed for mask and supplies   -Advised to reach out via MyChart with questions     Has been advised to continue the current  BiPAP, clean equipment frequently, and get new mask and supplies as allowed by insurance and DME. Recommend an earlier appointment, if significant " treatment barriers develop.    The risks of untreated sleep apnea were discussed with the patient at length. Patients with SHAKA are at increased risk of cardiovascular disease including coronary artery disease, systemic arterial hypertension, pulmonary arterial hypertension, cardiac arrythmias, and stroke. The patient was advised to avoid driving a motor vehicle when drowsy.    Positive airway pressure will favorably impact many of the adverse conditions and effects provoked by SHAKA.    Have advised the patient to follow up with the appropriate healthcare practitioners for all other medical problems and issues.    Return in about 1 year (around 6/30/2023).      Please note portions of this record was created using voice recognition software. I have made every reasonable attempt to correct obvious errors, but I expect that there are errors of grammar and possibly content I did not discover before finalizing the note.

## 2022-07-02 DIAGNOSIS — L98.8 EOSINOPHILIC SPONGIOSIS: ICD-10-CM

## 2022-07-02 DIAGNOSIS — Z79.01 CHRONIC ANTICOAGULATION: ICD-10-CM

## 2022-07-02 DIAGNOSIS — D72.18 EOSINOPHILIC SPONGIOSIS: ICD-10-CM

## 2022-07-02 RX ORDER — OMEPRAZOLE 20 MG/1
20 CAPSULE, DELAYED RELEASE ORAL
Qty: 90 CAPSULE | Refills: 2 | Status: SHIPPED | OUTPATIENT
Start: 2022-07-02 | End: 2023-01-23

## 2022-07-02 RX ORDER — FAMOTIDINE 40 MG/1
40 TABLET, FILM COATED ORAL DAILY
Qty: 90 TABLET | Refills: 2 | Status: SHIPPED | OUTPATIENT
Start: 2022-07-02 | End: 2022-09-12 | Stop reason: SDUPTHER

## 2022-07-04 LAB — INR PPP: 3 (ref 2–3.5)

## 2022-07-05 ENCOUNTER — ANTICOAGULATION MONITORING (OUTPATIENT)
Dept: VASCULAR LAB | Facility: MEDICAL CENTER | Age: 82
End: 2022-07-05
Payer: MEDICARE

## 2022-07-05 DIAGNOSIS — I48.0 PAROXYSMAL ATRIAL FIBRILLATION (HCC): ICD-10-CM

## 2022-07-05 RX ORDER — WARFARIN SODIUM 6 MG/1
TABLET ORAL
Qty: 90 TABLET | Refills: 1 | Status: SHIPPED | OUTPATIENT
Start: 2022-07-05 | End: 2022-07-19

## 2022-07-05 NOTE — PROGRESS NOTES
Anticoagulation Summary  As of 7/5/2022    INR goal:  2.0-3.0   TTR:  74.3 % (2.3 y)   INR used for dosing:  3.00 (7/4/2022)   Warfarin maintenance plan:  3 mg (6 mg x 0.5) every Mon, Fri; 6 mg (6 mg x 1) all other days   Weekly warfarin total:  36 mg   Plan last modified:  Dallin Viveros PharmD (12/27/2021)   Next INR check:  7/11/2022   Target end date:  Indefinite    Indications    Paroxysmal atrial fibrillation (HCC) [I48.0]             Anticoagulation Episode Summary     INR check location:      Preferred lab:      Send INR reminders to:      Comments:  [facility]      Anticoagulation Care Providers     Provider Role Specialty Phone number    MariahRenay Christian Paredes M.D. Referring Internal Medicine [phone]    [facility] Anticoagulation Services Responsible  [phone]        Anticoagulation Patient Findings  Patient Findings     Negatives:  Signs/symptoms of thrombosis, Signs/symptoms of bleeding, Laboratory test error suspected, Change in health, Change in alcohol use, Change in activity, Upcoming invasive procedure, Emergency department visit, Upcoming dental procedure, Missed doses, Extra doses, Change in medications, Change in diet/appetite, Hospital admission, Bruising, Other complaints        Spoke with patient to report a therapeutic INR.  Pt instructed to continue with current warfarin dosing regimen. Pt denies any s/s of bleeding, bruising, clotting or any changes to diet or medication.  Will follow up in 1 week.    Becky Mi, Med Ass't    I have reviewed and am in agreement with the above stated plan on 7-5-22.  Christian Jordan, AbadD, BCACP

## 2022-07-11 ENCOUNTER — ANTICOAGULATION MONITORING (OUTPATIENT)
Dept: MEDICAL GROUP | Facility: MEDICAL CENTER | Age: 82
End: 2022-07-11
Payer: MEDICARE

## 2022-07-11 DIAGNOSIS — I48.0 PAROXYSMAL ATRIAL FIBRILLATION (HCC): ICD-10-CM

## 2022-07-11 LAB — INR PPP: 2.1 (ref 2–3.5)

## 2022-07-18 LAB — INR PPP: 2.5 (ref 2–3.5)

## 2022-07-19 ENCOUNTER — ANTICOAGULATION MONITORING (OUTPATIENT)
Dept: VASCULAR LAB | Facility: MEDICAL CENTER | Age: 82
End: 2022-07-19
Payer: MEDICARE

## 2022-07-19 DIAGNOSIS — I48.0 PAROXYSMAL ATRIAL FIBRILLATION (HCC): ICD-10-CM

## 2022-07-19 NOTE — PROGRESS NOTES
Anticoagulation Summary  As of 2022    INR goal:  2.0-3.0   TTR:  74.7 % (2.3 y)   INR used for dosin.50 (2022)   Warfarin maintenance plan:  3 mg (6 mg x 0.5) every Mon, Fri; 6 mg (6 mg x 1) all other days   Weekly warfarin total:  36 mg   Plan last modified:  Dallin Viveros, PharmD (2021)   Next INR check:  2022   Target end date:  Indefinite    Indications    Paroxysmal atrial fibrillation (HCC) [I48.0]             Anticoagulation Episode Summary     INR check location:      Preferred lab:      Send INR reminders to:      Comments:  [facility]      Anticoagulation Care Providers     Provider Role Specialty Phone number    La Paredes M.D. Referring Internal Medicine [phone]    [facility] Anticoagulation Services Responsible  [phone]          Refer to Anticoagulation Patient Findings for HPI  Patient Findings     Positives:  Change in medications (Pt will be starting Xarelto once she receieves it)    Negatives:  Signs/symptoms of thrombosis, Signs/symptoms of bleeding, Laboratory test error suspected, Change in health, Change in alcohol use, Change in activity, Upcoming invasive procedure, Emergency department visit, Upcoming dental procedure, Missed doses, Extra doses, Change in diet/appetite, Hospital admission, Bruising, Other complaints          Spoke with patient to report a therapeutic INR.      Pt is NOT on antiplatelet therapy.    Pt instructed to continue with current warfarin dosing regimen, confirms dosing.   Pt is waiting for her Xarelto to come in so she can start it. Instructed Pt to call clinic prior to starting her Xarelto.    Will follow up in 1 week    Marilyn Bass    +++++++++++++++++++++++++++++++++++++++++++++++++++++++++++++++++++    I have reviewed and concur with the above plan.       Current Outpatient Medications:   •  warfarin, TAKE ONE TABLET DAILY [facility]  •  famotidine, 40 mg, Oral, DAILY  •   omeprazole, 20 mg, Oral, QDAY  •  losartan, TAKE 1 TABLET BY MOUTH EVERY DAY  •  Non Formulary Request, Indications: Balance Nature supplement  •  spironolactone, 25 mg, Oral, QDAY  •  levothyroxine, 150 mcg, Oral, AM ES  •  bisoprolol, 10 mg, Oral, DAILY  •  Vitamin D, 5,000 Units, Oral, DAILY  •  Cosentyx Sensoready Pen, Inject 300mg Sub Cut once monthly  •  CVS Vitamin C, TAKE 1 TABLET BY MOUTH EVERY DAY  •  atorvastatin, TAKE 1 TABLET BY MOUTH AT  BEDTIME  •  loratadine, 10 mg, Oral, BID    Dallin Viveros, PharmD, MS, BCACP, C  [facility]  Phone: [phone],  Fax: [phone]  On call: [phone]

## 2022-07-19 NOTE — PROGRESS NOTES
Received call that she picked up Xarelto prescribed by Dr. Villar. Her INR is less than 3.0 so she can STOP warfarin and start Xarelto 15 mg daily.     [facility] will be managing Xarelto. Will d/c from [facility]    Nedra Donald, PharmD

## 2022-07-20 ENCOUNTER — TELEPHONE (OUTPATIENT)
Dept: SLEEP MEDICINE | Facility: MEDICAL CENTER | Age: 82
End: 2022-07-20

## 2022-07-20 NOTE — TELEPHONE ENCOUNTER
PT called in because her prescription has not been received by [facility]. Please refax prescription over.

## 2022-09-12 DIAGNOSIS — L98.8 EOSINOPHILIC SPONGIOSIS: ICD-10-CM

## 2022-09-12 DIAGNOSIS — D72.18 EOSINOPHILIC SPONGIOSIS: ICD-10-CM

## 2022-09-12 RX ORDER — FAMOTIDINE 40 MG/1
40 TABLET, FILM COATED ORAL DAILY
Qty: 90 TABLET | Refills: 2 | Status: ON HOLD | OUTPATIENT
Start: 2022-09-12 | End: 2023-01-24 | Stop reason: SDUPTHER

## 2022-09-29 ENCOUNTER — HOSPITAL ENCOUNTER (OUTPATIENT)
Dept: CARDIOLOGY | Facility: MEDICAL CENTER | Age: 82
End: 2022-09-29
Attending: INTERNAL MEDICINE
Payer: MEDICARE

## 2022-09-29 DIAGNOSIS — G47.33 OBSTRUCTIVE SLEEP APNEA TREATED WITH CONTINUOUS POSITIVE AIRWAY PRESSURE (CPAP): ICD-10-CM

## 2022-09-29 DIAGNOSIS — Z85.820 HISTORY OF MALIGNANT MELANOMA: ICD-10-CM

## 2022-09-29 DIAGNOSIS — I48.0 PAROXYSMAL ATRIAL FIBRILLATION (HCC): ICD-10-CM

## 2022-09-29 DIAGNOSIS — I25.10 CORONARY ARTERY DISEASE INVOLVING NATIVE CORONARY ARTERY OF NATIVE HEART WITHOUT ANGINA PECTORIS: ICD-10-CM

## 2022-09-29 PROCEDURE — 93306 TTE W/DOPPLER COMPLETE: CPT

## 2022-09-30 LAB
LV EJECT FRACT  99904: 55
LV EJECT FRACT MOD 2C 99903: 58.24
LV EJECT FRACT MOD 4C 99902: 55.43
LV EJECT FRACT MOD BP 99901: 55.53

## 2022-09-30 PROCEDURE — 93306 TTE W/DOPPLER COMPLETE: CPT | Mod: 26 | Performed by: INTERNAL MEDICINE

## 2022-10-31 ENCOUNTER — PATIENT MESSAGE (OUTPATIENT)
Dept: HEALTH INFORMATION MANAGEMENT | Facility: OTHER | Age: 82
End: 2022-10-31

## 2022-10-31 ENCOUNTER — PATIENT OUTREACH (OUTPATIENT)
Dept: HEALTH INFORMATION MANAGEMENT | Facility: OTHER | Age: 82
End: 2022-10-31
Payer: MEDICARE

## 2022-10-31 DIAGNOSIS — I10 ESSENTIAL HYPERTENSION: ICD-10-CM

## 2022-10-31 NOTE — PROGRESS NOTES
PEYTON Curiel Called the pt to introduce the pt to the CCM Program. Pt stated she would like to speak with her daughter before saying yes. CHW will follow up in one week. PEYTON Curiel send a [employer] message.

## 2022-11-08 ENCOUNTER — PATIENT OUTREACH (OUTPATIENT)
Dept: HEALTH INFORMATION MANAGEMENT | Facility: OTHER | Age: 82
End: 2022-11-08
Payer: MEDICARE

## 2022-11-08 DIAGNOSIS — I10 ESSENTIAL HYPERTENSION: ICD-10-CM

## 2022-11-08 NOTE — PROGRESS NOTES
PEYTON Curiel received a callfrom the pt as she was retuning my phone call. Pt accepted the program and is set for enrollment with the CCM RN Darrius on 11/11 @3pm.

## 2022-11-08 NOTE — PROGRESS NOTES
PEYTON Curiel called the pt for the 2nd time and no response. CHW will put the pt on call list and try again next week.

## 2022-11-10 ENCOUNTER — PATIENT OUTREACH (OUTPATIENT)
Dept: HEALTH INFORMATION MANAGEMENT | Facility: OTHER | Age: 82
End: 2022-11-10
Payer: MEDICARE

## 2022-11-10 DIAGNOSIS — N18.32 STAGE 3B CHRONIC KIDNEY DISEASE: ICD-10-CM

## 2022-11-11 ENCOUNTER — PATIENT MESSAGE (OUTPATIENT)
Dept: HEALTH INFORMATION MANAGEMENT | Facility: OTHER | Age: 82
End: 2022-11-11

## 2022-11-11 ENCOUNTER — PATIENT OUTREACH (OUTPATIENT)
Dept: HEALTH INFORMATION MANAGEMENT | Facility: OTHER | Age: 82
End: 2022-11-11
Payer: MEDICARE

## 2022-11-11 ENCOUNTER — TELEPHONE (OUTPATIENT)
Dept: DERMATOLOGY | Facility: IMAGING CENTER | Age: 82
End: 2022-11-11

## 2022-11-11 DIAGNOSIS — N18.4 CKD (CHRONIC KIDNEY DISEASE) STAGE 4, GFR 15-29 ML/MIN (HCC): ICD-10-CM

## 2022-11-11 PROCEDURE — 99490 CHRNC CARE MGMT STAFF 1ST 20: CPT | Performed by: FAMILY MEDICINE

## 2022-11-11 PROCEDURE — 99439 CHRNC CARE MGMT STAF EA ADDL: CPT | Performed by: FAMILY MEDICINE

## 2022-11-11 SDOH — ECONOMIC STABILITY: FOOD INSECURITY: WITHIN THE PAST 12 MONTHS, YOU WORRIED THAT YOUR FOOD WOULD RUN OUT BEFORE YOU GOT MONEY TO BUY MORE.: NEVER TRUE

## 2022-11-11 SDOH — ECONOMIC STABILITY: HOUSING INSECURITY: IN THE LAST 12 MONTHS, HOW MANY PLACES HAVE YOU LIVED?: 1

## 2022-11-11 SDOH — ECONOMIC STABILITY: FOOD INSECURITY: WITHIN THE PAST 12 MONTHS, THE FOOD YOU BOUGHT JUST DIDN'T LAST AND YOU DIDN'T HAVE MONEY TO GET MORE.: NEVER TRUE

## 2022-11-11 SDOH — ECONOMIC STABILITY: INCOME INSECURITY: IN THE LAST 12 MONTHS, WAS THERE A TIME WHEN YOU WERE NOT ABLE TO PAY THE MORTGAGE OR RENT ON TIME?: NO

## 2022-11-11 SDOH — HEALTH STABILITY: PHYSICAL HEALTH: ON AVERAGE, HOW MANY MINUTES DO YOU ENGAGE IN EXERCISE AT THIS LEVEL?: 0 MIN

## 2022-11-11 SDOH — ECONOMIC STABILITY: TRANSPORTATION INSECURITY
IN THE PAST 12 MONTHS, HAS LACK OF TRANSPORTATION KEPT YOU FROM MEETINGS, WORK, OR FROM GETTING THINGS NEEDED FOR DAILY LIVING?: NO

## 2022-11-11 SDOH — HEALTH STABILITY: PHYSICAL HEALTH: ON AVERAGE, HOW MANY DAYS PER WEEK DO YOU ENGAGE IN MODERATE TO STRENUOUS EXERCISE (LIKE A BRISK WALK)?: 0 DAYS

## 2022-11-11 ASSESSMENT — LIFESTYLE VARIABLES
AUDIT-C TOTAL SCORE: 1
HOW OFTEN DO YOU HAVE A DRINK CONTAINING ALCOHOL: MONTHLY OR LESS
SKIP TO QUESTIONS 9-10: 1
HOW OFTEN DO YOU HAVE SIX OR MORE DRINKS ON ONE OCCASION: NEVER
HOW MANY STANDARD DRINKS CONTAINING ALCOHOL DO YOU HAVE ON A TYPICAL DAY: 1 OR 2

## 2022-11-11 ASSESSMENT — SOCIAL DETERMINANTS OF HEALTH (SDOH)
DO YOU BELONG TO ANY CLUBS OR ORGANIZATIONS SUCH AS CHURCH GROUPS UNIONS, FRATERNAL OR ATHLETIC GROUPS, OR SCHOOL GROUPS?: NO
WITHIN THE LAST YEAR, HAVE YOU BEEN AFRAID OF YOUR PARTNER OR EX-PARTNER?: NO
WITHIN THE LAST YEAR, HAVE YOU BEEN KICKED, HIT, SLAPPED, OR OTHERWISE PHYSICALLY HURT BY YOUR PARTNER OR EX-PARTNER?: NO
HOW OFTEN DO YOU ATTEND CHURCH OR RELIGIOUS SERVICES?: NEVER
HOW OFTEN DO YOU GET TOGETHER WITH FRIENDS OR RELATIVES?: ONCE A WEEK
IN A TYPICAL WEEK, HOW MANY TIMES DO YOU TALK ON THE PHONE WITH FAMILY, FRIENDS, OR NEIGHBORS?: MORE THAN THREE TIMES A WEEK
WITHIN THE LAST YEAR, HAVE TO BEEN RAPED OR FORCED TO HAVE ANY KIND OF SEXUAL ACTIVITY BY YOUR PARTNER OR EX-PARTNER?: NO
HOW HARD IS IT FOR YOU TO PAY FOR THE VERY BASICS LIKE FOOD, HOUSING, MEDICAL CARE, AND HEATING?: NOT HARD AT ALL
HOW OFTEN DO YOU ATTENT MEETINGS OF THE CLUB OR ORGANIZATION YOU BELONG TO?: NEVER
WITHIN THE LAST YEAR, HAVE YOU BEEN HUMILIATED OR EMOTIONALLY ABUSED IN OTHER WAYS BY YOUR PARTNER OR EX-PARTNER?: NO

## 2022-11-11 ASSESSMENT — PATIENT HEALTH QUESTIONNAIRE - PHQ9: CLINICAL INTERPRETATION OF PHQ2 SCORE: 0

## 2022-11-11 NOTE — PROGRESS NOTES
"INITIAL CARE MANAGEMENT CARE PLAN/ASSESSMENT     Called and spoke to patient to enroll her in the Personal Care Management Program. Patient gave her verbal consent and expressed her desire to enroll in the program. Patient verified her identity by providing this RN with her full name and . Patient is eligible for CCM due to her risk score of 4 and her HTN, CHF, PAF, CAD, CKD4 diagnoses. Patient is a  82 year old female who lives with her daughter. Patient just lost her  3 month's ago. Patient remains independent with her ADL's and IADL's and is an active . Patient does not use any DME. Patient does not follow and prescribed diet and eats what she wants. Patient does not exercise and does not have any interest at this time. Patient does not have an Advanced Directive but states her family \"knows what she wants\". This RN will mail patient information on Advanced Directives. Patient states she has no goals at this time. Just wants to \"stay healthy\". This RN will also send patient education/resources on Heart Healthy/Renal diet.   Patient understands the monthly follow-up call requirements of the PCM program. First monthly call is scheduled for 2022 @2:30pm. Patient has this RN's name and PCM program phone number and knows she can call with any health questions or concerns. Patient has no other immediate needs/concerns to address at this time.      Medication Self-Management Goals:     Reviewed medications listed below with patient.      Current Outpatient Medications:     famotidine (PEPCID) 40 MG Tab, Take 1 Tablet by mouth every day., Disp: 90 Tablet, Rfl: 2    rivaroxaban (XARELTO) 15 MG Tab tablet, Take  by mouth with dinner., Disp: , Rfl:     omeprazole (PRILOSEC) 20 MG delayed-release capsule, TAKE 1 CAPSULE BY MOUTH EVERY DAY, Disp: 90 Capsule, Rfl: 2    losartan (COZAAR) 50 MG Tab, TAKE 1 TABLET BY MOUTH EVERY DAY, Disp: 100 Tablet, Rfl: 3    Non Formulary Request, Indications: " Balance Nature supplement, Disp: , Rfl:     spironolactone (ALDACTONE) 25 MG Tab, Take 1 Tablet by mouth every day., Disp: 90 Tablet, Rfl: 3    levothyroxine (LEVOXYL) 150 MCG Tab, Take 1 Tablet by mouth every morning on an empty stomach., Disp: 90 Tablet, Rfl: 3    bisoprolol (ZEBETA) 10 MG tablet, Take 1 Tablet by mouth every day., Disp: 90 Tablet, Rfl: 4    Cholecalciferol (VITAMIN D) 125 MCG (5000 UT) Cap, Take 5,000 Units by mouth every day., Disp: 30 Capsule, Rfl: 11    Secukinumab (COSENTYX SENSOREADY PEN) 150 MG/ML Solution Auto-injector, Inject 300mg Sub Cut once monthly, Disp: 1.96 mL, Rfl: 6    CVS VITAMIN C 500 MG tablet, TAKE 1 TABLET BY MOUTH EVERY DAY, Disp: 90 Tablet, Rfl: 3    atorvastatin (LIPITOR) 40 MG Tab, TAKE 1 TABLET BY MOUTH AT  BEDTIME, Disp: 100 Tablet, Rfl: 3    loratadine (CLARITIN) 10 MG Tab, Take 1 tablet by mouth 2 Times a Day., Disp: 60 tablet, Rfl: 5         Goal: Patient will take her medications as scheduled/prescribed        Physical/Functional/Environmental Status:     Activities of Daily Living:  Bathing: independent   Dressing: independent  Grooming: independent  Mouth Care: independent  Toileting: independent  Climbing a Flight of Stairs: independent    Independent Activities of Daily Living:  Shopping: independent  Cooking: independent  Managing Medications: independent  Using the phone and looking up numbers: independent  Driving or using public transportation: independent  Managing Finances: independent        11/11/2022     3:03 PM   STEADI Fall Risk   One or more falls in the last year No               Goal: Patient will remain free from falls and injuries from falls.      Financial Status:     Patient is financially stable      Goal: N/A     Transportation Status:    Patient is an active      Goal: Patient will make all her scheduled provider appointments     Mental/Behavioral/Psychosocial Status:        3/1/2021     9:30 AM 5/17/2022     3:00 PM 11/11/2022      "2:50 PM   Depression Screen (PHQ-2/PHQ-9)   PHQ-2 Total Score 0 0 0       Interpretation of PHQ-9 Total Score   Score Severity   1-4 No Depression   5-9 Mild Depression   10-14 Moderate Depression   15-19 Moderately Severe Depression   20-27 Severe Depression       Goal:  N/A      Chronic Care Management Care Plan         Patient states she has no goals at this time. Just wants to \"stay healthy\". This RN will send patient education/resources on Heart Healthy/Renal diet and completing an Advanced Directive.          Discussion: Goals, barriers, and interventions     Goals: Discussed and created      Next Scheduled patient outreach:  12/9/2022@ 2:00pm  "

## 2022-11-11 NOTE — TELEPHONE ENCOUNTER
Follow up with pt to check if she received [employer] Patient Assistance packet, pt stated she did. She will fill out her financial part of the application and bring to us to send out.

## 2022-11-16 ENCOUNTER — OFFICE VISIT (OUTPATIENT)
Dept: MEDICAL GROUP | Facility: MEDICAL CENTER | Age: 82
End: 2022-11-16
Payer: MEDICARE

## 2022-11-16 VITALS
DIASTOLIC BLOOD PRESSURE: 62 MMHG | OXYGEN SATURATION: 97 % | WEIGHT: 215 LBS | HEIGHT: 63 IN | TEMPERATURE: 98.1 F | BODY MASS INDEX: 38.09 KG/M2 | HEART RATE: 82 BPM | RESPIRATION RATE: 14 BRPM | SYSTOLIC BLOOD PRESSURE: 124 MMHG

## 2022-11-16 DIAGNOSIS — I25.10 CORONARY ARTERY DISEASE INVOLVING NATIVE CORONARY ARTERY OF NATIVE HEART WITHOUT ANGINA PECTORIS: ICD-10-CM

## 2022-11-16 DIAGNOSIS — E89.0 POSTSURGICAL HYPOTHYROIDISM: ICD-10-CM

## 2022-11-16 DIAGNOSIS — I34.0 SEVERE MITRAL REGURGITATION: ICD-10-CM

## 2022-11-16 DIAGNOSIS — K76.0 HEPATIC STEATOSIS: ICD-10-CM

## 2022-11-16 DIAGNOSIS — I07.1 MODERATE TRICUSPID REGURGITATION: ICD-10-CM

## 2022-11-16 DIAGNOSIS — I50.42 CHRONIC COMBINED SYSTOLIC AND DIASTOLIC CONGESTIVE HEART FAILURE (HCC): ICD-10-CM

## 2022-11-16 DIAGNOSIS — K74.60 LIVER CIRRHOSIS SECONDARY TO NASH (NONALCOHOLIC STEATOHEPATITIS) (HCC): ICD-10-CM

## 2022-11-16 DIAGNOSIS — N18.32 STAGE 3B CHRONIC KIDNEY DISEASE: ICD-10-CM

## 2022-11-16 DIAGNOSIS — I10 ESSENTIAL HYPERTENSION: Chronic | ICD-10-CM

## 2022-11-16 DIAGNOSIS — I48.0 PAROXYSMAL ATRIAL FIBRILLATION (HCC): ICD-10-CM

## 2022-11-16 DIAGNOSIS — E66.9 OBESITY (BMI 35.0-39.9 WITHOUT COMORBIDITY): ICD-10-CM

## 2022-11-16 DIAGNOSIS — Z23 NEED FOR VACCINATION: ICD-10-CM

## 2022-11-16 DIAGNOSIS — G47.33 OBSTRUCTIVE SLEEP APNEA TREATED WITH CONTINUOUS POSITIVE AIRWAY PRESSURE (CPAP): ICD-10-CM

## 2022-11-16 DIAGNOSIS — K75.81 LIVER CIRRHOSIS SECONDARY TO NASH (NONALCOHOLIC STEATOHEPATITIS) (HCC): ICD-10-CM

## 2022-11-16 DIAGNOSIS — I27.20 MODERATE TO SEVERE PULMONARY HYPERTENSION (HCC): ICD-10-CM

## 2022-11-16 DIAGNOSIS — R73.01 ELEVATED FASTING GLUCOSE: ICD-10-CM

## 2022-11-16 DIAGNOSIS — E78.2 MIXED HYPERLIPIDEMIA: ICD-10-CM

## 2022-11-16 PROBLEM — N18.4 CKD (CHRONIC KIDNEY DISEASE) STAGE 4, GFR 15-29 ML/MIN (HCC): Status: RESOLVED | Noted: 2021-10-27 | Resolved: 2022-11-16

## 2022-11-16 PROCEDURE — G0008 ADMIN INFLUENZA VIRUS VAC: HCPCS | Performed by: FAMILY MEDICINE

## 2022-11-16 PROCEDURE — 90662 IIV NO PRSV INCREASED AG IM: CPT | Performed by: FAMILY MEDICINE

## 2022-11-16 PROCEDURE — 99214 OFFICE O/P EST MOD 30 MIN: CPT | Mod: 25 | Performed by: FAMILY MEDICINE

## 2022-11-16 RX ORDER — ATORVASTATIN CALCIUM 40 MG/1
TABLET, FILM COATED ORAL
Qty: 100 TABLET | Refills: 3 | Status: SHIPPED | OUTPATIENT
Start: 2022-11-16 | End: 2023-01-05 | Stop reason: SDUPTHER

## 2022-11-16 ASSESSMENT — FIBROSIS 4 INDEX: FIB4 SCORE: 1.14

## 2022-11-16 NOTE — PROGRESS NOTES
Chief Complaint   Patient presents with    Hepatic Disease    Dyslipidemia    Hypertension    Chronic Kidney Disease    Hypothyroidism       Subjective:     HPI:   Aleshia Crooks presents today with the followin. 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  Imaging has suggested nodularity to the liver which has led to a cirrhosis diagnosis.  There has been no ascites found.  Diagnosis has not been confirmed with biopsy.  Patient does not use alcohol.  There is no history of high ammonia levels or other secondary symptoms from liver fibrosis.  Lab orders including AFP tumor marker discussed and placed.    2. Mixed hyperlipidemia  Patient denies chest pain, chest pressure, palpitations or exertional shortness of breath. Patient denies muscle aches or muscle weakness from the atorvastatin medication. Patient is a former smoker. Patient takes no aspirin daily.  She is now on Xarelto.  Patient has coronary artery disease but no history of myocardial infarction, stroke or documented PVD.  Lab orders discussed and placed.    3. Essential hypertension/Stage 3b chronic kidney disease (HCC)  HTN - Chronic condition stable. Currently taking all meds as directed.   She is not taking baby aspirin daily.  She is on a direct oral anticoagulant, Xarelto  She is not monitoring BP at home.  Blood pressure here today is excellent  Denies symptoms low BP: light-headed, tunnel-vision, unusual fatigue.   Denies symptoms high BP:pounding headache, visual changes, palpitations, flushed face.   Denies medicine side effects: unusual fatigue, slow heartbeat, foot/leg swelling, cough.  Kidney disease has been moderate and relatively stable but she needs a recheck.  Lab order discussed and placed.    4. Postsurgical hypothyroidism  Patient reports good energy level on the medication. Patient denies insomnia, tremor or change in appetite.  Patient  is taking the medication on an empty stomach in the morning and waiting at least 30 minutes before eating.  Last TSH in December last year was at target.  Lab order discussed and placed.    5. Elevated fasting glucose  Patient has history of mild elevated fasting glucose.  Lab orders discussed and placed.    6. Obstructive sleep apnea treated with continuous positive airway pressure (CPAP)  She is using her CPAP, states it is helpful.  Denies daytime somnolence.  Room air pulse ox today normal.    7. Coronary artery disease involving native coronary artery of native heart without angina pectoris, s/p 1V CABG 1992  Patient continues to follow carefully with cardiology.    8. Chronic combined systolic and diastolic congestive heart failure (HCC)  Patient is following carefully with cardiology.  She is compliant with her medication regimen.    9. Severe mitral regurgitation/Moderate tricuspid regurgitation/Moderate to severe pulmonary hypertension ([facility])  Patient has significant changes on recent echocardiogram in September.  She appears to be asymptomatic.  She is currently grieving the death of her  and I do not know how much attention she is paying to her own symptoms.  However, she will be seeing cardiology next month.  Currently denies exertional shortness of breath or chest pressure.    10. Paroxysmal atrial fibrillation ([facility])  She is following with cardiology.  She is now on Xarelto anticoagulation.  Denies any bleeding.  Patient's EGFR is low.  I have asked her to repeat this.  She is already on a low-dose of Xarelto which I believe is adjusted for this.  Patient has had no TIA or stroke symptoms.    11. Obesity (BMI 35.0-39.9 without comorbidity) ([facility])  Patient did lose weight while taking care of her  and now has been stress eating.  She is adjusting to the loss.  Improve diet is briefly discussed.    12. Need for vaccination  High-dose flu vaccine administered today        Patient Active Problem  List    Diagnosis Date Noted    Moderate tricuspid regurgitation 11/16/2022    Elevated fasting glucose 11/16/2022    Postsurgical hypothyroidism 12/14/2021    Caregiver stress 10/27/2021    Situational stress 10/27/2021    Irritability 10/27/2021    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) (HCC) 03/25/2021    Dermatitis 03/01/2021    Falls 03/01/2021    Stress incontinence 03/01/2021    Stage 3b chronic kidney disease (HCC) 09/11/2020    Gastroesophageal reflux disease without esophagitis 06/08/2020    Abnormal CBC 06/08/2020    Abnormal CT of the abdomen 04/17/2020    Mixed hyperlipidemia 04/17/2020    Hepatic steatosis 04/17/2020    Elevated alkaline phosphatase level 04/17/2020    Elevated troponin 04/08/2020    Anemia 04/08/2020    History of malignant melanoma 03/12/2020    Skin lesions 03/12/2020    Essential hypertension 03/12/2020    Hypothyroidism 03/12/2020    Obesity (BMI 35.0-39.9 without comorbidity) (HCC) 03/12/2020    Obstructive sleep apnea treated with continuous positive airway pressure (CPAP) 03/12/2020    Severe mitral regurgitation 07/14/2017    Moderate to severe pulmonary hypertension (HCC) 07/14/2017    Chronic combined systolic and diastolic congestive heart failure (HCC) 07/14/2017    Paroxysmal atrial fibrillation (HCC) 07/14/2017    Coronary artery disease involving native coronary artery of native heart without angina pectoris, s/p 1V CABG 1992 07/14/2017    Microcytic anemia 07/13/2017    Anxiety 07/13/2017       Current medicines (including changes today)  Current Outpatient Medications   Medication Sig Dispense Refill    atorvastatin (LIPITOR) 40 MG Tab TAKE 1 TABLET BY MOUTH AT  BEDTIME 100 Tablet 3    famotidine (PEPCID) 40 MG Tab Take 1 Tablet by mouth every day. 90 Tablet 2    rivaroxaban (XARELTO) 15 MG Tab tablet Take  by mouth with dinner.      omeprazole (PRILOSEC) 20 MG delayed-release capsule TAKE 1 CAPSULE BY MOUTH EVERY DAY 90 Capsule 2    losartan  "(COZAAR) 50 MG Tab TAKE 1 TABLET BY MOUTH EVERY  Tablet 3    spironolactone (ALDACTONE) 25 MG Tab Take 1 Tablet by mouth every day. 90 Tablet 3    levothyroxine (LEVOXYL) 150 MCG Tab Take 1 Tablet by mouth every morning on an empty stomach. 90 Tablet 3    bisoprolol (ZEBETA) 10 MG tablet Take 1 Tablet by mouth every day. 90 Tablet 4    Cholecalciferol (VITAMIN D) 125 MCG (5000 UT) Cap Take 5,000 Units by mouth every day. 30 Capsule 11    Secukinumab (COSENTYX SENSOREADY PEN) 150 MG/ML Solution Auto-injector Inject 300mg Sub Cut once monthly 1.96 mL 6    CVS VITAMIN C 500 MG tablet TAKE 1 TABLET BY MOUTH EVERY DAY 90 Tablet 3    loratadine (CLARITIN) 10 MG Tab Take 1 Tablet by mouth 2 times a day. 60 tablet 5     No current facility-administered medications for this visit.       No Known Allergies    ROS: As per HPI       Objective:     /62 (BP Location: Right arm, Patient Position: Sitting, BP Cuff Size: Large adult)   Pulse 82   Temp 36.7 °C (98.1 °F) (Temporal)   Resp 14   Ht 1.6 m (5' 3\")   Wt 97.5 kg (215 lb)   SpO2 97%  Body mass index is 38.09 kg/m².    Physical Exam:  Constitutional: Well-developed and well-nourished. Not diaphoretic. No distress. Lucid and fluent.  Patient, physician and staff all wearing masks.  Skin: Skin is warm and dry. No rash noted.  Head: Atraumatic without lesions.  Eyes: Conjunctivae and extraocular motions are normal. Pupils are equal, round, and reactive to light. No scleral icterus.   Ears:  External ears unremarkable.   Neck: Supple, trachea midline. No thyromegaly present. No cervical or supraclavicular lymphadenopathy. No JVD or carotid bruits appreciated  Cardiovascular: Regular rate and rhythm.  Normal S1, S2 without murmur appreciated.  Chest: Effort normal. Clear to auscultation throughout. No adventitious sounds.   Abdomen: Soft, non tender, and without distention. Active bowel sounds in all four quadrants. No rebound, guarding, masses or " hepatosplenomegaly.  Extremities: No cyanosis, clubbing, erythema, nor edema.   Neurological: Alert and oriented x 3.  No tremor appreciated.  Gait is a little slow but quite steady.  Psychiatric:  Behavior, mood, and affect are appropriate.       Assessment and Plan:     82 y.o. female with the following issues:    1. Hepatic steatosis  Comp Metabolic Panel      2. 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) (HCC)  AFP SERUM TUMOR MARKER      3. Mixed hyperlipidemia  Comp Metabolic Panel    Lipid Profile      4. Essential hypertension  atorvastatin (LIPITOR) 40 MG Tab    Comp Metabolic Panel    Lipid Profile      5. Stage 3b chronic kidney disease (HCC)  TSH    Comp Metabolic Panel    CBC WITHOUT DIFFERENTIAL      6. Postsurgical hypothyroidism  TSH      7. Elevated fasting glucose  HEMOGLOBIN A1C      8. Obstructive sleep apnea treated with continuous positive airway pressure (CPAP)  CBC WITHOUT DIFFERENTIAL      9. Coronary artery disease involving native coronary artery of native heart without angina pectoris, s/p 1V CABG 1992  atorvastatin (LIPITOR) 40 MG Tab      10. Chronic combined systolic and diastolic congestive heart failure (HCC)  atorvastatin (LIPITOR) 40 MG Tab      11. Severe mitral regurgitation  atorvastatin (LIPITOR) 40 MG Tab      12. Moderate tricuspid regurgitation        13. Moderate to severe pulmonary hypertension (HCC)  atorvastatin (LIPITOR) 40 MG Tab    CBC WITHOUT DIFFERENTIAL      14. Paroxysmal atrial fibrillation (HCC)  atorvastatin (LIPITOR) 40 MG Tab      15. Obesity (BMI 35.0-39.9 without comorbidity) (HCC)  Patient identified as having weight management issue.  Appropriate orders and counseling given.      16. Need for vaccination  Influenza Vaccine, High Dose (65+ Only)            Followup: Return in about 6 months (around 5/16/2023), or if symptoms worsen or fail to improve.

## 2022-11-17 ENCOUNTER — HOSPITAL ENCOUNTER (OUTPATIENT)
Dept: LAB | Facility: MEDICAL CENTER | Age: 82
End: 2022-11-17
Attending: FAMILY MEDICINE
Payer: MEDICARE

## 2022-11-17 DIAGNOSIS — E78.2 MIXED HYPERLIPIDEMIA: ICD-10-CM

## 2022-11-17 DIAGNOSIS — N18.32 STAGE 3B CHRONIC KIDNEY DISEASE: ICD-10-CM

## 2022-11-17 DIAGNOSIS — I10 ESSENTIAL HYPERTENSION: Chronic | ICD-10-CM

## 2022-11-17 DIAGNOSIS — K74.60 LIVER CIRRHOSIS SECONDARY TO NASH (NONALCOHOLIC STEATOHEPATITIS) (HCC): ICD-10-CM

## 2022-11-17 DIAGNOSIS — E89.0 POSTSURGICAL HYPOTHYROIDISM: ICD-10-CM

## 2022-11-17 DIAGNOSIS — K76.0 HEPATIC STEATOSIS: ICD-10-CM

## 2022-11-17 DIAGNOSIS — G47.33 OBSTRUCTIVE SLEEP APNEA TREATED WITH CONTINUOUS POSITIVE AIRWAY PRESSURE (CPAP): ICD-10-CM

## 2022-11-17 DIAGNOSIS — K75.81 LIVER CIRRHOSIS SECONDARY TO NASH (NONALCOHOLIC STEATOHEPATITIS) (HCC): ICD-10-CM

## 2022-11-17 DIAGNOSIS — R73.01 ELEVATED FASTING GLUCOSE: ICD-10-CM

## 2022-11-17 DIAGNOSIS — I27.20 MODERATE TO SEVERE PULMONARY HYPERTENSION (HCC): ICD-10-CM

## 2022-11-17 LAB
ALBUMIN SERPL BCP-MCNC: 4.1 G/DL (ref 3.2–4.9)
ALBUMIN/GLOB SERPL: 1.4 G/DL
ALP SERPL-CCNC: 86 U/L (ref 30–99)
ALT SERPL-CCNC: 8 U/L (ref 2–50)
ANION GAP SERPL CALC-SCNC: 11 MMOL/L (ref 7–16)
AST SERPL-CCNC: 12 U/L (ref 12–45)
BILIRUB SERPL-MCNC: 0.5 MG/DL (ref 0.1–1.5)
BUN SERPL-MCNC: 35 MG/DL (ref 8–22)
CALCIUM SERPL-MCNC: 9.4 MG/DL (ref 8.5–10.5)
CHLORIDE SERPL-SCNC: 103 MMOL/L (ref 96–112)
CHOLEST SERPL-MCNC: 142 MG/DL (ref 100–199)
CO2 SERPL-SCNC: 23 MMOL/L (ref 20–33)
CREAT SERPL-MCNC: 1.57 MG/DL (ref 0.5–1.4)
ERYTHROCYTE [DISTWIDTH] IN BLOOD BY AUTOMATED COUNT: 44.5 FL (ref 35.9–50)
EST. AVERAGE GLUCOSE BLD GHB EST-MCNC: 128 MG/DL
FASTING STATUS PATIENT QL REPORTED: NORMAL
GFR SERPLBLD CREATININE-BSD FMLA CKD-EPI: 33 ML/MIN/1.73 M 2
GLOBULIN SER CALC-MCNC: 3 G/DL (ref 1.9–3.5)
GLUCOSE SERPL-MCNC: 95 MG/DL (ref 65–99)
HBA1C MFR BLD: 6.1 % (ref 4–5.6)
HCT VFR BLD AUTO: 35.9 % (ref 37–47)
HDLC SERPL-MCNC: 34 MG/DL
HGB BLD-MCNC: 10.9 G/DL (ref 12–16)
LDLC SERPL CALC-MCNC: 88 MG/DL
MCH RBC QN AUTO: 27.5 PG (ref 27–33)
MCHC RBC AUTO-ENTMCNC: 30.4 G/DL (ref 33.6–35)
MCV RBC AUTO: 90.7 FL (ref 81.4–97.8)
PLATELET # BLD AUTO: 299 K/UL (ref 164–446)
PMV BLD AUTO: 11 FL (ref 9–12.9)
POTASSIUM SERPL-SCNC: 4.8 MMOL/L (ref 3.6–5.5)
PROT SERPL-MCNC: 7.1 G/DL (ref 6–8.2)
RBC # BLD AUTO: 3.96 M/UL (ref 4.2–5.4)
SODIUM SERPL-SCNC: 137 MMOL/L (ref 135–145)
TRIGL SERPL-MCNC: 102 MG/DL (ref 0–149)
TSH SERPL DL<=0.005 MIU/L-ACNC: 0.49 UIU/ML (ref 0.38–5.33)
WBC # BLD AUTO: 8.1 K/UL (ref 4.8–10.8)

## 2022-11-17 PROCEDURE — 83036 HEMOGLOBIN GLYCOSYLATED A1C: CPT | Mod: GA

## 2022-11-17 PROCEDURE — 82105 ALPHA-FETOPROTEIN SERUM: CPT

## 2022-11-17 PROCEDURE — 80061 LIPID PANEL: CPT

## 2022-11-17 PROCEDURE — 84443 ASSAY THYROID STIM HORMONE: CPT

## 2022-11-17 PROCEDURE — 80053 COMPREHEN METABOLIC PANEL: CPT

## 2022-11-17 PROCEDURE — 36415 COLL VENOUS BLD VENIPUNCTURE: CPT

## 2022-11-17 PROCEDURE — 85027 COMPLETE CBC AUTOMATED: CPT

## 2022-11-19 LAB — AFP-TM SERPL-MCNC: 3 NG/ML (ref 0–9)

## 2022-12-01 ENCOUNTER — TELEPHONE (OUTPATIENT)
Dept: URGENT CARE | Facility: PHYSICIAN GROUP | Age: 82
End: 2022-12-01
Payer: MEDICARE

## 2022-12-01 ENCOUNTER — PATIENT OUTREACH (OUTPATIENT)
Dept: HEALTH INFORMATION MANAGEMENT | Facility: OTHER | Age: 82
End: 2022-12-01
Payer: MEDICARE

## 2022-12-01 DIAGNOSIS — I10 ESSENTIAL HYPERTENSION: ICD-10-CM

## 2022-12-01 DIAGNOSIS — J32.9 CHRONIC SINUSITIS, UNSPECIFIED LOCATION: ICD-10-CM

## 2022-12-01 NOTE — PROGRESS NOTES
CHW Moisés called the pt because the pt called wanting to talk to Darrius the CCM RN about the way she was feeling. RN discussed the pt with Dr Apryl ABBOTT and was informed Dr Pink was not in and another Provider will follow up. This CHW asked the pt to call back later today if MA had not called the pt.

## 2022-12-02 RX ORDER — AZITHROMYCIN 250 MG/1
TABLET, FILM COATED ORAL
Qty: 6 TABLET | Refills: 0 | Status: SHIPPED | OUTPATIENT
Start: 2022-12-02 | End: 2022-12-09

## 2022-12-02 RX ORDER — SECUKINUMAB 150 MG/ML
INJECTION SUBCUTANEOUS
Qty: 1.96 ML | Refills: 6 | Status: SHIPPED | OUTPATIENT
Start: 2022-12-02 | End: 2022-12-19 | Stop reason: SDUPTHER

## 2022-12-09 ENCOUNTER — OFFICE VISIT (OUTPATIENT)
Dept: DERMATOLOGY | Facility: IMAGING CENTER | Age: 82
End: 2022-12-09
Payer: MEDICARE

## 2022-12-09 ENCOUNTER — PATIENT OUTREACH (OUTPATIENT)
Dept: HEALTH INFORMATION MANAGEMENT | Facility: OTHER | Age: 82
End: 2022-12-09

## 2022-12-09 DIAGNOSIS — Z12.83 SKIN CANCER SCREENING: ICD-10-CM

## 2022-12-09 DIAGNOSIS — Z85.820 HX OF MELANOMA OF SKIN: ICD-10-CM

## 2022-12-09 DIAGNOSIS — L90.8 SKIN AGING: ICD-10-CM

## 2022-12-09 DIAGNOSIS — D22.9 NEVUS: ICD-10-CM

## 2022-12-09 DIAGNOSIS — L29.9 ITCHING: ICD-10-CM

## 2022-12-09 DIAGNOSIS — L81.4 LENTIGO: ICD-10-CM

## 2022-12-09 DIAGNOSIS — I50.42 CHRONIC COMBINED SYSTOLIC AND DIASTOLIC CONGESTIVE HEART FAILURE (HCC): ICD-10-CM

## 2022-12-09 DIAGNOSIS — L82.1 SEBORRHEIC KERATOSIS: ICD-10-CM

## 2022-12-09 DIAGNOSIS — L40.9 PSORIASIS: ICD-10-CM

## 2022-12-09 DIAGNOSIS — N18.32 STAGE 3B CHRONIC KIDNEY DISEASE: ICD-10-CM

## 2022-12-09 DIAGNOSIS — Z79.899 HIGH RISK MEDICATION USE: ICD-10-CM

## 2022-12-09 PROCEDURE — 99213 OFFICE O/P EST LOW 20 MIN: CPT | Performed by: DERMATOLOGY

## 2022-12-09 PROCEDURE — 99999 PR NO CHARGE: CPT | Performed by: FAMILY MEDICINE

## 2022-12-09 NOTE — PROGRESS NOTES
"Called and spoke to patient to complete PCM program monthly follow-up call. Spoke to patient who states she is \"feeling better\", but the cold/flu she has \"just won't let go\". Patient had called this RN and requested a Z-Pack which she got and states it helped. Patient states she got the Advanced Directive information this RN sent her but has not done anything with it yet. Patient continues to manage her hypertension and CHF and tries to eat healthy. Patient has no new goals. Patient has no other immediate needs/concerns to address at this time. Patient has PCM program phone number and knows she can call with any questions/needs.     Next PCM program monthly follow-up call scheduled for 1/9/2023 @ 2:30pm  "

## 2022-12-09 NOTE — PROGRESS NOTES
"CC:  Follow up CARL     Subjective: prev seen patient follow here for 6 mth full skin exam/ PSO. Denies new, growing, changing, itching or bleeding skin lesions today.    Does have outbreak of PSO areas on lower legs. Itching. Tends to have flares just prior to next consentyx due.        From prior notes:  \"HPI: Rash   \"All over body\", May have started in small sites on back, legs.  Some sites improved but others have worsened.  States Betamethasone oint has made rash worse in the last 2 weeks   Discontinued about 3 days ago.  Has tried and failed triamcinolone, no improvement.  Using some topical, name not known, that is compounded in lotion.     Denies known oral steroids.  May have started after spironolactone prescribed.  Reports taking metoprolol for at least 3 years; could have had smaller sites noted after this medication was begun, unsure.     Onset: at least 7 months has progressively  has gotten worse in the last 2 weeks . Thinks after flu vaccine in September started to developed  symptoms . Was evaluated by another Dermatologist 6 months ago . Bx done . 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  History of blistering/severe sunburns:Yes, Details: .  Family history of skin cancer:No  Family history of atypical moles:No    ROS: no fevers/chills. +/- itch.  No cough.  No weight loss. Denies severe infection/sx  DermPMH: hx melanoma left shoulder, 2014? Per records  Relevant PMH: many med comorbidities.  GONZALEZ, CHF, kidney Dz, hypothyroidism  Social: former smoker; denies travel abroad.  Daughter with well water which she doesn't drink due to taste.  FmHx:  not affected    PE: Gen:WDWN female in NAD.  " Skin:Scalp/face/eyes/lips/neck/chest/back/arms/legs/hands/feet/buttocks - without suspicious lesions noted.  Genitals exam declined  -scattered hyperpigmented macules/papules, appearing benign on torso and extremities  -pink macules, small plaques on lower anterior shins with marks of excoriation, xerosis noted of legs    SCI Prior path - see scans = spongiotic dermatitis + eosinophils. Consider ACD, Nummular dermatitis or drug reaction    Labs: Hep B/C (Ab X5) April 2020 - WNL  Quantiferon negative - June 2022  O&P - negative    A/P: PSO: Papulosquamous rash, prior path showing spongiotic dermatitis + eosinophils.  Suspicion for drug reaction and possible MTX intolerance: kidney dz and elevated Cr after test dose.  Steroid responsive by patient report.  Cannot exclude PSO; NOW IMPROVED !! on Cosentyx, tolerating well  -cont cosentyx 300mg SubCut Qmonth, se reviewed  -cont current home supply topicals - Lidex cream vs betamethasone oint BID severe sites and TAC oint BID less severe sites PRN / itching.    High risk medication: monitoring for safety prior to therapeutic doses:  -Next TB test June 2023    Hx of skin cancer: NER - left arm melanoma  -cont'd sunprotection and skin cancer surveillance  -Q 6mo-annual exam recommended; f/u suspicious lesions PRN    Lentigo/SKs/nevi, back:  -b/r    Moisturizers recommended for xerosis    I have reviewed medications relevant to my specialty.

## 2022-12-14 DIAGNOSIS — D50.0 IRON DEFICIENCY ANEMIA DUE TO CHRONIC BLOOD LOSS: ICD-10-CM

## 2022-12-14 RX ORDER — ASCORBIC ACID 500 MG
TABLET ORAL
Qty: 90 TABLET | Refills: 3 | Status: SHIPPED | OUTPATIENT
Start: 2022-12-14 | End: 2023-02-06 | Stop reason: SDUPTHER

## 2022-12-16 ENCOUNTER — TELEPHONE (OUTPATIENT)
Dept: DERMATOLOGY | Facility: IMAGING CENTER | Age: 82
End: 2022-12-16
Payer: MEDICARE

## 2022-12-16 NOTE — TELEPHONE ENCOUNTER
Daina  Are you able to resend Cosentix Rx to the correct pharmacy [facility] [place] [facility] [address] [facility]  Thank you

## 2022-12-19 RX ORDER — SECUKINUMAB 150 MG/ML
INJECTION SUBCUTANEOUS
Qty: 1.96 ML | Refills: 6 | Status: ON HOLD | OUTPATIENT
Start: 2022-12-19 | End: 2023-09-06 | Stop reason: SDUPTHER

## 2022-12-22 ENCOUNTER — OFFICE VISIT (OUTPATIENT)
Dept: CARDIOLOGY | Facility: MEDICAL CENTER | Age: 82
End: 2022-12-22
Payer: MEDICARE

## 2022-12-22 VITALS
HEART RATE: 76 BPM | SYSTOLIC BLOOD PRESSURE: 122 MMHG | OXYGEN SATURATION: 99 % | RESPIRATION RATE: 16 BRPM | HEIGHT: 63 IN | BODY MASS INDEX: 38.62 KG/M2 | WEIGHT: 218 LBS | DIASTOLIC BLOOD PRESSURE: 74 MMHG

## 2022-12-22 DIAGNOSIS — I50.42 CHRONIC COMBINED SYSTOLIC AND DIASTOLIC CONGESTIVE HEART FAILURE (HCC): ICD-10-CM

## 2022-12-22 DIAGNOSIS — I10 ESSENTIAL HYPERTENSION: Chronic | ICD-10-CM

## 2022-12-22 DIAGNOSIS — K76.0 HEPATIC STEATOSIS: ICD-10-CM

## 2022-12-22 DIAGNOSIS — I34.0 SEVERE MITRAL REGURGITATION: ICD-10-CM

## 2022-12-22 DIAGNOSIS — I25.10 CORONARY ARTERY DISEASE INVOLVING NATIVE CORONARY ARTERY OF NATIVE HEART WITHOUT ANGINA PECTORIS: ICD-10-CM

## 2022-12-22 DIAGNOSIS — I48.0 PAROXYSMAL ATRIAL FIBRILLATION (HCC): ICD-10-CM

## 2022-12-22 DIAGNOSIS — I27.20 MODERATE TO SEVERE PULMONARY HYPERTENSION (HCC): ICD-10-CM

## 2022-12-22 DIAGNOSIS — G47.33 OBSTRUCTIVE SLEEP APNEA TREATED WITH CONTINUOUS POSITIVE AIRWAY PRESSURE (CPAP): ICD-10-CM

## 2022-12-22 DIAGNOSIS — K75.81 LIVER CIRRHOSIS SECONDARY TO NASH (NONALCOHOLIC STEATOHEPATITIS) (HCC): ICD-10-CM

## 2022-12-22 DIAGNOSIS — E78.2 MIXED HYPERLIPIDEMIA: ICD-10-CM

## 2022-12-22 DIAGNOSIS — N39.3 STRESS INCONTINENCE: ICD-10-CM

## 2022-12-22 DIAGNOSIS — K74.60 LIVER CIRRHOSIS SECONDARY TO NASH (NONALCOHOLIC STEATOHEPATITIS) (HCC): ICD-10-CM

## 2022-12-22 PROCEDURE — 99214 OFFICE O/P EST MOD 30 MIN: CPT | Performed by: INTERNAL MEDICINE

## 2022-12-22 RX ORDER — LOSARTAN POTASSIUM 50 MG/1
TABLET ORAL
Qty: 100 TABLET | Refills: 3 | Status: SHIPPED | OUTPATIENT
Start: 2022-12-22 | End: 2023-03-27

## 2022-12-22 RX ORDER — SPIRONOLACTONE 25 MG/1
25 TABLET ORAL
Qty: 90 TABLET | Refills: 3 | Status: SHIPPED | OUTPATIENT
Start: 2022-12-22 | End: 2023-04-07

## 2022-12-22 ASSESSMENT — ENCOUNTER SYMPTOMS
SPUTUM PRODUCTION: 0
WEAKNESS: 0
SHORTNESS OF BREATH: 0
CHILLS: 0
LOSS OF CONSCIOUSNESS: 0
DIZZINESS: 0
PND: 0
PALPITATIONS: 0
ORTHOPNEA: 0
CARDIOVASCULAR NEGATIVE: 1
STRIDOR: 0
MUSCULOSKELETAL NEGATIVE: 1
EYES NEGATIVE: 1
CONSTITUTIONAL NEGATIVE: 1
BRUISES/BLEEDS EASILY: 0
HEMOPTYSIS: 0
COUGH: 0
FEVER: 0
CLAUDICATION: 0
SORE THROAT: 0
NEUROLOGICAL NEGATIVE: 1
WHEEZING: 0
RESPIRATORY NEGATIVE: 1
GASTROINTESTINAL NEGATIVE: 1

## 2022-12-22 ASSESSMENT — FIBROSIS 4 INDEX: FIB4 SCORE: 1.14

## 2022-12-22 NOTE — PROGRESS NOTES
Chief Complaint   Patient presents with    Atrial Fibrillation     F/V Dx: Paroxysmal atrial fibrillation (HCC)      Coronary Artery Disease     F/V Dx: Coronary artery disease involving native coronary artery of native heart without angina pectoris, s/p 1V CABG 1992    Hypertension         Subjective:   Aleshia Crooks is a 80 y.o. female who presents today as a follow-up for her CAD status post CABG hypertension hyperlipidemia lower extremity edema and mitral valve regurgitation with paroxysmal atrial fibrillation.     Since he was last seen she been well on her diuretic.  Has been having no chest pain shortness of breath or PND.  Her most recent echocardiogram showed moderate TR but is otherwise normal.  She is otherwise well.    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) 06/08/2020 2015    Moderate tricuspid regurgitation 11/16/2022    Thyroid disease      Past Surgical History:   Procedure Laterality Date    CHOLECYSTECTOMY      EYE SURGERY Bilateral     cataracts    MULTIPLE CORONARY ARTERY BYPASS      1 bypass    THYROIDECTOMY TOTAL       Family History   Problem Relation Age of Onset    Cancer Mother         cancer, smoker    Stroke Maternal Grandmother     Other Other         Crohn    Kidney Disease Other         kidney transplant     Social History     Socioeconomic History    Marital status:      Spouse name: Not on file    Number of children: Not on file    Years of education: Not on file    Highest education level: Not on file   Occupational History     Comment: [employer]   Tobacco Use    Smoking status: Former     Packs/day: 1.00     Years: 31.00     Pack years: 31.00     Types: Cigarettes     Quit date: 1992      Years since quittin.9    Smokeless tobacco: Never   Vaping Use    Vaping Use: Never used   Substance and Sexual Activity    Alcohol use: No    Drug use: No    Sexual activity: Not Currently     Partners: Male   Other Topics Concern    Not on file   Social History Narrative    Not on file     Social Determinants of Health     Financial Resource Strain: Low Risk     Difficulty of Paying Living Expenses: Not hard at all   Food Insecurity: No Food Insecurity    Worried About Running Out of Food in the Last Year: Never true    Ran Out of Food in the Last Year: Never true   Transportation Needs: No Transportation Needs    Lack of Transportation (Medical): No    Lack of Transportation (Non-Medical): No   Physical Activity: Inactive    Days of Exercise per Week: 0 days    Minutes of Exercise per Session: 0 min   Stress: No Stress Concern Present    Feeling of Stress : Not at all   Social Connections: Socially Isolated    Frequency of Communication with Friends and Family: More than three times a week    Frequency of Social Gatherings with Friends and Family: Once a week    Attends Mandaeism Services: Never    Active Member of Clubs or Organizations: No    Attends Club or Organization Meetings: Never    Marital Status:    Intimate Partner Violence: Not At Risk    Fear of Current or Ex-Partner: No    Emotionally Abused: No    Physically Abused: No    Sexually Abused: No   Housing Stability: Low Risk     Unable to Pay for Housing in the Last Year: No    Number of Places Lived in the Last Year: 1    Unstable Housing in the Last Year: No     No Known Allergies  Outpatient Encounter Medications as of 2022   Medication Sig Dispense Refill    rivaroxaban (XARELTO) 15 MG Tab tablet Take 1 Tablet by mouth with dinner. 90 Tablet 3    spironolactone (ALDACTONE) 25 MG Tab Take 1 Tablet by mouth every day. 90 Tablet 3    losartan (COZAAR) 50 MG Tab TAKE 1 TABLET BY MOUTH EVERY  Tablet 3    Secukinumab (COSENTYX  SENSOREADY PEN) 150 MG/ML Solution Auto-injector Inject 300mg Sub Cut once monthly 1.96 mL 6    ascorbic acid (ASCORBIC ACID) 500 MG Tab TAKE 1 TABLET BY MOUTH EVERY DAY 90 Tablet 3    atorvastatin (LIPITOR) 40 MG Tab TAKE 1 TABLET BY MOUTH AT  BEDTIME 100 Tablet 3    famotidine (PEPCID) 40 MG Tab Take 1 Tablet by mouth every day. 90 Tablet 2    omeprazole (PRILOSEC) 20 MG delayed-release capsule TAKE 1 CAPSULE BY MOUTH EVERY DAY 90 Capsule 2    levothyroxine (LEVOXYL) 150 MCG Tab Take 1 Tablet by mouth every morning on an empty stomach. 90 Tablet 3    bisoprolol (ZEBETA) 10 MG tablet Take 1 Tablet by mouth every day. 90 Tablet 4    Cholecalciferol (VITAMIN D) 125 MCG (5000 UT) Cap Take 5,000 Units by mouth every day. 30 Capsule 11    loratadine (CLARITIN) 10 MG Tab Take 1 Tablet by mouth 2 times a day. 60 tablet 5    [DISCONTINUED] rivaroxaban (XARELTO) 15 MG Tab tablet Take  by mouth with dinner.      [DISCONTINUED] losartan (COZAAR) 50 MG Tab TAKE 1 TABLET BY MOUTH EVERY  Tablet 3    [DISCONTINUED] spironolactone (ALDACTONE) 25 MG Tab Take 1 Tablet by mouth every day. 90 Tablet 3     No facility-administered encounter medications on file as of 12/22/2022.     Review of Systems   Constitutional: Negative.  Negative for chills, fever and malaise/fatigue.   HENT: Negative.  Negative for sore throat.    Eyes: Negative.    Respiratory: Negative.  Negative for cough, hemoptysis, sputum production, shortness of breath, wheezing and stridor.    Cardiovascular: Negative.  Negative for chest pain, palpitations, orthopnea, claudication, leg swelling and PND.   Gastrointestinal: Negative.    Genitourinary: Negative.    Musculoskeletal: Negative.    Skin: Negative.    Neurological: Negative.  Negative for dizziness, loss of consciousness and weakness.   Endo/Heme/Allergies: Negative.  Does not bruise/bleed easily.   All other systems reviewed and are negative.     Objective:   /74 (BP Location: Left arm, Patient  "Position: Sitting, BP Cuff Size: Adult)   Pulse 76   Resp 16   Ht 1.6 m (5' 3\")   Wt 98.9 kg (218 lb)   LMP  (LMP Unknown)   SpO2 99%   BMI 38.62 kg/m²     Physical Exam  Vitals and nursing note reviewed.   Constitutional:       General: She is not in acute distress.     Appearance: She is well-developed. She is not diaphoretic.   HENT:      Head: Normocephalic and atraumatic.      Right Ear: External ear normal.      Left Ear: External ear normal.      Nose: Nose normal.      Mouth/Throat:      Pharynx: No oropharyngeal exudate.   Eyes:      General: No scleral icterus.        Right eye: No discharge.         Left eye: No discharge.      Conjunctiva/sclera: Conjunctivae normal.      Pupils: Pupils are equal, round, and reactive to light.   Neck:      Vascular: No JVD.   Cardiovascular:      Rate and Rhythm: Normal rate and regular rhythm.      Heart sounds: No murmur heard.    No friction rub. No gallop.   Pulmonary:      Effort: Pulmonary effort is normal. No respiratory distress.      Breath sounds: No stridor. No wheezing or rales.   Chest:      Chest wall: No tenderness.   Abdominal:      General: There is no distension.      Palpations: Abdomen is soft.      Tenderness: There is no guarding.   Musculoskeletal:         General: No tenderness or deformity. Normal range of motion.      Cervical back: Neck supple.   Skin:     General: Skin is warm and dry.      Coloration: Skin is not pale.      Findings: No erythema or rash.   Neurological:      Mental Status: She is alert.      Cranial Nerves: No cranial nerve deficit.      Motor: No abnormal muscle tone.      Coordination: Coordination normal.      Deep Tendon Reflexes: Reflexes are normal and symmetric. Reflexes normal.   Psychiatric:         Behavior: Behavior normal.         Thought Content: Thought content normal.         Judgment: Judgment normal.       Assessment:     1. Severe mitral regurgitation        2. Essential hypertension  spironolactone " (ALDACTONE) 25 MG Tab    losartan (COZAAR) 50 MG Tab      3. Moderate to severe pulmonary hypertension (HCC)        4. Chronic combined systolic and diastolic congestive heart failure (HCC)        5. Paroxysmal atrial fibrillation (HCC)  rivaroxaban (XARELTO) 15 MG Tab tablet    spironolactone (ALDACTONE) 25 MG Tab      6. Coronary artery disease involving native coronary artery of native heart without angina pectoris, s/p 1V CABG 1992  rivaroxaban (XARELTO) 15 MG Tab tablet      7. Mixed hyperlipidemia        8. Hepatic steatosis        9. Stress incontinence        10. 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) (HCC)  spironolactone (ALDACTONE) 25 MG Tab      11. Obstructive sleep apnea treated with continuous positive airway pressure (CPAP)  spironolactone (ALDACTONE) 25 MG Tab          Medical Decision Making:  Today's Assessment / Status / Plan:   80-year-old female with paroxysmal atrial fibrillation and might regurgitation which appears to improved following diuresis.  I will refill her Xarelto.  Otherwise I am happy with how she is doing.  We will clinically follow her tricuspid regurgitation.  I will see her back in 6 months.    1. Severe MR, now improved after diuresis    - MR improved, moderate TR     2. COPD?    - obtain PFTs from prior pulmonologist     3. CAD s/p CABG    - cont atorva 40     4. A-fib    - cont metpo 50 BID    - cont xarelto 15 mg, Cr Cl 45     5. CHF, normalized    - per above    - cont losartan 50     6. Edema    - cont furosemide 40    - cont spiro25

## 2023-01-04 ENCOUNTER — PATIENT MESSAGE (OUTPATIENT)
Dept: MEDICAL GROUP | Facility: MEDICAL CENTER | Age: 83
End: 2023-01-04
Payer: MEDICARE

## 2023-01-04 DIAGNOSIS — I48.0 PAROXYSMAL ATRIAL FIBRILLATION (HCC): ICD-10-CM

## 2023-01-04 DIAGNOSIS — I25.10 CORONARY ARTERY DISEASE INVOLVING NATIVE CORONARY ARTERY OF NATIVE HEART WITHOUT ANGINA PECTORIS: ICD-10-CM

## 2023-01-04 DIAGNOSIS — I50.42 CHRONIC COMBINED SYSTOLIC AND DIASTOLIC CONGESTIVE HEART FAILURE (HCC): ICD-10-CM

## 2023-01-04 DIAGNOSIS — I10 ESSENTIAL HYPERTENSION: Chronic | ICD-10-CM

## 2023-01-04 DIAGNOSIS — I34.0 SEVERE MITRAL REGURGITATION: ICD-10-CM

## 2023-01-04 DIAGNOSIS — I27.20 MODERATE TO SEVERE PULMONARY HYPERTENSION (HCC): ICD-10-CM

## 2023-01-05 RX ORDER — ATORVASTATIN CALCIUM 40 MG/1
TABLET, FILM COATED ORAL
Qty: 100 TABLET | Refills: 3 | Status: SHIPPED | OUTPATIENT
Start: 2023-01-05 | End: 2024-03-04

## 2023-01-05 NOTE — PATIENT COMMUNICATION
Received request via: Patient    Was the patient seen in the last year in this department? Yes    Does the patient have an active prescription (recently filled or refills available) for medication(s) requested? No    Does the patient have intermediate Plus and need 100 day supply (blood pressure, diabetes and cholesterol meds only)? Yes, quantity updated to 100 days    Requested Prescriptions     Pending Prescriptions Disp Refills    atorvastatin (LIPITOR) 40 MG Tab 100 Tablet 3     Sig: TAKE 1 TABLET BY MOUTH AT  BEDTIME

## 2023-01-09 ENCOUNTER — PATIENT OUTREACH (OUTPATIENT)
Dept: HEALTH INFORMATION MANAGEMENT | Facility: OTHER | Age: 83
End: 2023-01-09
Payer: MEDICARE

## 2023-01-09 DIAGNOSIS — I10 ESSENTIAL HYPERTENSION: Chronic | ICD-10-CM

## 2023-01-09 PROCEDURE — 99490 CHRNC CARE MGMT STAFF 1ST 20: CPT | Performed by: FAMILY MEDICINE

## 2023-01-09 NOTE — PROGRESS NOTES
Called and spoke to patient to complete PCM program monthly follow-up call. Patient states she is finally doing better after being sick for several weeks. Patient still has a lingering cough but is much better. Patient states she now has an Advanced Directive/Will completed and family is aware of her wishes. Goal completed. Patient states she is otherwise doing well and has no new goals at this time. Patient has no other immediate needs/concerns to address at this time. Patient has PCM program phone number and knows she can call with any questions/needs.     Next PCM monthly/quarterly follow-up call scheduled for 2/9/2023 @ 2:00pm.

## 2023-01-17 ENCOUNTER — TELEPHONE (OUTPATIENT)
Dept: DERMATOLOGY | Facility: IMAGING CENTER | Age: 83
End: 2023-01-17
Payer: MEDICARE

## 2023-01-17 NOTE — TELEPHONE ENCOUNTER
Received Approval letter from [employer] for [employer]. Scanned in media. Followed up with pt, pt already received medication.

## 2023-01-23 ENCOUNTER — PATIENT OUTREACH (OUTPATIENT)
Dept: HEALTH INFORMATION MANAGEMENT | Facility: OTHER | Age: 83
End: 2023-01-23
Payer: MEDICARE

## 2023-01-23 ENCOUNTER — APPOINTMENT (OUTPATIENT)
Dept: RADIOLOGY | Facility: MEDICAL CENTER | Age: 83
DRG: 378 | End: 2023-01-23
Attending: EMERGENCY MEDICINE
Payer: MEDICARE

## 2023-01-23 ENCOUNTER — HOSPITAL ENCOUNTER (INPATIENT)
Facility: MEDICAL CENTER | Age: 83
LOS: 1 days | DRG: 378 | End: 2023-01-24
Attending: EMERGENCY MEDICINE | Admitting: HOSPITALIST
Payer: MEDICARE

## 2023-01-23 DIAGNOSIS — K92.2 LOWER GI BLEED: ICD-10-CM

## 2023-01-23 DIAGNOSIS — D68.9 COAGULOPATHY (HCC): ICD-10-CM

## 2023-01-23 DIAGNOSIS — L98.8 EOSINOPHILIC SPONGIOSIS: ICD-10-CM

## 2023-01-23 DIAGNOSIS — N18.32 STAGE 3B CHRONIC KIDNEY DISEASE: ICD-10-CM

## 2023-01-23 DIAGNOSIS — K62.5 RECTAL BLEEDING: ICD-10-CM

## 2023-01-23 DIAGNOSIS — D72.18 EOSINOPHILIC SPONGIOSIS: ICD-10-CM

## 2023-01-23 DIAGNOSIS — K92.2 GASTROINTESTINAL HEMORRHAGE, UNSPECIFIED GASTROINTESTINAL HEMORRHAGE TYPE: ICD-10-CM

## 2023-01-23 PROBLEM — I48.91 AF (ATRIAL FIBRILLATION) (HCC): Status: ACTIVE | Noted: 2017-07-14

## 2023-01-23 PROBLEM — D62 ACUTE BLOOD LOSS ANEMIA: Status: ACTIVE | Noted: 2023-01-23

## 2023-01-23 PROBLEM — G47.30 SLEEP APNEA: Status: ACTIVE | Noted: 2020-03-12

## 2023-01-23 LAB
ABO GROUP BLD: NORMAL
ALBUMIN SERPL BCP-MCNC: 4.1 G/DL (ref 3.2–4.9)
ALBUMIN/GLOB SERPL: 1.3 G/DL
ALP SERPL-CCNC: 90 U/L (ref 30–99)
ALT SERPL-CCNC: 13 U/L (ref 2–50)
ANION GAP SERPL CALC-SCNC: 15 MMOL/L (ref 7–16)
APTT PPP: 49.7 SEC (ref 24.7–36)
AST SERPL-CCNC: 17 U/L (ref 12–45)
BASOPHILS # BLD AUTO: 0.7 % (ref 0–1.8)
BASOPHILS # BLD: 0.06 K/UL (ref 0–0.12)
BILIRUB SERPL-MCNC: 0.5 MG/DL (ref 0.1–1.5)
BLD GP AB SCN SERPL QL: NORMAL
BUN SERPL-MCNC: 49 MG/DL (ref 8–22)
CALCIUM ALBUM COR SERPL-MCNC: 9.3 MG/DL (ref 8.5–10.5)
CALCIUM SERPL-MCNC: 9.4 MG/DL (ref 8.5–10.5)
CHLORIDE SERPL-SCNC: 99 MMOL/L (ref 96–112)
CO2 SERPL-SCNC: 18 MMOL/L (ref 20–33)
CREAT SERPL-MCNC: 1.57 MG/DL (ref 0.5–1.4)
EOSINOPHIL # BLD AUTO: 0.1 K/UL (ref 0–0.51)
EOSINOPHIL NFR BLD: 1.1 % (ref 0–6.9)
ERYTHROCYTE [DISTWIDTH] IN BLOOD BY AUTOMATED COUNT: 45.4 FL (ref 35.9–50)
GFR SERPLBLD CREATININE-BSD FMLA CKD-EPI: 33 ML/MIN/1.73 M 2
GLOBULIN SER CALC-MCNC: 3.1 G/DL (ref 1.9–3.5)
GLUCOSE SERPL-MCNC: 136 MG/DL (ref 65–99)
HCT VFR BLD AUTO: 32 % (ref 37–47)
HGB BLD-MCNC: 10 G/DL (ref 12–16)
IMM GRANULOCYTES # BLD AUTO: 0.04 K/UL (ref 0–0.11)
IMM GRANULOCYTES NFR BLD AUTO: 0.5 % (ref 0–0.9)
INR PPP: 1.89 (ref 0.87–1.13)
LYMPHOCYTES # BLD AUTO: 0.71 K/UL (ref 1–4.8)
LYMPHOCYTES NFR BLD: 8 % (ref 22–41)
MAGNESIUM SERPL-MCNC: 1.8 MG/DL (ref 1.5–2.5)
MCH RBC QN AUTO: 26.2 PG (ref 27–33)
MCHC RBC AUTO-ENTMCNC: 31.3 G/DL (ref 33.6–35)
MCV RBC AUTO: 83.8 FL (ref 81.4–97.8)
MONOCYTES # BLD AUTO: 0.58 K/UL (ref 0–0.85)
MONOCYTES NFR BLD AUTO: 6.5 % (ref 0–13.4)
NEUTROPHILS # BLD AUTO: 7.37 K/UL (ref 2–7.15)
NEUTROPHILS NFR BLD: 83.2 % (ref 44–72)
NRBC # BLD AUTO: 0 K/UL
NRBC BLD-RTO: 0 /100 WBC
PLATELET # BLD AUTO: 319 K/UL (ref 164–446)
PMV BLD AUTO: 11.3 FL (ref 9–12.9)
POTASSIUM SERPL-SCNC: 5.1 MMOL/L (ref 3.6–5.5)
PROT SERPL-MCNC: 7.2 G/DL (ref 6–8.2)
PROTHROMBIN TIME: 21.2 SEC (ref 12–14.6)
RBC # BLD AUTO: 3.82 M/UL (ref 4.2–5.4)
RH BLD: NORMAL
SODIUM SERPL-SCNC: 132 MMOL/L (ref 135–145)
WBC # BLD AUTO: 8.9 K/UL (ref 4.8–10.8)

## 2023-01-23 PROCEDURE — 770006 HCHG ROOM/CARE - MED/SURG/GYN SEMI*

## 2023-01-23 PROCEDURE — C9113 INJ PANTOPRAZOLE SODIUM, VIA: HCPCS | Performed by: HOSPITALIST

## 2023-01-23 PROCEDURE — 85025 COMPLETE CBC W/AUTO DIFF WBC: CPT

## 2023-01-23 PROCEDURE — 700117 HCHG RX CONTRAST REV CODE 255: Performed by: EMERGENCY MEDICINE

## 2023-01-23 PROCEDURE — 36415 COLL VENOUS BLD VENIPUNCTURE: CPT

## 2023-01-23 PROCEDURE — 86901 BLOOD TYPING SEROLOGIC RH(D): CPT

## 2023-01-23 PROCEDURE — 99285 EMERGENCY DEPT VISIT HI MDM: CPT

## 2023-01-23 PROCEDURE — 80053 COMPREHEN METABOLIC PANEL: CPT

## 2023-01-23 PROCEDURE — 700105 HCHG RX REV CODE 258: Performed by: HOSPITALIST

## 2023-01-23 PROCEDURE — 85730 THROMBOPLASTIN TIME PARTIAL: CPT

## 2023-01-23 PROCEDURE — 99222 1ST HOSP IP/OBS MODERATE 55: CPT | Performed by: SPECIALIST

## 2023-01-23 PROCEDURE — 99222 1ST HOSP IP/OBS MODERATE 55: CPT | Performed by: HOSPITALIST

## 2023-01-23 PROCEDURE — 700111 HCHG RX REV CODE 636 W/ 250 OVERRIDE (IP): Performed by: HOSPITALIST

## 2023-01-23 PROCEDURE — 86900 BLOOD TYPING SEROLOGIC ABO: CPT

## 2023-01-23 PROCEDURE — 83735 ASSAY OF MAGNESIUM: CPT

## 2023-01-23 PROCEDURE — 86850 RBC ANTIBODY SCREEN: CPT

## 2023-01-23 PROCEDURE — 700101 HCHG RX REV CODE 250: Performed by: SPECIALIST

## 2023-01-23 PROCEDURE — 74174 CTA ABD&PLVS W/CONTRAST: CPT

## 2023-01-23 PROCEDURE — 85610 PROTHROMBIN TIME: CPT

## 2023-01-23 RX ORDER — ONDANSETRON 4 MG/1
4 TABLET, ORALLY DISINTEGRATING ORAL EVERY 4 HOURS PRN
Status: DISCONTINUED | OUTPATIENT
Start: 2023-01-23 | End: 2023-01-24 | Stop reason: HOSPADM

## 2023-01-23 RX ORDER — PANTOPRAZOLE SODIUM 40 MG/10ML
40 INJECTION, POWDER, LYOPHILIZED, FOR SOLUTION INTRAVENOUS 2 TIMES DAILY
Status: DISCONTINUED | OUTPATIENT
Start: 2023-01-23 | End: 2023-01-24 | Stop reason: HOSPADM

## 2023-01-23 RX ORDER — ACETAMINOPHEN 325 MG/1
650 TABLET ORAL EVERY 6 HOURS PRN
Status: DISCONTINUED | OUTPATIENT
Start: 2023-01-23 | End: 2023-01-24 | Stop reason: HOSPADM

## 2023-01-23 RX ORDER — ATORVASTATIN CALCIUM 20 MG/1
40 TABLET, FILM COATED ORAL
Status: DISCONTINUED | OUTPATIENT
Start: 2023-01-23 | End: 2023-01-24 | Stop reason: HOSPADM

## 2023-01-23 RX ORDER — FAMOTIDINE 20 MG/1
40 TABLET, FILM COATED ORAL DAILY
Status: DISCONTINUED | OUTPATIENT
Start: 2023-01-23 | End: 2023-01-23

## 2023-01-23 RX ORDER — LEVOTHYROXINE SODIUM 150 UG/1
150 TABLET ORAL
COMMUNITY
End: 2023-01-25 | Stop reason: SDUPTHER

## 2023-01-23 RX ORDER — LOSARTAN POTASSIUM 50 MG/1
50 TABLET ORAL
Status: DISCONTINUED | OUTPATIENT
Start: 2023-01-24 | End: 2023-01-24 | Stop reason: HOSPADM

## 2023-01-23 RX ORDER — ONDANSETRON 2 MG/ML
4 INJECTION INTRAMUSCULAR; INTRAVENOUS EVERY 4 HOURS PRN
Status: DISCONTINUED | OUTPATIENT
Start: 2023-01-23 | End: 2023-01-24 | Stop reason: HOSPADM

## 2023-01-23 RX ORDER — BISOPROLOL FUMARATE 5 MG/1
10 TABLET, FILM COATED ORAL DAILY
Status: DISCONTINUED | OUTPATIENT
Start: 2023-01-24 | End: 2023-01-24 | Stop reason: HOSPADM

## 2023-01-23 RX ADMIN — IOHEXOL 100 ML: 350 INJECTION, SOLUTION INTRAVENOUS at 15:51

## 2023-01-23 RX ADMIN — PANTOPRAZOLE SODIUM 40 MG: 40 INJECTION, POWDER, LYOPHILIZED, FOR SOLUTION INTRAVENOUS at 21:45

## 2023-01-23 RX ADMIN — PHYTONADIONE 10 MG: 10 INJECTION, EMULSION INTRAMUSCULAR; INTRAVENOUS; SUBCUTANEOUS at 20:38

## 2023-01-23 RX ADMIN — POLYETHYLENE GLYCOL 3350, SODIUM SULFATE ANHYDROUS, SODIUM BICARBONATE, SODIUM CHLORIDE, POTASSIUM CHLORIDE 4 L: 236; 22.74; 6.74; 5.86; 2.97 POWDER, FOR SOLUTION ORAL at 22:33

## 2023-01-23 ASSESSMENT — FIBROSIS 4 INDEX
FIB4 SCORE: 1.15
FIB4 SCORE: 1.2

## 2023-01-23 ASSESSMENT — ENCOUNTER SYMPTOMS
SHORTNESS OF BREATH: 0
ORTHOPNEA: 0
HEMOPTYSIS: 0
BRUISES/BLEEDS EASILY: 0
WEAKNESS: 0
MYALGIAS: 0
DIAPHORESIS: 0
EYE PAIN: 0
TINGLING: 0
TREMORS: 0
WHEEZING: 0
DEPRESSION: 0
NAUSEA: 0
COUGH: 0
FALLS: 0
CLAUDICATION: 0
CHILLS: 0
FEVER: 0
SINUS PAIN: 0
SPUTUM PRODUCTION: 0
HEARTBURN: 0
BACK PAIN: 0
DIARRHEA: 0
SORE THROAT: 0
PHOTOPHOBIA: 0
ABDOMINAL PAIN: 0
HALLUCINATIONS: 0
PND: 0
NECK PAIN: 0
CONSTIPATION: 0
DOUBLE VISION: 0
DIZZINESS: 0
VOMITING: 0
BLURRED VISION: 0
STRIDOR: 0
POLYDIPSIA: 0
BLOOD IN STOOL: 1
PALPITATIONS: 0
HEADACHES: 0
FLANK PAIN: 0

## 2023-01-23 ASSESSMENT — LIFESTYLE VARIABLES
HAVE PEOPLE ANNOYED YOU BY CRITICIZING YOUR DRINKING: NO
EVER FELT BAD OR GUILTY ABOUT YOUR DRINKING: NO
HOW MANY TIMES IN THE PAST YEAR HAVE YOU HAD 5 OR MORE DRINKS IN A DAY: 0
AVERAGE NUMBER OF DAYS PER WEEK YOU HAVE A DRINK CONTAINING ALCOHOL: 0
HAVE YOU EVER FELT YOU SHOULD CUT DOWN ON YOUR DRINKING: NO
DOES PATIENT WANT TO STOP DRINKING: NO
TOTAL SCORE: 0
TOTAL SCORE: 0
ALCOHOL_USE: NO
ON A TYPICAL DAY WHEN YOU DRINK ALCOHOL HOW MANY DRINKS DO YOU HAVE: 0
EVER HAD A DRINK FIRST THING IN THE MORNING TO STEADY YOUR NERVES TO GET RID OF A HANGOVER: NO
SUBSTANCE_ABUSE: 0
CONSUMPTION TOTAL: NEGATIVE
TOTAL SCORE: 0

## 2023-01-23 ASSESSMENT — PAIN DESCRIPTION - PAIN TYPE: TYPE: ACUTE PAIN

## 2023-01-23 ASSESSMENT — PATIENT HEALTH QUESTIONNAIRE - PHQ9
1. LITTLE INTEREST OR PLEASURE IN DOING THINGS: NOT AT ALL
2. FEELING DOWN, DEPRESSED, IRRITABLE, OR HOPELESS: NOT AT ALL
SUM OF ALL RESPONSES TO PHQ9 QUESTIONS 1 AND 2: 0

## 2023-01-23 NOTE — ED TRIAGE NOTES
Pt comes in complaining of dark red stool and blood in the toilet for the past 4 days. Pt also complaining of painful urination for quick some time. Pt does take xarelto for afib

## 2023-01-23 NOTE — PROGRESS NOTES
"Returned patient's call from this morning. Spoke to patient who states she is having \"a lot of blood in her stools' and has diarrhea. Patient states it started yesterday. Patient is feeling very weak. Patient denies nausea, vomiting or hx of hemorrhoids. This RN advised patient to either see PCP of go to Urgent Care. Patient reports daughter is out of town until later tonight to give her a ride and she does not feel safe to drive. Patient is on MyChart, so this RN advised patient to message her PCP for guidance on what she should do. Patient verbalized understanding.   "

## 2023-01-23 NOTE — ED NOTES
Med Rec complete per patient  No oral antibiotics in the last 30 days per patient  Allergies reviewed  Preferred Pharmacy: [facility]

## 2023-01-23 NOTE — ED PROVIDER NOTES
ED Provider Note    Scribed for Claudia Tovar M.D. by Chaitanya Godoy. 1/23/2023, 11:46 AM.    Primary care provider: Asael Pink M.D.  Means of arrival: EMS  History obtained from: Patient  History limited by: None    CHIEF COMPLAINT  Chief Complaint   Patient presents with    Bloody Stools    Urinary Pain       HPI/ROS  Aleshia Crooks is a 83 y.o. female who presents to the Emergency Department via EMS for acute dark bloody stools. Patient states for 4 days she noticed passing dark red stools which have been progressively increasing in frequency.  She states that she has had 3 dark bloody stools this morning prior to coming into the emergency department.  Patient has been taking Xarelto for around a year.  Denies any abdominal pain.  Otherwise she denies any nausea, vomiting, fevers, or chest pain.  Patient reports that she last had a colonoscopy approximately 1 year ago which was unremarkable.  She has no prior history of GI bleed.    EXTERNAL RECORDS REVIEWED  Patient on Xarelto for atrial fibrillation, follows with Dr. Villar and last saw him on 12/2/22. She is s/p CABG w/ history of MI and tricuspid regurgitation.     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    SURGICAL HISTORY   has a past surgical history that includes thyroidectomy total; cholecystectomy; multiple coronary artery bypass; and eye surgery (Bilateral).    SOCIAL HISTORY  Social History     Tobacco Use    Smoking status: Former     Packs/day: 1.00     Years: 31.00     Pack years: 31.00     Types: Cigarettes      Quit date:      Years since quittin.0    Smokeless tobacco: Never   Vaping Use    Vaping Use: Never used   Substance Use Topics    Alcohol use: No    Drug use: No      Social History     Substance and Sexual Activity   Drug Use No       FAMILY HISTORY  Family History   Problem Relation Age of Onset    Cancer Mother         cancer, smoker    Stroke Maternal Grandmother     Other Other         Crohn    Kidney Disease Other         kidney transplant       CURRENT MEDICATIONS  Home Medications       Reviewed by Marilyn Garcia (Pharmacy Tech) on 23 at 1420  Med List Status: Complete     Medication Last Dose Status   ascorbic acid (ASCORBIC ACID) 500 MG Tab 2023 Active   atorvastatin (LIPITOR) 40 MG Tab 2023 Active   bisoprolol (ZEBETA) 10 MG tablet 2023 Active   Cholecalciferol (VITAMIN D) 125 MCG (5000 UT) Cap 2023 Active   CINNAMON PO 2023 Active   famotidine (PEPCID) 40 MG Tab 2023 Active   LEVOXYL 150 MCG Tab 2023 Active   loratadine (CLARITIN) 10 MG Tab 2023 Active   losartan (COZAAR) 50 MG Tab 2023 Active   rivaroxaban (XARELTO) 15 MG Tab tablet 2023 Active   Secukinumab (COSENTYX SENSOREADY PEN) 150 MG/ML Solution Auto-injector ~2023 Active   spironolactone (ALDACTONE) 25 MG Tab 2023 Active                    ALLERGIES  No Known Allergies    PHYSICAL EXAM  VITAL SIGNS: /63   Pulse 82   Temp 36.6 °C (97.8 °F) (Temporal)   Resp 20   Wt 93 kg (205 lb)   LMP  (LMP Unknown)   SpO2 95%   BMI 36.31 kg/m²   Vitals reviewed by myself.  Physical Exam  Nursing note and vitals reviewed.  Constitutional: Well-developed and well-nourished. No acute distress.   HENT: Head is normocephalic and atraumatic.  Eyes: extra-ocular movements intact  Cardiovascular: Regular rate and regular rhythm. No murmur heard.  Pulmonary/Chest: Breath sounds normal. No wheezes or rales.   Abdominal: Large ventral wall abdominal hernia which is soft. Soft and  non-tender. No distention.    Rectal: Dark red blood noted on gloved finger  Musculoskeletal: Extremities exhibit normal range of motion without edema or tenderness.   Neurological: Awake and alert  Skin: Skin is warm and dry. No rash.       DIAGNOSTIC STUDIES:  LABS  Labs Reviewed   CBC WITH DIFFERENTIAL - Abnormal; Notable for the following components:       Result Value    RBC 3.82 (*)     Hemoglobin 10.0 (*)     Hematocrit 32.0 (*)     MCH 26.2 (*)     MCHC 31.3 (*)     Neutrophils-Polys 83.20 (*)     Lymphocytes 8.00 (*)     Neutrophils (Absolute) 7.37 (*)     Lymphs (Absolute) 0.71 (*)     All other components within normal limits    Narrative:     Indicate which anticoagulants the patient is on:->NONE   COMP METABOLIC PANEL - Abnormal; Notable for the following components:    Sodium 132 (*)     Co2 18 (*)     Glucose 136 (*)     Bun 49 (*)     Creatinine 1.57 (*)     All other components within normal limits    Narrative:     Indicate which anticoagulants the patient is on:->NONE   APTT - Abnormal; Notable for the following components:    APTT 49.7 (*)     All other components within normal limits    Narrative:     Indicate which anticoagulants the patient is on:->NONE   PROTHROMBIN TIME - Abnormal; Notable for the following components:    PT 21.2 (*)     INR 1.89 (*)     All other components within normal limits    Narrative:     Indicate which anticoagulants the patient is on:->NONE   ESTIMATED GFR - Abnormal; Notable for the following components:    GFR (CKD-EPI) 33 (*)     All other components within normal limits    Narrative:     Indicate which anticoagulants the patient is on:->NONE   COD (ADULT)   CORRECTED CALCIUM    Narrative:     Indicate which anticoagulants the patient is on:->NONE   ABO RH CONFIRM       All labs reviewed and independently interpreted by myself      RADIOLOGY    CTA ABDOMEN PELVIS W & W/O POST PROCESS    (Results Pending)       COURSE & MEDICAL DECISION MAKING    ED Observation  Status? Yes; I am placing the patient in to an observation status due to a diagnostic uncertainty as well as therapeutic intensity. Patient placed in observation status at 2:46 PM, 1/23/2023.     Observation plan is as follows:  Patient is pending CTA of the abdomen to assess for active GI bleeding.  If negative she will still likely require hospitalization as she is having ongoing bloody stools and she is on Xarelto.  Signed out to oncoming physician to follow-up CTA    INITIAL ASSESSMENT AND PLAN    Patient is a 83-year-old female who comes in for evaluation of dark red blood per rectum.  Differential diagnosis includes lower GI bleed, diverticular bleed, anemia, medication side effect.  Diagnostic work-up includes labs and CTA of the abdomen.       ER COURSE AND FINAL DISPOSITION   Patient's initial vitals are within normal limits.  She is hemodynamically stable.  Exam is notable for dark blood on gloved finger during rectal exam.  Labs returned and independently interpreted by myself to demonstrate:  -Patient is slightly anemic with a hemoglobin of 10, her last hemoglobin from November 2022 was 10.9, prior to this it was normal range at 13  -BUN is elevated consistent with likely GI bleed  -Creatinine is mildly elevated  -Labs are otherwise unremarkable    Upon reassessment patient remains hemodynamically stable.  She is still pending CT of the abdomen.  If CTA demonstrates active bleeding she will likely require IR and gastroenterology consultation.  If CT does not demonstrate active bleeding, she still will likely require hospitalization as she has blood per rectum and is on Xarelto.  I discussed the case with oncoming physician who will take over care at this time.  Patient is in guarded condition.    ADDITIONAL PROBLEM LIST AND RESOURCE UTILIZATION    Additional problems aside from the chief complaint that I have addressed: none    I have discussed management of the patient with the following physicians and  MAGNUS's: Dr. Kwong    Discussion of management with other [facility] or appropriate source(s): none     Escalation of care considered, and ultimately not performed: see above.     Barriers to care at this time, including but not limited to: none.     Decision tools and prescription drugs considered including, but not limited to: see above.    Please see review of records as noted above      FINAL IMPRESSION  1. Lower GI bleed          Chaitanya MARTIN (Scribe), am scribing for, and in the presence of, Claudia Tovar M.D..    Electronically signed by: Chaitanya Godoy (Scribannmarie), 1/23/2023    Claudia MARTIN M.D. personally performed the services described in this documentation, as scribed by Chaitanya Godoy in my presence, and it is both accurate and complete.    The note accurately reflects work and decisions made by me.  Claudia Tovar M.D.  1/23/2023  2:52 PM

## 2023-01-24 ENCOUNTER — ANESTHESIA EVENT (OUTPATIENT)
Dept: SURGERY | Facility: MEDICAL CENTER | Age: 83
DRG: 378 | End: 2023-01-24
Payer: MEDICARE

## 2023-01-24 ENCOUNTER — ANESTHESIA (OUTPATIENT)
Dept: SURGERY | Facility: MEDICAL CENTER | Age: 83
DRG: 378 | End: 2023-01-24
Payer: MEDICARE

## 2023-01-24 ENCOUNTER — PHARMACY VISIT (OUTPATIENT)
Dept: PHARMACY | Facility: MEDICAL CENTER | Age: 83
End: 2023-01-24
Payer: MEDICARE

## 2023-01-24 VITALS
HEART RATE: 75 BPM | DIASTOLIC BLOOD PRESSURE: 63 MMHG | BODY MASS INDEX: 36.31 KG/M2 | SYSTOLIC BLOOD PRESSURE: 107 MMHG | WEIGHT: 205 LBS | TEMPERATURE: 96.8 F | RESPIRATION RATE: 18 BRPM | OXYGEN SATURATION: 96 %

## 2023-01-24 LAB
ABO + RH BLD: NORMAL
ALBUMIN SERPL BCP-MCNC: 3.8 G/DL (ref 3.2–4.9)
ALBUMIN/GLOB SERPL: 1.3 G/DL
ALP SERPL-CCNC: 83 U/L (ref 30–99)
ALT SERPL-CCNC: 16 U/L (ref 2–50)
ANION GAP SERPL CALC-SCNC: 14 MMOL/L (ref 7–16)
AST SERPL-CCNC: 17 U/L (ref 12–45)
BASOPHILS # BLD AUTO: 0.6 % (ref 0–1.8)
BASOPHILS # BLD: 0.05 K/UL (ref 0–0.12)
BILIRUB SERPL-MCNC: 0.7 MG/DL (ref 0.1–1.5)
BUN SERPL-MCNC: 46 MG/DL (ref 8–22)
CALCIUM ALBUM COR SERPL-MCNC: 9.2 MG/DL (ref 8.5–10.5)
CALCIUM SERPL-MCNC: 9 MG/DL (ref 8.5–10.5)
CHLORIDE SERPL-SCNC: 103 MMOL/L (ref 96–112)
CO2 SERPL-SCNC: 18 MMOL/L (ref 20–33)
CREAT SERPL-MCNC: 1.69 MG/DL (ref 0.5–1.4)
EOSINOPHIL # BLD AUTO: 0.23 K/UL (ref 0–0.51)
EOSINOPHIL NFR BLD: 2.9 % (ref 0–6.9)
ERYTHROCYTE [DISTWIDTH] IN BLOOD BY AUTOMATED COUNT: 44.9 FL (ref 35.9–50)
GFR SERPLBLD CREATININE-BSD FMLA CKD-EPI: 30 ML/MIN/1.73 M 2
GLOBULIN SER CALC-MCNC: 3 G/DL (ref 1.9–3.5)
GLUCOSE SERPL-MCNC: 97 MG/DL (ref 65–99)
HCT VFR BLD AUTO: 31.4 % (ref 37–47)
HEMOCCULT STL QL: POSITIVE
HGB BLD-MCNC: 10 G/DL (ref 12–16)
HGB BLD-MCNC: 9.8 G/DL (ref 12–16)
IMM GRANULOCYTES # BLD AUTO: 0.05 K/UL (ref 0–0.11)
IMM GRANULOCYTES NFR BLD AUTO: 0.6 % (ref 0–0.9)
LYMPHOCYTES # BLD AUTO: 1.03 K/UL (ref 1–4.8)
LYMPHOCYTES NFR BLD: 13.1 % (ref 22–41)
MCH RBC QN AUTO: 26.5 PG (ref 27–33)
MCHC RBC AUTO-ENTMCNC: 31.8 G/DL (ref 33.6–35)
MCV RBC AUTO: 83.1 FL (ref 81.4–97.8)
MONOCYTES # BLD AUTO: 0.73 K/UL (ref 0–0.85)
MONOCYTES NFR BLD AUTO: 9.3 % (ref 0–13.4)
NEUTROPHILS # BLD AUTO: 5.75 K/UL (ref 2–7.15)
NEUTROPHILS NFR BLD: 73.5 % (ref 44–72)
NRBC # BLD AUTO: 0 K/UL
NRBC BLD-RTO: 0 /100 WBC
PATHOLOGY CONSULT NOTE: NORMAL
PLATELET # BLD AUTO: 275 K/UL (ref 164–446)
PMV BLD AUTO: 11 FL (ref 9–12.9)
POTASSIUM SERPL-SCNC: 4.5 MMOL/L (ref 3.6–5.5)
PROT SERPL-MCNC: 6.8 G/DL (ref 6–8.2)
RBC # BLD AUTO: 3.78 M/UL (ref 4.2–5.4)
SODIUM SERPL-SCNC: 135 MMOL/L (ref 135–145)
WBC # BLD AUTO: 7.8 K/UL (ref 4.8–10.8)

## 2023-01-24 PROCEDURE — 160203 HCHG ENDO MINUTES - 1ST 30 MINS LEVEL 4: Performed by: SPECIALIST

## 2023-01-24 PROCEDURE — 700111 HCHG RX REV CODE 636 W/ 250 OVERRIDE (IP): Performed by: ANESTHESIOLOGY

## 2023-01-24 PROCEDURE — 700105 HCHG RX REV CODE 258: Performed by: SPECIALIST

## 2023-01-24 PROCEDURE — 88305 TISSUE EXAM BY PATHOLOGIST: CPT

## 2023-01-24 PROCEDURE — 45378 DIAGNOSTIC COLONOSCOPY: CPT | Performed by: SPECIALIST

## 2023-01-24 PROCEDURE — 160009 HCHG ANES TIME/MIN: Performed by: SPECIALIST

## 2023-01-24 PROCEDURE — 160208 HCHG ENDO MINUTES - EA ADDL 1 MIN LEVEL 4: Performed by: SPECIALIST

## 2023-01-24 PROCEDURE — 82272 OCCULT BLD FECES 1-3 TESTS: CPT

## 2023-01-24 PROCEDURE — 99100 ANES PT EXTEME AGE<1 YR&>70: CPT | Performed by: ANESTHESIOLOGY

## 2023-01-24 PROCEDURE — 700111 HCHG RX REV CODE 636 W/ 250 OVERRIDE (IP): Performed by: HOSPITALIST

## 2023-01-24 PROCEDURE — 700101 HCHG RX REV CODE 250: Performed by: ANESTHESIOLOGY

## 2023-01-24 PROCEDURE — 99239 HOSP IP/OBS DSCHRG MGMT >30: CPT | Performed by: INTERNAL MEDICINE

## 2023-01-24 PROCEDURE — 00813 ANES UPR LWR GI NDSC PX: CPT | Performed by: ANESTHESIOLOGY

## 2023-01-24 PROCEDURE — 85018 HEMOGLOBIN: CPT

## 2023-01-24 PROCEDURE — 85025 COMPLETE CBC W/AUTO DIFF WBC: CPT

## 2023-01-24 PROCEDURE — 80053 COMPREHEN METABOLIC PANEL: CPT

## 2023-01-24 PROCEDURE — 88312 SPECIAL STAINS GROUP 1: CPT

## 2023-01-24 PROCEDURE — 0DJD8ZZ INSPECTION OF LOWER INTESTINAL TRACT, VIA NATURAL OR ARTIFICIAL OPENING ENDOSCOPIC: ICD-10-PCS | Performed by: SPECIALIST

## 2023-01-24 PROCEDURE — A9270 NON-COVERED ITEM OR SERVICE: HCPCS | Performed by: HOSPITALIST

## 2023-01-24 PROCEDURE — 160002 HCHG RECOVERY MINUTES (STAT): Performed by: SPECIALIST

## 2023-01-24 PROCEDURE — C9113 INJ PANTOPRAZOLE SODIUM, VIA: HCPCS | Performed by: HOSPITALIST

## 2023-01-24 PROCEDURE — RXMED WILLOW AMBULATORY MEDICATION CHARGE: Performed by: INTERNAL MEDICINE

## 2023-01-24 PROCEDURE — 160048 HCHG OR STATISTICAL LEVEL 1-5: Performed by: SPECIALIST

## 2023-01-24 PROCEDURE — 160035 HCHG PACU - 1ST 60 MINS PHASE I: Performed by: SPECIALIST

## 2023-01-24 PROCEDURE — 700102 HCHG RX REV CODE 250 W/ 637 OVERRIDE(OP): Performed by: HOSPITALIST

## 2023-01-24 PROCEDURE — 0DB68ZX EXCISION OF STOMACH, VIA NATURAL OR ARTIFICIAL OPENING ENDOSCOPIC, DIAGNOSTIC: ICD-10-PCS | Performed by: SPECIALIST

## 2023-01-24 PROCEDURE — 43239 EGD BIOPSY SINGLE/MULTIPLE: CPT | Performed by: SPECIALIST

## 2023-01-24 RX ORDER — LEVOTHYROXINE SODIUM 0.15 MG/1
150 TABLET ORAL
Status: DISCONTINUED | OUTPATIENT
Start: 2023-01-24 | End: 2023-01-24 | Stop reason: HOSPADM

## 2023-01-24 RX ORDER — LIDOCAINE HYDROCHLORIDE 20 MG/ML
INJECTION, SOLUTION EPIDURAL; INFILTRATION; INTRACAUDAL; PERINEURAL PRN
Status: DISCONTINUED | OUTPATIENT
Start: 2023-01-24 | End: 2023-01-24 | Stop reason: SURG

## 2023-01-24 RX ORDER — HALOPERIDOL 5 MG/ML
1 INJECTION INTRAMUSCULAR
Status: DISCONTINUED | OUTPATIENT
Start: 2023-01-24 | End: 2023-01-24 | Stop reason: HOSPADM

## 2023-01-24 RX ORDER — HYDRALAZINE HYDROCHLORIDE 20 MG/ML
5 INJECTION INTRAMUSCULAR; INTRAVENOUS
Status: DISCONTINUED | OUTPATIENT
Start: 2023-01-24 | End: 2023-01-24 | Stop reason: HOSPADM

## 2023-01-24 RX ORDER — SODIUM CHLORIDE, SODIUM LACTATE, POTASSIUM CHLORIDE, CALCIUM CHLORIDE 600; 310; 30; 20 MG/100ML; MG/100ML; MG/100ML; MG/100ML
INJECTION, SOLUTION INTRAVENOUS CONTINUOUS
Status: DISCONTINUED | OUTPATIENT
Start: 2023-01-24 | End: 2023-01-24

## 2023-01-24 RX ORDER — SODIUM CHLORIDE, SODIUM LACTATE, POTASSIUM CHLORIDE, CALCIUM CHLORIDE 600; 310; 30; 20 MG/100ML; MG/100ML; MG/100ML; MG/100ML
INJECTION, SOLUTION INTRAVENOUS CONTINUOUS
Status: DISCONTINUED | OUTPATIENT
Start: 2023-01-24 | End: 2023-01-24 | Stop reason: HOSPADM

## 2023-01-24 RX ORDER — ONDANSETRON 2 MG/ML
4 INJECTION INTRAMUSCULAR; INTRAVENOUS
Status: DISCONTINUED | OUTPATIENT
Start: 2023-01-24 | End: 2023-01-24 | Stop reason: HOSPADM

## 2023-01-24 RX ORDER — DIPHENHYDRAMINE HYDROCHLORIDE 50 MG/ML
12.5 INJECTION INTRAMUSCULAR; INTRAVENOUS
Status: DISCONTINUED | OUTPATIENT
Start: 2023-01-24 | End: 2023-01-24 | Stop reason: HOSPADM

## 2023-01-24 RX ORDER — LABETALOL HYDROCHLORIDE 5 MG/ML
5 INJECTION, SOLUTION INTRAVENOUS
Status: DISCONTINUED | OUTPATIENT
Start: 2023-01-24 | End: 2023-01-24 | Stop reason: HOSPADM

## 2023-01-24 RX ORDER — OMEPRAZOLE 20 MG/1
20 CAPSULE, DELAYED RELEASE ORAL 2 TIMES DAILY
Qty: 44 CAPSULE | Refills: 0 | Status: SHIPPED | OUTPATIENT
Start: 2023-01-24 | End: 2023-04-10

## 2023-01-24 RX ADMIN — PROPOFOL 50 MG: 10 INJECTION, EMULSION INTRAVENOUS at 09:20

## 2023-01-24 RX ADMIN — SODIUM CHLORIDE, POTASSIUM CHLORIDE, SODIUM LACTATE AND CALCIUM CHLORIDE: 600; 310; 30; 20 INJECTION, SOLUTION INTRAVENOUS at 09:16

## 2023-01-24 RX ADMIN — ONDANSETRON HYDROCHLORIDE 4 MG: 2 SOLUTION INTRAMUSCULAR; INTRAVENOUS at 02:38

## 2023-01-24 RX ADMIN — LIDOCAINE HYDROCHLORIDE 50 MG: 20 INJECTION, SOLUTION EPIDURAL; INFILTRATION; INTRACAUDAL at 09:22

## 2023-01-24 RX ADMIN — PANTOPRAZOLE SODIUM 40 MG: 40 INJECTION, POWDER, LYOPHILIZED, FOR SOLUTION INTRAVENOUS at 05:38

## 2023-01-24 RX ADMIN — PROPOFOL 50 MG: 10 INJECTION, EMULSION INTRAVENOUS at 09:29

## 2023-01-24 RX ADMIN — PROPOFOL 20 MG: 10 INJECTION, EMULSION INTRAVENOUS at 09:37

## 2023-01-24 RX ADMIN — PROPOFOL 50 MG: 10 INJECTION, EMULSION INTRAVENOUS at 09:24

## 2023-01-24 RX ADMIN — PROPOFOL 50 MG: 10 INJECTION, EMULSION INTRAVENOUS at 09:34

## 2023-01-24 ASSESSMENT — PAIN SCALES - GENERAL: PAIN_LEVEL: 0

## 2023-01-24 ASSESSMENT — PAIN DESCRIPTION - PAIN TYPE
TYPE: ACUTE PAIN
TYPE: SURGICAL PAIN
TYPE: SURGICAL PAIN

## 2023-01-24 NOTE — PROGRESS NOTES
4 Eyes Skin Assessment Completed by IWONA Barclay and IWONA Carrington.    Head WDL  Ears WDL  Nose WDL  Mouth WDL  Neck WDL  Breast/Chest WDL  Shoulder Blades WDL  Spine WDL  (R) Arm/Elbow/Hand WDL  (L) Arm/Elbow/Hand WDL  Abdomen WDL  Groin WDL  Scrotum/Coccyx/Buttocks WDL  (R) Leg Rash,psoriasis  (L) Leg Rash, psoriasis  (R) Heel/Foot/Toe WDL  (L) Heel/Foot/Toe WDL          Devices In Places Blood Pressure Cuff      Interventions In Place N/A    Possible Skin Injury No    Pictures Uploaded Into Epic N/A  Wound Consult Placed N/A  RN Wound Prevention Protocol Ordered No

## 2023-01-24 NOTE — OR NURSING
0950 Patient arrived from OR to PACU 1. Connected to monitor and report received from anesthesia and RN. VSS. 6 L 02 via mask. Breaths calm, even, unlabored.   Pt awake; denies pain and nausea.     1010:  Pt tolerating sips of water.     1019: Report to IWONA Badillo via phone.     1045: Pt meets discharge criteria. Pt transported to [place] [id number] via [facility] by CNA. All personal belongings with pt.

## 2023-01-24 NOTE — PROCEDURES
OPERATIVE REPORT    PATIENT:   Aleshia Crooks   1940       PREOPERATIVE DIAGNOSES/INDICATIONS: GI bleed, Anemia    POSTOPERATIVE DIAGNOSIS: Nodular mucosa of stomach    PROCEDURE:  ESOPHAGOGASTRODUODENOSCOPY    PHYSICIAN:  Amy Zarate MD    ANESTHESIA:  Per anesthesiologist.    LOCATION: [facility]    CONSENT:  OBTAINED. The risks, benefits and alternatives of the procedure were discussed in details. The risks include and are not limited to bleeding, infection, perforation, missed lesions, and sedations risks (cardiopulmonary compromise and allergic reaction to medications).    DESCRIPTION: The patient presented to the procedure room.  After routine checkup was performed, patient was brought into the endoscopy suite.  Patient was placed on his left lateral decubitus position. A bite block was placed in patient's mouth. Patient was sedated by anesthesia.  Vital signs were monitored throughout the procedure.  Oxygenation support was provided throughout the procedure. Upper endoscope was inserted into patient's mouth and advanced to the second portion of the duodenum under direct visualization.      Once the site was reached and examined, the upper endoscope was withdrawn.  Retroflexion was made within the stomach.  The stomach was decompressed, scope was withdrawn and the procedure was terminated.    The patient tolerated the procedure well.  There were no immediate complications.    OPERATIVE FINDINGS:    1. Esophagus: Normal esophagus  2. Stomach: small hiatal hernia. Nodular mucosa in stomach. Biopsied. No blood. No erosions or gastritis  3. Duodenum: normal    IMPRESSION/RECOMMENDATIONS:  Small hiatal hernia  Nodular mucosa in stomach - no blood     If signs of bleeding - need evaluation of small bowel    This note has been transcribed with digital voice recognition software and although it has been reviewed may contain grammatical or word errors

## 2023-01-24 NOTE — DISCHARGE PLANNING
MORA[facility] DCE chart review. Monitoring for developments in discharge disposition and will round back to obtain choice as indicated. Thank you.

## 2023-01-24 NOTE — DISCHARGE SUMMARY
Discharge Summary    CHIEF COMPLAINT ON ADMISSION  Chief Complaint   Patient presents with    Bloody Stools    Urinary Pain       Reason for Admission  EMS     Admission Date  2023    CODE STATUS  Full Code    HPI & HOSPITAL COURSE  Aleshia Crooks is a 83 y.o. female with atrial fibrillation on chronic Xarelto, GONZALEZ, hypertension, CAD, history of CABG, history of severe MR, pulmonary hypertension, CKD stage III, admitted 2023 with multiple maroon-colored stools that started 1 day prior to admission, with associated lightheadedness, without any nausea, abdominal pain.  On evaluation, vital signs were normal.  Hemoglobin was 10.  CT of the abdomen was negative for acute bleed, but showed diverticulosis.  She was started on IV Protonix, along with serial hemoglobin.  Xarelto was temporarily held.  Her diuretics were held as well in setting of bleeding. GI was consulted, and patient underwent colonoscopy which only showed mild diverticulosis in the left colon with no polyps and blood seen.  EGD was subsequently done which showed small hiatal hernia, and nodular mucosa in the stomach which was biopsied, but no signs of bleeding or blood.  GI recommended capsule endoscopy as outpatient if she continues to bleed to rule out small bowel bleed.    However, she remained hemodynamically stable.  Her hemoglobin remained stable at 10, without any further bleeding.  Her electrolytes remain normal and renal function remained stable at her baseline.  The issue of continued anticoagulation was discussed with her, with the risk of bleeding from unclear GI source versus benefits of CVA prophylaxis.  She is adamant that she wants to continue taking her Xarelto (states that her   of stroke from A. fib which concerns her) and understood the risks.  With negative EGD and colonoscopy, risk of profuse bleeding from possible small intestinal source is low, and reasonable to resume Xarelto in 24 to 48 hours.  She  agrees to have repeat hemoglobin checked in 48 hours to ensure stability.    I have personally seen and examined the patient on the day of discharge. With her clinical improvement, she was deemed ready to discharge from the hospital as she did not have any further hospitalization needs. Patient felt comfortable going home. The discharge plan was discussed with the patient, with which she was agreeable to.     Therefore, she is discharged in good and stable condition to home with close outpatient follow-up.    The patient recovered much more quickly than anticipated on admission.    Discharge Date  1/24/2023      FOLLOW UP ITEMS POST DISCHARGE  -After much discussion about risk and benefits of continuing chronic Xarelto, which shared decision making, patient wants to continue on Xarelto which she will resume after 24 hours.  A repeat CBC will be checked on 1/26, which is also be sent to her PCP.   -She will follow-up with outpatient GI for consideration for capsule endoscopy if she continues to have bleeding.  -She will be on Prilosec 20 mg twice daily for the next 2 weeks, then once daily thereafter.  -Follow-up with PCP.  - counseled to seek immediate medical attention, or return to the ED for recurrent or worsening symptoms.      DISCHARGE DIAGNOSES  Principal Problem:    GI bleed POA: Yes  Active Problems:    Moderate to severe pulmonary hypertension (HCC) POA: Yes       Chronic combined systolic and diastolic congestive heart failure (HCC) POA: Yes    AF (atrial fibrillation) (HCC) POA: Unknown    Essential hypertension (Chronic) POA: Yes    Hypothyroidism POA: Yes    Sleep apnea POA: Unknown    Hepatic steatosis POA: Yes    Stage 3b chronic kidney disease (HCC) POA: Yes    Acute blood loss anemia POA: Unknown  Resolved Problems:    * No resolved hospital problems. *      FOLLOW UP  Future Appointments   Date Time Provider Department Center   5/17/2023 11:20 AM Asael Pink M.D. [facility]    6/16/2023 11:30 AM Patricia Downs M.D. [facility]     No follow-up provider specified.    MEDICATIONS ON DISCHARGE     Medication List        START taking these medications        Instructions   omeprazole 20 MG delayed-release capsule  Commonly known as: PRILOSEC  Replaces: famotidine 40 MG Tabs   Take 1 Capsule by mouth 2 times a day for 14 days, then take 1 capsule by mouth daily  Dose: 20 mg            CHANGE how you take these medications        Instructions   ascorbic acid 500 MG Tabs  What changed: when to take this  Commonly known as: ascorbic acid   TAKE 1 TABLET BY MOUTH EVERY DAY     atorvastatin 40 MG Tabs  What changed:   how much to take  how to take this  when to take this  Commonly known as: LIPITOR   TAKE 1 TABLET BY MOUTH AT  BEDTIME     Cosentyx Sensoready Pen 150 MG/ML Soaj  What changed:   how much to take  how to take this  when to take this  Generic drug: Secukinumab   Doctor's comments: Requests 90 day supply.  Inject 300mg Sub Cut once monthly     losartan 50 MG Tabs  What changed:   how much to take  how to take this  when to take this  Commonly known as: COZAAR   TAKE 1 TABLET BY MOUTH EVERY DAY            CONTINUE taking these medications        Instructions   bisoprolol 10 MG tablet  Commonly known as: ZEBETA   Doctor's comments: Do not substitute atenolol.  This is the medication that cardiology wants  Take 1 Tablet by mouth every day.  Dose: 10 mg     CINNAMON PO   Take 1 Tablet by mouth 2 times a day.  Dose: 1 Tablet     Levoxyl 150 MCG Tabs  Generic drug: levothyroxine   Take 150 mcg by mouth every morning on an empty stomach.  Dose: 150 mcg     loratadine 10 MG Tabs  Commonly known as: CLARITIN   Take 1 Tablet by mouth 2 times a day.  Dose: 10 mg     rivaroxaban 15 MG Tabs tablet  Commonly known as: XARELTO   Take 1 Tablet by mouth with dinner.  Dose: 15 mg     spironolactone 25 MG Tabs  Commonly known as: ALDACTONE   Take 1 Tablet by mouth every day.  Dose: 25 mg      Vitamin D 125 MCG (5000 UT) Caps   Take 5,000 Units by mouth every day.  Dose: 5,000 Units            STOP taking these medications      famotidine 40 MG Tabs  Commonly known as: PEPCID  Replaced by: omeprazole 20 MG delayed-release capsule              Allergies  No Known Allergies    DIET  Orders Placed This Encounter   Procedures    Diet Order Diet: Cardiac     Standing Status:   Standing     Number of Occurrences:   1     Order Specific Question:   Diet:     Answer:   Cardiac [6]       ACTIVITY  As tolerated.  Weight bearing as tolerated    CONSULTATIONS  GI    PROCEDURES  As above    LABORATORY  Lab Results   Component Value Date    SODIUM 135 01/24/2023    POTASSIUM 4.5 01/24/2023    CHLORIDE 103 01/24/2023    CO2 18 (L) 01/24/2023    GLUCOSE 97 01/24/2023    BUN 46 (H) 01/24/2023    CREATININE 1.69 (H) 01/24/2023        Lab Results   Component Value Date    WBC 7.8 01/24/2023    HEMOGLOBIN 9.8 (L) 01/24/2023    HEMATOCRIT 31.4 (L) 01/24/2023    PLATELETCT 275 01/24/2023        Total time of the discharge process = 40 minutes.

## 2023-01-24 NOTE — PROCEDURES
OPERATIVE REPORT        PATIENT: Aleshia Crooks  1940      PREOPERATIVE DIAGNOSIS/INDICATION: Hematochezia    POSTOPERATIVE DIAGNOSES: diverticulosis, no blood seen    PROCEDURE: COLONOSCOPY    PHYSICIAN: Amy Zarate MD    CONSENT:  OBTAINED. The risks, benefits and alternatives of the procedure were discussed in details. The risks include and are not limited to bleeding, infection, perforation, missed lesions, and sedations risks (cardiopulmonary compromise and allergic reaction to medications).    ANESTHESIA:  Per anesthesiologist.    LOCATION: [facility]    DESCRIPTION:  The patient presented to the procedure room.  After routine checkup was performed, patient was brought into endoscopy suite.  Patient was placed on his left lateral decubitus position.  Patient was sedated by anesthesia. Vital signs were monitored throughout procedure.  Oxygenation support was provided throughout procedure. Digital rectal examination was performed.  Then, a colonoscope was inserted into patient's anus, advanced to the cecum under direct visualization.  Once the site was reached and examined, the colonoscope was withdrawn and procedure was terminated. Withdrawal time was at least 6 minutes to ensure adequate examination.    The patient tolerated the procedure well.  There were no immediate complications.    The quality of the bowel preparation was  adequate.      FINDINGS:  Diverticula - mild in left colon. No polyps. No blood seen    RECOMMENDATIONS:  Proceed with EGD    This note has been transcribed with digital voice recognition software and although it has been reviewed may contain grammatical or word errors

## 2023-01-24 NOTE — ED NOTES
Report given to receiving RN for S533. Pt taken off monitor on er bed by transport tech. Pt is AOx4 and has no needs at this time. IV patent and all belongings and paperwork sent with pt

## 2023-01-24 NOTE — PROGRESS NOTES
Report received from IWONA Badillo. Patient arrived to room 533-2 via bed from emergency room. Patient oriented to room, use of call bell. Policies and procedures explained.Patient verbalized understanding. Patient alert and oriented to person, place and time.Patient has a  # 20 gauge to right forearm saline looks. Assessment performed Vital signs: /55, P 72, R 17, T 97.1 on admission. Will continue to monitor.

## 2023-01-24 NOTE — ANESTHESIA TIME REPORT
Anesthesia Start and Stop Event Times     Date Time Event    1/24/2023 0913 Ready for Procedure     0916 Anesthesia Start     0952 Anesthesia Stop        Responsible Staff  01/24/23    Name Role Begin End    Crista Lawrence M.D. Anesth 0916 [id number]        Overtime Reason:  no overtime (within assigned shift)    Comments:

## 2023-01-24 NOTE — CARE PLAN
The patient is Stable - Low risk of patient condition declining or worsening    Shift Goals  Clinical Goals: Control bleeding  Patient Goals: Rest    Progress made toward(s) clinical / shift goals:    Problem: Knowledge Deficit - Standard  Goal: Patient and family/care givers will demonstrate understanding of plan of care, disease process/condition, diagnostic tests and medications  Outcome: Progressing     Problem: Pain - Standard  Goal: Alleviation of pain or a reduction in pain to the patient’s comfort goal  Outcome: Progressing     Problem: Psychosocial  Goal: Patient's level of anxiety will decrease  Outcome: Progressing     Problem: Discharge Barriers/Planning  Goal: Patient's continuum of care needs are met  Outcome: Progressing     Problem: Fluid Volume  Goal: Fluid volume balance will be maintained  Outcome: Progressing       Patient is not progressing towards the following goals:

## 2023-01-24 NOTE — CONSULTS
GASTROENTEROLOGY CONSULTATION    PATIENT NAME: Aleshia Crooks  : 1940  CSN: [id number]  MRN:  [id number]     CONSULTATION DATE:  2023    PRIMARY CARE PROVIDER:  Asael Pink M.D.      REASON FOR CONSULT:  Hematochezia    HISTORY OF PRESENT ILLNESS:  Aleshia Crooks is a 83 y.o. female who presented 2023 with past medical history of atrial fibrillation on Xarelto, GONZALEZ, hypertension, coronary artery disease, history of CABG, history of severe MR, pulmonary hypertension, CKD stage III who comes into the hospital for maroon-colored stools that started yesterday.  Patient states that she had 1 episode yesterday and 3 episodes today.  It is associated with lightheadedness.  She denies any shortness of breath, hematemesis, nausea, abdominal pain, fever, chills.  Patient states that she was taking Coumadin but last year she started taking Xarelto for her A. fib. She was having constipation for a few days before she bleed. She also has history of liver disease secondary to GONZALEZ. She does not have signs of decompensation     She presents to the hospital with normal vital signs.  Hemoglobin 10 it was 10.9     CTA of the abdomen was negative for acute bleed.  She did have evidence of diverticulosis.       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        bisoprolol (ZEBETA) tablet 10 mg  10 mg Oral DAILY Ivette Manzo M.D.        famotidine (PEPCID) tablet 40 mg  40 mg Oral DAILY Ivette Manzo M.D.        losartan (COZAAR) tablet 50 mg  50 mg Oral Q DAY Ivette Manzo M.D.        pantoprazole (Protonix) injection 40 mg  40 mg Intravenous BID Ivette Manzo M.D.        phytonadione (AQUA-MEPHYTON) 10 mg in NS 50 mL IVPB  10 mg Intravenous Once Ivette Manzo M.D.        acetaminophen (Tylenol) tablet 650 mg  650 mg Oral Q6HRS PRN Ivette Manzo M.D.        ondansetron (ZOFRAN) syringe/vial injection 4 mg  4 mg Intravenous Q4HRS PRN Ivette Manzo M.D.        ondansetron (ZOFRAN ODT) dispertab 4 mg  4 mg Oral Q4HRS PRN Ivette Manzo M.D.         Current Outpatient Medications   Medication Sig Dispense Refill    LEVOXYL 150 MCG Tab Take 150 mcg by mouth every morning on an empty stomach.      CINNAMON PO Take 1 Tablet by mouth 2 times a day.      atorvastatin (LIPITOR) 40 MG Tab TAKE 1 TABLET BY MOUTH AT  BEDTIME (Patient taking differently: Take 40 mg by mouth at bedtime. TAKE 1 TABLET BY MOUTH AT  BEDTIME) 100 Tablet 3    rivaroxaban (XARELTO) 15 MG Tab tablet Take 1 Tablet by mouth with dinner. 90 Tablet 3    spironolactone (ALDACTONE) 25 MG Tab Take 1 Tablet by mouth every day. 90 Tablet 3    losartan (COZAAR) 50 MG Tab TAKE 1 TABLET BY MOUTH EVERY DAY (Patient taking differently: Take 50 mg by mouth every day. TAKE 1 TABLET BY MOUTH EVERY DAY) 100 Tablet 3    Secukinumab (COSENTYX SENSOREADY PEN) 150 MG/ML Solution Auto-injector Inject 300mg Sub Cut once monthly (Patient taking differently: Inject 300 mg under the skin every 28 days. Inject 300mg Sub Cut once monthly) 1.96 mL 6    ascorbic acid (ASCORBIC ACID) 500 MG Tab TAKE 1 TABLET BY MOUTH EVERY DAY (Patient taking differently: Take 500 mg by mouth every day.) 90 Tablet 3     famotidine (PEPCID) 40 MG Tab Take 1 Tablet by mouth every day. 90 Tablet 2    bisoprolol (ZEBETA) 10 MG tablet Take 1 Tablet by mouth every day. 90 Tablet 4    Cholecalciferol (VITAMIN D) 125 MCG (5000 UT) Cap Take 5,000 Units by mouth every day. 30 Capsule 11    loratadine (CLARITIN) 10 MG Tab Take 1 Tablet by mouth 2 times a day. 60 tablet 5        ALLERGIES:  No Known Allergies    SOCIAL HISTORY:  Social History     Socioeconomic History    Marital status:      Spouse name: Not on file    Number of children: Not on file    Years of education: Not on file    Highest education level: Not on file   Occupational History     Comment: [employer]   Tobacco Use    Smoking status: Former     Packs/day: 1.00     Years: 31.00     Pack years: 31.00     Types: Cigarettes     Quit date:      Years since quittin.0    Smokeless tobacco: Never   Vaping Use    Vaping Use: Never used   Substance and Sexual Activity    Alcohol use: No    Drug use: No    Sexual activity: Not Currently     Partners: Male   Other Topics Concern    Not on file   Social History Narrative    Not on file     Social Determinants of Health     Financial Resource Strain: Low Risk     Difficulty of Paying Living Expenses: Not hard at all   Food Insecurity: No Food Insecurity    Worried About Running Out of Food in the Last Year: Never true    Ran Out of Food in the Last Year: Never true   Transportation Needs: No Transportation Needs    Lack of Transportation (Medical): No    Lack of Transportation (Non-Medical): No   Physical Activity: Inactive    Days of Exercise per Week: 0 days    Minutes of Exercise per Session: 0 min   Stress: No Stress Concern Present    Feeling of Stress : Not at all   Social Connections: Socially Isolated    Frequency of Communication with Friends and Family: More than three times a week    Frequency of Social Gatherings with Friends and Family: Once a week    Attends [employer] Services: Never    Active Member of  Clubs or Organizations: No    Attends Club or Organization Meetings: Never    Marital Status:    Intimate Partner Violence: Not At Risk    Fear of Current or Ex-Partner: No    Emotionally Abused: No    Physically Abused: No    Sexually Abused: No   Housing Stability: Low Risk     Unable to Pay for Housing in the Last Year: No    Number of Places Lived in the Last Year: 1    Unstable Housing in the Last Year: No       FAMILY HISTORY:  Family History   Problem Relation Age of Onset    Cancer Mother         cancer, smoker    Stroke Maternal Grandmother     Other Other         Crohn    Kidney Disease Other         kidney transplant        REVIEW OF SYSTEMS:  General ROS: Negative for - chills, fever, night sweats or weight loss.  HEENT ROS: Negative  Respiratory ROS: Negative for - cough or shortness of breath.  Cardiovascular ROS:  Negative for - chest pain or palpitations.  Gastrointestinal ROS: As per the history of present illness.  Genito-Urinary ROS: Negative  Musculoskeletal ROS: Negative.  Neurological ROS: Negative  Skin ROS: negative  Hematology ROS: negative  Endocrinology ROS: Negative        PHYSICAL EXAM:  VITALS: /55   Pulse 75   Temp 36.6 °C (97.8 °F) (Temporal)   Resp 14   Wt 93 kg (205 lb)   LMP  (LMP Unknown)   SpO2 95%   BMI 36.31 kg/m²   GEN:  Aleshia Crooks is a 83 y.o. female in no acute distress.  HEENT: Mucous membranes pink and moist.  Sclera anicteric.    NECK:    Neck supple without lymphadenopathy or thyromegaly.  LUNGS: Clear to auscultation posteriorly.  HEART: Regular rate and rhythm. S1 and S2 normal. No murmurs, gallops  ABD:             + BS NT/ND -HSM  RECTAL: Not done at this time.  EXT:  Without cyanosis, deformity or pitting edema.  SKIN:  Pink, warm, dry.  NEURO: Grossly intact, A/OR.    LABS:  Recent Labs     01/23/23  1144   WBC 8.9   MCV 83.8     Recent Labs     01/23/23  1144   GLUCOSE 136*   BUN 49*   CO2 18*     Lab Results   Component Value Date     INR 1.89 (H) 01/23/2023    INR 2.50 07/18/2022    INR 2.10 07/11/2022     No components found for: ALT, AST, GGT, ALKPHOS  No results found for: BILINEO      @LASTIMGCAT(TA0308)@     @LASTIMGCAT(IN0473)@       IMPRESSION/PLAN:  Hematochezia  Anemia   Liver disease secondary to GONZALEZ  4.   Anticoagulated for A. Fib    Colonoscopy  Risks, benefits, and alternatives were discussed with consenting person(s). Consenting person(s) were given an opportunity to ask questions and discuss other options. Risks including but not limited to failed or incomplete Colonoscopy with intervention, bleeding control, banding, dilation, ineffective therapy, perforation, infection, bleeding, missed lesion(s), missed diagnosis, cardiac and/or pulmonary event, aspiration, stroke, possible need for surgery, hospitalization possibly prolonged, discomfort, unsuccessful and/or incomplete procedure, possible need for repeat procedures and/or additional testings, damage to adjacent organs and/or vascular structures, medication reaction, disability, death, and other adverse events possibly life-threatening. Discussion was undertaken with Layman's terms. Consenting persons stated understanding and acceptance of these risks, and wished to proceed. Consent was given in clear state of mind  MAC    EGD if colon negative for bleeding source.         Amy Zarate M.D.  Gastroenterology

## 2023-01-24 NOTE — DISCHARGE SUMMARY
ED Observation Discharge Summary    Patient:Aleshia Crooks  Patient : 1940  Patient MRN: [id number]  Patient PCP: Asael Pink M.D.    Admit Date: 2023  Discharge Date and Time: 23 4:10 PM  Discharge Diagnosis:   1. Lower GI bleed        2. Rectal bleeding        3. Coagulopathy (HCC)           Discharge Attending: Charles Kwong D.O.  Discharge Service: ED Observation    ED Course  Aleshia is a 83 y.o. female who was evaluated at [facility] presented with 4 days of bloody stools, the patient was evaluated by Dr. Tovar and was endorsed to me pending for a CT.  CT does not show site of active bleeding however on exam she did have significant bleeding she is on Xarelto and INR is 1.8.  This is a coagulopathy with active bleeding, this patient will require admission for further evaluation and treatment.  I spoke with Dr. Manzo for admission and the patient will be admitted for further evaluation and treatment.    Discharge Exam:  /55   Pulse 75   Temp 36.6 °C (97.8 °F) (Temporal)   Resp 14   Wt 93 kg (205 lb)   LMP  (LMP Unknown)   SpO2 95%   BMI 36.31 kg/m² .    Constitutional: Awake and alert. Nontoxic  HENT:  Grossly normal  Eyes: Grossly normal  Neck: Normal range of motion  Cardiovascular: Normal heart rate   Thorax & Lungs: No respiratory distress  Abdomen: Nontender  Skin:  No pathologic rash.   Extremities: Well perfused  Psychiatric: Affect normal    Labs  Results for orders placed or performed during the hospital encounter of 23   CBC WITH DIFFERENTIAL   Result Value Ref Range    WBC 8.9 4.8 - 10.8 K/uL    RBC 3.82 (L) 4.20 - 5.40 M/uL    Hemoglobin 10.0 (L) 12.0 - 16.0 g/dL    Hematocrit 32.0 (L) 37.0 - 47.0 %    MCV 83.8 81.4 - 97.8 fL    MCH 26.2 (L) 27.0 - 33.0 pg    MCHC 31.3 (L) 33.6 - 35.0 g/dL    RDW 45.4 35.9 - 50.0 fL    Platelet Count 319 164 - 446 K/uL    MPV 11.3 9.0 - 12.9 fL    Neutrophils-Polys 83.20 (H) 44.00 - 72.00 %    Lymphocytes 8.00 (L) 22.00 -  41.00 %    Monocytes 6.50 0.00 - 13.40 %    Eosinophils 1.10 0.00 - 6.90 %    Basophils 0.70 0.00 - 1.80 %    Immature Granulocytes 0.50 0.00 - 0.90 %    Nucleated RBC 0.00 /100 WBC    Neutrophils (Absolute) 7.37 (H) 2.00 - 7.15 K/uL    Lymphs (Absolute) 0.71 (L) 1.00 - 4.80 K/uL    Monos (Absolute) 0.58 0.00 - 0.85 K/uL    Eos (Absolute) 0.10 0.00 - 0.51 K/uL    Baso (Absolute) 0.06 0.00 - 0.12 K/uL    Immature Granulocytes (abs) 0.04 0.00 - 0.11 K/uL    NRBC (Absolute) 0.00 K/uL   Comp Metabolic Panel   Result Value Ref Range    Sodium 132 (L) 135 - 145 mmol/L    Potassium 5.1 3.6 - 5.5 mmol/L    Chloride 99 96 - 112 mmol/L    Co2 18 (L) 20 - 33 mmol/L    Anion Gap 15.0 7.0 - 16.0    Glucose 136 (H) 65 - 99 mg/dL    Bun 49 (H) 8 - 22 mg/dL    Creatinine 1.57 (H) 0.50 - 1.40 mg/dL    Calcium 9.4 8.5 - 10.5 mg/dL    AST(SGOT) 17 12 - 45 U/L    ALT(SGPT) 13 2 - 50 U/L    Alkaline Phosphatase 90 30 - 99 U/L    Total Bilirubin 0.5 0.1 - 1.5 mg/dL    Albumin 4.1 3.2 - 4.9 g/dL    Total Protein 7.2 6.0 - 8.2 g/dL    Globulin 3.1 1.9 - 3.5 g/dL    A-G Ratio 1.3 g/dL   COD - Adult (Type and Screen)   Result Value Ref Range    ABO Grouping Only A     Rh Grouping Only POS     Antibody Screen-Cod NEG    PTT   Result Value Ref Range    APTT 49.7 (H) 24.7 - 36.0 sec   PT/INR   Result Value Ref Range    PT 21.2 (H) 12.0 - 14.6 sec    INR 1.89 (H) 0.87 - 1.13   ESTIMATED GFR   Result Value Ref Range    GFR (CKD-EPI) 33 (A) >60 mL/min/1.73 m 2   CORRECTED CALCIUM   Result Value Ref Range    Correct Calcium 9.3 8.5 - 10.5 mg/dL       Radiology  CTA ABDOMEN PELVIS W & W/O POST PROCESS   Final Result      1.  No gastrointestinal bleeding site demonstrated.   2.  Colonic diverticula noted.   3.  No focal mesenteric inflammatory process.             Medications:   New Prescriptions    No medications on file       My final assessment includes patient is actively bleeding with a coagulopathy  Upon Reevaluation, the patient's condition  has: not improved; and will be escalated to hospitalization.    Patient discharged from ED Observation status at 4:25 PM   (Time) 1/23/2023 (Date).     Total time spent on this ED Observation discharge encounter is > 30 Minutes    Electronically signed by: Charles Kwong D.O., 1/23/2023 4:10 PM

## 2023-01-24 NOTE — DIETARY
Nutrition services: Day 1 of admit.  Aleshia Crooks is a 83 y.o. female with admitting DX of GI bleed.     Consult received for unintentional weight loss of 2-13 lbs in 1 month due to decreased appetite (MST 2). Pt not in room, colonoscopy and gastroscopy this morning. Pt with estimated weight and RD unable to complete wt hx interview at this time. Unable to assess weight loss with estimated weight. Recommend obtaining standing scale wt to more accurately assess wt loss trends.     Assessment:  Weight: 93 kg (205 lb) Weight is estimated   Body mass index is 36.31 kg/m²., BMI classification: Class 2 obesity   Diet/Intake: NPO    Evaluation:   Current clinical picture and MD progress notes reviewed. Pt with blood in stool which started 1/22.   Labs and Meds reviewed   Skin: no noted staged wounds or pressure injuries   +BM 1/24    Malnutrition Risk: Unable to fully assess at this time    Recommendations/Plan:  Diet advancements past NPO as medically feasible    Obtain updated weight.  RD to follow for weight history interview as able and appropriate.     RD to monitor per department policy

## 2023-01-24 NOTE — H&P
Hospital Medicine History & Physical Note    Date of Service  1/23/2023    Primary Care Physician  Asael Pink M.D.    Consultants  GI    Specialist Names:     Code Status  Full Code    Chief Complaint  Chief Complaint   Patient presents with    Bloody Stools    Urinary Pain       History of Presenting Illness  Aleshia Crooks is a 83 y.o. female who presented 1/23/2023 with past medical history of atrial fibrillation on Xarelto, GONZALEZ, hypertension, coronary artery disease, history of CABG, history of severe MR, pulmonary hypertension, CKD stage III who comes into the hospital for maroon-colored stools that started yesterday.  Patient states that she had 1 episode yesterday and 3 episodes today.  It is associated with lightheadedness.  She denies any shortness of breath, hematemesis, nausea, abdominal pain, fever, chills.  Patient states that she was taking Coumadin but last year she started taking Xarelto for her A. fib.    She presents to the hospital with normal vital signs.  Hemoglobin 10    CTA of the abdomen was negative for acute bleed.  She did have evidence of diverticulosis.    I discussed the plan of care with patient.    Review of Systems  Review of Systems   Constitutional:  Negative for chills, diaphoresis, fever and malaise/fatigue.   HENT:  Negative for congestion, ear discharge, ear pain, hearing loss, nosebleeds, sinus pain, sore throat and tinnitus.    Eyes:  Negative for blurred vision, double vision, photophobia and pain.   Respiratory:  Negative for cough, hemoptysis, sputum production, shortness of breath, wheezing and stridor.    Cardiovascular:  Positive for leg swelling. Negative for chest pain, palpitations, orthopnea, claudication and PND.   Gastrointestinal:  Positive for blood in stool. Negative for abdominal pain, constipation, diarrhea, heartburn, melena, nausea and vomiting.   Genitourinary:  Negative for dysuria, flank pain, frequency, hematuria and urgency.    Musculoskeletal:  Negative for back pain, falls, joint pain, myalgias and neck pain.   Skin:  Negative for itching and rash.   Neurological:  Negative for dizziness, tingling, tremors, weakness and headaches.   Endo/Heme/Allergies:  Negative for environmental allergies and polydipsia. Does not bruise/bleed easily.   Psychiatric/Behavioral:  Negative for depression, hallucinations, substance abuse and suicidal ideas.      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    Surgical History   has a past surgical history that includes thyroidectomy total; cholecystectomy; multiple coronary artery bypass; and eye surgery (Bilateral).     Family History  family history includes Cancer in her mother; Kidney Disease in an other family member; Other in an other family member; Stroke in her maternal grandmother.   Family history reviewed with patient. There is no family history that is pertinent to the chief complaint.     Social History   reports that she quit smoking about 31 years ago. Her smoking use included cigarettes. She has a 31.00 pack-year smoking history. She has never used smokeless tobacco. She reports that she does not drink alcohol and does not use drugs.    Allergies  No Known Allergies    Medications  Prior to Admission Medications   Prescriptions Last Dose Informant Patient Reported? Taking?   CINNAMON PO 1/23/2023 at 0900 Patient Yes Yes   Sig: Take 1 Tablet by mouth 2 times a day.   Cholecalciferol (VITAMIN D) 125 MCG (5000 UT) Cap 1/23/2023 at 0900 Patient Yes No   Sig: Take 5,000 Units by mouth every day.   LEVOXYL 150 MCG Tab 1/23/2023 at 0800 Patient Yes  Yes   Sig: Take 150 mcg by mouth every morning on an empty stomach.   Secukinumab (COSENTYX SENSOREADY PEN) 150 MG/ML Solution Auto-injector ~1/09/2023 at [facility] Patient No No   Sig: Inject 300mg Sub Cut once monthly   Patient taking differently: Inject 300 mg under the skin every 28 days. Inject 300mg Sub Cut once monthly   ascorbic acid (ASCORBIC ACID) 500 MG Tab 1/23/2023 at 0900 Patient No No   Sig: TAKE 1 TABLET BY MOUTH EVERY DAY   Patient taking differently: Take 500 mg by mouth every day.   atorvastatin (LIPITOR) 40 MG Tab 1/22/2023 at PM Patient No No   Sig: TAKE 1 TABLET BY MOUTH AT  BEDTIME   Patient taking differently: Take 40 mg by mouth at bedtime. TAKE 1 TABLET BY MOUTH AT  BEDTIME   bisoprolol (ZEBETA) 10 MG tablet 1/23/2023 at 0900 Patient No No   Sig: Take 1 Tablet by mouth every day.   famotidine (PEPCID) 40 MG Tab 1/22/2023 at PM Patient No No   Sig: Take 1 Tablet by mouth every day.   loratadine (CLARITIN) 10 MG Tab 1/23/2023 at 0900 Patient Yes No   Sig: Take 1 Tablet by mouth 2 times a day.   losartan (COZAAR) 50 MG Tab 1/23/2023 at 0900 Patient No No   Sig: TAKE 1 TABLET BY MOUTH EVERY DAY   Patient taking differently: Take 50 mg by mouth every day. TAKE 1 TABLET BY MOUTH EVERY DAY   rivaroxaban (XARELTO) 15 MG Tab tablet 1/22/2023 at PM Patient No No   Sig: Take 1 Tablet by mouth with dinner.   spironolactone (ALDACTONE) 25 MG Tab 1/23/2023 at 0900 Patient No No   Sig: Take 1 Tablet by mouth every day.      Facility-Administered Medications: None       Physical Exam  Temp:  [36.6 °C (97.8 °F)] 36.6 °C (97.8 °F)  Pulse:  [67-83] 75  Resp:  [14-20] 14  BP: (100-139)/(54-63) 110/55  SpO2:  [93 %-97 %] 95 %  Blood Pressure : 110/55   Temperature: 36.6 °C (97.8 °F)   Pulse: 75   Respiration: 14   Pulse Oximetry: 95 %       Physical Exam  Vitals and nursing note reviewed.   Constitutional:       General: She is not in acute distress.     Appearance: Normal appearance. She is not ill-appearing,  toxic-appearing or diaphoretic.   HENT:      Head: Normocephalic and atraumatic.      Nose: No congestion or rhinorrhea.      Mouth/Throat:      Pharynx: No oropharyngeal exudate or posterior oropharyngeal erythema.   Eyes:      General: No scleral icterus.  Neck:      Vascular: JVD present. No carotid bruit.   Cardiovascular:      Rate and Rhythm: Normal rate and regular rhythm.      Pulses: Normal pulses.      Heart sounds: Normal heart sounds. No murmur heard.    No friction rub. No gallop.   Pulmonary:      Effort: Pulmonary effort is normal. No respiratory distress.      Breath sounds: No stridor. No wheezing, rhonchi or rales.   Abdominal:      General: Abdomen is flat. There is no distension.      Palpations: There is no mass.      Tenderness: There is no abdominal tenderness. There is no left CVA tenderness, guarding or rebound.      Hernia: No hernia is present.   Musculoskeletal:         General: No swelling. Normal range of motion.      Cervical back: No rigidity. No muscular tenderness.      Right lower leg: Edema present.      Left lower leg: Edema present.   Lymphadenopathy:      Cervical: No cervical adenopathy.   Skin:     General: Skin is warm and dry.      Capillary Refill: Capillary refill takes more than 3 seconds.      Coloration: Skin is not jaundiced or pale.      Findings: No bruising or erythema.   Neurological:      Mental Status: She is alert.       Laboratory:  Recent Labs     01/23/23  1144   WBC 8.9   RBC 3.82*   HEMOGLOBIN 10.0*   HEMATOCRIT 32.0*   MCV 83.8   MCH 26.2*   MCHC 31.3*   RDW 45.4   PLATELETCT 319   MPV 11.3     Recent Labs     01/23/23  1144   SODIUM 132*   POTASSIUM 5.1   CHLORIDE 99   CO2 18*   GLUCOSE 136*   BUN 49*   CREATININE 1.57*   CALCIUM 9.4     Recent Labs     01/23/23  1144   ALTSGPT 13   ASTSGOT 17   ALKPHOSPHAT 90   TBILIRUBIN 0.5   GLUCOSE 136*     Recent Labs     01/23/23  1144   APTT 49.7*   INR 1.89*     No results for input(s): NTPROBNP in the last 72  hours.      No results for input(s): TROPONINT in the last 72 hours.    Imaging:  CTA ABDOMEN PELVIS W & W/O POST PROCESS   Final Result      1.  No gastrointestinal bleeding site demonstrated.   2.  Colonic diverticula noted.   3.  No focal mesenteric inflammatory process.             no X-Ray or EKG requiring interpretation    Assessment/Plan:  Justification for Admission Status  I anticipate this patient will require at least two midnights for appropriate medical management, necessitating inpatient admission because GI bleed    Patient will need a Med/Surg bed on MEDICAL service .  The need is secondary to GI bleed.    * GI bleed- (present on admission)  Assessment & Plan  Possibly a diverticular bleed  Continuous cardiac monitoring  Patient is started on IV Protonix  Monitor H&H every 8 hours, transfuse for hemoglobin less than 7  Coagulation studies within normal limits  We will consult GI    Acute blood loss anemia  Assessment & Plan  Monitor hemoglobin every 8 hours and transfuse less than 7    Stage 3b chronic kidney disease (HCC)- (present on admission)  Assessment & Plan  Monitor BMP and assess response  Avoid IV contrast/nephrotoxins/NSAIDs  Dose adjust meds for decreased GFR        Hepatic steatosis- (present on admission)  Assessment & Plan  INR elevated 1.8 and have ordered IV vitamin K  Monitor volume status    Sleep apnea  Assessment & Plan  Nocturnal CPAP ordered    Hypothyroidism- (present on admission)  Assessment & Plan  Continue home levothyroxine    Essential hypertension- (present on admission)  Assessment & Plan  Continue losartan and metoprolol    AF (atrial fibrillation) (HCC)  Assessment & Plan  Rate controlled on bisoprolol  Hold Xarelto until approved by GI    Chronic combined systolic and diastolic congestive heart failure (HCC)- (present on admission)  Assessment & Plan  Compensated  Continue metoprolol and losartan  Hold Lasix and Aldactone in setting of bleeding    Moderate to severe  pulmonary hypertension (HCC)- (present on admission)  Assessment & Plan  Monitor volume status  Holding Lasix and Aldactone        VTE prophylaxis: SCDs/TEDs

## 2023-01-24 NOTE — ASSESSMENT & PLAN NOTE
Possibly a diverticular bleed  Patient is started on IV Protonix  Monitor H&H every 8 hours, transfuse for hemoglobin less than 7  Coagulation studies within normal limits  We will consult GI

## 2023-01-25 ENCOUNTER — PATIENT OUTREACH (OUTPATIENT)
Dept: MEDICAL GROUP | Facility: MEDICAL CENTER | Age: 83
End: 2023-01-25
Payer: MEDICARE

## 2023-01-25 ENCOUNTER — PATIENT MESSAGE (OUTPATIENT)
Dept: MEDICAL GROUP | Facility: MEDICAL CENTER | Age: 83
End: 2023-01-25
Payer: MEDICARE

## 2023-01-25 DIAGNOSIS — E03.9 IDIOPATHIC HYPOTHYROIDISM: ICD-10-CM

## 2023-01-25 RX ORDER — LEVOTHYROXINE SODIUM 150 UG/1
150 TABLET ORAL
Qty: 90 TABLET | Refills: 3 | Status: SHIPPED | OUTPATIENT
Start: 2023-01-25 | End: 2023-09-01

## 2023-01-25 NOTE — PROGRESS NOTES
Meds-to-Beds: Discharge prescription orders listed below delivered to patient's bedside. RN Aby notified. Patient not counseled. Asleep at time of delivery.      Current Outpatient Medications   Medication Sig Dispense Refill    omeprazole (PRILOSEC) 20 MG delayed-release capsule Take 1 Capsule by mouth 2 times a day for 14 days, then take 1 capsule by mouth daily 44 Capsule 0      Lucien Banegas, Pharmacy Intern

## 2023-01-25 NOTE — PATIENT COMMUNICATION
Received request via: Patient    Was the patient seen in the last year in this department? Yes    Does the patient have an active prescription (recently filled or refills available) for medication(s) requested? No    Does the patient have [facility] and need 100 day supply (blood pressure, diabetes and cholesterol meds only)? Patient does not have SCP  Requested Prescriptions     Pending Prescriptions Disp Refills    LEVOXYL 150 MCG Tab 30 Tablet 0     Sig: Take 1 Tablet by mouth every morning on an empty stomach.

## 2023-01-25 NOTE — DISCHARGE INSTRUCTIONS
FOLLOW UP ITEMS POST DISCHARGE  -Resume Xarelto after 24 hours.  A repeat CBC will be checked on 1/26, which is also be sent to your PCP.   -Follow-up with outpatient GI for consideration for capsule endoscopy if you continue to have bleeding.  -Prilosec 20 mg twice daily for the next 2 weeks, then once daily thereafter.  -Follow-up with PCP.  - Seek immediate medical attention, or return to the ED for recurrent or worsening symptoms.

## 2023-01-25 NOTE — PROGRESS NOTES
01/25/2023:    0900: TCM outreach, attempt x1, LVM w/contact information.       01/26/2023:    Primary Care Coordinator RN back in office. Will send over to IWONA Rizo to complete as previously dicussed.

## 2023-01-26 ENCOUNTER — HOSPITAL ENCOUNTER (OUTPATIENT)
Dept: LAB | Facility: MEDICAL CENTER | Age: 83
End: 2023-01-26
Attending: INTERNAL MEDICINE
Payer: MEDICARE

## 2023-01-26 ENCOUNTER — PATIENT OUTREACH (OUTPATIENT)
Dept: MEDICAL GROUP | Facility: MEDICAL CENTER | Age: 83
End: 2023-01-26
Payer: MEDICARE

## 2023-01-26 DIAGNOSIS — K92.2 GASTROINTESTINAL HEMORRHAGE, UNSPECIFIED GASTROINTESTINAL HEMORRHAGE TYPE: ICD-10-CM

## 2023-01-26 DIAGNOSIS — N18.32 STAGE 3B CHRONIC KIDNEY DISEASE: ICD-10-CM

## 2023-01-26 LAB
ERYTHROCYTE [DISTWIDTH] IN BLOOD BY AUTOMATED COUNT: 46.3 FL (ref 35.9–50)
HCT VFR BLD AUTO: 30 % (ref 37–47)
HGB BLD-MCNC: 9.2 G/DL (ref 12–16)
MCH RBC QN AUTO: 26.1 PG (ref 27–33)
MCHC RBC AUTO-ENTMCNC: 30.7 G/DL (ref 33.6–35)
MCV RBC AUTO: 85.2 FL (ref 81.4–97.8)
PLATELET # BLD AUTO: 292 K/UL (ref 164–446)
PMV BLD AUTO: 11.8 FL (ref 9–12.9)
RBC # BLD AUTO: 3.52 M/UL (ref 4.2–5.4)
WBC # BLD AUTO: 8.3 K/UL (ref 4.8–10.8)

## 2023-01-26 PROCEDURE — 36415 COLL VENOUS BLD VENIPUNCTURE: CPT

## 2023-01-26 PROCEDURE — 85027 COMPLETE CBC AUTOMATED: CPT

## 2023-01-26 NOTE — PROGRESS NOTES
Subjective:     Aleshia Crooks is a 83 y.o. female who presents for Hospital Follow-up.    POST DISCHARGE CALL:  Discharge Date:1/23/2023   Date of Outreach Call: 7/20/2017  2:42 PM  Now that you're home, how are you doing? Good  Did you receive any new prescriptions? No  Were you able to fill those medications? Yes  Comment:Pt states she is taking all meds as prescribed.  How did you fill those prescriptions? *  If not able to fill prescriptions, why? *    Do you have questions about your medications? No  Do you have a follow-up appointment scheduled?Yes  Any issues or paperwork you wish to discuss with your PCP? *  Does patient qualify for CCM Program? Yes  If patient qualifies, was CCM Program Introduced? Yes  If patient does not qualify, comment? *  Number of Attempts: 2  Current or previous attempts completed within two business days of discharge? Yes  Provided education regarding treatment plan, medication, self-management, ADLs? Yes  Has patient completed Advance Directive? If yes, advise them to bring to appointment. No  Care Manager phone number provided? Yes  Is there anything else I can help you with? No  Chief Complaint? Bloody Stools, Urinary pain  Admitting Dx? admitted 1/23/2023 with multiple maroon-colored stools that started 1 day prior to admission, with associated lightheadedness, without any nausea, abdominal pain  Discharge Diagnosis? GI Bleed  Additional Comments? *    HPI:   Recently hospitalized for lower GI bleed, unclear source.  She did well off the xarelto for two days.  She is now on prilosec bid, going to once a day after two weeks.  She wants to keep on the anticoagulant to avoid stroke.    She was anemic, this was felt to be blood loss anemia.  Will recheck CBC and iron.  Lab orders placed.    Biopsies of her stomach did not show cancer or infection.  Negative for gastritis.  Colonoscopy was benign other than diverticulae.  No clear source of bleeding found.  Patient feels this was  from straining with constipation.   Now taking stool softeners.      Her blood pressure is lower than it was.      Lives with her daughter.   passes in August.      Current medicines (including reconciliation performed today)  Current Outpatient Medications   Medication Sig Dispense Refill    LEVOXYL 150 MCG Tab Take 1 Tablet by mouth every morning on an empty stomach. 90 Tablet 3    omeprazole (PRILOSEC) 20 MG delayed-release capsule Take 1 Capsule by mouth 2 times a day for 14 days, then take 1 capsule by mouth daily 44 Capsule 0    CINNAMON PO Take 1 Tablet by mouth 2 times a day.      atorvastatin (LIPITOR) 40 MG Tab TAKE 1 TABLET BY MOUTH AT  BEDTIME (Patient taking differently: Take 40 mg by mouth at bedtime. TAKE 1 TABLET BY MOUTH AT  BEDTIME) 100 Tablet 3    rivaroxaban (XARELTO) 15 MG Tab tablet Take 1 Tablet by mouth with dinner. 90 Tablet 3    spironolactone (ALDACTONE) 25 MG Tab Take 1 Tablet by mouth every day. 90 Tablet 3    losartan (COZAAR) 50 MG Tab TAKE 1 TABLET BY MOUTH EVERY DAY (Patient taking differently: Take 50 mg by mouth every day. TAKE 1 TABLET BY MOUTH EVERY DAY) 100 Tablet 3    Secukinumab (COSENTYX SENSOREADY PEN) 150 MG/ML Solution Auto-injector Inject 300mg Sub Cut once monthly (Patient taking differently: Inject 300 mg under the skin every 28 days. Inject 300mg Sub Cut once monthly) 1.96 mL 6    ascorbic acid (ASCORBIC ACID) 500 MG Tab TAKE 1 TABLET BY MOUTH EVERY DAY (Patient taking differently: Take 500 mg by mouth every day.) 90 Tablet 3    bisoprolol (ZEBETA) 10 MG tablet Take 1 Tablet by mouth every day. 90 Tablet 4    Cholecalciferol (VITAMIN D) 125 MCG (5000 UT) Cap Take 5,000 Units by mouth every day. 30 Capsule 11    loratadine (CLARITIN) 10 MG Tab Take 1 Tablet by mouth 2 times a day. 60 tablet 5     No current facility-administered medications for this visit.       Allergies:   Patient has no known allergies.    Social History     Tobacco Use    Smoking status:  "Former     Packs/day: 1.00     Years: 31.00     Pack years: 31.00     Types: Cigarettes     Quit date:      Years since quittin.1    Smokeless tobacco: Never   Vaping Use    Vaping Use: Never used   Substance Use Topics    Alcohol use: No    Drug use: No       ROS:  Denies chest pain or shortness of breath.  Denies chest pressure or palpitations.  Denies any further blood visible in the stool or change in stool pattern.    Objective:     Vitals:    23 1506   BP: 106/68   BP Location: Right arm   Patient Position: Sitting   BP Cuff Size: Large adult   Pulse: 80   Resp: 14   Temp: 36.3 °C (97.3 °F)   TempSrc: Temporal   SpO2: 99%   Weight: 96 kg (211 lb 9.6 oz)   Height: 1.6 m (5' 3\")     Body mass index is 37.48 kg/m².    Physical Exam:  Vital signs reviewed, very favorable.  Good oxygenation.  Patient, daughter, physician and staff all wearing masks.  Alert, no distress.  Lucid and fluent.  Lungs clear to auscultation A&P  Abdomen soft benign    Lab results from the hospital reviewed and discussed in detail with her and her daughter.  Imaging reviewed.  Colonoscopy results reviewed and discussed as well.  I do not know that the explanation of simply straining at stool is enough to explain her blood loss.  I discussed that with them.    Assessment and Plan:     1. Acute blood loss anemia/History of lower GI bleeding  Patient had a lower GI bleed with maroon-colored stools.  Discussed this with the patient and her daughter.  I do not feel that straining at stool truly explains the maroon stools or the degree of anemia.  Have asked her to complete follow-up lab orders to see if the anemia is improving.  She had both upper and lower endoscopy with no definite source of bleeding.  She has not had a camera.  Patient has decided to stay on the blood thinner even though that is probably a cofactor.  Her   from an atrial fibrillation associated stroke last August and she does not want to do the " same.  Denies any further blood in the stool.  Denies maroon-black or pale stool.  Denies any further change in bowel pattern or bowel discomfort.  - CBC WITH DIFFERENTIAL; Future  - IRON/TOTAL IRON BIND; Future    2. History of iron deficiency anemia  Patient does have past history of iron deficiency anemia.  Follow-up orders discussed and placed.  Possibility of AVMs or something similar discussed.  She does not want to swallow the camera at this time.  - CBC WITH DIFFERENTIAL; Future  - IRON/TOTAL IRON BIND; Future    3. Episodic lightheadedness  She is feeling occasionally lightheaded.  Her blood pressures have been low.  This could certainly be due to her anemia.  We will do the lab tests before deciding.  However I have asked her to discuss with cardiology whether her bisoprolol is still necessary.    5. Stage 3b chronic kidney disease (HCC)  Patient continues to have moderate stable chronic kidney disease.    6. 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  This continues to be a stable issue.    7. Obesity, Class II, BMI 35-39.9  Discussed obesity.  She is working on dietary changes and improved fiber in the diet.  - Patient identified as having weight management issue.  Appropriate orders and counseling given.      - Chart and discharge summary were reviewed.   - Hospitalization and results reviewed with patient.   - Medications reviewed including instructions regarding high risk medications, dosing and side effects.  - Recommended Services: No services needed at this time  - Advance directive/POLST on file?  No     Follow-up:Return in about 3 months (around 5/17/2023), or if symptoms worsen or fail to improve.    Face-to-face transitional care management services with HIGH (today's visit is within days post discharge & LACE+ score 59+) medical decision complexity were provided.     LACE+  Historical Score Over Time (0-28: Low, 29-58: Medium, 59+: High): 73

## 2023-01-26 NOTE — PROGRESS NOTES
"Attempt #2 to contact [facility] patient for TCM call from her hospital discharge on 1/23/2023 for GI Bleeding. Spoke to patient who states she is \"doing better\". Patient is getting her follow-up labs done today. Patient was prescribed no new medications but is to take her Prilosec 2x daily for the next two weeks. Patient states she has no other questions about her discharge instructions. Patient states they didn't find anything on her colonoscopy. Patient thinks constipation was part of her problem. This RN scheduled patient her [facility] hospital follow-up visit with PCP for 1/31/2023 @ 3:20pm. Patient has no other immediate needs or concerns to address at this time and has this RN's number if she needs any further assistance.   "

## 2023-01-31 ENCOUNTER — OFFICE VISIT (OUTPATIENT)
Dept: MEDICAL GROUP | Facility: MEDICAL CENTER | Age: 83
End: 2023-01-31
Payer: MEDICARE

## 2023-01-31 VITALS
BODY MASS INDEX: 37.49 KG/M2 | HEIGHT: 63 IN | SYSTOLIC BLOOD PRESSURE: 106 MMHG | OXYGEN SATURATION: 99 % | TEMPERATURE: 97.3 F | RESPIRATION RATE: 14 BRPM | HEART RATE: 80 BPM | WEIGHT: 211.6 LBS | DIASTOLIC BLOOD PRESSURE: 68 MMHG

## 2023-01-31 DIAGNOSIS — D62 ACUTE BLOOD LOSS ANEMIA: ICD-10-CM

## 2023-01-31 DIAGNOSIS — K74.60 LIVER CIRRHOSIS SECONDARY TO NASH (NONALCOHOLIC STEATOHEPATITIS) (HCC): ICD-10-CM

## 2023-01-31 DIAGNOSIS — R42 EPISODIC LIGHTHEADEDNESS: ICD-10-CM

## 2023-01-31 DIAGNOSIS — K75.81 LIVER CIRRHOSIS SECONDARY TO NASH (NONALCOHOLIC STEATOHEPATITIS) (HCC): ICD-10-CM

## 2023-01-31 DIAGNOSIS — Z87.19 HISTORY OF LOWER GI BLEEDING: ICD-10-CM

## 2023-01-31 DIAGNOSIS — N18.32 STAGE 3B CHRONIC KIDNEY DISEASE: ICD-10-CM

## 2023-01-31 DIAGNOSIS — E66.9 OBESITY, CLASS II, BMI 35-39.9: ICD-10-CM

## 2023-01-31 DIAGNOSIS — Z86.2 HISTORY OF IRON DEFICIENCY ANEMIA: ICD-10-CM

## 2023-01-31 PROBLEM — R79.89 ABNORMAL CBC: Status: RESOLVED | Noted: 2020-06-08 | Resolved: 2023-01-31

## 2023-01-31 PROBLEM — L30.9 DERMATITIS: Status: RESOLVED | Noted: 2021-03-01 | Resolved: 2023-01-31

## 2023-01-31 PROBLEM — Z63.6 CAREGIVER STRESS: Status: RESOLVED | Noted: 2021-10-27 | Resolved: 2023-01-31

## 2023-01-31 PROBLEM — K92.2 GI BLEED: Status: RESOLVED | Noted: 2023-01-23 | Resolved: 2023-01-31

## 2023-01-31 PROCEDURE — 99214 OFFICE O/P EST MOD 30 MIN: CPT | Performed by: FAMILY MEDICINE

## 2023-01-31 ASSESSMENT — FIBROSIS 4 INDEX: FIB4 SCORE: 1.18

## 2023-02-01 ENCOUNTER — HOSPITAL ENCOUNTER (OUTPATIENT)
Dept: LAB | Facility: MEDICAL CENTER | Age: 83
End: 2023-02-01
Attending: FAMILY MEDICINE
Payer: MEDICARE

## 2023-02-01 DIAGNOSIS — Z86.2 HISTORY OF IRON DEFICIENCY ANEMIA: ICD-10-CM

## 2023-02-01 DIAGNOSIS — D62 ACUTE BLOOD LOSS ANEMIA: ICD-10-CM

## 2023-02-01 DIAGNOSIS — Z87.19 HISTORY OF LOWER GI BLEEDING: ICD-10-CM

## 2023-02-01 LAB
BASOPHILS # BLD AUTO: 0.8 % (ref 0–1.8)
BASOPHILS # BLD: 0.07 K/UL (ref 0–0.12)
EOSINOPHIL # BLD AUTO: 0.3 K/UL (ref 0–0.51)
EOSINOPHIL NFR BLD: 3.4 % (ref 0–6.9)
ERYTHROCYTE [DISTWIDTH] IN BLOOD BY AUTOMATED COUNT: 46 FL (ref 35.9–50)
HCT VFR BLD AUTO: 30.9 % (ref 37–47)
HGB BLD-MCNC: 9.3 G/DL (ref 12–16)
IMM GRANULOCYTES # BLD AUTO: 0.05 K/UL (ref 0–0.11)
IMM GRANULOCYTES NFR BLD AUTO: 0.6 % (ref 0–0.9)
IRON SATN MFR SERPL: 8 % (ref 15–55)
IRON SERPL-MCNC: 33 UG/DL (ref 40–170)
LYMPHOCYTES # BLD AUTO: 0.92 K/UL (ref 1–4.8)
LYMPHOCYTES NFR BLD: 10.3 % (ref 22–41)
MCH RBC QN AUTO: 25.5 PG (ref 27–33)
MCHC RBC AUTO-ENTMCNC: 30.1 G/DL (ref 33.6–35)
MCV RBC AUTO: 84.9 FL (ref 81.4–97.8)
MONOCYTES # BLD AUTO: 0.76 K/UL (ref 0–0.85)
MONOCYTES NFR BLD AUTO: 8.5 % (ref 0–13.4)
NEUTROPHILS # BLD AUTO: 6.85 K/UL (ref 2–7.15)
NEUTROPHILS NFR BLD: 76.4 % (ref 44–72)
NRBC # BLD AUTO: 0 K/UL
NRBC BLD-RTO: 0 /100 WBC
PLATELET # BLD AUTO: 379 K/UL (ref 164–446)
PMV BLD AUTO: 11.6 FL (ref 9–12.9)
RBC # BLD AUTO: 3.64 M/UL (ref 4.2–5.4)
TIBC SERPL-MCNC: 413 UG/DL (ref 250–450)
UIBC SERPL-MCNC: 380 UG/DL (ref 110–370)
WBC # BLD AUTO: 9 K/UL (ref 4.8–10.8)

## 2023-02-01 PROCEDURE — 83540 ASSAY OF IRON: CPT

## 2023-02-01 PROCEDURE — 83550 IRON BINDING TEST: CPT

## 2023-02-01 PROCEDURE — 85025 COMPLETE CBC W/AUTO DIFF WBC: CPT

## 2023-02-01 PROCEDURE — 36415 COLL VENOUS BLD VENIPUNCTURE: CPT

## 2023-02-03 ENCOUNTER — HOSPITAL ENCOUNTER (OUTPATIENT)
Facility: MEDICAL CENTER | Age: 83
End: 2023-02-03
Attending: NURSE PRACTITIONER
Payer: MEDICARE

## 2023-02-03 ENCOUNTER — OFFICE VISIT (OUTPATIENT)
Dept: URGENT CARE | Facility: CLINIC | Age: 83
End: 2023-02-03
Payer: MEDICARE

## 2023-02-03 VITALS
TEMPERATURE: 97.9 F | BODY MASS INDEX: 37.56 KG/M2 | RESPIRATION RATE: 16 BRPM | HEART RATE: 118 BPM | DIASTOLIC BLOOD PRESSURE: 62 MMHG | OXYGEN SATURATION: 98 % | HEIGHT: 63 IN | WEIGHT: 212 LBS | SYSTOLIC BLOOD PRESSURE: 100 MMHG

## 2023-02-03 DIAGNOSIS — N30.01 ACUTE CYSTITIS WITH HEMATURIA: ICD-10-CM

## 2023-02-03 PROCEDURE — 87086 URINE CULTURE/COLONY COUNT: CPT

## 2023-02-03 PROCEDURE — 87186 SC STD MICRODIL/AGAR DIL: CPT

## 2023-02-03 PROCEDURE — 87077 CULTURE AEROBIC IDENTIFY: CPT

## 2023-02-03 PROCEDURE — 99213 OFFICE O/P EST LOW 20 MIN: CPT | Performed by: NURSE PRACTITIONER

## 2023-02-03 RX ORDER — CEFDINIR 300 MG/1
300 CAPSULE ORAL 2 TIMES DAILY
Qty: 14 CAPSULE | Refills: 0 | Status: SHIPPED | OUTPATIENT
Start: 2023-02-03 | End: 2023-02-10

## 2023-02-03 ASSESSMENT — ENCOUNTER SYMPTOMS
FLANK PAIN: 0
VOMITING: 0
ABDOMINAL PAIN: 0
NAUSEA: 0
FEVER: 0
BACK PAIN: 1

## 2023-02-03 ASSESSMENT — FIBROSIS 4 INDEX: FIB4 SCORE: 0.91

## 2023-02-03 NOTE — PROGRESS NOTES
Subjective:     Aleshia Crooks is a 83 y.o. female who presents for Unable to Urinate (X 1 MONTH )      No urinary incontinence. No vaginal symptoms.     UTI  This is a new problem. The current episode started 1 to 4 weeks ago. Associated symptoms include urinary symptoms. Pertinent negatives include no abdominal pain, fever, nausea or vomiting. Nothing aggravates the symptoms.     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) 06/08/2020 2015    Moderate tricuspid regurgitation 11/16/2022    Thyroid disease        Past Surgical History:   Procedure Laterality Date    AZ COLONOSCOPY,DIAGNOSTIC N/A 1/24/2023    Procedure: COLONOSCOPY;  Surgeon: Amy Zarate M.D.;  Location: SURGERY SAME DAY [facility];  Service: Gastroenterology    AZ UPPER GI ENDOSCOPY,DIAGNOSIS N/A 1/24/2023    Procedure: GASTROSCOPY;  Surgeon: Amy Zarate M.D.;  Location: SURGERY SAME DAY [facility];  Service: Gastroenterology    AZ UPPER GI ENDOSCOPY,BIOPSY N/A 1/24/2023    Procedure: GASTROSCOPY, WITH BIOPSY;  Surgeon: Amy Zarate M.D.;  Location: SURGERY SAME DAY [facility];  Service: Gastroenterology    CHOLECYSTECTOMY      EYE SURGERY Bilateral     cataracts    MULTIPLE CORONARY ARTERY BYPASS      1 bypass    THYROIDECTOMY TOTAL         Social History     Socioeconomic History    Marital status:      Spouse name: Not on file    Number of children: Not on file    Years of education: Not on file    Highest education level: Not on file   Occupational History     Comment: [employer]   Tobacco Use    Smoking status: Former     Packs/day: 1.00     Years: 31.00     Pack years: 31.00     Types: Cigarettes     Quit date: 1992     Years since quitting:  31.1    Smokeless tobacco: Never   Vaping Use    Vaping Use: Never used   Substance and Sexual Activity    Alcohol use: No    Drug use: No    Sexual activity: Not Currently     Partners: Male   Other Topics Concern    Not on file   Social History Narrative    Not on file     Social Determinants of Health     Financial Resource Strain: Low Risk     Difficulty of Paying Living Expenses: Not hard at all   Food Insecurity: No Food Insecurity    Worried About Running Out of Food in the Last Year: Never true    Ran Out of Food in the Last Year: Never true   Transportation Needs: No Transportation Needs    Lack of Transportation (Medical): No    Lack of Transportation (Non-Medical): No   Physical Activity: Inactive    Days of Exercise per Week: 0 days    Minutes of Exercise per Session: 0 min   Stress: No Stress Concern Present    Feeling of Stress : Not at all   Social Connections: Socially Isolated    Frequency of Communication with Friends and Family: More than three times a week    Frequency of Social Gatherings with Friends and Family: Once a week    Attends [employer] Services: Never    Active Member of Clubs or Organizations: No    Attends Club or Organization Meetings: Never    Marital Status:    Intimate Partner Violence: Not At Risk    Fear of Current or Ex-Partner: No    Emotionally Abused: No    Physically Abused: No    Sexually Abused: No   Housing Stability: Low Risk     Unable to Pay for Housing in the Last Year: No    Number of Places Lived in the Last Year: 1    Unstable Housing in the Last Year: No        Family History   Problem Relation Age of Onset    Cancer Mother         cancer, smoker    Stroke Maternal Grandmother     Other Other         Crohn    Kidney Disease Other         kidney transplant        No Known Allergies    Review of Systems   Constitutional:  Negative for fever.   Gastrointestinal:  Negative for abdominal pain, nausea and vomiting.   Genitourinary:  Positive for dysuria and  "urgency. Negative for flank pain and hematuria.   Musculoskeletal:  Positive for back pain.   All other systems reviewed and are negative.     Objective:   /62   Pulse (!) 118   Temp 36.6 °C (97.9 °F)   Resp 16   Ht 1.6 m (5' 3\")   Wt 96.2 kg (212 lb)   LMP  (LMP Unknown)   SpO2 98%   BMI 37.55 kg/m²     Physical Exam  Vitals reviewed.   Constitutional:       General: She is not in acute distress.  Pulmonary:      Effort: Pulmonary effort is normal. No respiratory distress.   Abdominal:      General: There is no distension.      Palpations: Abdomen is soft.      Tenderness: There is no abdominal tenderness. There is no right CVA tenderness or left CVA tenderness.   Skin:     General: Skin is warm and dry.   Neurological:      General: No focal deficit present.      Mental Status: She is alert and oriented to person, place, and time.       Assessment/Plan:   1. Acute cystitis with hematuria  - POCT Urinalysis  - URINE CULTURE(NEW); Future  - cefdinir (OMNICEF) 300 MG Cap; Take 1 Capsule by mouth 2 times a day for 7 days.  Dispense: 14 Capsule; Refill: 0  -Oral Hydration: Drink plenty of water.  -Take antibiotic as prescribed.  -Follow up with PCP.    Follow up urgently for new or persistent abdominal pain, flank pain, difficulty with urination, fevers, vomiting, weakness, tachycardia, or any other concerns.    -Stable vitals. Afebrile. No CVA or abdominal tenderness to palpation. Review of labs: creatine 1.69, GFR 30. Calculated creatine clearance greater than 30, no medication adjustment neccessary. Advised PCP f/u upon completion of antibiotics to reevaluate hematuria.     Differential diagnosis, natural history, supportive care, and indications for immediate follow-up discussed.  "

## 2023-02-03 NOTE — PATIENT INSTRUCTIONS
-Oral Hydration: Drink plenty of water.  -Take antibiotic as prescribed.  -Follow up with PCP.    Follow up urgently for new or persistent abdominal pain, flank pain, difficulty with urination, fevers, vomiting, weakness, tachycardia, or any other concerns.

## 2023-02-06 ENCOUNTER — PATIENT OUTREACH (OUTPATIENT)
Dept: HEALTH INFORMATION MANAGEMENT | Facility: OTHER | Age: 83
End: 2023-02-06
Payer: MEDICARE

## 2023-02-06 DIAGNOSIS — I10 ESSENTIAL HYPERTENSION: ICD-10-CM

## 2023-02-06 DIAGNOSIS — D50.0 IRON DEFICIENCY ANEMIA DUE TO CHRONIC BLOOD LOSS: ICD-10-CM

## 2023-02-06 LAB
BACTERIA UR CULT: ABNORMAL
BACTERIA UR CULT: ABNORMAL
SIGNIFICANT IND 70042: ABNORMAL
SITE SITE: ABNORMAL
SOURCE SOURCE: ABNORMAL

## 2023-02-06 RX ORDER — ASCORBIC ACID 500 MG
500 TABLET ORAL DAILY
Qty: 90 TABLET | Refills: 3 | Status: SHIPPED | OUTPATIENT
Start: 2023-02-06 | End: 2023-04-10

## 2023-02-06 RX ORDER — FERROUS SULFATE 325(65) MG
325 TABLET ORAL DAILY
Qty: 90 TABLET | Refills: 2 | Status: SHIPPED | OUTPATIENT
Start: 2023-02-06 | End: 2023-04-10

## 2023-02-06 NOTE — PROGRESS NOTES
PEYTON Curiel called the pt to do the monthly follow up or Darrius the RN who is no longer with up. This CHW let the pt know that another RN (Claudia) will take over March 1st and if the pt needs anything between now and then to please reach out. Pt stated they would be good until then.

## 2023-02-07 ENCOUNTER — TELEPHONE (OUTPATIENT)
Dept: URGENT CARE | Facility: CLINIC | Age: 83
End: 2023-02-07

## 2023-02-11 ENCOUNTER — TELEPHONE (OUTPATIENT)
Dept: URGENT CARE | Facility: CLINIC | Age: 83
End: 2023-02-11
Payer: MEDICARE

## 2023-02-11 DIAGNOSIS — N30.01 ACUTE CYSTITIS WITH HEMATURIA: ICD-10-CM

## 2023-02-11 RX ORDER — AMOXICILLIN 500 MG/1
500 CAPSULE ORAL 3 TIMES DAILY
Qty: 15 CAPSULE | Refills: 0 | Status: ON HOLD | OUTPATIENT
Start: 2023-02-11 | End: 2023-02-19

## 2023-02-16 ENCOUNTER — HOSPITAL ENCOUNTER (INPATIENT)
Facility: MEDICAL CENTER | Age: 83
LOS: 3 days | DRG: 291 | End: 2023-02-19
Attending: EMERGENCY MEDICINE | Admitting: INTERNAL MEDICINE
Payer: MEDICARE

## 2023-02-16 ENCOUNTER — APPOINTMENT (OUTPATIENT)
Dept: CARDIOLOGY | Facility: MEDICAL CENTER | Age: 83
DRG: 291 | End: 2023-02-16
Attending: INTERNAL MEDICINE
Payer: MEDICARE

## 2023-02-16 ENCOUNTER — APPOINTMENT (OUTPATIENT)
Dept: RADIOLOGY | Facility: MEDICAL CENTER | Age: 83
DRG: 291 | End: 2023-02-16
Attending: EMERGENCY MEDICINE
Payer: MEDICARE

## 2023-02-16 DIAGNOSIS — R79.89 ELEVATED TROPONIN: ICD-10-CM

## 2023-02-16 DIAGNOSIS — I34.0 SEVERE MITRAL REGURGITATION: ICD-10-CM

## 2023-02-16 DIAGNOSIS — I50.43 ACUTE ON CHRONIC COMBINED SYSTOLIC AND DIASTOLIC CONGESTIVE HEART FAILURE (HCC): ICD-10-CM

## 2023-02-16 DIAGNOSIS — I07.1 SEVERE TRICUSPID REGURGITATION BY PRIOR ECHOCARDIOGRAM: ICD-10-CM

## 2023-02-16 DIAGNOSIS — I27.20 MODERATE TO SEVERE PULMONARY HYPERTENSION (HCC): ICD-10-CM

## 2023-02-16 DIAGNOSIS — I50.9 ACUTE ON CHRONIC CONGESTIVE HEART FAILURE, UNSPECIFIED HEART FAILURE TYPE (HCC): ICD-10-CM

## 2023-02-16 DIAGNOSIS — D64.9 ANEMIA, UNSPECIFIED TYPE: ICD-10-CM

## 2023-02-16 DIAGNOSIS — N17.9 ACUTE KIDNEY INJURY (HCC): ICD-10-CM

## 2023-02-16 LAB
ALBUMIN SERPL BCP-MCNC: 4 G/DL (ref 3.2–4.9)
ALBUMIN/GLOB SERPL: 1.3 G/DL
ALP SERPL-CCNC: 76 U/L (ref 30–99)
ALT SERPL-CCNC: 9 U/L (ref 2–50)
ANION GAP SERPL CALC-SCNC: 15 MMOL/L (ref 7–16)
APPEARANCE UR: CLEAR
APTT PPP: 48.4 SEC (ref 24.7–36)
AST SERPL-CCNC: 14 U/L (ref 12–45)
BASOPHILS # BLD AUTO: 0.7 % (ref 0–1.8)
BASOPHILS # BLD: 0.06 K/UL (ref 0–0.12)
BILIRUB SERPL-MCNC: 0.5 MG/DL (ref 0.1–1.5)
BILIRUB UR QL STRIP.AUTO: NEGATIVE
BUN SERPL-MCNC: 22 MG/DL (ref 8–22)
CALCIUM ALBUM COR SERPL-MCNC: 9.3 MG/DL (ref 8.5–10.5)
CALCIUM SERPL-MCNC: 9.3 MG/DL (ref 8.5–10.5)
CHLORIDE SERPL-SCNC: 98 MMOL/L (ref 96–112)
CO2 SERPL-SCNC: 23 MMOL/L (ref 20–33)
COLOR UR: YELLOW
CREAT SERPL-MCNC: 1.97 MG/DL (ref 0.5–1.4)
EKG IMPRESSION: NORMAL
EOSINOPHIL # BLD AUTO: 0.28 K/UL (ref 0–0.51)
EOSINOPHIL NFR BLD: 3.3 % (ref 0–6.9)
ERYTHROCYTE [DISTWIDTH] IN BLOOD BY AUTOMATED COUNT: 59.8 FL (ref 35.9–50)
FLUAV RNA SPEC QL NAA+PROBE: NEGATIVE
FLUBV RNA SPEC QL NAA+PROBE: NEGATIVE
GFR SERPLBLD CREATININE-BSD FMLA CKD-EPI: 25 ML/MIN/1.73 M 2
GLOBULIN SER CALC-MCNC: 3 G/DL (ref 1.9–3.5)
GLUCOSE SERPL-MCNC: 113 MG/DL (ref 65–99)
GLUCOSE UR STRIP.AUTO-MCNC: NEGATIVE MG/DL
HCT VFR BLD AUTO: 30 % (ref 37–47)
HGB BLD-MCNC: 9.1 G/DL (ref 12–16)
IMM GRANULOCYTES # BLD AUTO: 0.04 K/UL (ref 0–0.11)
IMM GRANULOCYTES NFR BLD AUTO: 0.5 % (ref 0–0.9)
INR PPP: 2.24 (ref 0.87–1.13)
KETONES UR STRIP.AUTO-MCNC: NEGATIVE MG/DL
LACTATE SERPL-SCNC: 1.1 MMOL/L (ref 0.5–2)
LEUKOCYTE ESTERASE UR QL STRIP.AUTO: NEGATIVE
LV EJECT FRACT  99904: 55
LV EJECT FRACT MOD 2C 99903: 47.26
LV EJECT FRACT MOD 4C 99902: 49.45
LV EJECT FRACT MOD BP 99901: 45.27
LYMPHOCYTES # BLD AUTO: 0.64 K/UL (ref 1–4.8)
LYMPHOCYTES NFR BLD: 7.6 % (ref 22–41)
MCH RBC QN AUTO: 26.5 PG (ref 27–33)
MCHC RBC AUTO-ENTMCNC: 30.3 G/DL (ref 33.6–35)
MCV RBC AUTO: 87.2 FL (ref 81.4–97.8)
MICRO URNS: NORMAL
MONOCYTES # BLD AUTO: 0.57 K/UL (ref 0–0.85)
MONOCYTES NFR BLD AUTO: 6.8 % (ref 0–13.4)
NEUTROPHILS # BLD AUTO: 6.78 K/UL (ref 2–7.15)
NEUTROPHILS NFR BLD: 81.1 % (ref 44–72)
NITRITE UR QL STRIP.AUTO: NEGATIVE
NRBC # BLD AUTO: 0 K/UL
NRBC BLD-RTO: 0 /100 WBC
NT-PROBNP SERPL IA-MCNC: 4597 PG/ML (ref 0–125)
PH UR STRIP.AUTO: 5.5 [PH] (ref 5–8)
PLATELET # BLD AUTO: 326 K/UL (ref 164–446)
PMV BLD AUTO: 10.9 FL (ref 9–12.9)
POTASSIUM SERPL-SCNC: 4.7 MMOL/L (ref 3.6–5.5)
PROCALCITONIN SERPL-MCNC: 0.07 NG/ML
PROT SERPL-MCNC: 7 G/DL (ref 6–8.2)
PROT UR QL STRIP: NEGATIVE MG/DL
PROTHROMBIN TIME: 24.2 SEC (ref 12–14.6)
RBC # BLD AUTO: 3.44 M/UL (ref 4.2–5.4)
RBC UR QL AUTO: NEGATIVE
RSV RNA SPEC QL NAA+PROBE: NEGATIVE
SARS-COV-2 RNA RESP QL NAA+PROBE: NOTDETECTED
SODIUM SERPL-SCNC: 136 MMOL/L (ref 135–145)
SP GR UR STRIP.AUTO: 1.01
SPECIMEN SOURCE: NORMAL
TROPONIN T SERPL-MCNC: 39 NG/L (ref 6–19)
TROPONIN T SERPL-MCNC: 44 NG/L (ref 6–19)
UROBILINOGEN UR STRIP.AUTO-MCNC: 0.2 MG/DL
WBC # BLD AUTO: 8.4 K/UL (ref 4.8–10.8)

## 2023-02-16 PROCEDURE — 81003 URINALYSIS AUTO W/O SCOPE: CPT

## 2023-02-16 PROCEDURE — 36415 COLL VENOUS BLD VENIPUNCTURE: CPT

## 2023-02-16 PROCEDURE — 85610 PROTHROMBIN TIME: CPT

## 2023-02-16 PROCEDURE — 71045 X-RAY EXAM CHEST 1 VIEW: CPT

## 2023-02-16 PROCEDURE — 700111 HCHG RX REV CODE 636 W/ 250 OVERRIDE (IP): Performed by: EMERGENCY MEDICINE

## 2023-02-16 PROCEDURE — 93306 TTE W/DOPPLER COMPLETE: CPT | Mod: 26 | Performed by: INTERNAL MEDICINE

## 2023-02-16 PROCEDURE — 93306 TTE W/DOPPLER COMPLETE: CPT

## 2023-02-16 PROCEDURE — 83880 ASSAY OF NATRIURETIC PEPTIDE: CPT

## 2023-02-16 PROCEDURE — 85025 COMPLETE CBC W/AUTO DIFF WBC: CPT

## 2023-02-16 PROCEDURE — 85730 THROMBOPLASTIN TIME PARTIAL: CPT

## 2023-02-16 PROCEDURE — 770020 HCHG ROOM/CARE - TELE (206)

## 2023-02-16 PROCEDURE — 700102 HCHG RX REV CODE 250 W/ 637 OVERRIDE(OP): Performed by: INTERNAL MEDICINE

## 2023-02-16 PROCEDURE — C9803 HOPD COVID-19 SPEC COLLECT: HCPCS | Performed by: EMERGENCY MEDICINE

## 2023-02-16 PROCEDURE — 99285 EMERGENCY DEPT VISIT HI MDM: CPT

## 2023-02-16 PROCEDURE — 96374 THER/PROPH/DIAG INJ IV PUSH: CPT

## 2023-02-16 PROCEDURE — 80053 COMPREHEN METABOLIC PANEL: CPT

## 2023-02-16 PROCEDURE — 99222 1ST HOSP IP/OBS MODERATE 55: CPT | Performed by: INTERNAL MEDICINE

## 2023-02-16 PROCEDURE — 93005 ELECTROCARDIOGRAM TRACING: CPT | Performed by: EMERGENCY MEDICINE

## 2023-02-16 PROCEDURE — 84145 PROCALCITONIN (PCT): CPT

## 2023-02-16 PROCEDURE — 99223 1ST HOSP IP/OBS HIGH 75: CPT | Mod: AI | Performed by: INTERNAL MEDICINE

## 2023-02-16 PROCEDURE — A9270 NON-COVERED ITEM OR SERVICE: HCPCS | Performed by: INTERNAL MEDICINE

## 2023-02-16 PROCEDURE — 0241U HCHG SARS-COV-2 COVID-19 NFCT DS RESP RNA 4 TRGT MIC: CPT

## 2023-02-16 PROCEDURE — 84484 ASSAY OF TROPONIN QUANT: CPT

## 2023-02-16 PROCEDURE — 83605 ASSAY OF LACTIC ACID: CPT

## 2023-02-16 RX ORDER — SPIRONOLACTONE 25 MG/1
25 TABLET ORAL
Status: DISCONTINUED | OUTPATIENT
Start: 2023-02-16 | End: 2023-02-19 | Stop reason: HOSPADM

## 2023-02-16 RX ORDER — BISACODYL 10 MG
10 SUPPOSITORY, RECTAL RECTAL
Status: DISCONTINUED | OUTPATIENT
Start: 2023-02-16 | End: 2023-02-18

## 2023-02-16 RX ORDER — LOSARTAN POTASSIUM 50 MG/1
50 TABLET ORAL
Status: DISCONTINUED | OUTPATIENT
Start: 2023-02-17 | End: 2023-02-19 | Stop reason: HOSPADM

## 2023-02-16 RX ORDER — FUROSEMIDE 10 MG/ML
40 INJECTION INTRAMUSCULAR; INTRAVENOUS ONCE
Status: COMPLETED | OUTPATIENT
Start: 2023-02-16 | End: 2023-02-16

## 2023-02-16 RX ORDER — ATORVASTATIN CALCIUM 40 MG/1
40 TABLET, FILM COATED ORAL EVERY EVENING
Status: DISCONTINUED | OUTPATIENT
Start: 2023-02-16 | End: 2023-02-19 | Stop reason: HOSPADM

## 2023-02-16 RX ORDER — ONDANSETRON 4 MG/1
4 TABLET, ORALLY DISINTEGRATING ORAL EVERY 4 HOURS PRN
Status: DISCONTINUED | OUTPATIENT
Start: 2023-02-16 | End: 2023-02-19 | Stop reason: HOSPADM

## 2023-02-16 RX ORDER — FUROSEMIDE 10 MG/ML
40 INJECTION INTRAMUSCULAR; INTRAVENOUS
Status: DISCONTINUED | OUTPATIENT
Start: 2023-02-17 | End: 2023-02-18

## 2023-02-16 RX ORDER — ONDANSETRON 2 MG/ML
4 INJECTION INTRAMUSCULAR; INTRAVENOUS EVERY 4 HOURS PRN
Status: DISCONTINUED | OUTPATIENT
Start: 2023-02-16 | End: 2023-02-19 | Stop reason: HOSPADM

## 2023-02-16 RX ORDER — LEVOTHYROXINE SODIUM 0.07 MG/1
150 TABLET ORAL
Status: DISCONTINUED | OUTPATIENT
Start: 2023-02-17 | End: 2023-02-19 | Stop reason: HOSPADM

## 2023-02-16 RX ORDER — POLYETHYLENE GLYCOL 3350 17 G/17G
1 POWDER, FOR SOLUTION ORAL
Status: DISCONTINUED | OUTPATIENT
Start: 2023-02-16 | End: 2023-02-18

## 2023-02-16 RX ORDER — ACETAMINOPHEN 325 MG/1
650 TABLET ORAL EVERY 6 HOURS PRN
Status: DISCONTINUED | OUTPATIENT
Start: 2023-02-16 | End: 2023-02-19 | Stop reason: HOSPADM

## 2023-02-16 RX ORDER — BISOPROLOL FUMARATE 5 MG/1
10 TABLET, FILM COATED ORAL DAILY
Status: DISCONTINUED | OUTPATIENT
Start: 2023-02-17 | End: 2023-02-19 | Stop reason: HOSPADM

## 2023-02-16 RX ORDER — AMOXICILLIN 250 MG
2 CAPSULE ORAL 2 TIMES DAILY
Status: DISCONTINUED | OUTPATIENT
Start: 2023-02-16 | End: 2023-02-18

## 2023-02-16 RX ADMIN — SENNOSIDES AND DOCUSATE SODIUM 2 TABLET: 50; 8.6 TABLET ORAL at 18:07

## 2023-02-16 RX ADMIN — RIVAROXABAN 15 MG: 15 TABLET, FILM COATED ORAL at 18:18

## 2023-02-16 RX ADMIN — ATORVASTATIN CALCIUM 40 MG: 40 TABLET, FILM COATED ORAL at 18:08

## 2023-02-16 RX ADMIN — FUROSEMIDE 40 MG: 10 INJECTION, SOLUTION INTRAMUSCULAR; INTRAVENOUS at 16:39

## 2023-02-16 ASSESSMENT — CHA2DS2 SCORE
HYPERTENSION: YES
CHA2DS2 VASC SCORE: 5
DIABETES: NO
PRIOR STROKE OR TIA OR THROMBOEMBOLISM: NO
AGE 75 OR GREATER: YES
VASCULAR DISEASE: NO
SEX: FEMALE
CHF OR LEFT VENTRICULAR DYSFUNCTION: YES
AGE 65 TO 74: NO

## 2023-02-16 ASSESSMENT — ENCOUNTER SYMPTOMS
FEVER: 0
INSOMNIA: 0
ORTHOPNEA: 1
WHEEZING: 0
NERVOUS/ANXIOUS: 0
PALPITATIONS: 0
CHILLS: 0
DIZZINESS: 0
ABDOMINAL PAIN: 0
VOMITING: 0
COUGH: 0
BACK PAIN: 0
SHORTNESS OF BREATH: 1
DIAPHORESIS: 0
HEMATOCHEZIA: 0
HEMOPTYSIS: 0
HEADACHES: 0
COUGH: 1
NAUSEA: 0
BLURRED VISION: 0
WEAKNESS: 1
EYE PAIN: 0

## 2023-02-16 ASSESSMENT — COGNITIVE AND FUNCTIONAL STATUS - GENERAL
CLIMB 3 TO 5 STEPS WITH RAILING: A LITTLE
SUGGESTED CMS G CODE MODIFIER MOBILITY: CJ
DAILY ACTIVITIY SCORE: 22
TOILETING: A LITTLE
STANDING UP FROM CHAIR USING ARMS: A LITTLE
DRESSING REGULAR LOWER BODY CLOTHING: A LITTLE
MOBILITY SCORE: 22
SUGGESTED CMS G CODE MODIFIER DAILY ACTIVITY: CJ

## 2023-02-16 ASSESSMENT — PAIN DESCRIPTION - PAIN TYPE: TYPE: ACUTE PAIN;CHRONIC PAIN;SURGICAL PAIN

## 2023-02-16 ASSESSMENT — FIBROSIS 4 INDEX
FIB4 SCORE: 1.16
FIB4 SCORE: 1.16
FIB4 SCORE: 0.91

## 2023-02-16 NOTE — ED NOTES
Pt ambulated to restroom. Pt became visibly sob after walking the short distance to the restroom.

## 2023-02-16 NOTE — ED PROVIDER NOTES
ED Provider Note    CHIEF COMPLAINT  Chief Complaint   Patient presents with    Shortness of Breath    Weakness     Pt BIB EMS from home. Per EMS for 2 weeks pt has been becoming increasingly short of breath and can barely take a few steps without becoming SOB. Pt does not use O2 at home. Pt does experience left sided chest pressure at night but is not c/o of CP at this time. Pt has hx of CHF.       HPI  Aleshia Crooks is a 83 y.o. female who presents for evaluation of shortness of breath for 2-3 weeks.  Patient notes it is intermittent and currently she does feel short of breath.  She notes she has a history of CHF and it feels similar but she does not have a cough, fever, chills, hemoptysis, or extremity swelling.  She did take an extra Lasix yesterday to see if that helped.  Patient describes an episode yesterday when she got up from bed of severe shortness of breath which took her a few minutes to recover from.  Patient additionally notes that she is on amoxicillin for what was originally being treated as a urinary tract infection.  She notes that she was originally on cefdinir but had to be taken off that due to the feeling of short of breath which she related to the initiation of this medication.  She currently has no hematuria or dysuria.  External records reviewed-care everywhere  Limitation to history-none  Outside historians-daughter    REVIEW OF SYSTEMS  Constitutional: No fevers or chills  Skin: No rashes  HEENT: No sore throat, runny nose  Neck: No neck pain, stiffness, or masses.  Chest: Occasional left-sided chest pressure when exerting.  Pulm: Shortness of breath with exertion.  No, wheezing, stridor, or pain with inspiration/expiration  Gastrointestinal: No nausea, vomiting, diarrhea, constipation, bloating, melena, hematochezia or abdominal pain.  Genitourinary: No dysuria or hematuria  Musculoskeletal: No pain, swelling, or focal weakness  Heme: No bleeding or bruising problems.   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    SOCIAL HISTORY  Social History     Tobacco Use    Smoking status: Former     Packs/day: 1.00     Years: 31.00     Pack years: 31.00     Types: Cigarettes     Quit date:      Years since quittin.1    Smokeless tobacco: Never   Vaping Use    Vaping Use: Never used   Substance and Sexual Activity    Alcohol use: No    Drug use: No    Sexual activity: Not Currently     Partners: Male       SURGICAL HISTORY   has a past surgical history that includes thyroidectomy total; cholecystectomy; multiple coronary artery bypass; eye surgery (Bilateral); colonoscopy,diagnostic (N/A, 2023); upper gi endoscopy,diagnosis (N/A, 2023); and upper gi endoscopy,biopsy (N/A, 2023).    CURRENT MEDICATIONS  Home Medications       Reviewed by Marilyn An (Pharmacy Tech) on 23 at 1706  Med List Status: Complete     Medication Last Dose Status   amoxicillin (AMOXIL) 500 MG Cap 2/15/2023 Active   ascorbic acid (ASCORBIC ACID) 500 MG Tab 2023 Active   atorvastatin (LIPITOR) 40 MG Tab 2/15/2023 Active   bisoprolol (ZEBETA) 10 MG tablet 2023 Active   Cholecalciferol (VITAMIN D) 125 MCG (5000 UT) Cap 2023 Active   CINNAMON PO 2023 Active   ferrous sulfate 325 (65 Fe) MG tablet 2023 Active   LEVOXYL 150 MCG Tab 2023 Active   loratadine  "(CLARITIN) 10 MG Tab 2/16/2023 Active   losartan (COZAAR) 50 MG Tab 2/16/2023 Active   omeprazole (PRILOSEC) 20 MG delayed-release capsule 2/16/2023 Active   Potassium 99 MG Tab 2/16/2023 Active   rivaroxaban (XARELTO) 15 MG Tab tablet 2/15/2023 Active   Secukinumab (COSENTYX SENSOREADY PEN) 150 MG/ML Solution Auto-injector FEW WEEKS AGO Active   spironolactone (ALDACTONE) 25 MG Tab 2/16/2023 Active                     ALLERGIES  Allergies   Allergen Reactions    Cefdinir Shortness of Breath       PHYSICAL EXAM  VITAL SIGNS: /75   Pulse (!) 109   Temp 36.1 °C (97 °F) (Temporal)   Resp 18   Ht 1.6 m (5' 3\")   Wt 96 kg (211 lb 10.3 oz)   LMP  (LMP Unknown)   SpO2 97%   BMI 37.49 kg/m²    Gen: Alert in no apparent distress.  HEENT: No signs of trauma, Bilateral external ears normal, Nose normal. Conjunctiva normal, Non-icteric.   Neck:  No tenderness, Supple, No masses.  No JVD  Cardiovascular: Regular rate and rhythm, no murmurs.  Capillary refill less than 3 seconds to all extremities, 2+ distal pulses.  Thorax & Lungs: Diminished breath sounds bilaterally, mild tachypnea, no prolonged expiratory phase.  No wheezing noted.    Abdomen: Bowel sounds normal, Soft, No tenderness, No masses, No pulsatile masses. No Guarding or rebound  Skin: Warm, Dry  Extremities: Intact distal pulses, No edema  Neurologic: Alert , no facial droop, grossly normal coordination and strength  Psychiatric: Affect pleasant      LABS  Results for orders placed or performed during the hospital encounter of 02/16/23   COV-2, FLU A/B, AND RSV BY PCR (2-4 HOURS CEPHEID): Collect NP swab in VTM    Specimen: Mid-Turbinate; Respirate   Result Value Ref Range    Influenza virus A RNA Negative Negative    Influenza virus B, PCR Negative Negative    RSV, PCR Negative Negative    SARS-CoV-2 by PCR NotDetected     SARS-CoV-2 Source NP Swab    CBC WITH DIFFERENTIAL   Result Value Ref Range    WBC 8.4 4.8 - 10.8 K/uL    RBC 3.44 (L) 4.20 - " 5.40 M/uL    Hemoglobin 9.1 (L) 12.0 - 16.0 g/dL    Hematocrit 30.0 (L) 37.0 - 47.0 %    MCV 87.2 81.4 - 97.8 fL    MCH 26.5 (L) 27.0 - 33.0 pg    MCHC 30.3 (L) 33.6 - 35.0 g/dL    RDW 59.8 (H) 35.9 - 50.0 fL    Platelet Count 326 164 - 446 K/uL    MPV 10.9 9.0 - 12.9 fL    Neutrophils-Polys 81.10 (H) 44.00 - 72.00 %    Lymphocytes 7.60 (L) 22.00 - 41.00 %    Monocytes 6.80 0.00 - 13.40 %    Eosinophils 3.30 0.00 - 6.90 %    Basophils 0.70 0.00 - 1.80 %    Immature Granulocytes 0.50 0.00 - 0.90 %    Nucleated RBC 0.00 /100 WBC    Neutrophils (Absolute) 6.78 2.00 - 7.15 K/uL    Lymphs (Absolute) 0.64 (L) 1.00 - 4.80 K/uL    Monos (Absolute) 0.57 0.00 - 0.85 K/uL    Eos (Absolute) 0.28 0.00 - 0.51 K/uL    Baso (Absolute) 0.06 0.00 - 0.12 K/uL    Immature Granulocytes (abs) 0.04 0.00 - 0.11 K/uL    NRBC (Absolute) 0.00 K/uL   COMP METABOLIC PANEL   Result Value Ref Range    Sodium 136 135 - 145 mmol/L    Potassium 4.7 3.6 - 5.5 mmol/L    Chloride 98 96 - 112 mmol/L    Co2 23 20 - 33 mmol/L    Anion Gap 15.0 7.0 - 16.0    Glucose 113 (H) 65 - 99 mg/dL    Bun 22 8 - 22 mg/dL    Creatinine 1.97 (H) 0.50 - 1.40 mg/dL    Calcium 9.3 8.5 - 10.5 mg/dL    AST(SGOT) 14 12 - 45 U/L    ALT(SGPT) 9 2 - 50 U/L    Alkaline Phosphatase 76 30 - 99 U/L    Total Bilirubin 0.5 0.1 - 1.5 mg/dL    Albumin 4.0 3.2 - 4.9 g/dL    Total Protein 7.0 6.0 - 8.2 g/dL    Globulin 3.0 1.9 - 3.5 g/dL    A-G Ratio 1.3 g/dL   TROPONIN   Result Value Ref Range    Troponin T 44 (H) 6 - 19 ng/L   proBrain Natriuretic Peptide, NT   Result Value Ref Range    NT-proBNP 4597 (H) 0 - 125 pg/mL   LACTIC ACID   Result Value Ref Range    Lactic Acid 1.1 0.5 - 2.0 mmol/L   URINALYSIS (UA)    Specimen: Urine   Result Value Ref Range    Color Yellow     Character Clear     Specific Gravity 1.010 <1.035    Ph 5.5 5.0 - 8.0    Glucose Negative Negative mg/dL    Ketones Negative Negative mg/dL    Protein Negative Negative mg/dL    Bilirubin Negative Negative     Urobilinogen, Urine 0.2 Negative    Nitrite Negative Negative    Leukocyte Esterase Negative Negative    Occult Blood Negative Negative    Micro Urine Req see below    PROTHROMBIN TIME (INR)   Result Value Ref Range    PT 24.2 (H) 12.0 - 14.6 sec    INR 2.24 (H) 0.87 - 1.13   APTT   Result Value Ref Range    APTT 48.4 (H) 24.7 - 36.0 sec   PROCALCITONIN   Result Value Ref Range    Procalcitonin 0.07 <0.25 ng/mL   CORRECTED CALCIUM   Result Value Ref Range    Correct Calcium 9.3 8.5 - 10.5 mg/dL   ESTIMATED GFR   Result Value Ref Range    GFR (CKD-EPI) 25 (A) >60 mL/min/1.73 m 2   TROPONIN   Result Value Ref Range    Troponin T 39 (H) 6 - 19 ng/L   EKG (NOW)   Result Value Ref Range    Report       [facility] Emergency Dept[facility]    Test Date:  2023  Pt Name:    GINA REICH                Department: ER  MRN:        [id number]                      Room:       [facility]  Gender:     Female                       Technician: [id number]  :        [id number]                   Requested By:WEN MALDONADO  Order #:    [id number]                    Reading MD:    Measurements  Intervals                                Axis  Rate:       87                           P:  IN:                                      QRS:        39  QRSD:       82                           T:          -23  QT:         381  QTc:        459    Interpretive Statements  Atrial fibrillation  Borderline low voltage, extremity leads  Minimal ST depression, anterolateral leads  Compared to ECG 2020 19:20:53  ST (T wave) deviation now present  Ventricular premature complex(es) no longer present       I have independently interpreted this EKG  Irregularly irregular rhythm with a rate of 87.  This appears to be atrial fibrillation.  There are very mild ST depressions in the anterolateral leads.   RADIOLOGY  DX-CHEST-PORTABLE (1 VIEW)   Final Result      1.  Mild interstitial pulmonary edema.   2.  Stable enlargement of the cardiomediastinal  silhouette.      EC-ECHOCARDIOGRAM COMPLETE W/O CONT    (Results Pending)     I have independently interpreted the diagnostic imaging associated with this visit and am waiting the final reading from the radiologist.   My preliminary interpretation is a follows: No lobar opacities to suggest pneumonia however there is a mild interstitial prominence suggestive of either a diffuse viral process or diffuse interstitial edema.  Cardiac silhouette is enlarged but appears similar to the previous x-ray    Critical Care Note  Upon my evaluation, this patient had high probability of imminent and life-threatening deterioration due to acute CHF exacerbation, elevated troponin, exertional hypoxemia, and anemia which required my direct attention, intervention, and personal management. I personally provided 35 minutes of critical care time exclusive of time spent on separately billable procedures. Time includes review of laboratory data, radiology results, discussion with consultants, and monitoring for potential decompensation.      INITIAL IMPRESSION  Patient arrives for evaluation of shortness of breath which is much worse with exertion and likely related to a pulmonary process.  Alternatively cardiac etiology is possible as she has had chest pain and shortness of breath.  She does have diminished breath sounds bilaterally but no specific breath sounds to suggest a diagnosis.  Given her history of CHF this is the most concerning.  Patient will be watched closely on the cardiac monitor and reevaluated once the labs have returned.  Currently she is not hypoxemic or tachycardic.  COURSE & MEDICAL DECISION MAKING  Patient's labs were concerning for several issues including a BNP of 4600 as well as a troponin of 39.  It is also notable that when we attempted to ambulate the patient with a pulse ox, she became hypotensive, tachycardic, tachypneic, and markedly hypoxic to 75.  When she was back to the bed she recovered fully and did  not require supplemental oxygen.  Regardless, this is a fairly profound deterioration when attempting to ambulate and she will require further inpatient evaluation.  It likely is not helping that she is anemic but I do not feel she needs emergent transfusion.  This can be considered as an inpatient by the hospital service.  I did give her a dose of Lasix here in the emergency department and discussed the case with the hospitalist.    Pertinent Labs & Imaging studies reviewed. (See chart for details)  ED observation? No    I have discussed management of the patient with the following physicians and MAGNUS's: Hospitalist    Escalation of care considered, and ultimately not performed:diagnostic imaging, CT imaging of the chest    Barriers to care at this time, including but not limited to: None.     Decision tools and Rx drugs considered including, but not limited to : None none    Discussion of management with other QHP or appropriate source(s): None      FINAL IMPRESSION  1. Acute on chronic congestive heart failure, unspecified heart failure type (HCC)    2. Elevated troponin    3. Anemia, unspecified type    4. Acute kidney injury (HCC)        Electronically signed by: Nathan Fernandez M.D., 2/16/2023 12:15 PM

## 2023-02-16 NOTE — ED TRIAGE NOTES
"Chief Complaint   Patient presents with    Shortness of Breath    Weakness     Pt BIB EMS from home. Per EMS for 2 weeks pt has been becoming increasingly short of breath and can barely take a few steps without becoming SOB. Pt does not use O2 at home. Pt does experience left sided chest pressure at night but is not c/o of CP at this time. Pt has hx of CHF.     Pulse 88   Temp 36.6 °C (97.8 °F) (Temporal)   Resp 20   Ht 1.6 m (5' 3\")   Wt 96.2 kg (212 lb)   LMP  (LMP Unknown)   SpO2 95%   BMI 37.55 kg/m²     Pt reports hx of Afib. Pt reports being tx for a UTI. Pt started on Cefdinir which was stopped once pt reported SOB from ABX. Pt now on amoxicillin. Pt did not take dose this am.   "

## 2023-02-17 ENCOUNTER — ANESTHESIA EVENT (OUTPATIENT)
Dept: CARDIOLOGY | Facility: MEDICAL CENTER | Age: 83
DRG: 291 | End: 2023-02-17
Payer: MEDICARE

## 2023-02-17 ENCOUNTER — APPOINTMENT (OUTPATIENT)
Dept: CARDIOLOGY | Facility: MEDICAL CENTER | Age: 83
DRG: 291 | End: 2023-02-17
Attending: NURSE PRACTITIONER
Payer: MEDICARE

## 2023-02-17 ENCOUNTER — ANESTHESIA (OUTPATIENT)
Dept: CARDIOLOGY | Facility: MEDICAL CENTER | Age: 83
DRG: 291 | End: 2023-02-17
Payer: MEDICARE

## 2023-02-17 LAB
ANION GAP SERPL CALC-SCNC: 14 MMOL/L (ref 7–16)
BUN SERPL-MCNC: 22 MG/DL (ref 8–22)
CALCIUM SERPL-MCNC: 9.4 MG/DL (ref 8.5–10.5)
CHLORIDE SERPL-SCNC: 98 MMOL/L (ref 96–112)
CO2 SERPL-SCNC: 22 MMOL/L (ref 20–33)
CREAT SERPL-MCNC: 1.96 MG/DL (ref 0.5–1.4)
ERYTHROCYTE [DISTWIDTH] IN BLOOD BY AUTOMATED COUNT: 62.3 FL (ref 35.9–50)
GFR SERPLBLD CREATININE-BSD FMLA CKD-EPI: 25 ML/MIN/1.73 M 2
GLUCOSE SERPL-MCNC: 110 MG/DL (ref 65–99)
HCT VFR BLD AUTO: 31 % (ref 37–47)
HGB BLD-MCNC: 9.1 G/DL (ref 12–16)
MAGNESIUM SERPL-MCNC: 2 MG/DL (ref 1.5–2.5)
MCH RBC QN AUTO: 26.5 PG (ref 27–33)
MCHC RBC AUTO-ENTMCNC: 29.4 G/DL (ref 33.6–35)
MCV RBC AUTO: 90.1 FL (ref 81.4–97.8)
PLATELET # BLD AUTO: 332 K/UL (ref 164–446)
PMV BLD AUTO: 10.9 FL (ref 9–12.9)
POTASSIUM SERPL-SCNC: 4.2 MMOL/L (ref 3.6–5.5)
RBC # BLD AUTO: 3.44 M/UL (ref 4.2–5.4)
SODIUM SERPL-SCNC: 134 MMOL/L (ref 135–145)
WBC # BLD AUTO: 9.8 K/UL (ref 4.8–10.8)

## 2023-02-17 PROCEDURE — 99100 ANES PT EXTEME AGE<1 YR&>70: CPT | Performed by: ANESTHESIOLOGY

## 2023-02-17 PROCEDURE — 93312 ECHO TRANSESOPHAGEAL: CPT | Mod: 26 | Performed by: INTERNAL MEDICINE

## 2023-02-17 PROCEDURE — 700111 HCHG RX REV CODE 636 W/ 250 OVERRIDE (IP): Performed by: ANESTHESIOLOGY

## 2023-02-17 PROCEDURE — 700102 HCHG RX REV CODE 250 W/ 637 OVERRIDE(OP): Performed by: INTERNAL MEDICINE

## 2023-02-17 PROCEDURE — 83735 ASSAY OF MAGNESIUM: CPT

## 2023-02-17 PROCEDURE — 36415 COLL VENOUS BLD VENIPUNCTURE: CPT

## 2023-02-17 PROCEDURE — 93319 3D ECHO IMG CGEN CAR ANOMAL: CPT | Performed by: INTERNAL MEDICINE

## 2023-02-17 PROCEDURE — 770020 HCHG ROOM/CARE - TELE (206)

## 2023-02-17 PROCEDURE — 700101 HCHG RX REV CODE 250: Performed by: ANESTHESIOLOGY

## 2023-02-17 PROCEDURE — 160035 HCHG PACU - 1ST 60 MINS PHASE I

## 2023-02-17 PROCEDURE — 4410588 EC-TEE W/O CONT

## 2023-02-17 PROCEDURE — 160002 HCHG RECOVERY MINUTES (STAT)

## 2023-02-17 PROCEDURE — A9270 NON-COVERED ITEM OR SERVICE: HCPCS | Performed by: INTERNAL MEDICINE

## 2023-02-17 PROCEDURE — 80048 BASIC METABOLIC PNL TOTAL CA: CPT

## 2023-02-17 PROCEDURE — 99233 SBSQ HOSP IP/OBS HIGH 50: CPT | Mod: FS | Performed by: INTERNAL MEDICINE

## 2023-02-17 PROCEDURE — B24BZZ4 ULTRASONOGRAPHY OF HEART WITH AORTA, TRANSESOPHAGEAL: ICD-10-PCS | Performed by: INTERNAL MEDICINE

## 2023-02-17 PROCEDURE — 01922 ANES N-INVAS IMG/RADJ THER: CPT | Performed by: ANESTHESIOLOGY

## 2023-02-17 PROCEDURE — 700105 HCHG RX REV CODE 258: Performed by: ANESTHESIOLOGY

## 2023-02-17 PROCEDURE — 99233 SBSQ HOSP IP/OBS HIGH 50: CPT | Performed by: STUDENT IN AN ORGANIZED HEALTH CARE EDUCATION/TRAINING PROGRAM

## 2023-02-17 PROCEDURE — 85027 COMPLETE CBC AUTOMATED: CPT

## 2023-02-17 PROCEDURE — 700111 HCHG RX REV CODE 636 W/ 250 OVERRIDE (IP): Performed by: INTERNAL MEDICINE

## 2023-02-17 RX ORDER — ALBUTEROL SULFATE 2.5 MG/3ML
2.5 SOLUTION RESPIRATORY (INHALATION)
Status: DISCONTINUED | OUTPATIENT
Start: 2023-02-17 | End: 2023-02-17 | Stop reason: HOSPADM

## 2023-02-17 RX ORDER — HALOPERIDOL 5 MG/ML
1 INJECTION INTRAMUSCULAR
Status: DISCONTINUED | OUTPATIENT
Start: 2023-02-17 | End: 2023-02-17 | Stop reason: HOSPADM

## 2023-02-17 RX ORDER — ONDANSETRON 2 MG/ML
4 INJECTION INTRAMUSCULAR; INTRAVENOUS
Status: DISCONTINUED | OUTPATIENT
Start: 2023-02-17 | End: 2023-02-17 | Stop reason: HOSPADM

## 2023-02-17 RX ORDER — SODIUM CHLORIDE, SODIUM LACTATE, POTASSIUM CHLORIDE, CALCIUM CHLORIDE 600; 310; 30; 20 MG/100ML; MG/100ML; MG/100ML; MG/100ML
INJECTION, SOLUTION INTRAVENOUS
Status: DISCONTINUED | OUTPATIENT
Start: 2023-02-17 | End: 2023-02-17 | Stop reason: SURG

## 2023-02-17 RX ORDER — HYDRALAZINE HYDROCHLORIDE 20 MG/ML
5 INJECTION INTRAMUSCULAR; INTRAVENOUS
Status: DISCONTINUED | OUTPATIENT
Start: 2023-02-17 | End: 2023-02-17 | Stop reason: HOSPADM

## 2023-02-17 RX ORDER — LABETALOL HYDROCHLORIDE 5 MG/ML
5 INJECTION, SOLUTION INTRAVENOUS
Status: DISCONTINUED | OUTPATIENT
Start: 2023-02-17 | End: 2023-02-17 | Stop reason: HOSPADM

## 2023-02-17 RX ORDER — LIDOCAINE HYDROCHLORIDE 20 MG/ML
INJECTION, SOLUTION EPIDURAL; INFILTRATION; INTRACAUDAL; PERINEURAL PRN
Status: DISCONTINUED | OUTPATIENT
Start: 2023-02-17 | End: 2023-02-17 | Stop reason: SURG

## 2023-02-17 RX ADMIN — RIVAROXABAN 15 MG: 15 TABLET, FILM COATED ORAL at 17:51

## 2023-02-17 RX ADMIN — LEVOTHYROXINE SODIUM 150 MCG: 0.07 TABLET ORAL at 05:05

## 2023-02-17 RX ADMIN — PROPOFOL 50 MG: 10 INJECTION, EMULSION INTRAVENOUS at 14:02

## 2023-02-17 RX ADMIN — LIDOCAINE HYDROCHLORIDE 100 MG: 20 INJECTION, SOLUTION EPIDURAL; INFILTRATION; INTRACAUDAL at 14:02

## 2023-02-17 RX ADMIN — FUROSEMIDE 40 MG: 10 INJECTION, SOLUTION INTRAMUSCULAR; INTRAVENOUS at 05:05

## 2023-02-17 RX ADMIN — ATORVASTATIN CALCIUM 40 MG: 40 TABLET, FILM COATED ORAL at 17:51

## 2023-02-17 RX ADMIN — PROPOFOL 50 MG: 10 INJECTION, EMULSION INTRAVENOUS at 14:25

## 2023-02-17 RX ADMIN — PROPOFOL 30 MG: 10 INJECTION, EMULSION INTRAVENOUS at 14:17

## 2023-02-17 RX ADMIN — PROPOFOL 20 MG: 10 INJECTION, EMULSION INTRAVENOUS at 14:07

## 2023-02-17 RX ADMIN — SPIRONOLACTONE 25 MG: 25 TABLET ORAL at 00:00

## 2023-02-17 RX ADMIN — PROPOFOL 20 MG: 10 INJECTION, EMULSION INTRAVENOUS at 14:11

## 2023-02-17 RX ADMIN — PROPOFOL 30 MG: 10 INJECTION, EMULSION INTRAVENOUS at 14:22

## 2023-02-17 RX ADMIN — SODIUM CHLORIDE, POTASSIUM CHLORIDE, SODIUM LACTATE AND CALCIUM CHLORIDE: 600; 310; 30; 20 INJECTION, SOLUTION INTRAVENOUS at 13:48

## 2023-02-17 RX ADMIN — EPHEDRINE SULFATE 10 MG: 50 INJECTION INTRAMUSCULAR; INTRAVENOUS; SUBCUTANEOUS at 14:02

## 2023-02-17 RX ADMIN — PROPOFOL 50 MG: 10 INJECTION, EMULSION INTRAVENOUS at 14:29

## 2023-02-17 ASSESSMENT — CHA2DS2 SCORE
SEX: FEMALE
CHA2DS2 VASC SCORE: 6
VASCULAR DISEASE: YES
CHF OR LEFT VENTRICULAR DYSFUNCTION: YES
AGE 75 OR GREATER: YES
HYPERTENSION: YES

## 2023-02-17 ASSESSMENT — COGNITIVE AND FUNCTIONAL STATUS - GENERAL
SUGGESTED CMS G CODE MODIFIER MOBILITY: CH
SUGGESTED CMS G CODE MODIFIER DAILY ACTIVITY: CH
MOBILITY SCORE: 24
DAILY ACTIVITIY SCORE: 24

## 2023-02-17 ASSESSMENT — ENCOUNTER SYMPTOMS
SHORTNESS OF BREATH: 0
APNEA: 0
CHOKING: 0
WEAKNESS: 1
WHEEZING: 0
STRIDOR: 0
SHORTNESS OF BREATH: 1
COUGH: 0
COUGH: 1
CHEST TIGHTNESS: 0

## 2023-02-17 ASSESSMENT — PAIN DESCRIPTION - PAIN TYPE
TYPE: ACUTE PAIN
TYPE: ACUTE PAIN

## 2023-02-17 ASSESSMENT — PAIN SCALES - GENERAL: PAIN_LEVEL: 0

## 2023-02-17 NOTE — PROGRESS NOTES
Hospital Medicine Daily Progress Note    Date of Service  2/17/2023    Chief Complaint  Aleshia Crooks is a 83 y.o. female admitted 2/16/2023 with sob    Hospital Course  83 y.o. female with hx of  coronary bypass surgery, hypertension, hyperlipidemia, moderate to severe MR, moderate TR, and paroxysmal atrial fibrillation on xarelto, who presented 2/16/2023 with worsening shortness of breath and dyspnea on exertion.   In ED, patient became hypoxic to 75% requiring oxygen with ambulation.   Echo notes: LVEF 55%, 55%. severely dilated left atrium.  Moderate, posteriorly directed eccentric mitral regurgitation jet.  Mild tricuspid regurgitation.   Cardiology was consulted given sob with multivalve disease     Interval Problem Update  Seen patient at bedside. Reports sob improving. On 2L O2  Cardiology consulted, plan GOMEZ today  Echo reviewed    I have discussed this patient's plan of care and discharge plan at IDT rounds today with Case Management, Nursing, Nursing leadership, and other members of the IDT team.    Consultants/Specialty  cardiology    Code Status  Full Code    Disposition  Patient is not medically cleared for discharge.   Anticipate discharge to  TBD .  I have placed the appropriate orders for post-discharge needs.    Review of Systems  Review of Systems   Constitutional:  Positive for malaise/fatigue.   Respiratory:  Positive for cough and shortness of breath.    Neurological:  Positive for weakness.   All other systems reviewed and are negative.     Physical Exam  Temp:  [36.1 °C (97 °F)-37 °C (98.6 °F)] 36.9 °C (98.4 °F)  Pulse:  [] 69  Resp:  [17-27] 17  BP: ()/(50-75) 91/50  SpO2:  [75 %-98 %] 96 %    Physical Exam  Vitals and nursing note reviewed.   Constitutional:       Appearance: Normal appearance. She is obese.   HENT:      Head: Normocephalic and atraumatic.      Nose: Nose normal.      Mouth/Throat:      Pharynx: Oropharynx is clear.   Eyes:      Extraocular Movements:  Extraocular movements intact.      Conjunctiva/sclera: Conjunctivae normal.      Pupils: Pupils are equal, round, and reactive to light.   Cardiovascular:      Rate and Rhythm: Normal rate. Rhythm irregular.      Pulses: Normal pulses.      Heart sounds: Normal heart sounds.   Pulmonary:      Effort: Pulmonary effort is normal.      Breath sounds: Rales present.      Comments: On oxygen supplements  Abdominal:      General: Abdomen is flat. Bowel sounds are normal.      Palpations: Abdomen is soft.   Musculoskeletal:         General: Normal range of motion.      Cervical back: Normal range of motion and neck supple.   Skin:     General: Skin is warm and dry.   Neurological:      General: No focal deficit present.      Mental Status: She is alert and oriented to person, place, and time. Mental status is at baseline.   Psychiatric:         Mood and Affect: Mood normal.         Behavior: Behavior normal.       Fluids    Intake/Output Summary (Last 24 hours) at 2/17/2023 1305  Last data filed at 2/17/2023 1232  Gross per 24 hour   Intake --   Output 1100 ml   Net -1100 ml       Laboratory  Recent Labs     02/16/23  1145 02/17/23  0322   WBC 8.4 9.8   RBC 3.44* 3.44*   HEMOGLOBIN 9.1* 9.1*   HEMATOCRIT 30.0* 31.0*   MCV 87.2 90.1   MCH 26.5* 26.5*   MCHC 30.3* 29.4*   RDW 59.8* 62.3*   PLATELETCT 326 332   MPV 10.9 10.9     Recent Labs     02/16/23  1145 02/17/23  0322   SODIUM 136 134*   POTASSIUM 4.7 4.2   CHLORIDE 98 98   CO2 23 22   GLUCOSE 113* 110*   BUN 22 22   CREATININE 1.97* 1.96*   CALCIUM 9.3 9.4     Recent Labs     02/16/23  1145   APTT 48.4*   INR 2.24*               Imaging  EC-ECHOCARDIOGRAM COMPLETE W/O CONT   Final Result      DX-CHEST-PORTABLE (1 VIEW)   Final Result      1.  Mild interstitial pulmonary edema.   2.  Stable enlargement of the cardiomediastinal silhouette.      EC-GOMEZ W/O CONT    (Results Pending)        Assessment/Plan  * Acute on chronic combined systolic and diastolic congestive heart  failure (HCC)- (present on admission)  Assessment & Plan  Hx of valvular disease and follows Dr. Villar outpatient.  Echo reviewed: LVEF 55%, 55%. severely dilated left atrium. Moderate, posteriorly directed eccentric mitral regurgitation jet. Mild tricuspid regurgitation.   - cont iv lasix  - cardiology consulted, plan GOMEZ  - We will continue patient on spironolactone, losartan, bisoprolol and xarelto.      Severe tricuspid regurgitation by prior echocardiogram- (present on admission)  Assessment & Plan  As above  Repeating echo, cardiology consult    Stage 3b chronic kidney disease (HCC)- (present on admission)  Assessment & Plan  GFR slowly trending downwards currently 25  Creatinine elevated 1.92. Baseline appears to be less than 1.7  - avoid nephrotoxins and renal dosing meds  - Monitor renal function closely on IV Lasix    Obesity (BMI 35.0-39.9 without comorbidity) ([facility])- (present on admission)  Assessment & Plan  BMI 37    Hypothyroidism- (present on admission)  Assessment & Plan  Continue on Levoxyl  Check tsh    Coronary artery disease involving native coronary artery of native heart without angina pectoris, s/p 1V CABG 1992- (present on admission)  Assessment & Plan  History of, following Dr. Villar. Cont statin    AF (atrial fibrillation) ([facility])- (present on admission)  Assessment & Plan  Patient is on bisoprolol and chronic Xarelto  - We will continue Xarelto and bisoprolol     Moderate to severe pulmonary hypertension (HCC)- (present on admission)  Assessment & Plan  Noted improvement from 0395-3270 on echocardiogram  - We will obtain new echo: Estimated right ventricular systolic pressure is 40 mmHg.     Severe mitral regurgitation- (present on admission)  Assessment & Plan  History of  Plan as above         VTE prophylaxis: therapeutic anticoagulation with xarelto    I have performed a physical exam and reviewed and updated ROS and Plan today (2/17/2023). In review of yesterday's note (2/16/2023), there  are no changes except as documented above.

## 2023-02-17 NOTE — ED NOTES
Pharmacy Medication Reconciliation    ~Medication reconciliation has been completed by interviewing patient with medication list and consent to do so with family/visitors in the room. Reviewed list with patient and returned at bedside  ~Allergies were reviewed and updated   ~Pt home pharmacy:  [facility]      ~Patient reports she stopped taking her course of Amoxil yesterday 02/15/2023 and she only had one day left.

## 2023-02-17 NOTE — ASSESSMENT & PLAN NOTE
GFR slowly trending downwards currently 25  Creatinine elevated 1.92. Baseline appears to be less than 1.7  - avoid nephrotoxins and renal dosing meds  - Monitor renal function closely on IV Lasix

## 2023-02-17 NOTE — H&P
Hospital Medicine History & Physical Note    Date of Service  2/16/2023    Primary Care Physician  Asael Pink M.D.    Consultants  Cardiology  Dr. Di Romero    Code Status  Full Code    Chief Complaint  Chief Complaint   Patient presents with    Shortness of Breath    Weakness     Pt BIB EMS from home. Per EMS for 2 weeks pt has been becoming increasingly short of breath and can barely take a few steps without becoming SOB. Pt does not use O2 at home. Pt does experience left sided chest pressure at night but is not c/o of CP at this time. Pt has hx of CHF.       History of Presenting Illness  Aleshia Crooks is a 83 y.o. female who presented 2/16/2023 with worsening shortness of breath and dyspnea on exertion.  Patient stated she has been having increasing shortness of breath for the last 2 weeks.  She stated she was weighing yourself every day but no longer.  She stated she did gain almost 8 pounds from being discharged.  She stated she was seeing her cardiologist but unfortunately cardiologist is retiring and has to see another.  Patient has associated dyspnea on exertion, orthopnea, fatigue.  Patient denies any chest pain, abdominal pain, dizziness, loss of consciousness.    I spoke to EDP about patient's case, patient was taken for ambulation, became hypoxic to 75% requiring oxygen.  At rest she was not requiring oxygen. Patient also became tachypneic and tachycardic while walking.    I discussed the plan of care with patient, family, bedside RN, charge RN, , and pharmacy.    Review of Systems  Review of Systems   Constitutional:  Positive for malaise/fatigue. Negative for chills and fever.   HENT:  Negative for congestion and hearing loss.    Eyes:  Negative for blurred vision and pain.   Respiratory:  Positive for cough and shortness of breath.    Cardiovascular:  Positive for orthopnea and leg swelling. Negative for chest pain and palpitations.   Gastrointestinal:  Negative for abdominal  pain, nausea and vomiting.   Genitourinary:  Negative for dysuria and hematuria.   Musculoskeletal:  Negative for back pain and joint pain.   Skin:  Negative for itching and rash.   Neurological:  Positive for weakness. Negative for dizziness and headaches.   Psychiatric/Behavioral:  The patient is not nervous/anxious and does not have insomnia.    All other systems reviewed and are negative.    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    Surgical History   has a past surgical history that includes thyroidectomy total; cholecystectomy; multiple coronary artery bypass; eye surgery (Bilateral); pr colonoscopy,diagnostic (N/A, 1/24/2023); pr upper gi endoscopy,diagnosis (N/A, 1/24/2023); and pr upper gi endoscopy,biopsy (N/A, 1/24/2023).     Family History  family history includes Cancer in her mother; Kidney Disease in an other family member; Other in an other family member; Stroke in her maternal grandmother.   Family history reviewed with patient. There is no family history that is pertinent to the chief complaint.     Social History   reports that she quit smoking about 31 years ago. Her smoking use included cigarettes. She has a 31.00 pack-year smoking history. She has never used smokeless tobacco. She reports that she does not drink alcohol and does not use drugs.    Allergies  Allergies   Allergen Reactions    Cefdinir Shortness of Breath       Medications  Prior to Admission Medications   Prescriptions Last Dose Informant Patient Reported? Taking?   CINNAMON PO  Patient Yes No   Sig: Take 1 Tablet by mouth 2 times a day.   Cholecalciferol (VITAMIN D) 125 MCG  (5000 UT) Cap  Patient Yes No   Sig: Take 5,000 Units by mouth every day.   LEVOXYL 150 MCG Tab   No No   Sig: Take 1 Tablet by mouth every morning on an empty stomach.   Secukinumab (COSENTYX SENSOREADY PEN) 150 MG/ML Solution Auto-injector  Patient No No   Sig: Inject 300mg Sub Cut once monthly   Patient taking differently: Inject 300 mg under the skin every 28 days. Inject 300mg Sub Cut once monthly   amoxicillin (AMOXIL) 500 MG Cap   No No   Sig: Take 1 Capsule by mouth 3 times a day for 5 days.   ascorbic acid (ASCORBIC ACID) 500 MG Tab   No No   Sig: Take 1 Tablet by mouth every day.   atorvastatin (LIPITOR) 40 MG Tab  Patient No No   Sig: TAKE 1 TABLET BY MOUTH AT  BEDTIME   Patient taking differently: Take 40 mg by mouth at bedtime. TAKE 1 TABLET BY MOUTH AT  BEDTIME   bisoprolol (ZEBETA) 10 MG tablet  Patient No No   Sig: Take 1 Tablet by mouth every day.   ferrous sulfate 325 (65 Fe) MG tablet   No No   Sig: Take 1 Tablet by mouth every day.   loratadine (CLARITIN) 10 MG Tab  Patient Yes No   Sig: Take 1 Tablet by mouth 2 times a day.   losartan (COZAAR) 50 MG Tab  Patient No No   Sig: TAKE 1 TABLET BY MOUTH EVERY DAY   Patient taking differently: Take 50 mg by mouth every day. TAKE 1 TABLET BY MOUTH EVERY DAY   omeprazole (PRILOSEC) 20 MG delayed-release capsule   No No   Sig: Take 1 Capsule by mouth 2 times a day for 14 days, then take 1 capsule by mouth daily   rivaroxaban (XARELTO) 15 MG Tab tablet  Patient No No   Sig: Take 1 Tablet by mouth with dinner.   spironolactone (ALDACTONE) 25 MG Tab  Patient No No   Sig: Take 1 Tablet by mouth every day.      Facility-Administered Medications: None       Physical Exam  Temp:  [36.1 °C (97 °F)-36.6 °C (97.8 °F)] 36.1 °C (97 °F)  Pulse:  [] 93  Resp:  [16-27] 18  BP: ()/(54-75) 95/55  SpO2:  [75 %-97 %] 95 %  Blood Pressure : 128/63   Temperature: 36.6 °C (97.8 °F)   Pulse: 95   Respiration: (!) 32   Pulse Oximetry: 94 %       Physical Exam  Vitals  and nursing note reviewed.   Constitutional:       General: She is not in acute distress.     Appearance: Normal appearance. She is obese. She is ill-appearing.      Comments: Frail appearing elderly female   HENT:      Head: Normocephalic and atraumatic.      Comments: Temporal muscle wasting positive     Right Ear: External ear normal.      Left Ear: External ear normal.      Mouth/Throat:      Mouth: Mucous membranes are dry.      Pharynx: No oropharyngeal exudate.   Eyes:      General: No scleral icterus.     Extraocular Movements: Extraocular movements intact.   Cardiovascular:      Rate and Rhythm: Normal rate and regular rhythm.      Pulses: Normal pulses.      Heart sounds: Murmur (Loudest at apex, holosystolic) heard.      Comments: Difficult to completely auscultate due to body habitus  Pulmonary:      Effort: Respiratory distress present.      Breath sounds: No wheezing.      Comments: Difficult to completely auscultate due to body habitus.  On 3 L nasal cannula.  Bilateral coarse crackles noted.  Abdominal:      General: Abdomen is flat. Bowel sounds are normal. There is no distension.      Palpations: Abdomen is soft.      Tenderness: There is no abdominal tenderness.   Musculoskeletal:         General: No swelling or tenderness. Normal range of motion.      Cervical back: Normal range of motion and neck supple. No rigidity.      Right lower leg: Edema present.      Left lower leg: Edema present.      Comments: Sarcopenic. Bilateral hand muscle atrophy noted.   Skin:     General: Skin is warm.      Capillary Refill: Capillary refill takes less than 2 seconds.      Coloration: Skin is not jaundiced.      Findings: No erythema.   Neurological:      General: No focal deficit present.      Mental Status: She is alert and oriented to person, place, and time. Mental status is at baseline.      Motor: Weakness present.   Psychiatric:         Mood and Affect: Mood normal.         Behavior: Behavior normal.          Thought Content: Thought content normal.         Judgment: Judgment normal.       Laboratory:  Recent Labs     02/16/23  1145   WBC 8.4   RBC 3.44*   HEMOGLOBIN 9.1*   HEMATOCRIT 30.0*   MCV 87.2   MCH 26.5*   MCHC 30.3*   RDW 59.8*   PLATELETCT 326   MPV 10.9     Recent Labs     02/16/23  1145   SODIUM 136   POTASSIUM 4.7   CHLORIDE 98   CO2 23   GLUCOSE 113*   BUN 22   CREATININE 1.97*   CALCIUM 9.3     Recent Labs     02/16/23  1145   ALTSGPT 9   ASTSGOT 14   ALKPHOSPHAT 76   TBILIRUBIN 0.5   GLUCOSE 113*     Recent Labs     02/16/23  1145   APTT 48.4*   INR 2.24*     Recent Labs     02/16/23  1145   NTPROBNP 4597*         Recent Labs     02/16/23  1145 02/16/23  1546   TROPONINT 44* 39*       Imaging:  EC-ECHOCARDIOGRAM COMPLETE W/O CONT         DX-CHEST-PORTABLE (1 VIEW)   Final Result      1.  Mild interstitial pulmonary edema.   2.  Stable enlargement of the cardiomediastinal silhouette.          X-Ray:  My impression is: Mild pulmonary edema  EKG:  My impression is: Atrial fibrillation with RVR, QTc 459 ms    Assessment/Plan:  Justification for Admission Status  I anticipate this patient will require at least two midnights for appropriate medical management, necessitating inpatient admission because acute CHF exacerbation.  Patient has significant valvulopathy, she requires cardiology consultation.  Comparatively on her echocardiogram she has worsening mitral regurgitation which needs to be addressed.  Unclear at this time if patient will require any further surgeries.  She will be on IV Lasix, given that her blood pressure is low normal, this will take more than 2 midnights to complex euvolemic state.    Patient will need a Telemetry bed on medicine service .  The need is secondary to acute CHF exacerbation.    * Acute on chronic combined systolic and diastolic congestive heart failure (HCC)- (present on admission)  Assessment & Plan  Reviewed Echocardiograms.   (07/2017) Patient did have a combined  systolic dysfunction with grade 3-4 diastolic dysfunction on echo, LVEF 30%, RVSP 80 mmHg.  Most recent echocardiogram (09/22) showed improvement on LVEF 55%, moderate to severe tricuspid regurgitation right-sided enlargements, severely dilated left atrium, moderate to severe mitral regurgitation.  Patient was seeing Dr. Villar outpatient.  -Acute CHF exacerbation  - We will start patient on IV Lasix 40 mg IV twice daily  - Consulted cardiology given severity of valvulopathy, Dr. Romero aware of patient and will evaluate.  - We will continue patient on spironolactone, losartan.  At this time we will hold bisoprolol due to acute exacerbation.  -Monitor on telemetry  - Fluid restrictions to 1500 mL/day  - CHF order set in place, echocardiogram ordered    Severe tricuspid regurgitation by prior echocardiogram- (present on admission)  Assessment & Plan  As above  Repeating echo, cardiology consult    Stage 3b chronic kidney disease (HCC)- (present on admission)  Assessment & Plan  GFR slowly trending downwards currently 25  Creatinine elevated 1.92. Baseline appears to be less than 1.7  -continue losartan  - Monitor renal function closely on IV Lasix    Obesity (BMI 35.0-39.9 without comorbidity) ([facility])- (present on admission)  Assessment & Plan  Chronic, difficult to lose weight    Hypothyroidism- (present on admission)  Assessment & Plan  Continue on Levoxyl    Coronary artery disease involving native coronary artery of native heart without angina pectoris, s/p 1V CABG 1992- (present on admission)  Assessment & Plan  History of    AF (atrial fibrillation) ([facility])- (present on admission)  Assessment & Plan  Patient is on bisoprolol and chronic Xarelto  - We will continue Xarelto at this time  - Holding bisoprolol due to acute CHF exacerbation    Moderate to severe pulmonary hypertension (HCC)- (present on admission)  Assessment & Plan  Noted improvement from [id number] on echocardiogram  - We will obtain new echo, prior echo  on 09/22 was not able to make comment on RVSP    Severe mitral regurgitation- (present on admission)  Assessment & Plan  History of  Plan as above        VTE prophylaxis: therapeutic anticoagulation with Xarelto    Total time spent on admission over 75 minutes.  This included my review with ER physician, review of ED events, patient's history, outside records, recent records, face to face interview, physical examination; my review of lab results (CBC, chemistry panel), imaging review (CXR) and ECG.   In addition, counseling patient and speaking with cardiologist for evaluation.    Please note that this dictation was created using voice recognition software. I have made every reasonable attempt to correct obvious errors, but there may be errors of grammar and possibly content that I did not discover before finalizing the note.

## 2023-02-17 NOTE — PROGRESS NOTES
4 Eyes Skin Assessment Completed by IWONA Galeana and IWONA Badillo.    Head WDL  Ears WDL  Nose WDL  Mouth WDL  Neck WDL  Breast/Chest WDL  Shoulder Blades WDL  Spine WDL  (R) Arm/Elbow/Hand WDL  (L) Arm/Elbow/Hand WDL  Abdomen WDL  Groin WDL  Scrotum/Coccyx/Buttocks WDL  (R) Leg Redness, Blanching, Scab, and Edema. psoriasis  (L) Leg Redness, Blanching, Scab, and Edema, Psoriasis  (R) Heel/Foot/Toe Boggy and Edema  (L) Heel/Foot/Toe Boggy and Edema          Devices In Places Tele Box, Blood Pressure Cuff, and Pulse Ox      Interventions In Place Gray Ear Foams and Pillows    Possible Skin Injury No    Pictures Uploaded Into Epic N/A  Wound Consult Placed N/A  RN Wound Prevention Protocol Ordered Yes

## 2023-02-17 NOTE — PROGRESS NOTES
Monitor Summary  Afib: 83-93  Ectopies: occasional PVC, bigeminy, trigeminy, MF couplet  ./.06/.

## 2023-02-17 NOTE — ANESTHESIA TIME REPORT
Anesthesia Start and Stop Event Times     Date Time Event    2/17/2023 1332 Ready for Procedure     1348 Anesthesia Start     1441 Anesthesia Stop        Responsible Staff  02/17/23    Name Role Begin End    Celina Maradiaga M.D. Anesth 1348 1441        Overtime Reason:  no overtime (within assigned shift)    Comments:

## 2023-02-17 NOTE — CONSULTS
Cardiology Initial Consult Note    Date of note:    2/16/2023      Consulting Physician: Tip Jo M.D.    Name:   Aleshia Crooks   YOB: 1940  Age:   83 y.o.  female   MRN:   [id number]    Reason for Consultation: Progressive shortness of breath     HPI:  Aleshia Crooks is a 83 y.o. female with a history of coronary bypass surgery, hypertension, hyperlipidemia, mitral regurgitation, and paroxysmal atrial fibrillation, who presented to the emergency room with progressive shortness of breath.    Cardiac history:  1.  Coronary artery disease.  Her last cardiac catheterization 7/18/2017: My review of the report is left main artery no significant disease, left anterior descending artery, no significant disease, left circumflex artery, no significant disease.  Right coronary artery 100% occluded proximally.  Vein graft to the right coronary artery is widely patent.  Of note in 2017, they noted 3+ mitral regurgitation.  LVEF of 45%.    2.  Chronic/persistent atrial fibrillation dating back to ECG of 7/2017.  Patient is rate controlled on bisoprolol 10 mg p.o. daily, and Xarelto for stroke prevention of atrial fibrillation.    3.  She has a history of moderate to severe mitral and tricuspid regurgitation since 2017.    Patient reports she was in her normal state of health until about 2 weeks ago.  She started noticing some shortness of breath with exertion, however it briefly went away, but then 2 days ago came back more significantly.  Last night, she could hardly get to the bathroom and got very short of breath and was gasping for air.  She denies any chest discomfort or pain or pressure.  Denies any palpitations, no lightheadedness, no dizziness.  She has not had any recent fevers chills cough or sore throat.  She has noticed several pound weight gain in the past 2 weeks.  She has been compliant with her medications.  She normally is not on p.o. Lasix but does take spironolactone 25 mg p.o.  daily.    Problem list:  Principal Problem:    Acute on chronic combined systolic and diastolic congestive heart failure (HCC) POA: Yes      Overview: Hx of CHF, Afib, CAD s/p 1v CABG, severe mitral regurgitation      On metoprolol, losartan, lasix, spironolactone      Following with cardiology  Active Problems:    Severe mitral regurgitation POA: Yes      Overview: Hx of severe mitral regurgitation      Moderate MR in recent echo      Per cardiology, may need GOMEZ and procedure when able      Following with cardiology          Moderate to severe pulmonary hypertension (HCC) POA: Yes      Overview: History of SHAKA on CPAP, denies smoking history      On Lasix, spironolactone    AF (atrial fibrillation) (HCC) POA: Yes      Overview: Rate controlled on metoprolol      Anticoagulation with warfarin, Vit K      Following with anticoagulation clinic    Coronary artery disease involving native coronary artery of native heart without angina pectoris, s/p 1V CABG 1992 POA: Yes      Overview: On losartan, metoprolol, statin      Following with Dr. Villar    Hypothyroidism POA: Yes      Overview: Hx of hyperthyroidism, thyroid nodule s/p thyroidectomy      On Levoxyl.     Obesity (BMI 35.0-39.9 without comorbidity) (HCC) POA: Yes      Overview: Body mass index is 36.14 kg/m².      Lifestyle modifications          Stage 3b chronic kidney disease (HCC) POA: Yes    Severe tricuspid regurgitation by prior echocardiogram POA: Yes  Resolved Problems:    * No resolved hospital problems. 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) 06/08/2020 2015    Moderate tricuspid regurgitation 11/16/2022    Thyroid disease      Past Surgical History:   Procedure  Laterality Date    MA COLONOSCOPY,DIAGNOSTIC N/A 1/24/2023    Procedure: COLONOSCOPY;  Surgeon: Amy Zarate M.D.;  Location: SURGERY SAME DAY [facility];  Service: Gastroenterology    MA UPPER GI ENDOSCOPY,DIAGNOSIS N/A 1/24/2023    Procedure: GASTROSCOPY;  Surgeon: Amy Zarate M.D.;  Location: SURGERY SAME DAY [facility];  Service: Gastroenterology    MA UPPER GI ENDOSCOPY,BIOPSY N/A 1/24/2023    Procedure: GASTROSCOPY, WITH BIOPSY;  Surgeon: Amy Zarate M.D.;  Location: SURGERY SAME DAY [facility];  Service: Gastroenterology    CHOLECYSTECTOMY      EYE SURGERY Bilateral     cataracts    MULTIPLE CORONARY ARTERY BYPASS      1 bypass    THYROIDECTOMY TOTAL       (Not in a hospital admission)    Current Facility-Administered Medications   Medication Dose Route Frequency Provider Last Rate Last Admin    senna-docusate (PERICOLACE or SENOKOT S) 8.6-50 MG per tablet 2 Tablet  2 Tablet Oral BID Tip Jo M.D.        And    polyethylene glycol/lytes (MIRALAX) PACKET 1 Packet  1 Packet Oral QDAY PRN Tip Jo M.D.        And    magnesium hydroxide (MILK OF MAGNESIA) suspension 30 mL  30 mL Oral QDAY PRN Tip Jo M.D.        And    bisacodyl (DULCOLAX) suppository 10 mg  10 mg Rectal QDAY PRN Tip Jo M.D.        acetaminophen (Tylenol) tablet 650 mg  650 mg Oral Q6HRS PRN Tip Jo M.D.        ondansetron (ZOFRAN) syringe/vial injection 4 mg  4 mg Intravenous Q4HRS PRN Tip Jo M.D.        ondansetron (ZOFRAN ODT) dispertab 4 mg  4 mg Oral Q4HRS PRN Tip Jo M.D.        [START ON 2/17/2023] furosemide (LASIX) injection 40 mg  40 mg Intravenous Q DAY Tip Jo M.D.         Current Outpatient Medications   Medication Sig Dispense Refill    Potassium 99 MG Tab Take 99 mg by mouth every morning.      amoxicillin (AMOXIL) 500 MG Cap Take 1 Capsule by mouth 3 times a day for 5 days. 15 Capsule 0    ascorbic acid (ASCORBIC ACID) 500 MG  Tab Take 1 Tablet by mouth every day. 90 Tablet 3    ferrous sulfate 325 (65 Fe) MG tablet Take 1 Tablet by mouth every day. 90 Tablet 2    LEVOXYL 150 MCG Tab Take 1 Tablet by mouth every morning on an empty stomach. 90 Tablet 3    omeprazole (PRILOSEC) 20 MG delayed-release capsule Take 1 Capsule by mouth 2 times a day for 14 days, then take 1 capsule by mouth daily 44 Capsule 0    CINNAMON PO Take 1 Tablet by mouth 2 times a day.      atorvastatin (LIPITOR) 40 MG Tab TAKE 1 TABLET BY MOUTH AT  BEDTIME 100 Tablet 3    rivaroxaban (XARELTO) 15 MG Tab tablet Take 1 Tablet by mouth with dinner. 90 Tablet 3    spironolactone (ALDACTONE) 25 MG Tab Take 1 Tablet by mouth every day. 90 Tablet 3    losartan (COZAAR) 50 MG Tab TAKE 1 TABLET BY MOUTH EVERY  Tablet 3    Secukinumab (COSENTYX SENSOREADY PEN) 150 MG/ML Solution Auto-injector Inject 300mg Sub Cut once monthly 1.96 mL 6    bisoprolol (ZEBETA) 10 MG tablet Take 1 Tablet by mouth every day. 90 Tablet 4    Cholecalciferol (VITAMIN D) 125 MCG (5000 UT) Cap Take 5,000 Units by mouth every day. 30 Capsule 11    loratadine (CLARITIN) 10 MG Tab Take 1 Tablet by mouth 2 times a day. 60 tablet 5     Allergies   Allergen Reactions    Cefdinir Shortness of Breath     Family History   Problem Relation Age of Onset    Cancer Mother         cancer, smoker    Stroke Maternal Grandmother     Other Other         Crohn    Kidney Disease Other         kidney transplant     Social History     Socioeconomic History    Marital status:      Spouse name: Not on file    Number of children: Not on file    Years of education: Not on file    Highest education level: Not on file   Occupational History     Comment: [employer]   Tobacco Use    Smoking status: Former     Packs/day: 1.00     Years: 31.00     Pack years: 31.00     Types: Cigarettes     Quit date:      Years since quittin.1    Smokeless tobacco: Never   Vaping Use    Vaping Use: Never used   Substance and  "Sexual Activity    Alcohol use: No    Drug use: No    Sexual activity: Not Currently     Partners: Male   Other Topics Concern    Not on file   Social History Narrative    Not on file     Social Determinants of Health     Financial Resource Strain: Low Risk     Difficulty of Paying Living Expenses: Not hard at all   Food Insecurity: No Food Insecurity    Worried About Running Out of Food in the Last Year: Never true    Ran Out of Food in the Last Year: Never true   Transportation Needs: No Transportation Needs    Lack of Transportation (Medical): No    Lack of Transportation (Non-Medical): No   Physical Activity: Inactive    Days of Exercise per Week: 0 days    Minutes of Exercise per Session: 0 min   Stress: No Stress Concern Present    Feeling of Stress : Not at all   Social Connections: Socially Isolated    Frequency of Communication with Friends and Family: More than three times a week    Frequency of Social Gatherings with Friends and Family: Once a week    Attends Rastafari Services: Never    Active Member of Clubs or Organizations: No    Attends Club or Organization Meetings: Never    Marital Status:    Intimate Partner Violence: Not At Risk    Fear of Current or Ex-Partner: No    Emotionally Abused: No    Physically Abused: No    Sexually Abused: No   Housing Stability: Low Risk     Unable to Pay for Housing in the Last Year: No    Number of Places Lived in the Last Year: 1    Unstable Housing in the Last Year: No     No intake or output data in the 24 hours ending 02/16/23 1301      Review of Systems   Constitutional: Negative for diaphoresis and fever.   Respiratory:  Negative for cough, hemoptysis and wheezing.    Gastrointestinal:  Negative for hematochezia and melena.   Genitourinary:  Negative for hematuria.      Physical Exam  Body mass index is 37.55 kg/m².  /63   Pulse 95   Temp 36.6 °C (97.8 °F) (Temporal)   Resp 20   Ht 1.6 m (5' 3\")   Wt 96.2 kg (212 lb)   SpO2 94%   Vitals:    " 02/16/23 1500 02/16/23 1503 02/16/23 1603 02/16/23 1605   BP: 104/58  (!) 87/54 128/63   Pulse: 97  (!) 113 95   Resp: 20  (!) 25 20   Temp:       TempSrc:       SpO2: 93% 91% (!) 75% 94%   Weight:       Height:         Oxygen Therapy:  Pulse Oximetry: 94 %, O2 (LPM): 2, O2 Delivery Device: Nasal Cannula    Physical Exam  Constitutional:       Appearance: Normal appearance.   Eyes:      Extraocular Movements: Extraocular movements intact.      Conjunctiva/sclera: Conjunctivae normal.   Neck:      Vascular: No carotid bruit or JVD.   Cardiovascular:      Rate and Rhythm: Normal rate and regular rhythm.      Pulses:           Radial pulses are 2+ on the right side and 2+ on the left side.        Posterior tibial pulses are 1+ on the right side and 1+ on the left side.      Heart sounds: Murmur heard.   Early systolic murmur is present with a grade of 2/6 at the lower left sternal border and apex.     No friction rub. No gallop.   Pulmonary:      Effort: Pulmonary effort is normal. No respiratory distress.      Breath sounds: Normal breath sounds. No wheezing.   Abdominal:      General: Bowel sounds are normal.      Palpations: Abdomen is soft.   Musculoskeletal:      Cervical back: Neck supple.      Right lower leg: No edema.      Left lower leg: No edema.   Skin:     General: Skin is warm and dry.   Neurological:      Mental Status: She is alert and oriented to person, place, and time. Mental status is at baseline.   Psychiatric:         Mood and Affect: Mood normal.        Labs (personally reviewed and notable for):   Lab Results   Component Value Date/Time    SODIUM 136 02/16/2023 11:45 AM    POTASSIUM 4.7 02/16/2023 11:45 AM    CHLORIDE 98 02/16/2023 11:45 AM    CO2 23 02/16/2023 11:45 AM    GLUCOSE 113 (H) 02/16/2023 11:45 AM    BUN 22 02/16/2023 11:45 AM    CREATININE 1.97 (H) 02/16/2023 11:45 AM      Lab Results   Component Value Date/Time    WBC 8.4 02/16/2023 11:45 AM    RBC 3.44 (L) 02/16/2023 11:45 AM     HEMOGLOBIN 9.1 (L) 02/16/2023 11:45 AM    HEMATOCRIT 30.0 (L) 02/16/2023 11:45 AM    MCV 87.2 02/16/2023 11:45 AM    MCH 26.5 (L) 02/16/2023 11:45 AM    MCHC 30.3 (L) 02/16/2023 11:45 AM    MPV 10.9 02/16/2023 11:45 AM    NEUTSPOLYS 81.10 (H) 02/16/2023 11:45 AM    LYMPHOCYTES 7.60 (L) 02/16/2023 11:45 AM    MONOCYTES 6.80 02/16/2023 11:45 AM    EOSINOPHILS 3.30 02/16/2023 11:45 AM    BASOPHILS 0.70 02/16/2023 11:45 AM    HYPOCHROMIA 1+ 10/31/2020 10:54 AM    ANISOCYTOSIS 1+ 12/16/2020 11:32 AM    INR 2.24 (H) 02/16/2023 11:45 AM      Lab Results   Component Value Date/Time    CHOLSTRLTOT 142 11/17/2022 09:36 AM    LDL 88 11/17/2022 09:36 AM    HDL 34 (A) 11/17/2022 09:36 AM    TRIGLYCERIDE 102 11/17/2022 09:36 AM       Lab Results   Component Value Date/Time    TROPONINT 39 (H) 02/16/2023 1546     Lab Results   Component Value Date/Time    NTPROBNP 4597 (H) 02/16/2023 1145     Lab Results   Component Value Date/Time    HBA1C 6.1 (H) 11/17/2022 0936   Her troponin has been flat, 44, then 39, up NT proBNP 4597 2 years ago it was 3007.    Cardiac Imaging and Procedures Review:    EKG dated 2/16/23: My personal interpretation is atrial fibrillation, ventricular rate of 87 bpm, nonspecific ST-T changes.  When compared to prior ECG of 4/8/20, premature ventricular beats now present.  Patient had atrial fibrillation in 4/8/20.    Echo dated 9/29/2 to: My personal interpretation is normal left ventricular cavity size and systolic function.  Visually estimated LVEF is around 55%.  Enlarged right ventricular cavity size with preserved systolic function.  Moderate left atrial enlargement and moderate to severe right atrial enlargement.  There appears to be moderate diastolic dysfunction.  LVOT velocity is 0.66 m/s suggesting somewhat decreased cardiac output.  Sclerotic trileaflet aortic valve visually seems to have at least moderate aortic stenosis and trace regurgitation. Mitral valve is not well seen, appears sclerotic.   Cannot completely rule out prolapse as patient does appear to have moderate to severe posteriorly directed regurgitation which would be more consistent if there is prolapse or mall coaptation.  See normal tricuspid valve with moderate to severe tricuspid regurgitation.  RV systolic pressure estimated at 39 mmHg plus right atrial pressure which is considered elevated and estimated at 8 mmHg.  It appears somewhat similar compared to echo 21.    Radiology test Review:  DX-CHEST-PORTABLE (1 VIEW)   Final Result      1.  Mild interstitial pulmonary edema.   2.  Stable enlargement of the cardiomediastinal silhouette.      EC-ECHOCARDIOGRAM COMPLETE W/O CONT    (Results Pending)     Impression and Medical Decision Makin.  Shortness of breath with transient drop in oxygen saturation is not clear.  Although echocardiogram shows normal LVEF, LVOT velocity is decreased suggesting somewhat of a lower cardiac output.  She also does have grade 2-3 diastolic dysfunction with mitral inflow showing somewhat restrictive physiology.  It is difficult to know how much of moderate to severe mitral and tricuspid regurgitation is contributing to her current symptoms.  She has had known moderate to severe tricuspid and mitral regurgitation since  and even prior.  She was clinically doing quite well when she saw Dr. Villar .  Her shortness of breath is likely multifactorial.  For now, would continue diuresis and follow-up on transthoracic echo.  Patient may benefit from a transesophageal echo if transthoracic echo suggests worsening of LVEF or mitral/tricuspid regurgitation.  - Continue diuresis with IV Lasix  - Follow-up on transthoracic echo, if there is any changes from September, would consider transesophageal echo to better assess mitral and tricuspid regurgitation.    2.  Persistent atrial fibrillation, rate controlled.  - Continue bisoprolol 10 mg p.o. daily    3.  Continue Xarelto for prevention of stroke from  atrial fibrillation    Thank you for allowing me to participate in the care of this patient, cardiology will continue to follow.      Echocardiogram performed 2/16/23: My personal interpretation is normal left and right ventricular systolic function.  Likely mild prolapse of the anterior leaflet of the mitral valve with moderate mitral regurgitation posteriorly directed.  The aortic valve appears thickened and sclerotic trileaflet with at least moderate aortic stenosis if not severe.  Usually it appears more severe although not as well seen as I would like.  Mean gradient is only 11 mmHg, but aortic valve area is 0.9 cm² using RVOT diameter of 2 cm.  DVI is 0.28.  Possible she may have low-flow low gradient aortic stenosis.  The transvalvular velocity of the aortic valve was not thoroughly interrogated.  Would consider transesophageal echo to better look at the aortic valve and also mitral valve.     Di Romero M.D.  Cardiologist, [facility] [place]     This note was generated using voice recognition software which has a small chance of producing errors of grammar and possibly content. I have made every reasonable attempt to find and correct any obvious errors, but expect that some may not be found prior to finalization of this note.

## 2023-02-17 NOTE — PROGRESS NOTES
NO HARD CHART:     Patient to procedure Room for GOMEZ with no hard chart, only loose papers. Per Floor RN, no hard chart available. Procedure RN completed Pre-Procedure Consent paperwork packet, including: WHO Yellow Sheet signed by RN and MD, Informed consent for GOMEZ procedure signed by RN, patient, and Cardiology, and Informed consent for Anesthesia signed by Anesthesia MD and patient. Procedure Packet secured to loose papers and hand delivered to receiving PACU RN who acknowledged receipt and no hard chart.    Lower Denture x1 given to PACU RN.

## 2023-02-17 NOTE — ASSESSMENT & PLAN NOTE
Noted improvement from [id number] on echocardiogram  - We will obtain new echo: Estimated right ventricular systolic pressure is 40 mmHg.

## 2023-02-17 NOTE — PROGRESS NOTES
Bedside report received from night RN, pt care assumed, assessment completed. Pt is A&O4, pain 0, Afib on the monitor. Updated on POC, questions answered. Bed in lowest, locked position, treaded socks on, call light and belongings within reach. NPO, waiting for GOMEZ.

## 2023-02-17 NOTE — ANESTHESIA PREPROCEDURE EVALUATION
Date/Time: 02/17/23 1400    Scheduled providers: Celina Maradiaga M.D.; Luis Carlos RIDDLE M.D.    Procedure: EC-GOMEZ W/O CONT    Diagnosis:       Acute on chronic combined systolic and diastolic congestive heart failure (HCC) [I50.43]      Acute on chronic combined systolic and diastolic congestive heart failure (HCC) [I50.43]    Location: [facility] IM[facility] - ECHOCARDIOLOGY [facility]          Relevant Problems   ANESTHESIA   (positive) Sleep apnea      CARDIAC   (positive) AF (atrial fibrillation) (HCC)   (positive) Acute on chronic combined systolic and diastolic congestive heart failure (HCC)   (positive) Coronary artery disease involving native coronary artery of native heart without angina pectoris, s/p 1V CABG 1992   (positive) Essential hypertension   (positive) Moderate to severe pulmonary hypertension (HCC)   (positive) Severe mitral regurgitation      GI   (positive) Gastroesophageal reflux disease without esophagitis         (positive) Hepatic steatosis   (positive) 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) (HCC)   (positive) Stage 3b chronic kidney disease (HCC)      ENDO   (positive) Hypothyroidism   (positive) Postsurgical hypothyroidism       Physical Exam    Airway   Mallampati: I  TM distance: >3 FB  Neck ROM: full       Cardiovascular   Rhythm: irregular  Rate: normal     Dental - normal exam           Pulmonary   Breath sounds clear to auscultation     Abdominal   (+) obese     Neurological - normal exam                 Anesthesia Plan    ASA 3   ASA physical status 3 criteria: CAD/stents (> 3 months)    Plan - MAC               Induction: intravenous      Pertinent diagnostic labs and testing reviewed    Informed Consent:    Anesthetic plan and risks discussed with patient.

## 2023-02-17 NOTE — ASSESSMENT & PLAN NOTE
Hx of valvular disease and follows Dr. Villar outpatient.  Echo reviewed: LVEF 55%, 55%. severely dilated left atrium. Moderate, posteriorly directed eccentric mitral regurgitation jet. Mild tricuspid regurgitation.   - cont iv lasix  - cardiology consulted, s/p GOMEZ 2/17 showing moderate to severe MR and severe TR  and mild to moderate AS.   - We will continue patient on spironolactone, losartan, bisoprolol and xarelto.

## 2023-02-17 NOTE — ANESTHESIA POSTPROCEDURE EVALUATION
Patient: Aleshia Crooks    Procedure Summary     Date: 02/17/23 Room / Location: [facility] ECHOCARDIOLOGY [facility]    Anesthesia Start: 1348 Anesthesia Stop: 1441    Procedure: EC-GOMEZ W/O CONT Diagnosis:       Acute on chronic combined systolic and diastolic congestive heart failure (HCC)      Acute on chronic combined systolic and diastolic congestive heart failure (HCC)    Scheduled Providers: Celina Maradiaga M.D.; Luis Carlos RIDDLE M.D. Responsible Provider: Celina Maradiaga M.D.    Anesthesia Type: MAC ASA Status: 3          Final Anesthesia Type: MAC  Last vitals  BP   Blood Pressure : (!) 87/54    Temp   36.6 °C (97.9 °F)    Pulse   92   Resp   (!) 24    SpO2   100 %      Anesthesia Post Evaluation    Patient location during evaluation: PACU  Patient participation: complete - patient participated  Level of consciousness: awake  Pain score: 0    Airway patency: patent  Anesthetic complications: no  Cardiovascular status: adequate  Respiratory status: acceptable  Hydration status: acceptable    PONV: none          No notable events documented.     Nurse Pain Score: 0 (NPRS)

## 2023-02-17 NOTE — PROGRESS NOTES
Cardiology Follow Up Progress Note    Date of Service  2/17/2023    Attending Physician  Len Handy M.D.    Chief Complaint   Progressive shortness of breath    HPI  Aleshia Crooks is a 83 y.o. female with a CABG x1v 1992 (vein graft to RCA), [facility] 2017 widely patent vein graft to RCA, 3+ MR, EF 45%, chronic/persistent A-fib dating back to 2017 on bisoprolol & Xarelto 15, secondary pulmonary hypertension likely secondary to valvular disease, CKD stage III admitted 2/16/2023 with worsening shortness of breath with exertion x2 weeks.    Interim Events  No overnight cardiac events  Itlxmyjjv-R-ijk rate well controlled  BP appropriate  Plan for GOMEZ this afternoon  Keep n.p.o.  Strict intake and output  Responded well to IV diuretic, SOB improved    Review of Systems  Review of Systems   Respiratory:  Negative for apnea, cough, choking, chest tightness, shortness of breath, wheezing and stridor.      Vital signs in last 24 hours  Temp:  [36.1 °C (97 °F)-37 °C (98.6 °F)] 36.6 °C (97.9 °F)  Pulse:  [] 71  Resp:  [16-27] 18  BP: ()/(54-75) 94/58  SpO2:  [75 %-98 %] 98 %    Physical Exam  Physical Exam  Cardiovascular:      Rate and Rhythm: Normal rate. Rhythm irregular.      Pulses: Normal pulses.      Heart sounds: Murmur heard.   Pulmonary:      Effort: Pulmonary effort is normal.   Skin:     General: Skin is warm.   Neurological:      Mental Status: She is alert.   Psychiatric:         Mood and Affect: Mood normal.       Lab Review  Lab Results   Component Value Date/Time    WBC 9.8 02/17/2023 03:22 AM    RBC 3.44 (L) 02/17/2023 03:22 AM    HEMOGLOBIN 9.1 (L) 02/17/2023 03:22 AM    HEMATOCRIT 31.0 (L) 02/17/2023 03:22 AM    MCV 90.1 02/17/2023 03:22 AM    MCH 26.5 (L) 02/17/2023 03:22 AM    MCHC 29.4 (L) 02/17/2023 03:22 AM    MPV 10.9 02/17/2023 03:22 AM      Lab Results   Component Value Date/Time    SODIUM 134 (L) 02/17/2023 03:22 AM    POTASSIUM 4.2 02/17/2023 03:22 AM    CHLORIDE 98 02/17/2023 03:22  AM    CO2 22 02/17/2023 03:22 AM    GLUCOSE 110 (H) 02/17/2023 03:22 AM    BUN 22 02/17/2023 03:22 AM    CREATININE 1.96 (H) 02/17/2023 03:22 AM      Lab Results   Component Value Date/Time    ASTSGOT 14 02/16/2023 11:45 AM    ALTSGPT 9 02/16/2023 11:45 AM     Lab Results   Component Value Date/Time    CHOLSTRLTOT 142 11/17/2022 09:36 AM    LDL 88 11/17/2022 09:36 AM    HDL 34 (A) 11/17/2022 09:36 AM    TRIGLYCERIDE 102 11/17/2022 09:36 AM    TROPONINT 39 (H) 02/16/2023 03:46 PM       Recent Labs     02/16/23  1145   NTPROBNP 4597*       Cardiac Imaging and Procedures Review  EKG:  My personal interpretation of the EKG dated 2/16/2023 is atrial fibrillation, heart rate 87.    Echocardiogram:    Compared to the prior study on 9/29/22, mitral and tricuspid   regurgitation has improved.  Normal left ventricular size and systolic function.   The left ventricular ejection fraction is estimated to be 55%.  Severely dilated left atrium.  Moderate, posteriorly directed eccentric mitral regurgitation jet.  Mild tricuspid regurgitation.   Estimated right ventricular systolic pressure is 40 mmHg.        Cardiac Catheterization:    Coronary angiography (right and left) 7/18/17 widely patent SVG to RCA, 100% mid RCA, 3+ mitral regurgitation     Imaging  Chest X-Ray:  Mild interstitial pulmonary edema.  2.  Stable enlargement of the cardiomediastinal silhouette     Stress Test:  n/a    Assessment/Plan  #Acute on chronic decompensated systolic heart failure suspect valvular etiology  #Moderate MR  #Mild TR  #Aortic valve area 0.9 cm2  #Persistent A-fib, dating back to 2017  #On Xarelto 15, adjusted based on CKD  #CKD stage IIIb,cr 1.96   #LVEF 55%    Recommendations  -Plan is  to undergo transesophageal echocardiogram this afternoon, risks and benefits discussed, patient is agreeable  -Continue n.p.o.  -Continue furosemide 40 IV daily  -Strict intake and output  -A-fib rate controlled, on bisoprolol  -on rivaroxaban low-dose 15 mg  daily  -On bisoprolol 10 mg  -On losartan 50 mg daily  -Continue spironolactone 25 mg daily    Cardiology will follow along    I personally spent a total of 18  minutes which includes face-to-face time and non-face-to-face time spent on preparing to see the patient, reviewing hospital notes and tests, obtaining history from the patient, performing a medically appropriate exam, counseling and educating the patient, ordering medications/tests/procedures/referrals as clinically indicated, and documenting information in the electronic medical record.     Thank you for allowing me to participate in the care of this patient.  I will continue to follow this patient    Please contact me with any questions.    RAYNA Guzman.   Cardiologist, [facility]  [phone]

## 2023-02-17 NOTE — CARE PLAN
The patient is Stable - Low risk of patient condition declining or worsening    Shift Goals  Clinical Goals: diuresis; echo; monitor respiratory status  Patient Goals: rest and sleep    Progress made toward(s) clinical / shift goals:    Problem: Care Map:  Day 1 Optimal Outcome for the Heart Failure Patient  Goal: Day 1:  Optimal Care of the heart failure patient  Outcome: Progressing  Intervention: For patient's with heart failure exacerbation, identify precipitant (diet, med compliance, etc.) and direct education towards lifestyle changes to prevent exacerbations.  Note: Patient was instructed on low sodium diet with 1500 mL fluid restriction.   Intervention: Instruct patient to write down weights on symptom tracker daily  Note: Daily weight recorded.  Intervention: Ensure daily BMP is ordered by provider  Note: BMP, CBC, and magnesium lab ordered place for 0300 on Friday 2/17/2023  Intervention: Document intake and output per shift.  Communicate with care team if volume status is improving, stalled or worseing.  Note: Strict input and output record every shift.  Intervention: Assess edema every shift (peripheral, sacral, periorbital, perineal/scrotal, and abdominal)  Note: Patient has bilateral lower extremity edema 2+  Intervention: If patient is HFrEF, review for guideline medications (evidence based beta blocker (Toprol XL, carvedilol, bisprolol), ACEI/ARB/ARNI, Aldosterone receptor antagonist).  If not ordered request provider contraindication documentation.  Note: Patient will be started on beta blocker, diuretics (Lasix and Aldactone), and losartan on Friday's morning 2/17/2023       Patient is not progressing towards the following goals:

## 2023-02-17 NOTE — DISCHARGE PLANNING
"Care Transition Team Assessment    RN DEEP spoke with patient at bedside. Role of CM explained. Demographic info verified. Patient states she lives with her adult daughter \"Yamilka\" in [place]. Verbalizes she is \"very independent\" and still drives. Patient states she does use a CPAP machine & her daughter is bringing hers to the hospital. No other DME or outpatient services. Daughter to p/u upon discharge. No CM needs identified at this time.    Information Source  Orientation Level: Oriented X4  Information Given By: Patient  Who is responsible for making decisions for patient? : Patient    Elopement Risk  Legal Hold: No  Ambulatory or Self Mobile in Wheelchair: Yes  Disoriented: No  Psychiatric Symptoms: None  History of Wandering: No  Elopement this Admit: No  Vocalizing Wanting to Leave: No  Displays Behaviors, Body Language Wanting to Leave: No-Not at Risk for Elopement  Elopement Risk: Not at Risk for Elopement    Interdisciplinary Discharge Planning  Lives with - Patient's Self Care Capacity: Adult Children (Adult daughter \"Yamilka\")  Support Systems: Children  Do You Take your Prescribed Medications Regularly: Yes  Able to Return to Previous ADL's: Yes  Mobility Issues: No  Prior Services: Home-Independent  Assistance Needed: No  Durable Medical Equipment: Other - Specify (CPAP machine that dtr is bringing to hospital)    Discharge Preparedness  What is your plan after discharge?: Home with help  Prior Functional Level: Independent with Activities of Daily Living  Difficulity with ADLs: None  Difficulity with IADLs: None    Functional Assesment  Prior Functional Level: Independent with Activities of Daily Living    Vision / Hearing Impairment  Right Eye Vision: Impaired, Wears Glasses  Left Eye Vision: Impaired, Wears Glasses    Domestic Abuse  Have you ever been the victim of abuse or violence?: No    Anticipated Discharge Information  Discharge Disposition: Discharged to home/self care (01)        "

## 2023-02-17 NOTE — PROGRESS NOTES
Patient to floor with RN on monitor in stable condition. VSS. Surgical dressings clean dry intact. Aox4 and on 2 l O2. Family updated. No further needs.

## 2023-02-18 PROBLEM — I35.0 AORTIC STENOSIS: Status: ACTIVE | Noted: 2023-02-18

## 2023-02-18 LAB
ANION GAP SERPL CALC-SCNC: 12 MMOL/L (ref 7–16)
BUN SERPL-MCNC: 23 MG/DL (ref 8–22)
CALCIUM SERPL-MCNC: 9 MG/DL (ref 8.5–10.5)
CHLORIDE SERPL-SCNC: 99 MMOL/L (ref 96–112)
CO2 SERPL-SCNC: 26 MMOL/L (ref 20–33)
CREAT SERPL-MCNC: 1.96 MG/DL (ref 0.5–1.4)
ERYTHROCYTE [DISTWIDTH] IN BLOOD BY AUTOMATED COUNT: 61.2 FL (ref 35.9–50)
GFR SERPLBLD CREATININE-BSD FMLA CKD-EPI: 25 ML/MIN/1.73 M 2
GLUCOSE SERPL-MCNC: 110 MG/DL (ref 65–99)
HCT VFR BLD AUTO: 30.2 % (ref 37–47)
HGB BLD-MCNC: 9 G/DL (ref 12–16)
LV EJECT FRACT  99904: 55
MCH RBC QN AUTO: 26.2 PG (ref 27–33)
MCHC RBC AUTO-ENTMCNC: 29.8 G/DL (ref 33.6–35)
MCV RBC AUTO: 88 FL (ref 81.4–97.8)
PHOSPHATE SERPL-MCNC: 4.8 MG/DL (ref 2.5–4.5)
PLATELET # BLD AUTO: 329 K/UL (ref 164–446)
PMV BLD AUTO: 10.5 FL (ref 9–12.9)
POTASSIUM SERPL-SCNC: 4.2 MMOL/L (ref 3.6–5.5)
RBC # BLD AUTO: 3.43 M/UL (ref 4.2–5.4)
SODIUM SERPL-SCNC: 137 MMOL/L (ref 135–145)
T4 FREE SERPL-MCNC: 1.52 NG/DL (ref 0.93–1.7)
TSH SERPL DL<=0.005 MIU/L-ACNC: 5.1 UIU/ML (ref 0.38–5.33)
WBC # BLD AUTO: 10.7 K/UL (ref 4.8–10.8)

## 2023-02-18 PROCEDURE — 700102 HCHG RX REV CODE 250 W/ 637 OVERRIDE(OP)

## 2023-02-18 PROCEDURE — 84443 ASSAY THYROID STIM HORMONE: CPT

## 2023-02-18 PROCEDURE — A9270 NON-COVERED ITEM OR SERVICE: HCPCS

## 2023-02-18 PROCEDURE — 700102 HCHG RX REV CODE 250 W/ 637 OVERRIDE(OP): Performed by: INTERNAL MEDICINE

## 2023-02-18 PROCEDURE — 85027 COMPLETE CBC AUTOMATED: CPT

## 2023-02-18 PROCEDURE — 84100 ASSAY OF PHOSPHORUS: CPT

## 2023-02-18 PROCEDURE — 770020 HCHG ROOM/CARE - TELE (206)

## 2023-02-18 PROCEDURE — 700101 HCHG RX REV CODE 250: Performed by: STUDENT IN AN ORGANIZED HEALTH CARE EDUCATION/TRAINING PROGRAM

## 2023-02-18 PROCEDURE — 99232 SBSQ HOSP IP/OBS MODERATE 35: CPT | Performed by: STUDENT IN AN ORGANIZED HEALTH CARE EDUCATION/TRAINING PROGRAM

## 2023-02-18 PROCEDURE — 36415 COLL VENOUS BLD VENIPUNCTURE: CPT

## 2023-02-18 PROCEDURE — 700111 HCHG RX REV CODE 636 W/ 250 OVERRIDE (IP): Performed by: INTERNAL MEDICINE

## 2023-02-18 PROCEDURE — A9270 NON-COVERED ITEM OR SERVICE: HCPCS | Performed by: INTERNAL MEDICINE

## 2023-02-18 PROCEDURE — 700111 HCHG RX REV CODE 636 W/ 250 OVERRIDE (IP): Performed by: NURSE PRACTITIONER

## 2023-02-18 PROCEDURE — 700102 HCHG RX REV CODE 250 W/ 637 OVERRIDE(OP): Performed by: STUDENT IN AN ORGANIZED HEALTH CARE EDUCATION/TRAINING PROGRAM

## 2023-02-18 PROCEDURE — 84439 ASSAY OF FREE THYROXINE: CPT

## 2023-02-18 PROCEDURE — 80048 BASIC METABOLIC PNL TOTAL CA: CPT

## 2023-02-18 PROCEDURE — 99232 SBSQ HOSP IP/OBS MODERATE 35: CPT | Mod: FS | Performed by: INTERNAL MEDICINE

## 2023-02-18 PROCEDURE — A9270 NON-COVERED ITEM OR SERVICE: HCPCS | Performed by: STUDENT IN AN ORGANIZED HEALTH CARE EDUCATION/TRAINING PROGRAM

## 2023-02-18 RX ORDER — LOPERAMIDE HYDROCHLORIDE 2 MG/1
2 CAPSULE ORAL ONCE
Status: COMPLETED | OUTPATIENT
Start: 2023-02-18 | End: 2023-02-18

## 2023-02-18 RX ORDER — FUROSEMIDE 10 MG/ML
40 INJECTION INTRAMUSCULAR; INTRAVENOUS
Status: DISCONTINUED | OUTPATIENT
Start: 2023-02-18 | End: 2023-02-19

## 2023-02-18 RX ORDER — LIDOCAINE 50 MG/G
1 PATCH TOPICAL EVERY 24 HOURS
Status: DISCONTINUED | OUTPATIENT
Start: 2023-02-18 | End: 2023-02-19 | Stop reason: HOSPADM

## 2023-02-18 RX ORDER — LOPERAMIDE HYDROCHLORIDE 2 MG/1
2 CAPSULE ORAL 4 TIMES DAILY PRN
Status: DISCONTINUED | OUTPATIENT
Start: 2023-02-18 | End: 2023-02-18

## 2023-02-18 RX ORDER — OMEPRAZOLE 20 MG/1
20 CAPSULE, DELAYED RELEASE ORAL DAILY
Status: DISCONTINUED | OUTPATIENT
Start: 2023-02-18 | End: 2023-02-19 | Stop reason: HOSPADM

## 2023-02-18 RX ADMIN — LIDOCAINE 1 PATCH: 50 PATCH TOPICAL at 14:26

## 2023-02-18 RX ADMIN — RIVAROXABAN 15 MG: 15 TABLET, FILM COATED ORAL at 17:00

## 2023-02-18 RX ADMIN — LOPERAMIDE HYDROCHLORIDE 2 MG: 2 CAPSULE ORAL at 04:42

## 2023-02-18 RX ADMIN — FUROSEMIDE 40 MG: 10 INJECTION, SOLUTION INTRAMUSCULAR; INTRAVENOUS at 16:59

## 2023-02-18 RX ADMIN — ATORVASTATIN CALCIUM 40 MG: 40 TABLET, FILM COATED ORAL at 17:00

## 2023-02-18 RX ADMIN — OMEPRAZOLE 20 MG: 20 CAPSULE, DELAYED RELEASE ORAL at 16:59

## 2023-02-18 RX ADMIN — LEVOTHYROXINE SODIUM 150 MCG: 0.07 TABLET ORAL at 04:42

## 2023-02-18 RX ADMIN — FUROSEMIDE 40 MG: 10 INJECTION, SOLUTION INTRAMUSCULAR; INTRAVENOUS at 04:42

## 2023-02-18 ASSESSMENT — FIBROSIS 4 INDEX
FIB4 SCORE: 1.148936170212765957
FIB4 SCORE: 1.148936170212765957

## 2023-02-18 ASSESSMENT — ENCOUNTER SYMPTOMS
WHEEZING: 0
STRIDOR: 0
SHORTNESS OF BREATH: 1
COUGH: 1
CHOKING: 0
SHORTNESS OF BREATH: 0
APNEA: 0
WEAKNESS: 1
CHEST TIGHTNESS: 0
COUGH: 0

## 2023-02-18 ASSESSMENT — PAIN DESCRIPTION - PAIN TYPE: TYPE: ACUTE PAIN

## 2023-02-18 NOTE — PROGRESS NOTES
Hospital Medicine Daily Progress Note    Date of Service  2/18/2023    Chief Complaint  Aleshia Crooks is a 83 y.o. female admitted 2/16/2023 with sob    Hospital Course  83 y.o. female with hx of  coronary bypass surgery, hypertension, hyperlipidemia, moderate to severe MR, moderate TR, and paroxysmal atrial fibrillation on xarelto, who presented 2/16/2023 with worsening shortness of breath and dyspnea on exertion.   In ED, patient became hypoxic to 75% requiring oxygen with ambulation.   Echo notes: LVEF 55%, 55%. severely dilated left atrium.  Moderate, posteriorly directed eccentric mitral regurgitation jet.  Mild tricuspid regurgitation.   Cardiology was consulted given sob with multivalve disease   S/p GOMEZ 2/17 showing moderate to severe MR and severe TR  and mild to moderate AS.     Interval Problem Update  Seen patient at bedside. Reports sob improving. On 2L O2  Cardiology consulted, s/p GOMEZ 2/17  On iv lasix  Cardiology following     I have discussed this patient's plan of care and discharge plan at IDT rounds today with Case Management, Nursing, Nursing leadership, and other members of the IDT team.    Consultants/Specialty  cardiology    Code Status  Full Code    Disposition  Patient is not medically cleared for discharge.   Anticipate discharge to  TBD .  I have placed the appropriate orders for post-discharge needs.    Review of Systems  Review of Systems   Constitutional:  Positive for malaise/fatigue.   Respiratory:  Positive for cough and shortness of breath.    Neurological:  Positive for weakness.   All other systems reviewed and are negative.     Physical Exam  Temp:  [36.6 °C (97.9 °F)-36.9 °C (98.4 °F)] 36.8 °C (98.2 °F)  Pulse:  [78-95] 90  Resp:  [16-18] 18  BP: ()/(55-63) 103/56  SpO2:  [91 %-98 %] 94 %    Physical Exam  Vitals and nursing note reviewed.   Constitutional:       Appearance: Normal appearance. She is obese.   HENT:      Head: Normocephalic and atraumatic.      Nose:  Nose normal.      Mouth/Throat:      Pharynx: Oropharynx is clear.   Eyes:      Extraocular Movements: Extraocular movements intact.      Conjunctiva/sclera: Conjunctivae normal.      Pupils: Pupils are equal, round, and reactive to light.   Cardiovascular:      Rate and Rhythm: Normal rate. Rhythm irregular.      Pulses: Normal pulses.      Heart sounds: Normal heart sounds.   Pulmonary:      Effort: Pulmonary effort is normal.      Breath sounds: Rales present.      Comments: On oxygen supplements  Abdominal:      General: Abdomen is flat. Bowel sounds are normal.      Palpations: Abdomen is soft.   Musculoskeletal:         General: Normal range of motion.      Cervical back: Normal range of motion and neck supple.   Skin:     General: Skin is warm and dry.   Neurological:      General: No focal deficit present.      Mental Status: She is alert and oriented to person, place, and time. Mental status is at baseline.   Psychiatric:         Mood and Affect: Mood normal.         Behavior: Behavior normal.       Fluids    Intake/Output Summary (Last 24 hours) at 2/18/2023 1541  Last data filed at 2/18/2023 0830  Gross per 24 hour   Intake 390 ml   Output 750 ml   Net -360 ml         Laboratory  Recent Labs     02/16/23  1145 02/17/23  0322 02/18/23  0125   WBC 8.4 9.8 10.7   RBC 3.44* 3.44* 3.43*   HEMOGLOBIN 9.1* 9.1* 9.0*   HEMATOCRIT 30.0* 31.0* 30.2*   MCV 87.2 90.1 88.0   MCH 26.5* 26.5* 26.2*   MCHC 30.3* 29.4* 29.8*   RDW 59.8* 62.3* 61.2*   PLATELETCT 326 332 329   MPV 10.9 10.9 10.5       Recent Labs     02/16/23  1145 02/17/23  0322 02/18/23  0125   SODIUM 136 134* 137   POTASSIUM 4.7 4.2 4.2   CHLORIDE 98 98 99   CO2 23 22 26   GLUCOSE 113* 110* 110*   BUN 22 22 23*   CREATININE 1.97* 1.96* 1.96*   CALCIUM 9.3 9.4 9.0       Recent Labs     02/16/23  1145   APTT 48.4*   INR 2.24*                 Imaging  EC-GOMEZ W/O CONT   Final Result      EC-ECHOCARDIOGRAM COMPLETE W/O CONT   Final Result       DX-CHEST-PORTABLE (1 VIEW)   Final Result      1.  Mild interstitial pulmonary edema.   2.  Stable enlargement of the cardiomediastinal silhouette.             Assessment/Plan  * Acute on chronic combined systolic and diastolic congestive heart failure (HCC)- (present on admission)  Assessment & Plan  Hx of valvular disease and follows Dr. Villar outpatient.  Echo reviewed: LVEF 55%, 55%. severely dilated left atrium. Moderate, posteriorly directed eccentric mitral regurgitation jet. Mild tricuspid regurgitation.   - cont iv lasix  - cardiology consulted, s/p GOMEZ 2/17 showing moderate to severe MR and severe TR  and mild to moderate AS.   - We will continue patient on spironolactone, losartan, bisoprolol and xarelto.      Aortic stenosis  Assessment & Plan  Noted mild to moderate AS on GOMEZ 2/17  On lasix  Cardiology following     Severe tricuspid regurgitation by prior echocardiogram- (present on admission)  Assessment & Plan  severe TR noted on GOMEZ 2/17/23  Cardiology following  On iv lasix    Stage 3b chronic kidney disease (HCC)- (present on admission)  Assessment & Plan  GFR slowly trending downwards currently 25  Creatinine elevated 1.92. Baseline appears to be less than 1.7  - avoid nephrotoxins and renal dosing meds  - Monitor renal function closely on IV Lasix    Obesity (BMI 35.0-39.9 without comorbidity) ([facility])- (present on admission)  Assessment & Plan  BMI 37    Hypothyroidism- (present on admission)  Assessment & Plan  Continue on Levoxyl  Check tsh 5.1, check free T4    Coronary artery disease involving native coronary artery of native heart without angina pectoris, s/p 1V CABG 1992- (present on admission)  Assessment & Plan  History of, following Dr. Villar. Cont statin    AF (atrial fibrillation) ([facility])- (present on admission)  Assessment & Plan  Patient is on bisoprolol and chronic Xarelto  - We will continue Xarelto and bisoprolol     Moderate to severe pulmonary hypertension (HCC)- (present on  admission)  Assessment & Plan  Noted improvement from 6328-3025 on echocardiogram  - We will obtain new echo: Estimated right ventricular systolic pressure is 40 mmHg.     Severe mitral regurgitation- (present on admission)  Assessment & Plan  Moderate to severe MR noted on GOMEZ 2/17  Cardiology following          VTE prophylaxis: therapeutic anticoagulation with xarelto    I have performed a physical exam and reviewed and updated ROS and Plan today (2/18/2023). In review of yesterday's note (2/17/2023), there are no changes except as documented above.

## 2023-02-18 NOTE — PROGRESS NOTES
Cardiology Follow Up Progress Note    Date of Service  2/18/2023    Attending Physician  Len Handy M.D.    Chief Complaint   Progressive shortness of breath    HPI  Aleshia Crooks is a 83 y.o. female with a CABG x1v 1992 (vein graft to RCA), [facility] 2017 widely patent vein graft to RCA, 3+ MR, EF 45%, chronic/persistent A-fib dating back to 2017 on bisoprolol & Xarelto 15, secondary pulmonary hypertension likely secondary to valvular disease, CKD stage III admitted 2/16/2023 with worsening shortness of breath with exertion x2 weeks.    Interim Events  No overnight cardiac events  Jupabnomg-V-vaa rate well controlled  BP appropriate  Responded well to IV diuretic, SOB improved  S/p GOMEZ revealed severe TR, moderate to severe MR, mild to moderate AS  Review of Systems  Review of Systems   Respiratory:  Negative for apnea, cough, choking, chest tightness, shortness of breath, wheezing and stridor.      Vital signs in last 24 hours  Temp:  [36.5 °C (97.7 °F)-36.9 °C (98.4 °F)] 36.9 °C (98.4 °F)  Pulse:  [69-95] 83  Resp:  [14-24] 16  BP: ()/(50-63) 103/58  SpO2:  [91 %-100 %] 96 %    Physical Exam  Physical Exam  Cardiovascular:      Rate and Rhythm: Normal rate. Rhythm irregular.      Pulses: Normal pulses.      Heart sounds: Murmur heard.   Pulmonary:      Effort: Pulmonary effort is normal.   Skin:     General: Skin is warm.   Neurological:      Mental Status: She is alert.   Psychiatric:         Mood and Affect: Mood normal.       Lab Review  Lab Results   Component Value Date/Time    WBC 10.7 02/18/2023 01:25 AM    RBC 3.43 (L) 02/18/2023 01:25 AM    HEMOGLOBIN 9.0 (L) 02/18/2023 01:25 AM    HEMATOCRIT 30.2 (L) 02/18/2023 01:25 AM    MCV 88.0 02/18/2023 01:25 AM    MCH 26.2 (L) 02/18/2023 01:25 AM    MCHC 29.8 (L) 02/18/2023 01:25 AM    MPV 10.5 02/18/2023 01:25 AM      Lab Results   Component Value Date/Time    SODIUM 137 02/18/2023 01:25 AM    POTASSIUM 4.2 02/18/2023 01:25 AM    CHLORIDE 99 02/18/2023  01:25 AM    CO2 26 02/18/2023 01:25 AM    GLUCOSE 110 (H) 02/18/2023 01:25 AM    BUN 23 (H) 02/18/2023 01:25 AM    CREATININE 1.96 (H) 02/18/2023 01:25 AM      Lab Results   Component Value Date/Time    ASTSGOT 14 02/16/2023 11:45 AM    ALTSGPT 9 02/16/2023 11:45 AM     Lab Results   Component Value Date/Time    CHOLSTRLTOT 142 11/17/2022 09:36 AM    LDL 88 11/17/2022 09:36 AM    HDL 34 (A) 11/17/2022 09:36 AM    TRIGLYCERIDE 102 11/17/2022 09:36 AM    TROPONINT 39 (H) 02/16/2023 03:46 PM       Recent Labs     02/16/23  1145   NTPROBNP 4597*         Cardiac Imaging and Procedures Review  EKG:  My personal interpretation of the EKG dated 2/16/2023 is atrial fibrillation, heart rate 87.    Echocardiogram:    Compared to the prior study on 9/29/22, mitral and tricuspid   regurgitation has improved.  Normal left ventricular size and systolic function.   The left ventricular ejection fraction is estimated to be 55%.  Severely dilated left atrium.  Moderate, posteriorly directed eccentric mitral regurgitation jet.  Mild tricuspid regurgitation.   Estimated right ventricular systolic pressure is 40 mmHg.        Cardiac Catheterization:    Coronary angiography (right and left) 7/18/17 widely patent SVG to RCA, 100% mid RCA, 3+ mitral regurgitation     Imaging  Chest X-Ray:  Mild interstitial pulmonary edema.  2.  Stable enlargement of the cardiomediastinal silhouette     Stress Test:  n/a    Assessment/Plan  #Acute on chronic decompensated systolic heart failure suspect valvular etiology  #Moderate -severe MR  #severe TR  #Mild -moderate AS  #Persistent A-fib, dating back to 2017  #On Xarelto 15, adjusted based on CKD  #CKD stage IIIb,cr 1.96   #LVEF 55%    Recommendations  -Continue furosemide 40 IV daily  -Strict intake and output  -A-fib rate controlled, on bisoprolol  -on rivaroxaban low-dose 15 mg daily, adjusted for low GFR  -On losartan 50 mg daily  -Continue spironolactone 25 mg daily    Cardiology will follow  along    I personally spent a total of 18 minutes which includes face-to-face time and non-face-to-face time spent on preparing to see the patient, reviewing hospital notes and tests, obtaining history from the patient, performing a medically appropriate exam, counseling and educating the patient, ordering medications/tests/procedures/referrals as clinically indicated, and documenting information in the electronic medical record.     Thank you for allowing me to participate in the care of this patient.  I will continue to follow this patient    Please contact me with any questions.    RAYNA Guzman.   Cardiologist, [facility]  [phone]

## 2023-02-18 NOTE — CARE PLAN
The patient is Stable - Low risk of patient condition declining or worsening    Shift Goals  Clinical Goals: diuresis, monitor respiratory status  Patient Goals: rest    Progress made toward(s) clinical / shift goals:      Problem: Care Map:  Day 1 Optimal Outcome for the Heart Failure Patient  Goal: Day 1:  Optimal Care of the heart failure patient  Outcome: Progressing  Note: Pt given heart failure booklet. Instructed pt on diet and taking daily weights. Pt ambulatory. Weaning pt o2 as tolerated.      Problem: Knowledge Deficit - Standard  Goal: Patient and family/care givers will demonstrate understanding of plan of care, disease process/condition, diagnostic tests and medications  Outcome: Progressing       Patient is not progressing towards the following goals:

## 2023-02-18 NOTE — PROGRESS NOTES
Assumed care of patient at bedside report from day RN. Updated on POC. Patient currently A & O x 4; on 1 L O2 nasal cannula; up self; without complaints of acute pain. Assessment completed Call light within reach. Whiteboard updated. Fall precautions in place. Bed locked and in lowest position. All questions answered. No other needs indicated at this time.

## 2023-02-18 NOTE — PROGRESS NOTES
Monitor Summary  Rhythm: Afib  Rate: 84-94  Ectopy: rare couplet, frequent PVC, occasional trigem  - / .09 / -

## 2023-02-18 NOTE — PROGRESS NOTES
Assumed care of patient, bedside report received from aydin RN. Updated on POC, call light within reach and fall precautions in place. Bed locked and in lowest position. Patient instructed to call for assistance before getting out of bed. All questions answered, no other needs at this time.

## 2023-02-18 NOTE — CARE PLAN
The patient is Watcher - Medium risk of patient condition declining or worsening    Shift Goals monitor I&O's  Clinical Goals: monitor I&O's  Patient Goals: sleep  Family Goals: n/a    Progress made toward(s) clinical / shift goals:    Problem: Care Map:  Day 2 Optimal Outcome for the Heart Failure Patient  Goal: Day 2:  Optimal Care of the heart failure patient  Outcome: Progressing  Patient educated on importance of ambulation, documenting intake and output, and taking daily weights.     Patient is not progressing towards the following goals:

## 2023-02-19 VITALS
RESPIRATION RATE: 18 BRPM | SYSTOLIC BLOOD PRESSURE: 121 MMHG | OXYGEN SATURATION: 93 % | TEMPERATURE: 98.4 F | DIASTOLIC BLOOD PRESSURE: 64 MMHG | BODY MASS INDEX: 35.55 KG/M2 | HEART RATE: 116 BPM | HEIGHT: 63 IN | WEIGHT: 200.62 LBS

## 2023-02-19 LAB
ANION GAP SERPL CALC-SCNC: 14 MMOL/L (ref 7–16)
BUN SERPL-MCNC: 22 MG/DL (ref 8–22)
CALCIUM SERPL-MCNC: 8.8 MG/DL (ref 8.5–10.5)
CHLORIDE SERPL-SCNC: 98 MMOL/L (ref 96–112)
CO2 SERPL-SCNC: 26 MMOL/L (ref 20–33)
CREAT SERPL-MCNC: 2.05 MG/DL (ref 0.5–1.4)
ERYTHROCYTE [DISTWIDTH] IN BLOOD BY AUTOMATED COUNT: 59.6 FL (ref 35.9–50)
GFR SERPLBLD CREATININE-BSD FMLA CKD-EPI: 24 ML/MIN/1.73 M 2
GLUCOSE SERPL-MCNC: 108 MG/DL (ref 65–99)
HCT VFR BLD AUTO: 30.6 % (ref 37–47)
HGB BLD-MCNC: 9.2 G/DL (ref 12–16)
MAGNESIUM SERPL-MCNC: 1.6 MG/DL (ref 1.5–2.5)
MCH RBC QN AUTO: 26.3 PG (ref 27–33)
MCHC RBC AUTO-ENTMCNC: 30.1 G/DL (ref 33.6–35)
MCV RBC AUTO: 87.4 FL (ref 81.4–97.8)
PHOSPHATE SERPL-MCNC: 3.9 MG/DL (ref 2.5–4.5)
PLATELET # BLD AUTO: 328 K/UL (ref 164–446)
PMV BLD AUTO: 10.5 FL (ref 9–12.9)
POTASSIUM SERPL-SCNC: 3.4 MMOL/L (ref 3.6–5.5)
RBC # BLD AUTO: 3.5 M/UL (ref 4.2–5.4)
SODIUM SERPL-SCNC: 138 MMOL/L (ref 135–145)
WBC # BLD AUTO: 8.5 K/UL (ref 4.8–10.8)

## 2023-02-19 PROCEDURE — A9270 NON-COVERED ITEM OR SERVICE: HCPCS | Performed by: STUDENT IN AN ORGANIZED HEALTH CARE EDUCATION/TRAINING PROGRAM

## 2023-02-19 PROCEDURE — 99239 HOSP IP/OBS DSCHRG MGMT >30: CPT | Performed by: STUDENT IN AN ORGANIZED HEALTH CARE EDUCATION/TRAINING PROGRAM

## 2023-02-19 PROCEDURE — 85027 COMPLETE CBC AUTOMATED: CPT

## 2023-02-19 PROCEDURE — 84100 ASSAY OF PHOSPHORUS: CPT

## 2023-02-19 PROCEDURE — 36415 COLL VENOUS BLD VENIPUNCTURE: CPT

## 2023-02-19 PROCEDURE — 700111 HCHG RX REV CODE 636 W/ 250 OVERRIDE (IP): Performed by: NURSE PRACTITIONER

## 2023-02-19 PROCEDURE — 700102 HCHG RX REV CODE 250 W/ 637 OVERRIDE(OP): Performed by: STUDENT IN AN ORGANIZED HEALTH CARE EDUCATION/TRAINING PROGRAM

## 2023-02-19 PROCEDURE — 700102 HCHG RX REV CODE 250 W/ 637 OVERRIDE(OP): Performed by: NURSE PRACTITIONER

## 2023-02-19 PROCEDURE — 83735 ASSAY OF MAGNESIUM: CPT

## 2023-02-19 PROCEDURE — 80048 BASIC METABOLIC PNL TOTAL CA: CPT

## 2023-02-19 PROCEDURE — 99232 SBSQ HOSP IP/OBS MODERATE 35: CPT | Mod: FS | Performed by: INTERNAL MEDICINE

## 2023-02-19 PROCEDURE — A9270 NON-COVERED ITEM OR SERVICE: HCPCS | Performed by: NURSE PRACTITIONER

## 2023-02-19 PROCEDURE — 700102 HCHG RX REV CODE 250 W/ 637 OVERRIDE(OP): Performed by: INTERNAL MEDICINE

## 2023-02-19 PROCEDURE — A9270 NON-COVERED ITEM OR SERVICE: HCPCS | Performed by: INTERNAL MEDICINE

## 2023-02-19 RX ORDER — POTASSIUM CHLORIDE 20 MEQ/1
40 TABLET, EXTENDED RELEASE ORAL ONCE
Status: COMPLETED | OUTPATIENT
Start: 2023-02-19 | End: 2023-02-19

## 2023-02-19 RX ORDER — FUROSEMIDE 40 MG/1
40 TABLET ORAL DAILY
Qty: 30 TABLET | Refills: 11 | Status: SHIPPED | OUTPATIENT
Start: 2023-02-19 | End: 2023-03-27

## 2023-02-19 RX ORDER — FUROSEMIDE 40 MG/1
40 TABLET ORAL
Status: DISCONTINUED | OUTPATIENT
Start: 2023-02-19 | End: 2023-02-19 | Stop reason: HOSPADM

## 2023-02-19 RX ADMIN — LEVOTHYROXINE SODIUM 150 MCG: 0.07 TABLET ORAL at 05:13

## 2023-02-19 RX ADMIN — OMEPRAZOLE 20 MG: 20 CAPSULE, DELAYED RELEASE ORAL at 05:13

## 2023-02-19 RX ADMIN — ACETAMINOPHEN 650 MG: 325 TABLET, FILM COATED ORAL at 05:13

## 2023-02-19 RX ADMIN — FUROSEMIDE 40 MG: 10 INJECTION, SOLUTION INTRAMUSCULAR; INTRAVENOUS at 05:13

## 2023-02-19 RX ADMIN — FUROSEMIDE 40 MG: 40 TABLET ORAL at 12:53

## 2023-02-19 RX ADMIN — POTASSIUM CHLORIDE 40 MEQ: 1500 TABLET, EXTENDED RELEASE ORAL at 12:53

## 2023-02-19 ASSESSMENT — FIBROSIS 4 INDEX: FIB4 SCORE: 1.15

## 2023-02-19 ASSESSMENT — ENCOUNTER SYMPTOMS
SHORTNESS OF BREATH: 0
COUGH: 0
STRIDOR: 0
CHOKING: 0
WHEEZING: 0
APNEA: 0
CHEST TIGHTNESS: 0

## 2023-02-19 NOTE — DISCHARGE PLANNING
note:  Discussed in IDR, per MD, pending Cardiology consult and diurese with IV Lasix. Possible dc tomorrow.

## 2023-02-19 NOTE — DISCHARGE SUMMARY
Discharge Summary    CHIEF COMPLAINT ON ADMISSION  Chief Complaint   Patient presents with    Shortness of Breath    Weakness     Pt BIB EMS from home. Per EMS for 2 weeks pt has been becoming increasingly short of breath and can barely take a few steps without becoming SOB. Pt does not use O2 at home. Pt does experience left sided chest pressure at night but is not c/o of CP at this time. Pt has hx of CHF.       Reason for Admission  ems     Admission Date  2/16/2023    CODE STATUS  Full Code    HPI & HOSPITAL COURSE  This is a 83 y.o. female with hx of  coronary bypass surgery, hypertension, hyperlipidemia, moderate to severe MR, moderate TR, and paroxysmal atrial fibrillation on xarelto, who presented 2/16/2023 with worsening shortness of breath and dyspnea on exertion.   In ED, patient became hypoxic to 75% requiring oxygen with ambulation.   Echo notes: LVEF 55%, 55%. severely dilated left atrium.  Moderate, posteriorly directed eccentric mitral regurgitation jet.  Mild tricuspid regurgitation.   Patient was diuresed during hospitalization and had significant improvement in respiratory status  Cardiology was consulted and patient underwent GOMEZ on 2/17 showing multi-valvular heart disease mitral valve prolapse resulting in moderate to severe MR. These findings have been seen on prior TTE studies. Also noted to have mild-mod AS on GOMEZ.  Cardiology recommends lasix 40mg daily and spironolactone and continues bisoprolol and Xarelto. She will need repeat BMP as an outpatient to ensure electrolytes and renal function stable and outpatient cardiology follow up.     Therefore, she is discharged in fair and stable condition to home with close outpatient follow-up.    The patient met 2-midnight criteria for an inpatient stay at the time of discharge.    Discharge Date  2/19/23    FOLLOW UP ITEMS POST DISCHARGE  PCP  Cardiology     DISCHARGE DIAGNOSES  Principal Problem:    Acute on chronic combined systolic and diastolic  congestive heart failure (HCC) POA: Yes      Overview: Hx of CHF, Afib, CAD s/p 1v CABG, severe mitral regurgitation      On metoprolol, losartan, lasix, spironolactone      Following with cardiology  Active Problems:    Severe mitral regurgitation POA: Yes      Overview: Hx of severe mitral regurgitation      Moderate MR in recent echo      Per cardiology, may need GOMEZ and procedure when able      Following with cardiology          Moderate to severe pulmonary hypertension (HCC) POA: Yes      Overview: History of SHAKA on CPAP, denies smoking history      On Lasix, spironolactone    AF (atrial fibrillation) ([facility]) POA: Yes      Overview: Rate controlled on metoprolol      Anticoagulation with warfarin, Vit K      Following with anticoagulation clinic    Coronary artery disease involving native coronary artery of native heart without angina pectoris, s/p 1V CABG 1992 POA: Yes      Overview: On losartan, metoprolol, statin      Following with Dr. Villar    Hypothyroidism POA: Yes      Overview: Hx of hyperthyroidism, thyroid nodule s/p thyroidectomy      On Levoxyl.     Obesity (BMI 35.0-39.9 without comorbidity) ([facility]) POA: Yes      Overview: Body mass index is 36.14 kg/m².      Lifestyle modifications          Stage 3b chronic kidney disease (HCC) POA: Yes    Severe tricuspid regurgitation by prior echocardiogram POA: Yes    Aortic stenosis POA: Unknown  Resolved Problems:    * No resolved hospital problems. *      FOLLOW UP  Future Appointments   Date Time Provider Department Center   5/17/2023 11:20 AM Asael Pink M.D. [address]   6/16/2023 11:30 AM Patricia Downs M.D. [facility]     Asael Pink M.D.  [address]  [facility] [address]  [facility] [address]  [phone]    Follow up in 1 week(s)        MEDICATIONS ON DISCHARGE     Medication List        START taking these medications        Instructions   furosemide 40 MG Tabs  Commonly known as: LASIX   Take 1 Tablet by mouth every day.  Dose: 40 mg             CONTINUE taking these medications        Instructions   ascorbic acid 500 MG Tabs  Commonly known as: ascorbic acid   Take 1 Tablet by mouth every day.  Dose: 500 mg     atorvastatin 40 MG Tabs  Commonly known as: LIPITOR   TAKE 1 TABLET BY MOUTH AT  BEDTIME     bisoprolol 10 MG tablet  Commonly known as: ZEBETA   Doctor's comments: Do not substitute atenolol.  This is the medication that cardiology wants  Take 1 Tablet by mouth every day.  Dose: 10 mg     CINNAMON PO   Take 1 Tablet by mouth 2 times a day.  Dose: 1 Tablet     Cosentyx Sensoready Pen 150 MG/ML Soaj  Generic drug: Secukinumab   Doctor's comments: Requests 90 day supply.  Inject 300mg Sub Cut once monthly     ferrous sulfate 325 (65 Fe) MG tablet   Take 1 Tablet by mouth every day.  Dose: 325 mg     Levoxyl 150 MCG Tabs  Generic drug: levothyroxine   Take 1 Tablet by mouth every morning on an empty stomach.  Dose: 150 mcg     loratadine 10 MG Tabs  Commonly known as: CLARITIN   Take 1 Tablet by mouth 2 times a day.  Dose: 10 mg     losartan 50 MG Tabs  Commonly known as: COZAAR   TAKE 1 TABLET BY MOUTH EVERY DAY     omeprazole 20 MG delayed-release capsule  Commonly known as: PRILOSEC   Take 1 Capsule by mouth 2 times a day for 14 days, then take 1 capsule by mouth daily  Dose: 20 mg     rivaroxaban 15 MG Tabs tablet  Commonly known as: XARELTO   Take 1 Tablet by mouth with dinner.  Dose: 15 mg     spironolactone 25 MG Tabs  Commonly known as: ALDACTONE   Take 1 Tablet by mouth every day.  Dose: 25 mg     Vitamin D 125 MCG (5000 UT) Caps   Take 5,000 Units by mouth every day.  Dose: 5,000 Units            STOP taking these medications      amoxicillin 500 MG Caps  Commonly known as: AMOXIL     Potassium 99 MG Tabs              Allergies  Allergies   Allergen Reactions    Cefdinir Shortness of Breath       DIET  Orders Placed This Encounter   Procedures    Diet Order Diet: Cardiac     Standing Status:   Standing     Number of Occurrences:   1      Order Specific Question:   Diet:     Answer:   Cardiac [6]       ACTIVITY  As tolerated.  Weight bearing as tolerated    CONSULTATIONS  cardiology    PROCEDURES  S/p GOMEZ    LABORATORY  Lab Results   Component Value Date    SODIUM 138 02/19/2023    POTASSIUM 3.4 (L) 02/19/2023    CHLORIDE 98 02/19/2023    CO2 26 02/19/2023    GLUCOSE 108 (H) 02/19/2023    BUN 22 02/19/2023    CREATININE 2.05 (H) 02/19/2023        Lab Results   Component Value Date    WBC 8.5 02/19/2023    HEMOGLOBIN 9.2 (L) 02/19/2023    HEMATOCRIT 30.6 (L) 02/19/2023    PLATELETCT 328 02/19/2023      EC-GOMEZ W/O CONT   Final Result      EC-ECHOCARDIOGRAM COMPLETE W/O CONT   Final Result      DX-CHEST-PORTABLE (1 VIEW)   Final Result      1.  Mild interstitial pulmonary edema.   2.  Stable enlargement of the cardiomediastinal silhouette.            Total time of the discharge process 31 minutes.

## 2023-02-19 NOTE — PROGRESS NOTES
Pt escorted off unit via wheelchair with assistance from NP with all belongings without incident.

## 2023-02-19 NOTE — CARE PLAN
The patient is Stable - Low risk of patient condition declining or worsening    Shift Goals  Clinical Goals: Monitor VS, start lasix, monitor I & O  Patient Goals: Rest, DC  Family Goals: No family at bedside    Progress made toward(s) clinical / shift goals:  DC home     Patient is not progressing towards the following goals:

## 2023-02-19 NOTE — PROGRESS NOTES
Cardiology Follow Up Progress Note    Date of Service  2/19/2023    Attending Physician  Len Handy M.D.    Chief Complaint   Progressive shortness of breath    HPI  Aleshia Crooks is a 83 y.o. female with a CABG x1v 1992 (vein graft to RCA), [facility] 2017 widely patent vein graft to RCA, 3+ MR, EF 45%, chronic/persistent A-fib dating back to 2017 on bisoprolol & Xarelto 15, secondary pulmonary hypertension likely secondary to valvular disease, CKD stage III admitted 2/16/2023 with worsening shortness of breath with exertion x2 weeks.    Interim Events  No overnight cardiac events  Tuarfixgx-[id number]-zbl rate well controlled  BP appropriate  Responded well to IV diuretic, SOB improved  Transition to oral furosemide  S/p GOMEZ revealed severe TR, moderate to severe MR, mild to moderate AS  Follow-up appointment in structural heart clinic in 2 weeks will arrange  Stable for discharge  Review of Systems  Review of Systems   Respiratory:  Negative for apnea, cough, choking, chest tightness, shortness of breath, wheezing and stridor.      Vital signs in last 24 hours  Temp:  [36.4 °C (97.5 °F)-37.1 °C (98.8 °F)] 36.9 °C (98.4 °F)  Pulse:  [] 116  Resp:  [18] 18  BP: ()/(53-64) 121/64  SpO2:  [91 %-96 %] 93 %    Physical Exam  Physical Exam  Cardiovascular:      Rate and Rhythm: Normal rate. Rhythm irregular.      Pulses: Normal pulses.      Heart sounds: Murmur heard.   Pulmonary:      Effort: Pulmonary effort is normal.   Skin:     General: Skin is warm.   Neurological:      Mental Status: She is alert.   Psychiatric:         Mood and Affect: Mood normal.       Lab Review  Lab Results   Component Value Date/Time    WBC 8.5 02/19/2023 12:51 AM    RBC 3.50 (L) 02/19/2023 12:51 AM    HEMOGLOBIN 9.2 (L) 02/19/2023 12:51 AM    HEMATOCRIT 30.6 (L) 02/19/2023 12:51 AM    MCV 87.4 02/19/2023 12:51 AM    MCH 26.3 (L) 02/19/2023 12:51 AM    MCHC 30.1 (L) 02/19/2023 12:51 AM    MPV 10.5 02/19/2023 12:51 AM      Lab Results    Component Value Date/Time    SODIUM 138 02/19/2023 12:51 AM    POTASSIUM 3.4 (L) 02/19/2023 12:51 AM    CHLORIDE 98 02/19/2023 12:51 AM    CO2 26 02/19/2023 12:51 AM    GLUCOSE 108 (H) 02/19/2023 12:51 AM    BUN 22 02/19/2023 12:51 AM    CREATININE 2.05 (H) 02/19/2023 12:51 AM      Lab Results   Component Value Date/Time    ASTSGOT 14 02/16/2023 11:45 AM    ALTSGPT 9 02/16/2023 11:45 AM     Lab Results   Component Value Date/Time    CHOLSTRLTOT 142 11/17/2022 09:36 AM    LDL 88 11/17/2022 09:36 AM    HDL 34 (A) 11/17/2022 09:36 AM    TRIGLYCERIDE 102 11/17/2022 09:36 AM    TROPONINT 39 (H) 02/16/2023 03:46 PM       No results for input(s): NTPROBNP in the last 72 hours.      Cardiac Imaging and Procedures Review  EKG:  My personal interpretation of the EKG dated 2/16/2023 is atrial fibrillation, heart rate 87.    Echocardiogram:    Compared to the prior study on 9/29/22, mitral and tricuspid   regurgitation has improved.  Normal left ventricular size and systolic function.   The left ventricular ejection fraction is estimated to be 55%.  Severely dilated left atrium.  Moderate, posteriorly directed eccentric mitral regurgitation jet.  Mild tricuspid regurgitation.   Estimated right ventricular systolic pressure is 40 mmHg.        Cardiac Catheterization:    Coronary angiography (right and left) 7/18/17 widely patent SVG to RCA, 100% mid RCA, 3+ mitral regurgitation     Imaging  Chest X-Ray:  Mild interstitial pulmonary edema.  2.  Stable enlargement of the cardiomediastinal silhouette     Stress Test:  n/a    Assessment/Plan  #Acute on chronic decompensated systolic heart failure suspect valvular etiology  #Moderate -severe MR  #severe TR  #Mild -moderate AS  #Persistent A-fib, dating back to 2017  #On Xarelto 15, adjusted based on CKD  #CKD stage IIIb,cr 1.96   #LVEF 55%    Recommendations  -Transition to oral furosemide  -Repeat BMP in 5 to 7 days, will arrange   -A-fib rate controlled, on bisoprolol  -on  rivaroxaban low-dose 15 mg daily, adjusted for low GFR  -On losartan 50 mg daily  -Continue spironolactone 25 mg daily  Cardiology will sign off  Follow-up appointment scheduled in structural heart clinic in 2 weeks.  Follow-up BMP outpatient, 5 to 7 days, will arrange    I personally spent a total of 18 minutes which includes face-to-face time and non-face-to-face time spent on preparing to see the patient, reviewing hospital notes and tests, obtaining history from the patient, performing a medically appropriate exam, counseling and educating the patient, ordering medications/tests/procedures/referrals as clinically indicated, and documenting information in the electronic medical record.     Thank you for allowing me to participate in the care of this patient.  I will continue to follow this patient    Please contact me with any questions.    RAYNA Guzman.   Cardiologist, [facility]  [phone]

## 2023-02-19 NOTE — DISCHARGE INSTRUCTIONS
Follow-up appointment scheduled in structural heart clinic in 2 weeks.  Follow-up BMP outpatient, 5 to 7 days    Discharge Instructions per Len Handy M.D.    Please follow-up with PCP and cardiology as outpatient  Take medications as prescribed      Return to ER in the event of new or worsening symptoms. Please note importance of compliance and the patient has agreed to proceed with all medical recommendations and follow up plan indicated above. All medications come with benefits and risks. Risks may include permanent injury or death and these risks can be minimized with close reassessment and monitoring. Please make it to your scheduled follow ups with PCP and cardiology      Heart Failure, Diagnosis    Heart failure means that your heart is not able to pump blood in the right way. This makes it hard for your body to work well. Heart failure is usually a long-term (chronic) condition. You must take good care of yourself and follow your treatment plan from your doctor.  What are the causes?  This condition may be caused by:  High blood pressure.  Build up of cholesterol and fat in the arteries.  Heart attack. This injures the heart muscle.  Heart valves that do not open and close properly.  Damage of the heart muscle. This is also called cardiomyopathy.  Lung disease.  Abnormal heart rhythms.  What increases the risk?  The risk of heart failure goes up as a person ages. This condition is also more likely to develop in people who:  Are overweight.  Are male.  Smoke or chew tobacco.  Abuse alcohol or illegal drugs.  Have taken medicines that can damage the heart.  Have diabetes.  Have abnormal heart rhythms.  Have thyroid problems.  Have low blood counts (anemia).  What are the signs or symptoms?  Symptoms of this condition include:  Shortness of breath.  Coughing.  Swelling of the feet, ankles, legs, or belly.  Losing weight for no reason.  Trouble breathing.  Waking from sleep because of the need to sit up and  get more air.  Rapid heartbeat.  Being very tired.  Feeling dizzy, or feeling like you may pass out (faint).  Having no desire to eat.  Feeling like you may vomit (nauseous).  Peeing (urinating) more at night.  Feeling confused.  How is this treated?         This condition may be treated with:  Medicines. These can be given to treat blood pressure and to make the heart muscles stronger.  Changes in your daily life. These may include eating a healthy diet, staying at a healthy body weight, quitting tobacco and illegal drug use, or doing exercises.  Surgery. Surgery can be done to open blocked valves, or to put devices in the heart, such as pacemakers.  A donor heart (heart transplant). You will receive a healthy heart from a donor.  Follow these instructions at home:  Treat other conditions as told by your doctor. These may include high blood pressure, diabetes, thyroid disease, or abnormal heart rhythms.  Learn as much as you can about heart failure.  Get support as you need it.  Keep all follow-up visits as told by your doctor. This is important.  Summary  Heart failure means that your heart is not able to pump blood in the right way.  This condition is caused by high blood pressure, heart attack, or damage of the heart muscle.  Symptoms of this condition include shortness of breath and swelling of the feet, ankles, legs, or belly. You may also feel very tired or feel like you may vomit.  You may be treated with medicines, surgery, or changes in your daily life.  Treat other health conditions as told by your doctor.  This information is not intended to replace advice given to you by your health care provider. Make sure you discuss any questions you have with your health care provider.  Document Released: 09/26/2009 Document Revised: 03/06/2020 Document Reviewed: 03/06/2020  [employer] Patient Education © 2020 [employer]

## 2023-02-19 NOTE — CARE PLAN
The patient is Stable - Low risk of patient condition declining or worsening    Shift Goals  Clinical Goals: monitor I&O's  Patient Goals: sleep  Family Goals: n/a    Progress made toward(s) clinical / shift goals:      Patient is not progressing towards the following goals:      Problem: Care Map:  Admission Optimal Outcome for the Heart Failure Patient.  Discussed fluid balance and monitoring I/Os    Goal: Admission:  Optimal Care of the heart failure patient  Outcome: Progressing     Problem: Knowledge Deficit - Standard  Goal: Patient and family/care givers will demonstrate understanding of plan of care, disease process/condition, diagnostic tests and medications  Outcome: Progressing

## 2023-02-19 NOTE — PROGRESS NOTES
Pt D/C'd. PIV removed. Discharge instructions provided to pt.  Pt states understanding.  Pt states all questions have been answered.  Copy of discharge provided to pt.  Signed copy in chart.  Prescription e-scribed. Pt states that all personal belongings are in possession.  Pt daughter at the bedside to take the pt home.

## 2023-02-19 NOTE — PROGRESS NOTES
Pt a&o 4, VSS, assessment completed. Resting comfortably in bed with call light, bedside table in reach. No c/o at this time. Side rails up 2. Instructed to use call light when needing to get OOB verbalized understanding. Bed alarm on, bed in low position. Will continue to monitor.

## 2023-02-19 NOTE — CARE PLAN
The patient is Stable - Low risk of patient condition declining or worsening    Shift Goals  Clinical Goals: monitor I&O's  Patient Goals: sleep  Family Goals: n/a    Progress made toward(s) clinical / shift goals:  Progressing      Problem: Knowledge Deficit - Standard  Goal: Patient and family/care givers will demonstrate understanding of plan of care, disease process/condition, diagnostic tests and medications  Outcome: Progressing

## 2023-02-21 ENCOUNTER — PATIENT OUTREACH (OUTPATIENT)
Dept: MEDICAL GROUP | Facility: MEDICAL CENTER | Age: 83
End: 2023-02-21
Payer: MEDICARE

## 2023-02-21 ENCOUNTER — TELEPHONE (OUTPATIENT)
Dept: CARDIOLOGY | Facility: MEDICAL CENTER | Age: 83
End: 2023-02-21
Payer: MEDICARE

## 2023-02-21 NOTE — PROGRESS NOTES
Patient outreach for TCM follow up.  Reviewed discharge instructions and new and current medications.  Patient verbalized understanding.  Scheduled appointment with PCP for 4/10/23 .  Discussed peguero failure management as well as fluid balance as patient had episode of low B/P yesterday due to dehydration. She was checked by CHELSEYSA and increased fluid intake resulting in higher B/P. Scheduled for Dr Pink follow up after patient returns from her trip to visit her daughter. Pt currently identified as CCM and followed by Moisés Duffy.

## 2023-02-21 NOTE — TELEPHONE ENCOUNTER
Referral from: Dr. Mcdonnell for sev TR, mod-sev MR    Patient called on 2/21/2023. Scheduled consult with Dr. Gray 3/15/2023.    Phone number given to patient for [facility] for any further questions or concerns.

## 2023-02-24 ENCOUNTER — HOSPITAL ENCOUNTER (OUTPATIENT)
Dept: LAB | Facility: MEDICAL CENTER | Age: 83
End: 2023-02-24
Attending: NURSE PRACTITIONER
Payer: MEDICARE

## 2023-02-24 DIAGNOSIS — I50.43 ACUTE ON CHRONIC COMBINED SYSTOLIC AND DIASTOLIC CONGESTIVE HEART FAILURE (HCC): ICD-10-CM

## 2023-02-24 LAB
ANION GAP SERPL CALC-SCNC: 13 MMOL/L (ref 7–16)
BUN SERPL-MCNC: 33 MG/DL (ref 8–22)
CALCIUM SERPL-MCNC: 9.2 MG/DL (ref 8.5–10.5)
CHLORIDE SERPL-SCNC: 95 MMOL/L (ref 96–112)
CO2 SERPL-SCNC: 27 MMOL/L (ref 20–33)
CREAT SERPL-MCNC: 2.41 MG/DL (ref 0.5–1.4)
GFR SERPLBLD CREATININE-BSD FMLA CKD-EPI: 20 ML/MIN/1.73 M 2
GLUCOSE SERPL-MCNC: 115 MG/DL (ref 65–99)
POTASSIUM SERPL-SCNC: 4.4 MMOL/L (ref 3.6–5.5)
SODIUM SERPL-SCNC: 135 MMOL/L (ref 135–145)

## 2023-02-24 PROCEDURE — 36415 COLL VENOUS BLD VENIPUNCTURE: CPT

## 2023-02-24 PROCEDURE — 80048 BASIC METABOLIC PNL TOTAL CA: CPT

## 2023-03-15 ENCOUNTER — TELEPHONE (OUTPATIENT)
Dept: CARDIOLOGY | Facility: MEDICAL CENTER | Age: 83
End: 2023-03-15

## 2023-03-15 ENCOUNTER — DOCUMENTATION (OUTPATIENT)
Dept: CARDIOLOGY | Facility: MEDICAL CENTER | Age: 83
End: 2023-03-15

## 2023-03-15 ENCOUNTER — OFFICE VISIT (OUTPATIENT)
Dept: CARDIOLOGY | Facility: MEDICAL CENTER | Age: 83
End: 2023-03-15
Payer: MEDICARE

## 2023-03-15 VITALS
HEART RATE: 88 BPM | WEIGHT: 204 LBS | BODY MASS INDEX: 36.14 KG/M2 | HEIGHT: 63 IN | RESPIRATION RATE: 16 BRPM | OXYGEN SATURATION: 99 % | SYSTOLIC BLOOD PRESSURE: 110 MMHG | DIASTOLIC BLOOD PRESSURE: 60 MMHG

## 2023-03-15 DIAGNOSIS — K74.60 LIVER CIRRHOSIS SECONDARY TO NASH (NONALCOHOLIC STEATOHEPATITIS) (HCC): ICD-10-CM

## 2023-03-15 DIAGNOSIS — N18.32 STAGE 3B CHRONIC KIDNEY DISEASE: ICD-10-CM

## 2023-03-15 DIAGNOSIS — K75.81 LIVER CIRRHOSIS SECONDARY TO NASH (NONALCOHOLIC STEATOHEPATITIS) (HCC): ICD-10-CM

## 2023-03-15 DIAGNOSIS — I34.0 SEVERE MITRAL REGURGITATION: ICD-10-CM

## 2023-03-15 DIAGNOSIS — I50.43 ACUTE ON CHRONIC COMBINED SYSTOLIC AND DIASTOLIC CONGESTIVE HEART FAILURE (HCC): ICD-10-CM

## 2023-03-15 DIAGNOSIS — I25.10 CORONARY ARTERY DISEASE INVOLVING NATIVE CORONARY ARTERY OF NATIVE HEART WITHOUT ANGINA PECTORIS: ICD-10-CM

## 2023-03-15 DIAGNOSIS — I10 ESSENTIAL HYPERTENSION: Chronic | ICD-10-CM

## 2023-03-15 DIAGNOSIS — I48.11 LONGSTANDING PERSISTENT ATRIAL FIBRILLATION (HCC): ICD-10-CM

## 2023-03-15 PROCEDURE — 99215 OFFICE O/P EST HI 40 MIN: CPT | Performed by: INTERNAL MEDICINE

## 2023-03-15 RX ORDER — FAMOTIDINE 40 MG/1
40 TABLET, FILM COATED ORAL
COMMUNITY
Start: 2023-03-09 | End: 2023-04-10

## 2023-03-15 ASSESSMENT — PATIENT HEALTH QUESTIONNAIRE - PHQ9
5. POOR APPETITE OR OVEREATING: 0 - NOT AT ALL
SUM OF ALL RESPONSES TO PHQ QUESTIONS 1-9: 7
CLINICAL INTERPRETATION OF PHQ2 SCORE: 3

## 2023-03-15 ASSESSMENT — FIBROSIS 4 INDEX: FIB4 SCORE: 1.15

## 2023-03-15 NOTE — PROGRESS NOTES
[facility] Mitral DOMINIC review:  Suitable for Implant: Yes  Any Additional Comments: SMR. A2/P2 lesion. Restricted P2 w/ A2 override. Wide lesion. Posteriorly directed jet. Leaflet length adequate to grasp. Mitral valve leaflets appear clipable. Recommend 2x 17mm NTW clips due to short restricted P2. MAC and TR observed.

## 2023-03-15 NOTE — TELEPHONE ENCOUNTER
Patient tentatively scheduled for Mitral DOMINIC 4/17/2023 instead of 4/10/2023.    Called patient to inform. Also advised we could do her procedure on 3/27, but she states she will be out of town. Confirmed tentative date of 4/17/2023.

## 2023-03-15 NOTE — PROGRESS NOTES
Valve Program Consultation: 3/15/2023 for tentative Mitral DOMINIC 4/17/2023    Mental Status Assessment:  Appearance: normal  Behavior: normal  Mood/Affect: normal  Thought process/content: normal  Cognition: normal  Functional ability: limited stamina d/t SOB/fatigue  Dental Concerns: full upper/lower dentures, patient denies s/s of oral infection  Further mental assessment needed: no. Patient scored 7 on PHQ-9 questionnaire. Advised to discuss with PCP if warranted for treatment options. Patient states her positive questions were related to her limitations due to sev MR and not related to depression.      Post-op Plan of Care:  Support systems: daughter Yamilka present during consult  Patient understands discharge plan: yes  DME: CPAP, patient advised to bring with to procedure  Home health warranted prior to procedure: No  Social concerns for discharge: None  PCP alerted of social concerns: n/a  POLST: none  Advance directive: has one but not on file, encouraged patient to bring a copy for her chart if she would like  Flu/PNA/COVID vaccines completed: UTD  Post-op goal: improve SOB and symptoms  [place]  Medication instructions: hold vitamin C x5 days, hold Xarelto x48hrs    Concerns prior to procedure: None    Met with patient during New Mitral DOMINIC consult.     All patient's questions and concerns were addressed during this visit. They understood pre-operative and post-operative plan of care.    Reviewed patient Mitral DOMINIC education packet explaining that information is provided regarding preparation for Mitral DOMINIC the night prior, what to expect during the hospital stay, average LOS, and what to look out for post Mitral DOMINIC discharge. Explained that patient will require SBE prophylactic antibiotic prior to any dental treatment post Mitral DOMINIC.     Explained that patient should not eat or drink anything past midnight the day of the procedure. Encouraged patient to wear something clean and comfortable,  easy to get on/off to check in Tuesday morning, time TBD. Patient may have friends and family in the pre-operational area until patient is taken to the operating room, at which time family and friends will be asked to wait on floor 1 [facility] surgical waiting area. On completion of Mitral DOMINIC, heart team will update family. Once update is given, there is some time before family/friends may visit patient in their assigned room. Length of stay on average is one night, however patient may stay longer depending on specific needs at that time. Patient given printed instructions sheet with all above stated instructions. Patient states understanding of all material and education presented today and has no further questions at this time. Encouraged patient again, to contact me with any questions or concerns during this work-up process. Patient states understanding.

## 2023-03-15 NOTE — PROGRESS NOTES
Structural heart initial Consultation Note    Date of note:    3/15/2023      Patient Name: Aleshia Crooks   YOB: 1940  MRN:              [id number]    Chief Complaint: Mitral regurgitation    Aleshia Crooks is a 83 y.o. female  patient presented today with severe mitral regurgitation.  She has history of one-vessel CABG 1992, paroxysmal atrial fibrillation, liver cirrhosis secondary to Gonzalez, hyperlipidemia, CKD stage III with recent hospitalization for congestive heart failure found to have severe mitral regurgitation, moderate to severe tricuspid regurgitation.  She continues to feel significant fatigue, not able to walk inside the house without having shortness of breath.        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) 06/08/2020 2015    Moderate tricuspid regurgitation 11/16/2022    Thyroid disease              Current Outpatient Medications   Medication Sig Dispense Refill    famotidine (PEPCID) 40 MG Tab Take 40 mg by mouth every day.      furosemide (LASIX) 40 MG Tab Take 1 Tablet by mouth every day. 30 Tablet 11    ascorbic acid (ASCORBIC ACID) 500 MG Tab Take 1 Tablet by mouth every day. 90 Tablet 3    ferrous sulfate 325 (65 Fe) MG tablet Take 1 Tablet by mouth every day. 90 Tablet 2    LEVOXYL 150 MCG Tab Take 1 Tablet by mouth every morning on an empty stomach. 90 Tablet 3    omeprazole (PRILOSEC) 20 MG delayed-release capsule Take 1 Capsule by mouth 2 times a day for 14 days, then take 1 capsule by mouth daily 44 Capsule 0    CINNAMON PO Take 1 Tablet by mouth 2 times a day.      atorvastatin (LIPITOR) 40 MG Tab TAKE 1 TABLET BY MOUTH AT  BEDTIME 100 Tablet 3    rivaroxaban (XARELTO) 15 MG Tab  "tablet Take 1 Tablet by mouth with dinner. 90 Tablet 3    spironolactone (ALDACTONE) 25 MG Tab Take 1 Tablet by mouth every day. 90 Tablet 3    losartan (COZAAR) 50 MG Tab TAKE 1 TABLET BY MOUTH EVERY  Tablet 3    Secukinumab (COSENTYX SENSOREADY PEN) 150 MG/ML Solution Auto-injector Inject 300mg Sub Cut once monthly 1.96 mL 6    bisoprolol (ZEBETA) 10 MG tablet Take 1 Tablet by mouth every day. 90 Tablet 4    Cholecalciferol (VITAMIN D) 125 MCG (5000 UT) Cap Take 5,000 Units by mouth every day. 30 Capsule 11    loratadine (CLARITIN) 10 MG Tab Take 1 Tablet by mouth 2 times a day. 60 tablet 5     No current facility-administered medications for this visit.             Physical Exam:  Ambulatory Vitals  /60 (BP Location: Left arm, Patient Position: Sitting, BP Cuff Size: Adult)   Pulse 88   Resp 16   Ht 1.6 m (5' 3\")   Wt 92.5 kg (204 lb)   SpO2 99%    Oxygen Therapy:  Pulse Oximetry: 99 %  BP Readings from Last 4 Encounters:   03/15/23 110/60   02/19/23 121/64   02/03/23 100/62   01/31/23 106/68       Weight/BMI: Body mass index is 36.14 kg/m².  Wt Readings from Last 4 Encounters:   03/15/23 92.5 kg (204 lb)   02/19/23 91 kg (200 lb 9.9 oz)   02/03/23 96.2 kg (212 lb)   01/31/23 96 kg (211 lb 9.6 oz)           General: Well appearing and in no apparent distress  Neck: carotid bruits absent  Lungs: respiratory sounds  normal  Heart: Irregularly irregular rhythm  No palpable thrills on palpation, murmurs present, no rubs,   Lowe extremity edema absent.       EKG 2/16/2023 atrial fibrillation, rate controlled    Transesophageal echocardiogram 2/17/2023 mitral valve prolapse with severe eccentric mitral regurgitation wraparound jet, moderate to severe tricuspid regurgitation.    Medical Decision Making:  Problem List Items Addressed This Visit       Essential hypertension (Chronic)    Severe mitral regurgitation    Acute on chronic combined systolic and diastolic congestive heart failure (HCC)    AF " (atrial fibrillation) (HCC)    Coronary artery disease involving native coronary artery of native heart without angina pectoris, s/p 1V CABG 1992    Stage 3b chronic kidney disease (HCC)    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) (HCC)       She has severe symptomatic mitral regurgitation, acute on chronic diastolic heart failure secondary to this NYHA class III stage C.  She is a good candidate for transcatheter mitral valve repair using MitraClip.  Tricuspid regurgitation most likely will improve after fixing the mitral regurgitation.  Risks benefits of mitral valve clip procedure discussed in detail.  Patient agrees to proceed.    Case discussed with Dr. Mc, our CT surgeon who will see the patient    This note was dictated using Dragon speech recognition software.    Cesar LEDESMA  Interventional cardiologist  [facility], [facility]  [address] [place] [id number]  Phone: [phone]  Fax: [phone]

## 2023-03-20 ENCOUNTER — DOCUMENTATION (OUTPATIENT)
Dept: CARDIOLOGY | Facility: MEDICAL CENTER | Age: 83
End: 2023-03-20
Payer: MEDICARE

## 2023-03-20 NOTE — PROGRESS NOTES
REFERRING PHYSICIAN: Cesar Gray MD    CONSULTING PHYSICIAN: Christine Mc MD, FACS    CHIEF COMPLAINT: Fatigue and shortness of breath    HISTORY OF PRESENT ILLNESS: The patient is an 83 y.o. female with medical history of one-vessel CABG in 1992, paroxysmal atrial fibrillation, liver cirrhosis, hyperlipidemia, chronic kidney disease stage III, recent congestive heart failure hospitalization, severe mitral regurgitation, moderate to severe tricuspid regurgitation.  She complains of significant fatigue and exertional shortness of breath.  She is not able to walk inside her house without having shortness of breath.Today, she states she is no longer taking Xarelto after her hospitalization and  has an exercise capacity of only a few steps before getting short of breath and having to stop to rest. She denies, chest pain, lower extremity edema, dizziness, syncope, orthopnea, or PND.    PAST MEDICAL HISTORY:   Active Ambulatory Problems     Diagnosis Date Noted    Microcytic anemia 07/13/2017    Anxiety 07/13/2017    Severe mitral regurgitation 07/14/2017    Moderate to severe pulmonary hypertension (HCC) 07/14/2017    Acute on chronic combined systolic and diastolic congestive heart failure (HCC) 07/14/2017    AF (atrial fibrillation) (HCC) 07/14/2017    Coronary artery disease involving native coronary artery of native heart without angina pectoris, s/p 1V CABG 1992 07/14/2017    History of malignant melanoma 03/12/2020    Skin lesions 03/12/2020    Essential hypertension 03/12/2020    Hypothyroidism 03/12/2020    Obesity (BMI 35.0-39.9 without comorbidity) (HCC) 03/12/2020    Sleep apnea 03/12/2020    Elevated troponin 04/08/2020    Anemia 04/08/2020    Abnormal CT of the abdomen 04/17/2020    Mixed hyperlipidemia 04/17/2020    Hepatic steatosis 04/17/2020    Elevated alkaline phosphatase level 04/17/2020    Gastroesophageal reflux disease without esophagitis 06/08/2020    Stage 3b chronic kidney disease  (HCC) 09/11/2020    Falls 03/01/2021    Stress incontinence 03/01/2021    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) (HCC) 03/25/2021    Situational stress 10/27/2021    Irritability 10/27/2021    Postsurgical hypothyroidism 12/14/2021    Severe tricuspid regurgitation by prior echocardiogram 11/16/2022    Elevated fasting glucose 11/16/2022    Acute blood loss anemia 01/23/2023    Aortic stenosis 02/18/2023     Resolved Ambulatory Problems     Diagnosis Date Noted    Hyperthyroidism 07/13/2017    Acute hypoxemic respiratory failure (HCC) 04/08/2020    Abnormal CBC 06/08/2020    Malignant melanoma of left upper extremity including shoulder (HCC) 06/08/2020    Dermatitis 03/01/2021    Caregiver stress 10/27/2021    CKD (chronic kidney disease) stage 4, GFR 15-29 ml/min (HCC) 10/27/2021    GI bleed 01/23/2023     Past Medical History:   Diagnosis Date    Apnea, sleep     Atrial fibrillation (HCC)     Gasping for breath     Heart attack (HCC)     Hyperlipidemia     Hypertension     Moderate tricuspid regurgitation 11/16/2022    Thyroid disease        PAST SURGICAL HISTORY:   Past Surgical History:   Procedure Laterality Date    NC COLONOSCOPY,DIAGNOSTIC N/A 1/24/2023    Procedure: COLONOSCOPY;  Surgeon: Amy Zarate M.D.;  Location: SURGERY SAME DAY [facility];  Service: Gastroenterology    NC UPPER GI ENDOSCOPY,DIAGNOSIS N/A 1/24/2023    Procedure: GASTROSCOPY;  Surgeon: Amy Zarate M.D.;  Location: SURGERY SAME DAY [facility];  Service: Gastroenterology    NC UPPER GI ENDOSCOPY,BIOPSY N/A 1/24/2023    Procedure: GASTROSCOPY, WITH BIOPSY;  Surgeon: Amy Zarate M.D.;  Location: SURGERY SAME DAY [facility];  Service: Gastroenterology    CHOLECYSTECTOMY      EYE SURGERY Bilateral     cataracts    MULTIPLE CORONARY ARTERY BYPASS      1 bypass    THYROIDECTOMY TOTAL          ALLERGIES:   Allergies   Allergen Reactions    Cefdinir Shortness of Breath    Ibuprofen     Morphine     Morphine  Sulfate     Morphine-Naltrexone         CURRENT MEDICATIONS:   Current Outpatient Medications:     famotidine (PEPCID) 40 MG Tab, Take 40 mg by mouth every day., Disp: , Rfl:     furosemide (LASIX) 40 MG Tab, Take 1 Tablet by mouth every day., Disp: 30 Tablet, Rfl: 11    ascorbic acid (ASCORBIC ACID) 500 MG Tab, Take 1 Tablet by mouth every day., Disp: 90 Tablet, Rfl: 3    ferrous sulfate 325 (65 Fe) MG tablet, Take 1 Tablet by mouth every day., Disp: 90 Tablet, Rfl: 2    LEVOXYL 150 MCG Tab, Take 1 Tablet by mouth every morning on an empty stomach., Disp: 90 Tablet, Rfl: 3    omeprazole (PRILOSEC) 20 MG delayed-release capsule, Take 1 Capsule by mouth 2 times a day for 14 days, then take 1 capsule by mouth daily, Disp: 44 Capsule, Rfl: 0    CINNAMON PO, Take 1 Tablet by mouth 2 times a day., Disp: , Rfl:     atorvastatin (LIPITOR) 40 MG Tab, TAKE 1 TABLET BY MOUTH AT  BEDTIME, Disp: 100 Tablet, Rfl: 3    rivaroxaban (XARELTO) 15 MG Tab tablet, Take 1 Tablet by mouth with dinner., Disp: 90 Tablet, Rfl: 3    spironolactone (ALDACTONE) 25 MG Tab, Take 1 Tablet by mouth every day., Disp: 90 Tablet, Rfl: 3    losartan (COZAAR) 50 MG Tab, TAKE 1 TABLET BY MOUTH EVERY DAY, Disp: 100 Tablet, Rfl: 3    Secukinumab (COSENTYX SENSOREADY PEN) 150 MG/ML Solution Auto-injector, Inject 300mg Sub Cut once monthly, Disp: 1.96 mL, Rfl: 6    bisoprolol (ZEBETA) 10 MG tablet, Take 1 Tablet by mouth every day., Disp: 90 Tablet, Rfl: 4    Cholecalciferol (VITAMIN D) 125 MCG (5000 UT) Cap, Take 5,000 Units by mouth every day., Disp: 30 Capsule, Rfl: 11    loratadine (CLARITIN) 10 MG Tab, Take 1 Tablet by mouth 2 times a day., Disp: 60 tablet, Rfl: 5    FAMILY HISTORY:   Family History   Problem Relation Age of Onset    Cancer Mother         cancer, smoker    Stroke Maternal Grandmother     Other Other         Crohn    Kidney Disease Other         kidney transplant        SOCIAL HISTORY:   Social History     Socioeconomic History     Marital status:      Spouse name: Not on file    Number of children: Not on file    Years of education: Not on file    Highest education level: Not on file   Occupational History     Comment: [employer]   Tobacco Use    Smoking status: Former     Packs/day: 1.00     Years: 31.00     Pack years: 31.00     Types: Cigarettes     Quit date:      Years since quittin.2    Smokeless tobacco: Never   Vaping Use    Vaping Use: Never used   Substance and Sexual Activity    Alcohol use: No    Drug use: No    Sexual activity: Not Currently     Partners: Male   Other Topics Concern    Not on file   Social History Narrative    Not on file     Social Determinants of Health     Financial Resource Strain: Low Risk     Difficulty of Paying Living Expenses: Not hard at all   Food Insecurity: No Food Insecurity    Worried About Running Out of Food in the Last Year: Never true    Ran Out of Food in the Last Year: Never true   Transportation Needs: No Transportation Needs    Lack of Transportation (Medical): No    Lack of Transportation (Non-Medical): No   Physical Activity: Inactive    Days of Exercise per Week: 0 days    Minutes of Exercise per Session: 0 min   Stress: No Stress Concern Present    Feeling of Stress : Not at all   Social Connections: Socially Isolated    Frequency of Communication with Friends and Family: More than three times a week    Frequency of Social Gatherings with Friends and Family: Once a week    Attends Islam Services: Never    Active Member of Clubs or Organizations: No    Attends Club or Organization Meetings: Never    Marital Status:    Intimate Partner Violence: Not At Risk    Fear of Current or Ex-Partner: No    Emotionally Abused: No    Physically Abused: No    Sexually Abused: No   Housing Stability: Low Risk     Unable to Pay for Housing in the Last Year: No    Number of Places Lived in the Last Year: 1    Unstable Housing in the Last Year: No     REVIEW OF SYSTEMS:  Review  "of Systems   Constitutional:  Negative for chills, fever and weight loss.   HENT: Negative.     Eyes: Negative.    Respiratory:  Positive for shortness of breath.    Cardiovascular:  Positive for leg swelling. Negative for chest pain.   Gastrointestinal: Negative.    Genitourinary: Negative.    Musculoskeletal:  Positive for joint pain.   Skin: Negative.    Neurological:  Positive for dizziness.   Endo/Heme/Allergies: Negative.    Psychiatric/Behavioral: Negative.       PHYSICAL EXAMINATION:    /60 (BP Location: Left arm, Patient Position: Sitting, BP Cuff Size: Adult long)   Pulse 90   Temp 35.8 °C (96.5 °F) (Temporal)   Ht 1.6 m (5' 3\")   Wt 93.5 kg (206 lb 3.2 oz)   LMP  (LMP Unknown)   SpO2 94%   BMI 36.53 kg/m²     Physical Exam  Constitutional:       General: She is not in acute distress.  HENT:      Head: Normocephalic.   Eyes:      Pupils: Pupils are equal, round, and reactive to light.   Cardiovascular:      Rate and Rhythm: Normal rate and regular rhythm.      Heart sounds: Murmur heard.   Systolic murmur is present with a grade of 4/6.     No gallop.   Pulmonary:      Effort: Pulmonary effort is normal. No respiratory distress.      Breath sounds: Normal breath sounds. No wheezing or rales.   Abdominal:      General: Bowel sounds are normal. There is no distension.      Palpations: Abdomen is soft.      Tenderness: There is no abdominal tenderness.   Musculoskeletal:         General: Normal range of motion.      Cervical back: Neck supple.   Skin:     General: Skin is warm and dry.   Neurological:      Mental Status: She is alert and oriented to person, place, and time.   Psychiatric:         Mood and Affect: Mood and affect normal.         Cognition and Memory: Memory normal.         Judgment: Judgment normal.     LABS REVIEWED:  Lab Results   Component Value Date/Time    SODIUM 135 02/24/2023 11:35 AM    POTASSIUM 4.4 02/24/2023 11:35 AM    CHLORIDE 95 (L) 02/24/2023 11:35 AM    CO2 27 " 02/24/2023 11:35 AM    GLUCOSE 115 (H) 02/24/2023 11:35 AM    BUN 33 (H) 02/24/2023 11:35 AM    CREATININE 2.41 (H) 02/24/2023 11:35 AM      Lab Results   Component Value Date/Time    PROTHROMBTM 24.2 (H) 02/16/2023 11:45 AM    INR 2.24 (H) 02/16/2023 11:45 AM      Lab Results   Component Value Date/Time    WBC 8.5 02/19/2023 12:51 AM    RBC 3.50 (L) 02/19/2023 12:51 AM    HEMOGLOBIN 9.2 (L) 02/19/2023 12:51 AM    HEMATOCRIT 30.6 (L) 02/19/2023 12:51 AM    MCV 87.4 02/19/2023 12:51 AM    MCH 26.3 (L) 02/19/2023 12:51 AM    MCHC 30.1 (L) 02/19/2023 12:51 AM    MPV 10.5 02/19/2023 12:51 AM    NEUTSPOLYS 81.10 (H) 02/16/2023 11:45 AM    LYMPHOCYTES 7.60 (L) 02/16/2023 11:45 AM    MONOCYTES 6.80 02/16/2023 11:45 AM    EOSINOPHILS 3.30 02/16/2023 11:45 AM    BASOPHILS 0.70 02/16/2023 11:45 AM    HYPOCHROMIA 1+ 10/31/2020 10:54 AM    ANISOCYTOSIS 1+ 12/16/2020 11:32 AM        IMAGING REVIEWED AND INTERPRETED:    ECHOCARDIOGRAM: 2/16/23  The left ventricular ejection fraction is estimated to be 55%.  Severely dilated left atrium.  Moderate, posteriorly directed eccentric mitral regurgitation jet.  Mild tricuspid regurgitation.   Estimated right ventricular systolic pressure is 40 mmHg.    GOMEZ: 2/17/23  The left ventricular ejection fraction is visually estimated to be 55%.   Severely dilated left atrium.  Moderately dilated right atrium.  There is severe tricuspid regurgitation.  There is prolapse of the anterior leaflet of the mitral valve resulting   in posteriorly directed regurgitant jet. There is moderate to severe   mitral regurgitation.   The aortic valve is mildly calcified with restriction in motion of the   non coronary and right coronary cusp.  There is mild to moderate aortic   valve stenosis.    ANGIOGRAM: None      IMPRESSION:  Severe symptomatic mitral regurgitation (4+, degenerative), mitral valve prolapse, paroxysmal atrial fibrillation, congestive heart failure with preserved ejection fraction, s/p  coronary artery bypass grafting x1 in 1992, liver cirrhosis, chronic kidney disease stage III, dyslipidemia    PLAN:  I do not recommend redo median sternotomy and mitral valve repair or replacement due to excessive operative risk.  I estimate this to be greater than 10%.  The STS mortality risk score is 7% and the morbidity and mortality risk score is 30% for mitral valve repair. The STS mortality risk score is 11% and the morbidity and mortality risk score is 46% for mitral valve replacement. The scores were discussed with patient.    She is a good candidate for transcatheter [id number] mitral valve repair (DOMINIC, MitraClip) and I recommend completing her work-up.  The procedure, its risks, benefits, potential complications and alternative treatments were discussed with the patient in detail.    Findings and recommendations have been discussed with the patient’s cardiologist, Cesar Gray MD.  Thank you for this very challenging consultation and participation in the patient’s care.  I will keep you apprised of all future developments.    Sincerely,    Christine Mc MD, FACS

## 2023-03-20 NOTE — PROGRESS NOTES
Valve Program Functional Assessment: Pre DOMINIC     [id number]   1a) Showering/bathin  1b) Walking 1 block on ground: 1  1c) Hurrying or joggin  2) Swellin  3) Fatigue: 1  4) Shortness of breath: 1  5) Sleep sitting up: 5  6) Limited enjoyment of life: 2  7) Spend the rest of your life with HF: 1  8a) Hobbies, recreational activities:2  8b) Working or doing household chores:2  8c) Visiting family or friends: 3     Strength   1) _20_____ kg  2) _20_____ kg  3) _20_____ kg  AVG:__20____    KING ADLs  Patient independently preforms...   - Bathing: Yes   - Dressing: Yes   - Toileting: Yes   - Transferring: Yes   - Continence: Yes   - Feeding: Yes   Total Score: _6_/6    Living Situation  Patient lives: with adult child or relative    Mobility Aids   Patient uses: none      FRAILTY SCORE: _0_/ 3

## 2023-03-21 ENCOUNTER — PATIENT OUTREACH (OUTPATIENT)
Dept: HEALTH INFORMATION MANAGEMENT | Facility: OTHER | Age: 83
End: 2023-03-21
Payer: MEDICARE

## 2023-03-21 DIAGNOSIS — N18.4 CKD (CHRONIC KIDNEY DISEASE) STAGE 4, GFR 15-29 ML/MIN (HCC): ICD-10-CM

## 2023-03-21 DIAGNOSIS — I50.42 CHRONIC COMBINED SYSTOLIC AND DIASTOLIC CONGESTIVE HEART FAILURE (HCC): ICD-10-CM

## 2023-03-21 DIAGNOSIS — I10 ESSENTIAL HYPERTENSION: ICD-10-CM

## 2023-03-27 ENCOUNTER — PATIENT MESSAGE (OUTPATIENT)
Dept: HEALTH INFORMATION MANAGEMENT | Facility: OTHER | Age: 83
End: 2023-03-27

## 2023-03-27 ENCOUNTER — TELEPHONE (OUTPATIENT)
Dept: CARDIOLOGY | Facility: MEDICAL CENTER | Age: 83
End: 2023-03-27
Payer: MEDICARE

## 2023-03-27 DIAGNOSIS — I34.0 SEVERE MITRAL REGURGITATION: ICD-10-CM

## 2023-03-27 DIAGNOSIS — I07.1 SEVERE TRICUSPID REGURGITATION BY PRIOR ECHOCARDIOGRAM: ICD-10-CM

## 2023-03-27 DIAGNOSIS — R06.02 SHORTNESS OF BREATH: ICD-10-CM

## 2023-03-27 RX ORDER — FUROSEMIDE 40 MG/1
40 TABLET ORAL 2 TIMES DAILY
Qty: 180 TABLET | Refills: 3 | Status: SHIPPED | OUTPATIENT
Start: 2023-03-27 | End: 2023-04-07

## 2023-03-27 NOTE — TELEPHONE ENCOUNTER
Patient's daughter Zack called to notify us that she is taking the patient to the ER because patient is extremely SOB when trying to get in the car to go to get labs. They prefer she go to [facility] in [place] vs [facility], so they are calling an ambulance for transport. Agreed with POC and encouraged Zack to reach out with any questions or concerns.     FYI to Dr. Gray and Perla KELLER

## 2023-03-27 NOTE — TELEPHONE ENCOUNTER
SHONA Martínez.  You 7 minutes ago (10:30 AM)     Concern that she may need ER visit for eval if symptoms progress.     STAT BMP, BNP, CBC for eval.     Increase furosemide to 40 mg BID, cont aldactone 25 mg QD, STOP losartan, cont bisoprolol at night for now. Keep track of BID vitals. If at rest symptoms worsen, present to ER. SC      Called patient back to inform of APRN's recommendations. Patient verbalizes understanding. Sent [employer] message with medication changes and instructions.     Labs ordered and faxed to [facility] in [place] [phone], med rec updated.     ER precautions discussed for worsening symptoms.

## 2023-03-27 NOTE — PROGRESS NOTES
Spoke with Aleshia, explained she is no longer eligible for PCM program as she no longer has SCP. Aleshia verbalized understanding. Sent [id number] message with RN Care Coordinator information, explained if anything changes she could call.

## 2023-03-27 NOTE — TELEPHONE ENCOUNTER
Received VM from patient wanting a call back to discuss her symptoms.     Called patient back to discuss. She states she has completely lost her appetite, so not eating is making her feel worse. She also is having worsening JONES, and can only take a few steps before becoming out of breath. This improves with rest. She is now unable to lay flat. Denies weight gain/edema.     When her SBP is in the high 70s-80s, she doesn't take her losartan.   BP: 80s/50s-100s/60s.     She feels lightheaded when her BP is low. We discussed when this happens to lay down and elevate her legs above the level of her heart or sit upright and place her head between her legs until the lightheadedness passes.     Confirmed she takes her medications per the med rec with the exception of skipping her losartan when hypotensive.     To Perla KELLER to advise

## 2023-03-28 ENCOUNTER — TELEPHONE (OUTPATIENT)
Dept: CARDIOLOGY | Facility: MEDICAL CENTER | Age: 83
End: 2023-03-28
Payer: MEDICARE

## 2023-03-28 NOTE — TELEPHONE ENCOUNTER
Received phone call from the hospitalist, Dr. Ellen Schoenfelder, at [facility] in [facility]. She stated the patient had a Hgb of 5 and is receiving her second blood transfusion. She wanted a call back to discuss further.     To Dr. Gray

## 2023-04-05 ENCOUNTER — TELEPHONE (OUTPATIENT)
Dept: CARDIOLOGY | Facility: MEDICAL CENTER | Age: 83
End: 2023-04-05

## 2023-04-05 ENCOUNTER — OFFICE VISIT (OUTPATIENT)
Dept: CARDIOTHORACIC SURGERY | Facility: MEDICAL CENTER | Age: 83
End: 2023-04-05
Payer: MEDICARE

## 2023-04-05 VITALS
TEMPERATURE: 96.5 F | SYSTOLIC BLOOD PRESSURE: 108 MMHG | DIASTOLIC BLOOD PRESSURE: 60 MMHG | BODY MASS INDEX: 36.54 KG/M2 | OXYGEN SATURATION: 94 % | HEIGHT: 63 IN | HEART RATE: 90 BPM | WEIGHT: 206.2 LBS

## 2023-04-05 DIAGNOSIS — I34.0 NONRHEUMATIC MITRAL VALVE REGURGITATION: ICD-10-CM

## 2023-04-05 DIAGNOSIS — I34.0 SEVERE MITRAL REGURGITATION: ICD-10-CM

## 2023-04-05 DIAGNOSIS — Z00.6 EXAMINATION OF PARTICIPANT IN CLINICAL TRIAL: ICD-10-CM

## 2023-04-05 PROCEDURE — 99205 OFFICE O/P NEW HI 60 MIN: CPT | Performed by: THORACIC SURGERY (CARDIOTHORACIC VASCULAR SURGERY)

## 2023-04-05 RX ORDER — PANTOPRAZOLE SODIUM 40 MG/1
40 TABLET, DELAYED RELEASE ORAL 2 TIMES DAILY
COMMUNITY
Start: 2023-03-30 | End: 2023-04-10 | Stop reason: SDUPTHER

## 2023-04-05 ASSESSMENT — ENCOUNTER SYMPTOMS
PSYCHIATRIC NEGATIVE: 1
GASTROINTESTINAL NEGATIVE: 1
FEVER: 0
CHILLS: 0
SHORTNESS OF BREATH: 1
WEIGHT LOSS: 0
EYES NEGATIVE: 1
DIZZINESS: 1

## 2023-04-05 ASSESSMENT — FIBROSIS 4 INDEX: FIB4 SCORE: 1.15

## 2023-04-05 NOTE — TELEPHONE ENCOUNTER
Ekaterina Benoit R.N.  Sofy Mortensen, Med Ass't  Can you please request STAT records from [facility] in [facility] for patient's most recent hospitalization? Thank you!!       Pending records

## 2023-04-05 NOTE — TELEPHONE ENCOUNTER
Task to Sofy ABBOTT to request STAT records from [facility] in [facility].    Called patient to see how she's doing. She states she had an upper GI bleed. She states they did not do any invasive testing, but put her on Protonix, and she's been stable. She has not had a follow up or labs since DC, but has an appt scheduled with her PCP on 4/10.     She is agreeable to proceeding with Mitral DOMINIC as previously discussed on 4/17.

## 2023-04-06 ENCOUNTER — APPOINTMENT (OUTPATIENT)
Dept: ADMISSIONS | Facility: MEDICAL CENTER | Age: 83
DRG: 266 | End: 2023-04-06
Attending: INTERNAL MEDICINE
Payer: MEDICARE

## 2023-04-07 ENCOUNTER — HOSPITAL ENCOUNTER (EMERGENCY)
Facility: MEDICAL CENTER | Age: 83
End: 2023-04-08
Attending: EMERGENCY MEDICINE
Payer: MEDICARE

## 2023-04-07 ENCOUNTER — HOSPITAL ENCOUNTER (OUTPATIENT)
Dept: LAB | Facility: MEDICAL CENTER | Age: 83
End: 2023-04-07
Attending: NURSE PRACTITIONER
Payer: MEDICARE

## 2023-04-07 ENCOUNTER — TELEPHONE (OUTPATIENT)
Dept: CARDIOLOGY | Facility: MEDICAL CENTER | Age: 83
End: 2023-04-07
Payer: MEDICARE

## 2023-04-07 DIAGNOSIS — I50.9 ACUTE ON CHRONIC CONGESTIVE HEART FAILURE, UNSPECIFIED HEART FAILURE TYPE (HCC): Primary | ICD-10-CM

## 2023-04-07 DIAGNOSIS — K74.60 LIVER CIRRHOSIS SECONDARY TO NASH (NONALCOHOLIC STEATOHEPATITIS) (HCC): ICD-10-CM

## 2023-04-07 DIAGNOSIS — K75.81 LIVER CIRRHOSIS SECONDARY TO NASH (NONALCOHOLIC STEATOHEPATITIS) (HCC): ICD-10-CM

## 2023-04-07 DIAGNOSIS — I10 ESSENTIAL HYPERTENSION: Chronic | ICD-10-CM

## 2023-04-07 DIAGNOSIS — G47.33 OBSTRUCTIVE SLEEP APNEA TREATED WITH CONTINUOUS POSITIVE AIRWAY PRESSURE (CPAP): ICD-10-CM

## 2023-04-07 DIAGNOSIS — I34.0 SEVERE MITRAL REGURGITATION: ICD-10-CM

## 2023-04-07 DIAGNOSIS — I07.1 SEVERE TRICUSPID REGURGITATION BY PRIOR ECHOCARDIOGRAM: ICD-10-CM

## 2023-04-07 DIAGNOSIS — I48.0 PAROXYSMAL ATRIAL FIBRILLATION (HCC): ICD-10-CM

## 2023-04-07 DIAGNOSIS — R06.02 SHORTNESS OF BREATH: ICD-10-CM

## 2023-04-07 LAB
ANION GAP SERPL CALC-SCNC: 15 MMOL/L (ref 7–16)
BUN SERPL-MCNC: 40 MG/DL (ref 8–22)
CALCIUM SERPL-MCNC: 9.3 MG/DL (ref 8.5–10.5)
CHLORIDE SERPL-SCNC: 103 MMOL/L (ref 96–112)
CO2 SERPL-SCNC: 21 MMOL/L (ref 20–33)
CREAT SERPL-MCNC: 2.22 MG/DL (ref 0.5–1.4)
ERYTHROCYTE [DISTWIDTH] IN BLOOD BY AUTOMATED COUNT: 54.7 FL (ref 35.9–50)
GFR SERPLBLD CREATININE-BSD FMLA CKD-EPI: 22 ML/MIN/1.73 M 2
GLUCOSE SERPL-MCNC: 125 MG/DL (ref 65–99)
HCT VFR BLD AUTO: 28.5 % (ref 37–47)
HGB BLD-MCNC: 8.9 G/DL (ref 12–16)
MCH RBC QN AUTO: 28.3 PG (ref 27–33)
MCHC RBC AUTO-ENTMCNC: 31.2 G/DL (ref 33.6–35)
MCV RBC AUTO: 90.5 FL (ref 81.4–97.8)
NT-PROBNP SERPL IA-MCNC: 3849 PG/ML (ref 0–125)
PLATELET # BLD AUTO: 432 K/UL (ref 164–446)
PMV BLD AUTO: 10.8 FL (ref 9–12.9)
POTASSIUM SERPL-SCNC: 4.3 MMOL/L (ref 3.6–5.5)
RBC # BLD AUTO: 3.15 M/UL (ref 4.2–5.4)
SODIUM SERPL-SCNC: 139 MMOL/L (ref 135–145)
WBC # BLD AUTO: 9.5 K/UL (ref 4.8–10.8)

## 2023-04-07 PROCEDURE — 36415 COLL VENOUS BLD VENIPUNCTURE: CPT

## 2023-04-07 PROCEDURE — 85025 COMPLETE CBC W/AUTO DIFF WBC: CPT

## 2023-04-07 PROCEDURE — 85027 COMPLETE CBC AUTOMATED: CPT

## 2023-04-07 PROCEDURE — 93005 ELECTROCARDIOGRAM TRACING: CPT | Performed by: EMERGENCY MEDICINE

## 2023-04-07 PROCEDURE — 80053 COMPREHEN METABOLIC PANEL: CPT

## 2023-04-07 PROCEDURE — 83880 ASSAY OF NATRIURETIC PEPTIDE: CPT | Mod: 91

## 2023-04-07 PROCEDURE — 80048 BASIC METABOLIC PNL TOTAL CA: CPT

## 2023-04-07 PROCEDURE — 99285 EMERGENCY DEPT VISIT HI MDM: CPT

## 2023-04-07 PROCEDURE — 96374 THER/PROPH/DIAG INJ IV PUSH: CPT

## 2023-04-07 PROCEDURE — 84484 ASSAY OF TROPONIN QUANT: CPT

## 2023-04-07 PROCEDURE — 93005 ELECTROCARDIOGRAM TRACING: CPT

## 2023-04-07 PROCEDURE — 83880 ASSAY OF NATRIURETIC PEPTIDE: CPT

## 2023-04-07 RX ORDER — FUROSEMIDE 40 MG/1
20 TABLET ORAL DAILY
Status: SHIPPED | DISCHARGE
Start: 2023-04-07 | End: 2023-04-10

## 2023-04-07 RX ORDER — SPIRONOLACTONE 25 MG/1
12.5 TABLET ORAL DAILY
Status: SHIPPED | DISCHARGE
Start: 2023-04-07 | End: 2023-04-10

## 2023-04-07 ASSESSMENT — FIBROSIS 4 INDEX: FIB4 SCORE: .875

## 2023-04-07 NOTE — TELEPHONE ENCOUNTER
Received VM from patient stating she is having a hard time breathing and has to gasp for air at times.    Called patient back to discuss. She denies weight gain and increased in swelling. She does report she ate a lot of salt a couple days ago, and her symptoms have been worse since then. She said she was sleeping last night with her CPAP and woke up gasping for air. She had to sleep on two pillows for the rest of the night. The same thing happened while brushing her teeth this morning.     She denies signs of bleeding, and she states they did not restart her Xarelto when she discharged.     She states she was discharged on 20mg furosemide and 12.5mg spironolactone (med rec updated).    She does not take her BP at home, but when she saw Dr. Jesusita Marr, her BP was 108/60.    ER precautions given to patient and she verbalizes understanding.    To Perla KELLER to advise

## 2023-04-07 NOTE — TELEPHONE ENCOUNTER
SHONA Martínez.  You 6 minutes ago (10:43 AM)     Obtain STAT labs today and follow up with symptoms on Monday and lab report. ER over weekend if symptoms progress. SC      Called patient to inform of APRN's recommendations. She verbalizes understanding and will have labs drawn today. Reinforced ER precautions for worsening symptoms over the weekend, and patient verbalizes understanding.

## 2023-04-08 ENCOUNTER — APPOINTMENT (OUTPATIENT)
Dept: RADIOLOGY | Facility: MEDICAL CENTER | Age: 83
End: 2023-04-08
Attending: EMERGENCY MEDICINE
Payer: MEDICARE

## 2023-04-08 VITALS
OXYGEN SATURATION: 97 % | HEART RATE: 69 BPM | DIASTOLIC BLOOD PRESSURE: 50 MMHG | RESPIRATION RATE: 18 BRPM | WEIGHT: 200 LBS | BODY MASS INDEX: 35.44 KG/M2 | TEMPERATURE: 97.2 F | HEIGHT: 63 IN | SYSTOLIC BLOOD PRESSURE: 100 MMHG

## 2023-04-08 LAB
ALBUMIN SERPL BCP-MCNC: 3.9 G/DL (ref 3.2–4.9)
ALBUMIN/GLOB SERPL: 1.2 G/DL
ALP SERPL-CCNC: 71 U/L (ref 30–99)
ALT SERPL-CCNC: 13 U/L (ref 2–50)
ANION GAP SERPL CALC-SCNC: 15 MMOL/L (ref 7–16)
AST SERPL-CCNC: 16 U/L (ref 12–45)
BASOPHILS # BLD AUTO: 0.7 % (ref 0–1.8)
BASOPHILS # BLD: 0.08 K/UL (ref 0–0.12)
BILIRUB SERPL-MCNC: 0.4 MG/DL (ref 0.1–1.5)
BUN SERPL-MCNC: 40 MG/DL (ref 8–22)
CALCIUM ALBUM COR SERPL-MCNC: 9.3 MG/DL (ref 8.5–10.5)
CALCIUM SERPL-MCNC: 9.2 MG/DL (ref 8.5–10.5)
CHLORIDE SERPL-SCNC: 103 MMOL/L (ref 96–112)
CO2 SERPL-SCNC: 21 MMOL/L (ref 20–33)
CREAT SERPL-MCNC: 2.09 MG/DL (ref 0.5–1.4)
EKG IMPRESSION: NORMAL
EKG IMPRESSION: NORMAL
EOSINOPHIL # BLD AUTO: 0.22 K/UL (ref 0–0.51)
EOSINOPHIL NFR BLD: 1.9 % (ref 0–6.9)
ERYTHROCYTE [DISTWIDTH] IN BLOOD BY AUTOMATED COUNT: 55 FL (ref 35.9–50)
GFR SERPLBLD CREATININE-BSD FMLA CKD-EPI: 23 ML/MIN/1.73 M 2
GLOBULIN SER CALC-MCNC: 3.2 G/DL (ref 1.9–3.5)
GLUCOSE SERPL-MCNC: 123 MG/DL (ref 65–99)
HCT VFR BLD AUTO: 28.5 % (ref 37–47)
HGB BLD-MCNC: 8.6 G/DL (ref 12–16)
IMM GRANULOCYTES # BLD AUTO: 0.08 K/UL (ref 0–0.11)
IMM GRANULOCYTES NFR BLD AUTO: 0.7 % (ref 0–0.9)
LYMPHOCYTES # BLD AUTO: 1.31 K/UL (ref 1–4.8)
LYMPHOCYTES NFR BLD: 11.3 % (ref 22–41)
MCH RBC QN AUTO: 27.3 PG (ref 27–33)
MCHC RBC AUTO-ENTMCNC: 30.2 G/DL (ref 33.6–35)
MCV RBC AUTO: 90.5 FL (ref 81.4–97.8)
MONOCYTES # BLD AUTO: 0.89 K/UL (ref 0–0.85)
MONOCYTES NFR BLD AUTO: 7.7 % (ref 0–13.4)
NEUTROPHILS # BLD AUTO: 9.01 K/UL (ref 2–7.15)
NEUTROPHILS NFR BLD: 77.7 % (ref 44–72)
NRBC # BLD AUTO: 0 K/UL
NRBC BLD-RTO: 0 /100 WBC
NT-PROBNP SERPL IA-MCNC: 4538 PG/ML (ref 0–125)
PLATELET # BLD AUTO: 441 K/UL (ref 164–446)
PMV BLD AUTO: 11 FL (ref 9–12.9)
POTASSIUM SERPL-SCNC: 4.2 MMOL/L (ref 3.6–5.5)
PROT SERPL-MCNC: 7.1 G/DL (ref 6–8.2)
RBC # BLD AUTO: 3.15 M/UL (ref 4.2–5.4)
SODIUM SERPL-SCNC: 139 MMOL/L (ref 135–145)
TROPONIN T SERPL-MCNC: 41 NG/L (ref 6–19)
TROPONIN T SERPL-MCNC: 57 NG/L (ref 6–19)
WBC # BLD AUTO: 11.6 K/UL (ref 4.8–10.8)

## 2023-04-08 PROCEDURE — 93005 ELECTROCARDIOGRAM TRACING: CPT | Performed by: EMERGENCY MEDICINE

## 2023-04-08 PROCEDURE — 71045 X-RAY EXAM CHEST 1 VIEW: CPT

## 2023-04-08 PROCEDURE — 700102 HCHG RX REV CODE 250 W/ 637 OVERRIDE(OP): Performed by: EMERGENCY MEDICINE

## 2023-04-08 PROCEDURE — 84484 ASSAY OF TROPONIN QUANT: CPT

## 2023-04-08 PROCEDURE — 96374 THER/PROPH/DIAG INJ IV PUSH: CPT

## 2023-04-08 PROCEDURE — A9270 NON-COVERED ITEM OR SERVICE: HCPCS | Performed by: EMERGENCY MEDICINE

## 2023-04-08 PROCEDURE — 36415 COLL VENOUS BLD VENIPUNCTURE: CPT

## 2023-04-08 PROCEDURE — 700111 HCHG RX REV CODE 636 W/ 250 OVERRIDE (IP): Performed by: EMERGENCY MEDICINE

## 2023-04-08 RX ORDER — FUROSEMIDE 10 MG/ML
40 INJECTION INTRAMUSCULAR; INTRAVENOUS ONCE
Status: COMPLETED | OUTPATIENT
Start: 2023-04-08 | End: 2023-04-08

## 2023-04-08 RX ORDER — ACETAMINOPHEN 325 MG/1
650 TABLET ORAL ONCE
Status: COMPLETED | OUTPATIENT
Start: 2023-04-08 | End: 2023-04-08

## 2023-04-08 RX ADMIN — FUROSEMIDE 40 MG: 10 INJECTION, SOLUTION INTRAMUSCULAR; INTRAVENOUS at 04:32

## 2023-04-08 RX ADMIN — ACETAMINOPHEN 650 MG: 325 TABLET, FILM COATED ORAL at 02:25

## 2023-04-08 ASSESSMENT — PAIN DESCRIPTION - PAIN TYPE: TYPE: ACUTE PAIN

## 2023-04-08 NOTE — ED NOTES
Pt discharged to home. Pt was given follow up instructions. All lines removed prior to discharge. Pt verbalized understanding of all instructions for discharge and is brought out of ED by wheel chair. AOx4

## 2023-04-08 NOTE — ED NOTES
Pt brought back to [address] by wheel chair. Placed in gown and connected to monitoring equipment. Labs collected and sent.

## 2023-04-08 NOTE — ED PROVIDER NOTES
"  ER Provider Note    Scribed for Manoj Tan II, M.D. by Preston Yeager. 4/8/2023  12:04 AM    Primary Care Provider: Asael Pink M.D.    CHIEF COMPLAINT  Chief Complaint   Patient presents with    Shortness of Breath     Pt states she's been having episodes of extreme SOB. Pt states \"I've been gasping for air\". Pt scheduled for DOMINIC, MitraClip with Dr. Gray on 4/17.        EXTERNAL RECORDS REVIEWED  Reviewed Cardiothoracic surgery note by Dr. Mc on 4/5/2023. Aleshia has \"severe symptomatic mitral regurgitation, mitral valve prolapse, afib, and CHF with preserved ejection fraction.\"  She had a CABG 1 vessel in 1992.  Discussed surgery and Dr. Mc does not recommend sternotomy, MVF because of the excessive operative risk. Good candidate for transcatheter edge to edge mitral valve repair.     She is scheduled to have this done by Dr. Gray to have DOMINIC with mitral clip on 4/15/2023.    HPI/ROS    LIMITATION TO HISTORY   Select: : None  OUTSIDE HISTORIAN(S):  none    Aleshia Crooks is a 83 y.o. female history of one-vessel CABG in 1992, paroxysmal atrial fibrillation, liver cirrhosis, hyperlipidemia, chronic kidney disease stage III, recent congestive heart failure hospitalization, severe mitral regurgitation, moderate to severe tricuspid regurgitation.who presents to the ED complaining of shortness of breath onset prior to arrival. She states that she loses her breath, but is unsure as to how she can be helped by us.  Last night she was asleep on her CPAP when she lost her breath and woke up suddenly. She was able to get over this episode by breathing slightly away from her CPAP. During the day she was fine, but she suddenly had a similar episode again tonight while watching TV. She reports that Dr. Gray told her that if she has one while not exerting herself to call EMS and be evaluated here. She has a DOMINIC scheduled for 4/15 with Dr. Gray.    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;  Location: SURGERY SAME DAY [facility];  Service: Gastroenterology    CHOLECYSTECTOMY      EYE SURGERY Bilateral     cataracts    MULTIPLE CORONARY ARTERY BYPASS      1 bypass    THYROIDECTOMY TOTAL         FAMILY HISTORY  Family History   Problem Relation Age of Onset    Cancer Mother         cancer, smoker    Stroke Maternal Grandmother     Other Other         Crohn    Kidney Disease Other         kidney transplant       SOCIAL HISTORY   reports that she quit smoking about 31 years ago. Her smoking use included cigarettes. She has a 31.00 pack-year smoking history. She has never used smokeless tobacco. She reports that she does not drink alcohol and does not use drugs.    CURRENT MEDICATIONS  Discharge Medication List as of 4/8/2023  6:50 AM        CONTINUE these medications which have NOT CHANGED    Details   furosemide (LASIX) 40 MG Tab Take 0.5 Tablets by mouth every day., Another Facility      spironolactone (ALDACTONE) 25 MG Tab Take 0.5 Tablets by mouth every day., Another Facility     "  pantoprazole (PROTONIX) 40 MG Tablet Delayed Response Take 40 mg by mouth 2 times a day., Historical Med      famotidine (PEPCID) 40 MG Tab Take 40 mg by mouth every day., Historical Med      ascorbic acid (ASCORBIC ACID) 500 MG Tab Take 1 Tablet by mouth every day., Disp-90 Tablet, R-3, Normal      ferrous sulfate 325 (65 Fe) MG tablet Take 1 Tablet by mouth every day., Disp-90 Tablet, R-2, Normal      LEVOXYL 150 MCG Tab Take 1 Tablet by mouth every morning on an empty stomach., Disp-90 Tablet, R-3, YAMILE, Normal      omeprazole (PRILOSEC) 20 MG delayed-release capsule Take 1 Capsule by mouth 2 times a day for 14 days, then take 1 capsule by mouth daily, Disp-44 Capsule, R-0, Normal      CINNAMON PO Take 1 Tablet by mouth 2 times a day., Historical Med      atorvastatin (LIPITOR) 40 MG Tab TAKE 1 TABLET BY MOUTH AT  BEDTIME, Disp-100 Tablet, R-3, Normal      Secukinumab (COSENTYX SENSOREADY PEN) 150 MG/ML Solution Auto-injector Inject 300mg Sub Cut once monthlyRequests 90 day supply.Disp-1.96 mL, R-6, Normal      bisoprolol (ZEBETA) 10 MG tablet Take 1 Tablet by mouth every day.Do not substitute atenolol.  This is the medication that cardiology wantsDisp-90 Tablet, R-4, Normal      Cholecalciferol (VITAMIN D) 125 MCG (5000 UT) Cap Take 5,000 Units by mouth every day., Disp-30 Capsule, R-11, OTC      loratadine (CLARITIN) 10 MG Tab Take 1 Tablet by mouth 2 times a day., Disp-60 tablet, R-5, OTC             ALLERGIES  Cefdinir, Ibuprofen, Morphine, Morphine sulfate, and Morphine-naltrexone    PHYSICAL EXAM  /62   Pulse 75   Temp 36.4 °C (97.6 °F) (Temporal)   Resp 15   Ht 1.6 m (5' 3\")   Wt 90.7 kg (200 lb)   LMP  (LMP Unknown)   SpO2 97%   BMI 35.43 kg/m²   Physical Exam  Vitals and nursing note reviewed.   Constitutional:       Appearance: Normal appearance.   HENT:      Head: Normocephalic and atraumatic.      Nose: No congestion.   Eyes:      Extraocular Movements: Extraocular movements intact.     "  Pupils: Pupils are equal, round, and reactive to light.   Neck:      Comments: No JVD  Cardiovascular:      Rate and Rhythm: Normal rate. Rhythm irregular.      Heart sounds: Murmur heard.   Pulmonary:      Effort: Pulmonary effort is normal. No respiratory distress.      Breath sounds: Normal breath sounds.      Comments: 97% on room air  Abdominal:      Tenderness: There is no abdominal tenderness.   Musculoskeletal:         General: Normal range of motion.      Cervical back: Normal range of motion.      Comments: Bilateral lower extremity edema   Skin:     General: Skin is warm.   Neurological:      General: No focal deficit present.      Mental Status: She is alert.   Psychiatric:         Mood and Affect: Mood normal.        DIAGNOSTIC STUDIES    Labs:   Results for orders placed or performed during the hospital encounter of 04/07/23   CBC WITH DIFFERENTIAL   Result Value Ref Range    WBC 11.6 (H) 4.8 - 10.8 K/uL    RBC 3.15 (L) 4.20 - 5.40 M/uL    Hemoglobin 8.6 (L) 12.0 - 16.0 g/dL    Hematocrit 28.5 (L) 37.0 - 47.0 %    MCV 90.5 81.4 - 97.8 fL    MCH 27.3 27.0 - 33.0 pg    MCHC 30.2 (L) 33.6 - 35.0 g/dL    RDW 55.0 (H) 35.9 - 50.0 fL    Platelet Count 441 164 - 446 K/uL    MPV 11.0 9.0 - 12.9 fL    Neutrophils-Polys 77.70 (H) 44.00 - 72.00 %    Lymphocytes 11.30 (L) 22.00 - 41.00 %    Monocytes 7.70 0.00 - 13.40 %    Eosinophils 1.90 0.00 - 6.90 %    Basophils 0.70 0.00 - 1.80 %    Immature Granulocytes 0.70 0.00 - 0.90 %    Nucleated RBC 0.00 /100 WBC    Neutrophils (Absolute) 9.01 (H) 2.00 - 7.15 K/uL    Lymphs (Absolute) 1.31 1.00 - 4.80 K/uL    Monos (Absolute) 0.89 (H) 0.00 - 0.85 K/uL    Eos (Absolute) 0.22 0.00 - 0.51 K/uL    Baso (Absolute) 0.08 0.00 - 0.12 K/uL    Immature Granulocytes (abs) 0.08 0.00 - 0.11 K/uL    NRBC (Absolute) 0.00 K/uL   CMP   Result Value Ref Range    Sodium 139 135 - 145 mmol/L    Potassium 4.2 3.6 - 5.5 mmol/L    Chloride 103 96 - 112 mmol/L    Co2 21 20 - 33 mmol/L     Anion Gap 15.0 7.0 - 16.0    Glucose 123 (H) 65 - 99 mg/dL    Bun 40 (H) 8 - 22 mg/dL    Creatinine 2.09 (H) 0.50 - 1.40 mg/dL    Calcium 9.2 8.5 - 10.5 mg/dL    AST(SGOT) 16 12 - 45 U/L    ALT(SGPT) 13 2 - 50 U/L    Alkaline Phosphatase 71 30 - 99 U/L    Total Bilirubin 0.4 0.1 - 1.5 mg/dL    Albumin 3.9 3.2 - 4.9 g/dL    Total Protein 7.1 6.0 - 8.2 g/dL    Globulin 3.2 1.9 - 3.5 g/dL    A-G Ratio 1.2 g/dL   TROPONIN   Result Value Ref Range    Troponin T 57 (H) 6 - 19 ng/L   proBrain Natriuretic Peptide, NT   Result Value Ref Range    NT-proBNP 4538 (H) 0 - 125 pg/mL   CORRECTED CALCIUM   Result Value Ref Range    Correct Calcium 9.3 8.5 - 10.5 mg/dL   ESTIMATED GFR   Result Value Ref Range    GFR (CKD-EPI) 23 (A) >60 mL/min/1.73 m 2   TROPONIN   Result Value Ref Range    Troponin T 41 (H) 6 - 19 ng/L   EKG   Result Value Ref Range    Report       [facility] Emergency Dept.    Test Date:  2023  Pt Name:    GINA REICH                Department: ER  MRN:        [id number]                      Room:  Gender:     Female                       Technician: [id number]  :        1940                   Requested By:ER TRIAGE PROTOCOL  Order #:    [id number]                    Reading MD: Manoj Tan II, MD    Measurements  Intervals                                Axis  Rate:       82                           P:  NM:                                      QRS:        152  QRSD:       84                           T:          224  QT:         400  QTc:        468    Interpretive Statements  possible lead reversal. will repeat.  Electronically Signed On 2023 0:12:35 PDT by Manoj Tan II, MD     EKG   Result Value Ref Range    Report       [facility] Emergency Dept.    Test Date:  2023  Pt Name:    GINA REICH                Department: ER  MRN:        [id number]                      Room:        02  Gender:     Female                       Technician:  [id number]  :        1940                   Requested By:MANOJ BUCKNER II  Order #:    [id number]                    Reading MD: Manoj Buckner II, MD    Measurements  Intervals                                Axis  Rate:       75                           P:  AK:                                      QRS:        16  QRSD:       98                           T:          -45  QT:         445  QTc:        498    Interpretive Statements  Atrial fibrillation  Borderline repolarization abnormality  Borderline prolonged QT interval  No repolarization changes.  Impression: Atrial fibrillation. Borderline prolonged QT interval.  Unchanged  from previous  Compared to ECG 2023 22:04:58  No significant changes  Electronically Signed On 4-8-202 3 6:42:56 PDT by Manoj Buckner II, MD        All labs reviewed by me.     EKG:   I have independently interpreted this EKG     Radiology:   The attending emergency physician has independently interpreted the diagnostic imaging associated with this visit and am waiting the final reading from the radiologist.   Preliminary interpretation is a follows: No acute abnormality  Radiologist interpretation:   DX-CHEST-PORTABLE (1 VIEW)   Final Result         Mild diffuse interstitial prominence could relate to mild fluid overload.         COURSE & MEDICAL DECISION MAKING     ED Observation Status? Yes; I am placing the patient in to an observation status due to a diagnostic uncertainty as well as therapeutic intensity. Patient placed in observation status at 12:23 AM, 2023.     Observation plan is as follows: Monitor for symptom management, diagnostic results    Upon Reevaluation, the patient's condition has: Improved; and will be discharged.    Patient discharged from ED Observation status at 6:44 AM (Time) 2023 (Date).     INITIAL ASSESSMENT, COURSE AND PLAN  Care Narrative: This is an emergent evaluation of a 83-year-old woman who has severe mitral regurgitation  pending procedure by Dr. Gray now here with concerns of shortness of breath.  No chest pain.  On exam she is not having any signs of respiratory distress.  She has some faint crackles on lung exam.  I have some concerns for CHF exacerbation.  Possible renal failure worsening.  Possible MI but I think this is less likely in absence of pain.  Reviewed past records.  Work-up will include chest x-ray, CBC, CMP, troponin, BNP.    12:13 AM - Attempted to review EKG, appears to have limb lead reversal, will reorder    12:17 AM - Patient seen and examined at bedside.     4:30 AM -Labs ultimately resulted.  She has a increased proBNP level of 4005 and 38.  With a troponin of 57.  Troponin levels not far off from baseline.  proBNP level is increased.  Renal function at baseline with a creatinine of 2.  Spoke with patient regarding medications, she notes that a different doctor from a previous ER cut her Lasix dose in half and she believes that this contributed to her current shortness of breath.  This history was not initially provided.  Very certain that her presentation is consistent with a CHF exacerbation.  She would like to go home rather than being hospitalized.  Will order Lasix IV now.  If she is showing improvement I am comfortable with discharge home.    6:44 AM - Patient was reevaluated at bedside. Her troponin is trending down and her breathing is improved following Lasix administration. I believe that this is likely a CHF exacerbation. Patient will now be discharged at this time. Discussed return precautions and plan for follow up with PCP Monday. Patient verbalizes understanding and agreement to this plan of care.         CRITICAL CARE  The very real possibilty of a deterioration of this patient's condition required the highest level of my preparedness for sudden, emergent intervention.  I provided critical care services, which included medication orders, frequent reevaluations of the patient's condition  and response to treatment, ordering and reviewing test results, and discussing the case with various consultants.  The critical care time associated with the care of the patient was 35 minutes. Review chart for interventions. This time is exclusive of any other billable procedures.      PROBLEM LIST  #CHF exacerbation   -Improved after Lasix IV    #Chronic kidney disease   -At baseline, electrolytes within acceptable limits.        DISPOSITION AND DISCUSSIONS    Discussion of management with other [facility] or appropriate source(s): None     Escalation of care considered, and ultimately not performed: acute inpatient care management, however at this time, the patient is most appropriate for outpatient management.    Barriers to care at this time, including but not limited to: None    Decision tools and prescription drugs considered including, but not limited to: HEART Score 5 .    DISPOSITION:  Patient will be discharged home in stable condition.    FOLLOW UP:  Asael Pink M.D.  [address] [place] [address]  [facility] [id number] [phone]    Schedule an appointment as soon as possible for a visit       [facility], Emergency Dept  [address] [facility] [id number] [phone]    If symptoms worsen      FINAL DIAGNOSIS  1. Acute on chronic congestive heart failure, unspecified heart failure type (HCC) Active     Preston MARTIN (Scribannmarie), am scribing for, and in the presence of, DAT Anderson II.    Electronically signed by: Preston Yeager (Emmy), 4/8/2023    Manoj MARTIN II, M* personally performed the services described in this documentation, as scribed by Preston Yeager in my presence, and it is both accurate and complete.      The note accurately reflects work and decisions made by me.  Manoj Tan II, M.D.  4/8/2023  10:23 AM

## 2023-04-08 NOTE — ED TRIAGE NOTES
"Chief Complaint   Patient presents with    Shortness of Breath     Pt states she's been having episodes of extreme SOB. Pt states \"I've been gasping for air\". Pt scheduled for DOMINIC, MitraClip with Dr. Gray on 4/17.          /57   Pulse 80   Temp 36.4 °C (97.6 °F) (Temporal)   Resp 18   Ht 1.6 m (5' 3\")   Wt 90.7 kg (200 lb)   LMP  (LMP Unknown)   SpO2 94%   BMI 35.43 kg/m²     "

## 2023-04-08 NOTE — ED NOTES
RELIEF RN    PT IS RESTING IN BED. BREATHING IS EVEN AND UNLABORED, SKIN IS WARM AND DRY, VSS, NAD, WILL CONTINUE TO MONITOR.

## 2023-04-10 ENCOUNTER — OFFICE VISIT (OUTPATIENT)
Dept: MEDICAL GROUP | Facility: MEDICAL CENTER | Age: 83
End: 2023-04-10
Payer: MEDICARE

## 2023-04-10 VITALS
RESPIRATION RATE: 16 BRPM | DIASTOLIC BLOOD PRESSURE: 64 MMHG | HEIGHT: 63 IN | OXYGEN SATURATION: 98 % | SYSTOLIC BLOOD PRESSURE: 112 MMHG | HEART RATE: 70 BPM | BODY MASS INDEX: 36.17 KG/M2 | TEMPERATURE: 97.4 F | WEIGHT: 204.15 LBS

## 2023-04-10 DIAGNOSIS — Z53.09 CONTRAINDICATION TO ANTICOAGULATION THERAPY: ICD-10-CM

## 2023-04-10 DIAGNOSIS — K74.60 LIVER CIRRHOSIS SECONDARY TO NASH (NONALCOHOLIC STEATOHEPATITIS) (HCC): ICD-10-CM

## 2023-04-10 DIAGNOSIS — I10 ESSENTIAL HYPERTENSION: Chronic | ICD-10-CM

## 2023-04-10 DIAGNOSIS — G47.33 OBSTRUCTIVE SLEEP APNEA TREATED WITH CONTINUOUS POSITIVE AIRWAY PRESSURE (CPAP): ICD-10-CM

## 2023-04-10 DIAGNOSIS — I07.1 SEVERE TRICUSPID REGURGITATION BY PRIOR ECHOCARDIOGRAM: ICD-10-CM

## 2023-04-10 DIAGNOSIS — I48.0 PAROXYSMAL ATRIAL FIBRILLATION (HCC): ICD-10-CM

## 2023-04-10 DIAGNOSIS — I34.0 SEVERE MITRAL REGURGITATION: ICD-10-CM

## 2023-04-10 DIAGNOSIS — F41.8 SITUATIONAL ANXIETY: ICD-10-CM

## 2023-04-10 DIAGNOSIS — Z87.19 H/O: UPPER GI BLEED: ICD-10-CM

## 2023-04-10 DIAGNOSIS — N18.4 STAGE 4 CHRONIC KIDNEY DISEASE (HCC): ICD-10-CM

## 2023-04-10 DIAGNOSIS — D50.0 BLOOD LOSS ANEMIA: ICD-10-CM

## 2023-04-10 DIAGNOSIS — Z92.89 HISTORY OF RECENT BLOOD TRANSFUSION: ICD-10-CM

## 2023-04-10 DIAGNOSIS — K75.81 LIVER CIRRHOSIS SECONDARY TO NASH (NONALCOHOLIC STEATOHEPATITIS) (HCC): ICD-10-CM

## 2023-04-10 PROCEDURE — 99214 OFFICE O/P EST MOD 30 MIN: CPT | Performed by: FAMILY MEDICINE

## 2023-04-10 RX ORDER — SPIRONOLACTONE 25 MG/1
25 TABLET ORAL DAILY
Qty: 90 TABLET | Refills: 3 | Status: SHIPPED | OUTPATIENT
Start: 2023-04-10 | End: 2023-08-01

## 2023-04-10 RX ORDER — FUROSEMIDE 40 MG/1
40 TABLET ORAL DAILY
Qty: 90 TABLET | Refills: 3 | Status: SHIPPED | OUTPATIENT
Start: 2023-04-10 | End: 2024-01-24 | Stop reason: SDUPTHER

## 2023-04-10 RX ORDER — PANTOPRAZOLE SODIUM 40 MG/1
40 TABLET, DELAYED RELEASE ORAL 2 TIMES DAILY
Qty: 180 TABLET | Refills: 3 | Status: SHIPPED | OUTPATIENT
Start: 2023-04-10 | End: 2023-04-27 | Stop reason: SDUPTHER

## 2023-04-10 RX ORDER — ALPRAZOLAM 0.25 MG/1
0.25 TABLET ORAL 3 TIMES DAILY PRN
Qty: 15 TABLET | Refills: 0 | Status: ON HOLD | OUTPATIENT
Start: 2023-04-10 | End: 2023-04-19

## 2023-04-10 RX ORDER — LOSARTAN POTASSIUM 50 MG/1
50 TABLET ORAL DAILY
Qty: 100 TABLET | Refills: 0 | Status: ON HOLD
Start: 2023-04-10 | End: 2023-04-19

## 2023-04-10 ASSESSMENT — FIBROSIS 4 INDEX: FIB4 SCORE: 0.82

## 2023-04-10 NOTE — PROGRESS NOTES
Chief Complaint   Patient presents with    Hospital Follow-up       Subjective:     HPI:   Aleshia Crooks presents today with the following: Patient was hospitalized nearly 3 weeks ago    1. H/O: upper GI bleed/Blood loss anemia/History of recent blood transfusion/Contraindication to anticoagulation therapy  Hospitalized March 27 at [facility].  She had an upper GI bleed.  They were unable to do imaging or endoscopy.  Patient presented with a hemoglobin of 5.5.  She had 3 blood transfusions.  She is a positive.  She did stabilize when she was off the Xarelto.  Her discharge hemoglobin was 8.3.  Her most recent hemoglobin from her [facility] here 3 days ago was 8.6.  She is improving.  I agree completely with discontinuation of Xarelto.  This was a very severe potentially fatal bleed.  Discussed high iron diet.  Patient wishes to avoid iron tablets because she missed the bleed since her stool was already black from the iron.  I have asked her to at least take the low-dose iron twice weekly.  At twice weekly there might be some darkening of the stool but there should not be persistent black stool.  She will stay off Xarelto or aspirin or other anticoagulation.  She is now on Protonix instead of famotidine.  I agree completely with that change.  She is on Protonix twice daily which I also agree with due to the severity of her bleed.    2. Essential hypertension  Her blood pressure has been excellent on her moderately low losartan.  She had been instructed by the hospital at [place] to reduce her furosemide and spironolactone but unfortunately she went into fluid overload.  She is back on the 25 mg spironolactone and 40 mg furosemide.    3. Stage 4 chronic kidney disease (HCC)  The diuretics certainly stress her kidneys.  She is in stage IV CKD, overall stable.  I hope this will improve once she is no longer anemic.    4. Paroxysmal atrial fibrillation (HCC)  Patient does have  paroxysmal atrial fibrillation.  She is at increased risk of stroke as she and her daughter are aware.  However, she was very seriously ill with a rapid GI bleed and I do not think she is a candidate for anticoagulation.    5. Severe mitral regurgitation  She has a mitral clip procedure scheduled for early next week due to severe mitral regurgitation.  She will continue to eat very high iron which she is tolerating very well.  She will get a BMP and a CBC this Saturday to see if she is hopefully track.    6. Severe tricuspid regurgitation by prior echocardiogram  Patient does have tricuspid regurgitation as well.  She is following with cardiology.    7. Obstructive sleep apnea treated with continuous positive airway pressure (CPAP)  Patient does have sleep apnea.  Currently cooperative with treatment.  It is unclear to me if there is an oxygen bleed in but she is getting some relief from the CPAP.    8. 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) (HCC)  Patient does have liver cirrhosis but that has been quite stable.    9. Situational anxiety  She is having palpitations and shortness of breath on exertion which she was describing to anxiety.  While that might be a component I believe this is mostly because of her anemia.  She will pace herself and continue trying to supplement her iron.        Patient Active Problem List    Diagnosis Date Noted    H/O: upper GI bleed 04/10/2023    History of recent blood transfusion 04/10/2023    Contraindication to anticoagulation therapy 04/10/2023    Aortic stenosis 02/18/2023    Acute blood loss anemia 01/23/2023    Severe tricuspid regurgitation by prior echocardiogram 11/16/2022    Elevated fasting glucose 11/16/2022    Postsurgical hypothyroidism 12/14/2021    Situational stress 10/27/2021    Irritability 10/27/2021    Stage 4 chronic kidney disease (HCC) 10/27/2021    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) (HCC) 03/25/2021     Falls 03/01/2021    Stress incontinence 03/01/2021    Stage 3b chronic kidney disease ([facility]) 09/11/2020    Gastroesophageal reflux disease without esophagitis 06/08/2020    Abnormal CT of the abdomen 04/17/2020    Mixed hyperlipidemia 04/17/2020    Hepatic steatosis 04/17/2020    Elevated alkaline phosphatase level 04/17/2020    Elevated troponin 04/08/2020    Blood loss anemia 04/08/2020    History of malignant melanoma 03/12/2020    Skin lesions 03/12/2020    Essential hypertension 03/12/2020    Hypothyroidism 03/12/2020    Obesity (BMI 35.0-39.9 without comorbidity) ([facility]) 03/12/2020    Obstructive sleep apnea treated with continuous positive airway pressure (CPAP) 03/12/2020    Severe mitral regurgitation 07/14/2017    Moderate to severe pulmonary hypertension ([facility]) 07/14/2017    Acute on chronic combined systolic and diastolic congestive heart failure ([facility]) 07/14/2017    AF (atrial fibrillation) ([facility]) 07/14/2017    Coronary artery disease involving native coronary artery of native heart without angina pectoris, s/p 1V CABG 1992 07/14/2017    Microcytic anemia 07/13/2017    Situational anxiety 07/13/2017       Current medicines (including changes today)  Current Outpatient Medications   Medication Sig Dispense Refill    spironolactone (ALDACTONE) 25 MG Tab Take 1 Tablet by mouth every day. 90 Tablet 3    pantoprazole (PROTONIX) 40 MG Tablet Delayed Response Take 1 Tablet by mouth 2 times a day. 180 Tablet 3    furosemide (LASIX) 40 MG Tab Take 1 Tablet by mouth every day. 90 Tablet 3    losartan (COZAAR) 50 MG Tab Take 1 Tablet by mouth every day. 100 Tablet 0    ALPRAZolam (XANAX) 0.25 MG Tab Take 1 Tablet by mouth 3 times a day as needed for Anxiety for up to 7 days. 15 tablets is a 7 day quantity 15 Tablet 0    LEVOXYL 150 MCG Tab Take 1 Tablet by mouth every morning on an empty stomach. 90 Tablet 3    atorvastatin (LIPITOR) 40 MG Tab TAKE 1 TABLET BY MOUTH AT  BEDTIME 100 Tablet 3    Secukinumab (COSENTYX  "SENSOREADY PEN) 150 MG/ML Solution Auto-injector Inject 300mg Sub Cut once monthly 1.96 mL 6    bisoprolol (ZEBETA) 10 MG tablet Take 1 Tablet by mouth every day. 90 Tablet 4     No current facility-administered medications for this visit.       Allergies   Allergen Reactions    Cefdinir Shortness of Breath    Ibuprofen     Morphine     Morphine Sulfate     Morphine-Naltrexone        ROS: As per HPI       Objective:     /64 (BP Location: Right arm, Patient Position: Sitting, BP Cuff Size: Large adult)   Pulse 70   Temp 36.3 °C (97.4 °F) (Temporal)   Resp 16   Ht 1.6 m (5' 3\")   Wt 92.6 kg (204 lb 2.3 oz)   SpO2 98%  Body mass index is 36.16 kg/m².    Physical Exam:  Constitutional: Well-developed and well-nourished. Not diaphoretic. No distress. Lucid and fluent.  Patient is lucid and fluent.  Her daughter is with her here today.  Skin: Skin is warm and dry. No rash noted.  Head: Atraumatic without lesions.  Eyes: Conjunctivae and extraocular motions are normal. Pupils are equal, round, and reactive to light. No scleral icterus.   Ears:  External ears unremarkable.   Mouth/Throat: Tongue normal. Oropharynx is clear and moist. Posterior pharynx without erythema or exudates.  Neck: Supple, trachea midline. No thyromegaly present. No cervical or supraclavicular lymphadenopathy. No JVD or carotid bruits appreciated  Cardiovascular: Regular rate and rhythm.  Normal S1, S2 without murmur appreciated.  Chest: Effort normal. Clear to auscultation throughout. No adventitious sounds.   Abdomen: Soft, non tender, and without distention. Active bowel sounds in all four quadrants. No rebound, guarding, masses or hepatosplenomegaly.  Extremities: No cyanosis, clubbing, erythema, nor edema.   Neurological: Alert and oriented x 3.  No tremor appreciated.  Psychiatric:  Behavior, mood, and affect are appropriate.       Assessment and Plan:     83 y.o. female with the following issues:    1. H/O: upper GI bleed  " pantoprazole (PROTONIX) 40 MG Tablet Delayed Response      2. Blood loss anemia  CBC WITH DIFFERENTIAL    IRON/TOTAL IRON BIND      3. History of recent blood transfusion        4. Contraindication to anticoagulation therapy        5. Essential hypertension  spironolactone (ALDACTONE) 25 MG Tab    losartan (COZAAR) 50 MG Tab    Basic Metabolic Panel      6. Stage 4 chronic kidney disease (HCC)  Basic Metabolic Panel      7. Paroxysmal atrial fibrillation (HCC)  spironolactone (ALDACTONE) 25 MG Tab      8. Severe mitral regurgitation  furosemide (LASIX) 40 MG Tab      9. Severe tricuspid regurgitation by prior echocardiogram  furosemide (LASIX) 40 MG Tab      10. Obstructive sleep apnea treated with continuous positive airway pressure (CPAP)        11. 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) (HCC)        12. Situational anxiety  ALPRAZolam (XANAX) 0.25 MG Tab            Followup: Return in about 5 weeks (around 5/17/2023), or if symptoms worsen or fail to improve.

## 2023-04-10 NOTE — TELEPHONE ENCOUNTER
Reviewed the chart, patient went to the ER and also had labs drawn. The patient also saw her PCP Dr. Pink today, and she increased her diuretics.    Called patient to see how she's doing. She states she feels better since the ER, and has not had any further episodes of SOB at rest. Advised her to reach out with any concerns, otherwise I would be calling her after valve conference on Weds to discuss the plan for Mitral DOMINIC next Monday. Patient verbalizes understanding.    IKE to Dr. Gray

## 2023-04-11 ENCOUNTER — PRE-ADMISSION TESTING (OUTPATIENT)
Dept: ADMISSIONS | Facility: MEDICAL CENTER | Age: 83
DRG: 266 | End: 2023-04-11
Attending: INTERNAL MEDICINE
Payer: MEDICARE

## 2023-04-12 ENCOUNTER — TELEPHONE (OUTPATIENT)
Dept: CARDIOLOGY | Facility: MEDICAL CENTER | Age: 83
End: 2023-04-12
Payer: MEDICARE

## 2023-04-12 NOTE — TELEPHONE ENCOUNTER
Valve Conference Plan of Care: 4/12/2023 for Mitral DOMINIC 4/17/2023           Mitral DOMINIC Candidate: Yes  Access: n/a  Valve Size: n/a  Anesthesia or Moderate Sedation: GA with GOMEZ  Unit: telemetry  Incidental findings to be discussed with PCP: n/a   Clearance needed prior to procedure: no    Check in time of 1100 4/17/2023.     Pre-op appointment completed: yes    NPO after midnight. No medications the morning of the procedure except Levoxyl. Patient already holding Xarelto due to recent GIB. Verified patient is not taking her vitamin C anymore.     Patient notified of procedure date/time and check in process.     All questions were answered. Patient has direct extension for any further questions/concerns prior to the procedure.

## 2023-04-13 ENCOUNTER — HOSPITAL ENCOUNTER (OUTPATIENT)
Dept: RADIOLOGY | Facility: MEDICAL CENTER | Age: 83
DRG: 266 | End: 2023-04-13
Attending: INTERNAL MEDICINE | Admitting: INTERNAL MEDICINE
Payer: MEDICARE

## 2023-04-13 ENCOUNTER — DOCUMENTATION (OUTPATIENT)
Dept: CARDIOLOGY | Facility: MEDICAL CENTER | Age: 83
End: 2023-04-13
Payer: MEDICARE

## 2023-04-13 ENCOUNTER — PRE-ADMISSION TESTING (OUTPATIENT)
Dept: ADMISSIONS | Facility: MEDICAL CENTER | Age: 83
DRG: 266 | End: 2023-04-13
Attending: INTERNAL MEDICINE
Payer: MEDICARE

## 2023-04-13 DIAGNOSIS — Z01.812 PRE-OPERATIVE LABORATORY EXAMINATION: ICD-10-CM

## 2023-04-13 DIAGNOSIS — Z01.811 PRE-OPERATIVE RESPIRATORY EXAMINATION: ICD-10-CM

## 2023-04-13 DIAGNOSIS — I34.0 SEVERE MITRAL REGURGITATION: ICD-10-CM

## 2023-04-13 LAB
ABO GROUP BLD: NORMAL
ALBUMIN SERPL BCP-MCNC: 4 G/DL (ref 3.2–4.9)
ALBUMIN/GLOB SERPL: 1.2 G/DL
ALP SERPL-CCNC: 76 U/L (ref 30–99)
ALT SERPL-CCNC: 15 U/L (ref 2–50)
ANION GAP SERPL CALC-SCNC: 14 MMOL/L (ref 7–16)
APTT PPP: 36 SEC (ref 24.7–36)
AST SERPL-CCNC: 17 U/L (ref 12–45)
BASOPHILS # BLD AUTO: 0.5 % (ref 0–1.8)
BASOPHILS # BLD: 0.05 K/UL (ref 0–0.12)
BILIRUB SERPL-MCNC: 0.5 MG/DL (ref 0.1–1.5)
BLD GP AB SCN SERPL QL: NORMAL
BUN SERPL-MCNC: 48 MG/DL (ref 8–22)
CALCIUM ALBUM COR SERPL-MCNC: 9.1 MG/DL (ref 8.5–10.5)
CALCIUM SERPL-MCNC: 9.1 MG/DL (ref 8.5–10.5)
CHLORIDE SERPL-SCNC: 99 MMOL/L (ref 96–112)
CO2 SERPL-SCNC: 22 MMOL/L (ref 20–33)
CREAT SERPL-MCNC: 1.98 MG/DL (ref 0.5–1.4)
EOSINOPHIL # BLD AUTO: 0.21 K/UL (ref 0–0.51)
EOSINOPHIL NFR BLD: 2.2 % (ref 0–6.9)
ERYTHROCYTE [DISTWIDTH] IN BLOOD BY AUTOMATED COUNT: 54.7 FL (ref 35.9–50)
GFR SERPLBLD CREATININE-BSD FMLA CKD-EPI: 25 ML/MIN/1.73 M 2
GLOBULIN SER CALC-MCNC: 3.4 G/DL (ref 1.9–3.5)
GLUCOSE SERPL-MCNC: 112 MG/DL (ref 65–99)
HCT VFR BLD AUTO: 27.2 % (ref 37–47)
HGB BLD-MCNC: 8.2 G/DL (ref 12–16)
IMM GRANULOCYTES # BLD AUTO: 0.05 K/UL (ref 0–0.11)
IMM GRANULOCYTES NFR BLD AUTO: 0.5 % (ref 0–0.9)
INR PPP: 1.16 (ref 0.87–1.13)
LYMPHOCYTES # BLD AUTO: 1.04 K/UL (ref 1–4.8)
LYMPHOCYTES NFR BLD: 10.8 % (ref 22–41)
MCH RBC QN AUTO: 26.1 PG (ref 27–33)
MCHC RBC AUTO-ENTMCNC: 30.1 G/DL (ref 33.6–35)
MCV RBC AUTO: 86.6 FL (ref 81.4–97.8)
MONOCYTES # BLD AUTO: 0.75 K/UL (ref 0–0.85)
MONOCYTES NFR BLD AUTO: 7.8 % (ref 0–13.4)
NEUTROPHILS # BLD AUTO: 7.55 K/UL (ref 2–7.15)
NEUTROPHILS NFR BLD: 78.2 % (ref 44–72)
NRBC # BLD AUTO: 0 K/UL
NRBC BLD-RTO: 0 /100 WBC
NT-PROBNP SERPL IA-MCNC: 5647 PG/ML (ref 0–125)
PLATELET # BLD AUTO: 374 K/UL (ref 164–446)
PMV BLD AUTO: 11.3 FL (ref 9–12.9)
POTASSIUM SERPL-SCNC: 3.9 MMOL/L (ref 3.6–5.5)
PROT SERPL-MCNC: 7.4 G/DL (ref 6–8.2)
PROTHROMBIN TIME: 14.7 SEC (ref 12–14.6)
RBC # BLD AUTO: 3.14 M/UL (ref 4.2–5.4)
RH BLD: NORMAL
SODIUM SERPL-SCNC: 135 MMOL/L (ref 135–145)
WBC # BLD AUTO: 9.7 K/UL (ref 4.8–10.8)

## 2023-04-13 PROCEDURE — 80053 COMPREHEN METABOLIC PANEL: CPT

## 2023-04-13 PROCEDURE — 85025 COMPLETE CBC W/AUTO DIFF WBC: CPT

## 2023-04-13 PROCEDURE — 36415 COLL VENOUS BLD VENIPUNCTURE: CPT

## 2023-04-13 PROCEDURE — 85730 THROMBOPLASTIN TIME PARTIAL: CPT

## 2023-04-13 PROCEDURE — 83880 ASSAY OF NATRIURETIC PEPTIDE: CPT

## 2023-04-13 PROCEDURE — 71046 X-RAY EXAM CHEST 2 VIEWS: CPT

## 2023-04-13 PROCEDURE — 86850 RBC ANTIBODY SCREEN: CPT

## 2023-04-13 PROCEDURE — 86900 BLOOD TYPING SEROLOGIC ABO: CPT

## 2023-04-13 PROCEDURE — 85610 PROTHROMBIN TIME: CPT

## 2023-04-13 PROCEDURE — 86901 BLOOD TYPING SEROLOGIC RH(D): CPT

## 2023-04-13 NOTE — PROGRESS NOTES
Thank you for the opportunity to participate in your patient's care.     Your patient is scheduled for mitral valve transcatheter [id number] repair (Mitral DOMINIC) on 4/17/2023    Sincerely,    [facility] Structural [facility] Team    Ekaterina Benoit RN, Structural Heart Nurse Coordinator ([phone])  MAURY Vyas, RN, Structural [place] Nurse Coordinator ([phone])

## 2023-04-15 ENCOUNTER — HOSPITAL ENCOUNTER (OUTPATIENT)
Dept: LAB | Facility: MEDICAL CENTER | Age: 83
DRG: 266 | End: 2023-04-15
Attending: FAMILY MEDICINE
Payer: MEDICARE

## 2023-04-15 DIAGNOSIS — I10 ESSENTIAL HYPERTENSION: Chronic | ICD-10-CM

## 2023-04-15 DIAGNOSIS — N18.4 STAGE 4 CHRONIC KIDNEY DISEASE (HCC): ICD-10-CM

## 2023-04-15 DIAGNOSIS — D50.0 BLOOD LOSS ANEMIA: ICD-10-CM

## 2023-04-15 LAB
ANION GAP SERPL CALC-SCNC: 15 MMOL/L (ref 7–16)
ANISOCYTOSIS BLD QL SMEAR: ABNORMAL
BASOPHILS # BLD AUTO: 1 % (ref 0–1.8)
BASOPHILS # BLD: 0.08 K/UL (ref 0–0.12)
BUN SERPL-MCNC: 49 MG/DL (ref 8–22)
CALCIUM SERPL-MCNC: 9 MG/DL (ref 8.5–10.5)
CHLORIDE SERPL-SCNC: 104 MMOL/L (ref 96–112)
CO2 SERPL-SCNC: 21 MMOL/L (ref 20–33)
COMMENT 1642: NORMAL
CREAT SERPL-MCNC: 2.07 MG/DL (ref 0.5–1.4)
EOSINOPHIL # BLD AUTO: 0.31 K/UL (ref 0–0.51)
EOSINOPHIL NFR BLD: 3.8 % (ref 0–6.9)
ERYTHROCYTE [DISTWIDTH] IN BLOOD BY AUTOMATED COUNT: 56 FL (ref 35.9–50)
GFR SERPLBLD CREATININE-BSD FMLA CKD-EPI: 24 ML/MIN/1.73 M 2
GLUCOSE SERPL-MCNC: 99 MG/DL (ref 65–99)
HCT VFR BLD AUTO: 27.4 % (ref 37–47)
HGB BLD-MCNC: 7.9 G/DL (ref 12–16)
IMM GRANULOCYTES # BLD AUTO: 0.05 K/UL (ref 0–0.11)
IMM GRANULOCYTES NFR BLD AUTO: 0.6 % (ref 0–0.9)
IRON SATN MFR SERPL: 46 % (ref 15–55)
IRON SERPL-MCNC: 198 UG/DL (ref 40–170)
LYMPHOCYTES # BLD AUTO: 0.9 K/UL (ref 1–4.8)
LYMPHOCYTES NFR BLD: 11 % (ref 22–41)
MCH RBC QN AUTO: 25.8 PG (ref 27–33)
MCHC RBC AUTO-ENTMCNC: 28.8 G/DL (ref 33.6–35)
MCV RBC AUTO: 89.5 FL (ref 81.4–97.8)
MICROCYTES BLD QL SMEAR: ABNORMAL
MONOCYTES # BLD AUTO: 0.69 K/UL (ref 0–0.85)
MONOCYTES NFR BLD AUTO: 8.4 % (ref 0–13.4)
MORPHOLOGY BLD-IMP: NORMAL
NEUTROPHILS # BLD AUTO: 6.18 K/UL (ref 2–7.15)
NEUTROPHILS NFR BLD: 75.2 % (ref 44–72)
NRBC # BLD AUTO: 0 K/UL
NRBC BLD-RTO: 0 /100 WBC
PLATELET # BLD AUTO: 406 K/UL (ref 164–446)
PMV BLD AUTO: 11.2 FL (ref 9–12.9)
POLYCHROMASIA BLD QL SMEAR: NORMAL
POTASSIUM SERPL-SCNC: 4.1 MMOL/L (ref 3.6–5.5)
RBC # BLD AUTO: 3.06 M/UL (ref 4.2–5.4)
RBC BLD AUTO: PRESENT
SODIUM SERPL-SCNC: 140 MMOL/L (ref 135–145)
TIBC SERPL-MCNC: 434 UG/DL (ref 250–450)
UIBC SERPL-MCNC: 236 UG/DL (ref 110–370)
WBC # BLD AUTO: 8.2 K/UL (ref 4.8–10.8)

## 2023-04-15 PROCEDURE — 36415 COLL VENOUS BLD VENIPUNCTURE: CPT

## 2023-04-15 PROCEDURE — 80048 BASIC METABOLIC PNL TOTAL CA: CPT

## 2023-04-15 PROCEDURE — 85025 COMPLETE CBC W/AUTO DIFF WBC: CPT

## 2023-04-15 PROCEDURE — 83540 ASSAY OF IRON: CPT

## 2023-04-15 PROCEDURE — 83550 IRON BINDING TEST: CPT

## 2023-04-17 ENCOUNTER — HOSPITAL ENCOUNTER (INPATIENT)
Facility: MEDICAL CENTER | Age: 83
LOS: 2 days | DRG: 266 | End: 2023-04-19
Attending: INTERNAL MEDICINE | Admitting: INTERNAL MEDICINE
Payer: MEDICARE

## 2023-04-17 ENCOUNTER — ANESTHESIA (OUTPATIENT)
Dept: SURGERY | Facility: MEDICAL CENTER | Age: 83
DRG: 266 | End: 2023-04-17
Payer: MEDICARE

## 2023-04-17 ENCOUNTER — ANESTHESIA EVENT (OUTPATIENT)
Dept: SURGERY | Facility: MEDICAL CENTER | Age: 83
DRG: 266 | End: 2023-04-17
Payer: MEDICARE

## 2023-04-17 ENCOUNTER — APPOINTMENT (OUTPATIENT)
Dept: CARDIOLOGY | Facility: MEDICAL CENTER | Age: 83
DRG: 266 | End: 2023-04-17
Attending: ANESTHESIOLOGY
Payer: MEDICARE

## 2023-04-17 ENCOUNTER — APPOINTMENT (OUTPATIENT)
Dept: RADIOLOGY | Facility: MEDICAL CENTER | Age: 83
DRG: 266 | End: 2023-04-17
Attending: NURSE PRACTITIONER
Payer: MEDICARE

## 2023-04-17 DIAGNOSIS — N18.4 CKD (CHRONIC KIDNEY DISEASE) STAGE 4, GFR 15-29 ML/MIN (HCC): ICD-10-CM

## 2023-04-17 DIAGNOSIS — F41.8 SITUATIONAL ANXIETY: ICD-10-CM

## 2023-04-17 DIAGNOSIS — Z98.890 S/P MITRAL VALVE CLIP IMPLANTATION: ICD-10-CM

## 2023-04-17 DIAGNOSIS — D62 ACUTE BLOOD LOSS ANEMIA: ICD-10-CM

## 2023-04-17 DIAGNOSIS — Z95.818 S/P MITRAL VALVE CLIP IMPLANTATION: ICD-10-CM

## 2023-04-17 DIAGNOSIS — I10 ESSENTIAL HYPERTENSION: Chronic | ICD-10-CM

## 2023-04-17 PROBLEM — Z87.19 H/O: UPPER GI BLEED: Status: RESOLVED | Noted: 2023-04-10 | Resolved: 2023-04-17

## 2023-04-17 PROBLEM — R73.01 ELEVATED FASTING GLUCOSE: Status: RESOLVED | Noted: 2022-11-16 | Resolved: 2023-04-17

## 2023-04-17 PROBLEM — R45.4 IRRITABILITY: Status: RESOLVED | Noted: 2021-10-27 | Resolved: 2023-04-17

## 2023-04-17 PROBLEM — K74.60 LIVER CIRRHOSIS SECONDARY TO NASH (NONALCOHOLIC STEATOHEPATITIS) (HCC): Status: RESOLVED | Noted: 2021-03-25 | Resolved: 2023-04-17

## 2023-04-17 PROBLEM — R79.89 ELEVATED TROPONIN: Status: RESOLVED | Noted: 2020-04-08 | Resolved: 2023-04-17

## 2023-04-17 PROBLEM — D50.0 BLOOD LOSS ANEMIA: Status: RESOLVED | Noted: 2020-04-08 | Resolved: 2023-04-17

## 2023-04-17 PROBLEM — R93.5 ABNORMAL CT OF THE ABDOMEN: Status: RESOLVED | Noted: 2020-04-17 | Resolved: 2023-04-17

## 2023-04-17 PROBLEM — E89.0 POSTSURGICAL HYPOTHYROIDISM: Status: RESOLVED | Noted: 2021-12-14 | Resolved: 2023-04-17

## 2023-04-17 PROBLEM — K75.81 LIVER CIRRHOSIS SECONDARY TO NASH (NONALCOHOLIC STEATOHEPATITIS) (HCC): Status: RESOLVED | Noted: 2021-03-25 | Resolved: 2023-04-17

## 2023-04-17 PROBLEM — D50.9 MICROCYTIC ANEMIA: Status: RESOLVED | Noted: 2017-07-13 | Resolved: 2023-04-17

## 2023-04-17 LAB
ACT BLD: 227 SEC (ref 74–137)
ACT BLD: 269 SEC (ref 74–137)
ACT BLD: 305 SEC (ref 74–137)
ALBUMIN SERPL BCP-MCNC: 3.6 G/DL (ref 3.2–4.9)
ALBUMIN/GLOB SERPL: 1.2 G/DL
ALP SERPL-CCNC: 77 U/L (ref 30–99)
ALT SERPL-CCNC: 16 U/L (ref 2–50)
ANION GAP SERPL CALC-SCNC: 17 MMOL/L (ref 7–16)
AST SERPL-CCNC: 18 U/L (ref 12–45)
BILIRUB SERPL-MCNC: 0.5 MG/DL (ref 0.1–1.5)
BUN SERPL-MCNC: 57 MG/DL (ref 8–22)
CALCIUM ALBUM COR SERPL-MCNC: 8.7 MG/DL (ref 8.5–10.5)
CALCIUM SERPL-MCNC: 8.4 MG/DL (ref 8.5–10.5)
CHLORIDE SERPL-SCNC: 107 MMOL/L (ref 96–112)
CO2 SERPL-SCNC: 17 MMOL/L (ref 20–33)
CREAT SERPL-MCNC: 2.52 MG/DL (ref 0.5–1.4)
EKG IMPRESSION: NORMAL
ERYTHROCYTE [DISTWIDTH] IN BLOOD BY AUTOMATED COUNT: 55.1 FL (ref 35.9–50)
GFR SERPLBLD CREATININE-BSD FMLA CKD-EPI: 19 ML/MIN/1.73 M 2
GLOBULIN SER CALC-MCNC: 3 G/DL (ref 1.9–3.5)
GLUCOSE SERPL-MCNC: 194 MG/DL (ref 65–99)
HCT VFR BLD AUTO: 25.6 % (ref 37–47)
HGB BLD-MCNC: 7.8 G/DL (ref 12–16)
MCH RBC QN AUTO: 26.4 PG (ref 27–33)
MCHC RBC AUTO-ENTMCNC: 30.5 G/DL (ref 33.6–35)
MCV RBC AUTO: 86.5 FL (ref 81.4–97.8)
NT-PROBNP SERPL IA-MCNC: 3230 PG/ML (ref 0–125)
PLATELET # BLD AUTO: 352 K/UL (ref 164–446)
PMV BLD AUTO: 10.9 FL (ref 9–12.9)
POTASSIUM SERPL-SCNC: 4.3 MMOL/L (ref 3.6–5.5)
PROT SERPL-MCNC: 6.6 G/DL (ref 6–8.2)
RBC # BLD AUTO: 2.96 M/UL (ref 4.2–5.4)
SODIUM SERPL-SCNC: 141 MMOL/L (ref 135–145)
WBC # BLD AUTO: 14.8 K/UL (ref 4.8–10.8)

## 2023-04-17 PROCEDURE — 80053 COMPREHEN METABOLIC PANEL: CPT

## 2023-04-17 PROCEDURE — 700111 HCHG RX REV CODE 636 W/ 250 OVERRIDE (IP): Performed by: INTERNAL MEDICINE

## 2023-04-17 PROCEDURE — 700105 HCHG RX REV CODE 258: Performed by: NURSE PRACTITIONER

## 2023-04-17 PROCEDURE — 700101 HCHG RX REV CODE 250: Performed by: ANESTHESIOLOGY

## 2023-04-17 PROCEDURE — 33418 REPAIR TCAT MITRAL VALVE: CPT | Mod: 62,Q0 | Performed by: INTERNAL MEDICINE

## 2023-04-17 PROCEDURE — 93320 DOPPLER ECHO COMPLETE: CPT | Mod: 26 | Performed by: ANESTHESIOLOGY

## 2023-04-17 PROCEDURE — 700102 HCHG RX REV CODE 250 W/ 637 OVERRIDE(OP): Performed by: ANESTHESIOLOGY

## 2023-04-17 PROCEDURE — 02UG3JZ SUPPLEMENT MITRAL VALVE WITH SYNTHETIC SUBSTITUTE, PERCUTANEOUS APPROACH: ICD-10-PCS | Performed by: INTERNAL MEDICINE

## 2023-04-17 PROCEDURE — 160042 HCHG SURGERY MINUTES - EA ADDL 1 MIN LEVEL 5: Performed by: INTERNAL MEDICINE

## 2023-04-17 PROCEDURE — 93312 ECHO TRANSESOPHAGEAL: CPT | Mod: 26,59 | Performed by: ANESTHESIOLOGY

## 2023-04-17 PROCEDURE — 700111 HCHG RX REV CODE 636 W/ 250 OVERRIDE (IP): Performed by: ANESTHESIOLOGY

## 2023-04-17 PROCEDURE — 71045 X-RAY EXAM CHEST 1 VIEW: CPT

## 2023-04-17 PROCEDURE — 99100 ANES PT EXTEME AGE<1 YR&>70: CPT | Performed by: ANESTHESIOLOGY

## 2023-04-17 PROCEDURE — 36415 COLL VENOUS BLD VENIPUNCTURE: CPT

## 2023-04-17 PROCEDURE — 93355 ECHO TRANSESOPHAGEAL (TEE): CPT

## 2023-04-17 PROCEDURE — 700111 HCHG RX REV CODE 636 W/ 250 OVERRIDE (IP): Performed by: NURSE PRACTITIONER

## 2023-04-17 PROCEDURE — 93005 ELECTROCARDIOGRAM TRACING: CPT | Performed by: NURSE PRACTITIONER

## 2023-04-17 PROCEDURE — C1894 INTRO/SHEATH, NON-LASER: HCPCS | Performed by: INTERNAL MEDICINE

## 2023-04-17 PROCEDURE — A9270 NON-COVERED ITEM OR SERVICE: HCPCS | Performed by: ANESTHESIOLOGY

## 2023-04-17 PROCEDURE — 36620 INSERTION CATHETER ARTERY: CPT | Performed by: ANESTHESIOLOGY

## 2023-04-17 PROCEDURE — 93319 3D ECHO IMG CGEN CAR ANOMAL: CPT | Performed by: ANESTHESIOLOGY

## 2023-04-17 PROCEDURE — B24BZZ4 ULTRASONOGRAPHY OF HEART WITH AORTA, TRANSESOPHAGEAL: ICD-10-PCS | Performed by: ANESTHESIOLOGY

## 2023-04-17 PROCEDURE — C1887 CATHETER, GUIDING: HCPCS | Performed by: INTERNAL MEDICINE

## 2023-04-17 PROCEDURE — 160048 HCHG OR STATISTICAL LEVEL 1-5: Performed by: INTERNAL MEDICINE

## 2023-04-17 PROCEDURE — 160035 HCHG PACU - 1ST 60 MINS PHASE I: Performed by: INTERNAL MEDICINE

## 2023-04-17 PROCEDURE — 85027 COMPLETE CBC AUTOMATED: CPT

## 2023-04-17 PROCEDURE — 700102 HCHG RX REV CODE 250 W/ 637 OVERRIDE(OP): Performed by: NURSE PRACTITIONER

## 2023-04-17 PROCEDURE — 83880 ASSAY OF NATRIURETIC PEPTIDE: CPT

## 2023-04-17 PROCEDURE — C1751 CATH, INF, PER/CENT/MIDLINE: HCPCS | Performed by: INTERNAL MEDICINE

## 2023-04-17 PROCEDURE — 85347 COAGULATION TIME ACTIVATED: CPT | Mod: 91

## 2023-04-17 PROCEDURE — 00560 ANES PX HEART W/O PUMP OXTJ: CPT | Performed by: ANESTHESIOLOGY

## 2023-04-17 PROCEDURE — 160002 HCHG RECOVERY MINUTES (STAT): Performed by: INTERNAL MEDICINE

## 2023-04-17 PROCEDURE — A9270 NON-COVERED ITEM OR SERVICE: HCPCS | Performed by: NURSE PRACTITIONER

## 2023-04-17 PROCEDURE — 93010 ELECTROCARDIOGRAM REPORT: CPT | Performed by: INTERNAL MEDICINE

## 2023-04-17 PROCEDURE — 160009 HCHG ANES TIME/MIN: Performed by: INTERNAL MEDICINE

## 2023-04-17 PROCEDURE — 700101 HCHG RX REV CODE 250: Performed by: INTERNAL MEDICINE

## 2023-04-17 PROCEDURE — 160036 HCHG PACU - EA ADDL 30 MINS PHASE I: Performed by: INTERNAL MEDICINE

## 2023-04-17 PROCEDURE — C1760 CLOSURE DEV, VASC: HCPCS | Performed by: INTERNAL MEDICINE

## 2023-04-17 PROCEDURE — 160031 HCHG SURGERY MINUTES - 1ST 30 MINS LEVEL 5: Performed by: INTERNAL MEDICINE

## 2023-04-17 PROCEDURE — 770020 HCHG ROOM/CARE - TELE (206)

## 2023-04-17 PROCEDURE — 700105 HCHG RX REV CODE 258: Performed by: INTERNAL MEDICINE

## 2023-04-17 PROCEDURE — B24CZZ4 ULTRASONOGRAPHY OF PERICARDIUM, TRANSESOPHAGEAL: ICD-10-PCS | Performed by: ANESTHESIOLOGY

## 2023-04-17 PROCEDURE — 33418 REPAIR TCAT MITRAL VALVE: CPT | Mod: 62,Q0 | Performed by: THORACIC SURGERY (CARDIOTHORACIC VASCULAR SURGERY)

## 2023-04-17 DEVICE — KIT MITRACLIP G4 SYSTEM CATHETER SGC0701 (1/EA): Type: IMPLANTABLE DEVICE | Site: GROIN | Status: FUNCTIONAL

## 2023-04-17 RX ORDER — POLYETHYLENE GLYCOL 3350 17 G/17G
1 POWDER, FOR SOLUTION ORAL
Status: DISCONTINUED | OUTPATIENT
Start: 2023-04-17 | End: 2023-04-19 | Stop reason: HOSPADM

## 2023-04-17 RX ORDER — HALOPERIDOL 5 MG/ML
1 INJECTION INTRAMUSCULAR
Status: DISCONTINUED | OUTPATIENT
Start: 2023-04-17 | End: 2023-04-17 | Stop reason: HOSPADM

## 2023-04-17 RX ORDER — BISACODYL 10 MG
10 SUPPOSITORY, RECTAL RECTAL
Status: DISCONTINUED | OUTPATIENT
Start: 2023-04-17 | End: 2023-04-19 | Stop reason: HOSPADM

## 2023-04-17 RX ORDER — ROCURONIUM BROMIDE 10 MG/ML
INJECTION, SOLUTION INTRAVENOUS PRN
Status: DISCONTINUED | OUTPATIENT
Start: 2023-04-17 | End: 2023-04-17 | Stop reason: SURG

## 2023-04-17 RX ORDER — OXYCODONE HCL 5 MG/5 ML
10 SOLUTION, ORAL ORAL
Status: COMPLETED | OUTPATIENT
Start: 2023-04-17 | End: 2023-04-17

## 2023-04-17 RX ORDER — SODIUM CHLORIDE, SODIUM LACTATE, POTASSIUM CHLORIDE, CALCIUM CHLORIDE 600; 310; 30; 20 MG/100ML; MG/100ML; MG/100ML; MG/100ML
INJECTION, SOLUTION INTRAVENOUS CONTINUOUS
Status: DISCONTINUED | OUTPATIENT
Start: 2023-04-17 | End: 2023-04-17 | Stop reason: HOSPADM

## 2023-04-17 RX ORDER — OMEPRAZOLE 20 MG/1
20 CAPSULE, DELAYED RELEASE ORAL 2 TIMES DAILY
Status: DISCONTINUED | OUTPATIENT
Start: 2023-04-17 | End: 2023-04-19 | Stop reason: HOSPADM

## 2023-04-17 RX ORDER — HYDROMORPHONE HYDROCHLORIDE 1 MG/ML
0.1 INJECTION, SOLUTION INTRAMUSCULAR; INTRAVENOUS; SUBCUTANEOUS
Status: DISCONTINUED | OUTPATIENT
Start: 2023-04-17 | End: 2023-04-17 | Stop reason: HOSPADM

## 2023-04-17 RX ORDER — ACETAMINOPHEN 325 MG/1
650 TABLET ORAL EVERY 6 HOURS PRN
Status: DISCONTINUED | OUTPATIENT
Start: 2023-04-17 | End: 2023-04-19 | Stop reason: HOSPADM

## 2023-04-17 RX ORDER — ATORVASTATIN CALCIUM 40 MG/1
40 TABLET, FILM COATED ORAL EVERY EVENING
Status: DISCONTINUED | OUTPATIENT
Start: 2023-04-17 | End: 2023-04-19 | Stop reason: HOSPADM

## 2023-04-17 RX ORDER — FUROSEMIDE 10 MG/ML
40 INJECTION INTRAMUSCULAR; INTRAVENOUS
Status: DISCONTINUED | OUTPATIENT
Start: 2023-04-17 | End: 2023-04-19 | Stop reason: HOSPADM

## 2023-04-17 RX ORDER — DIPHENHYDRAMINE HCL 25 MG
25 TABLET ORAL NIGHTLY PRN
Status: DISCONTINUED | OUTPATIENT
Start: 2023-04-17 | End: 2023-04-19 | Stop reason: HOSPADM

## 2023-04-17 RX ORDER — LIDOCAINE HYDROCHLORIDE 40 MG/ML
SOLUTION TOPICAL PRN
Status: DISCONTINUED | OUTPATIENT
Start: 2023-04-17 | End: 2023-04-17 | Stop reason: SURG

## 2023-04-17 RX ORDER — HYDROMORPHONE HYDROCHLORIDE 1 MG/ML
0.2 INJECTION, SOLUTION INTRAMUSCULAR; INTRAVENOUS; SUBCUTANEOUS
Status: DISCONTINUED | OUTPATIENT
Start: 2023-04-17 | End: 2023-04-17 | Stop reason: HOSPADM

## 2023-04-17 RX ORDER — AMOXICILLIN 250 MG
2 CAPSULE ORAL 2 TIMES DAILY
Status: DISCONTINUED | OUTPATIENT
Start: 2023-04-17 | End: 2023-04-19 | Stop reason: HOSPADM

## 2023-04-17 RX ORDER — BISOPROLOL FUMARATE 5 MG/1
10 TABLET, FILM COATED ORAL DAILY
Status: DISCONTINUED | OUTPATIENT
Start: 2023-04-18 | End: 2023-04-19 | Stop reason: HOSPADM

## 2023-04-17 RX ORDER — LOSARTAN POTASSIUM 50 MG/1
50 TABLET ORAL EVERY EVENING
Status: DISCONTINUED | OUTPATIENT
Start: 2023-04-17 | End: 2023-04-18

## 2023-04-17 RX ORDER — ALBUTEROL SULFATE 2.5 MG/3ML
2.5 SOLUTION RESPIRATORY (INHALATION)
Status: DISCONTINUED | OUTPATIENT
Start: 2023-04-17 | End: 2023-04-17 | Stop reason: HOSPADM

## 2023-04-17 RX ORDER — HYDROMORPHONE HYDROCHLORIDE 1 MG/ML
0.4 INJECTION, SOLUTION INTRAMUSCULAR; INTRAVENOUS; SUBCUTANEOUS
Status: DISCONTINUED | OUTPATIENT
Start: 2023-04-17 | End: 2023-04-17 | Stop reason: HOSPADM

## 2023-04-17 RX ORDER — CEFAZOLIN SODIUM 1 G/3ML
INJECTION, POWDER, FOR SOLUTION INTRAMUSCULAR; INTRAVENOUS PRN
Status: DISCONTINUED | OUTPATIENT
Start: 2023-04-17 | End: 2023-04-17 | Stop reason: SURG

## 2023-04-17 RX ORDER — LIDOCAINE HYDROCHLORIDE 20 MG/ML
INJECTION, SOLUTION INFILTRATION; PERINEURAL
Status: DISCONTINUED | OUTPATIENT
Start: 2023-04-17 | End: 2023-04-17 | Stop reason: HOSPADM

## 2023-04-17 RX ORDER — DIPHENHYDRAMINE HYDROCHLORIDE 50 MG/ML
12.5 INJECTION INTRAMUSCULAR; INTRAVENOUS
Status: DISCONTINUED | OUTPATIENT
Start: 2023-04-17 | End: 2023-04-17 | Stop reason: HOSPADM

## 2023-04-17 RX ORDER — DEXAMETHASONE SODIUM PHOSPHATE 4 MG/ML
INJECTION, SOLUTION INTRA-ARTICULAR; INTRALESIONAL; INTRAMUSCULAR; INTRAVENOUS; SOFT TISSUE PRN
Status: DISCONTINUED | OUTPATIENT
Start: 2023-04-17 | End: 2023-04-17 | Stop reason: SURG

## 2023-04-17 RX ORDER — LEVOTHYROXINE SODIUM 0.07 MG/1
150 TABLET ORAL
Status: DISCONTINUED | OUTPATIENT
Start: 2023-04-18 | End: 2023-04-19 | Stop reason: HOSPADM

## 2023-04-17 RX ORDER — ALPRAZOLAM 0.25 MG/1
0.25 TABLET ORAL 3 TIMES DAILY PRN
Status: DISCONTINUED | OUTPATIENT
Start: 2023-04-17 | End: 2023-04-19 | Stop reason: HOSPADM

## 2023-04-17 RX ORDER — ONDANSETRON 2 MG/ML
INJECTION INTRAMUSCULAR; INTRAVENOUS PRN
Status: DISCONTINUED | OUTPATIENT
Start: 2023-04-17 | End: 2023-04-17 | Stop reason: SURG

## 2023-04-17 RX ORDER — ONDANSETRON 2 MG/ML
4 INJECTION INTRAMUSCULAR; INTRAVENOUS
Status: DISCONTINUED | OUTPATIENT
Start: 2023-04-17 | End: 2023-04-17 | Stop reason: HOSPADM

## 2023-04-17 RX ORDER — HYDRALAZINE HYDROCHLORIDE 20 MG/ML
10 INJECTION INTRAMUSCULAR; INTRAVENOUS
Status: DISCONTINUED | OUTPATIENT
Start: 2023-04-17 | End: 2023-04-19 | Stop reason: HOSPADM

## 2023-04-17 RX ORDER — OXYCODONE HCL 5 MG/5 ML
5 SOLUTION, ORAL ORAL
Status: COMPLETED | OUTPATIENT
Start: 2023-04-17 | End: 2023-04-17

## 2023-04-17 RX ORDER — DIPHENHYDRAMINE HYDROCHLORIDE 50 MG/ML
25 INJECTION INTRAMUSCULAR; INTRAVENOUS EVERY 6 HOURS PRN
Status: DISCONTINUED | OUTPATIENT
Start: 2023-04-17 | End: 2023-04-19 | Stop reason: HOSPADM

## 2023-04-17 RX ORDER — SODIUM CHLORIDE 9 MG/ML
INJECTION, SOLUTION INTRAVENOUS ONCE
Status: COMPLETED | OUTPATIENT
Start: 2023-04-17 | End: 2023-04-17

## 2023-04-17 RX ORDER — SODIUM CHLORIDE 9 MG/ML
INJECTION, SOLUTION INTRAVENOUS CONTINUOUS
Status: ACTIVE | OUTPATIENT
Start: 2023-04-17 | End: 2023-04-18

## 2023-04-17 RX ORDER — HEPARIN SODIUM,PORCINE 1000/ML
VIAL (ML) INJECTION PRN
Status: DISCONTINUED | OUTPATIENT
Start: 2023-04-17 | End: 2023-04-17 | Stop reason: SURG

## 2023-04-17 RX ORDER — ONDANSETRON 2 MG/ML
4 INJECTION INTRAMUSCULAR; INTRAVENOUS EVERY 4 HOURS PRN
Status: DISCONTINUED | OUTPATIENT
Start: 2023-04-17 | End: 2023-04-19 | Stop reason: HOSPADM

## 2023-04-17 RX ORDER — BUPIVACAINE HYDROCHLORIDE AND EPINEPHRINE 5; 5 MG/ML; UG/ML
INJECTION, SOLUTION EPIDURAL; INTRACAUDAL; PERINEURAL
Status: DISCONTINUED | OUTPATIENT
Start: 2023-04-17 | End: 2023-04-17 | Stop reason: HOSPADM

## 2023-04-17 RX ORDER — SPIRONOLACTONE 25 MG/1
25 TABLET ORAL DAILY
Status: DISCONTINUED | OUTPATIENT
Start: 2023-04-18 | End: 2023-04-19 | Stop reason: HOSPADM

## 2023-04-17 RX ADMIN — PROPOFOL 200 MG: 10 INJECTION, EMULSION INTRAVENOUS at 12:58

## 2023-04-17 RX ADMIN — OMEPRAZOLE 20 MG: 20 CAPSULE, DELAYED RELEASE ORAL at 18:35

## 2023-04-17 RX ADMIN — CEFAZOLIN 2 G: 330 INJECTION, POWDER, FOR SOLUTION INTRAMUSCULAR; INTRAVENOUS at 13:07

## 2023-04-17 RX ADMIN — ONDANSETRON 4 MG: 2 INJECTION INTRAMUSCULAR; INTRAVENOUS at 13:15

## 2023-04-17 RX ADMIN — HEPARIN SODIUM 3000 UNITS: 1000 INJECTION, SOLUTION INTRAVENOUS; SUBCUTANEOUS at 14:17

## 2023-04-17 RX ADMIN — SODIUM CHLORIDE: 9 INJECTION, SOLUTION INTRAVENOUS at 18:41

## 2023-04-17 RX ADMIN — ATORVASTATIN CALCIUM 40 MG: 40 TABLET, FILM COATED ORAL at 18:35

## 2023-04-17 RX ADMIN — LIDOCAINE HYDROCHLORIDE 4 ML: 40 SOLUTION TOPICAL at 12:59

## 2023-04-17 RX ADMIN — DEXAMETHASONE SODIUM PHOSPHATE 4 MG: 4 INJECTION, SOLUTION INTRA-ARTICULAR; INTRALESIONAL; INTRAMUSCULAR; INTRAVENOUS; SOFT TISSUE at 13:15

## 2023-04-17 RX ADMIN — ROCURONIUM BROMIDE 10 MG: 10 INJECTION, SOLUTION INTRAVENOUS at 14:17

## 2023-04-17 RX ADMIN — SUGAMMADEX 200 MG: 100 INJECTION, SOLUTION INTRAVENOUS at 15:07

## 2023-04-17 RX ADMIN — ACETAMINOPHEN 650 MG: 325 TABLET, FILM COATED ORAL at 18:34

## 2023-04-17 RX ADMIN — LOSARTAN POTASSIUM 50 MG: 50 TABLET, FILM COATED ORAL at 18:36

## 2023-04-17 RX ADMIN — FUROSEMIDE 40 MG: 10 INJECTION, SOLUTION INTRAMUSCULAR; INTRAVENOUS at 18:35

## 2023-04-17 RX ADMIN — OXYCODONE HYDROCHLORIDE 5 MG: 5 SOLUTION ORAL at 15:41

## 2023-04-17 RX ADMIN — SODIUM CHLORIDE: 9 INJECTION, SOLUTION INTRAVENOUS at 12:00

## 2023-04-17 RX ADMIN — HEPARIN SODIUM 13000 UNITS: 1000 INJECTION, SOLUTION INTRAVENOUS; SUBCUTANEOUS at 13:21

## 2023-04-17 RX ADMIN — ROCURONIUM BROMIDE 50 MG: 10 INJECTION, SOLUTION INTRAVENOUS at 12:58

## 2023-04-17 RX ADMIN — HEPARIN SODIUM 6000 UNITS: 1000 INJECTION, SOLUTION INTRAVENOUS; SUBCUTANEOUS at 13:53

## 2023-04-17 ASSESSMENT — ENCOUNTER SYMPTOMS
ABDOMINAL PAIN: 0
PALPITATIONS: 0
PND: 0
MYALGIAS: 0
CLAUDICATION: 0
FEVER: 0
SHORTNESS OF BREATH: 1
DIZZINESS: 1
COUGH: 0
ORTHOPNEA: 0

## 2023-04-17 ASSESSMENT — PAIN DESCRIPTION - PAIN TYPE
TYPE: ACUTE PAIN
TYPE: CHRONIC PAIN
TYPE: ACUTE PAIN
TYPE: ACUTE PAIN
TYPE: CHRONIC PAIN
TYPE: SURGICAL PAIN
TYPE: ACUTE PAIN
TYPE: SURGICAL PAIN
TYPE: SURGICAL PAIN

## 2023-04-17 ASSESSMENT — COGNITIVE AND FUNCTIONAL STATUS - GENERAL
TURNING FROM BACK TO SIDE WHILE IN FLAT BAD: A LITTLE
HELP NEEDED FOR BATHING: A LITTLE
DRESSING REGULAR LOWER BODY CLOTHING: A LITTLE
STANDING UP FROM CHAIR USING ARMS: A LITTLE
MOVING TO AND FROM BED TO CHAIR: A LITTLE
CLIMB 3 TO 5 STEPS WITH RAILING: A LITTLE
MOBILITY SCORE: 20
DAILY ACTIVITIY SCORE: 22
SUGGESTED CMS G CODE MODIFIER DAILY ACTIVITY: CJ
SUGGESTED CMS G CODE MODIFIER MOBILITY: CJ

## 2023-04-17 ASSESSMENT — FIBROSIS 4 INDEX: FIB4 SCORE: 0.88

## 2023-04-17 NOTE — PROCEDURES
DATE OF PROCEDURE: 4/17/23    REFERRING PHYSICIAN:     PROCEDURES:  1. Transcatheter mitral valve edge to edge repair (DOMINIC or Mitraclip)    PRE PROCEDURAL DIAGNOSIS:  Severe symptomatic mitral regurgitation NYHA III C , due to degenerative mitral valve disease and high surgical risk.    POST PROCEDURAL DIAGNOSIS:  Successful transcatheter mitral valve edge to edge repair using NTW clip, reduction of mitral regurgitation from 4+ to 2+    Surgeons: Dr. Gray (interventional cardiologist) Dr. Mc (Cardiothoracic surgeon)      DESCRIPTION OF PROCEDURE:  After informed consent was signed by the   patient, the patient was brought into the OR 19.  Bilateral groin was prepped and draped in   usual sterile manner.  The initial timeout was performed.     A 6-Chinese venous sheath was placed in the right femoral vein by Modified Seldinger   technique under ultrasound guidance. A PerClose devices was delivered after dilatation of skin track.  An 8.5-Chinese TorFlex venous sheath was advanced. Half dose IV heparin was given. A Logan   needle was inserted into the catheter and both the needle and the sheath were placed   in the right atrium. Under transesophageal echocardiogram guidance, the Logan needle   was used to puncture the septum and the catheter was advanced into the left atrium by   myself. Rest of the half dose Heparin was given. A Protrack pigtail wire was positioned into the left   atrium. The sheath was removed and the femoral access site was dilated with   multiple dialators. The 22/24-Chinese steerable guide catheter handle was inserted into  the left atrium under transesophageal echocardiogram guidance and the dilator was   removed by myself.  A NTW Clip attached to the delivery catheter handle was inserted in to   the left atrium. Again, under transesophageal echocardiogram guidance, the clip was   opened, advanced into the left ventricle and pulled up to the mitral valve leaflets at the    A2/P2 position. It was very difficult to grasp, after significant difficulty we were able to grasp leaflet and the clip was partially closed. The steerable device was manipulated by .  The severity of mitral regurgitation and the mitral valve gradient were measured. The clip was then closed fully and released by myself. After releasing the clip part of the mitral regurgitation opened. The delivery catheter was removed.       The mitral regurgitation was reduced from 4+ to 2+, the mean gradient was 2 mmHg.    The patient tolerated the procedure well. At the end of procedure hemodynamics were   again obtained. The steerable guide catheter was removed and the right femoral venous   access site was closed via PerClose closure. The patient was then extubated and transferred to PACU in stable condition.      RECOMMENDATION: Guideline directed medical therapy.

## 2023-04-17 NOTE — ANESTHESIA PROCEDURE NOTES
GOMEZ    Date/Time: 4/17/2023 1:08 PM  Performed by: Sachin Pineda M.D.  Authorized by: Sachin Pineda M.D.     Start Time:4/17/2023 1:08 PM  Preanesthetic Checklist: patient identified, IV checked, site marked, risks and benefits discussed, surgical consent, monitors and equipment checked, pre-op evaluation and timeout performed    Indication for GOMEZ: diagnostic   Patient Location: OR  Intubated: Yes  Bite Block: Yes  Heart Visualized: Yes  Insertion: atraumatic    **See FULL GOMEZ report in patient's chart via CV Synapse**

## 2023-04-17 NOTE — OR NURSING
1516 - pt arrives to PACU 4A. R groin site dressing CD&L, distal pulses palpable and area is soft.    1528 - Blood drawn and sent to lab    1533 - EKG at bedside    1540 - Dr. Gray at bedside. Pt c/o back pain and Dr. KRUGER said patient can be moved to comfortable position if need be, but patient must stay in bed.    1550 - CXR at bedside    1640 - R radial art line removed, pressure held for 10 minutes and hemostasis achieved. No bleeding or hematoma noted.

## 2023-04-17 NOTE — H&P
Physician H&P    Patient ID:  Aleshia Crooks  [id number]  83 y.o. female  1940    History:  Primary Diagnosis:Severe mitral regurgitation.    HPI:  The patient presents today from [place] with her family. She has been experiencing progressive shortness of breath and increased fatigue. She was found to have severe mitral regurgitation and sent to Dr. Gray and Dr. Mc for DOMINIC consultation. She was planned to go through DOMINIC in March but unfortunately had a hospital admission for worsening shortness of breath at rest found to have acute GIB and CHF exacerbation at [facility] in [place]. She was hospitalized for a few days with resolution of her bleeding and Hgb improved. Her breathing improved but her diuretics were held due to hypotension. She then had repeat ER visit in [place] two weeks ago for shortness of breath and volume overload. Her diuretics were re-started and she has since been back at her baseline with no further bleeding or worsening shortness of breath. She will plan to undergo DOMINIC today with Dr. Gray and Dr. Mc as scheduled. All questions and concerns will be answered prior to the procedure. Consent per MD's.    Past Medical History:  has a past medical history of Apnea, sleep, Atrial fibrillation (HCC), Cataract, Gasping for breath, Heart attack (HCC), High cholesterol, Hypertension, 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) (HCC) (03/25/2021), Malignant melanoma of left upper extremity including shoulder (HCC) (06/08/2020), Moderate tricuspid regurgitation (11/16/2022), and Thyroid disease.  Past Surgical History:  has a past surgical history that includes thyroidectomy total; cholecystectomy; multiple coronary artery bypass; eye surgery (Bilateral); pr colonoscopy,diagnostic (N/A, 1/24/2023); pr upper gi endoscopy,diagnosis (N/A, 1/24/2023); and pr upper gi endoscopy,biopsy (N/A, 1/24/2023).  Past Social History:  reports that she quit smoking  about 31 years ago. Her smoking use included cigarettes. She has a 31.00 pack-year smoking history. She has never used smokeless tobacco. She reports that she does not drink alcohol and does not use drugs.  Past Family History:   Family History   Problem Relation Age of Onset    Cancer Mother         cancer, smoker    Stroke Maternal Grandmother     Other Other         Crohn    Kidney Disease Other         kidney transplant     Allergies: Cefdinir, Ibuprofen, Morphine, and Morphine-naltrexone    Current Medications:  Prior to Admission medications    Medication Sig Start Date End Date Taking? Authorizing Provider   Acetaminophen (TYLENOL PO) Take 2 Tablets by mouth 2 times a day as needed (For pain). Pt is not sure the strength   Yes Physician Outpatient   spironolactone (ALDACTONE) 25 MG Tab Take 1 Tablet by mouth every day. 4/10/23   Asael Pink M.D.   pantoprazole (PROTONIX) 40 MG Tablet Delayed Response Take 1 Tablet by mouth 2 times a day. 4/10/23   Asael Pink M.D.   furosemide (LASIX) 40 MG Tab Take 1 Tablet by mouth every day. 4/10/23   Asael Pink M.D.   losartan (COZAAR) 50 MG Tab Take 1 Tablet by mouth every day.  Patient taking differently: Take 50 mg by mouth every evening. 4/10/23   Asael Pink M.D.   ALPRAZolam (XANAX) 0.25 MG Tab Take 1 Tablet by mouth 3 times a day as needed for Anxiety for up to 7 days. 15 tablets is a 7 day quantity 4/10/23 4/17/23  Asael Pink M.D.   LEVOXYL 150 MCG Tab Take 1 Tablet by mouth every morning on an empty stomach. 1/25/23   Asael Pink M.D.   atorvastatin (LIPITOR) 40 MG Tab TAKE 1 TABLET BY MOUTH AT  BEDTIME  Patient taking differently: Take 40 mg by mouth every evening. TAKE 1 TABLET BY MOUTH AT  BEDTIME 1/5/23   Asael Pink M.D.   Secukinumab (COSENTYX SENSOREADY PEN) 150 MG/ML Solution Auto-injector Inject 300mg Sub Cut once monthly  Patient taking differently: Inject 300 mg as directed every 28 days. Inject 300mg Sub Cut once  "monthly 12/19/22   SHONA Parson.   bisoprolol (ZEBETA) 10 MG tablet Take 1 Tablet by mouth every day. 5/17/22   Asael Pink M.D.       Review of Systems:  Review of Systems   Constitutional:  Positive for malaise/fatigue. Negative for fever.   Respiratory:  Positive for shortness of breath. Negative for cough.    Cardiovascular:  Negative for chest pain, palpitations, orthopnea, claudication, leg swelling and PND.   Gastrointestinal:  Negative for abdominal pain.   Musculoskeletal:  Negative for myalgias.   Neurological:  Positive for dizziness.   /55   Pulse 69   Temp 36 °C (96.8 °F) (Temporal)   Resp 20   Ht 1.6 m (5' 3\")   Wt 94.6 kg (208 lb 8.9 oz)   SpO2 98%     Physical Examination:  Physical Exam  Vitals and nursing note reviewed.   Constitutional:       Appearance: Normal appearance. She is obese.   HENT:      Head: Normocephalic and atraumatic.   Cardiovascular:      Rate and Rhythm: Normal rate. Rhythm irregular.      Pulses: Normal pulses.      Heart sounds: Murmur heard.   Pulmonary:      Effort: Pulmonary effort is normal.      Breath sounds: Rales present.   Musculoskeletal:         General: Normal range of motion.      Right lower leg: Edema present.      Left lower leg: Edema present.   Skin:     General: Skin is warm and dry.      Capillary Refill: Capillary refill takes less than 2 seconds.   Neurological:      General: No focal deficit present.      Mental Status: She is alert and oriented to person, place, and time. Mental status is at baseline.   Psychiatric:         Mood and Affect: Mood normal.         Behavior: Behavior normal.         Thought Content: Thought content normal.         Judgment: Judgment normal.       Impression:  Severe Mitral Regurgitation  Acute on chronic heart failure related to valvular heart disease- edema, rales, SOB, elevated pro-bnp ,recent ER visit  Recent acute blood loss anemia due to GIB  Chronic Atrial fibrillation NOT on oral " anticoagulation (due to recent GIB)  Moderate aortic stenosis and severe tricuspid regurgitation  HLD with non-obstructive CAD  CKD stage 4, GFR 24  Moderate to severe PAH with obesity and SHAKA on CPAP  HTN  Hypothyroidism   GERD  Malignant melanoma  Anxiety/stress with medication management    Plan:  Plan for percutaneous mitral valve repair using bhavana clip  today with inpatient hospital stay post-operatively for acute on chronic heart failure with expected IV diuresis and symptom/lab review for recent CHF exacerbation and GIB with acute blood loss anemia.  Risks and benefits are discussed.    Pre Procedure update:   See updated H/P since last consultation in office.      Perla Espinoza A.P.R.N.  4/17/2023

## 2023-04-17 NOTE — ANESTHESIA TIME REPORT
Anesthesia Start and Stop Event Times     Date Time Event    4/17/2023 1221 Ready for Procedure     1249 Anesthesia Start     1518 Anesthesia Stop        Responsible Staff  04/17/23    Name Role Begin End    Sachin Pineda M.D. Anesth 1249 1518        Overtime Reason:  overtime    Comments:

## 2023-04-17 NOTE — ANESTHESIA PROCEDURE NOTES
Arterial Line  Performed by: Sachin Pineda M.D.  Authorized by: Sachin Pineda M.D.     Start Time:  4/17/2023 1:06 PM  End Time:  4/17/2023 1:07 PM  Localization: surface landmarks    Patient Location:  OR  Indication: continuous blood pressure monitoring        Catheter Size:  20 G  Seldinger Technique?: Yes    Laterality:  Right  Site:  Radial artery  Line Secured:  Antimicrobial disc, tape and transparent dressing  Events: patient tolerated procedure well with no complications

## 2023-04-17 NOTE — ANESTHESIA PROCEDURE NOTES
Airway    Date/Time: 4/17/2023 1:04 PM  Performed by: Sachin Pineda M.D.  Authorized by: Sachin Pineda M.D.     Location:  OR  Urgency:  Elective  Difficult Airway: No    Indications for Airway Management:  Anesthesia      Spontaneous Ventilation: absent    Sedation Level:  Deep  Preoxygenated: Yes    Patient Position:  Sniffing  Mask Difficulty Assessment:  0 - not attempted  Final Airway Type:  Endotracheal airway  Final Endotracheal Airway:  ETT  Cuffed: Yes    Technique Used for Successful ETT Placement:  Direct laryngoscopy    Insertion Site:  Oral  Blade Type:  Leanne  Laryngoscope Blade/Videolaryngoscope Blade Size:  3  ETT Size (mm):  6.5  Measured from:  Teeth  ETT to Teeth (cm):  22  Placement Verified by: auscultation and capnometry    Cormack-Lehane Classification:  Grade I - full view of glottis  Number of Attempts at Approach:  1

## 2023-04-17 NOTE — OR SURGEON
OPERATIVE REPORT    Operation date: 4/17/2023    PreOp Diagnosis: Severe symptomatic mitral regurgitation (4+, mixed)    PostOp Diagnosis: Severe symptomatic mitral regurgitation (4+, mixed)    Procedure(s):  TRANSCATHETER AWIT-OU-IWFV MITRAL VALVE REPAIR (DOMINIC MitraClip NTW x1), TRANSSEPTAL PUNCTURE (Croswell catheter) and intraoperative transesophageal echocardiography    Surgeon(s):  HEAVENLY Russo M.D.    Anesthesiologist/Type of Anesthesia:  Anesthesiologist: Sachin Pineda M.D./General    Surgical Staff:  Circulator: Perla Mcclelland R.N.  Relief Circulator: Padmaja Isbell R.N.; Aubrie Rendon R.N.  Radiology Technologist: Grisell de La Rosa Marquez; Rupa Mena; Paul Desai  Cardiologist: THAO Martínez  Cardiology Nurse: Asael June R.N.    Specimens removed if any: None    Estimated Blood Loss: Minimal    Findings: Severe mitral regurgitation    Complications: None    Indications:  Ms. Crooks is an 83-year-old female with severe symptomatic mitral regurgitation.    Procedure:  The patient was brought to the operating room placed on the operating room table in the supine position.  After successful induction of general anesthesia and endotracheal intubation, the patient was prepped and draped in the usual sterile fashion.  Ultrasound-guided right femoral venous access was obtained.  A 1 cm incision was made in the right groin and a single Perclose suture was deployed.  The Croswell transseptal puncture catheter was used to perform the transseptal puncture in the superior posterior septum approximately 4 to 4.5 cm from the mitral valve.  A ProTrac wire was introduced into the left atrium and the Croswell catheter was removed.  A steerable guide catheter was placed in the left atrium over the ProTrac wire which was subsequently removed.  A clip delivery system catheter with a MitraClip NTW at its tip was placed just above the mitral valve  and appropriately oriented over the mid A2 P2.  The MitraClip was pushed into the left ventricle and pulled back to grasp the leaflets.  This maneuver was somewhat difficult and took several attempts.  The final attempts of the MitraClip applied to the medial A2 P2 area.  This resulted in elimination of the medial regurgitant jets.  Intraoperative transesophageal echocardiography showed significant reduction of the mitral regurgitation from 4+ to 2+.  The mean mitral transvalvular gradient was 3 mmHg.  The MitraClip NTW was released in the usual fashion.  The entire system was pulled out of the patient and the Perclose was tightened down.  Two figure-of-eight #0 silk sutures were required to obtain hemostasis.  The remainder of the operation will be dictated by Dr. Gray.  There were no apparent complications.  The patient tolerated the procedure well and left the operating room in stable condition.      4/17/2023 3:21 PM Christine Mc MD, FACS

## 2023-04-17 NOTE — ANESTHESIA PREPROCEDURE EVALUATION
" Case: [id number] Date/Time: 04/17/23 4250    Procedures:       TRANSCATHETER MITRAL VALVE PROCEDURE WITH ANY ASSOCIATED PROCEDURES POTENTIALLY INCLUDING TRANSESOPHAGEAL ECHOCARDIOGRAM AND POSSIBLE SEPTAL DEFECT CLOSURE      ECHOCARDIOGRAM, TRANSESOPHAGEAL, INTRAOPERATIVE      REPAIR, ATRIAL SEPTAL DEFECT    Pre-op diagnosis: SEVERE MITRAL REGURGITATION    Location: [facility] OR 19 / SURGERY [facility]    Surgeons: Cesar Gray M.D.          Relevant Problems   ANESTHESIA   (positive) Obstructive sleep apnea treated with continuous positive airway pressure (CPAP)      CARDIAC   (positive) AF (atrial fibrillation) (HCC)   (positive) Acute on chronic combined systolic and diastolic congestive heart failure (HCC)   (positive) Aortic stenosis   (positive) Coronary artery disease involving native coronary artery of native heart without angina pectoris, s/p 1V CABG 1992   (positive) Essential hypertension   (positive) Moderate to severe pulmonary hypertension (HCC)   (positive) Severe mitral regurgitation      GI   (positive) Gastroesophageal reflux disease without esophagitis         (positive) CKD (chronic kidney disease) stage 4, GFR 15-29 ml/min (HCC)   (positive) Hepatic steatosis      ENDO   (positive) Hypothyroidism     /55   Pulse 69   Temp 36 °C (96.8 °F) (Temporal)   Resp 20   Ht 1.6 m (5' 3\")   Wt 94.6 kg (208 lb 8.9 oz)   LMP  (LMP Unknown)   SpO2 98%   BMI 36.94 kg/m²     Physical Exam    Airway   Mallampati: II  TM distance: >3 FB  Neck ROM: full       Cardiovascular - normal exam  Rhythm: regular  Rate: normal  (+) murmur, peripheral edema     Dental - normal exam           Pulmonary - normal exam  (+) decreased breath sounds     Abdominal    Neurological - normal exam                 Anesthesia Plan    ASA 4       Plan - general       Airway plan will be ETT  GOMEZ Planned        Induction: intravenous    Postoperative Plan: Postoperative administration of opioids is intended.    Pertinent " diagnostic labs and testing reviewed    Informed Consent:    Anesthetic plan and risks discussed with patient.    Use of blood products discussed with: patient whom consented to blood products.

## 2023-04-18 ENCOUNTER — APPOINTMENT (OUTPATIENT)
Dept: CARDIOLOGY | Facility: MEDICAL CENTER | Age: 83
DRG: 266 | End: 2023-04-18
Attending: NURSE PRACTITIONER
Payer: MEDICARE

## 2023-04-18 ENCOUNTER — APPOINTMENT (OUTPATIENT)
Dept: RADIOLOGY | Facility: MEDICAL CENTER | Age: 83
DRG: 266 | End: 2023-04-18
Attending: NURSE PRACTITIONER
Payer: MEDICARE

## 2023-04-18 PROBLEM — Z98.890 S/P MITRAL VALVE CLIP IMPLANTATION: Status: ACTIVE | Noted: 2023-04-18

## 2023-04-18 PROBLEM — I34.0 SEVERE MITRAL REGURGITATION: Status: RESOLVED | Noted: 2017-07-14 | Resolved: 2023-04-18

## 2023-04-18 PROBLEM — Z95.818 S/P MITRAL VALVE CLIP IMPLANTATION: Status: ACTIVE | Noted: 2023-04-18

## 2023-04-18 LAB
ALBUMIN SERPL BCP-MCNC: 3.9 G/DL (ref 3.2–4.9)
ALBUMIN/GLOB SERPL: 1.2 G/DL
ALP SERPL-CCNC: 90 U/L (ref 30–99)
ALT SERPL-CCNC: 28 U/L (ref 2–50)
ANION GAP SERPL CALC-SCNC: 18 MMOL/L (ref 7–16)
AST SERPL-CCNC: 37 U/L (ref 12–45)
BILIRUB SERPL-MCNC: 0.5 MG/DL (ref 0.1–1.5)
BUN SERPL-MCNC: 58 MG/DL (ref 8–22)
CALCIUM ALBUM COR SERPL-MCNC: 9 MG/DL (ref 8.5–10.5)
CALCIUM SERPL-MCNC: 8.9 MG/DL (ref 8.5–10.5)
CHLORIDE SERPL-SCNC: 105 MMOL/L (ref 96–112)
CO2 SERPL-SCNC: 17 MMOL/L (ref 20–33)
CREAT SERPL-MCNC: 2.67 MG/DL (ref 0.5–1.4)
EKG IMPRESSION: NORMAL
ERYTHROCYTE [DISTWIDTH] IN BLOOD BY AUTOMATED COUNT: 55.8 FL (ref 35.9–50)
GFR SERPLBLD CREATININE-BSD FMLA CKD-EPI: 17 ML/MIN/1.73 M 2
GLOBULIN SER CALC-MCNC: 3.2 G/DL (ref 1.9–3.5)
GLUCOSE SERPL-MCNC: 176 MG/DL (ref 65–99)
HCT VFR BLD AUTO: 27.5 % (ref 37–47)
HGB BLD-MCNC: 8.3 G/DL (ref 12–16)
LV EJECT FRACT  99904: 55
LV EJECT FRACT  99904: 60
LV EJECT FRACT MOD 2C 99903: 65.96
LV EJECT FRACT MOD 4C 99902: 62.41
LV EJECT FRACT MOD BP 99901: 65.27
MCH RBC QN AUTO: 26.4 PG (ref 27–33)
MCHC RBC AUTO-ENTMCNC: 30.2 G/DL (ref 33.6–35)
MCV RBC AUTO: 87.6 FL (ref 81.4–97.8)
NT-PROBNP SERPL IA-MCNC: 5539 PG/ML (ref 0–125)
PLATELET # BLD AUTO: 358 K/UL (ref 164–446)
PMV BLD AUTO: 11.2 FL (ref 9–12.9)
POTASSIUM SERPL-SCNC: 4.6 MMOL/L (ref 3.6–5.5)
PROT SERPL-MCNC: 7.1 G/DL (ref 6–8.2)
RBC # BLD AUTO: 3.14 M/UL (ref 4.2–5.4)
SODIUM SERPL-SCNC: 140 MMOL/L (ref 135–145)
WBC # BLD AUTO: 11.3 K/UL (ref 4.8–10.8)

## 2023-04-18 PROCEDURE — 85027 COMPLETE CBC AUTOMATED: CPT

## 2023-04-18 PROCEDURE — 80053 COMPREHEN METABOLIC PANEL: CPT

## 2023-04-18 PROCEDURE — 93010 ELECTROCARDIOGRAM REPORT: CPT | Performed by: INTERNAL MEDICINE

## 2023-04-18 PROCEDURE — 306310 ANTI-EMBOLISM STOCKINGS XXLRG REG: Performed by: INTERNAL MEDICINE

## 2023-04-18 PROCEDURE — 770020 HCHG ROOM/CARE - TELE (206)

## 2023-04-18 PROCEDURE — 83880 ASSAY OF NATRIURETIC PEPTIDE: CPT

## 2023-04-18 PROCEDURE — 97162 PT EVAL MOD COMPLEX 30 MIN: CPT

## 2023-04-18 PROCEDURE — 93005 ELECTROCARDIOGRAM TRACING: CPT | Performed by: NURSE PRACTITIONER

## 2023-04-18 PROCEDURE — 93306 TTE W/DOPPLER COMPLETE: CPT

## 2023-04-18 PROCEDURE — 71045 X-RAY EXAM CHEST 1 VIEW: CPT

## 2023-04-18 PROCEDURE — 700102 HCHG RX REV CODE 250 W/ 637 OVERRIDE(OP): Performed by: NURSE PRACTITIONER

## 2023-04-18 PROCEDURE — 93306 TTE W/DOPPLER COMPLETE: CPT | Mod: 26 | Performed by: INTERNAL MEDICINE

## 2023-04-18 PROCEDURE — 97535 SELF CARE MNGMENT TRAINING: CPT

## 2023-04-18 PROCEDURE — 700111 HCHG RX REV CODE 636 W/ 250 OVERRIDE (IP): Performed by: NURSE PRACTITIONER

## 2023-04-18 PROCEDURE — A9270 NON-COVERED ITEM OR SERVICE: HCPCS | Performed by: NURSE PRACTITIONER

## 2023-04-18 RX ORDER — LOSARTAN POTASSIUM 25 MG/1
25 TABLET ORAL EVERY EVENING
Status: DISCONTINUED | OUTPATIENT
Start: 2023-04-18 | End: 2023-04-19 | Stop reason: HOSPADM

## 2023-04-18 RX ADMIN — FUROSEMIDE 40 MG: 10 INJECTION, SOLUTION INTRAMUSCULAR; INTRAVENOUS at 05:57

## 2023-04-18 RX ADMIN — LEVOTHYROXINE SODIUM 150 MCG: 75 TABLET ORAL at 05:57

## 2023-04-18 RX ADMIN — ACETAMINOPHEN 650 MG: 325 TABLET, FILM COATED ORAL at 00:29

## 2023-04-18 RX ADMIN — ACETAMINOPHEN 650 MG: 325 TABLET, FILM COATED ORAL at 20:57

## 2023-04-18 RX ADMIN — OMEPRAZOLE 20 MG: 20 CAPSULE, DELAYED RELEASE ORAL at 18:34

## 2023-04-18 RX ADMIN — ATORVASTATIN CALCIUM 40 MG: 40 TABLET, FILM COATED ORAL at 18:51

## 2023-04-18 RX ADMIN — DOCUSATE SODIUM 50 MG AND SENNOSIDES 8.6 MG 2 TABLET: 8.6; 5 TABLET, FILM COATED ORAL at 18:35

## 2023-04-18 RX ADMIN — BISOPROLOL FUMARATE 10 MG: 5 TABLET ORAL at 05:57

## 2023-04-18 RX ADMIN — SPIRONOLACTONE 25 MG: 25 TABLET ORAL at 05:57

## 2023-04-18 RX ADMIN — FUROSEMIDE 40 MG: 10 INJECTION, SOLUTION INTRAMUSCULAR; INTRAVENOUS at 18:34

## 2023-04-18 RX ADMIN — OMEPRAZOLE 20 MG: 20 CAPSULE, DELAYED RELEASE ORAL at 05:57

## 2023-04-18 ASSESSMENT — GAIT ASSESSMENTS
DISTANCE (FEET): 80
GAIT LEVEL OF ASSIST: CONTACT GUARD ASSIST
DEVIATION: OTHER (COMMENT)

## 2023-04-18 ASSESSMENT — ENCOUNTER SYMPTOMS
CHEST TIGHTNESS: 0
ACTIVITY CHANGE: 0
APNEA: 0
DIZZINESS: 0
SHORTNESS OF BREATH: 0
FATIGUE: 0
PALPITATIONS: 0

## 2023-04-18 ASSESSMENT — COGNITIVE AND FUNCTIONAL STATUS - GENERAL
CLIMB 3 TO 5 STEPS WITH RAILING: A LITTLE
MOBILITY SCORE: 22
SUGGESTED CMS G CODE MODIFIER MOBILITY: CJ
WALKING IN HOSPITAL ROOM: A LITTLE

## 2023-04-18 ASSESSMENT — PAIN DESCRIPTION - PAIN TYPE: TYPE: ACUTE PAIN

## 2023-04-18 ASSESSMENT — FIBROSIS 4 INDEX
FIB4 SCORE: 1.58
FIB4 SCORE: 1.58

## 2023-04-18 ASSESSMENT — PATIENT HEALTH QUESTIONNAIRE - PHQ9
SUM OF ALL RESPONSES TO PHQ9 QUESTIONS 1 AND 2: 0
2. FEELING DOWN, DEPRESSED, IRRITABLE, OR HOPELESS: NOT AT ALL
1. LITTLE INTEREST OR PLEASURE IN DOING THINGS: NOT AT ALL

## 2023-04-18 NOTE — ANESTHESIA POSTPROCEDURE EVALUATION
Patient: Aleshia Crooks    Procedure Summary     Date: 04/17/23 Room / Location: [place] [id number] / SURGERY [facility]    Anesthesia Start: 1249 Anesthesia Stop: 1518    Procedures:       TRANSCATHETER MITRAL VALVE PROCEDURE (Groin)      ECHOCARDIOGRAM, TRANSESOPHAGEAL, INTRAOPERATIVE (Esophagus) Diagnosis: (SEVERE MITRAL REGURGITATION)    Surgeons: Cesar Gray M.D. Responsible Provider: Sachin Pineda M.D.    Anesthesia Type: general ASA Status: 4          Final Anesthesia Type: general  Last vitals  BP   Blood Pressure : 96/56, Arterial BP: 119/60    Temp   36.6 °C (97.9 °F)    Pulse   65   Resp   16    SpO2   95 %      Anesthesia Post Evaluation    Patient location during evaluation: PACU  Patient participation: complete - patient participated  Level of consciousness: awake and alert    Airway patency: patent  Anesthetic complications: no  Cardiovascular status: hemodynamically stable  Respiratory status: face mask    PONV: none          No notable events documented.     Nurse Pain Score: 0 (NPRS)

## 2023-04-18 NOTE — PROGRESS NOTES
Cardiology Follow Up Progress Note    Date of Service  4/18/2023    Attending Physician  Cesar Gray M.D.    Chief Complaint   Elective Angie Clip Placement    HPI  Aleshia Crooks is a 83 y.o. female admitted 4/17/2023 for elective Angie Clip Placement.    Hx of severe mitral regurgitation now S/P angie clip x1 NTW clip, acute on chronic diastolic heart failure, HLD with CAD with prior CABG in '92, severe TR, moderate AS, chronic afib (not on oral anticoagulation due to recent GIB), obesity with mod-severe PAH with SHAKA on CPAP, HTN, CKD stage IV, hypothyroidism, GERD, and hepatic steatosis.    Interim Events  POD1-sitting in bed, legs swollen, walking good, didn't sleep well    Review of Systems  Review of Systems   Constitutional:  Negative for activity change and fatigue.   Respiratory:  Negative for apnea, chest tightness and shortness of breath.    Cardiovascular:  Positive for leg swelling. Negative for chest pain and palpitations.   Skin:         Psoriasis lower legs   Neurological:  Negative for dizziness.     Vital signs in last 24 hours  Temp:  [35.9 °C (96.7 °F)-36.9 °C (98.4 °F)] 36.6 °C (97.9 °F)  Pulse:  [54-77] 65  Resp:  [12-22] 16  BP: ()/(43-73) 96/56  SpO2:  [90 %-100 %] 95 %    Physical Exam  Physical Exam  Vitals and nursing note reviewed.   Constitutional:       Appearance: Normal appearance. She is obese.   HENT:      Head: Normocephalic and atraumatic.   Cardiovascular:      Rate and Rhythm: Normal rate and regular rhythm.      Pulses: Normal pulses.      Heart sounds: Normal heart sounds.   Pulmonary:      Effort: Pulmonary effort is normal.      Breath sounds: Normal breath sounds.   Musculoskeletal:         General: Normal range of motion.      Right lower leg: Edema present.      Left lower leg: Edema present.      Comments: Bilateral lower extremity edema 2+ pitting with psoriasis patches on anterior shins   Skin:     General: Skin is warm and dry.      Capillary Refill:  Capillary refill takes less than 2 seconds.   Neurological:      General: No focal deficit present.      Mental Status: She is alert and oriented to person, place, and time. Mental status is at baseline.   Psychiatric:         Mood and Affect: Mood normal.         Behavior: Behavior normal.         Thought Content: Thought content normal.         Judgment: Judgment normal.       Lab Review  Lab Results   Component Value Date/Time    WBC 11.3 (H) 04/18/2023 12:48 AM    RBC 3.14 (L) 04/18/2023 12:48 AM    HEMOGLOBIN 8.3 (L) 04/18/2023 12:48 AM    HEMATOCRIT 27.5 (L) 04/18/2023 12:48 AM    MCV 87.6 04/18/2023 12:48 AM    MCH 26.4 (L) 04/18/2023 12:48 AM    MCHC 30.2 (L) 04/18/2023 12:48 AM    MPV 11.2 04/18/2023 12:48 AM      Lab Results   Component Value Date/Time    SODIUM 140 04/18/2023 12:48 AM    POTASSIUM 4.6 04/18/2023 12:48 AM    CHLORIDE 105 04/18/2023 12:48 AM    CO2 17 (L) 04/18/2023 12:48 AM    GLUCOSE 176 (H) 04/18/2023 12:48 AM    BUN 58 (H) 04/18/2023 12:48 AM    CREATININE 2.67 (H) 04/18/2023 12:48 AM      Lab Results   Component Value Date/Time    ASTSGOT 37 04/18/2023 12:48 AM    ALTSGPT 28 04/18/2023 12:48 AM     Lab Results   Component Value Date/Time    CHOLSTRLTOT 142 11/17/2022 09:36 AM    LDL 88 11/17/2022 09:36 AM    HDL 34 (A) 11/17/2022 09:36 AM    TRIGLYCERIDE 102 11/17/2022 09:36 AM    TROPONINT 41 (H) 04/08/2023 04:14 AM       Recent Labs     04/17/23  1528 04/18/23  0048   NTPROBNP 3230* 5539*     Cardiac Imaging and Procedures Review  EKG:  My personal interpretation of the EKG dated 4/18/23 is afib rate of 55 bpm    Echocardiogram:  4/17/23 intra-op GOMEZ-results pending  4/18/23-pending echo for today    Cardiac Catheterization:  N/A    Imaging  Chest X-Ray:  4/18/23 showing    1.  Interstitial pulmonary parenchymal prominence suggest chronic underlying lung disease, component of interstitial edema and/or infiltrates not excluded.  2.  Cardiomegaly  3.   Atherosclerosis    Assessment/Plan  1. S/P Angie Clip X1 NTW clip  -doing well post-op but requires one more day for observation and diuresis with labs  -OAC and ASA on hold for recent GIB and anemia  -groins CDI with suture intact on R venous access (RN to re-dress with gauze/paper tape), remove suture in 7-10 days post-op  -SBE prophylaxis understands  -echo today  -reviewed hospital imaging, labs, and EKG  -cardiac rehab referral placed for outpatient work up, PT to see patient while inpatient  -EKG shows afib rate controlled    2. HFrEF, Stage C, Class III, LVEF 55%: valvular disease  -Heart failure due to valvular disease  -Recent HF Hospitalization precipitant was due to angie clip placement (if Applicable)  -Discussed Heart failure trajectory and prognosis with patient. Will continue to optimize medical therapy as tolerated. Advanced HF treatment, need for remote monitoring consideration at every visit.   -ACE-I/ARB/ARNI: losartan 25 mg QD  -Evidence Based Beta-blocker: bisoprolol 10 mg   -Aldosterone Antagonist: aldactone 25 mg QD  -Diuretic: lasix 40 mg BID IV  -SGLT2 inhibitor: not warranted  -Other: not warranted   -Labs: repeat cbc, bnp, bmp in AM   -compression stockings to lower legs    3. HLD with CAD with prior CABG  -no angina or JONES, stable  -cont statin, no ASA due to recent GIB  -follow clinically  -no recent angiogram  -LDL goal <70 with CAD    4. Chronic afib   -rate controlled, asymptomatic  -re-start OAC on dc or when clinically stable  -cont bisoprolol 10 mg QD    5. Anemia  -recent GIB  -no further bleeding  -hold OAC and aspirin until dc or 1 week post-op  -follow labs, check ferritin    6. HTN  -good control  -BP goal <130/80  -cont meds as above    7. Obesity with mod-severe PAH with SHAKA on CPAP  -tolerating and compliant with CPAP  -follow for cardiac rehab post-op    8. CKD stage IV  -follow labs with aggressive diuresis  -at baseline  -recommend outpatient nephrology visits    9.  Hypothyroidism  -stable on meds, home dosing    10. Severe TR, mod AS  -follow echo    11. Hepatic steatosis  -follow outpatient    Thank you for allowing me to participate in the care of this patient.  I will continue to follow this patient    Please contact me with any questions.    SHONA Martínez.   Cardiologist, [facility]  [phone]

## 2023-04-18 NOTE — THERAPY
"Physical Therapy   Initial Evaluation     Patient Name: Aleshia Crooks  Age:  83 y.o., Sex:  female  Medical Record #: [id number]  Today's Date: 4/18/2023     Precautions  Precautions: Fall Risk;Cardiac Precautions (See Comments)    Assessment  Patient is 83 y.o. female with a hx of mitral valve prolapse, mitral and aortic valve regurgitation, CABG, liver cirrhosis, and CKD is now s/p DOMINIC (4/17). Provided/reviewed handout of Physical Activity After TAVR/DOMINIC, discussed implementation of walking program, indicators to terminate exercise, and RPE scale. Pt demonstrates notable deconditioning given increased reliance on \"Dominga\" equipment at home, but was able to perform 1.5 minutes of continuous walking. Recommend FWW to improve stability and walking abilities as pt returns home and proceeds to increase activity levels. Pt demonstrates good motivation and understanding of information provided, and further follow up is not required.     Plan    Physical Therapy Initial Treatment Plan   Duration: Evaluation only    DC Equipment Recommendations: Front-Wheel Walker  Discharge Recommendations: Anticipate that the patient will have no further physical therapy needs after discharge from the hospital       Subjective    Pt reported feeling a lot better using a walker. Pt denied anticipating difficulties upon returning home.      Objective       04/18/23 0905   Initial Contact Note    Initial Contact Note Order Received and Verified, Evaluation Only - Patient Does Not Require Further Acute Physical Therapy at this Time.  However, May Benefit from Post Acute Therapy for Higher Level Functional Deficits.   Precautions   Precautions Fall Risk;Cardiac Precautions (See Comments)   Vitals   Pulse (!) 57   Pulse Oximetry 94 %   O2 Delivery Device None - Room Air   Pain   Pain Scales 0 to 10 Scale    Pain 0 - 10 Group   Pain Rating Scale (NPRS) 0   Prior Living Situation   Prior Services Home-Independent   Housing / Facility 1 Story " "House   Steps Into Home 0  (ramp entrance)   Steps In Home 0   Equipment Owned Power Wheel Chair   Lives with - Patient's Self Care Capacity Other (Comments)  (Daughter)   Comments IND with dressing/bathing ADLs. Daughter assists with grocery shopping   Prior Level of Functional Mobility   Bed Mobility Independent   Transfer Status Independent   Ambulation Independent   Ambulation Distance   (restricted household distances)   Assistive Devices Used Power Wheel Chair   Wheelchair Independent   Comments Pt reported primarily using \"Dominga\" around the home, and being limited with ambulation over the last 2 months due to shortness of breath. Able to physically walk short distances if needed in the home.   History of Falls   History of Falls No   Active ROM Upper Body   Active ROM Upper Body  WDL   Strength Upper Body   Upper Body Strength  WDL   Active ROM Lower Body    Active ROM Lower Body  WDL   Strength Lower Body   Lower Body Strength  WDL   Balance Assessment   Sitting Balance (Static) Good   Standing Balance (Static) Fair -   Bed Mobility    Comments up in chair pre/post, no concerns noted   Gait Analysis   Gait Level Of Assist Contact Guard Assist   Assistive Device None   Distance (Feet) 80  (1.5 minutes continuous walking prior to standing rest break)   # of Times Distance was Traveled 2   Deviation Other (Comment)  (Significantly reduced genie, extremely apprehensive with turns, and required close proximity to hallway railing for safety)   Comments Pt then ambulated 50' supervision w/FWW. Significant improvements in stability and genie noted with FWW   Functional Mobility   Sit to Stand Independent   How much difficulty does the patient currently have...   Turning over in bed (including adjusting bedclothes, sheets and blankets)? 4   Sitting down on and standing up from a chair with arms (e.g., wheelchair, bedside commode, etc.) 4   Moving from lying on back to sitting on the side of the bed? 4   How much " "help from another person does the patient currently need...   Moving to and from a bed to a chair (including a wheelchair)? 4   Need to walk in a hospital room? 3   Climbing 3-5 steps with a railing? 3   6 clicks Mobility Score 22   Education Group   Education Provided Role of Physical Therapist;Gait Training;Use of Assistive Device;Cardiac Precautions   Cardiac Precautions Patient Response Verbal Demonstration   Role of Physical Therapist Patient Response Verbal Demonstration   Gait Training Patient Response Action Demonstration   Use of Assistive Device Patient Response Action Demonstration   Additional Comments Provided/reviewed \"Physical Activity after TAVR/DOMINIC\" handout. Educated on implementation of walking program, indicators to terminate exercise, and RPE scale. Discussed use of FWW to improve stability/walking abilities given recent deconditioning, and pt in agreement. Pt declining need for f/u PT   Physical Therapy Initial Treatment Plan    Duration Evaluation only   Problem List    Problems Impaired Ambulation;Decreased Activity Tolerance;Impaired Balance   Anticipated Discharge Equipment and Recommendations   DC Equipment Recommendations Front-Wheel Walker   Discharge Recommendations Anticipate that the patient will have no further physical therapy needs after discharge from the hospital   Interdisciplinary Plan of Care Collaboration   IDT Collaboration with  ;Nursing   Patient Position at End of Therapy Seated;Edge of Bed;Call Light within Reach;Tray Table within Reach;Phone within Reach   Session Information   Date / Session Number  4/18- eval only         "

## 2023-04-18 NOTE — PROGRESS NOTES
Pt transferred to floor on transport monitor. Pt placed on tele monitor, monitor room notified. Pt educated on bedrest and is aware bedrest ends at 1915. Pt oriented to unit, call light within reach, and pt reports no further needs at this time.

## 2023-04-18 NOTE — DISCHARGE PLANNING
Case Management Discharge Planning    Admission Date: 4/17/2023  GMLOS: 5.8  ALOS: 1    6-Clicks ADL Score: 22  6-Clicks Mobility Score: 20      Anticipated Discharge Dispo: Discharge Disposition: Discharged to home/self care (01)    DME Needed: No    Action(s) Taken: OTHER    Escalations Completed: None    Medically Clear: No    Next Steps: f/u with pt and medical team to discuss dc needs and barriers.  Assessment to be completed to assess for HCM needs.    Barriers to Discharge: Medical clearance and Specialty Clearance

## 2023-04-18 NOTE — PROGRESS NOTES
4 Eyes Skin Assessment Completed by IWONA Lou and SARAH Alcala.    Head WDL  Ears Redness and Blanching  Nose WDL  Mouth WDL  Neck WDL  Breast/Chest WDL  Shoulder Blades WDL  Spine WDL  (R) Arm/Elbow/Hand Bruising, radial access site  (L) Arm/Elbow/Hand Bruising  Abdomen Abrasion  Groin Incision  Scrotum/Coccyx/Buttocks WDL  (R) Leg Rash, Swelling, and Edema  (L) Leg Redness, Rash, and Scab  (R) Heel/Foot/Toe Redness, Blanching, Boggy, Swelling, and Edema  (L) Heel/Foot/Toe Redness, Blanching, Boggy, Swelling, and Edema          Devices In Places Tele Box, Blood Pressure Cuff, Pulse Ox, Nasal Cannula, and GOLDY's      Interventions In Place Gray Ear Foams, NC W/Ear Foams, InterDry, Pillows, Barrier Cream, and Pressure Redistribution Mattress    Possible Skin Injury No    Pictures Uploaded Into Epic Yes  Wound Consult Placed N/A  RN Wound Prevention Protocol Ordered No

## 2023-04-18 NOTE — CARE PLAN
The patient is Stable - Low risk of patient condition declining or worsening    Shift Goals  Clinical Goals: monitor vitals, TAVR care plan  Patient Goals: Rest, control back pain    Progress made toward(s) clinical / shift goals:    Problem: Fall Risk  Goal: Patient will remain free from falls  4/17/2023 2341 by Aniyah Crockett RCAMACHO.  Outcome: Progressing  4/17/2023 2340 by MICHELLE OlsenN.  Outcome: Progressing     Problem: Knowledge Deficit - Standard  Goal: Patient and family/care givers will demonstrate understanding of plan of care, disease process/condition, diagnostic tests and medications  4/17/2023 2341 by Aniyah Crockett R.N.  Outcome: Progressing  4/17/2023 2340 by Aniyah Crockett RMeraryN.  Outcome: Progressing     Problem: Pain - Standard  Goal: Alleviation of pain or a reduction in pain to the patient’s comfort goal  4/17/2023 2341 by Aniyah Crockett RMeraryN.  Outcome: Progressing  4/17/2023 2340 by Aniyah Crockett R.N.  Outcome: Progressing     TAVR care plan: patient ambulated 60 feet after bed rest. GOLDY hose in use while awake, IS in use while awake. Post-op vitals complete and stable. Groin site CDI and soft.

## 2023-04-18 NOTE — PROGRESS NOTES
Bedside report received from IWONA Leung. Patient is alert and oriented x  4, 2 L O2 via NC in use, AFIB on the monitor. Fall precautions are in place. Call light is in reach.

## 2023-04-18 NOTE — CARE PLAN
The patient is Stable - Low risk of patient condition declining or worsening    Shift Goals  Clinical Goals: monitor vitals, TAVR care plan  Patient Goals: Rest, control back pain    Progress made toward(s) clinical / shift goals:    Problem: Fall Risk  Goal: Patient will remain free from falls  Outcome: Progressing  Note: Pt mobility assessed at beginning of shift. Pt standby assist. Fall precautions in place. Non-slip socks on, bed in lowest locked position, call light within reach.      Problem: Knowledge Deficit - Standard  Goal: Patient and family/care givers will demonstrate understanding of plan of care, disease process/condition, diagnostic tests and medications  Outcome: Progressing

## 2023-04-19 ENCOUNTER — APPOINTMENT (OUTPATIENT)
Dept: RADIOLOGY | Facility: MEDICAL CENTER | Age: 83
DRG: 266 | End: 2023-04-19
Attending: NURSE PRACTITIONER
Payer: MEDICARE

## 2023-04-19 VITALS
OXYGEN SATURATION: 92 % | DIASTOLIC BLOOD PRESSURE: 47 MMHG | SYSTOLIC BLOOD PRESSURE: 90 MMHG | HEIGHT: 63 IN | WEIGHT: 212.3 LBS | HEART RATE: 72 BPM | TEMPERATURE: 97.7 F | RESPIRATION RATE: 17 BRPM | BODY MASS INDEX: 37.62 KG/M2

## 2023-04-19 LAB
ANION GAP SERPL CALC-SCNC: 14 MMOL/L (ref 7–16)
BUN SERPL-MCNC: 63 MG/DL (ref 8–22)
CALCIUM SERPL-MCNC: 8.9 MG/DL (ref 8.5–10.5)
CHLORIDE SERPL-SCNC: 101 MMOL/L (ref 96–112)
CO2 SERPL-SCNC: 21 MMOL/L (ref 20–33)
CREAT SERPL-MCNC: 2.77 MG/DL (ref 0.5–1.4)
EKG IMPRESSION: NORMAL
ERYTHROCYTE [DISTWIDTH] IN BLOOD BY AUTOMATED COUNT: 55.4 FL (ref 35.9–50)
FERRITIN SERPL-MCNC: 22.3 NG/ML (ref 10–291)
GFR SERPLBLD CREATININE-BSD FMLA CKD-EPI: 17 ML/MIN/1.73 M 2
GLUCOSE SERPL-MCNC: 132 MG/DL (ref 65–99)
HCT VFR BLD AUTO: 23.9 % (ref 37–47)
HGB BLD-MCNC: 7.3 G/DL (ref 12–16)
MCH RBC QN AUTO: 26.1 PG (ref 27–33)
MCHC RBC AUTO-ENTMCNC: 30.5 G/DL (ref 33.6–35)
MCV RBC AUTO: 85.4 FL (ref 81.4–97.8)
NT-PROBNP SERPL IA-MCNC: 5914 PG/ML (ref 0–125)
PLATELET # BLD AUTO: 336 K/UL (ref 164–446)
PMV BLD AUTO: 11.4 FL (ref 9–12.9)
POTASSIUM SERPL-SCNC: 4.4 MMOL/L (ref 3.6–5.5)
RBC # BLD AUTO: 2.8 M/UL (ref 4.2–5.4)
SODIUM SERPL-SCNC: 136 MMOL/L (ref 135–145)
WBC # BLD AUTO: 14.7 K/UL (ref 4.8–10.8)

## 2023-04-19 PROCEDURE — 700102 HCHG RX REV CODE 250 W/ 637 OVERRIDE(OP): Performed by: NURSE PRACTITIONER

## 2023-04-19 PROCEDURE — 93010 ELECTROCARDIOGRAM REPORT: CPT | Performed by: STUDENT IN AN ORGANIZED HEALTH CARE EDUCATION/TRAINING PROGRAM

## 2023-04-19 PROCEDURE — 700111 HCHG RX REV CODE 636 W/ 250 OVERRIDE (IP): Performed by: NURSE PRACTITIONER

## 2023-04-19 PROCEDURE — 82728 ASSAY OF FERRITIN: CPT

## 2023-04-19 PROCEDURE — A9270 NON-COVERED ITEM OR SERVICE: HCPCS | Performed by: NURSE PRACTITIONER

## 2023-04-19 PROCEDURE — 85027 COMPLETE CBC AUTOMATED: CPT

## 2023-04-19 PROCEDURE — 80048 BASIC METABOLIC PNL TOTAL CA: CPT

## 2023-04-19 PROCEDURE — 83880 ASSAY OF NATRIURETIC PEPTIDE: CPT

## 2023-04-19 PROCEDURE — 71045 X-RAY EXAM CHEST 1 VIEW: CPT

## 2023-04-19 PROCEDURE — 93005 ELECTROCARDIOGRAM TRACING: CPT | Performed by: NURSE PRACTITIONER

## 2023-04-19 RX ORDER — LOSARTAN POTASSIUM 25 MG/1
25 TABLET ORAL EVERY EVENING
Qty: 100 TABLET | Refills: 3 | Status: SHIPPED | OUTPATIENT
Start: 2023-04-19

## 2023-04-19 RX ADMIN — BISOPROLOL FUMARATE 10 MG: 5 TABLET ORAL at 05:18

## 2023-04-19 RX ADMIN — LEVOTHYROXINE SODIUM 150 MCG: 75 TABLET ORAL at 05:18

## 2023-04-19 RX ADMIN — FUROSEMIDE 40 MG: 10 INJECTION, SOLUTION INTRAMUSCULAR; INTRAVENOUS at 05:22

## 2023-04-19 RX ADMIN — SPIRONOLACTONE 25 MG: 25 TABLET ORAL at 05:20

## 2023-04-19 RX ADMIN — DOCUSATE SODIUM 50 MG AND SENNOSIDES 8.6 MG 2 TABLET: 8.6; 5 TABLET, FILM COATED ORAL at 05:21

## 2023-04-19 RX ADMIN — OMEPRAZOLE 20 MG: 20 CAPSULE, DELAYED RELEASE ORAL at 05:21

## 2023-04-19 NOTE — PROGRESS NOTES
Discharge orders acknowledged, Pt aware and compliant with new orders, D/C teaching completed, Pt able to verbalize needs and complaint with discharge orders. Medication sent to pt pharmacy. IV removed.

## 2023-04-19 NOTE — PROGRESS NOTES
Received bedside report from RN, pt care assumed, VSS, pt assessment complete. Pt AAOx4, tele monitor on. No signs of acute distress noted at this time. Plan of care discussed with pt and verbalizes no questions. Pt denies any additional needs at this time. Bed locked/in lowest position, bed alarm on, pt educated on fall risk and verbalized understanding, call light within reach, hourly rounding initiated.

## 2023-04-19 NOTE — CARE PLAN
The patient is Stable - Low risk of patient condition declining or worsening    Shift Goals  Clinical Goals: Diuresis, Walk x2  Patient Goals: Rest, comfort  Family Goals: No family present    Progress made toward(s) clinical / shift goals:      Problem: Fall Risk  Goal: Patient will remain free from falls  Outcome: Progressing  Note: All fall precautions in place. Bed in lowest position and treaded socks on.      Problem: Knowledge Deficit - Standard  Goal: Patient and family/care givers will demonstrate understanding of plan of care, disease process/condition, diagnostic tests and medications  4/19/2023 0250 by Maddie Owusu R.N.  Outcome: Progressing  Note: Patient able to demonstrate an understanding of POC     Problem: Mobility  Goal: Patient's capacity to carry out activities will improve  Outcome: Progressing  Note: Patient completed two walks down the hallway. Each walk consisted of 100 ft. Patient tolerated activity well        Patient is not progressing towards the following goals:

## 2023-04-19 NOTE — DISCHARGE SUMMARY
PRIMARY DISCHARGE DIAGNOSIS: Status post MitraClip X 1 NTW clip    PROCEDURES:    1. Successful MitraClip X1 NTW clip , transfemoral approach under general anesthesia on 4/17/23  2. Intraoperative transesophageal echocardiogram showing   Intraoperative GOMEZ during mitraclip procedure.  Baseline images show   normal biventricular systolic function. Mixed degenerative and   functional mitral valve disease, mostly involving anterior leaflet   prolapse extending to the medial commissure. Severe, posteriorly   directed mitral regurgitation with flow reversal in the right upper   pulmonary vein. Severe tricuspid regurgitation. Single mitraclip placed   in the medial A2/P2 position with reduction in MR from 4+ to 2+ (mild-  moderate).  Normalization of pulmonary venous flow pattern indicative   of improved hemodynamics.  Findings communicated at the time of exam.   3. Echocardiogram on 4/18/23 showing Compared to the prior study on 02/16/23, MR and TR is better, s/p   mitraclip.  The left ventricular ejection fraction is visually estimated to be 60%.  Known transcatheter mitral valve repair with a mitraclip.  Mean transvalvular gradient is 5 mmHg at a heart rate of 57 BPM.  Moderate residual mitral regurgitation, eccnetric wrap around jet but   low volume.  Mild tricuspid regurgitation.  Estimated right ventricular systolic pressure is 25 mmHg.  4. CXR on 4/19/23 showing   1.  Interstitial pulmonary parenchymal prominence suggest chronic underlying lung disease, component of interstitial edema and/or infiltrates not excluded.  2.  Trace right pleural effusion  3.  Cardiomegaly  4.  Atherosclerosis  5. EKG on 4/19/23 showing afib rate of 73 bpm    Recent Labs     04/17/23  1528 04/18/23  0048 04/19/23  0056   WBC 14.8* 11.3* 14.7*   RBC 2.96* 3.14* 2.80*   HEMOGLOBIN 7.8* 8.3* 7.3*   HEMATOCRIT 25.6* 27.5* 23.9*   MCV 86.5 87.6 85.4   MCH 26.4* 26.4* 26.1*   RDW 55.1* 55.8* 55.4*   PLATELETCT 352 358 336   MPV 10.9 11.2 11.4      Recent Labs     04/17/23  1528 04/18/23  0048 04/19/23  0056   SODIUM 141 140 136   POTASSIUM 4.3 4.6 4.4   CHLORIDE 107 105 101   CO2 17* 17* 21   GLUCOSE 194* 176* 132*   BUN 57* 58* 63*     INR   Date Value Ref Range Status   04/13/2023 1.16 (H) 0.87 - 1.13 Final     Comment:     INR - Non-therapeutic Reference Range: 0.87-1.13  INR - Therapeutic Reference Range: 2.0-4.0       POC INR   Date Value Ref Range Status   10/08/2020 3.4 (H) 0.9 - 1.2 Final     Comment:     INR - Non-therapeutic Reference Range: 0.9-1.2  INR - Therapeutic Reference Range: 2.0-4.0       HOSPITAL COURSE: The patient is a pleasant 83 year old female with severe mitral regurgitation now S/P bhavana clip x1 NTW clip, acute on chronic diastolic heart failure, HLD with CAD with prior CABG in '92, mild TR/moderate AS, chronic afib (not on oral anticoagulation due to recent GIB), obesity with mod-severe PAH with SHAKA on CPAP, HTN, CKD stage IV, anemia, hypothyroidism, GERD, and hepatic steatosis. Due to the patient's symptoms, the patient underwent successful MitraClip described as above. Post-procedure, the patient did well. They did require IV diuresis during their stay and will continue on oral diuretics post-operatively. She diuresed well but continues with lower extremity edema and shortness of breath, we will continue to monitor this outpatient but her symptoms are gradually improving. She did continue to have an elevated BNP and pulmonary edema on her chest xray imaging findings. They were able to ambulate without difficulty. She was noted to have CKD and anemia, this is not new to her, we will follow her labs outpatient. No further events were noted during their stay. They are now off oxygen and are to be discharged to home with her family.    DISCHARGE MEDICATIONS:      Medication List        CHANGE how you take these medications        Instructions   atorvastatin 40 MG Tabs  What changed:   how much to take  how to take this  when to  take this  Commonly known as: LIPITOR   TAKE 1 TABLET BY MOUTH AT  BEDTIME     Cosentyx Sensoready Pen 150 MG/ML Soaj  What changed:   how much to take  how to take this  when to take this  Generic drug: Secukinumab   Doctor's comments: Requests 90 day supply.  Inject 300mg Sub Cut once monthly     losartan 25 MG Tabs  What changed:   medication strength  how much to take  when to take this  Commonly known as: COZAAR   Take 1 Tablet by mouth every evening.  Dose: 25 mg            CONTINUE taking these medications        Instructions   bisoprolol 10 MG tablet  Commonly known as: ZEBETA   Doctor's comments: Do not substitute atenolol.  This is the medication that cardiology wants  Take 1 Tablet by mouth every day.  Dose: 10 mg     furosemide 40 MG Tabs  Commonly known as: LASIX   Take 1 Tablet by mouth every day.  Dose: 40 mg     Levoxyl 150 MCG Tabs  Generic drug: levothyroxine   Take 1 Tablet by mouth every morning on an empty stomach.  Dose: 150 mcg     pantoprazole 40 MG Tbec  Commonly known as: PROTONIX   Take 1 Tablet by mouth 2 times a day.  Dose: 40 mg     spironolactone 25 MG Tabs  Commonly known as: ALDACTONE   Take 1 Tablet by mouth every day.  Dose: 25 mg     TYLENOL PO   Take 2 Tablets by mouth 2 times a day as needed (For pain). Pt is not sure the strength  Dose: 2 Tablet            STOP taking these medications      ALPRAZolam 0.25 MG Tabs  Commonly known as: XANAX            DISCHARGE INSTRUCTIONS: They are given discharge instructions on potential post-operative complications and symptoms to watch out for. Their groin sites were checked and were clean, dry, and intact. Patient or family to notify us for any complications noted on the discharge instructions. They will follow up with myself, Perla KELLER, on Tuesday in our cardiology office. They will get repeat CBC and BMP before their follow up appointment. They will then follow up with Dr. Gray with a repeat echocardiogram in one  month for post MitraClip assessment.     Future Appointments   Date Time Provider Department Center   4/25/2023  2:30 PM THAO Martínez RHDARRIAN None   5/17/2023 11:20 AM Asael Pink M.D. 75MGRP TIAN [facility]   5/19/2023  1:15 PM IHV EXAM 10 ECHO [facility]   5/24/2023  2:30 PM THAO Martínez None   6/16/2023 11:30 AM Patricia Downs M.D. [facility]     Discharge time spent with patient was 18 minutes.

## 2023-04-19 NOTE — PROGRESS NOTES
MONITOR SUMMARY:     Rhythm: A-fib  Rate: 56 - 66  Ectopy: (o)PVC  Measurements: --/.08/--

## 2023-04-24 ENCOUNTER — HOSPITAL ENCOUNTER (OUTPATIENT)
Dept: LAB | Facility: MEDICAL CENTER | Age: 83
End: 2023-04-24
Payer: MEDICARE

## 2023-04-24 DIAGNOSIS — N18.4 CKD (CHRONIC KIDNEY DISEASE) STAGE 4, GFR 15-29 ML/MIN (HCC): ICD-10-CM

## 2023-04-24 DIAGNOSIS — D62 ACUTE BLOOD LOSS ANEMIA: ICD-10-CM

## 2023-04-24 LAB
ERYTHROCYTE [DISTWIDTH] IN BLOOD BY AUTOMATED COUNT: 55.6 FL (ref 35.9–50)
HCT VFR BLD AUTO: 28 % (ref 37–47)
HGB BLD-MCNC: 8.2 G/DL (ref 12–16)
MCH RBC QN AUTO: 25.5 PG (ref 27–33)
MCHC RBC AUTO-ENTMCNC: 29.3 G/DL (ref 33.6–35)
MCV RBC AUTO: 87 FL (ref 81.4–97.8)
PLATELET # BLD AUTO: 380 K/UL (ref 164–446)
PMV BLD AUTO: 11.2 FL (ref 9–12.9)
RBC # BLD AUTO: 3.22 M/UL (ref 4.2–5.4)
WBC # BLD AUTO: 8.8 K/UL (ref 4.8–10.8)

## 2023-04-24 PROCEDURE — 36415 COLL VENOUS BLD VENIPUNCTURE: CPT

## 2023-04-24 PROCEDURE — 85027 COMPLETE CBC AUTOMATED: CPT

## 2023-04-24 PROCEDURE — 80048 BASIC METABOLIC PNL TOTAL CA: CPT

## 2023-04-25 ENCOUNTER — OFFICE VISIT (OUTPATIENT)
Dept: CARDIOLOGY | Facility: MEDICAL CENTER | Age: 83
End: 2023-04-25
Payer: MEDICARE

## 2023-04-25 VITALS
BODY MASS INDEX: 35.61 KG/M2 | HEART RATE: 71 BPM | RESPIRATION RATE: 18 BRPM | SYSTOLIC BLOOD PRESSURE: 112 MMHG | WEIGHT: 201 LBS | HEIGHT: 63 IN | OXYGEN SATURATION: 98 % | DIASTOLIC BLOOD PRESSURE: 66 MMHG

## 2023-04-25 DIAGNOSIS — I25.10 CORONARY ARTERY DISEASE INVOLVING NATIVE CORONARY ARTERY OF NATIVE HEART WITHOUT ANGINA PECTORIS: ICD-10-CM

## 2023-04-25 DIAGNOSIS — I35.0 AORTIC VALVE STENOSIS, ETIOLOGY OF CARDIAC VALVE DISEASE UNSPECIFIED: ICD-10-CM

## 2023-04-25 DIAGNOSIS — N18.4 CKD (CHRONIC KIDNEY DISEASE) STAGE 4, GFR 15-29 ML/MIN (HCC): ICD-10-CM

## 2023-04-25 DIAGNOSIS — D50.9 MICROCYTIC ANEMIA: ICD-10-CM

## 2023-04-25 DIAGNOSIS — Z53.09 CONTRAINDICATION TO ANTICOAGULATION THERAPY: ICD-10-CM

## 2023-04-25 DIAGNOSIS — I27.20 MODERATE TO SEVERE PULMONARY HYPERTENSION (HCC): ICD-10-CM

## 2023-04-25 DIAGNOSIS — I10 ESSENTIAL HYPERTENSION: Chronic | ICD-10-CM

## 2023-04-25 DIAGNOSIS — G47.33 OBSTRUCTIVE SLEEP APNEA TREATED WITH CONTINUOUS POSITIVE AIRWAY PRESSURE (CPAP): ICD-10-CM

## 2023-04-25 DIAGNOSIS — I50.43 ACUTE ON CHRONIC COMBINED SYSTOLIC AND DIASTOLIC CONGESTIVE HEART FAILURE (HCC): ICD-10-CM

## 2023-04-25 DIAGNOSIS — N18.4 STAGE 4 CHRONIC KIDNEY DISEASE (HCC): ICD-10-CM

## 2023-04-25 DIAGNOSIS — Z98.890 S/P MITRAL VALVE CLIP IMPLANTATION: ICD-10-CM

## 2023-04-25 DIAGNOSIS — Z95.818 S/P MITRAL VALVE CLIP IMPLANTATION: ICD-10-CM

## 2023-04-25 DIAGNOSIS — I48.11 LONGSTANDING PERSISTENT ATRIAL FIBRILLATION (HCC): ICD-10-CM

## 2023-04-25 DIAGNOSIS — E66.9 OBESITY (BMI 35.0-39.9 WITHOUT COMORBIDITY): ICD-10-CM

## 2023-04-25 DIAGNOSIS — E78.2 MIXED HYPERLIPIDEMIA: ICD-10-CM

## 2023-04-25 PROBLEM — I07.1 SEVERE TRICUSPID REGURGITATION BY PRIOR ECHOCARDIOGRAM: Status: RESOLVED | Noted: 2022-11-16 | Resolved: 2023-04-25

## 2023-04-25 LAB
ANION GAP SERPL CALC-SCNC: 18 MMOL/L (ref 7–16)
BUN SERPL-MCNC: 43 MG/DL (ref 8–22)
CALCIUM SERPL-MCNC: 9 MG/DL (ref 8.5–10.5)
CHLORIDE SERPL-SCNC: 99 MMOL/L (ref 96–112)
CO2 SERPL-SCNC: 19 MMOL/L (ref 20–33)
CREAT SERPL-MCNC: 2.16 MG/DL (ref 0.5–1.4)
GFR SERPLBLD CREATININE-BSD FMLA CKD-EPI: 22 ML/MIN/1.73 M 2
GLUCOSE SERPL-MCNC: 103 MG/DL (ref 65–99)
POTASSIUM SERPL-SCNC: 4 MMOL/L (ref 3.6–5.5)
SODIUM SERPL-SCNC: 136 MMOL/L (ref 135–145)

## 2023-04-25 PROCEDURE — 99214 OFFICE O/P EST MOD 30 MIN: CPT | Performed by: NURSE PRACTITIONER

## 2023-04-25 PROCEDURE — 93000 ELECTROCARDIOGRAM COMPLETE: CPT | Performed by: INTERNAL MEDICINE

## 2023-04-25 RX ORDER — WARFARIN SODIUM 5 MG/1
5 TABLET ORAL DAILY
Qty: 30 TABLET | Refills: 3 | Status: SHIPPED | OUTPATIENT
Start: 2023-04-25 | End: 2023-08-10 | Stop reason: SDUPTHER

## 2023-04-25 ASSESSMENT — ENCOUNTER SYMPTOMS
CLAUDICATION: 0
ORTHOPNEA: 1
DIZZINESS: 0
PALPITATIONS: 0
COUGH: 0
MYALGIAS: 0
ABDOMINAL PAIN: 0
PND: 0
SHORTNESS OF BREATH: 0
FEVER: 0

## 2023-04-25 ASSESSMENT — FIBROSIS 4 INDEX: FIB4 SCORE: 1.49

## 2023-04-25 NOTE — PATIENT INSTRUCTIONS
Keep heart medications the same.    Work with Dr. Pink about your persistent anemia.    Get an appointment with your nephrologist (kidney doctor).    We will consider Watchman procedure instead of re-starting blood thinner.    Don't start warfarin until pharmacist apt. Schedule with them for monitoring.    Repeat labs in 1 month.

## 2023-04-25 NOTE — PROGRESS NOTES
Chief Complaint   Patient presents with    Aortic Stenosis     Subjective     Aleshia Crooks is a 83 y.o. female who presents today for 1 week post bhavana clip placement.    She is a patient of Dr. Rehman in our office. Hx of severe mitral regurgitation now S/P bhavana clip x1 NTW clip, acute on chronic diastolic heart failure, HLD with CAD with prior CABG in '92, severe TR, moderate AS, chronic afib (not on oral anticoagulation due to recent GIB), obesity with mod-severe PAH with SHAKA on CPAP, HTN, CKD stage IV, hypothyroidism, GERD, and hepatic steatosis.    She presents today with her daughter.    She admits to improvement in her symptoms but still has some mild edema and orthopnea.    She has some groin sensitivity from her procedure. Suture was removed today.    She has no chest pain, dizziness/lightheadedness, or palpitations.    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) 06/08/2020 2015    Moderate tricuspid regurgitation 11/16/2022    Thyroid disease      Past Surgical History:   Procedure Laterality Date    TRANSCATHETER MITRAL VALVE REPAIR N/A 4/17/2023    Procedure: TRANSCATHETER MITRAL VALVE PROCEDURE;  Surgeon: Cesar Gray M.D.;  Location: SURGERY [facility];  Service: Cardiac    ECHOCARDIOGRAM, TRANSESOPHAGEAL, INTRAOPERATIVE N/A 4/17/2023    Procedure: ECHOCARDIOGRAM, TRANSESOPHAGEAL, INTRAOPERATIVE;  Surgeon: Cesar Gray M.D.;  Location: SURGERY [facility];  Service: Cardiac    MN COLONOSCOPY,DIAGNOSTIC N/A 1/24/2023    Procedure: COLONOSCOPY;  Surgeon: Amy Zarate M.D.;  Location: SURGERY SAME DAY [facility];  Service: Gastroenterology    MN UPPER GI ENDOSCOPY,DIAGNOSIS N/A 1/24/2023     Procedure: GASTROSCOPY;  Surgeon: Amy Zarate M.D.;  Location: SURGERY SAME DAY [facility];  Service: Gastroenterology    VA UPPER GI ENDOSCOPY,BIOPSY N/A 2023    Procedure: GASTROSCOPY, WITH BIOPSY;  Surgeon: Amy Zarate M.D.;  Location: SURGERY SAME DAY [facility];  Service: Gastroenterology    CHOLECYSTECTOMY      EYE SURGERY Bilateral     cataracts    MULTIPLE CORONARY ARTERY BYPASS      1 bypass    THYROIDECTOMY TOTAL       Family History   Problem Relation Age of Onset    Cancer Mother         cancer, smoker    Stroke Maternal Grandmother     Other Other         Crohn    Kidney Disease Other         kidney transplant     Social History     Socioeconomic History    Marital status:      Spouse name: Not on file    Number of children: Not on file    Years of education: Not on file    Highest education level: Not on file   Occupational History     Comment: [employer]   Tobacco Use    Smoking status: Former     Packs/day: 1.00     Years: 31.00     Pack years: 31.00     Types: Cigarettes     Quit date:      Years since quittin.3    Smokeless tobacco: Never   Vaping Use    Vaping Use: Never used   Substance and Sexual Activity    Alcohol use: No    Drug use: No    Sexual activity: Not Currently     Partners: Male   Other Topics Concern    Not on file   Social History Narrative    Not on file     Social Determinants of Health     Financial Resource Strain: Low Risk     Difficulty of Paying Living Expenses: Not hard at all   Food Insecurity: No Food Insecurity    Worried About Running Out of Food in the Last Year: Never true    Ran Out of Food in the Last Year: Never true   Transportation Needs: No Transportation Needs    Lack of Transportation (Medical): No    Lack of Transportation (Non-Medical): No   Physical Activity: Inactive    Days of Exercise per Week: 0 days    Minutes of Exercise per Session: 0 min   Stress: No Stress Concern Present    Feeling of Stress : Not at all   Social  Connections: Socially Isolated    Frequency of Communication with Friends and Family: More than three times a week    Frequency of Social Gatherings with Friends and Family: Once a week    Attends Confucianist Services: Never    Active Member of Clubs or Organizations: No    Attends Club or Organization Meetings: Never    Marital Status:    Intimate Partner Violence: Not At Risk    Fear of Current or Ex-Partner: No    Emotionally Abused: No    Physically Abused: No    Sexually Abused: No   Housing Stability: Low Risk     Unable to Pay for Housing in the Last Year: No    Number of Places Lived in the Last Year: 1    Unstable Housing in the Last Year: No     Allergies   Allergen Reactions    Cefdinir Shortness of Breath    Ibuprofen      Pt does not have an allergie to this medication    Morphine Vomiting    Morphine-Naltrexone Vomiting     Outpatient Encounter Medications as of 4/25/2023   Medication Sig Dispense Refill    warfarin (COUMADIN) 5 MG Tab Take 1 Tablet by mouth every day. 30 Tablet 3    losartan (COZAAR) 25 MG Tab Take 1 Tablet by mouth every evening. 100 Tablet 3    Acetaminophen (TYLENOL PO) Take 2 Tablets by mouth 2 times a day as needed (For pain). Pt is not sure the strength      spironolactone (ALDACTONE) 25 MG Tab Take 1 Tablet by mouth every day. 90 Tablet 3    pantoprazole (PROTONIX) 40 MG Tablet Delayed Response Take 1 Tablet by mouth 2 times a day. 180 Tablet 3    furosemide (LASIX) 40 MG Tab Take 1 Tablet by mouth every day. 90 Tablet 3    LEVOXYL 150 MCG Tab Take 1 Tablet by mouth every morning on an empty stomach. 90 Tablet 3    atorvastatin (LIPITOR) 40 MG Tab TAKE 1 TABLET BY MOUTH AT  BEDTIME (Patient taking differently: Take 40 mg by mouth every evening. TAKE 1 TABLET BY MOUTH AT  BEDTIME) 100 Tablet 3    Secukinumab (COSENTYX SENSOREADY PEN) 150 MG/ML Solution Auto-injector Inject 300mg Sub Cut once monthly (Patient taking differently: Inject 300 mg as directed every 28 days.  "Inject 300mg Sub Cut once monthly) 1.96 mL 6    bisoprolol (ZEBETA) 10 MG tablet Take 1 Tablet by mouth every day. 90 Tablet 4     No facility-administered encounter medications on file as of 4/25/2023.     Review of Systems   Constitutional:  Positive for malaise/fatigue. Negative for fever.        Exertional   Respiratory:  Negative for cough and shortness of breath.    Cardiovascular:  Positive for orthopnea and leg swelling. Negative for chest pain, palpitations, claudication and PND.   Gastrointestinal:  Negative for abdominal pain.   Musculoskeletal:  Negative for myalgias.   Neurological:  Negative for dizziness.            Objective     /66 (BP Location: Left arm, Patient Position: Sitting, BP Cuff Size: Adult)   Pulse 71   Resp 18   Ht 1.6 m (5' 3\")   Wt 91.2 kg (201 lb)   LMP  (LMP Unknown)   SpO2 98%   BMI 35.61 kg/m²     Physical Exam  Vitals and nursing note reviewed.   Constitutional:       Appearance: Normal appearance. She is well-developed.   HENT:      Head: Normocephalic and atraumatic.   Neck:      Vascular: No JVD.   Cardiovascular:      Rate and Rhythm: Normal rate and regular rhythm.      Pulses: Normal pulses.      Heart sounds: Normal heart sounds.   Pulmonary:      Effort: Pulmonary effort is normal.      Breath sounds: Normal breath sounds.   Musculoskeletal:         General: Normal range of motion.   Skin:     General: Skin is warm and dry.   Neurological:      Mental Status: She is alert and oriented to person, place, and time.   Psychiatric:         Mood and Affect: Mood normal.         Behavior: Behavior normal.         Thought Content: Thought content normal.         Judgment: Judgment normal.              Assessment & Plan     1. Aortic valve stenosis, etiology of cardiac valve disease unspecified  EKG      2. Coronary artery disease involving native coronary artery of native heart without angina pectoris, s/p 1V CABG 1992        3. Acute on chronic combined systolic and " diastolic congestive heart failure (HCC)  Basic Metabolic Panel    proBrain Natriuretic Peptide, NT      4. Stage 3b chronic kidney disease (HCC)        5. Microcytic anemia  CBC WITHOUT DIFFERENTIAL      6. CKD (chronic kidney disease) stage 4, GFR 15-29 ml/min ([facility])        7. Contraindication to anticoagulation therapy  REFERRAL TO CARDIOLOGY      8. Longstanding persistent atrial fibrillation (HCC)  REFERRAL TO CARDIOLOGY    Referral to Anticoagulation Monitoring      9. S/P mitral valve clip implantation        10. Essential hypertension        11. Obstructive sleep apnea treated with continuous positive airway pressure (CPAP)        12. Mixed hyperlipidemia        13. Moderate to severe pulmonary hypertension (HCC)        14. Obesity (BMI 35.0-39.9 without comorbidity) ([facility])          Medical Decision Making: Today's Assessment/Status/Plan:      1. S/P Angie Clip X1   -doing well post-op  -no ASA  -groins CDI with mild bruising and small nodule   -SBE prophylaxis understands  -echo 1 month with structural heart follow up  -reviewed hospital imaging, labs, and EKG  -cardiac rehab referral placed  -EKG shows afib, rate controlled  -suture removed, site CDI    2.HFrEF, Stage C, Class III, LVEF 60%: valvular disease  -Heart failure due to valvular disease  -Recent HF Hospitalization precipitant was due to Angie clip placement (if Applicable)  -Discussed Heart failure trajectory and prognosis with patient. Will continue to optimize medical therapy as tolerated. Advanced HF treatment, need for remote monitoring consideration at every visit.   -ACE-I/ARB/ARNI: losartan 25 mg QD  -Evidence Based Beta-blocker: bisoprolol 10 mg QD  -Aldosterone Antagonist: aldactone 25 mg QD  -Diuretic: lasix 40 mg QD  -SGLT2 inhibitor: not warranted  -repeat labs in 1 month   -Discussed/reviewed maintaining a low Sodium and hydration recommendations  -Repeat Echo in 1 month    3. HTN with CKD  -good control  -recommend repeat bmp and  follow up with nephrology     4. Obesity with SHAKA with PAH  -tolerating and compliant with CPAP    5. Anemia with recent GIB  -recommend repeat cbc and follow up with PCP  -refer back to anticoagulation clinic and EP for possible Watchman procedure  -OK to hold off on warfarin start until anticoagulation apt, start on low dose  -watch for bleeding, dark stools    6. Afib, persistent  -rate controlled, asymptomatic  -needs OAC re-start, see above  -consider Watchman  -cont bisoprolol 10 mg for rate control    7. HLD with CAD  -no angina or JONES  -cont statin, no ASA for recent GIB  -follow    Patient is to follow up with Perla KELLER in 1 month with echo and labs.

## 2023-04-26 LAB — EKG IMPRESSION: NORMAL

## 2023-04-27 ENCOUNTER — DOCUMENTATION (OUTPATIENT)
Dept: CARDIOLOGY | Facility: MEDICAL CENTER | Age: 83
End: 2023-04-27
Payer: MEDICARE

## 2023-04-27 ENCOUNTER — TELEPHONE (OUTPATIENT)
Dept: VASCULAR LAB | Facility: MEDICAL CENTER | Age: 83
End: 2023-04-27
Payer: MEDICARE

## 2023-04-27 DIAGNOSIS — Z87.19 H/O: UPPER GI BLEED: ICD-10-CM

## 2023-04-27 RX ORDER — PANTOPRAZOLE SODIUM 40 MG/1
40 TABLET, DELAYED RELEASE ORAL 2 TIMES DAILY
Qty: 180 TABLET | Refills: 3 | Status: SHIPPED | OUTPATIENT
Start: 2023-04-27

## 2023-04-27 NOTE — TELEPHONE ENCOUNTER
[facility] V[facility] and Pharmacotherapy Programs    Received pharmacotherapy referral for warfarin due to AFib from ARIANNA Huddleston on 4/25/23    Scheduled pt for initial visit on 5/2.    Insurance: Medicare MoO  PCP: [facility]  Locations to be seen: Any    [facility] Anticoagulation/Pharmacotherapy Clinic at [phone], fax [phone]    James Sr, AbadD, BCACP

## 2023-04-27 NOTE — PROGRESS NOTES
On behalf of [facility]'s Structural Heart Program, we would like to thank you for allowing us to participate in the care of your patient.     She underwent a successful mitral valve transcatheter [id number] repair (Mitral DOMINIC) on 4/17/2023.     Your patient is scheduled to follow up with our Structural Heart Program at one month and one year post procedure.     Again, thank you for allowing us to participate in the care of your patient. If you have any questions, please do not hesitate to contact our Structural Heart team.     Sincerely,    [facility] Structural Heart Team    MAURY Arteaga, RN, Structural Heart Nurse Coordinator ([phone])  MAURY Vyas, RN, Structural [place] Nurse Coordinator ([phone])

## 2023-05-02 ENCOUNTER — ANTICOAGULATION VISIT (OUTPATIENT)
Dept: VASCULAR LAB | Facility: MEDICAL CENTER | Age: 83
End: 2023-05-02
Attending: INTERNAL MEDICINE
Payer: MEDICARE

## 2023-05-02 ENCOUNTER — DOCUMENTATION (OUTPATIENT)
Dept: VASCULAR LAB | Facility: MEDICAL CENTER | Age: 83
End: 2023-05-02

## 2023-05-02 VITALS — HEART RATE: 55 BPM | DIASTOLIC BLOOD PRESSURE: 51 MMHG | SYSTOLIC BLOOD PRESSURE: 98 MMHG

## 2023-05-02 DIAGNOSIS — I48.11 LONGSTANDING PERSISTENT ATRIAL FIBRILLATION (HCC): ICD-10-CM

## 2023-05-02 LAB — INR PPP: 1.1 (ref 2–3.5)

## 2023-05-02 PROCEDURE — 99213 OFFICE O/P EST LOW 20 MIN: CPT

## 2023-05-02 PROCEDURE — 85610 PROTHROMBIN TIME: CPT

## 2023-05-02 ASSESSMENT — CHA2DS2 SCORE
CHA2DS2 VASC SCORE: 5
VASCULAR DISEASE: NO
AGE 65 TO 74: NO
HYPERTENSION: YES
AGE 75 OR GREATER: YES
SEX: FEMALE
PRIOR STROKE OR TIA OR THROMBOEMBOLISM: NO
CHF OR LEFT VENTRICULAR DYSFUNCTION: YES
DIABETES: NO

## 2023-05-02 NOTE — PROGRESS NOTES
.  Anticoagulation Summary  As of 2023      INR goal:  2.0-3.0   TTR:  --   INR used for dosin.10 (2023)   Warfarin maintenance plan:  5 mg (5 mg x 1) every day   Weekly warfarin total:  35 mg   Plan last modified:  Rudy Lowe, PharmD (2023)   Next INR check:  2023   Target end date:  Indefinite    Indications    AF (atrial fibrillation) (HCC) [I48.91]                 Anticoagulation Episode Summary       INR check location:      Preferred lab:      Send INR reminders to:      Comments:  Not a DOAC candidate, GI bleed in  on Xarelto          Anticoagulation Care Providers       Provider Role Specialty Phone number    Perla Espinoza, A.P.R.N. Referring Cardiovascular Disease (Cardiology) [phone]          Anticoagulation Patient Findings      PCP Dr Pink  Cardiologist Perla Espinoza APRN  Pt hx AFib + mitral valve clip  CHADSVASC = 5  HAS-BLED = 4  DOAC = GI bleeding on Xarelto and recent mitral clip, will move forward with warfarin for now.     Recent discharge note explains HPI well:    HOSPITAL COURSE: The patient is a pleasant 83 year old female with severe mitral regurgitation now S/P bhavana clip x1 NTW clip, acute on chronic diastolic heart failure, HLD with CAD with prior CABG in , mild TR/moderate AS, chronic afib (not on oral anticoagulation due to recent GIB), obesity with mod-severe PAH with SHAKA on CPAP, HTN, CKD stage IV, anemia, hypothyroidism, GERD, and hepatic steatosis. Due to the patient's symptoms, the patient underwent successful MitraClip described as above. Post-procedure, the patient did well    Pt also reports recent GI bleed and supported by cardiology note.  Pt was instructed to start warfarin with us and cautiously monitor. Pt might be a good candidate for watchman procedure.     Pt is new to warfarin and new to [facility] (returning). Discussed:   Indication for warfarin therapy and INR goal range.   Importance of monitoring and compliance.   Monitoring  parameters, signs and symptoms of bleeding or clotting.  Warfarin therapy, side effects, potential DDIs, OTC medications  Hormonal therapy None  DDI Discussed, no concerns today.   Pt is not on antiplatelet/NSAID therapy.   Importance of diet consistency, ie vitamin K intake, supplements  Lifestyle safety, ie smoking, ETOH, hobby safety, fall safety/prevention  Procedures for missed doses or suspected missed doses, surgeries/procedures, travel, dental work, any medication changes    Pt denies any unusual s/s of bleeding, bruising, clotting or any changes to diet or medications.    INR is SUB therapeutic today.   Pt to start warfarin dose (reduced from her previous warfarin regimen in the past)  Recheck INR in 3 days.    Rudy Lowe, Mau      [facility] Pharmacotherapy Program Consent                                             Name    Aleshia Crooks    MRN number: [id number]    the following are guidelines for participation in the [facility] Pharmacotherapy Program.     I, ____Aleshia Sonido Crooks_____, understand and voluntarily agree to participate in the [facility] Pharmacotherapy Program and to have services provided to me by pharmacists working in collaboration with my provider.    I understand the pharmacist within the [facility] Pharmacotherapy Program may initiate, modify or discontinue my medications, order appropriate testing and appointments, perform exams, monitor treatment, and make clinical evaluations and decisions pursuant to a collaborative practice agreement with my provider.  I understand the pharmacist within the [facility] Pharmacotherapy Program is not a physician, osteopathic physician, advanced practice registered nurse or physician assistant and may not diagnose.  I will take all my medications as instructed and not change the way I take it without first talking to my provider or a pharmacist within the [facility] Pharmacotherapy Program.  I understand that if I am late  to my appointment I may not be able to be seen by a pharmacist at that time and will have to reschedule my appointment.  During appointment with pharmacist I understand that pharmacist has the right not to answer questions or perform services outside the pharmacist’s scope of practice.  By signing below, I provide informed consent for the pharmacist to provide these services and for my participation in the [facility] Pharmacotherapy Program.      Aleshia Crooks           [id number]          05/02/23  Patient Name                   MRN number  Date     ____Obtained verbal consent from Aleshia Crooks

## 2023-05-03 ENCOUNTER — HOSPITAL ENCOUNTER (OUTPATIENT)
Dept: LAB | Facility: MEDICAL CENTER | Age: 83
End: 2023-05-03
Attending: NURSE PRACTITIONER
Payer: MEDICARE

## 2023-05-03 DIAGNOSIS — D50.9 MICROCYTIC ANEMIA: ICD-10-CM

## 2023-05-03 DIAGNOSIS — I50.43 ACUTE ON CHRONIC COMBINED SYSTOLIC AND DIASTOLIC CONGESTIVE HEART FAILURE (HCC): ICD-10-CM

## 2023-05-03 LAB
ERYTHROCYTE [DISTWIDTH] IN BLOOD BY AUTOMATED COUNT: 54.6 FL (ref 35.9–50)
HCT VFR BLD AUTO: 28 % (ref 37–47)
HGB BLD-MCNC: 8.4 G/DL (ref 12–16)
MCH RBC QN AUTO: 25.4 PG (ref 27–33)
MCHC RBC AUTO-ENTMCNC: 30 G/DL (ref 33.6–35)
MCV RBC AUTO: 84.6 FL (ref 81.4–97.8)
PLATELET # BLD AUTO: 309 K/UL (ref 164–446)
PMV BLD AUTO: 11.1 FL (ref 9–12.9)
RBC # BLD AUTO: 3.31 M/UL (ref 4.2–5.4)
WBC # BLD AUTO: 6.3 K/UL (ref 4.8–10.8)

## 2023-05-03 PROCEDURE — 36415 COLL VENOUS BLD VENIPUNCTURE: CPT

## 2023-05-03 PROCEDURE — 80048 BASIC METABOLIC PNL TOTAL CA: CPT

## 2023-05-03 PROCEDURE — 85027 COMPLETE CBC AUTOMATED: CPT

## 2023-05-03 PROCEDURE — 83880 ASSAY OF NATRIURETIC PEPTIDE: CPT

## 2023-05-03 NOTE — PROGRESS NOTES
Initial anticoag note and most recent cards note reviewed.    Patient with afib and chads vasc = 5 + recent mitral clip. Course complicated by GI bleed    Pending further recommendations, we will continue with indefinite anticoagulation with warfarin as directed by cards    Will defer all management of rhythm, rate, and other cv issues, aside from anticoagulation, to cards    Michael Bloch, MD  Anticoagulation Clinic

## 2023-05-04 LAB
ANION GAP SERPL CALC-SCNC: 13 MMOL/L (ref 7–16)
BUN SERPL-MCNC: 44 MG/DL (ref 8–22)
CALCIUM SERPL-MCNC: 9.4 MG/DL (ref 8.5–10.5)
CHLORIDE SERPL-SCNC: 101 MMOL/L (ref 96–112)
CO2 SERPL-SCNC: 22 MMOL/L (ref 20–33)
CREAT SERPL-MCNC: 2.17 MG/DL (ref 0.5–1.4)
GFR SERPLBLD CREATININE-BSD FMLA CKD-EPI: 22 ML/MIN/1.73 M 2
GLUCOSE SERPL-MCNC: 105 MG/DL (ref 65–99)
NT-PROBNP SERPL IA-MCNC: 4515 PG/ML (ref 0–125)
POTASSIUM SERPL-SCNC: 4 MMOL/L (ref 3.6–5.5)
SODIUM SERPL-SCNC: 136 MMOL/L (ref 135–145)

## 2023-05-05 ENCOUNTER — ANTICOAGULATION VISIT (OUTPATIENT)
Dept: VASCULAR LAB | Facility: MEDICAL CENTER | Age: 83
End: 2023-05-05
Attending: INTERNAL MEDICINE
Payer: MEDICARE

## 2023-05-05 DIAGNOSIS — I48.11 LONGSTANDING PERSISTENT ATRIAL FIBRILLATION (HCC): ICD-10-CM

## 2023-05-05 LAB — INR PPP: 1.2 (ref 2–3.5)

## 2023-05-05 PROCEDURE — 99212 OFFICE O/P EST SF 10 MIN: CPT

## 2023-05-05 PROCEDURE — 85610 PROTHROMBIN TIME: CPT

## 2023-05-05 NOTE — PROGRESS NOTES
Anticoagulation Summary  As of 2023      INR goal:  2.0-3.0   TTR:  --   INR used for dosin.20 (2023)   Warfarin maintenance plan:  5 mg (5 mg x 1) every day   Weekly warfarin total:  35 mg   Plan last modified:  Rudy Lowe, PharmD (2023)   Next INR check:  2023   Target end date:  Indefinite    Indications    AF (atrial fibrillation) (HCC) [I48.91]                 Anticoagulation Episode Summary       INR check location:      Preferred lab:      Send INR reminders to:      Comments:  Not a DOAC candidate, GI bleed in  on Xarelto          Anticoagulation Care Providers       Provider Role Specialty Phone number    Perla Espinoza, A.P.R.N. Referring Cardiovascular Disease (Cardiology) [phone]                  Refer to Patient Findings for HPI:  Patient Findings       Negatives:  Signs/symptoms of thrombosis, Signs/symptoms of bleeding, Laboratory test error suspected, Change in health, Change in alcohol use, Change in activity, Upcoming invasive procedure, Emergency department visit, Upcoming dental procedure, Missed doses, Extra doses, Change in medications, Change in diet/appetite, Hospital admission, Bruising, Other complaints            There were no vitals filed for this visit.  Pt declined vitals    Verified current warfarin dosing schedule.    Medications reconciled   Pt is not on antiplatelet therapy      A/P   INR  1.2 sub-therapeutic.     Warfarin dosing recommendation: Instructed patient to take bolus dose of 7.5 mg tonight and tomorrow, then resume 5 mg daily.     Patient was previously on a regimen of 6 mg daily (42 mg weekly) and states that worked with her and she is eating a similar amount of greens. To make this work with 5 mg tablets, the dose would be 7.5 mg 3x a week, and 5 mg AOD. Consider increasing at next appointment.     Pt educated to contact our clinic with any changes in medications or s/s of bleeding or thrombosis. Pt is aware to seek immediate  medical attention for falls, head injury or deep cuts.    Follow up appointment in 3 days.    Beatriz Cage, PharmD

## 2023-05-08 ENCOUNTER — ANTICOAGULATION VISIT (OUTPATIENT)
Dept: VASCULAR LAB | Facility: MEDICAL CENTER | Age: 83
End: 2023-05-08
Attending: INTERNAL MEDICINE
Payer: MEDICARE

## 2023-05-08 DIAGNOSIS — I48.11 LONGSTANDING PERSISTENT ATRIAL FIBRILLATION (HCC): ICD-10-CM

## 2023-05-08 LAB — INR PPP: 2.1 (ref 2–3.5)

## 2023-05-08 PROCEDURE — 85610 PROTHROMBIN TIME: CPT

## 2023-05-08 PROCEDURE — 99211 OFF/OP EST MAY X REQ PHY/QHP: CPT

## 2023-05-08 NOTE — PROGRESS NOTES
Anticoagulation Summary  As of 2023      INR goal:  2.0-3.0   TTR:  --   INR used for dosin.10 (2023)   Warfarin maintenance plan:  5 mg (5 mg x 1) every day   Weekly warfarin total:  35 mg   Plan last modified:  Rudy Lowe, Mau (2023)   Next INR check:  2023   Target end date:  Indefinite    Indications    AF (atrial fibrillation) (HCC) [I48.91]                 Anticoagulation Episode Summary       INR check location:      Preferred lab:      Send INR reminders to:      Comments:  Not a DOAC candidate, GI bleed in  on Xarelto          Anticoagulation Care Providers       Provider Role Specialty Phone number    Perla Espinoza, A.P.R.N. Referring Cardiovascular Disease (Cardiology) [phone]                  Refer to Patient Findings for HPI:  Patient Findings       Negatives:  Signs/symptoms of thrombosis, Signs/symptoms of bleeding, Laboratory test error suspected, Change in health, Change in alcohol use, Change in activity, Upcoming invasive procedure, Emergency department visit, Upcoming dental procedure, Missed doses, Extra doses, Change in medications, Change in diet/appetite, Hospital admission, Bruising, Other complaints            There were no vitals filed for this visit.   pt declined vitals    Verified current warfarin dosing schedule.    Medications reconciled   Pt is not on antiplatelet therapy      A/P   INR  -therapeutic.     Warfarin dosing recommendation: Continue 5 mg daily.     Pt endorses previously being established on warfarin 6 mg prior to transitioning to xarelto. She had a bleed while on xarelto, will slowly increase warfarin dose due to hx of recent bleed.    Pt educated to contact our clinic with any changes in medications or s/s of bleeding or thrombosis. Pt is aware to seek immediate medical attention for falls, head injury or deep cuts.    Follow up appointment in 4 day(s).    Rodney Cruz, AbadD

## 2023-05-09 ENCOUNTER — OFFICE VISIT (OUTPATIENT)
Dept: NEPHROLOGY | Facility: MEDICAL CENTER | Age: 83
End: 2023-05-09
Payer: MEDICARE

## 2023-05-09 VITALS
HEART RATE: 81 BPM | RESPIRATION RATE: 16 BRPM | WEIGHT: 200 LBS | HEIGHT: 63 IN | SYSTOLIC BLOOD PRESSURE: 124 MMHG | TEMPERATURE: 98.4 F | BODY MASS INDEX: 35.44 KG/M2 | OXYGEN SATURATION: 95 % | DIASTOLIC BLOOD PRESSURE: 74 MMHG

## 2023-05-09 DIAGNOSIS — I10 ESSENTIAL HYPERTENSION: Chronic | ICD-10-CM

## 2023-05-09 DIAGNOSIS — D63.8 ANEMIA OF CHRONIC DISEASE: ICD-10-CM

## 2023-05-09 DIAGNOSIS — E55.9 VITAMIN D DEFICIENCY: ICD-10-CM

## 2023-05-09 DIAGNOSIS — I50.42 CHRONIC COMBINED SYSTOLIC AND DIASTOLIC CONGESTIVE HEART FAILURE (HCC): ICD-10-CM

## 2023-05-09 DIAGNOSIS — N18.4 CKD (CHRONIC KIDNEY DISEASE) STAGE 4, GFR 15-29 ML/MIN (HCC): ICD-10-CM

## 2023-05-09 PROCEDURE — 99214 OFFICE O/P EST MOD 30 MIN: CPT | Performed by: INTERNAL MEDICINE

## 2023-05-09 RX ORDER — 0.9 % SODIUM CHLORIDE 0.9 %
10 VIAL (ML) INJECTION PRN
Status: CANCELLED | OUTPATIENT
Start: 2023-05-09

## 2023-05-09 RX ORDER — MULTIVITAMIN,THER AND MINERALS
1 CAPSULE ORAL NIGHTLY
COMMUNITY

## 2023-05-09 RX ORDER — DAPAGLIFLOZIN 5 MG/1
5 TABLET, FILM COATED ORAL DAILY
Qty: 90 TABLET | Refills: 3 | Status: SHIPPED | OUTPATIENT
Start: 2023-05-09 | End: 2023-05-17

## 2023-05-09 RX ORDER — 0.9 % SODIUM CHLORIDE 0.9 %
3 VIAL (ML) INJECTION PRN
Status: CANCELLED | OUTPATIENT
Start: 2023-05-09

## 2023-05-09 RX ORDER — 0.9 % SODIUM CHLORIDE 0.9 %
VIAL (ML) INJECTION PRN
Status: CANCELLED | OUTPATIENT
Start: 2023-05-09

## 2023-05-09 ASSESSMENT — ENCOUNTER SYMPTOMS
ABDOMINAL PAIN: 0
FEVER: 0
SHORTNESS OF BREATH: 1
ORTHOPNEA: 0

## 2023-05-09 ASSESSMENT — FIBROSIS 4 INDEX: FIB4 SCORE: 1.83

## 2023-05-09 NOTE — PROGRESS NOTES
"Chief Complaint   Patient presents with    Follow-Up     Stage 3b ckd, [facility]        CC: f/u CKD    HPI:  Aleshia Crooks is a 83 y.o. female with HTN, Psoriasis on immunosuppression, EJR1u-1, severe mitral regurgitation and valvular heart failure status post MitraClip April 2023 who presents today for follow up.    Re: HTN, diagnosed 1992 when she had a heart attack. BP control over the years has been well controlled. She occasionally checks BP at home, was 100/55 yesterday. She felt a little light-headed yesterday, but usually not light-headed.     Re: CHF, noted in 2017 and 2020. She had mitraclip in 4/2023. She feels like the mitraclip helped her breathing. She thinks her LE edema is starting to get better. She is on lasix 40mg and spironolactone 25mg in AM and feels diuretic effect.     Re: CKD, notably worsening in the last year. Her appetite is \"a little too good.\" Denies headaches, N/V, metallic taste. Denies NSAIDs, takes Tylenol PRN.     Re: Anemia, she needed some blood transfusions in 3/2023. She had c-scope in 1/2023 with diverticulosis, and EGD with nodular mucosa of the stomach. 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    Moderate tricuspid regurgitation 11/16/2022    Thyroid disease        Past Surgical History:   Procedure Laterality Date    TRANSCATHETER MITRAL VALVE REPAIR N/A 4/17/2023    Procedure: TRANSCATHETER MITRAL VALVE PROCEDURE;  Surgeon: Cesar Gray M.D.;  Location: SURGERY [facility];  Service: Cardiac    ECHOCARDIOGRAM, TRANSESOPHAGEAL, INTRAOPERATIVE N/A 4/17/2023    Procedure: ECHOCARDIOGRAM, " TRANSESOPHAGEAL, INTRAOPERATIVE;  Surgeon: Cesar Gray M.D.;  Location: SURGERY [facility];  Service: Cardiac    TX COLONOSCOPY,DIAGNOSTIC N/A 1/24/2023    Procedure: COLONOSCOPY;  Surgeon: Amy Zarate M.D.;  Location: SURGERY [facility];  Service: Gastroenterology    TX UPPER GI ENDOSCOPY,DIAGNOSIS N/A 1/24/2023    Procedure: GASTROSCOPY;  Surgeon: Amy Zarate M.D.;  Location: SURGERY [facility];  Service: Gastroenterology    TX UPPER GI ENDOSCOPY,BIOPSY N/A 1/24/2023    Procedure: GASTROSCOPY, WITH BIOPSY;  Surgeon: Amy Zarate M.D.;  Location: SURGERY [facility];  Service: Gastroenterology    CHOLECYSTECTOMY      EYE SURGERY Bilateral     cataracts    MULTIPLE CORONARY ARTERY BYPASS      1 bypass    THYROIDECTOMY TOTAL          Outpatient Encounter Medications as of 5/9/2023   Medication Sig Dispense Refill    Vitamins-Lipotropics (MULTI-VITAMIN HP/MINERALS) Cap Take  by mouth 1 time a day as needed.      pantoprazole (PROTONIX) 40 MG Tablet Delayed Response Take 1 Tablet by mouth 2 times a day. 180 Tablet 3    warfarin (COUMADIN) 5 MG Tab Take 1 Tablet by mouth every day. 30 Tablet 3    losartan (COZAAR) 25 MG Tab Take 1 Tablet by mouth every evening. 100 Tablet 3    Acetaminophen (TYLENOL PO) Take 2 Tablets by mouth 2 times a day as needed (For pain). Pt is not sure the strength      spironolactone (ALDACTONE) 25 MG Tab Take 1 Tablet by mouth every day. 90 Tablet 3    furosemide (LASIX) 40 MG Tab Take 1 Tablet by mouth every day. 90 Tablet 3    LEVOXYL 150 MCG Tab Take 1 Tablet by mouth every morning on an empty stomach. 90 Tablet 3    atorvastatin (LIPITOR) 40 MG Tab TAKE 1 TABLET BY MOUTH AT  BEDTIME (Patient taking differently: Take 40 mg by mouth every evening. TAKE 1 TABLET BY MOUTH AT  BEDTIME) 100 Tablet 3    Secukinumab (COSENTYX SENSOREADY PEN) 150 MG/ML Solution Auto-injector Inject 300mg Sub Cut once monthly (Patient taking differently: Inject 300 mg  "as directed every 28 days. Inject 300mg Sub Cut once monthly) 1.96 mL 6    bisoprolol (ZEBETA) 10 MG tablet Take 1 Tablet by mouth every day. 90 Tablet 4     No facility-administered encounter medications on file as of 5/9/2023.        Allergies   Allergen Reactions    Morphine Vomiting           Family History   Problem Relation Age of Onset    Cancer Mother         cancer, smoker    Stroke Maternal Grandmother     Other Other         Crohn    Kidney Disease Other         kidney transplant       Review of Systems   Constitutional:  Negative for fever.   Respiratory:  Positive for shortness of breath.    Cardiovascular:  Positive for leg swelling. Negative for chest pain and orthopnea.   Gastrointestinal:  Negative for abdominal pain.   Genitourinary:  Negative for dysuria.   All other systems reviewed and are negative.    /74 (BP Location: Right arm, Patient Position: Sitting, BP Cuff Size: Adult)   Pulse 81   Temp 36.9 °C (98.4 °F) (Temporal)   Resp 16   Ht 1.6 m (5' 3\")   Wt 90.7 kg (200 lb)   LMP  (LMP Unknown)   SpO2 95%   BMI 35.43 kg/m²     Physical Exam  Constitutional:       General: She is not in acute distress.     Appearance: She is obese.      Comments: In a power scooter   HENT:      Mouth/Throat:      Pharynx: No oropharyngeal exudate.   Eyes:      General: No scleral icterus.  Neck:      Trachea: No tracheal deviation.   Cardiovascular:      Rate and Rhythm: Normal rate. Rhythm irregular.      Heart sounds: Murmur heard.   Pulmonary:      Effort: Pulmonary effort is normal.      Breath sounds: Normal breath sounds. No stridor. No rales.   Abdominal:      General: Bowel sounds are normal.      Palpations: Abdomen is soft.      Tenderness: There is no abdominal tenderness.   Musculoskeletal:         General: Normal range of motion.      Cervical back: Neck supple.      Right lower leg: Edema (trace) present.      Left lower leg: Edema (1+) present.   Skin:     General: Skin is warm and " dry.      Findings: No rash.   Neurological:      General: No focal deficit present.      Mental Status: She is alert and oriented to person, place, and time.   Psychiatric:         Mood and Affect: Mood and affect normal.         Behavior: Behavior normal.         Labs reviewed.    Recent Labs     11/17/22  0936 01/23/23  1144 02/18/23  0125 02/19/23  0051 02/24/23  1135 04/13/23  1327 04/15/23  1039 04/17/23  1528 04/18/23  0048 04/19/23  0056 04/24/23  1337 05/03/23  1132   ALBUMIN 4.1   < >  --   --    < > 4.0  --  3.6 3.9  --   --   --    HDL 34*  --   --   --   --   --   --   --   --   --   --   --    TRIGLYCERIDE 102  --   --   --   --   --   --   --   --   --   --   --    SODIUM 137   < > 137 138   < > 135   < > 141 140 136 136 136   POTASSIUM 4.8   < > 4.2 3.4*   < > 3.9   < > 4.3 4.6 4.4 4.0 4.0   CHLORIDE 103   < > 99 98   < > 99   < > 107 105 101 99 101   CO2 23   < > 26 26   < > 22   < > 17* 17* 21 19* 22   BUN 35*   < > 23* 22   < > 48*   < > 57* 58* 63* 43* 44*   CREATININE 1.57*   < > 1.96* 2.05*   < > 1.98*   < > 2.52* 2.67* 2.77* 2.16* 2.17*   PHOSPHORUS  --   --  4.8* 3.9  --   --   --   --   --   --   --   --     < > = values in this interval not displayed.       Lab Results   Component Value Date/Time    WBC 6.3 05/03/2023 11:32 AM    RBC 3.31 (L) 05/03/2023 11:32 AM    HEMOGLOBIN 8.4 (L) 05/03/2023 11:32 AM    HEMATOCRIT 28.0 (L) 05/03/2023 11:32 AM    MCV 84.6 05/03/2023 11:32 AM    MCH 25.4 (L) 05/03/2023 11:32 AM    MCHC 30.0 (L) 05/03/2023 11:32 AM    MPV 11.1 05/03/2023 11:32 AM             URINALYSIS:  Lab Results   Component Value Date/Time    COLORURINE Yellow 02/16/2023 1458    CLARITY Clear 02/16/2023 1458    SPECGRAVITY 1.010 02/16/2023 1458    PHURINE 5.5 02/16/2023 1458    KETONES Negative 02/16/2023 1458    PROTEINURIN Negative 02/16/2023 1458    BILIRUBINUR Negative 02/16/2023 1458    UROBILU 0.2 02/16/2023 1458    NITRITE Negative 02/16/2023 1458    LEUKESTERAS Negative  02/16/2023 1458    OCCULTBLOOD Negative 02/16/2023 1458       [facility] [place]  Lab Results   Component Value Date/Time    TOTPROTUR 4.0 02/23/2021 0838      Lab Results   Component Value Date/Time    CREATININEU 44.35 02/23/2021 0838         Imaging reviewed  No orders to display         Assessment:  Aleshia Crooks is a 83 y.o. female with HTN, Psoriasis on immunosuppression, YCU0e-3, severe mitral regurgitation and valvular heart failure status post MitraClip April 2023 who presents today for follow up.    Plan:  1. CKD 4  -Creatinine and GFR worsening since early 2023 into stage IV CKD.  Urinalysis without hematuria or proteinuria.  I believe worsening of CKD is from cardiorenal syndrome from valvular heart failure.  Her baseline CKD likely due to history of hypertension, age-related kidney decline.  I explained the importance of blood pressure control to help slow CKD progression.  Avoid NSAIDs and other nephrotoxins.  Recommend low-sodium diet.  Continue losartan for long-term kidney protection.  Patient is undecided whether or not she would want dialysis if her kidney failure worsens.  Recommend dialysis options education.    2. Essential hypertension  -Controlled.  Continue losartan for long-term kidney protection.    3. Moderate to severe pulmonary hypertension (HCC)  -Likely due to valvular disease from tricuspid and mitral regurgitation.  Now status post MitraClip procedure April 2023.  Defer management to cardiology.    4. Chronic combined systolic and diastolic congestive heart failure (HCC)  -Patient appears stable and euvolemic on my exam today.  Continue Lasix and spironolactone as above.  If lower extremity edema worsens in the future, spironolactone can be increased from 25 to 100 mg daily.    5. Anemia, unspecified type  -Patient's iron levels are equivocal.  She had EGD and colonoscopy in January 2023, only showing diverticulosis.  Patient likely with anemia of chronic disease, recommend referral to  outpatient infusion center for Retacrit injections.  Will order weekly Retacrit 10,000 units subcutaneous for now, with possibility to reduce timing to every other week if anemia further improves.      RTC 3 months with preclinic labs    Ryder Davis MD  Nephrology

## 2023-05-09 NOTE — PATIENT INSTRUCTIONS
"If you would like to learn more about kidney failure and the options for patients with kidney failure, I recommend viewing a YouTube video by Dr. Luis Carlos Colvin about dialysis options.  You can find this easily by searching Google for \"YouTube, Dr. Luis Carlos Colvin, dialysis.\"  The video is about 10 minutes long.   "

## 2023-05-12 ENCOUNTER — ANTICOAGULATION VISIT (OUTPATIENT)
Dept: VASCULAR LAB | Facility: MEDICAL CENTER | Age: 83
End: 2023-05-12
Attending: INTERNAL MEDICINE
Payer: MEDICARE

## 2023-05-12 DIAGNOSIS — I48.11 LONGSTANDING PERSISTENT ATRIAL FIBRILLATION (HCC): ICD-10-CM

## 2023-05-12 LAB — INR PPP: 2.4 (ref 2–3.5)

## 2023-05-12 PROCEDURE — 85610 PROTHROMBIN TIME: CPT

## 2023-05-12 PROCEDURE — 99211 OFF/OP EST MAY X REQ PHY/QHP: CPT

## 2023-05-12 NOTE — PROGRESS NOTES
Anticoagulation Summary  As of 2023      INR goal:  2.0-3.0   TTR:  --   INR used for dosin.40 (2023)   Warfarin maintenance plan:  5 mg (5 mg x 1) every day   Weekly warfarin total:  35 mg   Plan last modified:  Rudy Lowe, PharmD (2023)   Next INR check:  2023   Target end date:  Indefinite    Indications    AF (atrial fibrillation) ([facility]) [I48.91]                 Anticoagulation Episode Summary       INR check location:      Preferred lab:      Send INR reminders to:      Comments:  Not a DOAC candidate, GI bleed in  on Xarelto          Anticoagulation Care Providers       Provider Role Specialty Phone number    Perla Espinoza, A.P.R.N. Referring Cardiovascular Disease (Cardiology) [phone]            Refer to Patient Findings for HPI:  Patient Findings       Negatives:  Signs/symptoms of thrombosis, Signs/symptoms of bleeding, Laboratory test error suspected, Change in health, Change in alcohol use, Change in activity, Upcoming invasive procedure, Emergency department visit, Upcoming dental procedure, Missed doses, Extra doses, Change in medications, Change in diet/appetite, Hospital admission, Bruising, Other complaints            There were no vitals filed for this visit.  Pt declined vitals    Verified current warfarin dosing schedule.    Medications reconciled   Pt is not on antiplatelet therapy      A/P   INR  2.4 -therapeutic.     Warfarin dosing recommendation: Continue current dosing regimen.      Pt educated to contact our clinic with any changes in medications or s/s of bleeding or thrombosis. Pt is aware to seek immediate medical attention for falls, head injury or deep cuts.    Follow up appointment in 1 week(s).    Beatriz Cage, PharmD

## 2023-05-17 ENCOUNTER — OFFICE VISIT (OUTPATIENT)
Dept: MEDICAL GROUP | Facility: MEDICAL CENTER | Age: 83
End: 2023-05-17
Payer: MEDICARE

## 2023-05-17 VITALS
TEMPERATURE: 97.8 F | SYSTOLIC BLOOD PRESSURE: 104 MMHG | OXYGEN SATURATION: 96 % | HEIGHT: 63 IN | WEIGHT: 203 LBS | HEART RATE: 75 BPM | BODY MASS INDEX: 35.97 KG/M2 | DIASTOLIC BLOOD PRESSURE: 62 MMHG

## 2023-05-17 DIAGNOSIS — Z53.09 CONTRAINDICATION TO ANTICOAGULATION THERAPY: ICD-10-CM

## 2023-05-17 DIAGNOSIS — I25.10 CORONARY ARTERY DISEASE INVOLVING NATIVE CORONARY ARTERY OF NATIVE HEART WITHOUT ANGINA PECTORIS: ICD-10-CM

## 2023-05-17 DIAGNOSIS — D64.89 ANEMIA DUE TO MULTIPLE MECHANISMS: ICD-10-CM

## 2023-05-17 DIAGNOSIS — I10 ESSENTIAL HYPERTENSION: Chronic | ICD-10-CM

## 2023-05-17 DIAGNOSIS — R73.02 IMPAIRED GLUCOSE TOLERANCE: ICD-10-CM

## 2023-05-17 DIAGNOSIS — I50.42 CHRONIC COMBINED SYSTOLIC AND DIASTOLIC CONGESTIVE HEART FAILURE (HCC): ICD-10-CM

## 2023-05-17 DIAGNOSIS — E03.9 HYPOTHYROIDISM, UNSPECIFIED TYPE: ICD-10-CM

## 2023-05-17 PROBLEM — D62 ACUTE BLOOD LOSS ANEMIA: Status: RESOLVED | Noted: 2023-01-23 | Resolved: 2023-05-17

## 2023-05-17 PROCEDURE — 99214 OFFICE O/P EST MOD 30 MIN: CPT | Performed by: FAMILY MEDICINE

## 2023-05-17 PROCEDURE — 3078F DIAST BP <80 MM HG: CPT | Performed by: FAMILY MEDICINE

## 2023-05-17 PROCEDURE — 3074F SYST BP LT 130 MM HG: CPT | Performed by: FAMILY MEDICINE

## 2023-05-17 RX ORDER — BISOPROLOL FUMARATE 10 MG/1
10 TABLET, FILM COATED ORAL DAILY
Qty: 90 TABLET | Refills: 4 | Status: SHIPPED | OUTPATIENT
Start: 2023-05-17

## 2023-05-17 ASSESSMENT — FIBROSIS 4 INDEX: FIB4 SCORE: 1.83

## 2023-05-17 NOTE — PROGRESS NOTES
Chief Complaint   Patient presents with    GI Bleeding     1m fv       Subjective:     HPI:   Aleshia Crooks presents today with the followin. Anemia due to multiple mechanisms  Patient is here to follow-up on GI bleeding.  She had significant bleeding while on Xarelto.  She has been placed back on warfarin but it is unclear if that is truly safe for her.  Patient's most recent blood count on May 3 showed a hemoglobin of 8.4.  This is significantly improved but is still quite low.  Patient not only has anemia from blood loss, she also has anemia due to her chronic renal failure.  She will be starting bone marrow stimulation shots soon.  Her last iron test April 15 showed high serum iron with normal iron binding and saturation.  She has stopped the iron supplementation at my request.  She was also having GI problems from the supplemental iron.  Patient denies any further bleeding.    2. Contraindication to anticoagulation therapy  Patient has relative complications to anticoagulation therapy including unsteady gait and advanced age.  She has significant contraindication of history of severe GI bleed.  She will be discussing this with vascular medicine/anticoagulation monitoring.  She does feel she did well on the warfarin in the past with no significant problems and is okay using that for now.    3. Hypothyroidism, unspecified type  Her TSH in February was at the borderline of normal.  This was very different than it had been 6 months before.  Patient is feeling very fatigued.  She has multiple reasons for that, primarily her anemia but I do want to recheck her thyroid.  Order discussed and placed.    4. Impaired glucose tolerance  Patient's last A1c in the fall remains mildly elevated.  However, patient has been very ill in the interim.  She will need to recheck this.  She will be checking her lab tests in about 2 months which should be a good time.  Order discussed and placed.  She cannot afford the  Farxiga.  I understand that there are cardiovascular reasons for her to be on this medication but I feel it is not essential for her blood sugar control.    5. Chronic combined systolic and diastolic congestive heart failure (HCC)  Her bisoprolol continues to be helpful.  She is following with cardiology.  This needs a renewal which is sent today.    6. Essential hypertension  HTN - Chronic condition stable. Currently taking all meds as directed.   She is not taking baby aspirin daily.  She is now on warfarin.  She is monitoring BP at home.  She is monitoring occasionally.  Her pressures have been running fairly low.  She continues both furosemide and spironolactone in addition to low-dose losartan.  I feel these are very important for her heart health.  She is also on bisoprolol.  She has tolerated that well and it also seems to be helping her heart health.  She will continue the current regimen.  I know her pressure gets a little low at times but she is not truly hypotensive and I feel the trade-offs are important.  Denies symptoms low BP: light-headed, tunnel-vision, unusual fatigue.   Denies symptoms high BP:pounding headache, visual changes, palpitations, flushed face.   Denies medicine side effects: unusual fatigue, slow heartbeat, foot/leg swelling, cough.    7. Coronary artery disease involving native coronary artery of native heart without angina pectoris, s/p 1V CABG 1992  Patient does indeed have coronary artery disease.  History of coronary artery bypass grafting 1992.  Lipid profile in November showed an LDL of 88.  She possibly could benefit by an increase in her atorvastatin dose but I will defer that to cardiology.        Patient Active Problem List    Diagnosis Date Noted    Anemia of chronic disease 05/09/2023    S/P mitral valve clip implantation 04/18/2023    History of recent blood transfusion 04/10/2023    Contraindication to anticoagulation therapy 04/10/2023    Aortic stenosis 02/18/2023     Situational stress 10/27/2021    Falls 03/01/2021    Stress incontinence 03/01/2021    CKD (chronic kidney disease) stage 4, GFR 15-29 ml/min ([facility]) 09/11/2020    Gastroesophageal reflux disease without esophagitis 06/08/2020    Mixed hyperlipidemia 04/17/2020    Hepatic steatosis 04/17/2020    Elevated alkaline phosphatase level 04/17/2020    History of malignant melanoma 03/12/2020    Skin lesions 03/12/2020    Essential hypertension 03/12/2020    Hypothyroidism 03/12/2020    Obesity (BMI 35.0-39.9 without comorbidity) ([facility]) 03/12/2020    Obstructive sleep apnea treated with continuous positive airway pressure (CPAP) 03/12/2020    Moderate to severe pulmonary hypertension ([facility]) 07/14/2017    Chronic combined systolic and diastolic congestive heart failure ([facility]) 07/14/2017    AF (atrial fibrillation) ([facility]) 07/14/2017    Coronary artery disease involving native coronary artery of native heart without angina pectoris, s/p 1V CABG 1992 07/14/2017    Situational anxiety 07/13/2017       Current medicines (including changes today)  Current Outpatient Medications   Medication Sig Dispense Refill    bisoprolol (ZEBETA) 10 MG tablet Take 1 Tablet by mouth every day. 90 Tablet 4    Vitamins-Lipotropics (MULTI-VITAMIN HP/MINERALS) Cap Take  by mouth 1 time a day as needed.      pantoprazole (PROTONIX) 40 MG Tablet Delayed Response Take 1 Tablet by mouth 2 times a day. 180 Tablet 3    warfarin (COUMADIN) 5 MG Tab Take 1 Tablet by mouth every day. 30 Tablet 3    losartan (COZAAR) 25 MG Tab Take 1 Tablet by mouth every evening. 100 Tablet 3    Acetaminophen (TYLENOL PO) Take 2 Tablets by mouth 2 times a day as needed (For pain). Pt is not sure the strength      spironolactone (ALDACTONE) 25 MG Tab Take 1 Tablet by mouth every day. 90 Tablet 3    furosemide (LASIX) 40 MG Tab Take 1 Tablet by mouth every day. 90 Tablet 3    LEVOXYL 150 MCG Tab Take 1 Tablet by mouth every morning on an empty stomach. 90 Tablet 3    atorvastatin  "(LIPITOR) 40 MG Tab TAKE 1 TABLET BY MOUTH AT  BEDTIME (Patient taking differently: Take 40 mg by mouth every evening. TAKE 1 TABLET BY MOUTH AT  BEDTIME) 100 Tablet 3    Secukinumab (COSENTYX SENSOREADY PEN) 150 MG/ML Solution Auto-injector Inject 300mg Sub Cut once monthly (Patient taking differently: Inject 300 mg as directed every 28 days. Inject 300mg Sub Cut once monthly) 1.96 mL 6     No current facility-administered medications for this visit.       Allergies   Allergen Reactions    Morphine Vomiting       ROS: As per HPI       Objective:     /62 (BP Location: Right arm, Patient Position: Sitting, BP Cuff Size: Large adult)   Pulse 75   Temp 36.6 °C (97.8 °F) (Temporal)   Ht 1.6 m (5' 3\")   Wt 92.1 kg (203 lb)   SpO2 96%  Body mass index is 35.96 kg/m².    Physical Exam:  Constitutional: Well-developed and well-nourished. Not diaphoretic. No distress. Lucid and fluent.  Skin: Skin is warm and dry. No rash noted.  Head: Atraumatic without lesions.  Eyes: Conjunctivae and extraocular motions are normal. Pupils are equal, round, and reactive to light. No scleral icterus.   Ears:  External ears unremarkable.   Mouth/Throat: Tongue normal. Oropharynx is clear and moist. Posterior pharynx without erythema or exudates.  Neck: Supple, trachea midline. No thyromegaly present. No cervical or supraclavicular lymphadenopathy. No JVD or carotid bruits appreciated  Cardiovascular: Regular rate and rhythm.  Normal S1, S2 without murmur appreciated.  Chest: Effort normal. Clear to auscultation throughout. No adventitious sounds.  No rales appreciated.  Abdomen: Soft, non tender, and without distention. Active bowel sounds in all four quadrants. No rebound, guarding, masses or hepatosplenomegaly.  Extremities: No cyanosis, clubbing, erythema, nor edema.   Neurological: Alert and oriented x 3.  No tremor appreciated.  Psychiatric:  Behavior, mood, and affect are appropriate.    Lab results from her hospitalization " and recent lab results from nephrology reviewed and discussed.  Echocardiogram from April 18 reviewed and discussed.  Ejection fraction was in the normal range.  She has the MitraClip in place.  Patient notices a major improvement in her energy level and ability to ambulate since that was done.     Assessment and Plan:     83 y.o. female with the following issues:    1. Anemia due to multiple mechanisms        2. Contraindication to anticoagulation therapy        3. Hypothyroidism, unspecified type  TSH      4. Impaired glucose tolerance  HEMOGLOBIN A1C      5. Chronic combined systolic and diastolic congestive heart failure (HCC)  bisoprolol (ZEBETA) 10 MG tablet      6. Essential hypertension  bisoprolol (ZEBETA) 10 MG tablet      7. Coronary artery disease involving native coronary artery of native heart without angina pectoris, s/p 1V CABG 1992  bisoprolol (ZEBETA) 10 MG tablet            Followup: Return in about 4 months (around 9/21/2023), or if symptoms worsen or fail to improve.

## 2023-05-19 ENCOUNTER — HOSPITAL ENCOUNTER (OUTPATIENT)
Dept: CARDIOLOGY | Facility: MEDICAL CENTER | Age: 83
End: 2023-05-19
Attending: INTERNAL MEDICINE
Payer: MEDICARE

## 2023-05-19 ENCOUNTER — ANTICOAGULATION VISIT (OUTPATIENT)
Dept: VASCULAR LAB | Facility: MEDICAL CENTER | Age: 83
End: 2023-05-19
Attending: INTERNAL MEDICINE
Payer: MEDICARE

## 2023-05-19 DIAGNOSIS — I34.0 SEVERE MITRAL REGURGITATION: ICD-10-CM

## 2023-05-19 DIAGNOSIS — I48.11 LONGSTANDING PERSISTENT ATRIAL FIBRILLATION (HCC): ICD-10-CM

## 2023-05-19 LAB — INR PPP: 3.2 (ref 2–3.5)

## 2023-05-19 PROCEDURE — 93306 TTE W/DOPPLER COMPLETE: CPT

## 2023-05-19 PROCEDURE — 99212 OFFICE O/P EST SF 10 MIN: CPT | Mod: 25

## 2023-05-19 PROCEDURE — 85610 PROTHROMBIN TIME: CPT

## 2023-05-19 NOTE — PROGRESS NOTES
Anticoagulation Summary  As of 5/19/2023      INR goal:  2.0-3.0   TTR:  59.4 % (1 wk)   INR used for dosing:  3.20 (5/19/2023)   Warfarin maintenance plan:  5 mg (5 mg x 1) every day   Weekly warfarin total:  35 mg   Plan last modified:  Rudy Lowe, PharmD (5/2/2023)   Next INR check:  6/2/2023   Target end date:  Indefinite    Indications    AF (atrial fibrillation) ([facility]) [I48.91]                 Anticoagulation Episode Summary       INR check location:      Preferred lab:      Send INR reminders to:      Comments:  Not a DOAC candidate, GI bleed in 2023 on Xarelto          Anticoagulation Care Providers       Provider Role Specialty Phone number    Perla Espinoza, A.P.R.N. Referring Cardiovascular Disease (Cardiology) [phone]                  Refer to Patient Findings for HPI:  Patient Findings       Negatives:  Signs/symptoms of thrombosis, Signs/symptoms of bleeding, Laboratory test error suspected, Change in health, Change in alcohol use, Change in activity, Upcoming invasive procedure, Emergency department visit, Upcoming dental procedure, Missed doses, Extra doses, Change in medications, Change in diet/appetite, Hospital admission, Bruising, Other complaints            There were no vitals filed for this visit.  Pt declined vitals    Verified current warfarin dosing schedule.    Medications reconciled   Pt is not on antiplatelet therapy      A/P   INR  3.2 supra-therapeutic.     Warfarin dosing recommendation: Instructed patient to take 1/2 tablet of warfarin tonight or eat an increased amount of greens, then continue current regimen.     Pt educated to contact our clinic with any changes in medications or s/s of bleeding or thrombosis. Pt is aware to seek immediate medical attention for falls, head injury or deep cuts.    Follow up appointment in 2 week(s) per patient preference.     Beatriz Cage, PharmD

## 2023-05-21 LAB
LV EJECT FRACT  99904: 55
LV EJECT FRACT MOD 2C 99903: 62.06
LV EJECT FRACT MOD 4C 99902: 55
LV EJECT FRACT MOD BP 99901: 56.39

## 2023-05-21 PROCEDURE — 93306 TTE W/DOPPLER COMPLETE: CPT | Mod: 26 | Performed by: INTERNAL MEDICINE

## 2023-05-23 ENCOUNTER — OFFICE VISIT (OUTPATIENT)
Dept: CARDIOLOGY | Facility: MEDICAL CENTER | Age: 83
End: 2023-05-23
Attending: NURSE PRACTITIONER
Payer: MEDICARE

## 2023-05-23 VITALS
RESPIRATION RATE: 14 BRPM | OXYGEN SATURATION: 95 % | DIASTOLIC BLOOD PRESSURE: 74 MMHG | WEIGHT: 203 LBS | BODY MASS INDEX: 35.97 KG/M2 | HEART RATE: 80 BPM | SYSTOLIC BLOOD PRESSURE: 116 MMHG | HEIGHT: 63 IN

## 2023-05-23 DIAGNOSIS — I48.11 LONGSTANDING PERSISTENT ATRIAL FIBRILLATION (HCC): ICD-10-CM

## 2023-05-23 PROCEDURE — 93005 ELECTROCARDIOGRAM TRACING: CPT | Performed by: INTERNAL MEDICINE

## 2023-05-23 PROCEDURE — 3078F DIAST BP <80 MM HG: CPT | Performed by: INTERNAL MEDICINE

## 2023-05-23 PROCEDURE — 3074F SYST BP LT 130 MM HG: CPT | Performed by: INTERNAL MEDICINE

## 2023-05-23 PROCEDURE — 99205 OFFICE O/P NEW HI 60 MIN: CPT | Mod: 25 | Performed by: INTERNAL MEDICINE

## 2023-05-23 PROCEDURE — 93010 ELECTROCARDIOGRAM REPORT: CPT | Performed by: INTERNAL MEDICINE

## 2023-05-23 PROCEDURE — 99212 OFFICE O/P EST SF 10 MIN: CPT | Performed by: INTERNAL MEDICINE

## 2023-05-23 ASSESSMENT — FIBROSIS 4 INDEX: FIB4 SCORE: 1.83

## 2023-05-24 ENCOUNTER — OFFICE VISIT (OUTPATIENT)
Dept: CARDIOLOGY | Facility: MEDICAL CENTER | Age: 83
End: 2023-05-24
Attending: NURSE PRACTITIONER
Payer: MEDICARE

## 2023-05-24 ENCOUNTER — TELEPHONE (OUTPATIENT)
Dept: CARDIOLOGY | Facility: MEDICAL CENTER | Age: 83
End: 2023-05-24

## 2023-05-24 ENCOUNTER — DOCUMENTATION (OUTPATIENT)
Dept: CARDIOLOGY | Facility: MEDICAL CENTER | Age: 83
End: 2023-05-24

## 2023-05-24 VITALS
BODY MASS INDEX: 35.97 KG/M2 | HEIGHT: 63 IN | SYSTOLIC BLOOD PRESSURE: 120 MMHG | WEIGHT: 203 LBS | RESPIRATION RATE: 16 BRPM | OXYGEN SATURATION: 98 % | DIASTOLIC BLOOD PRESSURE: 62 MMHG | HEART RATE: 70 BPM

## 2023-05-24 DIAGNOSIS — I48.11 LONGSTANDING PERSISTENT ATRIAL FIBRILLATION (HCC): ICD-10-CM

## 2023-05-24 DIAGNOSIS — E66.9 OBESITY (BMI 35.0-39.9 WITHOUT COMORBIDITY): ICD-10-CM

## 2023-05-24 DIAGNOSIS — Z98.890 S/P MITRAL VALVE CLIP IMPLANTATION: ICD-10-CM

## 2023-05-24 DIAGNOSIS — I25.10 CORONARY ARTERY DISEASE INVOLVING NATIVE CORONARY ARTERY OF NATIVE HEART WITHOUT ANGINA PECTORIS: ICD-10-CM

## 2023-05-24 DIAGNOSIS — I10 ESSENTIAL HYPERTENSION: Chronic | ICD-10-CM

## 2023-05-24 DIAGNOSIS — I35.0 MILD AORTIC STENOSIS: ICD-10-CM

## 2023-05-24 DIAGNOSIS — Z95.818 S/P MITRAL VALVE CLIP IMPLANTATION: ICD-10-CM

## 2023-05-24 DIAGNOSIS — I50.42 CHRONIC COMBINED SYSTOLIC AND DIASTOLIC CONGESTIVE HEART FAILURE (HCC): Chronic | ICD-10-CM

## 2023-05-24 DIAGNOSIS — G47.33 OBSTRUCTIVE SLEEP APNEA TREATED WITH CONTINUOUS POSITIVE AIRWAY PRESSURE (CPAP): ICD-10-CM

## 2023-05-24 DIAGNOSIS — E78.2 MIXED HYPERLIPIDEMIA: ICD-10-CM

## 2023-05-24 DIAGNOSIS — I27.20 MODERATE TO SEVERE PULMONARY HYPERTENSION (HCC): ICD-10-CM

## 2023-05-24 DIAGNOSIS — Z53.09 CONTRAINDICATION TO ANTICOAGULATION THERAPY: ICD-10-CM

## 2023-05-24 DIAGNOSIS — N18.4 CKD (CHRONIC KIDNEY DISEASE) STAGE 4, GFR 15-29 ML/MIN (HCC): ICD-10-CM

## 2023-05-24 PROCEDURE — 99214 OFFICE O/P EST MOD 30 MIN: CPT | Performed by: NURSE PRACTITIONER

## 2023-05-24 PROCEDURE — 99213 OFFICE O/P EST LOW 20 MIN: CPT | Performed by: NURSE PRACTITIONER

## 2023-05-24 PROCEDURE — 3074F SYST BP LT 130 MM HG: CPT | Performed by: NURSE PRACTITIONER

## 2023-05-24 PROCEDURE — 3078F DIAST BP <80 MM HG: CPT | Performed by: NURSE PRACTITIONER

## 2023-05-24 ASSESSMENT — ENCOUNTER SYMPTOMS
DIZZINESS: 0
COUGH: 0
PND: 0
ABDOMINAL PAIN: 0
PALPITATIONS: 0
SHORTNESS OF BREATH: 0
ORTHOPNEA: 0
MYALGIAS: 0
FEVER: 0
CLAUDICATION: 0

## 2023-05-24 ASSESSMENT — FIBROSIS 4 INDEX: FIB4 SCORE: 1.83

## 2023-05-24 NOTE — PROGRESS NOTES
Valve Program Functional Assessment: 1 month DOMINIC     [id number]   1a) Showering/bathin  1b) Walking 1 block on ground: 1  1c) Hurrying or joggin  2) Swellin  3) Fatigue: 1  4) Shortness of breath: 7  5) Sleep sitting up: 5  6) Limited enjoyment of life: 3  7) Spend the rest of your life with HF: 2  8a) Hobbies, recreational activities:3  8b) Working or doing household chores:2  8c) Visiting family or friends: 3     Strength   1) _26_____ kg  2) _26_____ kg  3) _24_____ kg  AVG:__25.3____    KING ADLs  Patient independently preforms...   - Bathing: Yes   - Dressing: Yes   - Toileting: Yes   - Transferring: Yes   - Continence: Yes  - Feeding: Yes   Total Score: _6_/6    Living Situation  Patient lives:  with adult child or relative    Mobility Aids   Patient uses: walker PRN, scooter for long distance       FRAILTY SCORE: _0_/ 3

## 2023-05-24 NOTE — TELEPHONE ENCOUNTER
Patient scheduled for post watchman GOMEZ w/anesthesia on 9-7-23 with Dr. Lopez. Patient has been instructed to check in at 9:00 for 11:00 case time. Message sent to angela RAMIRES to Dr. Lopez.

## 2023-05-24 NOTE — TELEPHONE ENCOUNTER
Patient scheduled for watchman w/GOMEZ on 7-26-23 with Dr. Arambula. Patient has been instructed to check in at 12:00 for 2:00 case time. Message sent to authorizations. Watchjuanjose rep aware of this date.

## 2023-05-24 NOTE — PROGRESS NOTES
Chief Complaint   Patient presents with    Aortic Stenosis     F/V DX: Aortic valve stenosis, etiology of cardiac valve disease unspecified     Subjective     Aleshia Crooks is a 83 y.o. female who presents today for 1 month post bhavana clip placement.    She is a patient of Dr. Rehman in our office. Hx of severe mitral regurgitation now S/P bhavana clip x1 NTW clip, acute on chronic diastolic heart failure, HLD with CAD with prior CABG in '92, severe TR, moderate AS, chronic afib (not on oral anticoagulation due to recent GIB), obesity with mod-severe PAH with SHAKA on CPAP, HTN, CKD stage IV, hypothyroidism, GERD, and hepatic steatosis.    She presents today with her daughter.    She admits to improvement in her symptoms but remains with mild edema in her legs.    Saw nephrology and is planning on having epoetin injections soon with persistent anemia.    She has no chest pain, dizziness/lightheadedness, or palpitations.    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) 06/08/2020 2015    Moderate tricuspid regurgitation 11/16/2022    Thyroid disease      Past Surgical History:   Procedure Laterality Date    TRANSCATHETER MITRAL VALVE REPAIR N/A 4/17/2023    Procedure: TRANSCATHETER MITRAL VALVE PROCEDURE;  Surgeon: Cesar Gray M.D.;  Location: [facility];  Service: Cardiac    ECHOCARDIOGRAM, TRANSESOPHAGEAL, INTRAOPERATIVE N/A 4/17/2023    Procedure: ECHOCARDIOGRAM, TRANSESOPHAGEAL, INTRAOPERATIVE;  Surgeon: Cesar Gray M.D.;  Location: [facility];  Service: Cardiac    MD COLONOSCOPY,DIAGNOSTIC N/A 1/24/2023    Procedure: COLONOSCOPY;  Surgeon: Amy Zarate M.D.;  Location: [facility]  DAY [facility];  Service: Gastroenterology    [place] UPPER GI ENDOSCOPY,DIAGNOSIS N/A 2023    Procedure: GASTROSCOPY;  Surgeon: Amy Zarate M.D.;  Location: SURGERY SAME DAY [facility];  Service: Gastroenterology    [place] UPPER GI ENDOSCOPY,BIOPSY N/A 2023    Procedure: GASTROSCOPY, WITH BIOPSY;  Surgeon: Amy Zarate M.D.;  Location: SURGERY SAME DAY [facility];  Service: Gastroenterology    CHOLECYSTECTOMY      EYE SURGERY Bilateral     cataracts    MULTIPLE CORONARY ARTERY BYPASS      1 bypass    THYROIDECTOMY TOTAL       Family History   Problem Relation Age of Onset    Cancer Mother         cancer, smoker    Stroke Maternal Grandmother     Other Other         Crohn    Kidney Disease Other         kidney transplant     Social History     Socioeconomic History    Marital status:      Spouse name: Not on file    Number of children: Not on file    Years of education: Not on file    Highest education level: Not on file   Occupational History     Comment: [employer]   Tobacco Use    Smoking status: Former     Packs/day: 1.00     Years: 31.00     Pack years: 31.00     Types: Cigarettes     Quit date:      Years since quittin.4    Smokeless tobacco: Never   Vaping Use    Vaping Use: Never used   Substance and Sexual Activity    Alcohol use: No    Drug use: No    Sexual activity: Not Currently     Partners: Male   Other Topics Concern    Not on file   Social History Narrative    Not on file     Social Determinants of Health     Financial Resource Strain: Low Risk  (2022)    Overall Financial Resource Strain (CARDIA)     Difficulty of Paying Living Expenses: Not hard at all   Food Insecurity: No Food Insecurity (2022)    Hunger Vital Sign     Worried About Running Out of Food in the Last Year: Never true     Ran Out of Food in the Last Year: Never true   Transportation Needs: No Transportation Needs (2022)    PRAPARE - Transportation     Lack of Transportation (Medical): No      Lack of Transportation (Non-Medical): No   Physical Activity: Inactive (11/11/2022)    Exercise Vital Sign     Days of Exercise per Week: 0 days     Minutes of Exercise per Session: 0 min   Stress: No Stress Concern Present (11/11/2022)    [place] [employer] - Occupational Stress Questionnaire     Feeling of Stress : Not at all   Social Connections: Socially Isolated (11/11/2022)    Social Connection and Isolation Panel [NHANES]     Frequency of Communication with Friends and Family: More than three times a week     Frequency of Social Gatherings with Friends and Family: Once a week     Attends Gnosticist Services: Never     Active Member of Clubs or Organizations: No     Attends Club or Organization Meetings: Never     Marital Status:    Intimate Partner Violence: Not At Risk (11/11/2022)    Humiliation, Afraid, Rape, and Kick questionnaire     Fear of Current or Ex-Partner: No     Emotionally Abused: No     Physically Abused: No     Sexually Abused: No   Housing Stability: Low Risk  (11/11/2022)    Housing Stability Vital Sign     Unable to Pay for Housing in the Last Year: No     Number of Places Lived in the Last Year: 1     Unstable Housing in the Last Year: No     Allergies   Allergen Reactions    Morphine Vomiting     Outpatient Encounter Medications as of 5/24/2023   Medication Sig Dispense Refill    bisoprolol (ZEBETA) 10 MG tablet Take 1 Tablet by mouth every day. 90 Tablet 4    Vitamins-Lipotropics (MULTI-VITAMIN HP/MINERALS) Cap Take  by mouth 1 time a day as needed.      pantoprazole (PROTONIX) 40 MG Tablet Delayed Response Take 1 Tablet by mouth 2 times a day. 180 Tablet 3    warfarin (COUMADIN) 5 MG Tab Take 1 Tablet by mouth every day. 30 Tablet 3    losartan (COZAAR) 25 MG Tab Take 1 Tablet by mouth every evening. 100 Tablet 3    Acetaminophen (TYLENOL PO) Take 2 Tablets by mouth 2 times a day as needed (For pain). Pt is not sure the strength      spironolactone  "(ALDACTONE) 25 MG Tab Take 1 Tablet by mouth every day. 90 Tablet 3    furosemide (LASIX) 40 MG Tab Take 1 Tablet by mouth every day. 90 Tablet 3    LEVOXYL 150 MCG Tab Take 1 Tablet by mouth every morning on an empty stomach. 90 Tablet 3    atorvastatin (LIPITOR) 40 MG Tab TAKE 1 TABLET BY MOUTH AT  BEDTIME (Patient taking differently: Take 40 mg by mouth every evening. TAKE 1 TABLET BY MOUTH AT  BEDTIME) 100 Tablet 3    Secukinumab (COSENTYX SENSOREADY PEN) 150 MG/ML Solution Auto-injector Inject 300mg Sub Cut once monthly (Patient taking differently: Inject 300 mg as directed every 28 days. Inject 300mg Sub Cut once monthly) 1.96 mL 6     No facility-administered encounter medications on file as of 5/24/2023.     Review of Systems   Constitutional:  Negative for fever and malaise/fatigue.   Respiratory:  Negative for cough and shortness of breath.    Cardiovascular:  Negative for chest pain, palpitations, orthopnea, claudication, leg swelling and PND.   Gastrointestinal:  Negative for abdominal pain.   Musculoskeletal:  Negative for myalgias.   Neurological:  Negative for dizziness.              Objective     /62 (BP Location: Left arm, Patient Position: Sitting, BP Cuff Size: Adult)   Pulse 70   Resp 16   Ht 1.6 m (5' 3\")   Wt 92.1 kg (203 lb)   LMP  (LMP Unknown)   SpO2 98%   BMI 35.96 kg/m²     Physical Exam  Vitals and nursing note reviewed.   Constitutional:       Appearance: Normal appearance. She is well-developed.   HENT:      Head: Normocephalic and atraumatic.   Neck:      Vascular: No JVD.   Cardiovascular:      Rate and Rhythm: Normal rate and regular rhythm.      Pulses: Normal pulses.      Heart sounds: Normal heart sounds.   Pulmonary:      Effort: Pulmonary effort is normal.      Breath sounds: Normal breath sounds.   Musculoskeletal:         General: Normal range of motion.   Skin:     General: Skin is warm and dry.   Neurological:      Mental Status: She is alert and oriented to " person, place, and time.   Psychiatric:         Mood and Affect: Mood normal.         Behavior: Behavior normal.         Thought Content: Thought content normal.         Judgment: Judgment normal.                Assessment & Plan     1. S/P mitral valve clip implantation  EC-ECHOCARDIOGRAM COMPLETE W/O CONT      2. Longstanding persistent atrial fibrillation (HCC)        3. Chronic combined systolic and diastolic congestive heart failure (HCC)        4. CKD (chronic kidney disease) stage 4, GFR 15-29 ml/min (HCC)        5. Contraindication to anticoagulation therapy        6. Coronary artery disease involving native coronary artery of native heart without angina pectoris, s/p 1V CABG 1992        7. Essential hypertension        8. Mixed hyperlipidemia        9. Moderate to severe pulmonary hypertension (HCC)        10. Obesity (BMI 35.0-39.9 without comorbidity) ([facility])        11. Obstructive sleep apnea treated with continuous positive airway pressure (CPAP)        12. Mild aortic stenosis          Medical Decision Making: Today's Assessment/Status/Plan:      1. S/P Angie Clip X1 with mild AS  -doing well post-op  -no ASA  -SBE prophylaxis understands  -echo shows good gradients and normal EF, mild AS noted- follow annually    2.HFrEF, Stage C, Class III, LVEF 60%: valvular disease  -stable symptoms  -ACE-I/ARB/ARNI: losartan 25 mg QD  -Evidence Based Beta-blocker: bisoprolol 10 mg QD  -Aldosterone Antagonist: aldactone 25 mg QD  -Diuretic: lasix 40 mg QD  -SGLT2 inhibitor: not warranted  -repeat labs per nephrology or worsening symptoms  -Discussed/reviewed maintaining a low Sodium and hydration recommendations  -Repeat Echo annually    3. HTN with CKD  -good control  -follow at home    4. Obesity with SHAKA with PAH  -tolerating and compliant with CPAP    5. Anemia with recent GIB  -followed by PCP and nephrology, pending epoetin injections  -pending Watchman for persistent anemia and intolerance for GIB    6. Afib,  persistent  -rate controlled, asymptomatic  -Watchman pending  -cont bisoprolol 10 mg for rate control    7. HLD with CAD  -no angina or JONES  -cont statin, no ASA for recent GIB  -follow    Patient is to follow up with Perla KELLER in 6 months with echo 1 year.

## 2023-05-24 NOTE — PROGRESS NOTES
Arrhythmia Clinic Note (New patient)     DOS: 5/23/2023    Referring physician: Perla Espinoza    Chief complaint/Reason for consult: Afib    HPI: 84 y/o F with permanent afib. She had a severe GI bleed on Xarelto. Now on warfarin but persistent anemia. She was not anemic 12 months ago. Referred to EP to consider Watchman. She had a Mitral Clip done last month.     ROS (+ highlighted in bold):  Constitutional: Fevers/chills/fatigue/weightloss  HEENT: Blurry vision/eye pain/sore throat/hearing loss  Respiratory: Shortness of breath/cough  Cardiovascular: Chest pain/palpitations/edema/orthopnea/syncope  GI: Nausea/vomitting/diarrhea  MSK: Arthralgias/myagias/muscle weakness  Skin: Rash/sores  Neurological: Numbness/tremors/vertigo  Endocrine: Excessive thirst/polyuria/cold intolerance/heat intolerance  Psych: Depression/anxiety    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) 06/08/2020 2015    Moderate tricuspid regurgitation 11/16/2022    Thyroid disease        Past Surgical History:   Procedure Laterality Date    TRANSCATHETER MITRAL VALVE REPAIR N/A 4/17/2023    Procedure: TRANSCATHETER MITRAL VALVE PROCEDURE;  Surgeon: Cesar Gray M.D.;  Location: SURGERY [facility];  Service: Cardiac    ECHOCARDIOGRAM, TRANSESOPHAGEAL, INTRAOPERATIVE N/A 4/17/2023    Procedure: ECHOCARDIOGRAM, TRANSESOPHAGEAL, INTRAOPERATIVE;  Surgeon: Cesar Gray M.D.;  Location: SURGERY [facility];  Service: Cardiac    MT COLONOSCOPY,DIAGNOSTIC N/A 1/24/2023    Procedure: COLONOSCOPY;  Surgeon: Amy Zarate M.D.;  Location: SURGERY SAME DAY [facility];  Service: Gastroenterology    MT UPPER GI ENDOSCOPY,DIAGNOSIS N/A 1/24/2023     Procedure: GASTROSCOPY;  Surgeon: Amy Zarate M.D.;  Location: SURGERY SAME DAY [facility];  Service: Gastroenterology    WA UPPER GI ENDOSCOPY,BIOPSY N/A 2023    Procedure: GASTROSCOPY, WITH BIOPSY;  Surgeon: Amy Zarate M.D.;  Location: SURGERY SAME DAY [facility];  Service: Gastroenterology    CHOLECYSTECTOMY      EYE SURGERY Bilateral     cataracts    MULTIPLE CORONARY ARTERY BYPASS      1 bypass    THYROIDECTOMY TOTAL         Social History     Socioeconomic History    Marital status:      Spouse name: Not on file    Number of children: Not on file    Years of education: Not on file    Highest education level: Not on file   Occupational History     Comment: [employer]   Tobacco Use    Smoking status: Former     Packs/day: 1.00     Years: 31.00     Pack years: 31.00     Types: Cigarettes     Quit date:      Years since quittin.4    Smokeless tobacco: Never   Vaping Use    Vaping Use: Never used   Substance and Sexual Activity    Alcohol use: No    Drug use: No    Sexual activity: Not Currently     Partners: Male   Other Topics Concern    Not on file   Social History Narrative    Not on file     Social Determinants of Health     Financial Resource Strain: Low Risk  (2022)    Overall Financial Resource Strain (CARDIA)     Difficulty of Paying Living Expenses: Not hard at all   Food Insecurity: No Food Insecurity (2022)    Hunger Vital Sign     Worried About Running Out of Food in the Last Year: Never true     Ran Out of Food in the Last Year: Never true   Transportation Needs: No Transportation Needs (2022)    PRAPARE - Transportation     Lack of Transportation (Medical): No     Lack of Transportation (Non-Medical): No   Physical Activity: Inactive (2022)    Exercise Vital Sign     Days of Exercise per Week: 0 days     Minutes of Exercise per Session: 0 min   Stress: No Stress Concern Present (2022)    [employer] [place] [employer] -  Occupational Stress Questionnaire     Feeling of Stress : Not at all   Social Connections: Socially Isolated (11/11/2022)    Social Connection and Isolation Panel [NHANES]     Frequency of Communication with Friends and Family: More than three times a week     Frequency of Social Gatherings with Friends and Family: Once a week     Attends Orthodoxy Services: Never     Active Member of Clubs or Organizations: No     Attends Club or Organization Meetings: Never     Marital Status:    Intimate Partner Violence: Not At Risk (11/11/2022)    Humiliation, Afraid, Rape, and Kick questionnaire     Fear of Current or Ex-Partner: No     Emotionally Abused: No     Physically Abused: No     Sexually Abused: No   Housing Stability: Low Risk  (11/11/2022)    Housing Stability Vital Sign     Unable to Pay for Housing in the Last Year: No     Number of Places Lived in the Last Year: 1     Unstable Housing in the Last Year: No       Family History   Problem Relation Age of Onset    Cancer Mother         cancer, smoker    Stroke Maternal Grandmother     Other Other         Crohn    Kidney Disease Other         kidney transplant       Allergies   Allergen Reactions    Morphine Vomiting       Current Outpatient Medications   Medication Sig Dispense Refill    bisoprolol (ZEBETA) 10 MG tablet Take 1 Tablet by mouth every day. 90 Tablet 4    Vitamins-Lipotropics (MULTI-VITAMIN HP/MINERALS) Cap Take  by mouth 1 time a day as needed.      pantoprazole (PROTONIX) 40 MG Tablet Delayed Response Take 1 Tablet by mouth 2 times a day. 180 Tablet 3    warfarin (COUMADIN) 5 MG Tab Take 1 Tablet by mouth every day. 30 Tablet 3    losartan (COZAAR) 25 MG Tab Take 1 Tablet by mouth every evening. 100 Tablet 3    Acetaminophen (TYLENOL PO) Take 2 Tablets by mouth 2 times a day as needed (For pain). Pt is not sure the strength      spironolactone (ALDACTONE) 25 MG Tab Take 1 Tablet by mouth every day. 90 Tablet 3    furosemide (LASIX) 40 MG Tab  "Take 1 Tablet by mouth every day. 90 Tablet 3    LEVOXYL 150 MCG Tab Take 1 Tablet by mouth every morning on an empty stomach. 90 Tablet 3    atorvastatin (LIPITOR) 40 MG Tab TAKE 1 TABLET BY MOUTH AT  BEDTIME (Patient taking differently: Take 40 mg by mouth every evening. TAKE 1 TABLET BY MOUTH AT  BEDTIME) 100 Tablet 3    Secukinumab (COSENTYX SENSOREADY PEN) 150 MG/ML Solution Auto-injector Inject 300mg Sub Cut once monthly (Patient taking differently: Inject 300 mg as directed every 28 days. Inject 300mg Sub Cut once monthly) 1.96 mL 6     No current facility-administered medications for this visit.       Physical Exam:  Vitals:    05/23/23 1255   BP: 116/74   BP Location: Left arm   Patient Position: Sitting   BP Cuff Size: Adult   Pulse: 80   Resp: 14   SpO2: 95%   Weight: 92.1 kg (203 lb)   Height: 1.6 m (5' 3\")     General appearance: NAD, conversant   Eyes: anicteric sclerae, moist conjunctivae; no lid-lag; PERRLA  HENT: Atraumatic; oropharynx clear with moist mucous membranes and no mucosal ulcerations; normal hard and soft palate  Neck: Trachea midline; FROM, supple, no thyromegaly or lymphadenopathy  Lungs: CTA, with normal respiratory effort and no intercostal retractions  CV: RRR, no MRGs, no JVD   Abdomen: Soft, non-tender; no masses or HSM  Extremities: No peripheral edema or extremity lymphadenopathy  Skin: Normal temperature, turgor and texture; no rash, ulcers or subcutaneous nodules  Psych: Appropriate affect, alert and oriented to person, place and time    Data:  Lipids:   Lab Results   Component Value Date/Time    CHOLSTRLTOT 142 11/17/2022 09:36 AM    TRIGLYCERIDE 102 11/17/2022 09:36 AM    HDL 34 (A) 11/17/2022 09:36 AM    LDL 88 11/17/2022 09:36 AM        BMP:  Lab Results   Component Value Date/Time    SODIUM 136 05/03/2023 1132    POTASSIUM 4.0 05/03/2023 1132    CHLORIDE 101 05/03/2023 1132    CO2 22 05/03/2023 1132    GLUCOSE 105 (H) 05/03/2023 1132    BUN 44 (H) 05/03/2023 1132    " CREATININE 2.17 (H) 05/03/2023 1132    CALCIUM 9.4 05/03/2023 1132    ANION 13.0 05/03/2023 1132        TSH:   Lab Results   Component Value Date/Time    TSHULTRASEN 5.100 02/18/2023 0125        THYROXINE (T4):   No results found for: LATHA     CBC:   Lab Results   Component Value Date/Time    WBC 6.3 05/03/2023 11:32 AM    RBC 3.31 (L) 05/03/2023 11:32 AM    HEMOGLOBIN 8.4 (L) 05/03/2023 11:32 AM    HEMATOCRIT 28.0 (L) 05/03/2023 11:32 AM    MCV 84.6 05/03/2023 11:32 AM    MCH 25.4 (L) 05/03/2023 11:32 AM    MCHC 30.0 (L) 05/03/2023 11:32 AM    RDW 54.6 (H) 05/03/2023 11:32 AM    PLATELETCT 309 05/03/2023 11:32 AM    MPV 11.1 05/03/2023 11:32 AM    NEUTSPOLYS 75.20 (H) 04/15/2023 10:39 AM    LYMPHOCYTES 11.00 (L) 04/15/2023 10:39 AM    MONOCYTES 8.40 04/15/2023 10:39 AM    EOSINOPHILS 3.80 04/15/2023 10:39 AM    BASOPHILS 1.00 04/15/2023 10:39 AM    IMMGRAN 0.60 04/15/2023 10:39 AM    NRBC 0.00 04/15/2023 10:39 AM    NEUTS 6.18 04/15/2023 10:39 AM    LYMPHS 0.90 (L) 04/15/2023 10:39 AM    MONOS 0.69 04/15/2023 10:39 AM    EOS 0.31 04/15/2023 10:39 AM    BASO 0.08 04/15/2023 10:39 AM    IMMGRANAB 0.05 04/15/2023 10:39 AM    NRBCAB 0.00 04/15/2023 10:39 AM        CBC w/o DIFF  Lab Results   Component Value Date/Time    WBC 6.3 05/03/2023 11:32 AM    RBC 3.31 (L) 05/03/2023 11:32 AM    HEMOGLOBIN 8.4 (L) 05/03/2023 11:32 AM    MCV 84.6 05/03/2023 11:32 AM    MCH 25.4 (L) 05/03/2023 11:32 AM    MCHC 30.0 (L) 05/03/2023 11:32 AM    RDW 54.6 (H) 05/03/2023 11:32 AM    MPV 11.1 05/03/2023 11:32 AM       Prior echo/stress results reviewed: EF 55%    EKG interpreted by me: Afib    Impression/Plan:  1. Longstanding persistent atrial fibrillation (HCC)  EKG    CL-LEFT ATRIAL APPENDAGE CLOSURE    EC-GMOEZ W/O CONT    EC-GOMEZ W/O CONT        Permanent Afib  GI bleeding  Anemia  Hypercoagulable state due to Afib  MR s/p repair with clip    - I recommend indefinite OAC given [id number] of 5 (HTN, age, CAD, female) however given  anemia we discussed LAA_C as an alternative. Risks include vascular access bleeding or pain, infection, stroke, myocardial infarction, cardiac tamponade, pericardial effusion, myocardial perforation, major bleeding, and death. Risk of major adverse event is ~1%.   - She would like to proceed    Schedule CANDELARIO-C in August    Quique Arambula MD  Cardiac Electrophysiology

## 2023-05-24 NOTE — TELEPHONE ENCOUNTER
----- Message from Quique Arambula M.D. sent at 5/23/2023  1:46 PM PDT -----  Let's schedule Watchman in July or August, no meds to hold, thanks

## 2023-05-26 ENCOUNTER — OUTPATIENT INFUSION SERVICES (OUTPATIENT)
Dept: ONCOLOGY | Facility: MEDICAL CENTER | Age: 83
End: 2023-05-26
Attending: INTERNAL MEDICINE
Payer: MEDICARE

## 2023-05-26 VITALS
BODY MASS INDEX: 37.36 KG/M2 | SYSTOLIC BLOOD PRESSURE: 114 MMHG | DIASTOLIC BLOOD PRESSURE: 68 MMHG | RESPIRATION RATE: 19 BRPM | OXYGEN SATURATION: 90 % | HEIGHT: 62 IN | HEART RATE: 90 BPM | TEMPERATURE: 97.4 F | WEIGHT: 203.04 LBS

## 2023-05-26 DIAGNOSIS — D63.8 ANEMIA OF CHRONIC DISEASE: ICD-10-CM

## 2023-05-26 LAB
FERRITIN SERPL-MCNC: 17 NG/ML (ref 10–291)
HCT VFR BLD AUTO: 30.2 % (ref 37–47)
HGB BLD-MCNC: 8.9 G/DL (ref 12–16)
IRON SATN MFR SERPL: 8 % (ref 15–55)
IRON SERPL-MCNC: 34 UG/DL (ref 40–170)
TIBC SERPL-MCNC: 453 UG/DL (ref 250–450)
UIBC SERPL-MCNC: 419 UG/DL (ref 110–370)

## 2023-05-26 PROCEDURE — 85018 HEMOGLOBIN: CPT

## 2023-05-26 PROCEDURE — 85014 HEMATOCRIT: CPT

## 2023-05-26 PROCEDURE — 36415 COLL VENOUS BLD VENIPUNCTURE: CPT

## 2023-05-26 PROCEDURE — 96372 THER/PROPH/DIAG INJ SC/IM: CPT

## 2023-05-26 PROCEDURE — 82728 ASSAY OF FERRITIN: CPT

## 2023-05-26 PROCEDURE — 83550 IRON BINDING TEST: CPT

## 2023-05-26 PROCEDURE — 83540 ASSAY OF IRON: CPT

## 2023-05-26 PROCEDURE — 700111 HCHG RX REV CODE 636 W/ 250 OVERRIDE (IP): Mod: JG | Performed by: INTERNAL MEDICINE

## 2023-05-26 RX ORDER — 0.9 % SODIUM CHLORIDE 0.9 %
10 VIAL (ML) INJECTION PRN
Status: CANCELLED | OUTPATIENT
Start: 2023-06-02

## 2023-05-26 RX ORDER — 0.9 % SODIUM CHLORIDE 0.9 %
3 VIAL (ML) INJECTION PRN
Status: CANCELLED | OUTPATIENT
Start: 2023-06-02

## 2023-05-26 RX ORDER — 0.9 % SODIUM CHLORIDE 0.9 %
VIAL (ML) INJECTION PRN
Status: CANCELLED | OUTPATIENT
Start: 2023-06-02

## 2023-05-26 RX ADMIN — EPOETIN ALFA-EPBX 10000 UNITS: 10000 INJECTION, SOLUTION INTRAVENOUS; SUBCUTANEOUS at 15:15

## 2023-05-26 ASSESSMENT — FIBROSIS 4 INDEX: FIB4 SCORE: 1.83

## 2023-05-27 NOTE — PROGRESS NOTES
Pt presents to [facility] for epoetin injection. Butterfly needle used in RAC; brisk blood return observed and pt tolerated well. Labs drawn and within treatable parameters. Epoetin injected into R back arm with no s/s of adverse reactions. Next appointment confirmed and education provided. Pt discharged to self care with all personal belongings and in NAD.

## 2023-05-28 NOTE — DOCUMENTATION QUERY
[facility]                                                                       Query Response Note      PATIENT:               GINA REICH  ACCT #:                  [id number]  MRN:                     [id number]  :                      1940  ADMIT DATE:       4/15/2023 10:28 AM  DISCH DATE:          RESPONDING  PROVIDER #:        [id number]           QUERY TEXT:    Can you please clarify if the valve disease was Rheumatic or Non?-rheumatic?    The patient's clinical indicators include:  Clinical indicators  H&P- Severe Mitral Regurgitation, Moderate aortic stenosis and severe tricuspid regurgitation    DCS- Successful MitraClip X1 NTW clip   Intraop GOMEZ: Mixed degenerative and   functional mitral valve disease, mostly involving anterior leaflet   prolapse extending to the medial commissure. Severe, posteriorly   directed mitral regurgitation with flow reversal in the right upper   pulmonary vein. Severe tricuspid regurgitation.     Echocardiogram: Mean transvalvular gradient is 5 mmHg at a heart rate of 57 BPM.  Moderate residual mitral regurgitation, eccnetric wrap around jet but   low volume. Mild tricuspid regurgitation.      Treatment or Monitoring  PROCEDURES:  1. Transcatheter mitral valve edge to edge repair (DOMINIC or Mitraclip)    Risk Factors  CHF, A-fib., HTN      [email or web address]  Options provided:   -- Rheumatic   -- Non?-rheumatic   -- Unable to determine      Query created by: Tony Shirley on 2023 6:06 AM    RESPONSE TEXT:    Non?-rheumatic          Electronically signed by:  MARSHA ACEVES MD 2023 8:52 AM

## 2023-05-30 DIAGNOSIS — D50.9 IRON DEFICIENCY ANEMIA, UNSPECIFIED IRON DEFICIENCY ANEMIA TYPE: ICD-10-CM

## 2023-05-30 RX ORDER — METHYLPREDNISOLONE SODIUM SUCCINATE 125 MG/2ML
125 INJECTION, POWDER, LYOPHILIZED, FOR SOLUTION INTRAMUSCULAR; INTRAVENOUS PRN
Status: CANCELLED | OUTPATIENT
Start: 2023-05-30

## 2023-05-30 RX ORDER — SODIUM CHLORIDE 9 MG/ML
INJECTION, SOLUTION INTRAVENOUS CONTINUOUS
Status: CANCELLED | OUTPATIENT
Start: 2023-05-30

## 2023-05-30 RX ORDER — 0.9 % SODIUM CHLORIDE 0.9 %
3 VIAL (ML) INJECTION PRN
Status: CANCELLED | OUTPATIENT
Start: 2023-05-30

## 2023-05-30 RX ORDER — 0.9 % SODIUM CHLORIDE 0.9 %
VIAL (ML) INJECTION PRN
Status: CANCELLED | OUTPATIENT
Start: 2023-05-30

## 2023-05-30 RX ORDER — EPINEPHRINE 1 MG/ML(1)
0.5 AMPUL (ML) INJECTION PRN
Status: CANCELLED | OUTPATIENT
Start: 2023-05-30

## 2023-05-30 RX ORDER — DIPHENHYDRAMINE HYDROCHLORIDE 50 MG/ML
50 INJECTION INTRAMUSCULAR; INTRAVENOUS PRN
Status: CANCELLED | OUTPATIENT
Start: 2023-05-30

## 2023-05-30 RX ORDER — 0.9 % SODIUM CHLORIDE 0.9 %
10 VIAL (ML) INJECTION PRN
Status: CANCELLED | OUTPATIENT
Start: 2023-05-30

## 2023-05-31 NOTE — PROGRESS NOTES
Iron deficiency noted on labs. Recommend IV iron in OPIC.    Ryder Davis MD  Nephrology  [facility]

## 2023-06-02 ENCOUNTER — ANTICOAGULATION VISIT (OUTPATIENT)
Dept: VASCULAR LAB | Facility: MEDICAL CENTER | Age: 83
End: 2023-06-02
Attending: INTERNAL MEDICINE
Payer: MEDICARE

## 2023-06-02 ENCOUNTER — OUTPATIENT INFUSION SERVICES (OUTPATIENT)
Dept: ONCOLOGY | Facility: MEDICAL CENTER | Age: 83
End: 2023-06-02
Attending: INTERNAL MEDICINE
Payer: MEDICARE

## 2023-06-02 VITALS
HEART RATE: 64 BPM | OXYGEN SATURATION: 94 % | BODY MASS INDEX: 37 KG/M2 | HEIGHT: 62 IN | DIASTOLIC BLOOD PRESSURE: 66 MMHG | SYSTOLIC BLOOD PRESSURE: 95 MMHG | TEMPERATURE: 98.3 F | WEIGHT: 201.06 LBS | RESPIRATION RATE: 18 BRPM

## 2023-06-02 DIAGNOSIS — I48.11 LONGSTANDING PERSISTENT ATRIAL FIBRILLATION (HCC): ICD-10-CM

## 2023-06-02 DIAGNOSIS — D63.8 ANEMIA OF CHRONIC DISEASE: ICD-10-CM

## 2023-06-02 DIAGNOSIS — D50.9 IRON DEFICIENCY ANEMIA, UNSPECIFIED IRON DEFICIENCY ANEMIA TYPE: ICD-10-CM

## 2023-06-02 LAB
FERRITIN SERPL-MCNC: 16.7 NG/ML (ref 10–291)
HCT VFR BLD AUTO: 29.5 % (ref 37–47)
HGB BLD-MCNC: 8.5 G/DL (ref 12–16)
INR PPP: 2.8 (ref 2–3.5)
IRON SATN MFR SERPL: 12 % (ref 15–55)
IRON SERPL-MCNC: 52 UG/DL (ref 40–170)
TIBC SERPL-MCNC: 445 UG/DL (ref 250–450)
UIBC SERPL-MCNC: 393 UG/DL (ref 110–370)

## 2023-06-02 PROCEDURE — 85018 HEMOGLOBIN: CPT

## 2023-06-02 PROCEDURE — 83540 ASSAY OF IRON: CPT

## 2023-06-02 PROCEDURE — 99211 OFF/OP EST MAY X REQ PHY/QHP: CPT

## 2023-06-02 PROCEDURE — 700105 HCHG RX REV CODE 258: Performed by: INTERNAL MEDICINE

## 2023-06-02 PROCEDURE — 96365 THER/PROPH/DIAG IV INF INIT: CPT

## 2023-06-02 PROCEDURE — 85610 PROTHROMBIN TIME: CPT

## 2023-06-02 PROCEDURE — 85014 HEMATOCRIT: CPT

## 2023-06-02 PROCEDURE — 96372 THER/PROPH/DIAG INJ SC/IM: CPT

## 2023-06-02 PROCEDURE — 700111 HCHG RX REV CODE 636 W/ 250 OVERRIDE (IP): Mod: JG | Performed by: INTERNAL MEDICINE

## 2023-06-02 PROCEDURE — 83550 IRON BINDING TEST: CPT

## 2023-06-02 PROCEDURE — 82728 ASSAY OF FERRITIN: CPT

## 2023-06-02 RX ORDER — 0.9 % SODIUM CHLORIDE 0.9 %
10 VIAL (ML) INJECTION PRN
Status: CANCELLED | OUTPATIENT
Start: 2023-06-09

## 2023-06-02 RX ORDER — EPINEPHRINE 1 MG/ML(1)
0.5 AMPUL (ML) INJECTION PRN
Status: CANCELLED | OUTPATIENT
Start: 2023-06-09

## 2023-06-02 RX ORDER — SODIUM CHLORIDE 9 MG/ML
INJECTION, SOLUTION INTRAVENOUS CONTINUOUS
Status: CANCELLED | OUTPATIENT
Start: 2023-06-09

## 2023-06-02 RX ORDER — 0.9 % SODIUM CHLORIDE 0.9 %
VIAL (ML) INJECTION PRN
Status: CANCELLED | OUTPATIENT
Start: 2023-06-09

## 2023-06-02 RX ORDER — 0.9 % SODIUM CHLORIDE 0.9 %
3 VIAL (ML) INJECTION PRN
Status: CANCELLED | OUTPATIENT
Start: 2023-06-09

## 2023-06-02 RX ORDER — METHYLPREDNISOLONE SODIUM SUCCINATE 125 MG/2ML
125 INJECTION, POWDER, LYOPHILIZED, FOR SOLUTION INTRAMUSCULAR; INTRAVENOUS PRN
Status: CANCELLED | OUTPATIENT
Start: 2023-06-09

## 2023-06-02 RX ORDER — DIPHENHYDRAMINE HYDROCHLORIDE 50 MG/ML
50 INJECTION INTRAMUSCULAR; INTRAVENOUS PRN
Status: CANCELLED | OUTPATIENT
Start: 2023-06-09

## 2023-06-02 RX ADMIN — FERUMOXYTOL 510 MG: 510 INJECTION INTRAVENOUS at 15:45

## 2023-06-02 RX ADMIN — EPOETIN ALFA-EPBX 10000 UNITS: 10000 INJECTION, SOLUTION INTRAVENOUS; SUBCUTANEOUS at 15:49

## 2023-06-02 ASSESSMENT — FIBROSIS 4 INDEX: FIB4 SCORE: 1.83

## 2023-06-02 NOTE — PROGRESS NOTES
Anticoagulation Summary  As of 2023      INR goal:  2.0-3.0   TTR:  56.6 % (3 wk)   INR used for dosin.80 (2023)   Warfarin maintenance plan:  5 mg (5 mg x 1) every day   Weekly warfarin total:  35 mg   Plan last modified:  Rudy Lowe, PharmD (2023)   Next INR check:  2023   Target end date:  Indefinite    Indications    AF (atrial fibrillation) ([facility]) [I48.91]                 Anticoagulation Episode Summary       INR check location:      Preferred lab:      Send INR reminders to:      Comments:  Not a DOAC candidate, GI bleed in  on Xarelto          Anticoagulation Care Providers       Provider Role Specialty Phone number    Pelra Espinoza, A.P.R.N. Referring Cardiovascular Disease (Cardiology) [phone]                  Refer to Patient Findings for HPI:  Patient Findings       Negatives:  Signs/symptoms of thrombosis, Signs/symptoms of bleeding, Laboratory test error suspected, Change in health, Change in alcohol use, Change in activity, Upcoming invasive procedure, Emergency department visit, Upcoming dental procedure, Missed doses, Extra doses, Change in medications, Change in diet/appetite, Hospital admission, Bruising, Other complaints            There were no vitals filed for this visit.  pt declined vitals    Verified current warfarin dosing schedule.    Medications reconciled   Pt is not on antiplatelet therapy      A/P   INR  2.8-therapeutic.     Warfarin dosing recommendation: Continue current dosing regimen.      Pt educated to contact our clinic with any changes in medications or s/s of bleeding or thrombosis. Pt is aware to seek immediate medical attention for falls, head injury or deep cuts.    Follow up appointment in 3 week(s).    Beatriz Cage, PharmD

## 2023-06-02 NOTE — PROGRESS NOTES
Aleshia presents to the infusion center for her retacrit injection and feraheme infusion. IV was placed and labs were collected and sent. Labs within parameters to give both injection and infusion. She tolerated both well with no complaints. IV was removed and site was covered with gauze and coban. She was therefore discharged home in stable condition.

## 2023-06-09 ENCOUNTER — OUTPATIENT INFUSION SERVICES (OUTPATIENT)
Dept: ONCOLOGY | Facility: MEDICAL CENTER | Age: 83
End: 2023-06-09
Attending: INTERNAL MEDICINE
Payer: MEDICARE

## 2023-06-09 VITALS
HEART RATE: 68 BPM | RESPIRATION RATE: 16 BRPM | HEIGHT: 62 IN | DIASTOLIC BLOOD PRESSURE: 43 MMHG | TEMPERATURE: 97 F | WEIGHT: 201.5 LBS | BODY MASS INDEX: 37.08 KG/M2 | OXYGEN SATURATION: 95 % | SYSTOLIC BLOOD PRESSURE: 96 MMHG

## 2023-06-09 DIAGNOSIS — D50.9 IRON DEFICIENCY ANEMIA, UNSPECIFIED IRON DEFICIENCY ANEMIA TYPE: ICD-10-CM

## 2023-06-09 DIAGNOSIS — D63.8 ANEMIA OF CHRONIC DISEASE: ICD-10-CM

## 2023-06-09 LAB
HCT VFR BLD AUTO: 33 % (ref 37–47)
HGB BLD-MCNC: 9.4 G/DL (ref 12–16)

## 2023-06-09 PROCEDURE — 85018 HEMOGLOBIN: CPT

## 2023-06-09 PROCEDURE — 700105 HCHG RX REV CODE 258: Performed by: INTERNAL MEDICINE

## 2023-06-09 PROCEDURE — 85014 HEMATOCRIT: CPT

## 2023-06-09 PROCEDURE — 96365 THER/PROPH/DIAG IV INF INIT: CPT

## 2023-06-09 PROCEDURE — 96372 THER/PROPH/DIAG INJ SC/IM: CPT | Mod: XU

## 2023-06-09 PROCEDURE — 700111 HCHG RX REV CODE 636 W/ 250 OVERRIDE (IP): Mod: JG | Performed by: INTERNAL MEDICINE

## 2023-06-09 RX ORDER — EPINEPHRINE 1 MG/ML(1)
0.5 AMPUL (ML) INJECTION PRN
Status: CANCELLED | OUTPATIENT
Start: 2023-06-09

## 2023-06-09 RX ORDER — 0.9 % SODIUM CHLORIDE 0.9 %
VIAL (ML) INJECTION PRN
Status: CANCELLED | OUTPATIENT
Start: 2023-06-16

## 2023-06-09 RX ORDER — METHYLPREDNISOLONE SODIUM SUCCINATE 125 MG/2ML
125 INJECTION, POWDER, LYOPHILIZED, FOR SOLUTION INTRAMUSCULAR; INTRAVENOUS PRN
Status: CANCELLED | OUTPATIENT
Start: 2023-06-09

## 2023-06-09 RX ORDER — 0.9 % SODIUM CHLORIDE 0.9 %
3 VIAL (ML) INJECTION PRN
Status: CANCELLED | OUTPATIENT
Start: 2023-06-16

## 2023-06-09 RX ORDER — 0.9 % SODIUM CHLORIDE 0.9 %
VIAL (ML) INJECTION PRN
Status: CANCELLED | OUTPATIENT
Start: 2023-06-09

## 2023-06-09 RX ORDER — 0.9 % SODIUM CHLORIDE 0.9 %
3 VIAL (ML) INJECTION PRN
Status: CANCELLED | OUTPATIENT
Start: 2023-06-09

## 2023-06-09 RX ORDER — 0.9 % SODIUM CHLORIDE 0.9 %
10 VIAL (ML) INJECTION PRN
Status: CANCELLED | OUTPATIENT
Start: 2023-06-09

## 2023-06-09 RX ORDER — 0.9 % SODIUM CHLORIDE 0.9 %
10 VIAL (ML) INJECTION PRN
Status: CANCELLED | OUTPATIENT
Start: 2023-06-16

## 2023-06-09 RX ORDER — SODIUM CHLORIDE 9 MG/ML
INJECTION, SOLUTION INTRAVENOUS CONTINUOUS
Status: DISCONTINUED | OUTPATIENT
Start: 2023-06-09 | End: 2023-06-09 | Stop reason: HOSPADM

## 2023-06-09 RX ORDER — SODIUM CHLORIDE 9 MG/ML
INJECTION, SOLUTION INTRAVENOUS CONTINUOUS
Status: CANCELLED | OUTPATIENT
Start: 2023-06-09

## 2023-06-09 RX ORDER — DIPHENHYDRAMINE HYDROCHLORIDE 50 MG/ML
50 INJECTION INTRAMUSCULAR; INTRAVENOUS PRN
Status: CANCELLED | OUTPATIENT
Start: 2023-06-09

## 2023-06-09 RX ADMIN — FERUMOXYTOL 510 MG: 510 INJECTION INTRAVENOUS at 15:35

## 2023-06-09 RX ADMIN — EPOETIN ALFA-EPBX 10000 UNITS: 10000 INJECTION, SOLUTION INTRAVENOUS; SUBCUTANEOUS at 16:10

## 2023-06-09 RX ADMIN — SODIUM CHLORIDE: 9 INJECTION, SOLUTION INTRAVENOUS at 15:35

## 2023-06-09 ASSESSMENT — FIBROSIS 4 INDEX: FIB4 SCORE: 1.83

## 2023-06-10 LAB — EKG IMPRESSION: NORMAL

## 2023-06-10 NOTE — PROGRESS NOTES
Pt arrived ambulatory to [facility] for dose 2/2 Feraheme infusion, H/H possible Retacrit injection. POC discussed with pt and she agrees with plan. Pt with call light in reach for safety. Pt verbalized understanding to call for needs/assist.    PIV established, brisk blood return noted. Pt medicated per MAR with Feraheme infusion. H/H results reviewed, pt meets parameters for Retacrit injection. Pt tolerated treatment without s/s adverse reaction. PIV dc'd catheter tip intact, gauze and coban dressing applied. Pt discharged to self care, [facility]. Pt's next appt confirmed 6/16/2023.

## 2023-06-16 ENCOUNTER — OFFICE VISIT (OUTPATIENT)
Dept: DERMATOLOGY | Facility: IMAGING CENTER | Age: 83
End: 2023-06-16
Payer: MEDICARE

## 2023-06-16 ENCOUNTER — OUTPATIENT INFUSION SERVICES (OUTPATIENT)
Dept: ONCOLOGY | Facility: MEDICAL CENTER | Age: 83
End: 2023-06-16
Attending: INTERNAL MEDICINE
Payer: MEDICARE

## 2023-06-16 VITALS
RESPIRATION RATE: 16 BRPM | TEMPERATURE: 97.3 F | HEART RATE: 79 BPM | WEIGHT: 199.3 LBS | SYSTOLIC BLOOD PRESSURE: 100 MMHG | BODY MASS INDEX: 36.67 KG/M2 | HEIGHT: 62 IN | OXYGEN SATURATION: 96 % | DIASTOLIC BLOOD PRESSURE: 60 MMHG

## 2023-06-16 DIAGNOSIS — L40.9 PSORIASIS: ICD-10-CM

## 2023-06-16 DIAGNOSIS — Z85.820 HX OF MELANOMA OF SKIN: ICD-10-CM

## 2023-06-16 DIAGNOSIS — Z12.83 SKIN CANCER SCREENING: ICD-10-CM

## 2023-06-16 DIAGNOSIS — D63.8 ANEMIA OF CHRONIC DISEASE: ICD-10-CM

## 2023-06-16 DIAGNOSIS — Z79.899 HIGH RISK MEDICATION USE: ICD-10-CM

## 2023-06-16 DIAGNOSIS — D22.9 NEVUS: ICD-10-CM

## 2023-06-16 DIAGNOSIS — L90.8 SKIN AGING: ICD-10-CM

## 2023-06-16 DIAGNOSIS — D49.2 NEOPLASM OF SKIN: ICD-10-CM

## 2023-06-16 DIAGNOSIS — L82.1 SEBORRHEIC KERATOSIS: ICD-10-CM

## 2023-06-16 DIAGNOSIS — L81.4 LENTIGO: ICD-10-CM

## 2023-06-16 LAB
HCT VFR BLD AUTO: 34.7 % (ref 37–47)
HGB BLD-MCNC: 10.3 G/DL (ref 12–16)

## 2023-06-16 PROCEDURE — 11102 TANGNTL BX SKIN SINGLE LES: CPT | Performed by: DERMATOLOGY

## 2023-06-16 PROCEDURE — 85014 HEMATOCRIT: CPT

## 2023-06-16 PROCEDURE — 36415 COLL VENOUS BLD VENIPUNCTURE: CPT

## 2023-06-16 PROCEDURE — 99214 OFFICE O/P EST MOD 30 MIN: CPT | Mod: 25 | Performed by: DERMATOLOGY

## 2023-06-16 PROCEDURE — 85018 HEMOGLOBIN: CPT

## 2023-06-16 RX ORDER — 0.9 % SODIUM CHLORIDE 0.9 %
10 VIAL (ML) INJECTION PRN
Status: CANCELLED | OUTPATIENT
Start: 2023-06-23

## 2023-06-16 RX ORDER — 0.9 % SODIUM CHLORIDE 0.9 %
3 VIAL (ML) INJECTION PRN
Status: CANCELLED | OUTPATIENT
Start: 2023-06-23

## 2023-06-16 RX ORDER — 0.9 % SODIUM CHLORIDE 0.9 %
VIAL (ML) INJECTION PRN
Status: CANCELLED | OUTPATIENT
Start: 2023-06-23

## 2023-06-16 ASSESSMENT — FIBROSIS 4 INDEX: FIB4 SCORE: 1.83

## 2023-06-16 NOTE — PROGRESS NOTES
Aleshia arrived ambulatory to the infusion center for her retacrit injection. Her labs were drawn and resulted:  Hemoglobin 10.3  Hematocrit 34.7  She does not meet parameters to receive the injection today. She was notified and discharged home in stable condition.

## 2023-06-16 NOTE — PROGRESS NOTES
"CC:  Follow up CARL     Subjective: prev seen patient follow here for 6 mth full skin exam/ PSO.     Denies new, growing, changing, itching or bleeding skin lesions today.    One sore on right arm, few months. Feels like \"there's a core in there\"    Had trouble getting cosentyx Dec -March. Now back on schedule but did have flare on legs while awaiting medication.     History of skin cancer: Yes, Details: melanoma 2014 left shoulder ,   History of biopsies:Yes, Details:  .  History of blistering/severe sunburns:Yes, Details: .  Family history of skin cancer:No  Family history of atypical moles:No    ROS: no fevers/chills. +/- itch.  No cough.  No weight loss. No LAD.  DermPMH: hx melanoma left shoulder, 2014? Per records  Relevant PMH: many med comorbidities.  GONZALEZ, CHF, kidney Dz, hypothyroidism  Social: former smoker; denies travel abroad.  Daughter with well water which she doesn't drink due to taste.  FmHx:  not affected    PE: Gen:WDWN female in NAD.  Skin:Scalp/face/eyes/lips/neck/chest/back/arms/legs/hands/feet/buttocks - examined  Genitals exam declined  -waxy papule/papulonodule, approx 1cm right arm  -scattered hyperpigmented macules/papules, appearing benign on torso and extremities  -pink macules, small plaques on lower anterior shins with marks of excoriation, xerosis noted of legs    SCI Prior path - see scans = spongiotic dermatitis + eosinophils. Consider ACD, Nummular dermatitis or drug reaction    Labs: Hep B/C (Ab X5) April 2020 - WNL  Quantiferon negative - June 2022  O&P - negative    A/P: PSO: Papulosquamous rash, prior path showing spongiotic dermatitis + eosinophils.  Suspicion for drug reaction and possible MTX intolerance: kidney dz and elevated Cr after test dose.  Steroid responsive by patient report.  Cannot exclude PSO; NOW IMPROVED !! on Cosentyx, tolerating well. Flare with recent difficulty getting medication  -cont cosentyx 300mg SubCut Qmonth, se reviewed  -cont current home " supply topicals - Lidex cream vs betamethasone oint BID severe sites and TAC oint BID less severe sites PRN / itching. 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 Cleaned with EtOH, anesthesia with lidocaine 1% + epinephrine, shave bx, AlCl3 for hemostasis  -vaseline/bandage and wound care reviewed    I have reviewed medications relevant to my specialty.

## 2023-06-23 ENCOUNTER — OUTPATIENT INFUSION SERVICES (OUTPATIENT)
Dept: ONCOLOGY | Facility: MEDICAL CENTER | Age: 83
End: 2023-06-23
Attending: INTERNAL MEDICINE
Payer: MEDICARE

## 2023-06-23 ENCOUNTER — ANTICOAGULATION VISIT (OUTPATIENT)
Dept: VASCULAR LAB | Facility: MEDICAL CENTER | Age: 83
End: 2023-06-23
Attending: INTERNAL MEDICINE
Payer: MEDICARE

## 2023-06-23 ENCOUNTER — TELEPHONE (OUTPATIENT)
Dept: DERMATOLOGY | Facility: IMAGING CENTER | Age: 83
End: 2023-06-23

## 2023-06-23 VITALS
BODY MASS INDEX: 36.76 KG/M2 | RESPIRATION RATE: 16 BRPM | DIASTOLIC BLOOD PRESSURE: 57 MMHG | WEIGHT: 199.74 LBS | HEIGHT: 62 IN | HEART RATE: 95 BPM | SYSTOLIC BLOOD PRESSURE: 100 MMHG | OXYGEN SATURATION: 92 % | TEMPERATURE: 96.7 F

## 2023-06-23 DIAGNOSIS — D63.8 ANEMIA OF CHRONIC DISEASE: ICD-10-CM

## 2023-06-23 DIAGNOSIS — I48.11 LONGSTANDING PERSISTENT ATRIAL FIBRILLATION (HCC): ICD-10-CM

## 2023-06-23 LAB
HCT VFR BLD AUTO: 34.7 % (ref 37–47)
HGB BLD-MCNC: 10.1 G/DL (ref 12–16)
INR PPP: 2.6 (ref 2–3.5)

## 2023-06-23 PROCEDURE — 85610 PROTHROMBIN TIME: CPT

## 2023-06-23 PROCEDURE — 85018 HEMOGLOBIN: CPT

## 2023-06-23 PROCEDURE — 85014 HEMATOCRIT: CPT

## 2023-06-23 PROCEDURE — 36415 COLL VENOUS BLD VENIPUNCTURE: CPT

## 2023-06-23 PROCEDURE — 99211 OFF/OP EST MAY X REQ PHY/QHP: CPT

## 2023-06-23 RX ORDER — 0.9 % SODIUM CHLORIDE 0.9 %
VIAL (ML) INJECTION PRN
Status: CANCELLED | OUTPATIENT
Start: 2023-06-30

## 2023-06-23 RX ORDER — 0.9 % SODIUM CHLORIDE 0.9 %
3 VIAL (ML) INJECTION PRN
Status: CANCELLED | OUTPATIENT
Start: 2023-06-30

## 2023-06-23 RX ORDER — 0.9 % SODIUM CHLORIDE 0.9 %
10 VIAL (ML) INJECTION PRN
Status: CANCELLED | OUTPATIENT
Start: 2023-06-30

## 2023-06-23 ASSESSMENT — FIBROSIS 4 INDEX: FIB4 SCORE: 1.83

## 2023-06-23 NOTE — PROGRESS NOTES
Anticoagulation Summary  As of 2023      INR goal:  2.0-3.0   TTR:  78.1 % (1.4 mo)   INR used for dosin.60 (2023)   Warfarin maintenance plan:  5 mg (5 mg x 1) every day   Weekly warfarin total:  35 mg   Plan last modified:  Rudy Lowe, PharmD (2023)   Next INR check:  2023   Target end date:  Indefinite    Indications    AF (atrial fibrillation) ([facility]) [I48.91]                 Anticoagulation Episode Summary       INR check location:      Preferred lab:      Send INR reminders to:      Comments:  Not a DOAC candidate, GI bleed in  on Xarelto          Anticoagulation Care Providers       Provider Role Specialty Phone number    Perla Espinoza, SASKIA.P.R.N. Referring Cardiovascular Disease (Cardiology) [phone]            Refer to Patient Findings for HPI:  Patient Findings       Negatives:  Signs/symptoms of thrombosis, Signs/symptoms of bleeding, Laboratory test error suspected, Change in health, Change in alcohol use, Change in activity, Upcoming invasive procedure, Emergency department visit, Upcoming dental procedure, Missed doses, Extra doses, Change in medications, Change in diet/appetite, Hospital admission, Bruising, Other complaints            There were no vitals filed for this visit.  Pt declined vitals    Verified current warfarin dosing schedule.    A/P   INR  2.6 -therapeutic.     Warfarin dosing recommendation: Continue current dosing regimen.      Pt educated to contact our clinic with any changes in medications or s/s of bleeding or thrombosis. Pt is aware to seek immediate medical attention for falls, head injury or deep cuts.    Follow up appointment in 4 week(s).    Beatriz Cage, AbadD

## 2023-06-23 NOTE — PROGRESS NOTES
Pt presents ambulatory for Retacrit injection. Labs drawn via 24 g butterfly with gauze and coband applied to site. Labs review and pt does not meet parameters for injection, hgb 10. Next appt confirmed with pt and pt left ambulatory in no distress.

## 2023-06-26 ENCOUNTER — PRE-ADMISSION TESTING (OUTPATIENT)
Dept: ADMISSIONS | Facility: MEDICAL CENTER | Age: 83
DRG: 273 | End: 2023-06-26
Attending: INTERNAL MEDICINE
Payer: MEDICARE

## 2023-06-30 ENCOUNTER — OUTPATIENT INFUSION SERVICES (OUTPATIENT)
Dept: ONCOLOGY | Facility: MEDICAL CENTER | Age: 83
End: 2023-06-30
Attending: INTERNAL MEDICINE
Payer: MEDICARE

## 2023-06-30 VITALS
WEIGHT: 200.4 LBS | BODY MASS INDEX: 36.88 KG/M2 | TEMPERATURE: 98.2 F | RESPIRATION RATE: 18 BRPM | SYSTOLIC BLOOD PRESSURE: 102 MMHG | OXYGEN SATURATION: 93 % | DIASTOLIC BLOOD PRESSURE: 48 MMHG | HEIGHT: 62 IN | HEART RATE: 68 BPM

## 2023-06-30 DIAGNOSIS — D63.8 ANEMIA OF CHRONIC DISEASE: ICD-10-CM

## 2023-06-30 LAB
HCT VFR BLD AUTO: 31.5 % (ref 37–47)
HGB BLD-MCNC: 9.7 G/DL (ref 12–16)

## 2023-06-30 PROCEDURE — 96372 THER/PROPH/DIAG INJ SC/IM: CPT

## 2023-06-30 PROCEDURE — 85014 HEMATOCRIT: CPT

## 2023-06-30 PROCEDURE — 36415 COLL VENOUS BLD VENIPUNCTURE: CPT

## 2023-06-30 PROCEDURE — 85018 HEMOGLOBIN: CPT

## 2023-06-30 PROCEDURE — 700111 HCHG RX REV CODE 636 W/ 250 OVERRIDE (IP): Mod: JZ,JG | Performed by: INTERNAL MEDICINE

## 2023-06-30 RX ORDER — 0.9 % SODIUM CHLORIDE 0.9 %
10 VIAL (ML) INJECTION PRN
Status: CANCELLED | OUTPATIENT
Start: 2023-07-07

## 2023-06-30 RX ORDER — 0.9 % SODIUM CHLORIDE 0.9 %
3 VIAL (ML) INJECTION PRN
Status: CANCELLED | OUTPATIENT
Start: 2023-07-07

## 2023-06-30 RX ORDER — 0.9 % SODIUM CHLORIDE 0.9 %
VIAL (ML) INJECTION PRN
Status: CANCELLED | OUTPATIENT
Start: 2023-07-07

## 2023-06-30 RX ADMIN — EPOETIN ALFA 10000 UNITS: 10000 SOLUTION INTRAVENOUS; SUBCUTANEOUS at 16:14

## 2023-06-30 ASSESSMENT — FIBROSIS 4 INDEX: FIB4 SCORE: 1.83

## 2023-07-01 NOTE — PROGRESS NOTES
Aleshia to infusion for weekly Retacrit injection. Reports fatigue and shortness of breath on exertion. Labs drawn via venipuncture to R AC, patient tolerated well. Labs reviewed by RN, Hgb: 9.7, within parameters for treatment. Retacrit given SQ in R upper arm, patient tolerated with no adverse effects. Patient left in stable condition, will be out of town next week will not be coming to infusion until 7/14, MD aware.

## 2023-07-07 ENCOUNTER — APPOINTMENT (OUTPATIENT)
Dept: ONCOLOGY | Facility: MEDICAL CENTER | Age: 83
End: 2023-07-07
Attending: INTERNAL MEDICINE
Payer: MEDICARE

## 2023-07-14 ENCOUNTER — OUTPATIENT INFUSION SERVICES (OUTPATIENT)
Dept: ONCOLOGY | Facility: MEDICAL CENTER | Age: 83
End: 2023-07-14
Attending: INTERNAL MEDICINE
Payer: MEDICARE

## 2023-07-14 VITALS
RESPIRATION RATE: 18 BRPM | HEIGHT: 62 IN | OXYGEN SATURATION: 99 % | BODY MASS INDEX: 36.88 KG/M2 | HEART RATE: 71 BPM | WEIGHT: 200.4 LBS | TEMPERATURE: 97.4 F | DIASTOLIC BLOOD PRESSURE: 43 MMHG | SYSTOLIC BLOOD PRESSURE: 101 MMHG

## 2023-07-14 DIAGNOSIS — D63.8 ANEMIA OF CHRONIC DISEASE: ICD-10-CM

## 2023-07-14 LAB
HCT VFR BLD AUTO: 28.4 % (ref 37–47)
HGB BLD-MCNC: 8.8 G/DL (ref 12–16)

## 2023-07-14 PROCEDURE — 85018 HEMOGLOBIN: CPT

## 2023-07-14 PROCEDURE — 85014 HEMATOCRIT: CPT

## 2023-07-14 PROCEDURE — 36415 COLL VENOUS BLD VENIPUNCTURE: CPT

## 2023-07-14 PROCEDURE — 96372 THER/PROPH/DIAG INJ SC/IM: CPT

## 2023-07-14 PROCEDURE — 700111 HCHG RX REV CODE 636 W/ 250 OVERRIDE (IP): Mod: JZ | Performed by: INTERNAL MEDICINE

## 2023-07-14 RX ORDER — 0.9 % SODIUM CHLORIDE 0.9 %
3 VIAL (ML) INJECTION PRN
Status: CANCELLED | OUTPATIENT
Start: 2023-07-21

## 2023-07-14 RX ORDER — 0.9 % SODIUM CHLORIDE 0.9 %
10 VIAL (ML) INJECTION PRN
Status: CANCELLED | OUTPATIENT
Start: 2023-07-21

## 2023-07-14 RX ORDER — 0.9 % SODIUM CHLORIDE 0.9 %
VIAL (ML) INJECTION PRN
Status: CANCELLED | OUTPATIENT
Start: 2023-07-21

## 2023-07-14 RX ADMIN — EPOETIN ALFA 10000 UNITS: 10000 SOLUTION INTRAVENOUS; SUBCUTANEOUS at 16:49

## 2023-07-14 ASSESSMENT — FIBROSIS 4 INDEX: FIB4 SCORE: 1.83

## 2023-07-15 NOTE — PROGRESS NOTES
Patient to [facility] for Retacrit injection. Lab drawn by venipuncture in right forearm. Retacrit injected into left back of arm. Patient has future appointments. Patient to home in care of self.

## 2023-07-21 ENCOUNTER — PRE-ADMISSION TESTING (OUTPATIENT)
Dept: ADMISSIONS | Facility: MEDICAL CENTER | Age: 83
DRG: 273 | End: 2023-07-21
Attending: INTERNAL MEDICINE
Payer: MEDICARE

## 2023-07-21 ENCOUNTER — OUTPATIENT INFUSION SERVICES (OUTPATIENT)
Dept: ONCOLOGY | Facility: MEDICAL CENTER | Age: 83
End: 2023-07-21
Attending: INTERNAL MEDICINE
Payer: MEDICARE

## 2023-07-21 ENCOUNTER — ANTICOAGULATION VISIT (OUTPATIENT)
Dept: VASCULAR LAB | Facility: MEDICAL CENTER | Age: 83
End: 2023-07-21
Attending: INTERNAL MEDICINE
Payer: MEDICARE

## 2023-07-21 ENCOUNTER — APPOINTMENT (OUTPATIENT)
Dept: VASCULAR LAB | Facility: MEDICAL CENTER | Age: 83
End: 2023-07-21
Payer: MEDICARE

## 2023-07-21 VITALS
HEIGHT: 61 IN | OXYGEN SATURATION: 93 % | DIASTOLIC BLOOD PRESSURE: 54 MMHG | TEMPERATURE: 97.1 F | SYSTOLIC BLOOD PRESSURE: 94 MMHG | WEIGHT: 201.5 LBS | HEART RATE: 74 BPM | BODY MASS INDEX: 38.04 KG/M2 | RESPIRATION RATE: 19 BRPM

## 2023-07-21 DIAGNOSIS — Z01.812 PRE-OPERATIVE LABORATORY EXAMINATION: ICD-10-CM

## 2023-07-21 DIAGNOSIS — I48.11 LONGSTANDING PERSISTENT ATRIAL FIBRILLATION (HCC): ICD-10-CM

## 2023-07-21 DIAGNOSIS — D63.8 ANEMIA OF CHRONIC DISEASE: ICD-10-CM

## 2023-07-21 DIAGNOSIS — Z01.810 PRE-OPERATIVE CARDIOVASCULAR EXAMINATION: ICD-10-CM

## 2023-07-21 LAB
ANION GAP SERPL CALC-SCNC: 11 MMOL/L (ref 7–16)
ANISOCYTOSIS BLD QL SMEAR: ABNORMAL
APTT PPP: 43 SEC (ref 24.7–36)
BASOPHILS # BLD AUTO: 0.6 % (ref 0–1.8)
BASOPHILS # BLD: 0.04 K/UL (ref 0–0.12)
BUN SERPL-MCNC: 63 MG/DL (ref 8–22)
CALCIUM SERPL-MCNC: 9.1 MG/DL (ref 8.5–10.5)
CHLORIDE SERPL-SCNC: 99 MMOL/L (ref 96–112)
CO2 SERPL-SCNC: 25 MMOL/L (ref 20–33)
COMMENT 1642: NORMAL
CREAT SERPL-MCNC: 2.81 MG/DL (ref 0.5–1.4)
EKG IMPRESSION: NORMAL
EOSINOPHIL # BLD AUTO: 0.2 K/UL (ref 0–0.51)
EOSINOPHIL NFR BLD: 3 % (ref 0–6.9)
ERYTHROCYTE [DISTWIDTH] IN BLOOD BY AUTOMATED COUNT: 75.4 FL (ref 35.9–50)
GFR SERPLBLD CREATININE-BSD FMLA CKD-EPI: 16 ML/MIN/1.73 M 2
GLUCOSE SERPL-MCNC: 132 MG/DL (ref 65–99)
HCT VFR BLD AUTO: 27.8 % (ref 37–47)
HCT VFR BLD AUTO: 27.8 % (ref 37–47)
HGB BLD-MCNC: 8.2 G/DL (ref 12–16)
HGB BLD-MCNC: 8.7 G/DL (ref 12–16)
IMM GRANULOCYTES # BLD AUTO: 0.02 K/UL (ref 0–0.11)
IMM GRANULOCYTES NFR BLD AUTO: 0.3 % (ref 0–0.9)
INR PPP: 2.09 (ref 0.87–1.13)
INR PPP: 2.2 (ref 2–3.5)
LYMPHOCYTES # BLD AUTO: 0.71 K/UL (ref 1–4.8)
LYMPHOCYTES NFR BLD: 10.6 % (ref 22–41)
MCH RBC QN AUTO: 26.5 PG (ref 27–33)
MCHC RBC AUTO-ENTMCNC: 29.5 G/DL (ref 32.2–35.5)
MCV RBC AUTO: 90 FL (ref 81.4–97.8)
MICROCYTES BLD QL SMEAR: ABNORMAL
MONOCYTES # BLD AUTO: 0.44 K/UL (ref 0–0.85)
MONOCYTES NFR BLD AUTO: 6.6 % (ref 0–13.4)
MORPHOLOGY BLD-IMP: NORMAL
NEUTROPHILS # BLD AUTO: 5.3 K/UL (ref 1.82–7.42)
NEUTROPHILS NFR BLD: 78.9 % (ref 44–72)
NRBC # BLD AUTO: 0 K/UL
NRBC BLD-RTO: 0 /100 WBC (ref 0–0.2)
OVALOCYTES BLD QL SMEAR: NORMAL
PLATELET # BLD AUTO: 265 K/UL (ref 164–446)
PLATELET BLD QL SMEAR: NORMAL
PMV BLD AUTO: 10.9 FL (ref 9–12.9)
POIKILOCYTOSIS BLD QL SMEAR: NORMAL
POTASSIUM SERPL-SCNC: 5 MMOL/L (ref 3.6–5.5)
PROTHROMBIN TIME: 22.9 SEC (ref 12–14.6)
RBC # BLD AUTO: 3.09 M/UL (ref 4.2–5.4)
RBC BLD AUTO: PRESENT
SCHISTOCYTES BLD QL SMEAR: NORMAL
SODIUM SERPL-SCNC: 135 MMOL/L (ref 135–145)
WBC # BLD AUTO: 6.7 K/UL (ref 4.8–10.8)

## 2023-07-21 PROCEDURE — 85014 HEMATOCRIT: CPT

## 2023-07-21 PROCEDURE — 93010 ELECTROCARDIOGRAM REPORT: CPT | Performed by: STUDENT IN AN ORGANIZED HEALTH CARE EDUCATION/TRAINING PROGRAM

## 2023-07-21 PROCEDURE — 85610 PROTHROMBIN TIME: CPT

## 2023-07-21 PROCEDURE — 96372 THER/PROPH/DIAG INJ SC/IM: CPT

## 2023-07-21 PROCEDURE — 93005 ELECTROCARDIOGRAM TRACING: CPT

## 2023-07-21 PROCEDURE — 85018 HEMOGLOBIN: CPT

## 2023-07-21 PROCEDURE — 80048 BASIC METABOLIC PNL TOTAL CA: CPT

## 2023-07-21 PROCEDURE — 700111 HCHG RX REV CODE 636 W/ 250 OVERRIDE (IP): Mod: JZ,JG | Performed by: INTERNAL MEDICINE

## 2023-07-21 PROCEDURE — 36415 COLL VENOUS BLD VENIPUNCTURE: CPT

## 2023-07-21 PROCEDURE — 85025 COMPLETE CBC W/AUTO DIFF WBC: CPT

## 2023-07-21 PROCEDURE — 85730 THROMBOPLASTIN TIME PARTIAL: CPT

## 2023-07-21 PROCEDURE — 99211 OFF/OP EST MAY X REQ PHY/QHP: CPT | Mod: 25

## 2023-07-21 RX ORDER — 0.9 % SODIUM CHLORIDE 0.9 %
3 VIAL (ML) INJECTION PRN
Status: CANCELLED | OUTPATIENT
Start: 2023-07-28

## 2023-07-21 RX ORDER — 0.9 % SODIUM CHLORIDE 0.9 %
10 VIAL (ML) INJECTION PRN
Status: CANCELLED | OUTPATIENT
Start: 2023-07-28

## 2023-07-21 RX ORDER — SECUKINUMAB 150 MG/ML
INJECTION SUBCUTANEOUS
Qty: 1.96 ML | Refills: 6 | Status: CANCELLED | OUTPATIENT
Start: 2023-07-21

## 2023-07-21 RX ORDER — 0.9 % SODIUM CHLORIDE 0.9 %
VIAL (ML) INJECTION PRN
Status: CANCELLED | OUTPATIENT
Start: 2023-07-28

## 2023-07-21 RX ADMIN — EPOETIN ALFA 10000 UNITS: 10000 SOLUTION INTRAVENOUS; SUBCUTANEOUS at 14:45

## 2023-07-21 ASSESSMENT — FIBROSIS 4 INDEX: FIB4 SCORE: 1.83

## 2023-07-21 NOTE — PROGRESS NOTES
Patient presents for lab draw and Epogen injection. Blood drawn from patient's right AC using a 23 gauge butterfly, gauze and coban to site. Hemoglobin 8.7 today. Epogen given as ordered to the back of the patient's right arm bleeding controlled. Patient scheduled for her next appointment and released in no acute distress.

## 2023-07-21 NOTE — PROGRESS NOTES
Anticoagulation Summary  As of 2023      INR goal:  2.0-3.0   TTR:  87.5 % (2.3 mo)   INR used for dosin.20 (2023)   Warfarin maintenance plan:  5 mg (5 mg x 1) every day   Weekly warfarin total:  35 mg   Plan last modified:  Rudy Lowe, PharmD (2023)   Next INR check:  2023   Target end date:  Indefinite    Indications    AF (atrial fibrillation) (formerly [facility]) [I48.91]                 Anticoagulation Episode Summary       INR check location:      Preferred lab:      Send INR reminders to:      Comments:  Not a DOAC candidate, GI bleed in  on Xarelto          Anticoagulation Care Providers       Provider Role Specialty Phone number    DIMA MartínezRCAMACHO. Referring Cardiovascular Disease (Cardiology) [phone]          Refer to Patient Findings for HPI:  Patient Findings       Negatives:  Signs/symptoms of thrombosis, Signs/symptoms of bleeding, Laboratory test error suspected, Change in health, Change in alcohol use, Change in activity, Upcoming invasive procedure, Emergency department visit, Upcoming dental procedure, Missed doses, Extra doses, Change in medications, Change in diet/appetite, Hospital admission, Bruising, Other complaints          There were no vitals filed for this visit.  The pt declined vitals today     Patient seen in clinic today for follow up on anticoagulation therapy with warfarin (a high risk medication) for hx of AF  Verified current warfarin dosing schedule.  Patient denies any missed doses of warfarin.    Medications reconciled   Pt is not on antiplatelet therapy      A/P   INR is therapeutic today at 2.2.     Warfarin dosing recommendation: Continue with the current dosing regimen.  Patient will follow up again in 2 weeks.     Pt educated to contact our clinic with any changes in medications or s/s of bleeding or thrombosis. Pt is aware to seek immediate medical attention for falls, head injury or deep cuts.    Follow up appointment in 2  week(s).    Next appt: Friday, Aug 4 @ 3pm     Linda Rodriguez PharmD

## 2023-07-24 ENCOUNTER — HOSPITAL ENCOUNTER (EMERGENCY)
Facility: MEDICAL CENTER | Age: 83
End: 2023-07-24
Attending: EMERGENCY MEDICINE
Payer: MEDICARE

## 2023-07-24 ENCOUNTER — APPOINTMENT (OUTPATIENT)
Dept: RADIOLOGY | Facility: MEDICAL CENTER | Age: 83
End: 2023-07-24
Attending: EMERGENCY MEDICINE
Payer: MEDICARE

## 2023-07-24 VITALS
WEIGHT: 200 LBS | HEIGHT: 62 IN | TEMPERATURE: 98.4 F | OXYGEN SATURATION: 92 % | HEART RATE: 79 BPM | DIASTOLIC BLOOD PRESSURE: 65 MMHG | SYSTOLIC BLOOD PRESSURE: 128 MMHG | RESPIRATION RATE: 20 BRPM | BODY MASS INDEX: 36.8 KG/M2

## 2023-07-24 DIAGNOSIS — M54.42 ACUTE LEFT-SIDED LOW BACK PAIN WITH LEFT-SIDED SCIATICA: Primary | ICD-10-CM

## 2023-07-24 DIAGNOSIS — M54.32 SCIATICA OF LEFT SIDE: ICD-10-CM

## 2023-07-24 LAB
ALBUMIN SERPL BCP-MCNC: 4.5 G/DL (ref 3.2–4.9)
ALBUMIN/GLOB SERPL: 1.4 G/DL
ALP SERPL-CCNC: 101 U/L (ref 30–99)
ALT SERPL-CCNC: 18 U/L (ref 2–50)
ANION GAP SERPL CALC-SCNC: 17 MMOL/L (ref 7–16)
APPEARANCE UR: CLEAR
AST SERPL-CCNC: 22 U/L (ref 12–45)
BASOPHILS # BLD AUTO: 0.6 % (ref 0–1.8)
BASOPHILS # BLD: 0.05 K/UL (ref 0–0.12)
BILIRUB SERPL-MCNC: 0.6 MG/DL (ref 0.1–1.5)
BILIRUB UR QL STRIP.AUTO: NEGATIVE
BUN SERPL-MCNC: 52 MG/DL (ref 8–22)
CALCIUM ALBUM COR SERPL-MCNC: 9.1 MG/DL (ref 8.5–10.5)
CALCIUM SERPL-MCNC: 9.5 MG/DL (ref 8.5–10.5)
CHLORIDE SERPL-SCNC: 97 MMOL/L (ref 96–112)
CO2 SERPL-SCNC: 20 MMOL/L (ref 20–33)
COLOR UR: YELLOW
CREAT SERPL-MCNC: 2.61 MG/DL (ref 0.5–1.4)
EOSINOPHIL # BLD AUTO: 0.14 K/UL (ref 0–0.51)
EOSINOPHIL NFR BLD: 1.6 % (ref 0–6.9)
ERYTHROCYTE [DISTWIDTH] IN BLOOD BY AUTOMATED COUNT: 71.9 FL (ref 35.9–50)
GFR SERPLBLD CREATININE-BSD FMLA CKD-EPI: 18 ML/MIN/1.73 M 2
GLOBULIN SER CALC-MCNC: 3.3 G/DL (ref 1.9–3.5)
GLUCOSE SERPL-MCNC: 132 MG/DL (ref 65–99)
GLUCOSE UR STRIP.AUTO-MCNC: NEGATIVE MG/DL
HCT VFR BLD AUTO: 29.2 % (ref 37–47)
HGB BLD-MCNC: 8.9 G/DL (ref 12–16)
IMM GRANULOCYTES # BLD AUTO: 0.02 K/UL (ref 0–0.11)
IMM GRANULOCYTES NFR BLD AUTO: 0.2 % (ref 0–0.9)
KETONES UR STRIP.AUTO-MCNC: NEGATIVE MG/DL
LEUKOCYTE ESTERASE UR QL STRIP.AUTO: NEGATIVE
LIPASE SERPL-CCNC: 65 U/L (ref 11–82)
LYMPHOCYTES # BLD AUTO: 0.62 K/UL (ref 1–4.8)
LYMPHOCYTES NFR BLD: 7.2 % (ref 22–41)
MCH RBC QN AUTO: 26.9 PG (ref 27–33)
MCHC RBC AUTO-ENTMCNC: 30.5 G/DL (ref 32.2–35.5)
MCV RBC AUTO: 88.2 FL (ref 81.4–97.8)
MICRO URNS: NORMAL
MONOCYTES # BLD AUTO: 0.62 K/UL (ref 0–0.85)
MONOCYTES NFR BLD AUTO: 7.2 % (ref 0–13.4)
NEUTROPHILS # BLD AUTO: 7.12 K/UL (ref 1.82–7.42)
NEUTROPHILS NFR BLD: 83.2 % (ref 44–72)
NITRITE UR QL STRIP.AUTO: NEGATIVE
NRBC # BLD AUTO: 0 K/UL
NRBC BLD-RTO: 0 /100 WBC (ref 0–0.2)
PH UR STRIP.AUTO: 5.5 [PH] (ref 5–8)
PLATELET # BLD AUTO: 296 K/UL (ref 164–446)
PMV BLD AUTO: 10.5 FL (ref 9–12.9)
POTASSIUM SERPL-SCNC: 4.8 MMOL/L (ref 3.6–5.5)
PROT SERPL-MCNC: 7.8 G/DL (ref 6–8.2)
PROT UR QL STRIP: NEGATIVE MG/DL
RBC # BLD AUTO: 3.31 M/UL (ref 4.2–5.4)
RBC UR QL AUTO: NEGATIVE
SODIUM SERPL-SCNC: 134 MMOL/L (ref 135–145)
SP GR UR STRIP.AUTO: 1.01
UROBILINOGEN UR STRIP.AUTO-MCNC: 0.2 MG/DL
WBC # BLD AUTO: 8.6 K/UL (ref 4.8–10.8)

## 2023-07-24 PROCEDURE — 99284 EMERGENCY DEPT VISIT MOD MDM: CPT

## 2023-07-24 PROCEDURE — 83690 ASSAY OF LIPASE: CPT

## 2023-07-24 PROCEDURE — 700111 HCHG RX REV CODE 636 W/ 250 OVERRIDE (IP): Performed by: EMERGENCY MEDICINE

## 2023-07-24 PROCEDURE — 74176 CT ABD & PELVIS W/O CONTRAST: CPT

## 2023-07-24 PROCEDURE — A9270 NON-COVERED ITEM OR SERVICE: HCPCS | Performed by: EMERGENCY MEDICINE

## 2023-07-24 PROCEDURE — 700101 HCHG RX REV CODE 250: Performed by: EMERGENCY MEDICINE

## 2023-07-24 PROCEDURE — 36415 COLL VENOUS BLD VENIPUNCTURE: CPT

## 2023-07-24 PROCEDURE — 700102 HCHG RX REV CODE 250 W/ 637 OVERRIDE(OP): Performed by: EMERGENCY MEDICINE

## 2023-07-24 PROCEDURE — 80053 COMPREHEN METABOLIC PANEL: CPT

## 2023-07-24 PROCEDURE — 85025 COMPLETE CBC W/AUTO DIFF WBC: CPT

## 2023-07-24 PROCEDURE — 81003 URINALYSIS AUTO W/O SCOPE: CPT

## 2023-07-24 RX ORDER — HYDROCODONE BITARTRATE AND ACETAMINOPHEN 5; 325 MG/1; MG/1
1 TABLET ORAL EVERY 4 HOURS PRN
Qty: 12 TABLET | Refills: 0 | Status: SHIPPED | OUTPATIENT
Start: 2023-07-24 | End: 2023-07-27

## 2023-07-24 RX ORDER — LIDOCAINE 50 MG/G
1 PATCH TOPICAL EVERY 24 HOURS
Qty: 10 PATCH | Refills: 0 | Status: SHIPPED | OUTPATIENT
Start: 2023-07-24 | End: 2023-09-15

## 2023-07-24 RX ORDER — HYDROCODONE BITARTRATE AND ACETAMINOPHEN 5; 325 MG/1; MG/1
1 TABLET ORAL ONCE
Status: COMPLETED | OUTPATIENT
Start: 2023-07-24 | End: 2023-07-24

## 2023-07-24 RX ORDER — LIDOCAINE 50 MG/G
1 PATCH TOPICAL EVERY 24 HOURS
Status: DISCONTINUED | OUTPATIENT
Start: 2023-07-24 | End: 2023-07-25 | Stop reason: HOSPADM

## 2023-07-24 RX ORDER — DEXAMETHASONE SODIUM PHOSPHATE 10 MG/ML
16 INJECTION, SOLUTION INTRAMUSCULAR; INTRAVENOUS ONCE
Status: COMPLETED | OUTPATIENT
Start: 2023-07-24 | End: 2023-07-24

## 2023-07-24 RX ADMIN — HYDROCODONE BITARTRATE AND ACETAMINOPHEN 1 TABLET: 5; 325 TABLET ORAL at 20:27

## 2023-07-24 RX ADMIN — DEXAMETHASONE SODIUM PHOSPHATE 16 MG: 10 INJECTION, SOLUTION INTRAMUSCULAR; INTRAVENOUS at 20:27

## 2023-07-24 RX ADMIN — LIDOCAINE 1 PATCH: 50 PATCH TOPICAL at 20:50

## 2023-07-24 ASSESSMENT — PAIN DESCRIPTION - PAIN TYPE
TYPE: ACUTE PAIN

## 2023-07-24 ASSESSMENT — FIBROSIS 4 INDEX: FIB4 SCORE: 2.14

## 2023-07-25 NOTE — ED NOTES
Pt discharged to home. Discharge paperwork provided. Education provided by ERP.  Pt was given follow up instructions and prescriptions. Pt verbalized understanding of all instructions for discharge.   Patient alert and oriented x 4, out of ER with all belongings via wheelchair

## 2023-07-25 NOTE — ED TRIAGE NOTES
"Chief Complaint   Patient presents with    Flank Pain     L sided, intermittent, started in the back and now radiating to the front.  Started 7/23, initially decreased with tylenol, now no change with OTC pain medication.       Pt wheeled to triage. Pt A&Ox4, came in for above.  States no hx of kidney stones.     Pt to lobby . Pt educated on alerting staff in changes to condition. Pt verbalized understanding. Protocol ordered.     BP (!) 140/66   Pulse 84   Temp 36.2 °C (97.2 °F) (Temporal)   Resp 18   Ht 1.575 m (5' 2\")   Wt 90.7 kg (200 lb)   LMP  (LMP Unknown)   SpO2 93%   BMI 36.58 kg/m²     "

## 2023-07-25 NOTE — ED PROVIDER NOTES
ED Provider Note    CHIEF COMPLAINT  Chief Complaint   Patient presents with    Flank Pain     L sided, intermittent, started in the back and now radiating to the front.  Started 7/23, initially decreased with tylenol, now no change with OTC pain medication.       EXTERNAL RECORDS REVIEWED  Other 5/24/2023 cardiology evaluation at [facility] heart: Reason for visit aortic stenosis    HPI/ROS  LIMITATION TO HISTORY   Select: : None      Aleshia Crooks is a 83 y.o. female who presents with 2 days of right flank pain rating to the right groin and down her right leg.  Patient states it began slowly and has progressively worsened.  It is crampy in nature and worse with movement.  She states she has never had symptoms like this before.  Denies weakness or numbness to her genital area, bowel or bladder dysfunction, or weakness to her lower extremities.  She has tried Tylenol the counter without significant change.  Patient denies dysuria, or blood in her urine.  She does have a known history of anemia and chronic kidney disease.  She denies history of kidney stones, falls, or back injuries.  She denies known rash.    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    SURGICAL HISTORY   has a past surgical history that includes thyroidectomy total; cholecystectomy; multiple coronary artery bypass; eye surgery (Bilateral); colonoscopy,diagnostic (N/A, 1/24/2023); upper gi endoscopy,diagnosis (N/A, 1/24/2023); upper gi endoscopy,biopsy (N/A, 1/24/2023); transcatheter mitral valve repair (N/A, 4/17/2023); and echocardiogram,  "transesophageal, intraoperative (N/A, 2023).    FAMILY HISTORY  Family History   Problem Relation Age of Onset    Cancer Mother         cancer, smoker    Stroke Maternal Grandmother     Other Other         Crohn    Kidney Disease Other         kidney transplant       SOCIAL HISTORY  Social History     Tobacco Use    Smoking status: Former     Packs/day: 1.00     Years: 31.00     Pack years: 31.00     Types: Cigarettes     Quit date: 5/10/1992     Years since quittin.2    Smokeless tobacco: Never    Tobacco comments:     Started smoking of  do not remember month or day   Vaping Use    Vaping Use: Never used   Substance and Sexual Activity    Alcohol use: No    Drug use: No    Sexual activity: Not Currently     Partners: Male       CURRENT MEDICATIONS  Home Medications       Reviewed by Victoria Grace R.N. (Registered Nurse) on 23 at 1806  Med List Status: Partial     Medication Last Dose Status   Acetaminophen (TYLENOL PO)  Active   atorvastatin (LIPITOR) 40 MG Tab  Active   bisoprolol (ZEBETA) 10 MG tablet  Active   furosemide (LASIX) 40 MG Tab  Active   LEVOXYL 150 MCG Tab  Active   losartan (COZAAR) 25 MG Tab  Active   pantoprazole (PROTONIX) 40 MG Tablet Delayed Response  Active   Secukinumab (COSENTYX SENSOREADY PEN) 150 MG/ML Solution Auto-injector  Active   spironolactone (ALDACTONE) 25 MG Tab  Active   Vitamins-Lipotropics (MULTI-VITAMIN HP/MINERALS) Cap  Active   warfarin (COUMADIN) 5 MG Tab  Active                    ALLERGIES  Allergies   Allergen Reactions    Morphine Vomiting       PHYSICAL EXAM  VITAL SIGNS: /65   Pulse 79   Temp 36.9 °C (98.4 °F) (Temporal)   Resp 20   Ht 1.575 m (5' 2\")   Wt 90.7 kg (200 lb)   LMP  (LMP Unknown)   SpO2 92%   BMI 36.58 kg/m²    Physical Exam  Vitals and nursing note reviewed.   Constitutional:       General: She is not in acute distress.     Appearance: Normal appearance. She is obese.   HENT:      Head: Normocephalic and atraumatic. "      Right Ear: External ear normal.      Left Ear: External ear normal.      Nose: Nose normal.      Mouth/Throat:      Mouth: Mucous membranes are moist.   Eyes:      Extraocular Movements: Extraocular movements intact.      Pupils: Pupils are equal, round, and reactive to light.   Cardiovascular:      Rate and Rhythm: Normal rate and regular rhythm.      Heart sounds: Murmur heard.   Pulmonary:      Effort: Pulmonary effort is normal.      Breath sounds: Normal breath sounds.   Abdominal:      General: Abdomen is flat. Bowel sounds are normal. There is no distension.      Palpations: Abdomen is soft.      Tenderness: There is no abdominal tenderness (Mild discomfort upon palpation of the left lower quadrant, not significantly tender).      Hernia: No hernia is present.   Musculoskeletal:         General: Tenderness (Significant tenderness along palpation to the left flank, left paraspinal muscles, left gluteal muscles with radiation down left leg.) present. Normal range of motion.      Cervical back: Normal range of motion.   Skin:     General: Skin is warm and dry.      Coloration: Skin is pale.   Neurological:      General: No focal deficit present.      Mental Status: She is alert and oriented to person, place, and time.      Comments: Normal strength and sensation of bilateral lower extremities.           DIAGNOSTIC STUDIES / PROCEDURES      LABS  Labs Reviewed   CBC WITH DIFFERENTIAL - Abnormal; Notable for the following components:       Result Value    RBC 3.31 (*)     Hemoglobin 8.9 (*)     Hematocrit 29.2 (*)     MCH 26.9 (*)     MCHC 30.5 (*)     RDW 71.9 (*)     Neutrophils-Polys 83.20 (*)     Lymphocytes 7.20 (*)     Lymphs (Absolute) 0.62 (*)     All other components within normal limits   COMP METABOLIC PANEL - Abnormal; Notable for the following components:    Sodium 134 (*)     Anion Gap 17.0 (*)     Glucose 132 (*)     Bun 52 (*)     Creatinine 2.61 (*)     Alkaline Phosphatase 101 (*)     All  other components within normal limits   ESTIMATED GFR - Abnormal; Notable for the following components:    GFR (CKD-EPI) 18 (*)     All other components within normal limits   LIPASE   URINALYSIS         RADIOLOGY  I have independently interpreted the diagnostic imaging associated with this visit and am waiting the final reading from the radiologist.   My preliminary interpretation is as follows: No evidence of hydronephrosis, air-fluid levels, or free air in the abdomen.  Radiologist interpretation:   CT-RENAL COLIC EVALUATION(A/P W/O)   Final Result         1.  No acute intra-abdominal abnormality identified   2.  Diverticulosis   3.  Hepatomegaly   4.  Changes of cirrhosis            COURSE & MEDICAL DECISION MAKING    ED Observation Status? Yes; I am placing the patient in to an observation status due to a diagnostic uncertainty as well as therapeutic intensity. Patient placed in observation status at 8:37 PM, 7/24/2023.     Observation plan is as follows: labs, imaging, pain medications, re-evaluation    Upon Reevaluation, the patient's condition has: Improved; and will be discharged.    Patient discharged from ED Observation status at 20 3:52 PM, 7/24/2023    INITIAL ASSESSMENT, COURSE AND PLAN  Care Narrative: Aleshia Crooks is a 83 y.o. female who presents with 2 days of right flank pain rating to the right groin and down her right leg.  Patient is afebrile, vital signs remarkable for mild hypertension at 140/66.  On exam, she does have mild left CVA tenderness but also significant tenderness to the left SI and gluteal area with radiation down her left leg.  Minimal left lower abdominal pain, however, patient states that the greater amount of pain is the left [place].  Radiation on the left leg.  She denies numbness to her genital area and she has normal strength and sensation of bilateral lower extremities.  Low suspicion for cauda equina with exam.  Differential includes sciatica, disc herniation,  ureteral stone, diverticulitis, pyelonephritis.    DISPOSITION AND DISCUSSIONS  Patient received Decadron, lidocaine patch, and Norco for her pain.  She states that she had significant improvement with this treatment.  No evidence of blood in urine, low suspicion for ureteral stone.  No evidence of infection in the urine.  CT renal without evidence of ureteral stone or acute intra-abdominal pathology.  No leukocytosis.  Patient does have a chronic anemia unchanged compared to previous labs.  She does have chronic kidney disease, creatinine is 2.6 which is stable compared to previous labs.  Patient is informed of the results.  I feel her symptoms are likely related to sciatica.  She is referred to spine surgery as well as physical therapy.  She states she will follow-up with her primary care provider as well.  She will be discharged home with a short prescription of Norco and lidocaine patches.  She is comfortable to plan with outpatient follow-up, given strict ER return precautions, discharged in good condition.      Decision tools and prescription drugs considered including, but not limited to: Pain Medications Norco .    FINAL DIAGNOSIS  1. Acute left-sided low back pain with left-sided sciatica Acute   2. Sciatica of left side Acute        I reviewed prescription monitoring program for patient's narcotic use before prescribing a scheduled drug.The patient will not drink alcohol nor drive with prescribed medications.The patient will return for new or worsening symptoms and is stable at the time of discharge.    The patient is referred to a primary physician for blood pressure management, diabetic screening, and for all other preventative health concerns.    In prescribing controlled substances to this patient, I certify that I have obtained and reviewed the medical history of Aleshia Crooks. I have also made a good luke effort to obtain applicable records from other providers who have treated the patient and  records did not demonstrate any increased risk of substance abuse that would prevent me from prescribing controlled substances.     I have conducted a physical exam and documented it. I have reviewed Ms. Crooks’s prescription history as maintained by the [place] Prescription Monitoring Program.     I have assessed the patient’s risk for abuse, dependency, and addiction using the validated Opioid Risk Tool available at https://www.mdcalc.com/uelvnh-pazi-regh-ort-narcotic-abuse.     Given the above, I believe the benefits of controlled substance therapy outweigh the risks. The reasons for prescribing controlled substances include non-narcotic, oral analgesic alternatives are contraindicated. Accordingly, I have discussed the risk and benefits, treatment plan, and alternative therapies with the patient.       DISPOSITION:  Patient will be discharged home in stable condition.    FOLLOW UP:  Asael Pink M.D.  [address] [place] [address]  [facility] [address]  [phone]      Follow-up at your scheduled appointment and discuss physical therapy for your left lower back pain    [facility], Emergency Dept  [address] [facility] [address]  [phone]    As needed, If symptoms worsen    Orlando Francis M.D.  [address] [facility]  [address]  [facility] [address]  [phone]    Schedule an appointment as soon as possible for a visit       [facility]  [address]  [facility] [address]  [phone]            OUTPATIENT MEDICATIONS:  Discharge Medication List as of 7/24/2023 11:44 PM        START taking these medications    Details   HYDROcodone-acetaminophen (NORCO) 5-325 MG Tab per tablet Take 1 Tablet by mouth every four hours as needed (severe pain) for up to 3 days., Disp-12 Tablet, R-0, Normal      lidocaine (LIDODERM) 5 % Patch Place 1 Patch on the skin every 24 hours., Disp-10 Patch, R-0, Normal             Electronically signed by: Mali Webber M.D.,  7/24/2023 8:32 PM

## 2023-07-25 NOTE — ED NOTES
Instructed to give urine sample, but said she doesn't have yet, saying she is in a lot of pain right now

## 2023-07-26 ENCOUNTER — HOSPITAL ENCOUNTER (INPATIENT)
Facility: MEDICAL CENTER | Age: 83
LOS: 3 days | DRG: 273 | End: 2023-07-29
Attending: INTERNAL MEDICINE | Admitting: INTERNAL MEDICINE
Payer: MEDICARE

## 2023-07-26 ENCOUNTER — ANESTHESIA (OUTPATIENT)
Dept: CARDIOLOGY | Facility: MEDICAL CENTER | Age: 83
DRG: 273 | End: 2023-07-26
Payer: MEDICARE

## 2023-07-26 ENCOUNTER — ANESTHESIA EVENT (OUTPATIENT)
Dept: CARDIOLOGY | Facility: MEDICAL CENTER | Age: 83
DRG: 273 | End: 2023-07-26
Payer: MEDICARE

## 2023-07-26 ENCOUNTER — APPOINTMENT (OUTPATIENT)
Dept: CARDIOLOGY | Facility: MEDICAL CENTER | Age: 83
DRG: 273 | End: 2023-07-26
Attending: INTERNAL MEDICINE
Payer: MEDICARE

## 2023-07-26 ENCOUNTER — APPOINTMENT (OUTPATIENT)
Dept: RADIOLOGY | Facility: MEDICAL CENTER | Age: 83
DRG: 273 | End: 2023-07-26
Attending: INTERNAL MEDICINE
Payer: MEDICARE

## 2023-07-26 DIAGNOSIS — I31.4 PERICARDIAL EFFUSION WITH CARDIAC TAMPONADE: ICD-10-CM

## 2023-07-26 DIAGNOSIS — I46.9 CARDIAC ARREST (HCC): ICD-10-CM

## 2023-07-26 DIAGNOSIS — I48.11 LONGSTANDING PERSISTENT ATRIAL FIBRILLATION (HCC): ICD-10-CM

## 2023-07-26 DIAGNOSIS — I31.39 PERICARDIAL EFFUSION WITH CARDIAC TAMPONADE: ICD-10-CM

## 2023-07-26 LAB
ABO GROUP BLD: NORMAL
ACT BLD: 299 SEC (ref 74–137)
ANION GAP SERPL CALC-SCNC: 21 MMOL/L (ref 7–16)
BARCODED ABORH UBTYP: 6200
BARCODED ABORH UBTYP: 6200
BARCODED PRD CODE UBPRD: NORMAL
BARCODED PRD CODE UBPRD: NORMAL
BARCODED UNIT NUM UBUNT: NORMAL
BARCODED UNIT NUM UBUNT: NORMAL
BLD GP AB SCN SERPL QL: NORMAL
BUN SERPL-MCNC: 52 MG/DL (ref 8–22)
CALCIUM SERPL-MCNC: 8.6 MG/DL (ref 8.5–10.5)
CHLORIDE SERPL-SCNC: 98 MMOL/L (ref 96–112)
CO2 SERPL-SCNC: 17 MMOL/L (ref 20–33)
COMPONENT R 8504R: NORMAL
COMPONENT R 8504R: NORMAL
CREAT SERPL-MCNC: 2.42 MG/DL (ref 0.5–1.4)
EKG IMPRESSION: NORMAL
ERYTHROCYTE [DISTWIDTH] IN BLOOD BY AUTOMATED COUNT: 73.3 FL (ref 35.9–50)
GFR SERPLBLD CREATININE-BSD FMLA CKD-EPI: 20 ML/MIN/1.73 M 2
GLUCOSE BLD STRIP.AUTO-MCNC: 234 MG/DL (ref 65–99)
GLUCOSE SERPL-MCNC: 235 MG/DL (ref 65–99)
HCT VFR BLD AUTO: 25.4 % (ref 37–47)
HGB BLD-MCNC: 7.5 G/DL (ref 12–16)
LACTATE SERPL-SCNC: 7.4 MMOL/L (ref 0.5–2)
MAGNESIUM SERPL-MCNC: 2.6 MG/DL (ref 1.5–2.5)
MCH RBC QN AUTO: 26.7 PG (ref 27–33)
MCHC RBC AUTO-ENTMCNC: 29.5 G/DL (ref 32.2–35.5)
MCV RBC AUTO: 90.4 FL (ref 81.4–97.8)
PLATELET # BLD AUTO: 374 K/UL (ref 164–446)
PMV BLD AUTO: 10.8 FL (ref 9–12.9)
POTASSIUM SERPL-SCNC: 5.3 MMOL/L (ref 3.6–5.5)
PRODUCT TYPE UPROD: NORMAL
PRODUCT TYPE UPROD: NORMAL
RBC # BLD AUTO: 2.81 M/UL (ref 4.2–5.4)
RH BLD: NORMAL
SODIUM SERPL-SCNC: 136 MMOL/L (ref 135–145)
TROPONIN T SERPL-MCNC: 44 NG/L (ref 6–19)
UNIT STATUS USTAT: NORMAL
UNIT STATUS USTAT: NORMAL
WBC # BLD AUTO: 15.1 K/UL (ref 4.8–10.8)

## 2023-07-26 PROCEDURE — 71045 X-RAY EXAM CHEST 1 VIEW: CPT

## 2023-07-26 PROCEDURE — 5A12012 PERFORMANCE OF CARDIAC OUTPUT, SINGLE, MANUAL: ICD-10-PCS | Performed by: STUDENT IN AN ORGANIZED HEALTH CARE EDUCATION/TRAINING PROGRAM

## 2023-07-26 PROCEDURE — 700105 HCHG RX REV CODE 258: Mod: JZ | Performed by: INTERNAL MEDICINE

## 2023-07-26 PROCEDURE — 86901 BLOOD TYPING SEROLOGIC RH(D): CPT

## 2023-07-26 PROCEDURE — 700111 HCHG RX REV CODE 636 W/ 250 OVERRIDE (IP): Mod: JZ

## 2023-07-26 PROCEDURE — 700102 HCHG RX REV CODE 250 W/ 637 OVERRIDE(OP): Performed by: NURSE PRACTITIONER

## 2023-07-26 PROCEDURE — 700101 HCHG RX REV CODE 250: Performed by: INTERNAL MEDICINE

## 2023-07-26 PROCEDURE — 700102 HCHG RX REV CODE 250 W/ 637 OVERRIDE(OP): Performed by: ANESTHESIOLOGY

## 2023-07-26 PROCEDURE — 86850 RBC ANTIBODY SCREEN: CPT

## 2023-07-26 PROCEDURE — 33340 PERQ CLSR TCAT L ATR APNDGE: CPT | Mod: Q0 | Performed by: INTERNAL MEDICINE

## 2023-07-26 PROCEDURE — 700111 HCHG RX REV CODE 636 W/ 250 OVERRIDE (IP): Mod: JZ | Performed by: INTERNAL MEDICINE

## 2023-07-26 PROCEDURE — 36620 INSERTION CATHETER ARTERY: CPT

## 2023-07-26 PROCEDURE — 03HY32Z INSERTION OF MONITORING DEVICE INTO UPPER ARTERY, PERCUTANEOUS APPROACH: ICD-10-PCS | Performed by: INTERNAL MEDICINE

## 2023-07-26 PROCEDURE — B24BZZ4 ULTRASONOGRAPHY OF HEART WITH AORTA, TRANSESOPHAGEAL: ICD-10-PCS | Performed by: ANESTHESIOLOGY

## 2023-07-26 PROCEDURE — 85347 COAGULATION TIME ACTIVATED: CPT

## 2023-07-26 PROCEDURE — 86923 COMPATIBILITY TEST ELECTRIC: CPT

## 2023-07-26 PROCEDURE — 700117 HCHG RX CONTRAST REV CODE 255: Performed by: INTERNAL MEDICINE

## 2023-07-26 PROCEDURE — 36556 INSERT NON-TUNNEL CV CATH: CPT

## 2023-07-26 PROCEDURE — 700111 HCHG RX REV CODE 636 W/ 250 OVERRIDE (IP)

## 2023-07-26 PROCEDURE — 93005 ELECTROCARDIOGRAM TRACING: CPT | Performed by: INTERNAL MEDICINE

## 2023-07-26 PROCEDURE — 700101 HCHG RX REV CODE 250: Performed by: ANESTHESIOLOGY

## 2023-07-26 PROCEDURE — 700111 HCHG RX REV CODE 636 W/ 250 OVERRIDE (IP): Mod: JZ | Performed by: ANESTHESIOLOGY

## 2023-07-26 PROCEDURE — 82962 GLUCOSE BLOOD TEST: CPT

## 2023-07-26 PROCEDURE — 99291 CRITICAL CARE FIRST HOUR: CPT | Mod: 25 | Performed by: INTERNAL MEDICINE

## 2023-07-26 PROCEDURE — 85027 COMPLETE CBC AUTOMATED: CPT

## 2023-07-26 PROCEDURE — 30233N1 TRANSFUSION OF NONAUTOLOGOUS RED BLOOD CELLS INTO PERIPHERAL VEIN, PERCUTANEOUS APPROACH: ICD-10-PCS | Performed by: STUDENT IN AN ORGANIZED HEALTH CARE EDUCATION/TRAINING PROGRAM

## 2023-07-26 PROCEDURE — A9270 NON-COVERED ITEM OR SERVICE: HCPCS | Performed by: INTERNAL MEDICINE

## 2023-07-26 PROCEDURE — 99100 ANES PT EXTEME AGE<1 YR&>70: CPT | Performed by: ANESTHESIOLOGY

## 2023-07-26 PROCEDURE — 80048 BASIC METABOLIC PNL TOTAL CA: CPT

## 2023-07-26 PROCEDURE — A9270 NON-COVERED ITEM OR SERVICE: HCPCS | Performed by: NURSE PRACTITIONER

## 2023-07-26 PROCEDURE — 85610 PROTHROMBIN TIME: CPT

## 2023-07-26 PROCEDURE — A9270 NON-COVERED ITEM OR SERVICE: HCPCS | Performed by: ANESTHESIOLOGY

## 2023-07-26 PROCEDURE — 01926 ANES IVNTL RAD ICR ICAR/AORT: CPT | Performed by: ANESTHESIOLOGY

## 2023-07-26 PROCEDURE — P9016 RBC LEUKOCYTES REDUCED: HCPCS

## 2023-07-26 PROCEDURE — 93010 ELECTROCARDIOGRAM REPORT: CPT | Mod: Q1 | Performed by: STUDENT IN AN ORGANIZED HEALTH CARE EDUCATION/TRAINING PROGRAM

## 2023-07-26 PROCEDURE — 700101 HCHG RX REV CODE 250

## 2023-07-26 PROCEDURE — 86900 BLOOD TYPING SEROLOGIC ABO: CPT

## 2023-07-26 PROCEDURE — 92950 HEART/LUNG RESUSCITATION CPR: CPT | Performed by: STUDENT IN AN ORGANIZED HEALTH CARE EDUCATION/TRAINING PROGRAM

## 2023-07-26 PROCEDURE — 770022 HCHG ROOM/CARE - ICU (200)

## 2023-07-26 PROCEDURE — C1751 CATH, INF, PER/CENT/MIDLINE: HCPCS

## 2023-07-26 PROCEDURE — 0W9D30Z DRAINAGE OF PERICARDIAL CAVITY WITH DRAINAGE DEVICE, PERCUTANEOUS APPROACH: ICD-10-PCS | Performed by: INTERNAL MEDICINE

## 2023-07-26 PROCEDURE — 160035 HCHG PACU - 1ST 60 MINS PHASE I

## 2023-07-26 PROCEDURE — 93320 DOPPLER ECHO COMPLETE: CPT | Mod: 26 | Performed by: ANESTHESIOLOGY

## 2023-07-26 PROCEDURE — 93312 ECHO TRANSESOPHAGEAL: CPT | Mod: 26,59 | Performed by: ANESTHESIOLOGY

## 2023-07-26 PROCEDURE — 160002 HCHG RECOVERY MINUTES (STAT)

## 2023-07-26 PROCEDURE — 301139 CL-LEFT ATRIAL APPENDAGE CLOSURE

## 2023-07-26 PROCEDURE — 83605 ASSAY OF LACTIC ACID: CPT

## 2023-07-26 PROCEDURE — 700102 HCHG RX REV CODE 250 W/ 637 OVERRIDE(OP): Performed by: INTERNAL MEDICINE

## 2023-07-26 PROCEDURE — 700105 HCHG RX REV CODE 258

## 2023-07-26 PROCEDURE — 85576 BLOOD PLATELET AGGREGATION: CPT | Mod: 91

## 2023-07-26 PROCEDURE — 84484 ASSAY OF TROPONIN QUANT: CPT

## 2023-07-26 PROCEDURE — 93325 DOPPLER ECHO COLOR FLOW MAPG: CPT | Mod: 26 | Performed by: ANESTHESIOLOGY

## 2023-07-26 PROCEDURE — 36430 TRANSFUSION BLD/BLD COMPNT: CPT

## 2023-07-26 PROCEDURE — 36620 INSERTION CATHETER ARTERY: CPT | Performed by: INTERNAL MEDICINE

## 2023-07-26 PROCEDURE — 700111 HCHG RX REV CODE 636 W/ 250 OVERRIDE (IP): Performed by: ANESTHESIOLOGY

## 2023-07-26 PROCEDURE — 33017 PRCRD DRG 6YR+ W/O CGEN CAR: CPT | Performed by: INTERNAL MEDICINE

## 2023-07-26 PROCEDURE — 85384 FIBRINOGEN ACTIVITY: CPT

## 2023-07-26 PROCEDURE — 99291 CRITICAL CARE FIRST HOUR: CPT | Mod: 25,GC | Performed by: INTERNAL MEDICINE

## 2023-07-26 PROCEDURE — 93325 DOPPLER ECHO COLOR FLOW MAPG: CPT

## 2023-07-26 PROCEDURE — 02L73DK OCCLUSION OF LEFT ATRIAL APPENDAGE WITH INTRALUMINAL DEVICE, PERCUTANEOUS APPROACH: ICD-10-PCS | Performed by: INTERNAL MEDICINE

## 2023-07-26 PROCEDURE — 83735 ASSAY OF MAGNESIUM: CPT

## 2023-07-26 RX ORDER — PANTOPRAZOLE SODIUM 40 MG/1
40 TABLET, DELAYED RELEASE ORAL 2 TIMES DAILY
Status: DISCONTINUED | OUTPATIENT
Start: 2023-07-26 | End: 2023-07-26

## 2023-07-26 RX ORDER — LIDOCAINE HYDROCHLORIDE 20 MG/ML
INJECTION, SOLUTION EPIDURAL; INFILTRATION; INTRACAUDAL; PERINEURAL PRN
Status: DISCONTINUED | OUTPATIENT
Start: 2023-07-26 | End: 2023-07-26 | Stop reason: SURG

## 2023-07-26 RX ORDER — HALOPERIDOL 5 MG/ML
1 INJECTION INTRAMUSCULAR
Status: DISCONTINUED | OUTPATIENT
Start: 2023-07-26 | End: 2023-07-26 | Stop reason: HOSPADM

## 2023-07-26 RX ORDER — OXYCODONE HCL 5 MG/5 ML
5 SOLUTION, ORAL ORAL
Status: COMPLETED | OUTPATIENT
Start: 2023-07-26 | End: 2023-07-26

## 2023-07-26 RX ORDER — BUPIVACAINE HYDROCHLORIDE 5 MG/ML
INJECTION, SOLUTION EPIDURAL; INTRACAUDAL
Status: COMPLETED
Start: 2023-07-26 | End: 2023-07-26

## 2023-07-26 RX ORDER — SODIUM CHLORIDE, SODIUM LACTATE, POTASSIUM CHLORIDE, AND CALCIUM CHLORIDE .6; .31; .03; .02 G/100ML; G/100ML; G/100ML; G/100ML
500 INJECTION, SOLUTION INTRAVENOUS ONCE
Status: COMPLETED | OUTPATIENT
Start: 2023-07-26 | End: 2023-07-27

## 2023-07-26 RX ORDER — DEXAMETHASONE SODIUM PHOSPHATE 4 MG/ML
INJECTION, SOLUTION INTRA-ARTICULAR; INTRALESIONAL; INTRAMUSCULAR; INTRAVENOUS; SOFT TISSUE PRN
Status: DISCONTINUED | OUTPATIENT
Start: 2023-07-26 | End: 2023-07-26 | Stop reason: SURG

## 2023-07-26 RX ORDER — HYDROMORPHONE HYDROCHLORIDE 1 MG/ML
0.2 INJECTION, SOLUTION INTRAMUSCULAR; INTRAVENOUS; SUBCUTANEOUS
Status: DISCONTINUED | OUTPATIENT
Start: 2023-07-26 | End: 2023-07-26 | Stop reason: HOSPADM

## 2023-07-26 RX ORDER — LOSARTAN POTASSIUM 25 MG/1
25 TABLET ORAL EVERY EVENING
Status: DISCONTINUED | OUTPATIENT
Start: 2023-07-26 | End: 2023-07-27

## 2023-07-26 RX ORDER — ATORVASTATIN CALCIUM 40 MG/1
40 TABLET, FILM COATED ORAL EVERY EVENING
Status: DISCONTINUED | OUTPATIENT
Start: 2023-07-26 | End: 2023-07-29 | Stop reason: HOSPADM

## 2023-07-26 RX ORDER — NOREPINEPHRINE BITARTRATE 0.03 MG/ML
0-1 INJECTION, SOLUTION INTRAVENOUS CONTINUOUS
Status: DISCONTINUED | OUTPATIENT
Start: 2023-07-26 | End: 2023-07-27

## 2023-07-26 RX ORDER — OMEPRAZOLE 20 MG/1
40 CAPSULE, DELAYED RELEASE ORAL 2 TIMES DAILY
Status: DISCONTINUED | OUTPATIENT
Start: 2023-07-26 | End: 2023-07-29 | Stop reason: HOSPADM

## 2023-07-26 RX ORDER — HYDROMORPHONE HYDROCHLORIDE 1 MG/ML
0.4 INJECTION, SOLUTION INTRAMUSCULAR; INTRAVENOUS; SUBCUTANEOUS
Status: DISCONTINUED | OUTPATIENT
Start: 2023-07-26 | End: 2023-07-26 | Stop reason: HOSPADM

## 2023-07-26 RX ORDER — ONDANSETRON 2 MG/ML
INJECTION INTRAMUSCULAR; INTRAVENOUS PRN
Status: DISCONTINUED | OUTPATIENT
Start: 2023-07-26 | End: 2023-07-26 | Stop reason: SURG

## 2023-07-26 RX ORDER — PHENYLEPHRINE HCL IN 0.9% NACL 0.5 MG/5ML
SYRINGE (ML) INTRAVENOUS
Status: COMPLETED | OUTPATIENT
Start: 2023-07-26 | End: 2023-07-26

## 2023-07-26 RX ORDER — LIDOCAINE HYDROCHLORIDE 40 MG/ML
SOLUTION TOPICAL PRN
Status: DISCONTINUED | OUTPATIENT
Start: 2023-07-26 | End: 2023-07-26 | Stop reason: SURG

## 2023-07-26 RX ORDER — DIPHENHYDRAMINE HYDROCHLORIDE 50 MG/ML
12.5 INJECTION INTRAMUSCULAR; INTRAVENOUS
Status: DISCONTINUED | OUTPATIENT
Start: 2023-07-26 | End: 2023-07-26 | Stop reason: HOSPADM

## 2023-07-26 RX ORDER — HYDROMORPHONE HYDROCHLORIDE 1 MG/ML
0.1 INJECTION, SOLUTION INTRAMUSCULAR; INTRAVENOUS; SUBCUTANEOUS
Status: DISCONTINUED | OUTPATIENT
Start: 2023-07-26 | End: 2023-07-26 | Stop reason: HOSPADM

## 2023-07-26 RX ORDER — IPRATROPIUM BROMIDE AND ALBUTEROL SULFATE 2.5; .5 MG/3ML; MG/3ML
3 SOLUTION RESPIRATORY (INHALATION)
Status: DISCONTINUED | OUTPATIENT
Start: 2023-07-26 | End: 2023-07-26 | Stop reason: HOSPADM

## 2023-07-26 RX ORDER — SODIUM CHLORIDE, SODIUM LACTATE, POTASSIUM CHLORIDE, CALCIUM CHLORIDE 600; 310; 30; 20 MG/100ML; MG/100ML; MG/100ML; MG/100ML
INJECTION, SOLUTION INTRAVENOUS CONTINUOUS
Status: ACTIVE | OUTPATIENT
Start: 2023-07-26 | End: 2023-07-26

## 2023-07-26 RX ORDER — CEFAZOLIN SODIUM 1 G/3ML
INJECTION, POWDER, FOR SOLUTION INTRAMUSCULAR; INTRAVENOUS PRN
Status: DISCONTINUED | OUTPATIENT
Start: 2023-07-26 | End: 2023-07-26 | Stop reason: SURG

## 2023-07-26 RX ORDER — HYDROCODONE BITARTRATE AND ACETAMINOPHEN 5; 325 MG/1; MG/1
1 TABLET ORAL EVERY 4 HOURS PRN
Status: DISCONTINUED | OUTPATIENT
Start: 2023-07-26 | End: 2023-07-29 | Stop reason: HOSPADM

## 2023-07-26 RX ORDER — FUROSEMIDE 20 MG/1
40 TABLET ORAL DAILY
Status: DISCONTINUED | OUTPATIENT
Start: 2023-07-27 | End: 2023-07-27

## 2023-07-26 RX ORDER — SPIRONOLACTONE 25 MG/1
25 TABLET ORAL DAILY
Status: DISCONTINUED | OUTPATIENT
Start: 2023-07-27 | End: 2023-07-27

## 2023-07-26 RX ORDER — HEPARIN SODIUM 200 [USP'U]/100ML
INJECTION, SOLUTION INTRAVENOUS
Status: COMPLETED
Start: 2023-07-26 | End: 2023-07-26

## 2023-07-26 RX ORDER — ONDANSETRON 2 MG/ML
4 INJECTION INTRAMUSCULAR; INTRAVENOUS
Status: COMPLETED | OUTPATIENT
Start: 2023-07-26 | End: 2023-07-26

## 2023-07-26 RX ORDER — HEPARIN SODIUM 1000 [USP'U]/ML
INJECTION, SOLUTION INTRAVENOUS; SUBCUTANEOUS
Status: COMPLETED
Start: 2023-07-26 | End: 2023-07-26

## 2023-07-26 RX ORDER — ASPIRIN 81 MG/1
81 TABLET ORAL DAILY
Status: DISCONTINUED | OUTPATIENT
Start: 2023-07-27 | End: 2023-07-26

## 2023-07-26 RX ORDER — BISOPROLOL FUMARATE 5 MG/1
10 TABLET, FILM COATED ORAL DAILY
Status: DISCONTINUED | OUTPATIENT
Start: 2023-07-27 | End: 2023-07-27

## 2023-07-26 RX ORDER — WARFARIN SODIUM 5 MG/1
5 TABLET ORAL DAILY
Status: DISCONTINUED | OUTPATIENT
Start: 2023-07-26 | End: 2023-07-27

## 2023-07-26 RX ORDER — OXYCODONE HCL 5 MG/5 ML
10 SOLUTION, ORAL ORAL
Status: COMPLETED | OUTPATIENT
Start: 2023-07-26 | End: 2023-07-26

## 2023-07-26 RX ORDER — LEVOTHYROXINE SODIUM 0.15 MG/1
150 TABLET ORAL
Status: DISCONTINUED | OUTPATIENT
Start: 2023-07-27 | End: 2023-07-29 | Stop reason: HOSPADM

## 2023-07-26 RX ORDER — LIDOCAINE HYDROCHLORIDE 20 MG/ML
INJECTION, SOLUTION INFILTRATION; PERINEURAL
Status: COMPLETED
Start: 2023-07-26 | End: 2023-07-26

## 2023-07-26 RX ORDER — PROTAMINE SULFATE 10 MG/ML
INJECTION, SOLUTION INTRAVENOUS
Status: COMPLETED
Start: 2023-07-26 | End: 2023-07-26

## 2023-07-26 RX ORDER — SODIUM CHLORIDE, SODIUM LACTATE, POTASSIUM CHLORIDE, CALCIUM CHLORIDE 600; 310; 30; 20 MG/100ML; MG/100ML; MG/100ML; MG/100ML
INJECTION, SOLUTION INTRAVENOUS CONTINUOUS
Status: DISCONTINUED | OUTPATIENT
Start: 2023-07-26 | End: 2023-07-26 | Stop reason: HOSPADM

## 2023-07-26 RX ORDER — ASPIRIN 81 MG/1
81 TABLET ORAL EVERY EVENING
Status: DISCONTINUED | OUTPATIENT
Start: 2023-07-26 | End: 2023-07-26

## 2023-07-26 RX ADMIN — CEFAZOLIN 2 G: 1 INJECTION, POWDER, FOR SOLUTION INTRAMUSCULAR; INTRAVENOUS at 13:54

## 2023-07-26 RX ADMIN — ONDANSETRON 4 MG: 2 INJECTION INTRAMUSCULAR; INTRAVENOUS at 14:54

## 2023-07-26 RX ADMIN — IOHEXOL 10 ML: 350 INJECTION, SOLUTION INTRAVENOUS at 13:45

## 2023-07-26 RX ADMIN — LIDOCAINE HYDROCHLORIDE 100 MG: 20 INJECTION, SOLUTION EPIDURAL; INFILTRATION; INTRACAUDAL at 13:54

## 2023-07-26 RX ADMIN — FENTANYL CITRATE 100 MCG: 50 INJECTION, SOLUTION INTRAMUSCULAR; INTRAVENOUS at 21:57

## 2023-07-26 RX ADMIN — PROPOFOL 50 MG: 10 INJECTION, EMULSION INTRAVENOUS at 14:54

## 2023-07-26 RX ADMIN — Medication 300 MCG: at 21:19

## 2023-07-26 RX ADMIN — PROTAMINE SULFATE 40 MG: 10 INJECTION, SOLUTION INTRAVENOUS at 14:51

## 2023-07-26 RX ADMIN — SUGAMMADEX 200 MG: 100 INJECTION, SOLUTION INTRAVENOUS at 14:54

## 2023-07-26 RX ADMIN — HEPARIN SODIUM 4000 UNITS: 200 INJECTION, SOLUTION INTRAVENOUS at 13:30

## 2023-07-26 RX ADMIN — DEXAMETHASONE SODIUM PHOSPHATE 8 MG: 4 INJECTION INTRA-ARTICULAR; INTRALESIONAL; INTRAMUSCULAR; INTRAVENOUS; SOFT TISSUE at 13:54

## 2023-07-26 RX ADMIN — ONDANSETRON 4 MG: 2 INJECTION INTRAMUSCULAR; INTRAVENOUS at 15:52

## 2023-07-26 RX ADMIN — SODIUM CHLORIDE, POTASSIUM CHLORIDE, SODIUM LACTATE AND CALCIUM CHLORIDE 500 ML: 600; 310; 30; 20 INJECTION, SOLUTION INTRAVENOUS at 21:00

## 2023-07-26 RX ADMIN — ASPIRIN 81 MG: 81 TABLET, COATED ORAL at 18:04

## 2023-07-26 RX ADMIN — ROCURONIUM BROMIDE 50 MG: 10 INJECTION, SOLUTION INTRAVENOUS at 13:54

## 2023-07-26 RX ADMIN — LOSARTAN POTASSIUM 25 MG: 25 TABLET, FILM COATED ORAL at 18:04

## 2023-07-26 RX ADMIN — FENTANYL CITRATE 100 MCG: 50 INJECTION, SOLUTION INTRAMUSCULAR; INTRAVENOUS at 23:15

## 2023-07-26 RX ADMIN — PROPOFOL 150 MG: 10 INJECTION, EMULSION INTRAVENOUS at 13:54

## 2023-07-26 RX ADMIN — HEPARIN SODIUM: 1000 INJECTION, SOLUTION INTRAVENOUS; SUBCUTANEOUS at 14:27

## 2023-07-26 RX ADMIN — NOREPINEPHRINE BITARTRATE 0.18 MCG/KG/MIN: 1 INJECTION INTRAVENOUS at 23:26

## 2023-07-26 RX ADMIN — ATORVASTATIN CALCIUM 40 MG: 40 TABLET, FILM COATED ORAL at 18:03

## 2023-07-26 RX ADMIN — LIDOCAINE HYDROCHLORIDE: 20 INJECTION, SOLUTION INFILTRATION; PERINEURAL at 13:33

## 2023-07-26 RX ADMIN — NOREPINEPHRINE BITARTRATE 0.2 MCG/KG/MIN: 1 INJECTION INTRAVENOUS at 21:22

## 2023-07-26 RX ADMIN — WARFARIN SODIUM 5 MG: 5 TABLET ORAL at 18:03

## 2023-07-26 RX ADMIN — BUPIVACAINE HYDROCHLORIDE: 5 INJECTION, SOLUTION EPIDURAL; INTRACAUDAL; PERINEURAL at 13:33

## 2023-07-26 RX ADMIN — Medication 400 MCG: at 21:21

## 2023-07-26 RX ADMIN — OXYCODONE HYDROCHLORIDE 5 MG: 5 SOLUTION ORAL at 15:52

## 2023-07-26 RX ADMIN — SODIUM CHLORIDE, POTASSIUM CHLORIDE, SODIUM LACTATE AND CALCIUM CHLORIDE: 600; 310; 30; 20 INJECTION, SOLUTION INTRAVENOUS at 13:47

## 2023-07-26 RX ADMIN — LIDOCAINE HYDROCHLORIDE 4 ML: 40 SOLUTION TOPICAL at 13:55

## 2023-07-26 RX ADMIN — OMEPRAZOLE 40 MG: 20 CAPSULE, DELAYED RELEASE ORAL at 18:04

## 2023-07-26 ASSESSMENT — PAIN DESCRIPTION - PAIN TYPE
TYPE: SURGICAL PAIN
TYPE: SURGICAL PAIN
TYPE: ACUTE PAIN;SURGICAL PAIN
TYPE: SURGICAL PAIN

## 2023-07-26 ASSESSMENT — LIFESTYLE VARIABLES
TOTAL SCORE: 0
TOTAL SCORE: 0
AVERAGE NUMBER OF DAYS PER WEEK YOU HAVE A DRINK CONTAINING ALCOHOL: 0
HAVE PEOPLE ANNOYED YOU BY CRITICIZING YOUR DRINKING: NO
ON A TYPICAL DAY WHEN YOU DRINK ALCOHOL HOW MANY DRINKS DO YOU HAVE: 0
CONSUMPTION TOTAL: NEGATIVE
HOW MANY TIMES IN THE PAST YEAR HAVE YOU HAD 5 OR MORE DRINKS IN A DAY: 0
HAVE YOU EVER FELT YOU SHOULD CUT DOWN ON YOUR DRINKING: NO
DOES PATIENT WANT TO STOP DRINKING: NO
TOTAL SCORE: 0
ALCOHOL_USE: NO
EVER HAD A DRINK FIRST THING IN THE MORNING TO STEADY YOUR NERVES TO GET RID OF A HANGOVER: NO
EVER FELT BAD OR GUILTY ABOUT YOUR DRINKING: NO

## 2023-07-26 ASSESSMENT — ENCOUNTER SYMPTOMS
DIAPHORESIS: 0
COUGH: 0
BACK PAIN: 0
WHEEZING: 0
LOSS OF CONSCIOUSNESS: 1
SENSORY CHANGE: 0
TREMORS: 0
DOUBLE VISION: 0
HEADACHES: 0
PALPITATIONS: 0
DIZZINESS: 1
ORTHOPNEA: 0
BLURRED VISION: 0
NAUSEA: 0
SPEECH CHANGE: 0
NECK PAIN: 0
TINGLING: 0
ABDOMINAL PAIN: 0
FOCAL WEAKNESS: 0
MYALGIAS: 0
VOMITING: 0
CHILLS: 0
FEVER: 0
SHORTNESS OF BREATH: 1
HEMOPTYSIS: 0

## 2023-07-26 ASSESSMENT — COGNITIVE AND FUNCTIONAL STATUS - GENERAL
DAILY ACTIVITIY SCORE: 24
MOBILITY SCORE: 23
SUGGESTED CMS G CODE MODIFIER DAILY ACTIVITY: CH
CLIMB 3 TO 5 STEPS WITH RAILING: A LITTLE
SUGGESTED CMS G CODE MODIFIER MOBILITY: CI

## 2023-07-26 ASSESSMENT — FIBROSIS 4 INDEX
FIB4 SCORE: 1.42
FIB4 SCORE: 1.123051946590399009
FIB4 SCORE: 1.42

## 2023-07-26 NOTE — OR NURSING
Assume care for patient in pre-op. Patient allergies, surgical procedure and NPO status verified. PIV started and IVF infusing. Belongings secured in the locker. Call light within reach.

## 2023-07-26 NOTE — ANESTHESIA PROCEDURE NOTES
Airway    Date/Time: 7/26/2023 1:55 PM    Performed by: Fili Vickers M.D.  Authorized by: Fili Vickers M.D.    Location:  OR  Urgency:  Elective  Indications for Airway Management:  Anesthesia      Spontaneous Ventilation: absent    Sedation Level:  Deep  Preoxygenated: Yes    Patient Position:  Sniffing  Final Airway Type:  Endotracheal airway  Final Endotracheal Airway:  ETT  Cuffed: Yes    Technique Used for Successful ETT Placement:  Direct laryngoscopy    Insertion Site:  Oral  Blade Type:  Leanne  Laryngoscope Blade/Videolaryngoscope Blade Size:  3  ETT Size (mm):  7.0  Measured from:  Gums  ETT to Gums (cm):  20  Placement Verified by: auscultation and capnometry    Cormack-Lehane Classification:  Grade I - full view of glottis  Number of Attempts at Approach:  1

## 2023-07-26 NOTE — H&P
[facility]  Electrophysiology Pre-procedure H&P    DOS:7/26/2023    Planned Procedure: CANDELARIO-C    Chief complaint/Reason for Procedure: Anemia    HPI: 82 y/o F with chronic GIB and permanent afib, mitral clip in situ, for CANDELARIO-C.      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) 06/08/2020 2015    Moderate tricuspid regurgitation 11/16/2022    Thyroid disease        Past Surgical History:   Procedure Laterality Date    TRANSCATHETER MITRAL VALVE REPAIR N/A 4/17/2023    Procedure: TRANSCATHETER MITRAL VALVE PROCEDURE;  Surgeon: Cesar Gray M.D.;  Location: SURGERY [facility];  Service: Cardiac    ECHOCARDIOGRAM, TRANSESOPHAGEAL, INTRAOPERATIVE N/A 4/17/2023    Procedure: ECHOCARDIOGRAM, TRANSESOPHAGEAL, INTRAOPERATIVE;  Surgeon: Cesar Gray M.D.;  Location: SURGERY [facility];  Service: Cardiac    CO COLONOSCOPY,DIAGNOSTIC N/A 1/24/2023    Procedure: COLONOSCOPY;  Surgeon: Amy Zarate M.D.;  Location: SURGERY SAME DAY [facility];  Service: Gastroenterology    CO UPPER GI ENDOSCOPY,DIAGNOSIS N/A 1/24/2023    Procedure: GASTROSCOPY;  Surgeon: Amy Zarate M.D.;  Location: SURGERY SAME DAY [facility];  Service: Gastroenterology    CO UPPER GI ENDOSCOPY,BIOPSY N/A 1/24/2023    Procedure: GASTROSCOPY, WITH BIOPSY;  Surgeon: Amy Zarate M.D.;  Location: SURGERY SAME DAY [facility];  Service: Gastroenterology    CHOLECYSTECTOMY      EYE SURGERY Bilateral     cataracts    MULTIPLE CORONARY ARTERY BYPASS      1 bypass    THYROIDECTOMY TOTAL         Social History     Socioeconomic History    Marital status:      Spouse name: Not on file     Number of children: Not on file    Years of education: Not on file    Highest education level: Not on file   Occupational History     Comment: [employer]   Tobacco Use    Smoking status: Former     Packs/day: 1.00     Years: 31.00     Pack years: 31.00     Types: Cigarettes     Quit date: 5/10/1992     Years since quittin.2    Smokeless tobacco: Never    Tobacco comments:     Started smoking of  do not remember month or day   Vaping Use    Vaping Use: Never used   Substance and Sexual Activity    Alcohol use: No    Drug use: No    Sexual activity: Not Currently     Partners: Male   Other Topics Concern    Not on file   Social History Narrative    Not on file     Social Determinants of Health     Financial Resource Strain: Low Risk  (2022)    Overall Financial Resource Strain (CARDIA)     Difficulty of Paying Living Expenses: Not hard at all   Food Insecurity: No Food Insecurity (2022)    Hunger Vital Sign     Worried About Running Out of Food in the Last Year: Never true     Ran Out of Food in the Last Year: Never true   Transportation Needs: No Transportation Needs (2022)    PRAPARE - Transportation     Lack of Transportation (Medical): No     Lack of Transportation (Non-Medical): No   Physical Activity: Inactive (2022)    Exercise Vital Sign     Days of Exercise per Week: 0 days     Minutes of Exercise per Session: 0 min   Stress: No Stress Concern Present (2022)    [employer] [place] [employer] - Occupational Stress Questionnaire     Feeling of Stress : Not at all   Social Connections: Socially Isolated (2022)    Social Connection and Isolation Panel [NHANES]     Frequency of Communication with Friends and Family: More than three times a week     Frequency of Social Gatherings with Friends and Family: Once a week     Attends Yazidism Services: Never     Active Member of Clubs or Organizations: No     Attends Club or Organization Meetings: Never     Marital  "Status:    Intimate Partner Violence: Not At Risk (11/11/2022)    Humiliation, Afraid, Rape, and Kick questionnaire     Fear of Current or Ex-Partner: No     Emotionally Abused: No     Physically Abused: No     Sexually Abused: No   Housing Stability: Low Risk  (11/11/2022)    Housing Stability Vital Sign     Unable to Pay for Housing in the Last Year: No     Number of Places Lived in the Last Year: 1     Unstable Housing in the Last Year: No       Family History   Problem Relation Age of Onset    Cancer Mother         cancer, smoker    Stroke Maternal Grandmother     Other Other         Crohn    Kidney Disease Other         kidney transplant       Allergies   Allergen Reactions    Morphine Vomiting       Current Facility-Administered Medications   Medication Dose Route Frequency Provider Last Rate Last Admin    lidocaine (Xylocaine) 1 % injection 0.5 mL  0.5 mL Intradermal Once PRN Quique Arambula M.D.        lactated ringers infusion   Intravenous Continuous Quique Arambula M.D.           Physical Exam:  Vitals:    07/26/23 1215   BP: 110/65   Pulse: 80   Resp: 18   Temp: 36.4 °C (97.5 °F)   TempSrc: Temporal   SpO2: 97%   Weight: 91.4 kg (201 lb 8 oz)   Height: 1.575 m (5' 2\")     General appearance: NAD, conversant   Neck: Trachea midline; FROM, supple, no thyromegaly or lymphadenopathy  CV: Irregular, no MRGs, no JVD   Extremities: No peripheral edema or extremity lymphadenopathy  Skin: Normal temperature, turgor and texture; no rash, ulcers or subcutaneous nodules  Psych: Appropriate affect, alert and oriented to person, place and time    Data:  Lab Results   Component Value Date/Time    CHOLSTRLTOT 142 11/17/2022 09:36 AM    LDL 88 11/17/2022 09:36 AM    HDL 34 (A) 11/17/2022 09:36 AM    TRIGLYCERIDE 102 11/17/2022 09:36 AM       Lab Results   Component Value Date/Time    SODIUM 134 (L) 07/24/2023 06:33 PM    POTASSIUM 4.8 07/24/2023 06:33 PM    CHLORIDE 97 07/24/2023 06:33 PM    CO2 20 07/24/2023 " 06:33 PM    GLUCOSE 132 (H) 07/24/2023 06:33 PM    BUN 52 (H) 07/24/2023 06:33 PM    CREATININE 2.61 (H) 07/24/2023 06:33 PM     Lab Results   Component Value Date/Time    ALKPHOSPHAT 101 (H) 07/24/2023 06:33 PM    ASTSGOT 22 07/24/2023 06:33 PM    ALTSGPT 18 07/24/2023 06:33 PM    TBILIRUBIN 0.6 07/24/2023 06:33 PM      Lab Results   Component Value Date/Time    BNPBTYPENAT 499 (H) 07/13/2017 03:01 AM         Recent Labs     07/24/23  1833   WBC 8.6   RBC 3.31*   HEMOGLOBIN 8.9*   HEMATOCRIT 29.2*   MCV 88.2   MCH 26.9*   MCHC 30.5*   RDW 71.9*   PLATELETCT 296   MPV 10.5       EKG interpreted by me: Afib    Impression/Plan:  1) Afib  2) GI bleed    Plan CANDELARIO-C. Risks include vascular access bleeding or pain, infection, stroke, myocardial infarction, cardiac tamponade, pericardial effusion, myocardial perforation, major bleeding, and death. Risk of major adverse event is ~1%.       Quique Arambula MD  Cardiac Electrophysiology

## 2023-07-26 NOTE — ANESTHESIA PROCEDURE NOTES
GOMEZ    Date/Time: 7/26/2023 1:57 PM    Performed by: Fili Vickers M.D.  Authorized by: Fili Vickers M.D.    Start Time:7/26/2023 1:57 PM  Preanesthetic Checklist: patient identified, IV checked, site marked, risks and benefits discussed, surgical consent, monitors and equipment checked, pre-op evaluation and timeout performed    Indication for GOMEZ: diagnostic   Patient Location: OR  Intubated: Yes  Bite Block: Yes  Heart Visualized: Yes  Insertion: atraumatic    **See FULL GOMEZ report in patient's chart via [facility]**

## 2023-07-26 NOTE — ANESTHESIA TIME REPORT
Anesthesia Start and Stop Event Times     Date Time Event    7/26/2023 1333 Ready for Procedure     1347 Anesthesia Start     1518 Anesthesia Stop        Responsible Staff  07/26/23    Name Role Begin End    Fili Vickers M.D. Anesth 1347 1518        Overtime Reason:  overtime    Comments: Plus [facility] for structural hearts

## 2023-07-26 NOTE — OP REPORT
"[facility] Electrophysiology/Structural Heart Procedure Note     Procedure(s) Performed:   1) Watchman CANDELARIO closure    Indication(s): Atrial fibrillation with GI bleed    Physician(s): Quique Arambula M.D.     Resident/Assistant(s): None     Anesthesia: General endotracheal anesthetic with Dr. Vickers     Specimen(s) Removed: None     Estimated Blood Loss:  30cc     Complications:  None     Description of Procedure:   After informed written consent, the patient was brought to the cath lab in the fasting, non-sedated state. The patient was prepped and draped in the usual sterile fashion. Femoral venous access was obtained using the modified Seldinger technique. This was done under direct US guidance to visualize the needle insertion. Images were saved to the PACS system. In the right femoral vein, an 8 Fr sheath were inserted over 0.035” guidewire and exchanged for an 8.5 Fr [place] trans-septal sheath. GOMEZ was used to identify the atrial septum, and left atrial appendage.  A [place] trans-septal needle was inserted to into the trans-septal sheath, and under ultrasound guidance a inferior/posterior trans-septal location was used to cross into the left atrium. Intravenous heparin was given to target an -300. A stiff exchange length 0.035\" wire was inserted into the left atrium/left pulmonary veins, over which we exchanged to the WATCHMAN delivery sheath. The WATCHMAN device was prepped and flushed. A pigtail catheter was advanced into the delivery sheath into the left atrium and then after counter clockwise torque maneuvered into the body of the left atrial appendage. Cine was taken to verify the location of the pigtail in the appendage and to assess for depth. The sheath was then advanced over the pigtail until sufficient depth was reached.  The pigtail was removed, and under wet to wet connection the WATCHMAN device was advanced through the delivery sheath until markers were aligned. The sheath was retracted " slowly to deploy the device. After the device was deployed we assessed again for leak with contrast injected through the sheath as well as a tug test. Initial deployment was too deep and left a lobe uncovered, so this was recaptured and redeployed more proximally. We verified good position, anchoring, size, and seal with no/minimal leak with GOMEZ. After all criteria were met we detached the device from the delivery system. At the end of the procedure, heparin was reversed with protamine, the catheter and sheaths were removed, and hemostasis was achieved by manual compression along with a figure of 8 silk suture. Following recovery from anesthesia, the patient was transferred to the PPU in good condition.        Fluoroscopy time:  6.0 min    Contrast used:  10cc     Device implanted:  Size  24mm  Serial # [id number]  Max appendage pre-measurement 18.2mm  Max device measurement 10-18.5% compression     Impressions:    1. Successful CANDELARIO closure      Recommendations:  1. Warfarin and ASA 81 (target INR 2-2.5)   2. Admit to monitored bedrest.  3. Removal of figure of 8 suture in the AM  4. Severe MR, Severe TR, and mod-severe AS, to be evaluated and managed by structural heart clinic (see additional note)

## 2023-07-26 NOTE — PROGRESS NOTES
EP Note    Pt was taken today for planned CANDELARIO-C due to recurrent GI bleed. Prior history of severe MR and mitral clip placed in April. GOMEZ today prior to CANDELARIO-C showed recurrent severe MR. I called Dr Gray and discussed the ramifications of this and whether she would be a surgical candidate, in which case she could have CANDELARIO-C deferred and instead addressed with surgical ligation. Dr Gray's opinion was that she was not a surgical candidate due to her advanced multisystem organ disease and prior sternotomy, and as such noted that her valve disease would not be managed with sternotomy. He asked that CANDELARIO-C continue as planned.    I discussed with pt's daughter to follow up with Dr Gray regarding recurrent severe MR.    Please see CANDELARIO-C op note for procedural details.     Quique Arambula MD  Cardiac Electrophysiology

## 2023-07-26 NOTE — OR NURSING
1507 patient arrived from OR, report received, attached to monitoring. VSS, patient oxygenating well on 8 L simple mask, s/p watchmen with Dr. Arambula, right groin femoral vein access with figure 8 suture, gauze and tegaderm in place; clean/dry/intact     1541 ekg complete     1545 telephone updates to Yamilka navarro    1552 medicated per EMAR as needed for pain and nausea    1610 noted small amount of drainage on right groin dressing, area marked and notified ARIANNA Cali    1613 report called to IWONA Holliday    1622 Karely KELLER at bedside, redressed groin site and placed a sand bag, per ARIANNA sand bag to be in place for 25 min and then place ice bag    1640 all patient belongings from locker brought to bedside     1645 patient transported to room with this RN, vss, pain tolerable, denies nausea, dressing to right groin clean/dry/intact, sand bag still in place, patient agrees/asks questions regarding care, safety ensured, care transferred.

## 2023-07-26 NOTE — ANESTHESIA PREPROCEDURE EVALUATION
Date/Time: 07/26/23 1430    Scheduled providers: Quique Arambula M.D.; Fili Vickers M.D.    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 shoulder (HCC) 06/08/2020 2015   • Moderate tricuspid regurgitation 11/16/2022   • Thyroid disease      Current Outpatient Medications   Medication Instructions   • Acetaminophen (TYLENOL PO) 2 Tablets, Oral, 2 TIMES DAILY PRN   • atorvastatin (LIPITOR) 40 MG Tab TAKE 1 TABLET BY MOUTH AT  BEDTIME   • bisoprolol (ZEBETA) 10 mg, Oral, DAILY   • CINNAMON PO 1 Tablet, Oral, 2 TIMES DAILY   • furosemide (LASIX) 40 mg, Oral, DAILY   • HYDROcodone-acetaminophen (NORCO) 5-325 MG Tab per tablet 1 Tablet, Oral, EVERY 4 HOURS PRN   • Levoxyl 150 mcg, Oral, EACH MORNING ON EMPTY STOMACH   • lidocaine (LIDODERM) 5 % Patch 1 Patch, Transdermal, EVERY 24 HOURS   • losartan (COZAAR) 25 mg, Oral, EVERY EVENING   • pantoprazole (PROTONIX) 40 mg, Oral, 2 TIMES DAILY   • Secukinumab (COSENTYX SENSOREADY PEN) 150 MG/ML Solution Auto-injector Inject 300mg Sub Cut once monthly   • spironolactone (ALDACTONE) 25 mg, Oral, DAILY   • VITAMIN D PO 1 Tablet, Oral, Every Day (QD)   • Vitamins-Lipotropics (MULTI-VITAMIN HP/MINERALS) Cap Oral, 1 TIME DAILY PRN   • warfarin (COUMADIN) 5 mg, Oral, DAILY     Past Surgical History:   Procedure Laterality Date   • TRANSCATHETER MITRAL VALVE REPAIR N/A 4/17/2023    Procedure: TRANSCATHETER MITRAL VALVE PROCEDURE;   Surgeon: Cesar Gray M.D.;  Location: SURGERY [facility];  Service: Cardiac   • ECHOCARDIOGRAM, TRANSESOPHAGEAL, INTRAOPERATIVE N/A 4/17/2023    Procedure: ECHOCARDIOGRAM, TRANSESOPHAGEAL, INTRAOPERATIVE;  Surgeon: Cesar Gray M.D.;  Location: SURGERY [facility];  Service: Cardiac   • SD COLONOSCOPY,DIAGNOSTIC N/A 1/24/2023    Procedure: COLONOSCOPY;  Surgeon: Amy Zarate M.D.;  Location: SURGERY SAME DAY [facility];  Service: Gastroenterology   • SD UPPER GI ENDOSCOPY,DIAGNOSIS N/A 1/24/2023    Procedure: GASTROSCOPY;  Surgeon: Amy Zarate M.D.;  Location: SURGERY SAME DAY [facility];  Service: Gastroenterology   • SD UPPER GI ENDOSCOPY,BIOPSY N/A 1/24/2023    Procedure: GASTROSCOPY, WITH BIOPSY;  Surgeon: Amy Zarate M.D.;  Location: SURGERY SAME DAY [facility];  Service: Gastroenterology   • CHOLECYSTECTOMY     • EYE SURGERY Bilateral     cataracts   • MULTIPLE CORONARY ARTERY BYPASS      1 bypass   • THYROIDECTOMY TOTAL       Lab Results   Component Value Date/Time    SODIUM 134 (L) 07/24/2023 06:33 PM    POTASSIUM 4.8 07/24/2023 06:33 PM    CHLORIDE 97 07/24/2023 06:33 PM    CO2 20 07/24/2023 06:33 PM    GLUCOSE 132 (H) 07/24/2023 06:33 PM    BUN 52 (H) 07/24/2023 06:33 PM    CREATININE 2.61 (H) 07/24/2023 06:33 PM      Lab Results   Component Value Date/Time    WBC 8.6 07/24/2023 06:33 PM    RBC 3.31 (L) 07/24/2023 06:33 PM    HEMOGLOBIN 8.9 (L) 07/24/2023 06:33 PM    HEMATOCRIT 29.2 (L) 07/24/2023 06:33 PM    MCV 88.2 07/24/2023 06:33 PM    MCH 26.9 (L) 07/24/2023 06:33 PM    MCHC 30.5 (L) 07/24/2023 06:33 PM    MPV 10.5 07/24/2023 06:33 PM    NEUTSPOLYS 83.20 (H) 07/24/2023 06:33 PM    LYMPHOCYTES 7.20 (L) 07/24/2023 06:33 PM    MONOCYTES 7.20 07/24/2023 06:33 PM    EOSINOPHILS 1.60 07/24/2023 06:33 PM    BASOPHILS 0.60 07/24/2023 06:33 PM    HYPOCHROMIA 1+ 10/31/2020 10:54 AM    ANISOCYTOSIS 1+ 07/21/2023 11:01 AM      TTE 5/2023:  CONCLUSIONS  Prior echocardiogram 4/18/2023,  no major changes, residual mitral regurgitation appears mild now.  The left ventricular ejection fraction is visually estimated to be 55%.  Known mitral valve repair with Mitraclip.  Mean transvalvular gradient is 6 mmHg at a heart rate of 67 BPM.  Mild mitral regurgitation.  Aortic valve sclerosis with mild stenosis.    Relevant Problems   ANESTHESIA   (positive) Obstructive sleep apnea treated with continuous positive airway pressure (CPAP)      CARDIAC   (positive) AF (atrial fibrillation) ([facility])   (positive) Chronic combined systolic and diastolic congestive heart failure (HCC)   (positive) Coronary artery disease involving native coronary artery of native heart without angina pectoris, s/p 1V CABG 1992   (positive) Essential hypertension   (positive) Mild aortic stenosis   (positive) Moderate to severe pulmonary hypertension (HCC)      GI   (positive) Gastroesophageal reflux disease without esophagitis         (positive) CKD (chronic kidney disease) stage 4, GFR 15-29 ml/min ([facility])   (positive) Hepatic steatosis      ENDO   (positive) Hypothyroidism       Physical Exam    Airway   Mallampati: II  TM distance: >3 FB  Neck ROM: full       Cardiovascular - normal exam  Rhythm: regular  Rate: normal  (-) murmur     Dental   (+) lower dentures, upper dentures           Pulmonary - normal exam  Breath sounds clear to auscultation     Abdominal   (+) obese     Neurological - normal exam               Anesthesia Plan    ASA 3   ASA physical status 3 criteria: CAD/stents (> 3 months)    Plan - general       Airway plan will be ETT  GOMEZ Planned        Induction: intravenous    Postoperative Plan: Postoperative administration of opioids is intended.    Pertinent diagnostic labs and testing reviewed    Informed Consent:    Anesthetic plan and risks discussed with patient.    Use of blood products discussed with: patient whom consented to blood products.       Patient fully consented for general anesthesia. Anesthetic  procedure and risks discussed in detail. Risks include but are not limited to PONV, pain, sore throat, damage to teeth/lips/gums, positioning injury, allergic reaction, vocal cord injury, esophageal injury, and/or cardiopulmonary problems up to and including death. Patient indicates complete understanding. All patient questions answered to full satisfaction and they agree to proceed as planned.

## 2023-07-27 ENCOUNTER — APPOINTMENT (OUTPATIENT)
Dept: CARDIOLOGY | Facility: MEDICAL CENTER | Age: 83
DRG: 273 | End: 2023-07-27
Attending: NURSE PRACTITIONER
Payer: MEDICARE

## 2023-07-27 ENCOUNTER — APPOINTMENT (OUTPATIENT)
Dept: CARDIOLOGY | Facility: MEDICAL CENTER | Age: 83
DRG: 273 | End: 2023-07-27
Attending: INTERNAL MEDICINE
Payer: MEDICARE

## 2023-07-27 PROBLEM — E87.5 HYPERKALEMIA: Status: ACTIVE | Noted: 2023-07-27

## 2023-07-27 PROBLEM — I50.9 CHF (CONGESTIVE HEART FAILURE) (HCC): Status: ACTIVE | Noted: 2023-07-27

## 2023-07-27 PROBLEM — L40.9 PSORIASIS: Status: ACTIVE | Noted: 2023-07-27

## 2023-07-27 PROBLEM — R57.0 CARDIOGENIC SHOCK (HCC): Status: ACTIVE | Noted: 2023-07-27

## 2023-07-27 PROBLEM — R73.9 HYPERGLYCEMIA: Status: ACTIVE | Noted: 2022-11-16

## 2023-07-27 LAB
ANION GAP SERPL CALC-SCNC: 14 MMOL/L (ref 7–16)
BASOPHILS # BLD AUTO: 0.1 % (ref 0–1.8)
BASOPHILS # BLD: 0.01 K/UL (ref 0–0.12)
BUN SERPL-MCNC: 53 MG/DL (ref 8–22)
BURR CELLS BLD QL SMEAR: NORMAL
CALCIUM SERPL-MCNC: 8 MG/DL (ref 8.5–10.5)
CFT BLD TEG: 7.8 MIN (ref 4.6–9.1)
CFT P HPASE BLD TEG: 8.4 MIN (ref 4.3–8.3)
CHLORIDE SERPL-SCNC: 102 MMOL/L (ref 96–112)
CLOT ANGLE BLD TEG: 79.3 DEGREES (ref 63–78)
CLOT LYSIS 30M P MA LENFR BLD TEG: 0 % (ref 0–2.6)
CO2 SERPL-SCNC: 20 MMOL/L (ref 20–33)
COMMENT 1642: NORMAL
CREAT SERPL-MCNC: 2.38 MG/DL (ref 0.5–1.4)
CT.EXTRINSIC BLD ROTEM: 0.8 MIN (ref 0.8–2.1)
EKG IMPRESSION: NORMAL
EKG IMPRESSION: NORMAL
EOSINOPHIL # BLD AUTO: 0 K/UL (ref 0–0.51)
EOSINOPHIL NFR BLD: 0 % (ref 0–6.9)
ERYTHROCYTE [DISTWIDTH] IN BLOOD BY AUTOMATED COUNT: 62.1 FL (ref 35.9–50)
ERYTHROCYTE [DISTWIDTH] IN BLOOD BY AUTOMATED COUNT: 65.8 FL (ref 35.9–50)
GFR SERPLBLD CREATININE-BSD FMLA CKD-EPI: 20 ML/MIN/1.73 M 2
GLUCOSE BLD STRIP.AUTO-MCNC: 118 MG/DL (ref 65–99)
GLUCOSE BLD STRIP.AUTO-MCNC: 124 MG/DL (ref 65–99)
GLUCOSE BLD STRIP.AUTO-MCNC: 141 MG/DL (ref 65–99)
GLUCOSE SERPL-MCNC: 142 MG/DL (ref 65–99)
HCT VFR BLD AUTO: 30.2 % (ref 37–47)
HCT VFR BLD AUTO: 31.4 % (ref 37–47)
HGB BLD-MCNC: 10 G/DL (ref 12–16)
HGB BLD-MCNC: 9.8 G/DL (ref 12–16)
IMM GRANULOCYTES # BLD AUTO: 0.06 K/UL (ref 0–0.11)
IMM GRANULOCYTES NFR BLD AUTO: 0.5 % (ref 0–0.9)
INR PPP: 4.24 (ref 0.87–1.13)
INR PPP: 4.36 (ref 0.87–1.13)
LACTATE SERPL-SCNC: 1.8 MMOL/L (ref 0.5–2)
LV EJECT FRACT  99904: 50
LV EJECT FRACT  99904: 60
LV EJECT FRACT MOD 2C 99903: 59.32
LV EJECT FRACT MOD 4C 99902: 63.98
LV EJECT FRACT MOD BP 99901: 60.9
LYMPHOCYTES # BLD AUTO: 0.58 K/UL (ref 1–4.8)
LYMPHOCYTES NFR BLD: 4.6 % (ref 22–41)
MCF BLD TEG: 71.4 MM (ref 52–69)
MCF.PLATELET INHIB BLD ROTEM: 42.4 MM (ref 15–32)
MCH RBC QN AUTO: 28.2 PG (ref 27–33)
MCH RBC QN AUTO: 28.2 PG (ref 27–33)
MCHC RBC AUTO-ENTMCNC: 31.8 G/DL (ref 32.2–35.5)
MCHC RBC AUTO-ENTMCNC: 32.5 G/DL (ref 32.2–35.5)
MCV RBC AUTO: 86.8 FL (ref 81.4–97.8)
MCV RBC AUTO: 88.7 FL (ref 81.4–97.8)
MONOCYTES # BLD AUTO: 1.4 K/UL (ref 0–0.85)
MONOCYTES NFR BLD AUTO: 11.2 % (ref 0–13.4)
MORPHOLOGY BLD-IMP: NORMAL
NEUTROPHILS # BLD AUTO: 10.5 K/UL (ref 1.82–7.42)
NEUTROPHILS NFR BLD: 83.6 % (ref 44–72)
NRBC # BLD AUTO: 0.02 K/UL
NRBC BLD-RTO: 0.2 /100 WBC (ref 0–0.2)
OVALOCYTES BLD QL SMEAR: NORMAL
PA AA BLD-ACNC: ABNORMAL % (ref 0–11)
PA ADP BLD-ACNC: ABNORMAL % (ref 0–17)
PLATELET # BLD AUTO: 281 K/UL (ref 164–446)
PLATELET # BLD AUTO: 307 K/UL (ref 164–446)
PLATELET BLD QL SMEAR: NORMAL
PMV BLD AUTO: 10.5 FL (ref 9–12.9)
PMV BLD AUTO: 10.5 FL (ref 9–12.9)
POIKILOCYTOSIS BLD QL SMEAR: NORMAL
POTASSIUM SERPL-SCNC: 5.3 MMOL/L (ref 3.6–5.5)
PROTHROMBIN TIME: 39.1 SEC (ref 12–14.6)
PROTHROMBIN TIME: 40 SEC (ref 12–14.6)
RBC # BLD AUTO: 3.48 M/UL (ref 4.2–5.4)
RBC # BLD AUTO: 3.54 M/UL (ref 4.2–5.4)
RBC BLD AUTO: PRESENT
SODIUM SERPL-SCNC: 136 MMOL/L (ref 135–145)
TEG ALGORITHM TGALG: ABNORMAL
WBC # BLD AUTO: 12.6 K/UL (ref 4.8–10.8)
WBC # BLD AUTO: 14.1 K/UL (ref 4.8–10.8)

## 2023-07-27 PROCEDURE — P9016 RBC LEUKOCYTES REDUCED: HCPCS

## 2023-07-27 PROCEDURE — 700101 HCHG RX REV CODE 250: Performed by: INTERNAL MEDICINE

## 2023-07-27 PROCEDURE — 83605 ASSAY OF LACTIC ACID: CPT

## 2023-07-27 PROCEDURE — 85027 COMPLETE CBC AUTOMATED: CPT

## 2023-07-27 PROCEDURE — A9270 NON-COVERED ITEM OR SERVICE: HCPCS | Performed by: INTERNAL MEDICINE

## 2023-07-27 PROCEDURE — 93010 ELECTROCARDIOGRAM REPORT: CPT | Performed by: INTERNAL MEDICINE

## 2023-07-27 PROCEDURE — 93308 TTE F-UP OR LMTD: CPT | Mod: 26 | Performed by: INTERNAL MEDICINE

## 2023-07-27 PROCEDURE — 700105 HCHG RX REV CODE 258

## 2023-07-27 PROCEDURE — 80048 BASIC METABOLIC PNL TOTAL CA: CPT

## 2023-07-27 PROCEDURE — 85025 COMPLETE CBC W/AUTO DIFF WBC: CPT

## 2023-07-27 PROCEDURE — 85610 PROTHROMBIN TIME: CPT

## 2023-07-27 PROCEDURE — 99223 1ST HOSP IP/OBS HIGH 75: CPT | Performed by: HOSPITALIST

## 2023-07-27 PROCEDURE — 86923 COMPATIBILITY TEST ELECTRIC: CPT

## 2023-07-27 PROCEDURE — 93308 TTE F-UP OR LMTD: CPT

## 2023-07-27 PROCEDURE — 82962 GLUCOSE BLOOD TEST: CPT

## 2023-07-27 PROCEDURE — 700102 HCHG RX REV CODE 250 W/ 637 OVERRIDE(OP): Performed by: INTERNAL MEDICINE

## 2023-07-27 PROCEDURE — 51798 US URINE CAPACITY MEASURE: CPT

## 2023-07-27 PROCEDURE — 700101 HCHG RX REV CODE 250

## 2023-07-27 PROCEDURE — 36430 TRANSFUSION BLD/BLD COMPNT: CPT

## 2023-07-27 PROCEDURE — 99291 CRITICAL CARE FIRST HOUR: CPT | Mod: Q0 | Performed by: INTERNAL MEDICINE

## 2023-07-27 PROCEDURE — 93005 ELECTROCARDIOGRAM TRACING: CPT | Performed by: INTERNAL MEDICINE

## 2023-07-27 PROCEDURE — 770000 HCHG ROOM/CARE - INTERMEDIATE ICU *

## 2023-07-27 PROCEDURE — 99233 SBSQ HOSP IP/OBS HIGH 50: CPT | Mod: FS | Performed by: INTERNAL MEDICINE

## 2023-07-27 RX ORDER — LIDOCAINE 50 MG/G
1 PATCH TOPICAL EVERY 24 HOURS
Status: DISCONTINUED | OUTPATIENT
Start: 2023-07-27 | End: 2023-07-29 | Stop reason: HOSPADM

## 2023-07-27 RX ORDER — DEXTROSE MONOHYDRATE 25 G/50ML
25 INJECTION, SOLUTION INTRAVENOUS
Status: DISCONTINUED | OUTPATIENT
Start: 2023-07-27 | End: 2023-07-27

## 2023-07-27 RX ORDER — INSULIN LISPRO 100 [IU]/ML
1-6 INJECTION, SOLUTION INTRAVENOUS; SUBCUTANEOUS
Status: DISCONTINUED | OUTPATIENT
Start: 2023-07-27 | End: 2023-07-27

## 2023-07-27 RX ADMIN — LIDOCAINE 1 PATCH: 50 PATCH TOPICAL at 07:56

## 2023-07-27 RX ADMIN — OMEPRAZOLE 40 MG: 20 CAPSULE, DELAYED RELEASE ORAL at 05:19

## 2023-07-27 RX ADMIN — HYDROCODONE BITARTRATE AND ACETAMINOPHEN 1 TABLET: 5; 325 TABLET ORAL at 17:53

## 2023-07-27 RX ADMIN — ATORVASTATIN CALCIUM 40 MG: 40 TABLET, FILM COATED ORAL at 17:52

## 2023-07-27 RX ADMIN — LEVOTHYROXINE SODIUM 150 MCG: 0.15 TABLET ORAL at 05:19

## 2023-07-27 RX ADMIN — NOREPINEPHRINE BITARTRATE 0.06 MCG/KG/MIN: 1 INJECTION INTRAVENOUS at 03:01

## 2023-07-27 RX ADMIN — OMEPRAZOLE 40 MG: 20 CAPSULE, DELAYED RELEASE ORAL at 17:52

## 2023-07-27 ASSESSMENT — ENCOUNTER SYMPTOMS
PALPITATIONS: 0
BLOOD IN STOOL: 0
FOCAL WEAKNESS: 0
FEVER: 0
PND: 0
FEVER: 1
SHORTNESS OF BREATH: 0
BLURRED VISION: 0
DIZZINESS: 0
ABDOMINAL PAIN: 0
HEADACHES: 0
COUGH: 0
SPEECH CHANGE: 0
ORTHOPNEA: 0
SPUTUM PRODUCTION: 0
WEAKNESS: 0
CLAUDICATION: 0
LOSS OF CONSCIOUSNESS: 0
CHILLS: 0
CHEST TIGHTNESS: 0
FATIGUE: 1
DOUBLE VISION: 0

## 2023-07-27 ASSESSMENT — PAIN SCALES - GENERAL: PAIN_LEVEL: 0

## 2023-07-27 ASSESSMENT — PAIN DESCRIPTION - PAIN TYPE
TYPE: ACUTE PAIN

## 2023-07-27 ASSESSMENT — CHA2DS2 SCORE
AGE 75 OR GREATER: YES
AGE 65 TO 74: NO
VASCULAR DISEASE: YES
CHF OR LEFT VENTRICULAR DYSFUNCTION: YES
CHA2DS2 VASC SCORE: 6
SEX: FEMALE
DIABETES: NO
HYPERTENSION: YES
PRIOR STROKE OR TIA OR THROMBOEMBOLISM: NO

## 2023-07-27 ASSESSMENT — FIBROSIS 4 INDEX: FIB4 SCORE: 1.37

## 2023-07-27 NOTE — PROGRESS NOTES
4 Eyes Skin Assessment Completed by IWONA Puente and IWONA Lama.    Head WDL- scab on face  Ears WDL  Nose WDL  Mouth WDL  Neck WDL  Breast/Chest Redness and Excoriation bilateral under breast - old scarring across chest   Shoulder Blades bruising to R upper shoulder  Spie WDL  (R) Arm/Elbow/Hand Bruising to right upper arm   (L) Arm/Elbow/Hand Bruising  Abdomen Incision with LENCHO drain   Groin WDL with R groin site with gauze and tegaderm  Scrotum/Coccyx/Buttocks Redness and Blanching area  (R) Leg Scab and Bruising  (L) Leg Scab and Bruising  (R) Heel/Foot/Toe Redness and Blanching  (L) Heel/Foot/Toe Redness and Blanching          Devices In Places ECG, Blood Pressure Cuff, Pulse Ox, and Arterial Line, PIV to R AC and PIV L FA, nasal cannula       Interventions In Place Gray Ear Foams, NC W/Ear Foams, Q2 Turns, and Low Air Loss Mattress    Possible Skin Injury No    Pictures Uploaded Into Epic N/A  Wound Consult Placed N/A  RN Wound Prevention Protocol Ordered No

## 2023-07-27 NOTE — CARE PLAN
Problem: Pain - Standard  Goal: Alleviation of pain or a reduction in pain to the patient’s comfort goal  7/27/2023 1614 by Guillermina Villarreal R.N.  Outcome: Progressing  7/27/2023 1614 by Guillermina Villarreal R.N.  Outcome: Progressing     Problem: Knowledge Deficit - Standard  Goal: Patient and family/care givers will demonstrate understanding of plan of care, disease process/condition, diagnostic tests and medications  7/27/2023 1614 by Guillermina Villarreal R.N.  Outcome: Progressing  7/27/2023 1614 by Guillermina Villarreal R.N.  Outcome: Progressing     Problem: Psychosocial  Goal: Patient's level of anxiety will decrease  7/27/2023 1614 by Guillermina Villarreal R.N.  Outcome: Progressing  7/27/2023 1614 by Guillermina Villarreal R.N.  Outcome: Progressing  Goal: Patient's ability to verbalize feelings about condition will improve  7/27/2023 1614 by Guillermina Villarreal R.N.  Outcome: Progressing  7/27/2023 1614 by Guillermina Villarreal R.N.  Outcome: Progressing  Goal: Patient's ability to re-evaluate and adapt role responsibilities will improve  7/27/2023 1614 by Guillermina Villarreal R.N.  Outcome: Progressing  7/27/2023 1614 by Guillermina Villarreal R.N.  Outcome: Progressing  Goal: Patient and family will demonstrate ability to cope with life altering diagnosis and/or procedure  7/27/2023 1614 by Guillermina Villarreal R.N.  Outcome: Progressing  7/27/2023 1614 by Guillremina Villarreal R.N.  Outcome: Progressing  Goal: Spiritual and cultural needs incorporated into hospitalization  7/27/2023 1614 by Guillermina Villarreal R.N.  Outcome: Progressing  7/27/2023 1614 by Guillermina Villarreal R.N.  Outcome: Progressing     Problem: Hemodynamics  Goal: Patient's hemodynamics, fluid balance and neurologic status will be stable or improve  7/27/2023 1614 by Guillermina Villarreal R.N.  Outcome: Progressing  7/27/2023 1614 by Guillermina Villarreal R.N.  Outcome: Progressing     Problem: Respiratory  Goal: Patient will achieve/maintain optimum respiratory ventilation and gas exchange  7/27/2023 1614 by  Guillermina Villarreal R.N.  Outcome: Progressing  7/27/2023 1614 by Guillermina Villarreal R.N.  Outcome: Progressing     Problem: Mechanical Ventilation  Goal: Safe management of artificial airway and ventilation  7/27/2023 1614 by Guillermina Villarreal R.N.  Outcome: Progressing  7/27/2023 1614 by Guillermina Villarreal R.N.  Outcome: Progressing  Goal: Successful weaning off mechanical ventilator, spontaneously maintains adequate gas exchange  7/27/2023 1614 by Guillermina Villarreal R.N.  Outcome: Progressing  7/27/2023 1614 by Guillermina Villarreal R.N.  Outcome: Progressing  Goal: Patient will be able to express needs and understand communication  7/27/2023 1614 by Guillermina Villarreal R.N.  Outcome: Progressing  7/27/2023 1614 by Guillermina Villarreal R.N.  Outcome: Progressing     Problem: Risk for Aspiration  Goal: Patient's risk for aspiration will be absent or decrease  7/27/2023 1614 by Guillermina Vilalrreal R.N.  Outcome: Progressing  7/27/2023 1614 by Guillermina Villarreal R.N.  Outcome: Progressing     Problem: Urinary - Renal Perfusion  Goal: Ability to achieve and maintain adequate renal perfusion and functioning will improve  7/27/2023 1614 by Guillermina Villarreal R.N.  Outcome: Progressing  7/27/2023 1614 by Guillermina Villarreal R.N.  Outcome: Progressing     Problem: Venous Thromboembolism (VTE) Prevention  Goal: The patient will remain free from venous thromboembolism (VTE)  7/27/2023 1614 by Guillermina Villarreal R.N.  Outcome: Progressing  7/27/2023 1614 by Guillermina Villarreal R.N.  Outcome: Progressing     Problem: Nutrition  Goal: Patient's nutritional and fluid intake will be adequate or improve  7/27/2023 1614 by Guillermina Villarreal R.N.  Outcome: Progressing  7/27/2023 1614 by Guillermina Villarreal R.N.  Outcome: Progressing  Goal: Enteral nutrition will be maintained or improve  7/27/2023 1614 by Guillermina Villarreal R.N.  Outcome: Progressing  7/27/2023 1614 by Guillermina Villarreal R.N.  Outcome: Progressing  Goal: Enteral nutrition will be maintained or improve  7/27/2023 1614 by  Guillermina Villarreal R.N.  Outcome: Progressing  7/27/2023 1614 by Guillermina Villarreal R.N.  Outcome: Progressing     Problem: Urinary Elimination  Goal: Establish and maintain regular urinary output  7/27/2023 1614 by Guillermina Villarreal R.N.  Outcome: Progressing  7/27/2023 1614 by Guillermina Villarreal R.N.  Outcome: Progressing     Problem: Bowel Elimination  Goal: Establish and maintain regular bowel function  7/27/2023 1614 by Guillermina Villarreal R.N.  Outcome: Progressing  7/27/2023 1614 by Guillermina Villarreal R.N.  Outcome: Progressing     Problem: Skin Integrity  Goal: Skin integrity is maintained or improved  7/27/2023 1614 by Guillermina Villarreal R.N.  Outcome: Progressing  7/27/2023 1614 by Guillermina Villarreal R.N.  Outcome: Progressing

## 2023-07-27 NOTE — PROCEDURES
Name of the Procedure:  Emergent Pericardiocentesis       Indication: pericardial tamponade, shock     Procedure:   Patient was brought to the cardiac catheterization lab after obtaining emergency verbal consent for the procedure and discussing the risks and benefits of the procedure. Under aseptic precautions after draping the patient with sterile covers, the subxiphoid region was anesthetized with 2% lidocaine. Under ultrasound  guidance, a micropuncture needle was introduced into the pericardial space without difficulty and 5 fluid was aspirated. We then introduced the micropuncture sheath into pericardial space. We then used a 0.035 inch wire to enter the pericardial space. We then dilated the track with a 8 Fr dilator and subsequently exchanged that for a 8 Fr pigtail drain that was used to drain 300 cc of bloody pericardial fluid. We sutured the drain in place and connected the drain to a negative suction. Patient was then transferred to ICU in a stable condition.     Recommendation:  Secure pericardial drain, transfuse 2 units PRBC.     Cesar Gray  Interventional cardiologist  Cell: [phone]

## 2023-07-27 NOTE — CODE DOCUMENTATION
Pt was talking, stated she felt off. BP was 70s systolic, patient became unresponsive. Monitors reported bradycardia rate in 20s, pt symptomatic. Code called. Pt became asystolic. One round of compressions, no shock, no meds given. Pulses present after one round with systolic in 70s. RT and RRT all at bedside. Dr Steen and Dr Carlos present.

## 2023-07-27 NOTE — PROGRESS NOTES
Cardiology Follow Up Progress Note    Date of Service  7/27/2023    Attending Physician  Stewart Cortez M.D.    HPI  Aleshia Crooks is a 83 y.o. female admitted 7/26/2023 for CANDELARIO-C due to her history of anemia and GI bleed on OAC.     Underwent successful WATCHMAN with Dr. Arambula on 7/26/2023. Experienced brief asystolic cardiac arrest, achieved ROSC after brief CPR. Found to have developed late-presenting pericardial effusion and underwent emergent pericardiocentesis with 300 cc bloody pericardial fluid out with drain placement.     Interim Events  75 cc output from pericardial drain since midnight.   Repeat TTE this am showed trivial pericardial effusion.  Patient reports feeling better. Chest tenderness from CPR.   AF, rate 60's-70's on tele.   Figure 8 suture removed from right femoral access site without difficulty. Site is soft with no evidence of bleeding.     Review of Systems  Review of Systems   Constitutional:  Positive for fatigue. Negative for fever.   Respiratory:  Negative for chest tightness and shortness of breath.    Cardiovascular:  Negative for chest pain, palpitations and leg swelling.   Gastrointestinal:  Negative for abdominal pain and blood in stool.   Genitourinary:  Negative for hematuria.   Musculoskeletal:  Negative for gait problem.        Chest tenderness from CPR   Neurological:  Negative for dizziness and syncope.   All other systems reviewed and are negative.      Vital signs in last 24 hours  Temp:  [35.8 °C (96.5 °F)-36.8 °C (98.3 °F)] 36.2 °C (97.2 °F)  Pulse:  [] 71  Resp:  [8-24] 16  BP: ()/(34-73) 96/52  SpO2:  [88 %-100 %] 92 %    Physical Exam  Physical Exam  Constitutional:       General: She is not in acute distress.     Appearance: Normal appearance.   HENT:      Head: Normocephalic.   Eyes:      Extraocular Movements: Extraocular movements intact.   Cardiovascular:      Rate and Rhythm: Normal rate. Rhythm irregular.      Pulses: Normal pulses.      Heart  sounds: Normal heart sounds.   Pulmonary:      Effort: Pulmonary effort is normal.      Breath sounds: Normal breath sounds.   Chest:      Comments: Pericardial drain in place.   Abdominal:      Palpations: Abdomen is soft.      Tenderness: There is no abdominal tenderness.   Musculoskeletal:      Cervical back: Normal range of motion.      Right lower leg: No edema.      Left lower leg: No edema.   Skin:     General: Skin is warm and dry.   Neurological:      Mental Status: She is alert and oriented to person, place, and time.   Psychiatric:         Mood and Affect: Mood normal.         Behavior: Behavior normal.         Thought Content: Thought content normal.         Lab Review  Lab Results   Component Value Date/Time    WBC 14.1 (H) 07/27/2023 04:24 AM    RBC 3.48 (L) 07/27/2023 04:24 AM    HEMOGLOBIN 9.8 (L) 07/27/2023 04:24 AM    HEMATOCRIT 30.2 (L) 07/27/2023 04:24 AM    MCV 86.8 07/27/2023 04:24 AM    MCH 28.2 07/27/2023 04:24 AM    MCHC 32.5 07/27/2023 04:24 AM    MPV 10.5 07/27/2023 04:24 AM      Lab Results   Component Value Date/Time    SODIUM 136 07/27/2023 04:24 AM    POTASSIUM 5.3 07/27/2023 04:24 AM    CHLORIDE 102 07/27/2023 04:24 AM    CO2 20 07/27/2023 04:24 AM    GLUCOSE 142 (H) 07/27/2023 04:24 AM    BUN 53 (H) 07/27/2023 04:24 AM    CREATININE 2.38 (H) 07/27/2023 04:24 AM      Lab Results   Component Value Date/Time    ASTSGOT 22 07/24/2023 06:33 PM    ALTSGPT 18 07/24/2023 06:33 PM     Lab Results   Component Value Date/Time    CHOLSTRLTOT 142 11/17/2022 09:36 AM    LDL 88 11/17/2022 09:36 AM    HDL 34 (A) 11/17/2022 09:36 AM    TRIGLYCERIDE 102 11/17/2022 09:36 AM    TROPONINT 44 (H) 07/26/2023 09:30 PM       No results for input(s): NTPROBNP in the last 72 hours.      Assessment/Plan  Permanent AF:  -S/P CANDELARIO-C (WATCHMAN) on 7/26/2023 C/B pericardial effusion, s/p pericardiocentesis. TTE this am showed trivial pericardial effusion. Repeat TTE ordered for the am.   -Rate well controlled.    -Resume warfarin when there is no drain output > 24 hours.   -Right femoral access site figure 8 suture removed without difficulty. Site is soft with CDI dressing in place.     Thank you for allowing me to participate in the care of this patient.    EP will continue to follow.     Please contact me with any questions.    SHONA Marcano.   Cardiologist, [facility]  [phone]

## 2023-07-27 NOTE — ASSESSMENT & PLAN NOTE
Hemorrhagic pericardial tamponade in relation to watchman procedure  Drain in place with effective drainage by echo  Drain management per cardiology  Will discuss anticoag management with cardiology. She is supratherapeutic which is concerning given her hemorrhagic tamponade, although drain output is low

## 2023-07-27 NOTE — CARE PLAN
The patient is Watcher - Medium risk of patient condition declining or worsening    Shift Goals  Clinical Goals: remains hemodynamically stable, monitor labs, prbc, pain management  Patient Goals: rest  Family Goals: pt to get better    Progress made toward(s) clinical / shift goals:      Problem: Pain - Standard  Goal: Alleviation of pain or a reduction in pain to the patient’s comfort goal  Outcome: Progressing     Problem: Knowledge Deficit - Standard  Goal: Patient and family/care givers will demonstrate understanding of plan of care, disease process/condition, diagnostic tests and medications  Outcome: Progressing     Problem: Psychosocial  Goal: Patient's level of anxiety will decrease  Outcome: Progressing  Goal: Patient's ability to verbalize feelings about condition will improve  Outcome: Progressing  Goal: Patient's ability to re-evaluate and adapt role responsibilities will improve  Outcome: Progressing  Goal: Patient and family will demonstrate ability to cope with life altering diagnosis and/or procedure  Outcome: Progressing  Goal: Spiritual and cultural needs incorporated into hospitalization  Outcome: Progressing     Problem: Hemodynamics  Goal: Patient's hemodynamics, fluid balance and neurologic status will be stable or improve  Outcome: Progressing     Problem: Respiratory  Goal: Patient will achieve/maintain optimum respiratory ventilation and gas exchange  Outcome: Progressing     Problem: Risk for Aspiration  Goal: Patient's risk for aspiration will be absent or decrease  Outcome: Progressing     Problem: Urinary - Renal Perfusion  Goal: Ability to achieve and maintain adequate renal perfusion and functioning will improve  Outcome: Progressing     Problem: Venous Thromboembolism (VTE) Prevention  Goal: The patient will remain free from venous thromboembolism (VTE)  Outcome: Progressing     Problem: Nutrition  Goal: Patient's nutritional and fluid intake will be adequate or improve  Outcome:  Progressing  Goal: Enteral nutrition will be maintained or improve  Outcome: Progressing  Goal: Enteral nutrition will be maintained or improve  Outcome: Progressing     Problem: Urinary Elimination  Goal: Establish and maintain regular urinary output  Outcome: Progressing     Problem: Bowel Elimination  Goal: Establish and maintain regular bowel function  Outcome: Progressing       Patient is not progressing towards the following goals:

## 2023-07-27 NOTE — PROGRESS NOTES
Pharmacy Warfarin Monitoring     Date: 7/27/2023  Reason for Anticoagulation: Atrial Fibrillation   Target INR: 2.0 to 3.0    [id number] VASc Score: 6  HAS-BLED Score: 2     Hemoglobin Value: (!) 9.8  Hematocrit Value: (!) 30.2  Lab Platelet Value: 307    INR from last 7 days       Date/Time INR Value    07/26/23 2345 4.36          Dose from last 7 days       Date/Time Dose (mg)    07/27/23 1308 0          Home dose: 5 mg daily  Significant Interactions: Not Applicable  Bridge Therapy: Not indicated, supratherapeutic INR    Comments: INR supratherapeutic.  Cardiac tamponade overnight with pericardiocentesis and pericardial drain in place.  Hold warfarin with drain in place and supratherapeutic INR.  MD aware of INR and home warfarin utilization if warfarin reversal becomes a necessary discussion.    Education Material Provided?: No - chronic warfarin patient  Pharmacist suggested discharge dosing: TBD pending INR trends and DDI upon discharge     Thank you!  Pita Recinos, PharmD, BCCCP

## 2023-07-27 NOTE — DISCHARGE INSTRUCTIONS
[facility] POST WATCHMAN INSTRUCTIONS  No lifting > 10 lbs x 1 week.  No baths or hot tubs x 1 week.      May shower on 7/28/2023 and take off groin dressing and leave site uncovered.  Please try to keep Steri-strip in place and allow to come off on its own.  Continue to monitor site daily for warmth, redness, discolored drainage.    3. Please do not miss any doses of your blood thinner.      4. Please keep all follow up appointments scheduled for you.  You are scheduled for procedure follow up with EP clinic.     5. A transesophageal echocardiogram (GOMEZ) will be scheduled for you  to check the healing of the Watchman.  This procedure is completed at [facility] same day procedures.  We use sedation/anesthesia for this procedure so you will need a .     6. Please walk and take deep breaths after discharge.  After discharge, if you experience severe chest pain, shortness of breath, neurological changes, high fever, severe dizziness, trouble with catheter site needs to be seen in the emergency dept.

## 2023-07-27 NOTE — PROCEDURES
Arterial Line Insertion    Date/Time: 7/26/2023 10:56 PM    Performed by: Rashawn Steen D.O.  Authorized by: Rashawn Steen D.O.  Consent: The procedure was performed in an emergent situation. Verbal consent obtained.  Risks and benefits: risks, benefits and alternatives were discussed  Consent given by: patient  Required items: required blood products, implants, devices, and special equipment available  Preparation: Patient was prepped and draped in the usual sterile fashion.  Indications: multiple ABGs, respiratory failure and hemodynamic monitoring  Location: right radial  Anesthesia: local infiltration    Anesthesia:  Local Anesthetic: lidocaine 1% without epinephrine    Sedation:  Patient sedated: no    Needle gauge: 20  Seldinger technique: Seldinger technique used  Number of attempts: 1  Post-procedure: dressing applied and line sutured

## 2023-07-27 NOTE — PROCEDURES
"Code Blue Note    I responded to code blue page to Mrs Crooks's room.  On arrival CPR was in progress and a pulse check was approaching.  Bedside nurse provided patient history to me including: admitted today for watchman placement.  Procedure was reportedly successful with no complications.      Nurse told me they responded to call bell by patient.  She complained of feeling \"off\" and suddenly lost consciousness.  A pulse was not detected so a code was called and CPR was started.    I arrived just prior to first pulse check.  No medication had been administered at this time.  On pulse check a femoral pulse was palpated.  She was transitioned from BVM to NRB mask and had spontaneous respirations.    We cycled her BP and called for ECG.  BP was ~100s/80s with pulse in 160s, irregularly irregular.  ECG demonstrating afib rvr, some mild st depressions but no other arrhyhtmia or ischemia noted.    Over the next few minutes she regained full consciousness and was following commands without obvious deficit.      Summary:  - sudden loss of pulses  - 2 min CPR, no medications  - no neuro deficits.    Possibly this represented syncopal episode with difficulty finding pulse, possibly arrhythmia which self-resolved.  We will transfer her to ICU and continue telemetry monitoring, obtain ECHO, recheck labs    Addendum: possible tamponade, cardiology notified and planning pericardiocentesis    Shaji Carlos M.D.    "

## 2023-07-27 NOTE — ASSESSMENT & PLAN NOTE
HFpEF. Diastolic vs valvular  Holding home diuretics and anti-HTN agents given shock/tamponade  Resume as appropriate  Caution with fluid overload

## 2023-07-27 NOTE — ASSESSMENT & PLAN NOTE
Chronic condition followed by electrophysiology.   Status post Watchman procedure 7/26  She has been on coumadin which will be held due to INR over 5 and current pericardial effusion.   INR ordered for the morning

## 2023-07-27 NOTE — PROGRESS NOTES
Cardiology Daily Progress Note    Date of Service  7/27/2023    Chief Complaint  Aleshia Crooks is a 83 y.o. female admitted 7/26/2023 for an elective Watchman procedure.      PMH: CABG in 1992, severe MR s/p mitral clipping 2023, LVEF 55%, chronic A-fib, recurrent GI bleed, CKD baseline creatinine~2        Interval Problem Update    Xudwyfavf-A-qap rate well controlled  BP appropriate  Pericardial drain in situ  Repeat limited echo showed trivial effusion  Repeat limited echo tomorrow morning  CBC this afternoon  Review of Systems  Review of Systems   Cardiovascular:  Negative for chest pain, palpitations, orthopnea, claudication, leg swelling and PND.        Physical Exam  Temp:  [35.8 °C (96.5 °F)-36.8 °C (98.3 °F)] 36.2 °C (97.2 °F)  Pulse:  [] 76  Resp:  [8-24] 16  BP: ()/(34-73) 94/62  SpO2:  [88 %-100 %] 98 %    Physical Exam  Cardiovascular:      Rate and Rhythm: Normal rate. Rhythm irregular.      Pulses: Normal pulses.   Pulmonary:      Effort: Pulmonary effort is normal.   Skin:     General: Skin is warm.      Comments: Right femoral access site is clean dry and intact   Neurological:      Mental Status: She is alert. Mental status is at baseline.   Psychiatric:         Mood and Affect: Mood normal.         Fluids    Intake/Output Summary (Last 24 hours) at 7/27/2023 1453  Last data filed at 7/27/2023 1400  Gross per 24 hour   Intake 1210.7 ml   Output 555 ml   Net 655.7 ml       Laboratory  Recent Labs     07/24/23 1833 07/26/23 2130 07/27/23  0424   WBC 8.6 15.1* 14.1*   RBC 3.31* 2.81* 3.48*   HEMOGLOBIN 8.9* 7.5* 9.8*   HEMATOCRIT 29.2* 25.4* 30.2*   MCV 88.2 90.4 86.8   MCH 26.9* 26.7* 28.2   MCHC 30.5* 29.5* 32.5   RDW 71.9* 73.3* 62.1*   PLATELETCT 296 374 307   MPV 10.5 10.8 10.5     Recent Labs     07/24/23 1833 07/26/23 2130 07/27/23  0424   SODIUM 134* 136 136   POTASSIUM 4.8 5.3 5.3   CHLORIDE 97 98 102   CO2 20 17* 20   GLUCOSE 132* 235* 142*   BUN 52* 52* 53*   CREATININE  2.61* 2.42* 2.38*   CALCIUM 9.5 8.6 8.0*     Recent Labs     07/26/23  2345 07/27/23  1055   INR 4.36* 4.24*               Imaging  EC-ECHOCARDIOGRAM LTD W/O CONT   Final Result      DX-CHEST-LIMITED (1 VIEW)   Final Result         1.  Pulmonary edema and/or infiltrates.   2.  Cardiomegaly      EC-GOMEZ W/O CONT   Final Result      CL-LEFT ATRIAL APPENDAGE CLOSURE    (Results Pending)        Assessment/Plan     -Pericardial effusion with cardiac tamponade on echo post watchman procedure, 7/26/23  -Patient experienced brief asystolic cardiac arrest, ROSC achieved after brief CPR.  -Permanent A-fib, on warfarin on hold INR 4.4  -LVEF 55%  -History of CABG 1992    Recommendations  -s/p successful pericardiocentesis, 300 cc of bloody pericardial fluid was drained.  -S/p transfusion /packed red blood cell x2, 7/26/2023.   limited echo this morning revealed trivial pericardial effusion.  -Continue with pericardial drain  -Repeat CBC this afternoon   -repeat limited echo tomorrow morning  -Cardiology will follow along    I personally spent a total of 20  minutes which includes face-to-face time and non-face-to-face time spent on preparing to see the patient, reviewing hospital notes and tests, obtaining history from the patient, performing a medically appropriate exam, counseling and educating the patient, ordering medications/tests/procedures/referrals as clinically indicated, and documenting information in the electronic medical record.

## 2023-07-27 NOTE — PROGRESS NOTES
Critical Care Progress Note    Date of admission  7/26/2023    Chief Complaint  82yo female with hx of CAD s/p CABG in 1992, severe MR s/p mitral clip in 4/2023, aortic stenosis, CHF (LVEF 55%), chronic afib, recurrent GI bleed, CKD (baseline creatinine in 2s) who underwent elective watchman procedure 7/26 in the afternoon. In the evening she experience a bradycardic then asystolic cardiac arrest. Brief CPR then patient had ROSC.  Found to have pericardial tamponade and underwent drainage of the pericardial effusion emergently in the cath lab.      Hospital Course  No notes on file    Interval Problem Update  Reviewed last 24 hour events:  Neuro: intact  HR: afib 60-80s  SBP: normotensive off levo this morning  Tmax: AF  GI: cardiac diet, BM PTA  I/O: no urine yet  Lines: PIV, art line  LENCHO 60 out overnight  Echo just completed  Mobility: up to chair  Resp: 3L   Vte: warfarin consult  PPI/H2:home PPI  Antibx: s/p cefazolin neelam-procedure    ASA/ d/c'ed  Need to address supratherapeutic INR with cardiology  INR for today ordered    Review of Systems  Review of Systems   Respiratory:  Negative for shortness of breath.    Cardiovascular:  Positive for chest pain (from CPR).   Neurological:  Negative for dizziness.        Vital Signs for last 24 hours   Temp:  [35.8 °C (96.5 °F)-36.8 °C (98.3 °F)] 35.9 °C (96.6 °F)  Pulse:  [] 61  Resp:  [8-23] 13  BP: ()/(34-73) 115/72  SpO2:  [88 %-100 %] 96 %    Hemodynamic parameters for last 24 hours       Respiratory Information for the last 24 hours       Physical Exam   Physical Exam  Constitutional:       General: She is not in acute distress.     Appearance: She is obese.   HENT:      Mouth/Throat:      Mouth: Mucous membranes are moist.   Eyes:      Pupils: Pupils are equal, round, and reactive to light.   Cardiovascular:      Rate and Rhythm: Normal rate. Rhythm irregular.      Heart sounds: Murmur heard.   Pulmonary:      Effort: Pulmonary effort is normal.       Breath sounds: Normal breath sounds.   Abdominal:      General: Bowel sounds are normal.      Tenderness: There is no abdominal tenderness.   Musculoskeletal:      Right lower leg: Edema (trace) present.      Left lower leg: Edema (1+) present.   Neurological:      General: No focal deficit present.      Mental Status: She is oriented to person, place, and time.   Psychiatric:         Mood and Affect: Mood normal.         Medications  Current Facility-Administered Medications   Medication Dose Route Frequency Provider Last Rate Last Admin    fentaNYL (Sublimaze) injection 50 mcg  50 mcg Intravenous Q HOUR PRN Ayo Olson M.D.        insulin lispro (HumaLOG,AdmeLOG) injection  1-6 Units Subcutaneous 4X/DAY ACHS Petty Shipley M.D.        And    dextrose 50% (D50W) injection 25 g  25 g Intravenous Q15 MIN PRN Petty Shipley M.D.        lidocaine (Lidoderm) 5 % 1 Patch  1 Patch Transdermal Q24HR Gifty Au M.D.        lidocaine (Xylocaine) 1 % injection 0.5 mL  0.5 mL Intradermal Once PRN Quique Arambula M.D.        atorvastatin (Lipitor) tablet 40 mg  40 mg Oral Q EVENING Quique Arambula M.D.   40 mg at 07/26/23 1803    [Held by provider] bisoprolol (Zebeta) tablet 10 mg  10 mg Oral DAILY Quique Arambula M.D.        [Held by provider] furosemide (Lasix) tablet 40 mg  40 mg Oral DAILY Quique Arambula M.D.        HYDROcodone-acetaminophen (Norco) 5-325 MG per tablet 1 Tablet  1 Tablet Oral Q4HRS PRN Quique Arambula M.D.        levothyroxine (Synthroid) tablet 150 mcg  150 mcg Oral AM ES Quique Arambula M.D.   150 mcg at 07/27/23 0519    [Held by provider] losartan (Cozaar) tablet 25 mg  25 mg Oral Q EVENING Quique Arambula M.D.   25 mg at 07/26/23 1804    [Held by provider] spironolactone (Aldactone) tablet 25 mg  25 mg Oral DAILY Quique Arambula M.D.        [Held by provider] warfarin (Coumadin) tablet 5 mg  5 mg Oral DAILY AT 1800 Quique Arambula M.D.   5 mg at 07/26/23 1803    MD Alert...Warfarin per Physician    Other Physician to Dose SHONA Marcano.        omeprazole (PriLOSEC) capsule 40 mg  40 mg Oral BID Quique Arambula M.D.   40 mg at 07/27/23 0519    norepinephrine (Levophed) 8 mg in 250 mL NS infusion (premix)  0-1 mcg/kg/min (Ideal) Intravenous Continuous Rashawn Steen D.O. 2.8 mL/hr at 07/27/23 0623 0.03 mcg/kg/min at 07/27/23 0623       Fluids    Intake/Output Summary (Last 24 hours) at 7/27/2023 0704  Last data filed at 7/27/2023 0549  Gross per 24 hour   Intake 970 ml   Output 70 ml   Net 900 ml       Laboratory          Recent Labs     07/24/23 1833 07/26/23 2130 07/27/23 0424   SODIUM 134* 136 136   POTASSIUM 4.8 5.3 5.3   CHLORIDE 97 98 102   CO2 20 17* 20   BUN 52* 52* 53*   CREATININE 2.61* 2.42* 2.38*   MAGNESIUM  --  2.6*  --    CALCIUM 9.5 8.6 8.0*     Recent Labs     07/24/23 1833 07/26/23 2130 07/27/23 0424   ALTSGPT 18  --   --    ASTSGOT 22  --   --    ALKPHOSPHAT 101*  --   --    TBILIRUBIN 0.6  --   --    LIPASE 65  --   --    GLUCOSE 132* 235* 142*     Recent Labs     07/24/23 1833 07/26/23 2130 07/27/23 0424   WBC 8.6 15.1* 14.1*   NEUTSPOLYS 83.20*  --   --    LYMPHOCYTES 7.20*  --   --    MONOCYTES 7.20  --   --    EOSINOPHILS 1.60  --   --    BASOPHILS 0.60  --   --    ASTSGOT 22  --   --    ALTSGPT 18  --   --    ALKPHOSPHAT 101*  --   --    TBILIRUBIN 0.6  --   --      Recent Labs     07/24/23 1833 07/26/23 2130 07/26/23 2345 07/27/23 0424   RBC 3.31* 2.81*  --  3.48*   HEMOGLOBIN 8.9* 7.5*  --  9.8*   HEMATOCRIT 29.2* 25.4*  --  30.2*   PLATELETCT 296 374  --  307   PROTHROMBTM  --   --  40.0*  --    INR  --   --  4.36*  --        Imaging  Echo:   Reviewed    Assessment/Plan  * Pericardial effusion with cardiac tamponade  Assessment & Plan  Hemorrhagic pericardial tamponade in relation to watchman procedure  Drain in place with effective drainage by echo  Drain management per cardiology  Will discuss anticoag management with cardiology. She is supratherapeutic  which is concerning given her hemorrhagic tamponade, although drain output is low    Psoriasis  Assessment & Plan  On immunomodulatory therapy    CHF (congestive heart failure) ([facility])  Assessment & Plan  HFpEF. Diastolic vs valvular  Holding home diuretics and anti-HTN agents given shock/tamponade  Resume as appropriate  Caution with fluid overload    Cardiac arrest ([facility])  Assessment & Plan  Due to tamponade  1 minute CPR  Neuro intact    H/O: GI bleed  Assessment & Plan  Recurrent, chronic  Requires periodic transfusions as outpatient  Goal of watchman is to get off anticoagulation  Home PPI    Hypothyroid  Assessment & Plan  Home levothyroxine    AF (atrial fibrillation) ([facility])- (present on admission)  Assessment & Plan  S/p Watchman procedure 7/26  Discuss anticoag with cardiology  Holding beta blocker as she is just coming off norepinephrine         VTE:  Coumadin  Ulcer: PPI  Lines: Arterial Line  d/c today    I have performed a physical exam and reviewed and updated ROS and Plan today (7/27/2023). In review of yesterday's note (7/26/2023), there are no changes except as documented above.     Discussed patient condition and risk of morbidity and/or mortality with RN, RT, Pharmacy, Charge nurse / hot rounds, Patient, and cardiology  The patient remains critically ill.  I managed levophed infusion and she has a pericardial drain in place.  Critical care time = 50 minutes in directly providing and coordinating critical care and extensive data review.  No time overlap and excludes procedures.

## 2023-07-27 NOTE — ASSESSMENT & PLAN NOTE
S/p Tracyman procedure 7/26  Discuss anticoag with cardiology  Holding beta blocker as she is just coming off norepinephrine

## 2023-07-27 NOTE — ASSESSMENT & PLAN NOTE
Bedside echocardiogram performed after code blue showed interval development of a pericardial effusion which was not present on GOMEZ during her Watchman earlier today. Given the clinical picture this is acute pericardial effusion causing cardiac tamponade leading to hypotension and sudden cardiac arrest/asystole  -Cardiology notified, planned for urgent pericardiocentesis  -Will hold aspirin, continue warfarin target INR 1.5-2  -Check INR, TEG  -2U PRBC, Hgb post-arrest was 7.5  -Admit to ICU for close monitoring  -Arterial line placed for monitoring. IV Levophed for BP support target MAP >65, currently through peripheral IV, might need central line if requirements escalating

## 2023-07-27 NOTE — PROGRESS NOTES
"[facility] Progress Note      Admit Date: 7/26/2023    Resident(s): Petty Shipley M.D.   Attending:  BRAYDON COX/ Dr. Putnam    Patient ID:    Name:  Aleshia Crooks   YOB: 1940  Age:  83 y.o.  female   MRN:  [id number]    Hospital Course (carried forward and updated):  83 y.o. female with PMHx atrial fibrillation, recurrent GI bleeding, chronic anemia of CKD receiving periodic outpatient EPO infusions, combined systolic/diastolic HF (most recent EF was 55% in May 2023), aortic stenosis, severe mitral regurgitation s/p mitral clip in April 2023, CAD s/p CABG 1992, HTN.     She had a Watchman procedure earlier in the day on 7/26/2023 which she tolerated well. Later in the evening, patient was reportedly feeling \"off\", pressed the nurse call light. While the nurse was attending to her she suddenly lost consciousness and no pulse was detected. CPR and code blue initiated. She received about  chest compressions and was receiving respiratory support with a bag-valve mask for about 60 seconds, after which a faint femoral pulse was palpated. CPR was stopped and patient had spontaneous respirations, was able to follow commands. She had not yet received any medications or other interventions.     Cardiology was notified. A bedside echocardiogram was performed which showed new pericardial effusion. At this time she was hypotensive, 60s/40s.     Consultants:  Critical Care  Cardiology     Interval Events:  7/27  Patient had urgent pericardiocentesis performed for effusion/tamponade and CPR performed for cardiac arrest. On morning evaluation vitals stable. On norepi running 0.03. has low urine output per nursing. Endorses some chest pain likely from CPR. Lidocaine patch administered. Continuing with anticoagulation on warfarin (held for today). If coags not optimized consider vitamin K.    Vitals Range last 24h:  Temp:  [35.8 °C (96.5 °F)-36.8 °C (98.3 °F)] 36.4 °C (97.6 °F)  Pulse:  [] 78  Resp:  [8-24] " 18  BP: ()/(34-73) 109/61  SpO2:  [88 %-100 %] 96 %      Intake/Output Summary (Last 24 hours) at 7/27/2023 1033  Last data filed at 7/27/2023 0800  Gross per 24 hour   Intake 1030.7 ml   Output 80 ml   Net 950.7 ml        ROS    PHYSICAL EXAM:  Vitals:    07/27/23 0815 07/27/23 0830 07/27/23 0845 07/27/23 0900   BP:       Pulse: 81 72 82 78   Resp: 20 17 15 18   Temp:       TempSrc:       SpO2: 96% 96% 98% 96%   Weight:       Height:        Body mass index is 37.46 kg/m².    O2 therapy: Pulse Oximetry: 96 %, O2 (LPM): 3, O2 Delivery Device: Silicone Nasal Cannula    Date 07/27/23 0700 - 07/28/23 0659   Shift 3050-4888 9244-5474 6288-9662 24 Hour Total   INTAKE   I.V. 60.7   60.7     Norepinephrine Volume 60.7   60.7   Shift Total 60.7   60.7   OUTPUT   Urine 0   0     Urine Void (mL) 0   0   Drains 10   10     Output (mL) (Closed/Suction Drain 1 Inferior;Medial Pericardial Al Walters) 10   10   Stool         Number of Times Stooled 0 x   0 x   Shift Total 10   10   NET 50.7   50.7        Physical Exam  Constitutional:       Appearance: She is obese.   HENT:      Head: Normocephalic and atraumatic.      Right Ear: Tympanic membrane, ear canal and external ear normal.      Left Ear: Tympanic membrane, ear canal and external ear normal.      Nose: Nose normal.      Mouth/Throat:      Pharynx: Oropharynx is clear.   Eyes:      Extraocular Movements: Extraocular movements intact.      Conjunctiva/sclera: Conjunctivae normal.      Pupils: Pupils are equal, round, and reactive to light.   Cardiovascular:      Rate and Rhythm: Normal rate.      Pulses: Normal pulses.   Pulmonary:      Effort: Pulmonary effort is normal.      Breath sounds: Normal breath sounds.   Abdominal:      General: Abdomen is flat. Bowel sounds are normal.   Musculoskeletal:      Cervical back: Normal range of motion.   Skin:     General: Skin is warm.   Neurological:      Mental Status: She is oriented to person, place, and time.    Psychiatric:         Mood and Affect: Mood normal.             Recent Labs     07/24/23 1833 07/26/23 2130 07/27/23 0424   SODIUM 134* 136 136   POTASSIUM 4.8 5.3 5.3   CHLORIDE 97 98 102   CO2 20 17* 20   BUN 52* 52* 53*   CREATININE 2.61* 2.42* 2.38*   MAGNESIUM  --  2.6*  --    CALCIUM 9.5 8.6 8.0*     Recent Labs     07/24/23 1833 07/26/23 2130 07/27/23 0424   ALTSGPT 18  --   --    ASTSGOT 22  --   --    ALKPHOSPHAT 101*  --   --    TBILIRUBIN 0.6  --   --    LIPASE 65  --   --    GLUCOSE 132* 235* 142*     Recent Labs     07/24/23 1833 07/26/23 2130 07/26/23 2345 07/27/23 0424   RBC 3.31* 2.81*  --  3.48*   HEMOGLOBIN 8.9* 7.5*  --  9.8*   HEMATOCRIT 29.2* 25.4*  --  30.2*   PLATELETCT 296 374  --  307   PROTHROMBTM  --   --  40.0*  --    INR  --   --  4.36*  --      Recent Labs     07/24/23 1833 07/26/23 2130 07/27/23 0424   WBC 8.6 15.1* 14.1*   NEUTSPOLYS 83.20*  --   --    LYMPHOCYTES 7.20*  --   --    MONOCYTES 7.20  --   --    EOSINOPHILS 1.60  --   --    BASOPHILS 0.60  --   --    ASTSGOT 22  --   --    ALTSGPT 18  --   --    ALKPHOSPHAT 101*  --   --    TBILIRUBIN 0.6  --   --        Meds:   fentaNYL  50 mcg      insulin lispro  1-6 Units      And    dextrose bolus  25 g      lidocaine  1 Patch      MD Alert...Warfarin per Pharmacy        lidocaine  0.5 mL      atorvastatin  40 mg      HYDROcodone-acetaminophen  1 Tablet      levothyroxine  150 mcg      omeprazole  40 mg      NORepinephrine  0-1 mcg/kg/min (Ideal) Stopped (07/27/23 0756)        Procedures:  Arterial line  Pericardiocentesis  Watchman CANDELARIO closure    Imaging:  EC-ECHOCARDIOGRAM LTD W/O CONT   Final Result      DX-CHEST-LIMITED (1 VIEW)   Final Result         1.  Pulmonary edema and/or infiltrates.   2.  Cardiomegaly      EC-GOMEZ W/O CONT         CL-LEFT ATRIAL APPENDAGE CLOSURE    (Results Pending)       ASSESSEMENT and PLAN:    * Pericardial effusion with cardiac tamponade  Assessment & Plan  Bedside echocardiogram  performed after code blue showed interval development of a pericardial effusion which was not present on GOMEZ during her Watchman earlier today. Given the clinical picture this is acute pericardial effusion causing cardiac tamponade leading to hypotension and sudden cardiac arrest/asystole  -Cardiology notified, planned for urgent pericardiocentesis  -Will hold aspirin, continue warfarin  -Check INR, TEG  -2U PRBC, Hgb post-arrest was 7.5  -Admit to ICU for close monitoring  -Arterial line placed for monitoring. IV Levophed for BP support target MAP >65, currently through peripheral IV, might need central line if requirements escalating    7/27: no signs of effusion on repeat echo. Pressures/vitals stable. Monitor output on pigtail.    Hyperkalemia  Assessment & Plan  ctm    Cardiac arrest (HCC)  Assessment & Plan  Arrested 2043 on 7/26, received around 60s of chest compressions without other interventions with ROSC. Afterwards was awake, alert, breathing spontaneously. Rhythm strip reviewed during event which showed asystole. Bedside echocardiogram showed new pericardial effusion which had developed in the interim from her Watchman procedure earlier in the day  -Suspect 2/2 to cardiac tamponade from post-procedural pericardial effusion    PLAN  -Repeat BMP, CBC, Mg, troponin    H/O: GI bleed  Assessment & Plan  Resume home PPI  On warfarin  S/p 2u pRBCs  H&H trend    Hyperglycemia  Assessment & Plan  SSI    AF (atrial fibrillation) ([facility])- (present on admission)  Assessment & Plan  S/p Watchman procedure 7/26  -warfarin in the immediate postprocedure period  -warfarin per pharmacy    Hypothyroid  Assessment & Plan  Continue home synthroid        DISPO: IMCU    CODE STATUS: FULL CODE    Quality Measures:  Feeding: cardiac diet  Analgesia:   Sedation: prn fentanyl  Thromboprophylaxis: warfarin  Head of bed: >30 degrees  Ulcer prophylaxis: omeprazole  Glycemic control: Correctional: SSI  Bowel care: bowel  regimen  Indwelling lines: 2 pIV, arterial, pigtail  Deescalation of antibiotics:       Petty Shipley M.D.

## 2023-07-27 NOTE — PROGRESS NOTES
2115- Preparing the patient for transport to [facility]. Transport on hold, unable to take a Blood pressure. Patient is awake.   2118 BP 50/40 with multiple retakes.  at [facility], orders for Sreedhar  2118- 300mcg of Sreedhar given, bolus increased to 1L.  2121- BP 53/34. 400mcg Sreedhar given   2122- Levo gtt started at 0.2mcg/kg/min  per Dr. Steen  2125- BP 89/69, levo increased   2126- Dr. Gray at bedside with cardiac ultrasound,   2135- orders for STAT cath lab, [facility] cath lab[facility].  2143-  preparing for emergent bedside pericardiocentesis   2200- Received orders for 2 PRBCs by Isaac Gaitan. Preparing patient for transport to [facility]

## 2023-07-27 NOTE — CONSULTS
"Critical Care Consultation/Transfer note    Date of consult: 7/26/2023    Referring Physician  Rashawn Steen D.O.    Reason for Consultation  Cardiac arrest    History of Presenting Illness  83 y.o. female with PMHx atrial fibrillation, recurrent GI bleeding, chronic anemia of CKD receiving periodic outpatient EPO infusions, combined systolic/diastolic HF (most recent EF was 55% in May 2023), aortic stenosis, severe mitral regurgitation s/p mitral clip in April 2023, CAD s/p CABG 1992, HTN.    She had a Watchman procedure earlier in the day on 7/26/2023 which she tolerated well. Later in the evening, patient was reportedly feeling \"off\", pressed the nurse call light. While the nurse was attending to her she suddenly lost consciousness and no pulse was detected. CPR and code blue initiated. She received about  chest compressions and was receiving respiratory support with a bag-valve mask for about 60 seconds, after which a faint femoral pulse was palpated. CPR was stopped and patient had spontaneous respirations, was able to follow commands. She had not yet received any medications or other interventions.    Cardiology was notified. A bedside echocardiogram was performed which showed new pericardial effusion. At this time she was hypotensive, 60s/40s.     Code Status  Full Code    Review of Systems  Review of Systems   Constitutional:  Negative for chills, diaphoresis, fever and malaise/fatigue.   HENT:  Negative for congestion.    Eyes:  Negative for blurred vision and double vision.   Respiratory:  Positive for shortness of breath. Negative for cough, hemoptysis and wheezing.         Reported shortness of breath prior to code which was subsequently improved   Cardiovascular:  Negative for chest pain, palpitations, orthopnea and leg swelling.   Gastrointestinal:  Negative for abdominal pain, nausea and vomiting.   Musculoskeletal:  Negative for back pain, joint pain, myalgias and neck pain.   Neurological:  Positive " for dizziness and loss of consciousness. Negative for tingling, tremors, sensory change, speech change, focal weakness and headaches.       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    Surgical History   has a past surgical history that includes thyroidectomy total; cholecystectomy; multiple coronary artery bypass; eye surgery (Bilateral); pr colonoscopy,diagnostic (N/A, 1/24/2023); pr upper gi endoscopy,diagnosis (N/A, 1/24/2023); pr upper gi endoscopy,biopsy (N/A, 1/24/2023); transcatheter mitral valve repair (N/A, 4/17/2023); and echocardiogram, transesophageal, intraoperative (N/A, 4/17/2023).    Family History  family history includes Cancer in her mother; Kidney Disease in an other family member; Other in an other family member; Stroke in her maternal grandmother.    Social History   reports that she quit smoking about 31 years ago. Her smoking use included cigarettes. She has a 31.00 pack-year smoking history. She has never used smokeless tobacco. She reports that she does not drink alcohol and does not use drugs.    Medications  Home Medications       Reviewed by Mg Louis R.N. (Registered Nurse) on 07/26/23 at 1734  Med List Status: Complete     Medication Last Dose Status   Acetaminophen (TYLENOL PO) 7/25/2023 Active   atorvastatin (LIPITOR) 40 MG Tab 7/25/2023 Active   bisoprolol (ZEBETA) 10 MG tablet 7/25/2023 Active   CINNAMON PO 5d ago Active   furosemide (LASIX) 40 MG Tab 7/25/2023 Active   HYDROcodone-acetaminophen (NORCO) 5-325 MG Tab per tablet unk Active   LEVOXYL 150 MCG Tab 7/26/2023 Active   lidocaine  (LIDODERM) 5 % Patch 7/24/2023 Active   losartan (COZAAR) 25 MG Tab 7/25/2023 Active   pantoprazole (PROTONIX) 40 MG Tablet Delayed Response 7/25/2023 Active   Secukinumab (COSENTYX SENSOREADY PEN) 150 MG/ML Solution Auto-injector 30d ago Active   spironolactone (ALDACTONE) 25 MG Tab 7/25/2023 Active   VITAMIN D PO 7/25/2023 Active   Vitamins-Lipotropics (MULTI-VITAMIN HP/MINERALS) Cap 5d ago Active   warfarin (COUMADIN) 5 MG Tab 7/25/2023 Active                  Current Facility-Administered Medications   Medication Dose Route Frequency Provider Last Rate Last Admin    lidocaine (Xylocaine) 1 % injection 0.5 mL  0.5 mL Intradermal Once PRN Quique Arambula M.D.        atorvastatin (Lipitor) tablet 40 mg  40 mg Oral Q EVENING Quique Arambula M.D.   40 mg at 07/26/23 1803    [Held by provider] bisoprolol (Zebeta) tablet 10 mg  10 mg Oral DAILY Quique Arambula M.D.        [Held by provider] furosemide (Lasix) tablet 40 mg  40 mg Oral DAILY Quique Arambula M.D.        HYDROcodone-acetaminophen (Norco) 5-325 MG per tablet 1 Tablet  1 Tablet Oral Q4HRS PRN Quique Arambula M.D.        [START ON 7/27/2023] levothyroxine (Synthroid) tablet 150 mcg  150 mcg Oral AM ES Quique Arambula M.D.        [Held by provider] losartan (Cozaar) tablet 25 mg  25 mg Oral Q EVENING Quique Arambula M.D.   25 mg at 07/26/23 1804    [Held by provider] spironolactone (Aldactone) tablet 25 mg  25 mg Oral DAILY Quique Arambula M.D.        warfarin (Coumadin) tablet 5 mg  5 mg Oral DAILY AT 1800 Quique Arambula M.D.   5 mg at 07/26/23 1803    aspirin EC tablet 81 mg  81 mg Oral Q EVENING DIMA MarcanoRMeraryNMerary   81 mg at 07/26/23 1804    MD Alert...Warfarin per Physician   Other Physician to Dose Karely C Hilliard, A.P.R.N.        omeprazole (PriLOSEC) capsule 40 mg  40 mg Oral BID Quique Arambula M.D.   40 mg at 07/26/23 1804       Allergies  Allergies   Allergen Reactions    Morphine Vomiting       Vital Signs last 24 hours  Temp:  [36.2  °C (97.1 °F)-36.8 °C (98.3 °F)] 36.7 °C (98.1 °F)  Pulse:  [] 100  Resp:  [13-20] 20  BP: ()/(54-69) 89/54  SpO2:  [88 %-99 %] 96 %    Physical Exam  Physical Exam  Vitals reviewed.   Constitutional:       General: She is not in acute distress.     Appearance: Normal appearance. She is not ill-appearing.      Comments: Exam performed after code concluded, patient awake and conversant   HENT:      Head: Normocephalic and atraumatic.      Mouth/Throat:      Mouth: Mucous membranes are moist.      Pharynx: Oropharynx is clear. No oropharyngeal exudate or posterior oropharyngeal erythema.   Eyes:      General: No scleral icterus.     Extraocular Movements: Extraocular movements intact.      Conjunctiva/sclera: Conjunctivae normal.      Pupils: Pupils are equal, round, and reactive to light.   Cardiovascular:      Rate and Rhythm: Tachycardia present. Rhythm irregular.      Pulses: Normal pulses.      Heart sounds: Murmur heard.   Pulmonary:      Effort: Pulmonary effort is normal. No respiratory distress.      Breath sounds: Normal breath sounds. No stridor. No wheezing or rales.   Abdominal:      General: Abdomen is flat. There is no distension.      Palpations: Abdomen is soft.      Tenderness: There is no abdominal tenderness. There is no guarding.   Musculoskeletal:      Cervical back: Neck supple. No rigidity.      Right lower leg: No edema.      Left lower leg: No edema.   Skin:     General: Skin is warm and dry.      Capillary Refill: Capillary refill takes less than 2 seconds.   Neurological:      General: No focal deficit present.      Mental Status: She is alert and oriented to person, place, and time.   Psychiatric:         Mood and Affect: Mood normal.         Behavior: Behavior normal.         Thought Content: Thought content normal.         Judgment: Judgment normal.         Fluids    Intake/Output Summary (Last 24 hours) at 7/26/2023 2121  Last data filed at 7/26/2023 1518  Gross per 24 hour    Intake 400 ml   Output 10 ml   Net 390 ml       Laboratory  Recent Results (from the past 48 hour(s))   Urinalysis    Collection Time: 23  9:37 PM    Specimen: Urine, Clean Catch   Result Value Ref Range    Color Yellow     Character Clear     Specific Gravity 1.015 <1.035    Ph 5.5 5.0 - 8.0    Glucose Negative Negative mg/dL    Ketones Negative Negative mg/dL    Protein Negative Negative mg/dL    Bilirubin Negative Negative    Urobilinogen, Urine 0.2 Negative    Nitrite Negative Negative    Leukocyte Esterase Negative Negative    Occult Blood Negative Negative    Micro Urine Req see below    POCT activated clotting time device results    Collection Time: 23  2:31 PM   Result Value Ref Range    Istat Activated Clotting Time 299 (H) 74 - 137 sec   EKG    Collection Time: 23  3:38 PM   Result Value Ref Range    Report       [facility]    Test Date:  2023  Pt Name:    GINA REICH                Department: [facility]  MRN:        [id number]                      Room:       [facility]  Gender:     Female                       Technician: [facility]  :        1940                   Requested By:GIN KWON  Order #:    [id number]                    Reading MD: Jacquelin Rehman MD    Measurements  Intervals                                Axis  Rate:       82                           P:          0  SC:         0                            QRS:        49  QRSD:       87                           T:          -38  QT:         386  QTc:        451    Interpretive Statements  Atrial fibrillation  Borderline low voltage, extremity leads  Borderline repolarization abnormality  Electronically Signed On 2023 16:16:42 PDT by Jacquelin Rehman MD         Imaging  DX-CHEST-LIMITED (1 VIEW)   Final Result         1.  Pulmonary edema and/or infiltrates.   2.  Cardiomegaly      EC-GOMEZ W/O CONT         CL-LEFT ATRIAL APPENDAGE CLOSURE    (Results Pending)   CL-LEFT HEART CATHETERIZATION WITH POSSIBLE INTERVENTION     (Results Pending)       Assessment/Plan  * Pericardial effusion with cardiac tamponade  Assessment & Plan  Bedside echocardiogram performed after code blue showed interval development of a pericardial effusion which was not present on GOMEZ during her Watchman earlier today. Given the clinical picture this is acute pericardial effusion causing cardiac tamponade leading to hypotension and sudden cardiac arrest/asystole  -Cardiology notified, planned for urgent pericardiocentesis  -Will hold aspirin, continue warfarin target INR 1.5-2  -Check INR, TEG  -2U PRBC, Hgb post-arrest was 7.5  -Admit to ICU for close monitoring  -Arterial line placed for monitoring. IV Levophed for BP support target MAP >65, currently through peripheral IV, might need central line if requirements escalating    Cardiac arrest (HCC)  Assessment & Plan  Arrested 2043 on 7/26, received around 60s of chest compressions without other interventions with ROSC. Afterwards was awake, alert, breathing spontaneously. Rhythm strip reviewed during event which showed asystole. Bedside echocardiogram showed new pericardial effusion which had developed in the interim from her Watchman procedure earlier in the day  -Suspect 2/2 to cardiac tamponade from post-procedural pericardial effusion  -Repeat BMP, CBC, Mg, troponin    AF (atrial fibrillation) ([facility])- (present on admission)  Assessment & Plan  S/p Watchman procedure 7/26  -Continue metoprolol, warfarin in the immediate postprocedure period        Discussed patient condition and risk of morbidity and/or mortality with Patient.

## 2023-07-27 NOTE — PROGRESS NOTES
Pt to [facility] 616.   Dr Steen at bedside for artline insertion.    Art line complete 2039. Pt tolerated well and remained alert throughout. No observed complications.

## 2023-07-27 NOTE — ASSESSMENT & PLAN NOTE
An echocardiogram performed after code blue revealed a pericardial effusion with evidence of tamponade physiology for which she underwent pericardiocentesis by cardiology   Pericardial drain removed on 7/28  Repeat echocardiogram ordered for tomorrow

## 2023-07-27 NOTE — CONSULTS
Hospital Medicine Consultation    Date of Service  7/27/2023    Referring Physician  Juana Au M.D.    Consulting Physician  Stewart Cortez M.D.    Reason for Consultation  Pericardial effusion    History of Presenting Illness  83 y.o. female who presented 7/26/2023 with elective cardiac ablation.  Ms. Crooks has a past medical history of atrial fibrillation on Coumadin therapy as well as previous GI bleed that underwent a watchman with a left atrial appendage closure by Dr. Ralf coker on 7/26/2023.  Later in the evening CODE BLUE was called for which she underwent CPR due to lack of pulses and bag mask ventilation.  She had return of spontaneous circulation in the bedside ultrasound revealed tamponade physiology for which she had an emergent pericardiocentesis and ICU admission.  She currently has a drain in place.  Her INR remains elevated though Coumadin has been held.  She did receive 2 units packed red blood cells due to drop in hemoglobin 7.5.    Review of Systems  Review of Systems   Constitutional:  Negative for chills and fever.   Respiratory:  Negative for shortness of breath.    Cardiovascular:         Sternal pain   All other systems reviewed and are negative.      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    Surgical History   has a past surgical history that includes thyroidectomy total; cholecystectomy; multiple coronary artery bypass; eye surgery (Bilateral); pr colonoscopy,diagnostic (N/A, 1/24/2023); pr upper gi endoscopy,diagnosis (N/A, 1/24/2023); pr upper gi endoscopy,biopsy (N/A, 1/24/2023);  transcatheter mitral valve repair (N/A, 4/17/2023); and echocardiogram, transesophageal, intraoperative (N/A, 4/17/2023).    Family History  family history includes Cancer in her mother; Kidney Disease in an other family member; Other in an other family member; Stroke in her maternal grandmother.    Social History   reports that she quit smoking about 31 years ago. Her smoking use included cigarettes. She has a 31.00 pack-year smoking history. She has never used smokeless tobacco. She reports that she does not drink alcohol and does not use drugs.    Medications  Prior to Admission Medications   Prescriptions Last Dose Informant Patient Reported? Taking?   Acetaminophen (TYLENOL PO) 7/25/2023 at [facility] Patient Yes No   Sig: Take 2 Tablets by mouth 2 times a day as needed (For pain).   CINNAMON PO 5d ago  Yes Yes   Sig: Take 1 Tablet by mouth 2 times a day.   HYDROcodone-acetaminophen (NORCO) 5-325 MG Tab per tablet unk at k  No No   Sig: Take 1 Tablet by mouth every four hours as needed (severe pain) for up to 3 days.   LEVOXYL 150 MCG Tab 7/26/2023 at 0655 Patient No No   Sig: Take 1 Tablet by mouth every morning on an empty stomach.   Secukinumab (COSENTYX SENSOREADY PEN) 150 MG/ML Solution Auto-injector 30d ago Patient No No   Sig: Inject 300mg Sub Cut once monthly   Patient taking differently: Inject 300 mg as directed every 28 days. Inject 300mg Sub Cut once monthly   VITAMIN D PO 7/25/2023 at k  Yes Yes   Sig: Take 1 Tablet by mouth every day.   Vitamins-Lipotropics (MULTI-VITAMIN HP/MINERALS) Cap 5d ago  Yes No   Sig: Take  by mouth 1 time a day as needed.   atorvastatin (LIPITOR) 40 MG Tab 7/25/2023 at hs Patient No No   Sig: TAKE 1 TABLET BY MOUTH AT  BEDTIME   Patient taking differently: Take 40 mg by mouth every evening. TAKE 1 TABLET BY MOUTH AT  BEDTIME   bisoprolol (ZEBETA) 10 MG tablet 7/25/2023 at am  No No   Sig: Take 1 Tablet by mouth every day.   furosemide (LASIX) 40 MG Tab 7/25/2023 at am  Patient No No   Sig: Take 1 Tablet by mouth every day.   lidocaine (LIDODERM) 5 % Patch 7/24/2023 at hs  No No   Sig: Place 1 Patch on the skin every 24 hours.   losartan (COZAAR) 25 MG Tab 7/25/2023 at hs  No No   Sig: Take 1 Tablet by mouth every evening.   pantoprazole (PROTONIX) 40 MG Tablet Delayed Response 7/25/2023 at hs  No No   Sig: Take 1 Tablet by mouth 2 times a day.   spironolactone (ALDACTONE) 25 MG Tab 7/25/2023 at am Patient No No   Sig: Take 1 Tablet by mouth every day.   warfarin (COUMADIN) 5 MG Tab 7/25/2023 at 1800  No No   Sig: Take 1 Tablet by mouth every day.      Facility-Administered Medications: None       Allergies  Allergies   Allergen Reactions    Morphine Vomiting       Physical Exam  Temp:  [35.8 °C (96.5 °F)-36.8 °C (98.3 °F)] 36.2 °C (97.2 °F)  Pulse:  [] 76  Resp:  [8-24] 16  BP: ()/(34-73) 94/62  SpO2:  [88 %-100 %] 98 %    Physical Exam  Vitals and nursing note reviewed.   Constitutional:       General: She is not in acute distress.     Appearance: She is not toxic-appearing.   HENT:      Mouth/Throat:      Mouth: Mucous membranes are dry.   Cardiovascular:      Rate and Rhythm: Normal rate. Rhythm irregular.      Comments: Anterior chest wall tenderness  Left sided pericardial drain  Pulmonary:      Effort: Pulmonary effort is normal.      Breath sounds: Normal breath sounds.   Abdominal:      General: There is no distension.      Tenderness: There is no abdominal tenderness.   Musculoskeletal:      Cervical back: Neck supple.      Right lower leg: Edema present.      Comments: No right groin hematoma   Neurological:      General: No focal deficit present.      Mental Status: She is alert and oriented to person, place, and time.   Psychiatric:         Mood and Affect: Mood normal.         Behavior: Behavior normal.         Fluids  Date 07/27/23 0700 - 07/28/23 0659   Shift 6937-4954 5934-2334 8672-2590 24 Hour Total   INTAKE   P.O. 180   180   I.V. 60.7   60.7   Shift  Total 240.7   240.7   OUTPUT   Urine 450   450   Drains 35   35   Shift Total 485   485   Weight (kg) 92.9 92.9 92.9 92.9       Laboratory  Recent Labs     07/24/23 1833 07/26/23 2130 07/27/23  0424   WBC 8.6 15.1* 14.1*   RBC 3.31* 2.81* 3.48*   HEMOGLOBIN 8.9* 7.5* 9.8*   HEMATOCRIT 29.2* 25.4* 30.2*   MCV 88.2 90.4 86.8   MCH 26.9* 26.7* 28.2   MCHC 30.5* 29.5* 32.5   RDW 71.9* 73.3* 62.1*   PLATELETCT 296 374 307   MPV 10.5 10.8 10.5     Recent Labs     07/24/23 1833 07/26/23 2130 07/27/23  0424   SODIUM 134* 136 136   POTASSIUM 4.8 5.3 5.3   CHLORIDE 97 98 102   CO2 20 17* 20   GLUCOSE 132* 235* 142*   BUN 52* 52* 53*   CREATININE 2.61* 2.42* 2.38*   CALCIUM 9.5 8.6 8.0*     Recent Labs     07/26/23  2345 07/27/23  1055   INR 4.36* 4.24*                 Imaging  EC-ECHOCARDIOGRAM LTD W/O CONT   Final Result      DX-CHEST-LIMITED (1 VIEW)   Final Result         1.  Pulmonary edema and/or infiltrates.   2.  Cardiomegaly      EC-GOMEZ W/O CONT   Final Result      CL-LEFT ATRIAL APPENDAGE CLOSURE    (Results Pending)       Assessment/Plan  * Pericardial effusion with cardiac tamponade  Assessment & Plan  An echocardiogram performed after code blue revealed a pericardial effusion with evidence of tamponade physiology for which she underwent pericardiocentesis by cardiology   Pericardial drain remains in  Repeat echocardiogram does not reveal any evidence of fluid  Limited echocardiogram likely will be done tomorrow    Cardiac arrest (HCC)  Assessment & Plan  Cardiac arrest requiring CPR and return of spontaneous circulation likely due to pericardial tamponade  Lidocaine patch on her sternum due to pain  Continuous telemetry monitoring  She requests ongoing full treatment and full CODE STATUS    Acute blood loss anemia- (present on admission)  Assessment & Plan  Due to tamponade in the setting of the INR over 4 due to Coumadin use  She has received 2 units packed red blood cells  Follow serial hemoglobins  If she  continues to bleed then her INR will be reversed    AF (atrial fibrillation) ([facility])- (present on admission)  Assessment & Plan  Chronic condition followed by electrophysiology.   Status post Watchman procedure 7/26  She has been on coumadin which will be held due to INR over 4 and current pericardial effusion.     Cardiogenic shock ([facility])  Assessment & Plan  For which she required IV pressors    Hyperkalemia  Assessment & Plan  resolved    H/O: GI bleed  Assessment & Plan  Resume home PPI  Goal is to get off anticoag    Hyperglycemia  Assessment & Plan  Stress response  No hx of DM  Stop insulin    CKD (chronic kidney disease) stage 4, GFR 15-29 ml/min ([facility])- (present on admission)  Assessment & Plan  Her baseline Cr is in the low 2 range  Given her cardiac arrest and risk of low perfusion, follow urine output and check BMP in the morning    Obesity (BMI 35.0-39.9 without comorbidity) ([facility])- (present on admission)  Assessment & Plan  BMI 37    Hypothyroid  Assessment & Plan  Continue home synthroid    Coronary artery disease involving native coronary artery of native heart without angina pectoris, s/p 1V CABG 1992- (present on admission)  Assessment & Plan  Hx of  statin

## 2023-07-27 NOTE — DISCHARGE PLANNING
MATT responded to Code Blue.  SW called and updated Pt's DTR, Yamilka of her Mother's change in condition.  MATT made Yamilka aware that the Pt was transferring to [id number]. Yamilka updated this SW that she is on her way to [facility] now.  SW updated bedside staff.

## 2023-07-27 NOTE — ASSESSMENT & PLAN NOTE
Arrested 2043 on 7/26, received around 60s of chest compressions without other interventions with ROSC. Afterwards was awake, alert, breathing spontaneously. Rhythm strip reviewed during event which showed asystole. Bedside echocardiogram showed new pericardial effusion which had developed in the interim from her Watchman procedure earlier in the day  -Suspect 2/2 to cardiac tamponade from post-procedural pericardial effusion  -Repeat BMP, CBC, Mg, troponin

## 2023-07-27 NOTE — DOCUMENTATION QUERY
[facility]                                                                       Query Response Note      PATIENT:               GINA REICH  ACCT #:                  [id number]  MRN:                     [id number]  :                      1940  ADMIT DATE:       2023 11:48 AM  DISCH DATE:          RESPONDING  PROVIDER #:        [id number]           QUERY TEXT:    Lactic acid 7.4 is noted in the  Lab Results.  Can a diagnosis be specified to support this finding?    The patient's clinical indicators include:   Lactic acid: 7.6  Risk Factors: cardiac arrest, cardiogenic shock, pericardial effusion with cardiac tamponade  Treatment: repeat lactic acid level, IVF, pressors    Thank you,  Shani Rangel RN, BSN, CCDS  Clinical   Connect via [employer]  Options provided:   -- Lactic acidosis   -- Other explanation, please specify   -- Findings of no clinical significance   -- Unable to determine      Query created by: Shani Rangel on 2023 12:24 PM    RESPONSE TEXT:    Lactic acidosis          Electronically signed by:  BINDU VAUGHN MD 2023 1:07 PM

## 2023-07-27 NOTE — PROGRESS NOTES
Code Blue Summary     Code Blue initiated at: 2043  Initial rhythm: Symptomatic Bradycardia  with transition to asystole   Subsequent rhythms: A-fib  Interventions: CPR  Medications administered: Fluid Bolus  Total rounds of CPR: 1  Outcome: ROSC achieved at 2048, patient transferred to ICU  Total time of code: 25 min   Code Blue Summary

## 2023-07-27 NOTE — PROGRESS NOTES
Patient became hypotensive and unresponsive and had brief one round of CPR with return of circulation but continues to be hypotensive.  Emergent bedside echo showed moderate pericardial effusion. Patient underwent emergent pericardiocentesis (see separate note). Then stabilized.  A total of 30 minutes of critical care time was utilized not including procedures.

## 2023-07-27 NOTE — ASSESSMENT & PLAN NOTE
Cardiac arrest requiring CPR and return of spontaneous circulation likely due to pericardial tamponade  Lidocaine patch on her sternum due to pain  Continuous telemetry monitoring  She requests ongoing full treatment and full CODE STATUS

## 2023-07-27 NOTE — ANESTHESIA POSTPROCEDURE EVALUATION
Patient: Aleshia Crooks    Procedure Summary     Date: 07/26/23 Room / Location: [facility] Cath Lab [facility]    Anesthesia Start: 1347 Anesthesia Stop: 1518    Procedure: CL-LEFT ATRIAL APPENDAGE CLOSURE Diagnosis:       Longstanding persistent atrial fibrillation (HCC)      (See Associated Dx)    Scheduled Providers: Quique Arambula M.D.; Fili Vickers M.D. Responsible Provider: Fili Vickers M.D.    Anesthesia Type: general ASA Status: 3          Final Anesthesia Type: general  Last vitals  BP   Blood Pressure : 115/72, NIBP: 110/60, Arterial BP: 108/59    Temp   35.9 °C (96.6 °F)    Pulse   61   Resp   13    SpO2   96 %      Anesthesia Post Evaluation    Patient location during evaluation: PACU  Patient participation: complete - patient participated  Level of consciousness: sleepy but conscious  Pain score: 0    Airway patency: patent  Anesthetic complications: no  Cardiovascular status: hemodynamically stable  Respiratory status: acceptable  Hydration status: balanced    PONV: none          There were no known notable events for this encounter.     Nurse Pain Score: 0 (NPRS)

## 2023-07-27 NOTE — PROGRESS NOTES
Report received and assumed patient care. Pt A&O x 4 and on 1L NC, saturating 95 %. No signs of distress noted at this time. Pt has tele box in place. Pt updated on plan of care for the day and has call light within reach. Fall precautions are in place. Pt is off bedrest and able to sit up at this time.

## 2023-07-27 NOTE — ASSESSMENT & PLAN NOTE
Recurrent, chronic  Requires periodic transfusions as outpatient  Goal of watchman is to get off anticoagulation  Home PPI

## 2023-07-27 NOTE — PROGRESS NOTES
EP Note    Pt s/p CANDELARIO-C yesterday, no complications evident in the immediate aftermath of the procedure and no evidence of perforation or effusion at the time of the procedure, however pt developed late-presenting effusion ~6 hrs after procedure and underwent emergent pericardiocentesis last night. Likely etiology of effusion is due to micro-perforation due to device anchors during implantation or recapture. I discussed case with Dr Gray who did discuss with pt and family who are all aware. We will continue to follow along with cardiology. Warfarin resumption when no drain output >24 hrs.     Quique Arambula MD  Cardiac Electrophysiology

## 2023-07-27 NOTE — PROGRESS NOTES
This note is for learning purposes only and not for billing. Note written by Elton oDw, Medical Student    Daily Progress Note:   Note Author: Elton Dow, Student  Date: 7/27/2023    Date of Admission: 7/26/2023  Attending: Dr. Au  Senior Resident: Petty Shipley MD (PGY-2)  Medical Student: Elton Dow MS4    Reason for interval visit  (Principal Problem)   Pericardial effusion with cardiac tamponade    Subjective  Aleshia Crooks is a 83 y.o. female with a PMH of aatrial fibrillation, recurrent GI bleeding, chronic anemia of CKD receiving periodic outpatient EPO infusions, combined systolic/diastolic HF (most recent EF was 55% in May 2023), aortic stenosis, severe mitral regurgitation s/p mitral clip in April 2023, CAD s/p CABG 1992, HTN who was admitted on 7/26/2023 with for a left atrial appendage closure. The patient then had a cardiac arrest that triggered a code blue, had 2 minutes of CPR. Cardiology was notified. A bedside echocardiogram was performed which showed new pericardial effusion. Emergent pericardiocentesis performed draining 300 cc of bloody pericardial fluid. She was given 2U PRBCs. She was transferred to the ICU.     Interval updates:   7/27:Patient reports no acute changes since the pericardiocentesis last night. Was able to sleep intermittently throughout the night    Consultants/Specialty:  ICU    Review of Systems   Constitutional:  Positive for fever. Negative for chills.   Eyes:  Negative for blurred vision and double vision.   Respiratory:  Negative for cough, sputum production and shortness of breath.         Chest wall pain with breathing   Cardiovascular:  Negative for palpitations and leg swelling.   Neurological:  Negative for dizziness, speech change, focal weakness, loss of consciousness, weakness and headaches.     Objective Data:   Physical Exam:   Vitals:   Temp:  [35.8 °C (96.5 °F)-36.8 °C (98.3 °F)] 35.9 °C (96.6 °F)  Pulse:  [] 61  Resp:  [8-23] 13  BP:  ()/(34-73) 115/72  SpO2:  [88 %-100 %] 96 %  O2 therapy: Pulse Oximetry: 96 %, O2 (LPM): 3, O2 Delivery Device: Silicone Nasal Cannula         Physical Exam  Constitutional:       General: She is not in acute distress.     Appearance: She is obese.   HENT:      Head: Normocephalic and atraumatic.   Neck:      Vascular: No carotid bruit.   Cardiovascular:      Rate and Rhythm: Normal rate. Rhythm irregular.      Heart sounds: No murmur heard.  Pulmonary:      Effort: Pulmonary effort is normal. No respiratory distress.      Breath sounds: No wheezing.   Chest:      Chest wall: Tenderness present.   Abdominal:      General: Abdomen is flat.      Palpations: Abdomen is soft.      Tenderness: There is abdominal tenderness.   Skin:     General: Skin is warm and dry.      Capillary Refill: Capillary refill takes less than 2 seconds.   Neurological:      General: No focal deficit present.      Mental Status: She is alert and oriented to person, place, and time.      Cranial Nerves: No cranial nerve deficit.      Motor: No weakness.         Lab Results:  Recent Labs     07/24/23 1833 07/26/23 2130 07/27/23 0424   WBC 8.6 15.1* 14.1*   RBC 3.31* 2.81* 3.48*   HEMOGLOBIN 8.9* 7.5* 9.8*   HEMATOCRIT 29.2* 25.4* 30.2*   MCV 88.2 90.4 86.8   MCH 26.9* 26.7* 28.2   RDW 71.9* 73.3* 62.1*   PLATELETCT 296 374 307   MPV 10.5 10.8 10.5   NEUTSPOLYS 83.20*  --   --    LYMPHOCYTES 7.20*  --   --    MONOCYTES 7.20  --   --    EOSINOPHILS 1.60  --   --    BASOPHILS 0.60  --   --      Recent Labs     07/24/23 1833 07/26/23 2130 07/27/23 0424   SODIUM 134* 136 136   POTASSIUM 4.8 5.3 5.3   CHLORIDE 97 98 102   CO2 20 17* 20   BUN 52* 52* 53*   CREATININE 2.61* 2.42* 2.38*   CALCIUM 9.5 8.6 8.0*   MAGNESIUM  --  2.6*  --    ALBUMIN 4.5  --   --      Estimated GFR/CRCL = Estimated Creatinine Clearance: 19.7 mL/min (A) (by C-G formula based on SCr of 2.38 mg/dL (H)).  Recent Labs     07/24/23  1833 07/26/23  2660 07/27/23  7833    GLUCOSE 132* 235* 142*     Recent Labs     23  1833 23  2345   ASTSGOT 22  --    ALTSGPT 18  --    TBILIRUBIN 0.6  --    ALKPHOSPHAT 101*  --    GLOBULIN 3.3  --    INR  --  4.36*             Recent Labs     23  2345   INR 4.36*         Microbiology Results:  Results       ** No results found for the last 168 hours. **            Imaging Results:  DX-CHEST-LIMITED (1 VIEW)   Final Result         1.  Pulmonary edema and/or infiltrates.   2.  Cardiomegaly      EC-GOMEZ W/O CONT         CL-LEFT ATRIAL APPENDAGE CLOSURE    (Results Pending)   CL-LEFT HEART CATHETERIZATION WITH POSSIBLE INTERVENTION    (Results Pending)       EKG  Results for orders placed or performed during the hospital encounter of 23   EKG   Result Value Ref Range    Report       [facility]    Test Date:  2023  Pt Name:    GINA REICH                Department: RST3  MRN:        [id number]                      Room:       [facility]  Gender:     Female                       Technician: CELINA  :        [id number]                   Requested By:GIN KWON  Order #:    [id number]                    Reading MD: Jacquelin Rehman MD    Measurements  Intervals                                Axis  Rate:       82                           P:          0  AK:         0                            QRS:        49  QRSD:       87                           T:          -38  QT:         386  QTc:        451    Interpretive Statements  Atrial fibrillation  Borderline low voltage, extremity leads  Borderline repolarization abnormality  Electronically Signed On 2023 16:16:42 PDT by Jacquelin Rehman MD     EKG   Result Value Ref Range    Report       [facility]    Test Date:  2023  Pt Name:    GINA REICH                Department: 171  MRN:        [id number]                      Room:       [facility]  Gender:     Female                       Technician: PITER  :        1940                   Requested By:VICENTA LUX  NATHALIE  Order #:    [id number]                    Reading MD: Cesar Gray MD    Measurements  Intervals                                Axis  Rate:       113                          P:          0  SD:         0                            QRS:        73  QRSD:       83                           T:          262  QT:         311  QTc:        427    Interpretive Statements  Atrial fibrillation  Nonspecific repol abnormality, diffuse leads  Compared to ECG 07/26/2023 15:38:37  No significant changes  Electronically Signed On 07- 01:18:11 PDT by Cesar Gray MD         Current Medications    Current Facility-Administered Medications:     fentaNYL (Sublimaze) injection 50 mcg, 50 mcg, Intravenous, Q HOUR PRN, Ayo Olson M.D.    insulin lispro (HumaLOG,AdmeLOG) injection, 1-6 Units, Subcutaneous, 4X/DAY ACHS **AND** POC blood glucose manual result, , , Q AC AND BEDTIME(S) **AND** NOTIFY MD and PharmD, , , Once **AND** Administer 20 grams of glucose (approximately 8 ounces of fruit juice) every 15 minutes PRN FSBG less than 70 mg/dL, , , PRN **AND** dextrose 50% (D50W) injection 25 g, 25 g, Intravenous, Q15 MIN PRN, Petty Shipley M.D.    lidocaine (Xylocaine) 1 % injection 0.5 mL, 0.5 mL, Intradermal, Once PRN, Quique Arambula M.D.    atorvastatin (Lipitor) tablet 40 mg, 40 mg, Oral, Q EVENING, Quique Arambula M.D., 40 mg at 07/26/23 1803    [Held by provider] bisoprolol (Zebeta) tablet 10 mg, 10 mg, Oral, DAILY, Quique Arambula M.D.    [Held by provider] furosemide (Lasix) tablet 40 mg, 40 mg, Oral, DAILY, Quique Arambula M.D.    HYDROcodone-acetaminophen (Norco) 5-325 MG per tablet 1 Tablet, 1 Tablet, Oral, Q4HRS PRN, Quique Arambula M.D.    levothyroxine (Synthroid) tablet 150 mcg, 150 mcg, Oral, AM ES, Quique Arambula M.D., 150 mcg at 07/27/23 0519    [Held by provider] losartan (Cozaar) tablet 25 mg, 25 mg, Oral, Q EVENING, Quique Arambula M.D., 25 mg at 07/26/23 1804    [Held by provider]  spironolactone (Aldactone) tablet 25 mg, 25 mg, Oral, DAILY, Quique Arambula M.D.    [Held by provider] warfarin (Coumadin) tablet 5 mg, 5 mg, Oral, DAILY AT 1800, Quique Arambula M.D., 5 mg at 07/26/23 1803    MD Alert...Warfarin per Physician, , Other, Physician to Dose, SHONA Marcano.    omeprazole (PriLOSEC) capsule 40 mg, 40 mg, Oral, BID, Quique Arambula M.D., 40 mg at 07/27/23 0519    norepinephrine (Levophed) 8 mg in 250 mL NS infusion (premix), 0-1 mcg/kg/min (Ideal), Intravenous, Continuous, Rashawn Steen D.O., Last Rate: 2.8 mL/hr at 07/27/23 0623, 0.03 mcg/kg/min at 07/27/23 0623    Problem Representation:     Aleshia Crooks is a 83 y.o. female with a PMH of atrial fibrillation and chronic GI bleed who was admitted on 7/26/2023 with for a left atrial appendage closure.    Cardiac tamponade secondary to pericardial effusion following watchman procedure  Cardiac arrest  Echocardiogram post cardiac arrest showed pericardial effusion which was not present on GOMEZ during her Watchman earlier today. Patient was taken for emergent pericardiocentesis and drained 300 cc of bloody pericardial fluid. She was given 2U PRBCs.   Continuous monitoring and vital sign checks for 24-48 hours.   Repeat echo prior to discharge, will need to follow up outpatient echo 1-2 weeks   Warfarin therapy for post procedural anticoagulant. Maintain PT/INR 1-2 range. Reverse with Vitamin k and PCC    Atrial fibrillation  Chronic. CHADSVASC - 5. Moderate-high risk for event, and would benefit from anticoagulation. Contraindicated to xarelto [facility] recent GI bleed in March     Severe mitral regurgitation  History of Mitral clip from April 2023. GOMEZ prior to watchman placement shows sever MR. Per cardiology, not a surgical candidate due to advanced multisystem organ disease and prior sternotomy.   monitor    Acute on chronic anemia  CKD  Acute hemmorrhagic blood loss from pericardial effusion with underlying CKD anemia  receiving transfusions and EPO injections. Patient has received 2U PRBCs. Hemoglobin is chronically low, under 10  Monitor H/H transfuse below 7    GI bleed   Her chart review, patient has history of significant bleeding while on Xarelto and was switched to warfarin, but benefit remains unclear    DISPO: ICU    CODE STATUS: FULLCODE      Quality Measures:  Feeding: Normal  Analgesia: lidocaine patch  Sedation: none  Thromboprophylaxis: warfarin  Head of bed: >30 degrees  Ulcer prophylaxis: Protonix  Glycemic control: Insulin  Bowel care: bowel regimen  Indwelling lines: two PIV, Arterial line  Deescalation of antibiotics: none    Elton Dow MS4

## 2023-07-28 ENCOUNTER — APPOINTMENT (OUTPATIENT)
Dept: CARDIOLOGY | Facility: MEDICAL CENTER | Age: 83
DRG: 273 | End: 2023-07-28
Attending: NURSE PRACTITIONER
Payer: MEDICARE

## 2023-07-28 LAB
ANION GAP SERPL CALC-SCNC: 16 MMOL/L (ref 7–16)
BUN SERPL-MCNC: 61 MG/DL (ref 8–22)
CALCIUM SERPL-MCNC: 8.2 MG/DL (ref 8.5–10.5)
CHLORIDE SERPL-SCNC: 100 MMOL/L (ref 96–112)
CO2 SERPL-SCNC: 21 MMOL/L (ref 20–33)
CREAT SERPL-MCNC: 2.58 MG/DL (ref 0.5–1.4)
ERYTHROCYTE [DISTWIDTH] IN BLOOD BY AUTOMATED COUNT: 65.3 FL (ref 35.9–50)
GFR SERPLBLD CREATININE-BSD FMLA CKD-EPI: 18 ML/MIN/1.73 M 2
GLUCOSE SERPL-MCNC: 136 MG/DL (ref 65–99)
HCT VFR BLD AUTO: 30.3 % (ref 37–47)
HGB BLD-MCNC: 9.5 G/DL (ref 12–16)
INR PPP: 5.22 (ref 0.87–1.13)
LV EJECT FRACT  99904: 60
MCH RBC QN AUTO: 27.4 PG (ref 27–33)
MCHC RBC AUTO-ENTMCNC: 31.4 G/DL (ref 32.2–35.5)
MCV RBC AUTO: 87.3 FL (ref 81.4–97.8)
PLATELET # BLD AUTO: 268 K/UL (ref 164–446)
PMV BLD AUTO: 11 FL (ref 9–12.9)
POTASSIUM SERPL-SCNC: 5 MMOL/L (ref 3.6–5.5)
PROTHROMBIN TIME: 45.8 SEC (ref 12–14.6)
RBC # BLD AUTO: 3.47 M/UL (ref 4.2–5.4)
SODIUM SERPL-SCNC: 137 MMOL/L (ref 135–145)
WBC # BLD AUTO: 12 K/UL (ref 4.8–10.8)

## 2023-07-28 PROCEDURE — 700102 HCHG RX REV CODE 250 W/ 637 OVERRIDE(OP): Performed by: INTERNAL MEDICINE

## 2023-07-28 PROCEDURE — 93308 TTE F-UP OR LMTD: CPT

## 2023-07-28 PROCEDURE — 770020 HCHG ROOM/CARE - TELE (206)

## 2023-07-28 PROCEDURE — 85027 COMPLETE CBC AUTOMATED: CPT

## 2023-07-28 PROCEDURE — 85610 PROTHROMBIN TIME: CPT

## 2023-07-28 PROCEDURE — 93308 TTE F-UP OR LMTD: CPT | Mod: 26 | Performed by: INTERNAL MEDICINE

## 2023-07-28 PROCEDURE — 99232 SBSQ HOSP IP/OBS MODERATE 35: CPT | Mod: FS | Performed by: INTERNAL MEDICINE

## 2023-07-28 PROCEDURE — 99233 SBSQ HOSP IP/OBS HIGH 50: CPT | Performed by: HOSPITALIST

## 2023-07-28 PROCEDURE — A9270 NON-COVERED ITEM OR SERVICE: HCPCS | Performed by: INTERNAL MEDICINE

## 2023-07-28 PROCEDURE — 80048 BASIC METABOLIC PNL TOTAL CA: CPT

## 2023-07-28 RX ORDER — ACETAMINOPHEN 325 MG/1
650 TABLET ORAL EVERY 4 HOURS PRN
Status: DISCONTINUED | OUTPATIENT
Start: 2023-07-28 | End: 2023-07-29 | Stop reason: HOSPADM

## 2023-07-28 RX ADMIN — ATORVASTATIN CALCIUM 40 MG: 40 TABLET, FILM COATED ORAL at 18:08

## 2023-07-28 RX ADMIN — OMEPRAZOLE 40 MG: 20 CAPSULE, DELAYED RELEASE ORAL at 05:47

## 2023-07-28 RX ADMIN — OMEPRAZOLE 40 MG: 20 CAPSULE, DELAYED RELEASE ORAL at 18:08

## 2023-07-28 RX ADMIN — HYDROCODONE BITARTRATE AND ACETAMINOPHEN 1 TABLET: 5; 325 TABLET ORAL at 13:04

## 2023-07-28 RX ADMIN — LEVOTHYROXINE SODIUM 150 MCG: 0.15 TABLET ORAL at 05:47

## 2023-07-28 ASSESSMENT — ENCOUNTER SYMPTOMS
ORTHOPNEA: 0
SHORTNESS OF BREATH: 0
ABDOMINAL PAIN: 0
DIZZINESS: 0
CLAUDICATION: 0
ROS GI COMMENTS: EATING WELL
PALPITATIONS: 0
CHEST TIGHTNESS: 0
FEVER: 0
FATIGUE: 1
BLOOD IN STOOL: 0
PND: 0

## 2023-07-28 ASSESSMENT — FIBROSIS 4 INDEX
FIB4 SCORE: 1.57
FIB4 SCORE: 1.57

## 2023-07-28 ASSESSMENT — PAIN DESCRIPTION - PAIN TYPE
TYPE: ACUTE PAIN

## 2023-07-28 NOTE — PROGRESS NOTES
Monitor Summary  Rhythm: a.fib  HR: 70-90s  Ectopy: n/a  Measurements: -/0.08/-      12 hour chart check

## 2023-07-28 NOTE — PROGRESS NOTES
Hospital Medicine Daily Progress Note    Date of Service  7/28/2023    Chief Complaint  Aleshia Crooks is a 83 y.o. female admitted 7/26/2023 with elective surgery for Watchman Procedure.    Hospital Course  Ms. Crooks has a past medical history of atrial fibrillation on Coumadin therapy as well as previous GI bleed that underwent a watchman with a left atrial appendage closure by Dr. Arambula electrophysiology on 7/26/2023.  Later in the evening CODE BLUE was called for which she underwent CPR due to lack of pulses and bag mask ventilation.  She had return of spontaneous circulation in the bedside ultrasound revealed tamponade physiology for which she had an emergent pericardiocentesis and ICU admission.  She currently has a drain in place.  Her INR remains elevated though Coumadin has been held.  She did receive 2 units packed red blood cells due to drop in hemoglobin 7.5.    Interval Problem Update  7/28: Ms. Crooks was evaluated and examined in the IMCU. She had 10 mL out from the pericardial drain over night and the drain was removed.  Her Hb is 9.5. Her INR went up to 5 today despite holding coumadin.    I have discussed this patient's plan of care and discharge plan at IDT rounds today with Case Management, Nursing, Nursing leadership, and other members of the IDT team.    Consultants/Specialty  cardiology I discussed with Dr. Delgado in person    Code Status  Full Code    Disposition  The patient is not medically cleared for discharge to home or a post-acute facility.  Anticipate discharge to: home with close outpatient follow-up    I have placed the appropriate orders for post-discharge needs.    Review of Systems  Review of Systems   Constitutional:  Negative for fever.   Respiratory:  Negative for shortness of breath.    Cardiovascular:  Negative for chest pain and leg swelling.   Gastrointestinal:         Eating well   All other systems reviewed and are negative.       Physical Exam  Temp:  [36.2 °C  (97.2 °F)-36.6 °C (97.9 °F)] 36.6 °C (97.9 °F)  Pulse:  [63-95] 73  Resp:  [13-20] 16  BP: ()/(50-81) 128/62  SpO2:  [92 %-100 %] 97 %    Physical Exam  Vitals and nursing note reviewed.   Constitutional:       Appearance: Normal appearance.   HENT:      Mouth/Throat:      Mouth: Mucous membranes are dry.   Cardiovascular:      Rate and Rhythm: Normal rate. Rhythm irregular.   Pulmonary:      Effort: Pulmonary effort is normal.      Breath sounds: Normal breath sounds.      Comments: Room air  Abdominal:      General: There is no distension.      Tenderness: There is no abdominal tenderness.   Musculoskeletal:      Right lower leg: No edema.      Left lower leg: No edema.   Neurological:      General: No focal deficit present.      Mental Status: She is alert and oriented to person, place, and time.   Psychiatric:         Mood and Affect: Mood normal.         Behavior: Behavior normal.         Thought Content: Thought content normal.         Judgment: Judgment normal.         Fluids    Intake/Output Summary (Last 24 hours) at 7/28/2023 0701  Last data filed at 7/28/2023 0400  Gross per 24 hour   Intake 490.7 ml   Output 490 ml   Net 0.7 ml       Laboratory  Recent Labs     07/27/23  0424 07/27/23  1555 07/28/23  0455   WBC 14.1* 12.6* 12.0*   RBC 3.48* 3.54* 3.47*   HEMOGLOBIN 9.8* 10.0* 9.5*   HEMATOCRIT 30.2* 31.4* 30.3*   MCV 86.8 88.7 87.3   MCH 28.2 28.2 27.4   MCHC 32.5 31.8* 31.4*   RDW 62.1* 65.8* 65.3*   PLATELETCT 307 281 268   MPV 10.5 10.5 11.0     Recent Labs     07/26/23  2130 07/27/23  0424 07/28/23  0455   SODIUM 136 136 137   POTASSIUM 5.3 5.3 5.0   CHLORIDE 98 102 100   CO2 17* 20 21   GLUCOSE 235* 142* 136*   BUN 52* 53* 61*   CREATININE 2.42* 2.38* 2.58*   CALCIUM 8.6 8.0* 8.2*     Recent Labs     07/26/23  2345 07/27/23  1055   INR 4.36* 4.24*               Imaging  EC-ECHOCARDIOGRAM LTD W/O CONT   Final Result      DX-CHEST-LIMITED (1 VIEW)   Final Result         1.  Pulmonary edema  and/or infiltrates.   2.  Cardiomegaly      EC-GOMEZ W/O CONT   Final Result      CL-LEFT ATRIAL APPENDAGE CLOSURE    (Results Pending)   EC-ECHOCARDIOGRAM LTD W/O CONT    (Results Pending)        Assessment/Plan  * Pericardial effusion with cardiac tamponade  Assessment & Plan  An echocardiogram performed after code blue revealed a pericardial effusion with evidence of tamponade physiology for which she underwent pericardiocentesis by cardiology   Pericardial drain removed on 7/28  Repeat echocardiogram ordered for tomorrow      Cardiac arrest ([facility])  Assessment & Plan  Cardiac arrest requiring CPR and return of spontaneous circulation likely due to pericardial tamponade  Lidocaine patch on her sternum due to pain  Continuous telemetry monitoring  She requests ongoing full treatment and full CODE STATUS    Acute blood loss anemia- (present on admission)  Assessment & Plan  Due to tamponade in the setting of the INR over 4 due to Coumadin use  She has received 2 units packed red blood cells  Follow serial hemoglobins  If she continues to bleed then her INR will be reversed    AF (atrial fibrillation) ([facility])- (present on admission)  Assessment & Plan  Chronic condition followed by electrophysiology.   Status post Watchman procedure 7/26  She has been on coumadin which will be held due to INR over 5 and current pericardial effusion.   INR ordered for the morning    Cardiogenic shock ([facility])  Assessment & Plan  For which she required IV pressors    CHF (congestive heart failure) ([facility])  Assessment & Plan  HFpEF without exacerbation    Hyperkalemia  Assessment & Plan  resolved    H/O: GI bleed  Assessment & Plan  Resume home PPI  Goal is to get off anticoag    Hyperglycemia  Assessment & Plan  Stress response  No hx of DM      CKD (chronic kidney disease) stage 4, GFR 15-29 ml/min ([facility])- (present on admission)  Assessment & Plan  Her baseline Cr is in the low 2 range  Given her cardiac arrest and risk of low perfusion, follow  urine output     Obesity (BMI 35.0-39.9 without comorbidity) (HCC)- (present on admission)  Assessment & Plan  BMI 37    Hypothyroid  Assessment & Plan  Continue home synthroid    Coronary artery disease involving native coronary artery of native heart without angina pectoris, s/p 1V CABG 1992- (present on admission)  Assessment & Plan  Hx of  statin         VTE prophylaxis:    therapeutic anticoagulation with coumadin dosing per pharmacy, adjust PRN      I have performed a physical exam and reviewed and updated ROS and Plan today (7/28/2023). In review of yesterday's note (7/27/2023), there are no changes except as documented above.

## 2023-07-28 NOTE — ASSESSMENT & PLAN NOTE
Due to tamponade in the setting of the INR over 4 due to Coumadin use  She has received 2 units packed red blood cells  Follow serial hemoglobins  If she continues to bleed then her INR will be reversed

## 2023-07-28 NOTE — PROGRESS NOTES
Cardiology Follow Up Progress Note    Date of Service  7/28/2023    Attending Physician  Stewart Cortez M.D.    HPI  Aleshia Crooks is a 83 y.o. female admitted 7/26/2023 for CANDELARIO-C due to her history of anemia and GI bleed on OAC.     Underwent successful WATCHMAN with Dr. Arambula on 7/26/2023. Experienced brief asystolic cardiac arrest, achieved ROSC after brief CPR. Found to have developed late-presenting pericardial effusion and underwent emergent pericardiocentesis with 300 cc bloody pericardial fluid out with drain placement.     Interim Events  25 cc output since last night.   Repeat TTE this am continues to show only trivial effusion.   Patient reports feeling better. Ongoing chest tenderness from CPR.   AF, rate 60's-70's on tele.   Drain likely to be pulled today.   INR supra-therapeutic (5.2).     Review of Systems  Review of Systems   Constitutional:  Positive for fatigue. Negative for fever.   Respiratory:  Negative for chest tightness and shortness of breath.    Cardiovascular:  Negative for chest pain, palpitations and leg swelling.   Gastrointestinal:  Negative for abdominal pain and blood in stool.   Genitourinary:  Negative for hematuria.   Musculoskeletal:  Negative for gait problem.   Neurological:  Negative for dizziness and syncope.   All other systems reviewed and are negative.      Vital signs in last 24 hours  Temp:  [36.4 °C (97.5 °F)-36.6 °C (97.9 °F)] 36.6 °C (97.9 °F)  Pulse:  [63-95] 78  BP: ()/(50-81) 98/58  SpO2:  [92 %-100 %] 97 %    Physical Exam  Physical Exam  Constitutional:       General: She is not in acute distress.     Appearance: Normal appearance.   HENT:      Head: Normocephalic.   Eyes:      Extraocular Movements: Extraocular movements intact.   Neck:      Vascular: No carotid bruit.   Cardiovascular:      Rate and Rhythm: Normal rate and regular rhythm.      Pulses: Normal pulses.      Heart sounds: Normal heart sounds.   Pulmonary:      Effort: Pulmonary effort is  normal.      Breath sounds: Normal breath sounds.   Chest:      Comments: Pericardial drain in place.   Abdominal:      Palpations: Abdomen is soft.      Tenderness: There is no abdominal tenderness.   Musculoskeletal:      Cervical back: Normal range of motion.      Right lower leg: No edema.      Left lower leg: Edema (Chronic) present.   Skin:     General: Skin is warm and dry.   Neurological:      Mental Status: She is alert and oriented to person, place, and time.   Psychiatric:         Mood and Affect: Mood normal.         Behavior: Behavior normal.         Thought Content: Thought content normal.         Lab Review  Lab Results   Component Value Date/Time    WBC 12.0 (H) 07/28/2023 04:55 AM    RBC 3.47 (L) 07/28/2023 04:55 AM    HEMOGLOBIN 9.5 (L) 07/28/2023 04:55 AM    HEMATOCRIT 30.3 (L) 07/28/2023 04:55 AM    MCV 87.3 07/28/2023 04:55 AM    MCH 27.4 07/28/2023 04:55 AM    MCHC 31.4 (L) 07/28/2023 04:55 AM    MPV 11.0 07/28/2023 04:55 AM      Lab Results   Component Value Date/Time    SODIUM 137 07/28/2023 04:55 AM    POTASSIUM 5.0 07/28/2023 04:55 AM    CHLORIDE 100 07/28/2023 04:55 AM    CO2 21 07/28/2023 04:55 AM    GLUCOSE 136 (H) 07/28/2023 04:55 AM    BUN 61 (H) 07/28/2023 04:55 AM    CREATININE 2.58 (H) 07/28/2023 04:55 AM      Lab Results   Component Value Date/Time    ASTSGOT 22 07/24/2023 06:33 PM    ALTSGPT 18 07/24/2023 06:33 PM     Lab Results   Component Value Date/Time    CHOLSTRLTOT 142 11/17/2022 09:36 AM    LDL 88 11/17/2022 09:36 AM    HDL 34 (A) 11/17/2022 09:36 AM    TRIGLYCERIDE 102 11/17/2022 09:36 AM    TROPONINT 44 (H) 07/26/2023 09:30 PM       No results for input(s): NTPROBNP in the last 72 hours.      Assessment/Plan  Permanent AF:  -S/P CANDELARIO-C (WATCHMAN) on 7/26/2023 C/B pericardial effusion, s/p pericardiocentesis. TTE this am showed trivial pericardial effusion.   -Repeat TTE ordered for the am showing only trivial pericardial effusion.   -Rate well controlled.   -INR 5.2, FU  apt for INR check rescheduled for earlier apt on Monday. If patient remains stable, okay to resume warfarin once INR is < 3.   - Re-start ASA 81 mg daily once INR normalizes.   -Right femoral access site figure 8 suture removed without     Thank you for allowing me to participate in the care of this patient.    Close follow up arranged as below:    Future Appointments   Date Time Provider Department Center   7/31/2023  4:15 PM [facility]   8/1/2023  2:40 PM Cesar Gray M.D. [facility] None   8/4/2023  3:00 PM [facility] EXAM 4 [facility] None   8/7/2023  3:45 PM [facility]   8/8/2023  8:45 AM Ryder Davis M.D. [address]   8/14/2023  3:45 PM [facility]   8/21/2023  3:45 PM [facility]   9/7/2023 11:00 AM [facility] EXAM 3 GOMEZ [facility]   9/11/2023  1:30 PM ICR RN [facility] CLAIRE DoveChoudhary   9/12/2023  9:45 AM DIMA JainRBRENNON [facility] None   9/21/2023 11:20 AM Asael Pink M.D. [address]   11/17/2023  2:15 PM DIMA MartínezRBRENNON [facility] None   12/1/2023 10:00 AM Patricia Downs M.D. [facility]   5/24/2024  1:15 PM [facility] EXAM 12 ECHO [facility]       Please contact me with any questions.    TYSON [email or web address].   Cardiologist, [facility]  [phone]

## 2023-07-28 NOTE — PROGRESS NOTES
Pharmacy Warfarin Monitoring     Date: 7/28/2023  Reason for Anticoagulation: Atrial Fibrillation   Target INR: 2.0 to 3.0    [id number] VASc Score: 6  HAS-BLED Score: 2     Hemoglobin Value: (!) 9.5  Hematocrit Value: (!) 30.3  Lab Platelet Value: 268    INR from last 7 days       Date/Time INR Value    07/27/23 1055 4.24    07/26/23 2345 4.36          Dose from last 7 days       Date/Time Dose (mg)    07/28/23 0727 0    07/27/23 1308 0          Home dose: 5 mg daily  Significant Interactions: Not Applicable  Bridge Therapy: Not indicated - INR supratherapeutic    Comments: INR supratherapeutic.  Cardiac tamponade overnight with pericardiocentesis and pericardial drain in place.  Hold warfarin with drain in place and supratherapeutic INR.  MD aware of INR and home warfarin utilization if warfarin reversal becomes a necessary discussion.    Education Material Provided?: No - chronic warfarin patient  Pharmacist suggested discharge dosing: TBD pending INR trends and DDI upon discharge     Thank you!  Pita Recinos, PharmD, BCCCP

## 2023-07-28 NOTE — ASSESSMENT & PLAN NOTE
Her baseline Cr is in the low 2 range  Given her cardiac arrest and risk of low perfusion, follow urine output

## 2023-07-28 NOTE — PROGRESS NOTES
Cardiology Daily Progress Note    Date of Service  7/28/2023    Chief Complaint  Aleshia Crooks is a 83 y.o. female admitted 7/26/2023 for an elective Watchman procedure, complicated by pericardial effusion and cardiac tamponade underwent urgent pericardiocentesis.      PMH: CABG in 1992, severe MR s/p mitral clipping 2023, LVEF 55%, chronic A-fib, recurrent GI bleed, CKD baseline creatinine~2        Interval Problem Update    Trivial pericardial effusion on echo 7/28/2023 this afternoon.  Drain pulled at 1:30 PM today  Repeat limited echo at 5 PM  Plan for discharge early tomorrow morning  Telemetry, A-fib rate well controlled  Resume warfarin when INR<3      Review of Systems  Review of Systems   Cardiovascular:  Negative for chest pain, palpitations, orthopnea, claudication, leg swelling and PND.        Physical Exam  Temp:  [35.9 °C (96.6 °F)-36.6 °C (97.9 °F)] 35.9 °C (96.6 °F)  Pulse:  [63-95] 75  BP: ()/(51-81) 103/65  SpO2:  [92 %-100 %] 97 %    Physical Exam  Cardiovascular:      Rate and Rhythm: Normal rate. Rhythm irregular.      Pulses: Normal pulses.   Pulmonary:      Effort: Pulmonary effort is normal.   Skin:     General: Skin is warm.      Comments: Right femoral access site is clean dry and intact   Neurological:      Mental Status: She is alert. Mental status is at baseline.   Psychiatric:         Mood and Affect: Mood normal.         Fluids    Intake/Output Summary (Last 24 hours) at 7/28/2023 1334  Last data filed at 7/28/2023 0400  Gross per 24 hour   Intake 250 ml   Output 20 ml   Net 230 ml         Laboratory  Recent Labs     07/27/23  0424 07/27/23  1555 07/28/23  0455   WBC 14.1* 12.6* 12.0*   RBC 3.48* 3.54* 3.47*   HEMOGLOBIN 9.8* 10.0* 9.5*   HEMATOCRIT 30.2* 31.4* 30.3*   MCV 86.8 88.7 87.3   MCH 28.2 28.2 27.4   MCHC 32.5 31.8* 31.4*   RDW 62.1* 65.8* 65.3*   PLATELETCT 307 281 268   MPV 10.5 10.5 11.0       Recent Labs     07/26/23  2130 07/27/23  0424 07/28/23  0455   SODIUM  136 136 137   POTASSIUM 5.3 5.3 5.0   CHLORIDE 98 102 100   CO2 17* 20 21   GLUCOSE 235* 142* 136*   BUN 52* 53* 61*   CREATININE 2.42* 2.38* 2.58*   CALCIUM 8.6 8.0* 8.2*       Recent Labs     07/26/23  2345 07/27/23  1055 07/28/23  0840   INR 4.36* 4.24* 5.22*                 Imaging  EC-ECHOCARDIOGRAM LTD W/O CONT   Final Result      EC-ECHOCARDIOGRAM LTD W/O CONT   Final Result      DX-CHEST-LIMITED (1 VIEW)   Final Result         1.  Pulmonary edema and/or infiltrates.   2.  Cardiomegaly      EC-GOMEZ W/O CONT   Final Result      CL-LEFT ATRIAL APPENDAGE CLOSURE    (Results Pending)   EC-ECHOCARDIOGRAM LTD W/O CONT    (Results Pending)          Assessment/Plan     -Pericardial effusion& cardiac tamponade on echo post watchman procedure, 7/26/23.  -Brief asystolic cardiac arrest, ROSC achieved after short CPR.  -Permanent A-fib,  warfarin on hold INR 5.22.  -LVEF 55%  -History of CABG 1992    Recommendations  -s/p drain removal, 7/28/2023  -s/p successful pericardiocentesis, 300 cc of bloody pericardial fluid was drained, 7/26/23.  -S/p transfusion /packed red blood cell x2, 7/26/2023.  -CBC stable this morning  -Trivial effusion on echo this afternoon  -Repeat limited echo at 5 PM  -Anticipitate discharge tomorrow morning      I personally spent a total of 15  minutes which includes face-to-face time and non-face-to-face time spent on preparing to see the patient, reviewing hospital notes and tests, obtaining history from the patient, performing a medically appropriate exam, counseling and educating the patient, ordering medications/tests/procedures/referrals as clinically indicated, and documenting information in the electronic medical record.

## 2023-07-29 ENCOUNTER — APPOINTMENT (OUTPATIENT)
Dept: CARDIOLOGY | Facility: MEDICAL CENTER | Age: 83
DRG: 273 | End: 2023-07-29
Attending: NURSE PRACTITIONER
Payer: MEDICARE

## 2023-07-29 VITALS
DIASTOLIC BLOOD PRESSURE: 56 MMHG | RESPIRATION RATE: 18 BRPM | SYSTOLIC BLOOD PRESSURE: 117 MMHG | HEART RATE: 86 BPM | HEIGHT: 62 IN | TEMPERATURE: 96.8 F | BODY MASS INDEX: 39.23 KG/M2 | OXYGEN SATURATION: 98 % | WEIGHT: 213.19 LBS

## 2023-07-29 LAB
ANION GAP SERPL CALC-SCNC: 14 MMOL/L (ref 7–16)
BUN SERPL-MCNC: 62 MG/DL (ref 8–22)
CALCIUM SERPL-MCNC: 8.2 MG/DL (ref 8.5–10.5)
CHLORIDE SERPL-SCNC: 100 MMOL/L (ref 96–112)
CO2 SERPL-SCNC: 21 MMOL/L (ref 20–33)
CREAT SERPL-MCNC: 2.22 MG/DL (ref 0.5–1.4)
ERYTHROCYTE [DISTWIDTH] IN BLOOD BY AUTOMATED COUNT: 64.5 FL (ref 35.9–50)
GFR SERPLBLD CREATININE-BSD FMLA CKD-EPI: 22 ML/MIN/1.73 M 2
GLUCOSE SERPL-MCNC: 121 MG/DL (ref 65–99)
HCT VFR BLD AUTO: 29.2 % (ref 37–47)
HGB BLD-MCNC: 9.1 G/DL (ref 12–16)
INR PPP: 4.66 (ref 0.87–1.13)
MCH RBC QN AUTO: 27.4 PG (ref 27–33)
MCHC RBC AUTO-ENTMCNC: 31.2 G/DL (ref 32.2–35.5)
MCV RBC AUTO: 88 FL (ref 81.4–97.8)
PLATELET # BLD AUTO: 251 K/UL (ref 164–446)
PMV BLD AUTO: 10.5 FL (ref 9–12.9)
POTASSIUM SERPL-SCNC: 5 MMOL/L (ref 3.6–5.5)
PROTHROMBIN TIME: 42.1 SEC (ref 12–14.6)
RBC # BLD AUTO: 3.32 M/UL (ref 4.2–5.4)
SODIUM SERPL-SCNC: 135 MMOL/L (ref 135–145)
WBC # BLD AUTO: 11.2 K/UL (ref 4.8–10.8)

## 2023-07-29 PROCEDURE — 80048 BASIC METABOLIC PNL TOTAL CA: CPT

## 2023-07-29 PROCEDURE — 99231 SBSQ HOSP IP/OBS SF/LOW 25: CPT | Mod: FS | Performed by: INTERNAL MEDICINE

## 2023-07-29 PROCEDURE — A9270 NON-COVERED ITEM OR SERVICE: HCPCS | Performed by: HOSPITALIST

## 2023-07-29 PROCEDURE — 93308 TTE F-UP OR LMTD: CPT

## 2023-07-29 PROCEDURE — A9270 NON-COVERED ITEM OR SERVICE: HCPCS | Performed by: INTERNAL MEDICINE

## 2023-07-29 PROCEDURE — 700102 HCHG RX REV CODE 250 W/ 637 OVERRIDE(OP): Performed by: HOSPITALIST

## 2023-07-29 PROCEDURE — 700102 HCHG RX REV CODE 250 W/ 637 OVERRIDE(OP): Performed by: INTERNAL MEDICINE

## 2023-07-29 PROCEDURE — 99239 HOSP IP/OBS DSCHRG MGMT >30: CPT | Performed by: HOSPITALIST

## 2023-07-29 PROCEDURE — 85610 PROTHROMBIN TIME: CPT

## 2023-07-29 PROCEDURE — 85027 COMPLETE CBC AUTOMATED: CPT

## 2023-07-29 PROCEDURE — 93308 TTE F-UP OR LMTD: CPT | Mod: 26 | Performed by: INTERNAL MEDICINE

## 2023-07-29 RX ADMIN — LEVOTHYROXINE SODIUM 150 MCG: 0.15 TABLET ORAL at 05:06

## 2023-07-29 RX ADMIN — OMEPRAZOLE 40 MG: 20 CAPSULE, DELAYED RELEASE ORAL at 05:06

## 2023-07-29 RX ADMIN — ACETAMINOPHEN 650 MG: 325 TABLET, FILM COATED ORAL at 08:33

## 2023-07-29 ASSESSMENT — FIBROSIS 4 INDEX: FIB4 SCORE: 1.67

## 2023-07-29 ASSESSMENT — PAIN DESCRIPTION - PAIN TYPE
TYPE: ACUTE PAIN
TYPE: ACUTE PAIN;SURGICAL PAIN
TYPE: ACUTE PAIN
TYPE: ACUTE PAIN

## 2023-07-29 NOTE — PROGRESS NOTES
Pharmacy Warfarin Monitoring     Date: 7/29/2023  Reason for Anticoagulation: Atrial Fibrillation   Target INR: 2.0 to 3.0    [id number] VASc Score: 6  HAS-BLED Score: 2     Hemoglobin Value: (!) 9.1  Hematocrit Value: (!) 29.2  Lab Platelet Value: 251    INR from last 7 days       Date/Time INR Value    07/29/23 0300 4.66    07/28/23 0840 5.22    07/27/23 1055 4.24    07/26/23 2345 4.36          Dose from last 7 days       Date/Time Dose (mg)    07/29/23 0830 0    07/28/23 0727 0    07/27/23 1308 0          Home dose: 5 mg daily  Significant Interactions: Not Applicable  Bridge Therapy: Not indicated - INR supratherapeutic    Comments: INR supratherapeutic.  Cardiac tamponade now s/p pericardial drain.  Hold warfarin with supratherapeutic INR. Holding warfarin    Education Material Provided?: No - chronic warfarin patient  Pharmacist suggested discharge dosing: TBD pending INR trends and DDI upon discharge     Thank you!  Pita Recinos, PharmD, BCCCP

## 2023-07-29 NOTE — DISCHARGE SUMMARY
Discharge Summary    CHIEF COMPLAINT ON ADMISSION  No chief complaint on file.      Reason for Admission  Longstanding persistent atrial fib with elective Watchman Procedure.    Admission Date  7/26/2023    CODE STATUS  Full Code    HPI & HOSPITAL COURSE  This is a 83 y.o. female here with Watchman procedure.   Ms. Crooks has a past medical history of atrial fibrillation on Coumadin therapy as well as previous GI bleed that underwent a watchman with a left atrial appendage closure by Dr. Arambula electrophysiology on 7/26/2023.  Later in the evening CODE BLUE was called for which she underwent CPR due to lack of pulses and bag mask ventilation.  She had return of spontaneous circulation in the bedside ultrasound revealed tamponade physiology for which she had an emergent pericardiocentesis and ICU admission.  She currently has a drain in place.  Her INR remains elevated though Coumadin has been held.  She did receive 2 units packed red blood cells due to drop in hemoglobin 7.5.  She was monitored in the IMCU with a pericardial drain.  The drain was subsequently removed on 7/28/2023 and on day of discharge echocardiogram does not reveal any further reaccumulation of fluid.  During her hospitalization her Coumadin was held though not reversed.  Interestingly her INR did go up to 5.22 on 7/20/2023 and now today it is 4.66.  She will hold her Coumadin until Monday and will get it checked at the Coumadin clinic and further recommendations based on her value.         Therefore, she is discharged in good and stable condition to home with close outpatient follow-up.    The patient met 2-midnight criteria for an inpatient stay at the time of discharge.    Discharge Date  7/29    FOLLOW UP ITEMS POST DISCHARGE  Coumadin clinic  Dr. Arambula    DISCHARGE DIAGNOSES  Principal Problem:    Pericardial effusion with cardiac tamponade (POA: Unknown)  Active Problems:    Cardiac arrest (HCC) (POA: Unknown)    AF (atrial fibrillation) (HCC)  (POA: Yes)    Acute blood loss anemia (POA: Yes)    Coronary artery disease involving native coronary artery of native heart without angina pectoris, s/p 1V CABG 1992 (POA: Yes)      Overview: On losartan, metoprolol, statin      Following with Dr. Villar    Hypothyroid (POA: Unknown)      Overview: Hx of hyperthyroidism, thyroid nodule s/p thyroidectomy      On Levoxyl.     Obesity (BMI 35.0-39.9 without comorbidity) (HCC) (POA: Yes)      Overview: Body mass index is 36.14 kg/m².      Lifestyle modifications          CKD (chronic kidney disease) stage 4, GFR 15-29 ml/min (HCC) (POA: Yes)    Hyperglycemia (POA: Unknown)    H/O: GI bleed (POA: Unknown)    Hyperkalemia (POA: Unknown)    CHF (congestive heart failure) (HCC) (POA: Unknown)    Psoriasis (POA: Unknown)    Cardiogenic shock (HCC) (POA: Unknown)  Resolved Problems:    * No resolved hospital problems. *      FOLLOW UP  Future Appointments   Date Time Provider Department Center   7/31/2023  4:15 PM [facility]   8/1/2023  2:40 PM Cesar Gray M.D. [facility] None   8/7/2023  8:45 AM [facility] EXAM 4 [facility]MED None   8/7/2023  3:45 PM [facility]   8/8/2023  8:45 AM Ryder Davis M.D. [address]   8/14/2023  3:45 PM [facility]   8/21/2023  3:45 PM [facility]   9/7/2023 11:00 AM [facility] EXAM 3 GOMEZ [facility]   9/11/2023  1:30 PM ICR IWONA Choudhary   9/12/2023  9:45 AM THAO Jain None   9/21/2023 11:20 AM Asael Pink M.D. [address]   11/17/2023  2:15 PM THAO Martínez None   12/1/2023 10:00 AM Patricia Downs M.D. [facility]   5/24/2024  1:15 PM [facility] EXAM 12 ECHO [facility]     No follow-up provider specified.    MEDICATIONS ON DISCHARGE     Medication List        CHANGE how you take these medications        Instructions   atorvastatin 40 MG Tabs  What changed:   how much to take  how to take  this  when to take this  Commonly known as: Lipitor   TAKE 1 TABLET BY MOUTH AT  BEDTIME     Cosentyx Sensoready Pen 150 MG/ML Soaj  What changed:   how much to take  how to take this  when to take this  Generic drug: Secukinumab   Doctor's comments: Requests 90 day supply.  Inject 300mg Sub Cut once monthly            CONTINUE taking these medications        Instructions   bisoprolol 10 MG tablet  Commonly known as: Zebeta   Doctor's comments: Do not substitute atenolol.  This is the medication that cardiology wants  Take 1 Tablet by mouth every day.  Dose: 10 mg     CINNAMON PO   Take 1 Tablet by mouth 2 times a day.  Dose: 1 Tablet     furosemide 40 MG Tabs  Commonly known as: Lasix   Take 1 Tablet by mouth every day.  Dose: 40 mg     Levoxyl 150 MCG Tabs  Generic drug: levothyroxine   Take 1 Tablet by mouth every morning on an empty stomach.  Dose: 150 mcg     lidocaine 5 % Ptch  Commonly known as: Lidoderm   Place 1 Patch on the skin every 24 hours.  Dose: 1 Patch     losartan 25 MG Tabs  Commonly known as: Cozaar   Take 1 Tablet by mouth every evening.  Dose: 25 mg     Multi-Vitamin HP/Minerals Caps   Take  by mouth 1 time a day as needed.     pantoprazole 40 MG Tbec  Commonly known as: Protonix   Take 1 Tablet by mouth 2 times a day.  Dose: 40 mg     spironolactone 25 MG Tabs  Commonly known as: Aldactone   Take 1 Tablet by mouth every day.  Dose: 25 mg     TYLENOL PO   Take 2 Tablets by mouth 2 times a day as needed (For pain).  Dose: 2 Tablet     VITAMIN D PO   Take 1 Tablet by mouth every day.  Dose: 1 Tablet     warfarin 5 MG Tabs  Commonly known as: Coumadin   Take 1 Tablet by mouth every day.  Dose: 5 mg            ASK your doctor about these medications        Instructions   HYDROcodone-acetaminophen 5-325 MG Tabs per tablet  Commonly known as: Norco  Ask about: Should I take this medication?   Take 1 Tablet by mouth every four hours as needed (severe pain) for up to 3 days.  Dose: 1 Tablet               Allergies  Allergies   Allergen Reactions    Morphine Vomiting       DIET  Orders Placed This Encounter   Procedures    Diet Order Diet: Cardiac     Standing Status:   Standing     Number of Occurrences:   1     Order Specific Question:   Diet:     Answer:   Cardiac [6]       ACTIVITY  As tolerated.      CONSULTATIONS  Cardiology     PROCEDURES  Watchman and left atrial appendage closure by Dr. Arambula 7/26.  Pericardial drain by Dr. Gray on 7/26.  Arterial line.      LABORATORY  Lab Results   Component Value Date    SODIUM 135 07/29/2023    POTASSIUM 5.0 07/29/2023    CHLORIDE 100 07/29/2023    CO2 21 07/29/2023    GLUCOSE 121 (H) 07/29/2023    BUN 62 (H) 07/29/2023    CREATININE 2.22 (H) 07/29/2023        Lab Results   Component Value Date    WBC 11.2 (H) 07/29/2023    HEMOGLOBIN 9.1 (L) 07/29/2023    HEMATOCRIT 29.2 (L) 07/29/2023    PLATELETCT 251 07/29/2023      EC-ECHOCARDIOGRAM LTD W/O CONT         EC-ECHOCARDIOGRAM LTD W/O CONT   Final Result      EC-ECHOCARDIOGRAM LTD W/O CONT   Final Result      DX-CHEST-LIMITED (1 VIEW)   Final Result         1.  Pulmonary edema and/or infiltrates.   2.  Cardiomegaly      EC-GOMEZ W/O CONT   Final Result      CL-LEFT ATRIAL APPENDAGE CLOSURE    (Results Pending)         Total time of the discharge process exceeds 34 minutes.

## 2023-07-29 NOTE — CARE PLAN
The patient is Stable - Low risk of patient condition declining or worsening    Shift Goals  Clinical Goals: pending echo  Patient Goals: go home  Family Goals: TYREE    Progress made toward(s) clinical / shift goals:    Problem: Pain - Standard  Goal: Alleviation of pain or a reduction in pain to the patient’s comfort goal  Description: Target End Date:  Prior to discharge or change in level of care    Document on Vitals flowsheet    1.  Document pain using the appropriate pain scale per order or unit policy  2.  Educate and implement non-pharmacologic comfort measures (i.e. relaxation, distraction, massage, cold/heat therapy, etc.)  3.  Pain management medications as ordered  4.  Reassess pain after pain med administration per policy  5.  If opiods administered assess patient's response to pain medication is appropriate per POSS sedation scale  6.  Follow pain management plan developed in collaboration with patient and interdisciplinary team (including palliative care or pain specialists if applicable)  Outcome: Progressing  Flowsheets  Taken 7/28/2023 2000 by Antoinette Greene RBRENNON  Pain Rating Scale (NPRS): 0  Taken 7/28/2023 1813 by Clementine Little RBRENNON  OB Pain Intervention:   Medication - See MAR   Repositioned   Rest  Taken 7/27/2023 0400 by Collin Tyler RBRENNON  Non Verbal Scale:   Calm   Sleeping     Problem: Knowledge Deficit - Standard  Goal: Patient and family/care givers will demonstrate understanding of plan of care, disease process/condition, diagnostic tests and medications  Description: Target End Date:  1-3 days or as soon as patient condition allows    Document in Patient Education    1.  Patient and family/caregiver oriented to unit, equipment, visitation policy and means for communicating concern  2.  Complete/review Learning Assessment  3.  Assess knowledge level of disease process/condition, treatment plan, diagnostic tests and medications  4.  Explain disease process/condition, treatment plan,  diagnostic tests and medications  Outcome: Progressing       Patient is not progressing towards the following goals:

## 2023-07-29 NOTE — PROGRESS NOTES
Echo was completed at this morning post drain removal 7/28/2023.    Patient is clinically doing well.    Pending echo results she is stable for discharge from cardiology standpoint.    Follow-up appointment in cardiology office 8/1/2023 at 2:40 PM    INR 4.66  Repeat INR on Monday, 8/1/2023 in Coumadin clinic  Resume warfarin when INR < 3  EP asked to resume aspirin 81 when INR is normalized

## 2023-07-29 NOTE — CARE PLAN
The patient is Stable - Low risk of patient condition declining or worsening    Shift Goals  Clinical Goals: pending Echo, DC  Patient Goals: go home  Family Goals: TYREE    Progress made toward(s) clinical / shift goals: Pt ECHO was clear, ready for DC    Patient is not progressing towards the following goals:

## 2023-07-29 NOTE — CARE PLAN
The patient is Stable - Low risk of patient condition declining or worsening    Shift Goals  Clinical Goals: hemodynamic stability, remove LENCHO drain, mobility, wean O2  Patient Goals: go home- get drain out  Family Goals: TYREE    Progress made toward(s) clinical / shift goals:   Problem: Pain - Standard  Goal: Alleviation of pain or a reduction in pain to the patient’s comfort goal  Outcome: Progressing  Flowsheets  Taken 7/28/2023 1813  OB Pain Intervention:   Medication - See MAR   Repositioned   Rest  Taken 7/28/2023 1400  Pain Rating Scale (NPRS): 2  Note: Chest discomfort managing with tylenol, pt denied how norco made her feel.      Problem: Knowledge Deficit - Standard  Goal: Patient and family/care givers will demonstrate understanding of plan of care, disease process/condition, diagnostic tests and medications  Outcome: Progressing     Problem: Respiratory  Goal: Patient will achieve/maintain optimum respiratory ventilation and gas exchange  Outcome: Progressing  Flowsheets (Taken 7/28/2023 1300)  O2 Delivery Device: None - Room Air  Note: Dyspnea with exertion, pt encouraged to take deep breaths.

## 2023-07-31 ENCOUNTER — TELEPHONE (OUTPATIENT)
Dept: CARDIOLOGY | Facility: MEDICAL CENTER | Age: 83
End: 2023-07-31
Payer: MEDICARE

## 2023-07-31 ENCOUNTER — OUTPATIENT INFUSION SERVICES (OUTPATIENT)
Dept: ONCOLOGY | Facility: MEDICAL CENTER | Age: 83
End: 2023-07-31
Attending: INTERNAL MEDICINE
Payer: MEDICARE

## 2023-07-31 VITALS
WEIGHT: 206.13 LBS | BODY MASS INDEX: 38.92 KG/M2 | RESPIRATION RATE: 18 BRPM | HEIGHT: 61 IN | OXYGEN SATURATION: 97 % | SYSTOLIC BLOOD PRESSURE: 104 MMHG | DIASTOLIC BLOOD PRESSURE: 51 MMHG | HEART RATE: 86 BPM | TEMPERATURE: 98 F

## 2023-07-31 DIAGNOSIS — D63.8 ANEMIA OF CHRONIC DISEASE: ICD-10-CM

## 2023-07-31 PROCEDURE — 700111 HCHG RX REV CODE 636 W/ 250 OVERRIDE (IP): Mod: JZ,JG | Performed by: INTERNAL MEDICINE

## 2023-07-31 PROCEDURE — 96372 THER/PROPH/DIAG INJ SC/IM: CPT

## 2023-07-31 RX ORDER — 0.9 % SODIUM CHLORIDE 0.9 %
10 VIAL (ML) INJECTION PRN
Status: CANCELLED | OUTPATIENT
Start: 2023-08-04

## 2023-07-31 RX ORDER — 0.9 % SODIUM CHLORIDE 0.9 %
3 VIAL (ML) INJECTION PRN
Status: CANCELLED | OUTPATIENT
Start: 2023-08-04

## 2023-07-31 RX ORDER — 0.9 % SODIUM CHLORIDE 0.9 %
VIAL (ML) INJECTION PRN
Status: CANCELLED | OUTPATIENT
Start: 2023-08-04

## 2023-07-31 RX ADMIN — EPOETIN ALFA 10000 UNITS: 10000 SOLUTION INTRAVENOUS; SUBCUTANEOUS at 16:33

## 2023-07-31 ASSESSMENT — FIBROSIS 4 INDEX: FIB4 SCORE: 1.67

## 2023-08-01 ENCOUNTER — OFFICE VISIT (OUTPATIENT)
Dept: CARDIOLOGY | Facility: MEDICAL CENTER | Age: 83
End: 2023-08-01
Attending: INTERNAL MEDICINE
Payer: MEDICARE

## 2023-08-01 ENCOUNTER — ANTICOAGULATION VISIT (OUTPATIENT)
Dept: VASCULAR LAB | Facility: MEDICAL CENTER | Age: 83
End: 2023-08-01
Attending: INTERNAL MEDICINE
Payer: MEDICARE

## 2023-08-01 ENCOUNTER — TELEPHONE (OUTPATIENT)
Dept: CARDIOLOGY | Facility: MEDICAL CENTER | Age: 83
End: 2023-08-01
Payer: MEDICARE

## 2023-08-01 VITALS
WEIGHT: 206 LBS | HEART RATE: 86 BPM | RESPIRATION RATE: 18 BRPM | BODY MASS INDEX: 37.91 KG/M2 | HEIGHT: 62 IN | OXYGEN SATURATION: 96 % | DIASTOLIC BLOOD PRESSURE: 60 MMHG | SYSTOLIC BLOOD PRESSURE: 108 MMHG

## 2023-08-01 DIAGNOSIS — I48.11 LONGSTANDING PERSISTENT ATRIAL FIBRILLATION (HCC): ICD-10-CM

## 2023-08-01 DIAGNOSIS — I34.0 NONRHEUMATIC MITRAL VALVE REGURGITATION: ICD-10-CM

## 2023-08-01 DIAGNOSIS — I25.10 CORONARY ARTERY DISEASE INVOLVING NATIVE CORONARY ARTERY OF NATIVE HEART WITHOUT ANGINA PECTORIS: ICD-10-CM

## 2023-08-01 DIAGNOSIS — M54.40 ACUTE BACK PAIN WITH SCIATICA, UNSPECIFIED LATERALITY: ICD-10-CM

## 2023-08-01 LAB — INR PPP: 1.3 (ref 2–3.5)

## 2023-08-01 PROCEDURE — 85610 PROTHROMBIN TIME: CPT

## 2023-08-01 PROCEDURE — 99212 OFFICE O/P EST SF 10 MIN: CPT | Mod: 25 | Performed by: INTERNAL MEDICINE

## 2023-08-01 PROCEDURE — 99213 OFFICE O/P EST LOW 20 MIN: CPT | Performed by: INTERNAL MEDICINE

## 2023-08-01 PROCEDURE — 99212 OFFICE O/P EST SF 10 MIN: CPT | Mod: 25

## 2023-08-01 PROCEDURE — 99214 OFFICE O/P EST MOD 30 MIN: CPT | Performed by: INTERNAL MEDICINE

## 2023-08-01 PROCEDURE — 3078F DIAST BP <80 MM HG: CPT | Performed by: INTERNAL MEDICINE

## 2023-08-01 PROCEDURE — 3074F SYST BP LT 130 MM HG: CPT | Performed by: INTERNAL MEDICINE

## 2023-08-01 ASSESSMENT — FIBROSIS 4 INDEX: FIB4 SCORE: 1.67

## 2023-08-01 NOTE — PROGRESS NOTES
Aleshia to infusion for weekly Retacrit injection. Reports she was hospitalized earlier this week after she developed cardiac tamponade after Watchman procedure, was given 2U PRBCs on 7/27. Reports feeling fatigued and sore today, otherwise feeling well. BP and labs from 7/29 reviewed, Hgb: 9.1, within parameters for treatment today. Retacrit injection given SQ in R upper arm, patient tolerated well with no adverse effects. Patient left in stable condition, knows when to return for next appt.

## 2023-08-01 NOTE — PROGRESS NOTES
Cardiology Follow-up Consultation Note        CC: S/p mitral valve clip    Patient ID/HPI:   83-year-old female patient with history of coronary artery disease, prior CABG, severe mitral regurgitation status post mitral valve clip underwent Watchman procedure due to recurrent GI bleeds, anemia postprocedure she developed pericardial effusion needing drainage.  She is subsequently discharged, here for follow-up.  She feels tired, still has some chest pain, complains of lower extremity edema.    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      VITAMIN D PO Take 1 Tablet by mouth every day.      lidocaine (LIDODERM) 5 % Patch Place 1 Patch on the skin every 24 hours. 10 Patch 0    bisoprolol (ZEBETA) 10 MG tablet Take 1 Tablet by mouth every day. 90 Tablet 4    Vitamins-Lipotropics (MULTI-VITAMIN HP/MINERALS) Cap Take  by mouth 1 time a day as needed.      pantoprazole (PROTONIX) 40 MG Tablet Delayed Response Take 1 Tablet by mouth 2 times a day. 180 Tablet 3    warfarin (COUMADIN) 5 MG Tab Take 1 Tablet by mouth every day. 30 Tablet 3    losartan (COZAAR) 25 MG Tab Take 1 Tablet by mouth every evening. 100 Tablet 3    Acetaminophen (TYLENOL PO) Take 2 Tablets by mouth 2 times a day as needed (For pain).      furosemide (LASIX) 40 MG Tab Take 1 Tablet by mouth every day. 90 Tablet 3    LEVOXYL  "150 MCG Tab Take 1 Tablet by mouth every morning on an empty stomach. 90 Tablet 3    atorvastatin (LIPITOR) 40 MG Tab TAKE 1 TABLET BY MOUTH AT  BEDTIME (Patient taking differently: Take 40 mg by mouth every evening. TAKE 1 TABLET BY MOUTH AT  BEDTIME) 100 Tablet 3    Secukinumab (COSENTYX SENSOREADY PEN) 150 MG/ML Solution Auto-injector Inject 300mg Sub Cut once monthly (Patient taking differently: Inject 300 mg as directed every 28 days. Inject 300mg Sub Cut once monthly) 1.96 mL 6     No current facility-administered medications for this visit.         Physical Exam:  Ambulatory Vitals  /60 (BP Location: Left arm, Patient Position: Sitting, BP Cuff Size: Adult)   Pulse 86   Resp 18   Ht 1.575 m (5' 2\")   Wt 93.4 kg (206 lb)   SpO2 96%    Oxygen Therapy:  Pulse Oximetry: 96 %  BP Readings from Last 4 Encounters:   08/01/23 108/60   07/31/23 104/51   07/29/23 117/56   07/24/23 128/65       Weight/BMI: Body mass index is 37.68 kg/m².  Wt Readings from Last 4 Encounters:   08/01/23 93.4 kg (206 lb)   07/31/23 93.5 kg (206 lb 2.1 oz)   07/29/23 96.7 kg (213 lb 3 oz)   07/24/23 90.7 kg (200 lb)       General: Well appearing and in no apparent distress  Neck: JVP present, carotid bruits absent  Lungs: respiratory sounds  normal, additional breath sounds absent  Heart: irregularly irregular rhythm,   No palpable thrills on palpation, murmurs present, no rubs,   Lower extremity edema 2+.       Hospital discharge notes, GOMEZ, echocardiogram reviewed      Medical Decision Making:  Problem List Items Addressed This Visit       AF (atrial fibrillation) (HCC)    Coronary artery disease involving native coronary artery of native heart without angina pectoris, s/p 1V CABG 1992     Other Visit Diagnoses       Nonrheumatic mitral valve regurgitation        Relevant Orders    Basic Metabolic Panel    EC-ECHOCARDIOGRAM COMPLETE W/O CONT          Since my last evaluation she has recurrent GI bleed, anemia worsening of " mitral, tricuspid regurgitation, congestive heart failure symptoms, recent hospitalization complicated by pericardial effusion needing drainage.  Recommend continue diuresis, increase Lasix to 80 mg daily for for 5 days then decrease to 40 mg daily.  Recommend iron supplementation.  We will repeat echocardiogram in 4 months, with medical therapy, improving anemia her valvular regurgitation might improve.  Reevaluate in December.  Low-salt diet , medication compliance discussed.        Cesar LEDESMA  Interventional cardiologist  [facility], [facility]  [address] [address]  Phone: [phone]  Fax: [phone]

## 2023-08-01 NOTE — PROGRESS NOTES
Anticoagulation Summary  As of 2023      INR goal:  2.0-3.0   TTR:  87.5 % (2.3 mo)   INR used for dosin.30 (2023)   Warfarin maintenance plan:  5 mg (5 mg x 1) every day   Weekly warfarin total:  35 mg   Plan last modified:  Rudy Lowe, PharmD (2023)   Next INR check:  2023   Target end date:  2023    Indications    AF (atrial fibrillation) (HCC) [I48.91]                 Anticoagulation Episode Summary       INR check location:      Preferred lab:      Send INR reminders to:      Comments:  Not a DOAC candidate, GI bleed in  on Xarelto  s/p watchmen on 23; new LOT 23 after GOMEZ          Anticoagulation Care Providers       Provider Role Specialty Phone number    THAO Martínez Referring Cardiovascular Disease (Cardiology) [phone]                  Refer to Patient Findings for HPI:  Patient Findings       Positives:  Missed doses (Recently hospitalized, increased INR, held doses.), Hospital admission (Recently hospitalized; see recent encounter on 23 s/p watchmen)    Negatives:  Signs/symptoms of thrombosis, Signs/symptoms of bleeding, Laboratory test error suspected, Change in health, Change in alcohol use, Change in activity, Upcoming invasive procedure, Emergency department visit, Upcoming dental procedure, Extra doses, Change in medications, Change in diet/appetite, Bruising, Other complaints            Verified current warfarin dosing schedule.      Pt is not on antiplatelet therapy  Per cardiology, no ASA for now s/p watchmen       A/P   INR SUB-therapeutic (1.3)    Warfarin dosing recommendation: Even though pt is SUB-therapeutic, pt has elevated INRs during recent hospitalization. Plan to restart current warfarin dosing regimen at 5 mg daily.       Pt educated to contact our clinic with any changes in medications or s/s of bleeding or thrombosis. Pt is aware to seek immediate medical attention for falls, head injury or deep cuts.    Follow up  appointment in 1 week.    Mitzi Carrillo, PharmD   Seen with Nedra Donald PharmD

## 2023-08-01 NOTE — TELEPHONE ENCOUNTER
Phone Number Called: [phone]    Call outcome: Spoke to patient regarding message below.    Message: Called to inform patient of the recommendations. Patient verbalizes understanding. She will get her INR checked today.       ----- Message from THAO Marcano sent at 8/1/2023  9:12 AM PDT -----  Regarding: CONSTANTINO Lobato,    I scheduled patient for an INR check yesterday, looks like she missed that apt. She is supposed to resume Warfarin and ASA 81 mg daily once INR is < 3. Would you please follow up with her and see when she is planning on having her INR rechecked and let her know Dr. Arambula's recommendations as above?    Thank you!    JS  ----- Message -----  From: SHERMAN Guzman  Sent: 7/29/2023   9:38 AM PDT  To: THAO Marcano     I am sorry is there a way your RN to call the patient on Tuesday to resume ASA .. S/p post recent watchman by Ralf c/b large effusion,  INR was supra therapeutic upon DC     thx

## 2023-08-01 NOTE — PROGRESS NOTES
Request a little difficult for me to understand.  Supposedly to [facility].  Referral renewed.

## 2023-08-02 ENCOUNTER — TELEPHONE (OUTPATIENT)
Dept: CARDIOLOGY | Facility: MEDICAL CENTER | Age: 83
End: 2023-08-02
Payer: MEDICARE

## 2023-08-02 NOTE — TELEPHONE ENCOUNTER
1 WEEK POST WATCHMAN FOLLOW UP WOUND CHECK:    Patient is s/p CANDELARIO closure with Watchman device by Dr. Lyn on (07/26/2023) for PAF with intolerance to anticoagulation.    Wound check completed in office. Right femoral access site appears CDI/FREDI. No evidence of active bleeding, edema, erythema, or hematoma present.     Patient to follow up in 3-4 weeks as previously scheduled. Patient instructed to contact the office with any questions or concerns.     Future Appointments   Date Time Provider Department Center   8/7/2023  1:30 PM [facility] EXAM 4 VMED None   8/7/2023  3:45 [facility]   8/8/2023  8:45 AM Ryder Davis M.D. [address]   8/14/2023  3:45 [facility]   8/21/2023  3:45 PM [facility]   8/28/2023 11:30 AM [facility]   9/7/2023 11:00 AM [facility] EXAM 3 GOMEZ [facility]   9/11/2023  1:30 PM ICR RN YANIV RANGEL Choudhary   9/12/2023  9:45 AM DIMA JainRMeraryNMerary CARCB None   9/21/2023 11:20 AM Asael Pink M.D. [address]   11/17/2023  2:15 PM DIMA MartínezRMeraryN. CARCB None   12/1/2023 10:00 AM Patricia Downs M.D. [facility]   12/1/2023  1:15 PM [facility] EXAM 10 ECHO [facility]   12/6/2023  2:30 PM DIMA MartínezRMeraryN. CARCB None   5/24/2024  1:15 PM [facility] EXAM 12 ECHO [facility]       Mulu Arias R.N.   [facility]  [phone]

## 2023-08-02 NOTE — TELEPHONE ENCOUNTER
AK    Caller: Aleshia Crooks    Topic/issue: Patient is confused about her bisoprolol (ZEBETA) 10 MG tablet medication. When she was relaeased from the hosptial, JM had her take 1/2 of this. Does she stay on the 1/2 or take the full 10mg daily?    Callback Number: [phone]    Thank you,  -Saba SAENZ

## 2023-08-02 NOTE — TELEPHONE ENCOUNTER
Returned patient's phone call. Patient still has the 10mg bisoprolol ordered. No notes in chart from [facility]. Discharge instructions say to continue 10mg specifically. Informed patient of findings, and she will resume and start to monitor her blood pressure to make sure it doesn't get too low. All questions/concerns answered at this time, patient appreciative of information given.

## 2023-08-03 NOTE — DOCUMENTATION QUERY
"[facility]                                                                       Query Response Note      PATIENT:               GINA REICH  ACCT #:                  [id number]  MRN:                     [id number]  :                      1940  ADMIT DATE:       2023 11:48 AM  DISCH DATE:        2023 11:30 AM  RESPONDING  PROVIDER #:        [id number]           QUERY TEXT:    Etiology of effusion due to micro-perforation due to device anchors is documented in the  Progress Note as \"likely\" and not mentioned elsewhere in the Medical Record.    Can the etiology of pericardial effusion and cardiac tamponade be clarified?    The patient's clinical indicators include:   Progress Note:  s/p CANDELARIO-C yesterday, no complications evident in the immediate aftermath of the procedure and no evidence of perforation or effusion at the time of the procedure, however pt developed late-presenting effusion.  Likely etiology of effusion is d/t micro-perforation d/t device anchors during implantation or recapture  Risk Factors: s/p watchman CANDELARIO closure  Treatment: pericardiocentesis    Thank you,  Shani Rangel RN, BSN, CCDS  Clinical   Connect via [employer]  Options provided:   -- Watchman device ruled in as likely cause of pericardial effusion and cardiac tamponade   -- Watchman device ruled out as likely cause of pericardial effusion and cardiac tamponade   -- Other explanation, please specify   -- Unable to determine      Query created by: Shani Rangel on 2023 1:28 PM    RESPONSE TEXT:    Watchman device ruled in as likely cause of pericardial effusion and cardiac tamponade          Electronically signed by:  GIN KWON MD 2023 5:37 PM              "

## 2023-08-04 ENCOUNTER — APPOINTMENT (OUTPATIENT)
Dept: VASCULAR LAB | Facility: MEDICAL CENTER | Age: 83
End: 2023-08-04
Payer: MEDICARE

## 2023-08-07 ENCOUNTER — ANTICOAGULATION VISIT (OUTPATIENT)
Dept: VASCULAR LAB | Facility: MEDICAL CENTER | Age: 83
End: 2023-08-07
Attending: INTERNAL MEDICINE
Payer: MEDICARE

## 2023-08-07 ENCOUNTER — OUTPATIENT INFUSION SERVICES (OUTPATIENT)
Dept: ONCOLOGY | Facility: MEDICAL CENTER | Age: 83
End: 2023-08-07
Attending: INTERNAL MEDICINE
Payer: MEDICARE

## 2023-08-07 VITALS
HEART RATE: 66 BPM | DIASTOLIC BLOOD PRESSURE: 72 MMHG | RESPIRATION RATE: 18 BRPM | BODY MASS INDEX: 38.42 KG/M2 | HEIGHT: 61 IN | SYSTOLIC BLOOD PRESSURE: 105 MMHG | OXYGEN SATURATION: 98 % | WEIGHT: 203.48 LBS | TEMPERATURE: 97 F

## 2023-08-07 DIAGNOSIS — I48.11 LONGSTANDING PERSISTENT ATRIAL FIBRILLATION (HCC): ICD-10-CM

## 2023-08-07 DIAGNOSIS — D63.8 ANEMIA OF CHRONIC DISEASE: ICD-10-CM

## 2023-08-07 LAB
HCT VFR BLD AUTO: 32.8 % (ref 37–47)
HGB BLD-MCNC: 10.1 G/DL (ref 12–16)
INR PPP: 1.3 (ref 2–3.5)

## 2023-08-07 PROCEDURE — 85018 HEMOGLOBIN: CPT

## 2023-08-07 PROCEDURE — 85610 PROTHROMBIN TIME: CPT

## 2023-08-07 PROCEDURE — 36415 COLL VENOUS BLD VENIPUNCTURE: CPT

## 2023-08-07 PROCEDURE — 99212 OFFICE O/P EST SF 10 MIN: CPT

## 2023-08-07 PROCEDURE — 85014 HEMATOCRIT: CPT

## 2023-08-07 RX ORDER — 0.9 % SODIUM CHLORIDE 0.9 %
10 VIAL (ML) INJECTION PRN
Status: CANCELLED | OUTPATIENT
Start: 2023-08-14

## 2023-08-07 RX ORDER — 0.9 % SODIUM CHLORIDE 0.9 %
3 VIAL (ML) INJECTION PRN
Status: CANCELLED | OUTPATIENT
Start: 2023-08-14

## 2023-08-07 RX ORDER — 0.9 % SODIUM CHLORIDE 0.9 %
VIAL (ML) INJECTION PRN
Status: CANCELLED | OUTPATIENT
Start: 2023-08-14

## 2023-08-07 ASSESSMENT — FIBROSIS 4 INDEX: FIB4 SCORE: 1.67

## 2023-08-07 NOTE — PROGRESS NOTES
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 Referring Cardiovascular Disease (Cardiology) [phone]                  Refer to Patient Findings for HPI:  Patient Findings       Negatives:  Signs/symptoms of thrombosis, Signs/symptoms of bleeding, Laboratory test error suspected, Change in health, Change in alcohol use, Change in activity, Upcoming invasive procedure, Emergency department visit, Upcoming dental procedure, Missed doses, Extra doses, Change in medications, Change in diet/appetite, Hospital admission, Bruising, Other complaints            There were no vitals filed for this visit.    Verified current warfarin dosing schedule.    Medications reconciled   Pt is not on antiplatelet therapy      A/P   INR  sub-therapeutic.     Warfarin dosing recommendation: Bolus with 10mg x 2 days then continue regimen    Pt educated to contact our clinic with any changes in medications or s/s of bleeding or thrombosis. Pt is aware to seek immediate medical attention for falls, head injury or deep cuts.    Follow up appointment in 3 day(s).    Preston Brito, PharmD  Seen with Nedra Donald PharmD

## 2023-08-07 NOTE — PROGRESS NOTES
Patient came into infusion with family member. Orders and vital signs reviewed, assessment done. Labs collected peripherally in right AC with 25G butterfly needle  and reviewed. Patient does not meet parameters to proceed with Retacrit treatment due to Hgb of 10.1.  Patient left in stable condition with next appointment confirmed.

## 2023-08-10 ENCOUNTER — ANTICOAGULATION VISIT (OUTPATIENT)
Dept: VASCULAR LAB | Facility: MEDICAL CENTER | Age: 83
End: 2023-08-10
Attending: INTERNAL MEDICINE
Payer: MEDICARE

## 2023-08-10 DIAGNOSIS — I48.11 LONGSTANDING PERSISTENT ATRIAL FIBRILLATION (HCC): ICD-10-CM

## 2023-08-10 DIAGNOSIS — D68.69 SECONDARY HYPERCOAGULABILITY DISORDER (HCC): ICD-10-CM

## 2023-08-10 LAB — INR PPP: 2.5 (ref 2–3.5)

## 2023-08-10 PROCEDURE — 85610 PROTHROMBIN TIME: CPT

## 2023-08-10 PROCEDURE — 99212 OFFICE O/P EST SF 10 MIN: CPT | Performed by: NURSE PRACTITIONER

## 2023-08-10 RX ORDER — WARFARIN SODIUM 5 MG/1
5 TABLET ORAL DAILY
Qty: 30 TABLET | Refills: 3 | Status: SHIPPED | OUTPATIENT
Start: 2023-08-10 | End: 2023-09-12

## 2023-08-10 NOTE — PROGRESS NOTES
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, GI bleed in  on Xarelto  s/p watchmen on 23; new LOT 23 w/ cards after GOMEZ          Anticoagulation Care Providers       Provider Role Specialty Phone number    THAO Martínez Referring Cardiovascular Disease (Cardiology) [phone]                  Refer to Patient Findings for HPI:  Patient Findings       Positives:  Bruising    Negatives:  Signs/symptoms of thrombosis, Signs/symptoms of bleeding, Laboratory test error suspected, Change in health, Change in alcohol use, Change in activity, Upcoming invasive procedure, Emergency department visit, Upcoming dental procedure, Missed doses, Extra doses, Change in medications, Change in diet/appetite, Hospital admission, Other complaints    Comments:  She has a large bruise to the back of her right thigh/posterior knee. States she has had the bruise since having her Watchman procedure which was done from the right groin. It's sore to touch. No bleeding. No other abnormal bruising.            There were no vitals filed for this visit.    Verified current warfarin dosing schedule.    Medications reconciled   Pt is not on antiplatelet therapy      A/P   INR  -therapeutic. INR up from 1.3 on Monday with loading doses. Will have pt resume her previous dose with close INR follow up.    Will monitor large hematoma to right posterior thigh/posterior knee region. Pt may try heat to the area. Advised to  monitor for any worsening bruising/swelling or pain for which she will go to the ER.    Warfarin dosing recommendation: Resume taking 5 mg daily.    Pt educated to contact our clinic with any changes in medications or s/s of bleeding or thrombosis. Pt is aware to seek immediate medical attention for falls, head injury or deep cuts.    Follow up appointment on Monday.    RAYNA Lew.

## 2023-08-14 ENCOUNTER — OUTPATIENT INFUSION SERVICES (OUTPATIENT)
Dept: ONCOLOGY | Facility: MEDICAL CENTER | Age: 83
End: 2023-08-14
Attending: INTERNAL MEDICINE
Payer: MEDICARE

## 2023-08-14 ENCOUNTER — ANTICOAGULATION VISIT (OUTPATIENT)
Dept: VASCULAR LAB | Facility: MEDICAL CENTER | Age: 83
End: 2023-08-14
Attending: INTERNAL MEDICINE
Payer: MEDICARE

## 2023-08-14 VITALS
TEMPERATURE: 97.9 F | RESPIRATION RATE: 16 BRPM | WEIGHT: 201.94 LBS | DIASTOLIC BLOOD PRESSURE: 70 MMHG | HEIGHT: 61 IN | OXYGEN SATURATION: 94 % | SYSTOLIC BLOOD PRESSURE: 109 MMHG | HEART RATE: 83 BPM | BODY MASS INDEX: 38.13 KG/M2

## 2023-08-14 DIAGNOSIS — D63.8 ANEMIA OF CHRONIC DISEASE: ICD-10-CM

## 2023-08-14 DIAGNOSIS — I48.11 LONGSTANDING PERSISTENT ATRIAL FIBRILLATION (HCC): ICD-10-CM

## 2023-08-14 LAB
FERRITIN SERPL-MCNC: 83.5 NG/ML (ref 10–291)
HCT VFR BLD AUTO: 33.5 % (ref 37–47)
HGB BLD-MCNC: 10.1 G/DL (ref 12–16)
INR PPP: 2.4 (ref 2–3.5)
IRON SATN MFR SERPL: 11 % (ref 15–55)
IRON SERPL-MCNC: 40 UG/DL (ref 40–170)
TIBC SERPL-MCNC: 357 UG/DL (ref 250–450)
UIBC SERPL-MCNC: 317 UG/DL (ref 110–370)

## 2023-08-14 PROCEDURE — 36415 COLL VENOUS BLD VENIPUNCTURE: CPT

## 2023-08-14 PROCEDURE — 85018 HEMOGLOBIN: CPT

## 2023-08-14 PROCEDURE — 83540 ASSAY OF IRON: CPT

## 2023-08-14 PROCEDURE — 83550 IRON BINDING TEST: CPT

## 2023-08-14 PROCEDURE — 82728 ASSAY OF FERRITIN: CPT

## 2023-08-14 PROCEDURE — 85610 PROTHROMBIN TIME: CPT

## 2023-08-14 PROCEDURE — 85014 HEMATOCRIT: CPT

## 2023-08-14 RX ORDER — EPINEPHRINE 1 MG/ML(1)
0.5 AMPUL (ML) INJECTION PRN
Status: CANCELLED | OUTPATIENT
Start: 2023-08-14

## 2023-08-14 RX ORDER — 0.9 % SODIUM CHLORIDE 0.9 %
10 VIAL (ML) INJECTION PRN
Status: CANCELLED | OUTPATIENT
Start: 2023-08-14

## 2023-08-14 RX ORDER — 0.9 % SODIUM CHLORIDE 0.9 %
3 VIAL (ML) INJECTION PRN
Status: CANCELLED | OUTPATIENT
Start: 2023-08-21

## 2023-08-14 RX ORDER — 0.9 % SODIUM CHLORIDE 0.9 %
10 VIAL (ML) INJECTION PRN
Status: CANCELLED | OUTPATIENT
Start: 2023-08-21

## 2023-08-14 RX ORDER — SODIUM CHLORIDE 9 MG/ML
INJECTION, SOLUTION INTRAVENOUS CONTINUOUS
Status: CANCELLED | OUTPATIENT
Start: 2023-08-14

## 2023-08-14 RX ORDER — 0.9 % SODIUM CHLORIDE 0.9 %
VIAL (ML) INJECTION PRN
Status: CANCELLED | OUTPATIENT
Start: 2023-08-14

## 2023-08-14 RX ORDER — 0.9 % SODIUM CHLORIDE 0.9 %
VIAL (ML) INJECTION PRN
Status: CANCELLED | OUTPATIENT
Start: 2023-08-21

## 2023-08-14 RX ORDER — 0.9 % SODIUM CHLORIDE 0.9 %
3 VIAL (ML) INJECTION PRN
Status: CANCELLED | OUTPATIENT
Start: 2023-08-14

## 2023-08-14 RX ORDER — DIPHENHYDRAMINE HYDROCHLORIDE 50 MG/ML
50 INJECTION INTRAMUSCULAR; INTRAVENOUS PRN
Status: CANCELLED | OUTPATIENT
Start: 2023-08-14

## 2023-08-14 RX ORDER — METHYLPREDNISOLONE SODIUM SUCCINATE 125 MG/2ML
125 INJECTION, POWDER, LYOPHILIZED, FOR SOLUTION INTRAMUSCULAR; INTRAVENOUS PRN
Status: CANCELLED | OUTPATIENT
Start: 2023-08-14

## 2023-08-14 ASSESSMENT — FIBROSIS 4 INDEX: FIB4 SCORE: 1.67

## 2023-08-14 NOTE — PROGRESS NOTES
Patient still with JASIEL, will order another round of IV iron.    Ryder Davis MD  Nephrology  [facility]

## 2023-08-15 NOTE — PROGRESS NOTES
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 DOAC candidate, GI bleed in  on Xarelto  s/p watchmen on 23; new LOT 23 w/ cards after GOMEZ          Anticoagulation Care Providers       Provider Role Specialty Phone number    THAO Martínez Referring Cardiovascular Disease (Cardiology) [phone]          Refer to Patient Findings for HPI:  Patient Findings       Negatives:  Signs/symptoms of thrombosis, Signs/symptoms of bleeding, Laboratory test error suspected, Change in health, Change in alcohol use, Change in activity, Upcoming invasive procedure, Emergency department visit, Upcoming dental procedure, Missed doses, Extra doses, Change in medications, Change in diet/appetite, Hospital admission, Bruising, Other complaints          There were no vitals filed for this visit.  The pt declined vitals today - pt seen in [facility]    Patient seen in clinic today for follow up on anticoagulation therapy with warfarin (a high risk medication) for hx of AF  Verified current warfarin dosing schedule.  Patient denies any missed doses of warfarin.    Medications reconciled   Pt is not on antiplatelet therapy      A/P   INR is therapeutic today at 2.4.     Warfarin dosing recommendation: Continue with the current dosing regimen.   Patient will follow up again in 1 week.     Pt educated to contact our clinic with any changes in medications or s/s of bleeding or thrombosis. Pt is aware to seek immediate medical attention for  falls, head injury or deep cuts.    Follow up appointment in 1 week(s).    Next appt: Monday, Aug 21 @ 3:45pm-[facility] in [facility]    Linda Rodriguez PharmD

## 2023-08-15 NOTE — PROGRESS NOTES
Pt presents to hospitals for epoetin injection. Butterfly needle used in RAC; brisk blood return observed and pt tolerated well. Labs drawn and not within treatable parameters. Next appointment confirmed and education provided. Pt discharged to self care with all personal belongings and in NAD.

## 2023-08-17 ENCOUNTER — PATIENT MESSAGE (OUTPATIENT)
Dept: MEDICAL GROUP | Facility: MEDICAL CENTER | Age: 83
End: 2023-08-17
Payer: MEDICARE

## 2023-08-17 DIAGNOSIS — D50.0 IRON DEFICIENCY ANEMIA DUE TO CHRONIC BLOOD LOSS: ICD-10-CM

## 2023-08-17 RX ORDER — FERROUS SULFATE 325(65) MG
325 TABLET ORAL DAILY
Qty: 90 TABLET | Refills: 2 | Status: SHIPPED | OUTPATIENT
Start: 2023-08-17 | End: 2024-01-24 | Stop reason: SDUPTHER

## 2023-08-21 ENCOUNTER — OUTPATIENT INFUSION SERVICES (OUTPATIENT)
Dept: ONCOLOGY | Facility: MEDICAL CENTER | Age: 83
End: 2023-08-21
Attending: INTERNAL MEDICINE
Payer: MEDICARE

## 2023-08-21 ENCOUNTER — ANTICOAGULATION VISIT (OUTPATIENT)
Dept: VASCULAR LAB | Facility: MEDICAL CENTER | Age: 83
End: 2023-08-21
Attending: INTERNAL MEDICINE
Payer: MEDICARE

## 2023-08-21 VITALS
RESPIRATION RATE: 16 BRPM | HEIGHT: 61 IN | DIASTOLIC BLOOD PRESSURE: 62 MMHG | TEMPERATURE: 97.4 F | BODY MASS INDEX: 39.04 KG/M2 | HEART RATE: 81 BPM | OXYGEN SATURATION: 95 % | WEIGHT: 206.79 LBS | SYSTOLIC BLOOD PRESSURE: 102 MMHG

## 2023-08-21 DIAGNOSIS — D63.8 ANEMIA OF CHRONIC DISEASE: ICD-10-CM

## 2023-08-21 DIAGNOSIS — I48.11 LONGSTANDING PERSISTENT ATRIAL FIBRILLATION (HCC): ICD-10-CM

## 2023-08-21 DIAGNOSIS — D50.9 IRON DEFICIENCY ANEMIA, UNSPECIFIED IRON DEFICIENCY ANEMIA TYPE: ICD-10-CM

## 2023-08-21 DIAGNOSIS — D68.69 SECONDARY HYPERCOAGULABLE STATE (HCC): ICD-10-CM

## 2023-08-21 LAB
HCT VFR BLD AUTO: 33.5 % (ref 37–47)
HGB BLD-MCNC: 10.3 G/DL (ref 12–16)
INR PPP: 3 (ref 2–3.5)

## 2023-08-21 PROCEDURE — 85014 HEMATOCRIT: CPT

## 2023-08-21 PROCEDURE — 700111 HCHG RX REV CODE 636 W/ 250 OVERRIDE (IP): Mod: JG | Performed by: INTERNAL MEDICINE

## 2023-08-21 PROCEDURE — 85610 PROTHROMBIN TIME: CPT

## 2023-08-21 PROCEDURE — 96365 THER/PROPH/DIAG IV INF INIT: CPT

## 2023-08-21 PROCEDURE — 700105 HCHG RX REV CODE 258: Performed by: INTERNAL MEDICINE

## 2023-08-21 PROCEDURE — 99212 OFFICE O/P EST SF 10 MIN: CPT | Mod: 25 | Performed by: NURSE PRACTITIONER

## 2023-08-21 PROCEDURE — 85018 HEMOGLOBIN: CPT

## 2023-08-21 RX ORDER — 0.9 % SODIUM CHLORIDE 0.9 %
VIAL (ML) INJECTION PRN
Status: CANCELLED | OUTPATIENT
Start: 2023-08-28

## 2023-08-21 RX ORDER — 0.9 % SODIUM CHLORIDE 0.9 %
10 VIAL (ML) INJECTION PRN
Status: CANCELLED | OUTPATIENT
Start: 2023-08-28

## 2023-08-21 RX ORDER — DIPHENHYDRAMINE HYDROCHLORIDE 50 MG/ML
50 INJECTION INTRAMUSCULAR; INTRAVENOUS PRN
Status: CANCELLED | OUTPATIENT
Start: 2023-08-28

## 2023-08-21 RX ORDER — METHYLPREDNISOLONE SODIUM SUCCINATE 125 MG/2ML
125 INJECTION, POWDER, LYOPHILIZED, FOR SOLUTION INTRAMUSCULAR; INTRAVENOUS PRN
Status: CANCELLED | OUTPATIENT
Start: 2023-08-28

## 2023-08-21 RX ORDER — SODIUM CHLORIDE 9 MG/ML
INJECTION, SOLUTION INTRAVENOUS CONTINUOUS
Status: CANCELLED | OUTPATIENT
Start: 2023-08-28

## 2023-08-21 RX ORDER — 0.9 % SODIUM CHLORIDE 0.9 %
3 VIAL (ML) INJECTION PRN
Status: CANCELLED | OUTPATIENT
Start: 2023-08-28

## 2023-08-21 RX ORDER — EPINEPHRINE 1 MG/ML(1)
0.5 AMPUL (ML) INJECTION PRN
Status: CANCELLED | OUTPATIENT
Start: 2023-08-28

## 2023-08-21 RX ADMIN — FERUMOXYTOL 510 MG: 510 INJECTION INTRAVENOUS at 16:15

## 2023-08-21 ASSESSMENT — FIBROSIS 4 INDEX: FIB4 SCORE: 1.67

## 2023-08-21 NOTE — PROGRESS NOTES
Anticoagulation Summary  As of 8/21/2023      INR goal:  2.0-3.0   TTR:  81.7 % (3 mo)   INR used for dosing:  3.00 (8/21/2023)   Warfarin maintenance plan:  5 mg (5 mg x 1) every day   Weekly warfarin total:  35 mg   Plan last modified:  Rudy Lowe, PharmD (5/2/2023)   Next INR check:  8/28/2023   Target end date:  9/12/2023    Indications    AF (atrial fibrillation) (HCC) [I48.91]                 Anticoagulation Episode Summary       INR check location:      Preferred lab:      Send INR reminders to:      Comments:  Not a DOAC candidate, GI bleed in 2023 on Xarelto  s/p watchmen on 7/26/23; new LOT 9/12/23 w/ cards after GOMEZ          Anticoagulation Care Providers       Provider Role Specialty Phone number    THAO Martínez Referring Cardiovascular Disease (Cardiology) [phone]                  Refer to Patient Findings for HPI:  Patient Findings       Negatives:  Signs/symptoms of thrombosis, Signs/symptoms of bleeding, Laboratory test error suspected, Change in health, Change in alcohol use, Change in activity, Upcoming invasive procedure, Emergency department visit, Upcoming dental procedure, Missed doses, Extra doses, Change in medications, Change in diet/appetite, Hospital admission, Bruising, Other complaints          Pt seen in the infusion center today.    Verified current warfarin dosing schedule.    Pt is not on antiplatelet therapy      A/P   INR  3.0. Discussed increasing her vit k intake. Pt wishes to have a one time dose decrease tonight which is reasonable.    Warfarin dosing recommendation: Take 2.5 mg tonight then resume 5 mg daily.    Pt educated to contact our clinic with any changes in medications or s/s of bleeding or thrombosis. Pt is aware to seek immediate medical attention for falls, head injury or deep cuts.    Follow up appointment in 1 week(s).    LILIAN LewPBRENNON

## 2023-08-22 ENCOUNTER — TELEPHONE (OUTPATIENT)
Dept: CARDIOLOGY | Facility: MEDICAL CENTER | Age: 83
End: 2023-08-22
Payer: MEDICARE

## 2023-08-22 NOTE — PROGRESS NOTES
Patient came into INF independently. Orders and vitals reviewed, assessment done. PIV started with blood return noted and flushed briskly. Labs collected and reviewed, patient does not meet parameters for Retacrit injection. Feraheme infused as ordered with no adverse events. Patient observed for 30 minuets with no adverse events. PIV removed with tip intact. Patient left in stable condition with next appointment confirmed.

## 2023-08-22 NOTE — TELEPHONE ENCOUNTER
Patient called stating she is going to have an iron infusion next Monday and the week after that. She states the infusion center advised her to hold her oral iron prior to the infusion but advised her to reach out to her provider on when to restart it. Advised the patient I would ask Dr. Gray and call her back. Patient verbalizes understanding.    To Dr. Gray, please advise on when to resume oral iron post infusion. Or defer to PCP?

## 2023-08-22 NOTE — TELEPHONE ENCOUNTER
Cesar Gray M.D.  You 9 minutes ago (10:52 AM)     Have her resume in couple of days    ---------------------------------------------------  Called patient to notify of MD's response. Patient verbalizes understanding.

## 2023-08-28 ENCOUNTER — ANTICOAGULATION VISIT (OUTPATIENT)
Dept: VASCULAR LAB | Facility: MEDICAL CENTER | Age: 83
End: 2023-08-28
Attending: INTERNAL MEDICINE
Payer: MEDICARE

## 2023-08-28 ENCOUNTER — OUTPATIENT INFUSION SERVICES (OUTPATIENT)
Dept: ONCOLOGY | Facility: MEDICAL CENTER | Age: 83
End: 2023-08-28
Attending: INTERNAL MEDICINE
Payer: MEDICARE

## 2023-08-28 VITALS
HEIGHT: 61 IN | BODY MASS INDEX: 38.42 KG/M2 | RESPIRATION RATE: 18 BRPM | SYSTOLIC BLOOD PRESSURE: 130 MMHG | TEMPERATURE: 97.6 F | HEART RATE: 74 BPM | DIASTOLIC BLOOD PRESSURE: 68 MMHG | OXYGEN SATURATION: 92 % | WEIGHT: 203.48 LBS

## 2023-08-28 DIAGNOSIS — D50.9 IRON DEFICIENCY ANEMIA, UNSPECIFIED IRON DEFICIENCY ANEMIA TYPE: ICD-10-CM

## 2023-08-28 DIAGNOSIS — I48.11 LONGSTANDING PERSISTENT ATRIAL FIBRILLATION (HCC): ICD-10-CM

## 2023-08-28 DIAGNOSIS — D68.69 SECONDARY HYPERCOAGULABLE STATE (HCC): ICD-10-CM

## 2023-08-28 DIAGNOSIS — D63.8 ANEMIA OF CHRONIC DISEASE: ICD-10-CM

## 2023-08-28 LAB
FERRITIN SERPL-MCNC: 578 NG/ML (ref 10–291)
HCT VFR BLD AUTO: 33.9 % (ref 37–47)
HGB BLD-MCNC: 10.4 G/DL (ref 12–16)
INR PPP: 2.1 (ref 2–3.5)
IRON SATN MFR SERPL: 18 % (ref 15–55)
IRON SERPL-MCNC: 58 UG/DL (ref 40–170)
TIBC SERPL-MCNC: 316 UG/DL (ref 250–450)
UIBC SERPL-MCNC: 258 UG/DL (ref 110–370)

## 2023-08-28 PROCEDURE — 85014 HEMATOCRIT: CPT

## 2023-08-28 PROCEDURE — 82728 ASSAY OF FERRITIN: CPT

## 2023-08-28 PROCEDURE — 85018 HEMOGLOBIN: CPT

## 2023-08-28 PROCEDURE — 85610 PROTHROMBIN TIME: CPT

## 2023-08-28 PROCEDURE — 96365 THER/PROPH/DIAG IV INF INIT: CPT

## 2023-08-28 PROCEDURE — 700111 HCHG RX REV CODE 636 W/ 250 OVERRIDE (IP): Mod: JG | Performed by: INTERNAL MEDICINE

## 2023-08-28 PROCEDURE — 83550 IRON BINDING TEST: CPT

## 2023-08-28 PROCEDURE — 83540 ASSAY OF IRON: CPT

## 2023-08-28 PROCEDURE — 700105 HCHG RX REV CODE 258: Performed by: INTERNAL MEDICINE

## 2023-08-28 RX ORDER — 0.9 % SODIUM CHLORIDE 0.9 %
VIAL (ML) INJECTION PRN
Status: CANCELLED | OUTPATIENT
Start: 2023-09-04

## 2023-08-28 RX ORDER — EPINEPHRINE 1 MG/ML(1)
0.5 AMPUL (ML) INJECTION PRN
Status: CANCELLED | OUTPATIENT
Start: 2023-08-28

## 2023-08-28 RX ORDER — 0.9 % SODIUM CHLORIDE 0.9 %
VIAL (ML) INJECTION PRN
Status: CANCELLED | OUTPATIENT
Start: 2023-08-28

## 2023-08-28 RX ORDER — 0.9 % SODIUM CHLORIDE 0.9 %
10 VIAL (ML) INJECTION PRN
Status: CANCELLED | OUTPATIENT
Start: 2023-09-04

## 2023-08-28 RX ORDER — 0.9 % SODIUM CHLORIDE 0.9 %
10 VIAL (ML) INJECTION PRN
Status: CANCELLED | OUTPATIENT
Start: 2023-08-28

## 2023-08-28 RX ORDER — METHYLPREDNISOLONE SODIUM SUCCINATE 125 MG/2ML
125 INJECTION, POWDER, LYOPHILIZED, FOR SOLUTION INTRAMUSCULAR; INTRAVENOUS PRN
Status: CANCELLED | OUTPATIENT
Start: 2023-08-28

## 2023-08-28 RX ORDER — 0.9 % SODIUM CHLORIDE 0.9 %
3 VIAL (ML) INJECTION PRN
Status: CANCELLED | OUTPATIENT
Start: 2023-09-04

## 2023-08-28 RX ORDER — SODIUM CHLORIDE 9 MG/ML
INJECTION, SOLUTION INTRAVENOUS CONTINUOUS
Status: CANCELLED | OUTPATIENT
Start: 2023-08-28

## 2023-08-28 RX ORDER — 0.9 % SODIUM CHLORIDE 0.9 %
3 VIAL (ML) INJECTION PRN
Status: CANCELLED | OUTPATIENT
Start: 2023-08-28

## 2023-08-28 RX ORDER — DIPHENHYDRAMINE HYDROCHLORIDE 50 MG/ML
50 INJECTION INTRAMUSCULAR; INTRAVENOUS PRN
Status: CANCELLED | OUTPATIENT
Start: 2023-08-28

## 2023-08-28 RX ADMIN — FERUMOXYTOL 510 MG: 510 INJECTION INTRAVENOUS at 12:04

## 2023-08-28 ASSESSMENT — FIBROSIS 4 INDEX: FIB4 SCORE: 1.67

## 2023-08-28 NOTE — PROGRESS NOTES
Pt presented to [facility] for possible Retacrit injection and fereheme infusion. Pt reports tolerating past fereheme infusions without adverse reactions. PIV started to right AC, brisk blood return noted and flushed easily. Labs drawn as ordered. Hemoglobin is 10.4 and pt does NOT meet parameters for retacrit injection today.  Fereheme infused over 30 minutes without adverse reaction. Pt observed for 30 minutes post infusion. PIV discontinued with tip intact, gauze and Coban applied to the site. Schedulers emailed about future appointments. Pt discharged to home in good condition.

## 2023-08-28 NOTE — PROGRESS NOTES
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 Referring Cardiovascular Disease (Cardiology) [phone]                  Refer to Patient Findings for HPI:      [facility]     Verified current warfarin dosing schedule.    Medications reconciled: No      A/P   INR is therapeutic    Warfarin dosing recommendation: Pt is to continue with current warfarin dosing regimen.     Pt educated to contact our clinic with any changes in medications or s/s of bleeding or thrombosis. Pt is aware to seek immediate medical attention for falls, head injury or deep cuts.    Follow up appointment in 1 week(s).    Rudy Lowe, Mau

## 2023-08-31 ENCOUNTER — HOSPITAL ENCOUNTER (OUTPATIENT)
Dept: LAB | Facility: MEDICAL CENTER | Age: 83
End: 2023-08-31
Attending: INTERNAL MEDICINE
Payer: MEDICARE

## 2023-08-31 ENCOUNTER — HOSPITAL ENCOUNTER (OUTPATIENT)
Dept: LAB | Facility: MEDICAL CENTER | Age: 83
End: 2023-08-31
Attending: FAMILY MEDICINE
Payer: MEDICARE

## 2023-08-31 ENCOUNTER — HOSPITAL ENCOUNTER (OUTPATIENT)
Dept: LAB | Facility: MEDICAL CENTER | Age: 83
End: 2023-08-31
Attending: DERMATOLOGY
Payer: MEDICARE

## 2023-08-31 DIAGNOSIS — E55.9 VITAMIN D DEFICIENCY: ICD-10-CM

## 2023-08-31 DIAGNOSIS — I34.0 NONRHEUMATIC MITRAL VALVE REGURGITATION: ICD-10-CM

## 2023-08-31 DIAGNOSIS — Z79.899 HIGH RISK MEDICATION USE: ICD-10-CM

## 2023-08-31 DIAGNOSIS — E03.9 HYPOTHYROIDISM, UNSPECIFIED TYPE: ICD-10-CM

## 2023-08-31 DIAGNOSIS — R73.02 IMPAIRED GLUCOSE TOLERANCE: ICD-10-CM

## 2023-08-31 DIAGNOSIS — I10 ESSENTIAL HYPERTENSION: Chronic | ICD-10-CM

## 2023-08-31 DIAGNOSIS — N18.4 CKD (CHRONIC KIDNEY DISEASE) STAGE 4, GFR 15-29 ML/MIN (HCC): ICD-10-CM

## 2023-08-31 DIAGNOSIS — D63.8 ANEMIA OF CHRONIC DISEASE: ICD-10-CM

## 2023-08-31 LAB
25(OH)D3 SERPL-MCNC: 65 NG/ML (ref 30–100)
ALBUMIN SERPL BCP-MCNC: 4.1 G/DL (ref 3.2–4.9)
ANION GAP SERPL CALC-SCNC: 12 MMOL/L (ref 7–16)
ANION GAP SERPL CALC-SCNC: 13 MMOL/L (ref 7–16)
APPEARANCE UR: CLEAR
BILIRUB UR QL STRIP.AUTO: NEGATIVE
BUN SERPL-MCNC: 34 MG/DL (ref 8–22)
BUN SERPL-MCNC: 34 MG/DL (ref 8–22)
CALCIUM SERPL-MCNC: 9.4 MG/DL (ref 8.5–10.5)
CALCIUM SERPL-MCNC: 9.5 MG/DL (ref 8.5–10.5)
CHLORIDE SERPL-SCNC: 103 MMOL/L (ref 96–112)
CHLORIDE SERPL-SCNC: 104 MMOL/L (ref 96–112)
CO2 SERPL-SCNC: 24 MMOL/L (ref 20–33)
CO2 SERPL-SCNC: 25 MMOL/L (ref 20–33)
COLOR UR: YELLOW
CREAT SERPL-MCNC: 1.77 MG/DL (ref 0.5–1.4)
CREAT SERPL-MCNC: 1.83 MG/DL (ref 0.5–1.4)
CREAT UR-MCNC: 114.11 MG/DL
CREAT UR-MCNC: 114.85 MG/DL
ERYTHROCYTE [DISTWIDTH] IN BLOOD BY AUTOMATED COUNT: 63.3 FL (ref 35.9–50)
EST. AVERAGE GLUCOSE BLD GHB EST-MCNC: 100 MG/DL
FERRITIN SERPL-MCNC: 742 NG/ML (ref 10–291)
GFR SERPLBLD CREATININE-BSD FMLA CKD-EPI: 27 ML/MIN/1.73 M 2
GFR SERPLBLD CREATININE-BSD FMLA CKD-EPI: 28 ML/MIN/1.73 M 2
GLUCOSE SERPL-MCNC: 104 MG/DL (ref 65–99)
GLUCOSE SERPL-MCNC: 105 MG/DL (ref 65–99)
GLUCOSE UR STRIP.AUTO-MCNC: NEGATIVE MG/DL
HBA1C MFR BLD: 5.1 % (ref 4–5.6)
HCT VFR BLD AUTO: 33.3 % (ref 37–47)
HGB BLD-MCNC: 10.4 G/DL (ref 12–16)
IRON SATN MFR SERPL: 41 % (ref 15–55)
IRON SERPL-MCNC: 142 UG/DL (ref 40–170)
KETONES UR STRIP.AUTO-MCNC: NEGATIVE MG/DL
LEUKOCYTE ESTERASE UR QL STRIP.AUTO: NEGATIVE
MCH RBC QN AUTO: 29.1 PG (ref 27–33)
MCHC RBC AUTO-ENTMCNC: 31.2 G/DL (ref 32.2–35.5)
MCV RBC AUTO: 93.3 FL (ref 81.4–97.8)
MICRO URNS: NORMAL
MICROALBUMIN UR-MCNC: 5.7 MG/DL
MICROALBUMIN/CREAT UR: 50 MG/G (ref 0–30)
NITRITE UR QL STRIP.AUTO: NEGATIVE
PH UR STRIP.AUTO: 6 [PH] (ref 5–8)
PHOSPHATE SERPL-MCNC: 3 MG/DL (ref 2.5–4.5)
PLATELET # BLD AUTO: 220 K/UL (ref 164–446)
PMV BLD AUTO: 11.6 FL (ref 9–12.9)
POTASSIUM SERPL-SCNC: 3.3 MMOL/L (ref 3.6–5.5)
POTASSIUM SERPL-SCNC: 3.5 MMOL/L (ref 3.6–5.5)
PROT UR QL STRIP: NEGATIVE MG/DL
PROT UR-MCNC: 17 MG/DL (ref 0–15)
PROT/CREAT UR: 148 MG/G (ref 10–107)
PTH-INTACT SERPL-MCNC: 68.3 PG/ML (ref 14–72)
RBC # BLD AUTO: 3.57 M/UL (ref 4.2–5.4)
RBC UR QL AUTO: NEGATIVE
SODIUM SERPL-SCNC: 139 MMOL/L (ref 135–145)
SODIUM SERPL-SCNC: 142 MMOL/L (ref 135–145)
SP GR UR STRIP.AUTO: 1.01
TIBC SERPL-MCNC: 346 UG/DL (ref 250–450)
TSH SERPL DL<=0.005 MIU/L-ACNC: 14.6 UIU/ML (ref 0.38–5.33)
UIBC SERPL-MCNC: 204 UG/DL (ref 110–370)
UROBILINOGEN UR STRIP.AUTO-MCNC: 0.2 MG/DL
WBC # BLD AUTO: 5.6 K/UL (ref 4.8–10.8)

## 2023-08-31 PROCEDURE — 82043 UR ALBUMIN QUANTITATIVE: CPT

## 2023-08-31 PROCEDURE — 83550 IRON BINDING TEST: CPT

## 2023-08-31 PROCEDURE — 36415 COLL VENOUS BLD VENIPUNCTURE: CPT

## 2023-08-31 PROCEDURE — 83036 HEMOGLOBIN GLYCOSYLATED A1C: CPT | Mod: GA

## 2023-08-31 PROCEDURE — 86480 TB TEST CELL IMMUN MEASURE: CPT

## 2023-08-31 PROCEDURE — 80048 BASIC METABOLIC PNL TOTAL CA: CPT

## 2023-08-31 PROCEDURE — 84443 ASSAY THYROID STIM HORMONE: CPT

## 2023-08-31 PROCEDURE — 82728 ASSAY OF FERRITIN: CPT

## 2023-08-31 PROCEDURE — 82040 ASSAY OF SERUM ALBUMIN: CPT

## 2023-08-31 PROCEDURE — 82306 VITAMIN D 25 HYDROXY: CPT

## 2023-08-31 PROCEDURE — 84156 ASSAY OF PROTEIN URINE: CPT

## 2023-08-31 PROCEDURE — 81003 URINALYSIS AUTO W/O SCOPE: CPT

## 2023-08-31 PROCEDURE — 82570 ASSAY OF URINE CREATININE: CPT | Mod: 91

## 2023-08-31 PROCEDURE — 85027 COMPLETE CBC AUTOMATED: CPT

## 2023-08-31 PROCEDURE — 83540 ASSAY OF IRON: CPT

## 2023-08-31 PROCEDURE — 80048 BASIC METABOLIC PNL TOTAL CA: CPT | Mod: 91

## 2023-08-31 PROCEDURE — 83970 ASSAY OF PARATHORMONE: CPT

## 2023-08-31 PROCEDURE — 84100 ASSAY OF PHOSPHORUS: CPT

## 2023-09-01 DIAGNOSIS — E03.9 IDIOPATHIC HYPOTHYROIDISM: ICD-10-CM

## 2023-09-01 LAB
GAMMA INTERFERON BACKGROUND BLD IA-ACNC: 0.01 IU/ML
M TB IFN-G BLD-IMP: NEGATIVE
M TB IFN-G CD4+ BCKGRND COR BLD-ACNC: 0 IU/ML
MITOGEN IGNF BCKGRD COR BLD-ACNC: 1.87 IU/ML
QFT TB2 - NIL TBQ2: 0 IU/ML

## 2023-09-01 RX ORDER — LEVOTHYROXINE SODIUM 175 UG/1
175 TABLET ORAL
Qty: 90 TABLET | Refills: 3 | Status: ON HOLD | OUTPATIENT
Start: 2023-09-01 | End: 2023-11-07

## 2023-09-05 ENCOUNTER — OUTPATIENT INFUSION SERVICES (OUTPATIENT)
Dept: ONCOLOGY | Facility: MEDICAL CENTER | Age: 83
End: 2023-09-05
Attending: INTERNAL MEDICINE
Payer: MEDICARE

## 2023-09-05 ENCOUNTER — ANTICOAGULATION VISIT (OUTPATIENT)
Dept: VASCULAR LAB | Facility: MEDICAL CENTER | Age: 83
End: 2023-09-05
Attending: INTERNAL MEDICINE
Payer: MEDICARE

## 2023-09-05 VITALS — SYSTOLIC BLOOD PRESSURE: 133 MMHG | HEART RATE: 67 BPM | DIASTOLIC BLOOD PRESSURE: 84 MMHG

## 2023-09-05 VITALS
HEART RATE: 72 BPM | RESPIRATION RATE: 18 BRPM | BODY MASS INDEX: 38.21 KG/M2 | HEIGHT: 61 IN | DIASTOLIC BLOOD PRESSURE: 60 MMHG | OXYGEN SATURATION: 94 % | WEIGHT: 202.38 LBS | SYSTOLIC BLOOD PRESSURE: 106 MMHG | TEMPERATURE: 97.7 F

## 2023-09-05 DIAGNOSIS — D63.8 ANEMIA OF CHRONIC DISEASE: ICD-10-CM

## 2023-09-05 DIAGNOSIS — D68.69 SECONDARY HYPERCOAGULABLE STATE (HCC): ICD-10-CM

## 2023-09-05 DIAGNOSIS — I48.11 LONGSTANDING PERSISTENT ATRIAL FIBRILLATION (HCC): ICD-10-CM

## 2023-09-05 LAB
HCT VFR BLD AUTO: 33.8 % (ref 37–47)
HGB BLD-MCNC: 10.3 G/DL (ref 12–16)
INR PPP: 2.9 (ref 2–3.5)

## 2023-09-05 PROCEDURE — 85018 HEMOGLOBIN: CPT

## 2023-09-05 PROCEDURE — 85014 HEMATOCRIT: CPT

## 2023-09-05 PROCEDURE — 99211 OFF/OP EST MAY X REQ PHY/QHP: CPT

## 2023-09-05 PROCEDURE — 85610 PROTHROMBIN TIME: CPT

## 2023-09-05 PROCEDURE — 36415 COLL VENOUS BLD VENIPUNCTURE: CPT

## 2023-09-05 RX ORDER — 0.9 % SODIUM CHLORIDE 0.9 %
3 VIAL (ML) INJECTION PRN
Status: CANCELLED | OUTPATIENT
Start: 2023-09-11

## 2023-09-05 RX ORDER — 0.9 % SODIUM CHLORIDE 0.9 %
10 VIAL (ML) INJECTION PRN
Status: CANCELLED | OUTPATIENT
Start: 2023-09-11

## 2023-09-05 RX ORDER — 0.9 % SODIUM CHLORIDE 0.9 %
VIAL (ML) INJECTION PRN
Status: CANCELLED | OUTPATIENT
Start: 2023-09-11

## 2023-09-05 ASSESSMENT — FIBROSIS 4 INDEX: FIB4 SCORE: 1.909188309203678316

## 2023-09-05 NOTE — PROGRESS NOTES
Anticoagulation Summary  As of 2023      INR goal:  2.0-3.0   TTR:  84.3 % (3.5 mo)   INR used for dosin.90 (2023)   Warfarin maintenance plan:  5 mg (5 mg x 1) every day   Weekly warfarin total:  35 mg   Plan last modified:  Rudy Lowe, PharmD (2023)   Next INR check:  2023   Target end date:  2023    Indications    AF (atrial fibrillation) ([facility]) [I48.91]  Secondary hypercoagulable state ([facility]) [D68.69]                 Anticoagulation Episode Summary       INR check location:      Preferred lab:      Send INR reminders to:      Comments:  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 Referring Cardiovascular Disease (Cardiology) [phone]                  Refer to Patient Findings for HPI:  Patient Findings       Negatives:  Signs/symptoms of thrombosis, Signs/symptoms of bleeding, Laboratory test error suspected, Change in health, Change in alcohol use, Change in activity, Upcoming invasive procedure, Emergency department visit, Upcoming dental procedure, Missed doses, Extra doses, Change in medications, Change in diet/appetite, Hospital admission, Bruising, Other complaints            Vitals:    23 1408   BP: 133/84   Pulse: 67       Verified current warfarin dosing schedule.    Medications reconciled: No  Pt is not on antiplatelet therapy      A/P   INR is therapeutic    Warfarin dosing recommendation: Continue regimen as listed above.    Pt educated to contact our clinic with any changes in medications or s/s of bleeding or thrombosis. Pt is aware to seek immediate medical attention for falls, head injury or deep cuts.    Follow up appointment in 2 week(s) tentatively. Patient has appointment with cardiology on  to determine whether or not to continue warfarin after watchmen placement.      Claudia Wang, pharmacy student   Nedra Donald, PharmD

## 2023-09-06 ENCOUNTER — PRE-ADMISSION TESTING (OUTPATIENT)
Dept: ADMISSIONS | Facility: MEDICAL CENTER | Age: 83
End: 2023-09-06
Payer: MEDICARE

## 2023-09-06 RX ORDER — CHOLECALCIFEROL (VITAMIN D3) 125 MCG
5000 CAPSULE ORAL EVERY MORNING
COMMUNITY

## 2023-09-06 NOTE — PROGRESS NOTES
Pt presents to [facility] for epoetin injection. Butterfly needle used in RAC; brisk blood return observed and pt tolerated well. Labs drawn and not within treatable parameters. Next appointment confirmed and education provided. Pt discharged to self care with all personal belongings and in NAD.

## 2023-09-07 ENCOUNTER — ANESTHESIA (OUTPATIENT)
Dept: CARDIOLOGY | Facility: MEDICAL CENTER | Age: 83
End: 2023-09-07
Payer: MEDICARE

## 2023-09-07 ENCOUNTER — APPOINTMENT (OUTPATIENT)
Dept: CARDIOLOGY | Facility: MEDICAL CENTER | Age: 83
End: 2023-09-07
Attending: INTERNAL MEDICINE
Payer: MEDICARE

## 2023-09-07 ENCOUNTER — HOSPITAL ENCOUNTER (OUTPATIENT)
Facility: MEDICAL CENTER | Age: 83
End: 2023-09-07
Attending: INTERNAL MEDICINE | Admitting: INTERNAL MEDICINE
Payer: MEDICARE

## 2023-09-07 ENCOUNTER — ANESTHESIA EVENT (OUTPATIENT)
Dept: CARDIOLOGY | Facility: MEDICAL CENTER | Age: 83
End: 2023-09-07
Payer: MEDICARE

## 2023-09-07 VITALS
TEMPERATURE: 97.3 F | HEIGHT: 63 IN | OXYGEN SATURATION: 94 % | HEART RATE: 64 BPM | RESPIRATION RATE: 19 BRPM | BODY MASS INDEX: 36.29 KG/M2 | WEIGHT: 204.81 LBS | SYSTOLIC BLOOD PRESSURE: 130 MMHG | DIASTOLIC BLOOD PRESSURE: 60 MMHG

## 2023-09-07 DIAGNOSIS — I48.11 LONGSTANDING PERSISTENT ATRIAL FIBRILLATION (HCC): ICD-10-CM

## 2023-09-07 LAB
INR PPP: 2.12 (ref 0.87–1.13)
LV EJECT FRACT  99904: 45
PROTHROMBIN TIME: 24.1 SEC (ref 12–14.6)

## 2023-09-07 PROCEDURE — 160002 HCHG RECOVERY MINUTES (STAT)

## 2023-09-07 PROCEDURE — 4410588 EC-TEE W/O CONT

## 2023-09-07 PROCEDURE — 36415 COLL VENOUS BLD VENIPUNCTURE: CPT

## 2023-09-07 PROCEDURE — 85610 PROTHROMBIN TIME: CPT

## 2023-09-07 PROCEDURE — 700111 HCHG RX REV CODE 636 W/ 250 OVERRIDE (IP): Performed by: ANESTHESIOLOGY

## 2023-09-07 PROCEDURE — 93312 ECHO TRANSESOPHAGEAL: CPT | Mod: 26 | Performed by: INTERNAL MEDICINE

## 2023-09-07 PROCEDURE — 700105 HCHG RX REV CODE 258: Performed by: INTERNAL MEDICINE

## 2023-09-07 PROCEDURE — 160035 HCHG PACU - 1ST 60 MINS PHASE I

## 2023-09-07 RX ORDER — SODIUM CHLORIDE, SODIUM LACTATE, POTASSIUM CHLORIDE, CALCIUM CHLORIDE 600; 310; 30; 20 MG/100ML; MG/100ML; MG/100ML; MG/100ML
INJECTION, SOLUTION INTRAVENOUS CONTINUOUS
Status: DISCONTINUED | OUTPATIENT
Start: 2023-09-07 | End: 2023-09-07 | Stop reason: HOSPADM

## 2023-09-07 RX ORDER — EPHEDRINE SULFATE 50 MG/ML
5 INJECTION, SOLUTION INTRAVENOUS
Status: DISCONTINUED | OUTPATIENT
Start: 2023-09-07 | End: 2023-09-11 | Stop reason: HOSPADM

## 2023-09-07 RX ORDER — ONDANSETRON 2 MG/ML
4 INJECTION INTRAMUSCULAR; INTRAVENOUS
Status: DISCONTINUED | OUTPATIENT
Start: 2023-09-07 | End: 2023-09-11 | Stop reason: HOSPADM

## 2023-09-07 RX ORDER — DIPHENHYDRAMINE HYDROCHLORIDE 50 MG/ML
12.5 INJECTION INTRAMUSCULAR; INTRAVENOUS
Status: DISCONTINUED | OUTPATIENT
Start: 2023-09-07 | End: 2023-09-11 | Stop reason: HOSPADM

## 2023-09-07 RX ORDER — HALOPERIDOL 5 MG/ML
1 INJECTION INTRAMUSCULAR
Status: DISCONTINUED | OUTPATIENT
Start: 2023-09-07 | End: 2023-09-11 | Stop reason: HOSPADM

## 2023-09-07 RX ORDER — SODIUM CHLORIDE, SODIUM LACTATE, POTASSIUM CHLORIDE, CALCIUM CHLORIDE 600; 310; 30; 20 MG/100ML; MG/100ML; MG/100ML; MG/100ML
INJECTION, SOLUTION INTRAVENOUS CONTINUOUS
Status: DISCONTINUED | OUTPATIENT
Start: 2023-09-07 | End: 2023-09-11 | Stop reason: HOSPADM

## 2023-09-07 RX ORDER — SECUKINUMAB 150 MG/ML
INJECTION SUBCUTANEOUS
Qty: 1.96 ML | Refills: 5 | Status: SHIPPED | OUTPATIENT
Start: 2023-09-07

## 2023-09-07 RX ORDER — HYDRALAZINE HYDROCHLORIDE 20 MG/ML
5 INJECTION INTRAMUSCULAR; INTRAVENOUS
Status: DISCONTINUED | OUTPATIENT
Start: 2023-09-07 | End: 2023-09-11 | Stop reason: HOSPADM

## 2023-09-07 RX ADMIN — SODIUM CHLORIDE, POTASSIUM CHLORIDE, SODIUM LACTATE AND CALCIUM CHLORIDE: 600; 310; 30; 20 INJECTION, SOLUTION INTRAVENOUS at 10:34

## 2023-09-07 RX ADMIN — PROPOFOL 100 MG: 10 INJECTION, EMULSION INTRAVENOUS at 10:58

## 2023-09-07 RX ADMIN — PROPOFOL 50 MG: 10 INJECTION, EMULSION INTRAVENOUS at 11:03

## 2023-09-07 ASSESSMENT — PAIN SCALES - GENERAL: PAIN_LEVEL: 0

## 2023-09-07 ASSESSMENT — FIBROSIS 4 INDEX: FIB4 SCORE: 1.909188309203678316

## 2023-09-07 NOTE — ANESTHESIA POSTPROCEDURE EVALUATION
Patient: Aleshia Crooks    Procedure Summary     Date: 09/07/23 Room / Location: [facility] - Echocardiology [facility]    Anesthesia Start: 1058 Anesthesia Stop:     Procedure: EC-GOMEZ W/O CONT Diagnosis:       Longstanding persistent atrial fibrillation (HCC)      Longstanding persistent atrial fibrillation    Scheduled Providers: John Tubbs M.D.; Karel Suarez M.D. Responsible Provider: Karel Suarez M.D.    Anesthesia Type: general ASA Status: 3          Final Anesthesia Type: general  Last vitals  BP   Blood Pressure : 121/56    Temp   36.2 °C (97.2 °F)    Pulse   71   Resp   20    SpO2   94 %      Anesthesia Post Evaluation    Patient location during evaluation: PACU  Patient participation: complete - patient participated  Level of consciousness: awake and alert  Pain score: 0    Airway patency: patent  Anesthetic complications: no  Cardiovascular status: hemodynamically stable  Respiratory status: acceptable  Hydration status: euvolemic    PONV: none          No notable events documented.     Nurse Pain Score: 0 (NPRS)

## 2023-09-07 NOTE — ANESTHESIA PREPROCEDURE EVALUATION
Date/Time: 09/07/23 1100    Scheduled providers: John Tubbs M.D.; Karel Suarez M.D.    Procedure: EC-GOMEZ W/O CONT    Diagnosis:       Longstanding persistent atrial fibrillation (HCC) [I48.11]      Longstanding persistent atrial fibrillation [I48.11]    Location: [facility] - Echocardiology [facility]          Relevant Problems   ANESTHESIA   (positive) Obstructive sleep apnea treated with continuous positive airway pressure (CPAP)      CARDIAC   (positive) AF (atrial fibrillation) (HCC)   (positive) CHF (congestive heart failure) (HCC)   (positive) Cardiac arrest (HCC)   (positive) Chronic combined systolic and diastolic congestive heart failure (HCC)   (positive) Coronary artery disease involving native coronary artery of native heart without angina pectoris, s/p 1V CABG 1992   (positive) Essential hypertension   (positive) Mild aortic stenosis   (positive) Moderate to severe pulmonary hypertension (HCC)      GI   (positive) Gastroesophageal reflux disease without esophagitis         (positive) CKD (chronic kidney disease) stage 4, GFR 15-29 ml/min (HCC)   (positive) Hepatic steatosis      ENDO   (positive) Hypothyroid       Physical Exam    Airway   Mallampati: II  TM distance: >3 FB  Neck ROM: full       Cardiovascular - normal exam  Rhythm: regular  Rate: normal  (-) murmur     Dental - normal exam           Pulmonary - normal exam  Breath sounds clear to auscultation     Abdominal    Neurological - normal exam                 Anesthesia Plan    ASA 3   ASA physical status 3 criteria: CAD/stents (> 3 months) and COPD - poorly controlled    Plan - general       Airway plan will be natural airway          Induction: intravenous    Postoperative Plan: Postoperative administration of opioids is intended.    Pertinent diagnostic labs and testing reviewed    Informed Consent:    Anesthetic plan and risks discussed with patient.    Use of blood products discussed with: patient whom  consented to blood products.

## 2023-09-07 NOTE — OR NURSING
Patient arrived to PACU from cath lab in stable condition.   Report received at 1131.   VSS and initial assessment complete. Awake and denies pain or nausea.  1215 pt stable and ready for dc home.    1217 Instructions reviewed with pt and family via phone and all questions answered. Provided with paper copy of instructions.    1220 PIV removed, pt tolerated well.    DC home with family in stable condition consistent with preop status.

## 2023-09-07 NOTE — DISCHARGE INSTRUCTIONS
If any questions arise, call your provider.  If your provider is not available, please feel free to call the Surgical [facility] at [phone].    MEDICATIONS: Resume taking daily medication.  Take prescribed pain medication with food.  If no medication is prescribed, you may take non-aspirin pain medication if needed.  PAIN MEDICATION CAN BE VERY CONSTIPATING.  Take a stool softener or laxative such as senokot, pericolace, or milk of magnesia if needed.    What to Expect Post Anesthesia    Rest and take it easy for the first 24 hours.  A responsible adult is recommended to remain with you during that time.  It is normal to feel sleepy.  We encourage you to not do anything that requires balance, judgment or coordination.    FOR 24 HOURS DO NOT:  Drive, operate machinery or run household appliances.  Drink beer or alcoholic beverages.  Make important decisions or sign legal documents.    To avoid nausea, slowly advance diet as tolerated, avoiding spicy or greasy foods for the first day.  Add more substantial food to your diet according to your provider's instructions.  Babies can be fed formula or breast milk as soon as they are hungry.  INCREASE FLUIDS AND FIBER TO AVOID CONSTIPATION.    MILD FLU-LIKE SYMPTOMS ARE NORMAL.  YOU MAY EXPERIENCE GENERALIZED MUSCLE ACHES, THROAT IRRITATION, HEADACHE AND/OR SOME NAUSEA.

## 2023-09-07 NOTE — H&P
CC: ERYN    HPI:    83 year old woman with PMH CAD / CABG, MR s/p bhavana clip and watchman c/b tamponade presents for elective eryn. No active cardiac complaints. Compliant with medicaitons.    Medications / Drug list prior to admission:  No current facility-administered medications on file prior to encounter.     Current Outpatient Medications on File Prior to Encounter   Medication Sig Dispense Refill    bisoprolol (ZEBETA) 10 MG tablet Take 1 Tablet by mouth every day. (Patient taking differently: Take 10 mg by mouth every morning.) 90 Tablet 4    Vitamins-Lipotropics (MULTI-VITAMIN HP/MINERALS) Cap Take 1 Capsule by mouth every evening.      pantoprazole (PROTONIX) 40 MG Tablet Delayed Response Take 1 Tablet by mouth 2 times a day. 180 Tablet 3    losartan (COZAAR) 25 MG Tab Take 1 Tablet by mouth every evening. 100 Tablet 3    Acetaminophen (TYLENOL PO) Take 2 Tablets by mouth 2 times a day as needed (For pain).      furosemide (LASIX) 40 MG Tab Take 1 Tablet by mouth every day. (Patient taking differently: Take 40 mg by mouth every morning.) 90 Tablet 3    atorvastatin (LIPITOR) 40 MG Tab TAKE 1 TABLET BY MOUTH AT  BEDTIME (Patient taking differently: Take 40 mg by mouth every evening. TAKE 1 TABLET BY MOUTH AT  BEDTIME) 100 Tablet 3    Secukinumab (COSENTYX SENSOREADY PEN) 150 MG/ML Solution Auto-injector Inject 300mg Sub Cut once monthly (Patient not taking: Reported on 9/6/2023) 1.96 mL 6       Current list of administered Medications:  No current facility-administered medications for this encounter.    Past Medical History:   Diagnosis Date    Apnea, sleep     cpap    Atrial fibrillation (HCC)     Breath shortness     Cataract     fede IOL    CKD (chronic kidney disease) stage 4, GFR 15-29 ml/min (HCC)     Congestive heart failure (HCC)     Dental disorder     full dentures    Gasping for breath     Heart attack (HCC) 1992    Heart murmur     Hemorrhagic disorder (HCC)     takes warfarin    High cholesterol      Hypertension     Iron deficiency anemia     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) (HCC) 03/25/2021    Malignant melanoma of left upper extremity including shoulder (HCC) 06/08/2020 2015    Moderate tricuspid regurgitation 11/16/2022    Pneumonia 2019    Snoring     Thyroid disease        Past Surgical History:   Procedure Laterality Date    TRANSCATHETER MITRAL VALVE REPAIR N/A 4/17/2023    Procedure: TRANSCATHETER MITRAL VALVE PROCEDURE;  Surgeon: Cesar Gray M.D.;  Location: SURGERY [facility];  Service: Cardiac    ECHOCARDIOGRAM, TRANSESOPHAGEAL, INTRAOPERATIVE N/A 4/17/2023    Procedure: ECHOCARDIOGRAM, TRANSESOPHAGEAL, INTRAOPERATIVE;  Surgeon: Cesar Gray M.D.;  Location: SURGERY [facility];  Service: Cardiac    IA COLONOSCOPY,DIAGNOSTIC N/A 1/24/2023    Procedure: COLONOSCOPY;  Surgeon: Amy Zarate M.D.;  Location: SURGERY SAME DAY [facility];  Service: Gastroenterology    IA UPPER GI ENDOSCOPY,DIAGNOSIS N/A 1/24/2023    Procedure: GASTROSCOPY;  Surgeon: Amy Zarate M.D.;  Location: SURGERY SAME DAY [facility];  Service: Gastroenterology    IA UPPER GI ENDOSCOPY,BIOPSY N/A 1/24/2023    Procedure: GASTROSCOPY, WITH BIOPSY;  Surgeon: Amy Zarate M.D.;  Location: SURGERY SAME DAY [facility];  Service: Gastroenterology    CHOLECYSTECTOMY      EYE SURGERY Bilateral     cataracts    MULTIPLE CORONARY ARTERY BYPASS      1 bypass    THYROIDECTOMY TOTAL         Family History   Problem Relation Age of Onset    Cancer Mother         cancer, smoker    Stroke Maternal Grandmother     Other Other         Crohn    Kidney Disease Other         kidney transplant     Patient family history was personally reviewed, no pertinent family history to current presentation    Social History     Socioeconomic History    Marital status:      Spouse name: Not on file    Number of children: Not on file    Years of education: Not on file    Highest education level:  Not on file   Occupational History     Comment: [employer]   Tobacco Use    Smoking status: Former     Current packs/day: 0.00     Average packs/day: 1 pack/day for 31.0 years (31.0 ttl pk-yrs)     Types: Cigarettes     Start date: 5/10/1961     Quit date: 5/10/1992     Years since quittin.3    Smokeless tobacco: Never    Tobacco comments:     Started smoking of  do not remember month or day   Vaping Use    Vaping Use: Never used   Substance and Sexual Activity    Alcohol use: No    Drug use: No    Sexual activity: Not Currently     Partners: Male   Other Topics Concern    Not on file   Social History Narrative    Not on file     Social Determinants of Health     Financial Resource Strain: Low Risk  (2022)    Overall Financial Resource Strain (CARDIA)     Difficulty of Paying Living Expenses: Not hard at all   Food Insecurity: No Food Insecurity (2022)    Hunger Vital Sign     Worried About Running Out of Food in the Last Year: Never true     Ran Out of Food in the Last Year: Never true   Transportation Needs: No Transportation Needs (2022)    PRAPARE - Transportation     Lack of Transportation (Medical): No     Lack of Transportation (Non-Medical): No   Physical Activity: Inactive (2022)    Exercise Vital Sign     Days of Exercise per Week: 0 days     Minutes of Exercise per Session: 0 min   Stress: No Stress Concern Present (2022)    [place] of [employer]ational [employer] - Occupational Stress Questionnaire     Feeling of Stress : Not at all   Social Connections: Socially Isolated (2022)    Social Connection and Isolation Panel [NHANES]     Frequency of Communication with Friends and Family: More than three times a week     Frequency of Social Gatherings with Friends and Family: Once a week     Attends Anglican Services: Never     Active Member of Clubs or Organizations: No     Attends Club or Organization Meetings: Never     Marital Status:    Intimate  "Partner Violence: Not At Risk (11/11/2022)    Humiliation, Afraid, Rape, and Kick questionnaire     Fear of Current or Ex-Partner: No     Emotionally Abused: No     Physically Abused: No     Sexually Abused: No   Housing Stability: Low Risk  (11/11/2022)    Housing Stability Vital Sign     Unable to Pay for Housing in the Last Year: No     Number of Places Lived in the Last Year: 1     Unstable Housing in the Last Year: No       ALLERGIES:  Allergies   Allergen Reactions    Morphine Vomiting       Review of systems:  A complete review of symptoms was reviewed with patient. This is reviewed in H&P and PMH. ALL OTHERS reviewed and negative    Physical exam:  Patient Vitals for the past 24 hrs:   BP Temp Temp src Pulse Resp SpO2 Height Weight   09/07/23 0930 121/56 36.2 °C (97.2 °F) Temporal 71 20 94 % 1.588 m (5' 2.5\") 92.9 kg (204 lb 12.9 oz)     General: No acute distress.   EYES: no jaundice  HEENT: OP clear   Neck: No bruits No JVD.   CVS: RRR. S1 + S2. No M/R/G. No edema.  Resp: CTAB. No wheezing or crackles/rhonchi.  Abdomen: Soft, NT, ND,  Skin: Grossly nothing acute no obvious rashes  Neurological: Alert, Moves all extremities, no cranial nerve defects on limited exam  Extremities: Pulse 2+ in b/l LE. No cyanosis.     Data:  Laboratory studies personally reviewed by me:  No results found for this or any previous visit (from the past 24 hour(s)).    All pertinent features of laboratory and imaging reviewed including primary images where applicable      Active Problems:    * No active hospital problems. *  Resolved Problems:    * No resolved hospital problems. *      Assessment / Plan:  83 year old woman with PMH CAD / CABG, MR s/p bhavana clip and watchman c/b tamponade presents for elective eryn.     ERYN    It is my pleasure to participate in the care of Ms. Crooks.  Please do not hesitate to contact me with questions or concerns.    John Tubbs MD  Cardiologist [facility]

## 2023-09-12 ENCOUNTER — OUTPATIENT INFUSION SERVICES (OUTPATIENT)
Dept: ONCOLOGY | Facility: MEDICAL CENTER | Age: 83
End: 2023-09-12
Attending: INTERNAL MEDICINE
Payer: MEDICARE

## 2023-09-12 ENCOUNTER — ANTICOAGULATION MONITORING (OUTPATIENT)
Dept: VASCULAR LAB | Facility: MEDICAL CENTER | Age: 83
End: 2023-09-12

## 2023-09-12 ENCOUNTER — OFFICE VISIT (OUTPATIENT)
Dept: CARDIOLOGY | Facility: MEDICAL CENTER | Age: 83
End: 2023-09-12
Attending: NURSE PRACTITIONER
Payer: MEDICARE

## 2023-09-12 VITALS
WEIGHT: 207 LBS | RESPIRATION RATE: 16 BRPM | SYSTOLIC BLOOD PRESSURE: 112 MMHG | DIASTOLIC BLOOD PRESSURE: 62 MMHG | OXYGEN SATURATION: 97 % | HEIGHT: 63 IN | HEART RATE: 68 BPM | BODY MASS INDEX: 36.68 KG/M2

## 2023-09-12 VITALS
TEMPERATURE: 97.6 F | DIASTOLIC BLOOD PRESSURE: 61 MMHG | SYSTOLIC BLOOD PRESSURE: 107 MMHG | OXYGEN SATURATION: 93 % | HEIGHT: 61 IN | BODY MASS INDEX: 38.88 KG/M2 | RESPIRATION RATE: 16 BRPM | WEIGHT: 205.91 LBS | HEART RATE: 63 BPM

## 2023-09-12 DIAGNOSIS — Z95.818 PRESENCE OF WATCHMAN LEFT ATRIAL APPENDAGE CLOSURE DEVICE: Primary | ICD-10-CM

## 2023-09-12 DIAGNOSIS — I48.11 LONGSTANDING PERSISTENT ATRIAL FIBRILLATION (HCC): ICD-10-CM

## 2023-09-12 DIAGNOSIS — D68.69 SECONDARY HYPERCOAGULABLE STATE (HCC): ICD-10-CM

## 2023-09-12 DIAGNOSIS — D63.8 ANEMIA OF CHRONIC DISEASE: ICD-10-CM

## 2023-09-12 LAB
HCT VFR BLD AUTO: 33.2 % (ref 37–47)
HGB BLD-MCNC: 10.5 G/DL (ref 12–16)

## 2023-09-12 PROCEDURE — 85014 HEMATOCRIT: CPT

## 2023-09-12 PROCEDURE — 99213 OFFICE O/P EST LOW 20 MIN: CPT | Performed by: NURSE PRACTITIONER

## 2023-09-12 PROCEDURE — 36415 COLL VENOUS BLD VENIPUNCTURE: CPT

## 2023-09-12 PROCEDURE — 3074F SYST BP LT 130 MM HG: CPT | Performed by: NURSE PRACTITIONER

## 2023-09-12 PROCEDURE — 85018 HEMOGLOBIN: CPT

## 2023-09-12 PROCEDURE — 3078F DIAST BP <80 MM HG: CPT | Performed by: NURSE PRACTITIONER

## 2023-09-12 PROCEDURE — 93005 ELECTROCARDIOGRAM TRACING: CPT | Performed by: NURSE PRACTITIONER

## 2023-09-12 RX ORDER — 0.9 % SODIUM CHLORIDE 0.9 %
10 VIAL (ML) INJECTION PRN
Status: CANCELLED | OUTPATIENT
Start: 2023-09-18

## 2023-09-12 RX ORDER — ASPIRIN 81 MG/1
81 TABLET ORAL DAILY
Qty: 100 TABLET | Refills: 3 | Status: SHIPPED | OUTPATIENT
Start: 2023-09-12

## 2023-09-12 RX ORDER — 0.9 % SODIUM CHLORIDE 0.9 %
VIAL (ML) INJECTION PRN
Status: CANCELLED | OUTPATIENT
Start: 2023-09-18

## 2023-09-12 RX ORDER — 0.9 % SODIUM CHLORIDE 0.9 %
3 VIAL (ML) INJECTION PRN
Status: CANCELLED | OUTPATIENT
Start: 2023-09-18

## 2023-09-12 ASSESSMENT — ENCOUNTER SYMPTOMS
SHORTNESS OF BREATH: 0
DIZZINESS: 0
CONSTITUTIONAL NEGATIVE: 1
PALPITATIONS: 0
HALLUCINATIONS: 0
PND: 0
SENSORY CHANGE: 0
EYES NEGATIVE: 1
ORTHOPNEA: 0
RESPIRATORY NEGATIVE: 1
GASTROINTESTINAL NEGATIVE: 1
NAUSEA: 0
BRUISES/BLEEDS EASILY: 0
DEPRESSION: 0
WHEEZING: 0
CLAUDICATION: 0
NEUROLOGICAL NEGATIVE: 1
NERVOUS/ANXIOUS: 0
MUSCULOSKELETAL NEGATIVE: 1

## 2023-09-12 ASSESSMENT — FIBROSIS 4 INDEX
FIB4 SCORE: 1.909188309203678316
FIB4 SCORE: 1.909188309203678316

## 2023-09-12 NOTE — PROGRESS NOTES
Electrophysiology Follow up  Note    DOS: 9/12/2023  [id number]  Aleshia Crooks    Chief complaint/Reason for consult: post Watchman    HPI: Pt is a 83 y.o. female who presents to the clinic today in follow up for post Watchman GOMEZ for healing assessment. Patient has a past medical history significant for but not limited to: chronic combined systolic/diastolic heart failure, hypertension, S/P mitral clip, moderate to severe pulmonary hypertension, CAD S/P CABGx1 1992, SHAKA on CPAP, CKD stage 4, anemia, h/o GI bleed, atrial fibrillation. Patient underwent Watchman left atrial appendage closure device implantation on 7/26. Post operatively experienced asystole cardiac arrest with ROSC achieved quickly and post procedure (6 hrs) patient developed a effusion which required pericardiocentesis with likely etiology micro-perforation of Watchman anchors. 300cc was removed. Patient feeling better today and we went through the Watchman and its healing.       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) 06/08/2020 2015    Moderate tricuspid regurgitation 11/16/2022    Pneumonia 2019    Snoring     Thyroid disease        Past Surgical History:   Procedure Laterality Date    TRANSCATHETER MITRAL VALVE REPAIR N/A 4/17/2023    Procedure: TRANSCATHETER MITRAL VALVE PROCEDURE;  Surgeon: Cesar Gray M.D.;  Location: SURGERY [facility];  Service: Cardiac     ECHOCARDIOGRAM, TRANSESOPHAGEAL, INTRAOPERATIVE N/A 2023    Procedure: ECHOCARDIOGRAM, TRANSESOPHAGEAL, INTRAOPERATIVE;  Surgeon: Cesar Gray M.D.;  Location: SURGERY [facility];  Service: Cardiac    HI COLONOSCOPY,DIAGNOSTIC N/A 2023    Procedure: COLONOSCOPY;  Surgeon: Amy Zarate M.D.;  Location: SURGERY SAME DAY [facility];  Service: Gastroenterology    HI UPPER GI ENDOSCOPY,DIAGNOSIS N/A 2023    Procedure: GASTROSCOPY;  Surgeon: Amy Zarate M.D.;  Location: SURGERY SAME DAY [facility];  Service: Gastroenterology    HI UPPER GI ENDOSCOPY,BIOPSY N/A 2023    Procedure: GASTROSCOPY, WITH BIOPSY;  Surgeon: Amy Zarate M.D.;  Location: SURGERY SAME DAY [facility];  Service: Gastroenterology    CHOLECYSTECTOMY      EYE SURGERY Bilateral     cataracts    MULTIPLE CORONARY ARTERY BYPASS      1 bypass    THYROIDECTOMY TOTAL         Social History     Socioeconomic History    Marital status:      Spouse name: Not on file    Number of children: Not on file    Years of education: Not on file    Highest education level: Not on file   Occupational History     Comment: [employer]   Tobacco Use    Smoking status: Former     Current packs/day: 0.00     Average packs/day: 1 pack/day for 31.0 years (31.0 ttl pk-yrs)     Types: Cigarettes     Start date: 5/10/1961     Quit date: 5/10/1992     Years since quittin.3    Smokeless tobacco: Never    Tobacco comments:     Started smoking of  do not remember month or day   Vaping Use    Vaping Use: Never used   Substance and Sexual Activity    Alcohol use: No    Drug use: No    Sexual activity: Not Currently     Partners: Male   Other Topics Concern    Not on file   Social History Narrative    Not on file     Social Determinants of Health     Financial Resource Strain: Low Risk  (2022)    Overall Financial Resource Strain (CARDIA)     Difficulty of Paying Living Expenses: Not hard at all   Food Insecurity: No Food Insecurity  (11/11/2022)    Hunger Vital Sign     Worried About Running Out of Food in the Last Year: Never true     Ran Out of Food in the Last Year: Never true   Transportation Needs: No Transportation Needs (11/11/2022)    PRAPARE - Transportation     Lack of Transportation (Medical): No     Lack of Transportation (Non-Medical): No   Physical Activity: Inactive (11/11/2022)    Exercise Vital Sign     Days of Exercise per Week: 0 days     Minutes of Exercise per Session: 0 min   Stress: No Stress Concern Present (11/11/2022)    [place]izen [place] of Occupational Health - Occupational Stress Questionnaire     Feeling of Stress : Not at all   Social Connections: Socially Isolated (11/11/2022)    Social Connection and Isolation Panel [NHANES]     Frequency of Communication with Friends and Family: More than three times a week     Frequency of Social Gatherings with Friends and Family: Once a week     Attends Jew Services: Never     Active Member of Clubs or Organizations: No     Attends Club or Organization Meetings: Never     Marital Status:    Intimate Partner Violence: Not At Risk (11/11/2022)    Humiliation, Afraid, Rape, and Kick questionnaire     Fear of Current or Ex-Partner: No     Emotionally Abused: No     Physically Abused: No     Sexually Abused: No   Housing Stability: Low Risk  (11/11/2022)    Housing Stability Vital Sign     Unable to Pay for Housing in the Last Year: No     Number of Places Lived in the Last Year: 1     Unstable Housing in the Last Year: No       Family History   Problem Relation Age of Onset    Cancer Mother         cancer, smoker    Stroke Maternal Grandmother     Other Other         Crohn    Kidney Disease Other         kidney transplant       Allergies   Allergen Reactions    Morphine Vomiting       Current Outpatient Medications   Medication Sig Dispense Refill    aspirin 81 MG EC tablet Take 1 Tablet by mouth every day. 100 Tablet 3    Cholecalciferol (VITAMIN D) 125 MCG (5000  UT) Cap Take 1 Tablet by mouth every morning.      LEVOXYL 175 MCG Tab Take 1 Tablet by mouth every morning on an empty stomach. 90 Tablet 3    ferrous sulfate 325 (65 Fe) MG tablet Take 1 Tablet by mouth every day. (Patient taking differently: Take 325 mg by mouth 1/2 hour after lunch.) 90 Tablet 2    CINNAMON PO Take 1 Tablet by mouth 2 times a day.      bisoprolol (ZEBETA) 10 MG tablet Take 1 Tablet by mouth every day. (Patient taking differently: Take 10 mg by mouth every morning.) 90 Tablet 4    Vitamins-Lipotropics (MULTI-VITAMIN HP/MINERALS) Cap Take 1 Capsule by mouth every evening.      pantoprazole (PROTONIX) 40 MG Tablet Delayed Response Take 1 Tablet by mouth 2 times a day. 180 Tablet 3    losartan (COZAAR) 25 MG Tab Take 1 Tablet by mouth every evening. 100 Tablet 3    Acetaminophen (TYLENOL PO) Take 2 Tablets by mouth 2 times a day as needed (For pain).      furosemide (LASIX) 40 MG Tab Take 1 Tablet by mouth every day. (Patient taking differently: Take 40 mg by mouth every morning.) 90 Tablet 3    atorvastatin (LIPITOR) 40 MG Tab TAKE 1 TABLET BY MOUTH AT  BEDTIME (Patient taking differently: Take 40 mg by mouth every evening. TAKE 1 TABLET BY MOUTH AT  BEDTIME) 100 Tablet 3    Secukinumab (COSENTYX SENSOREADY PEN) 150 MG/ML Solution Auto-injector Inject 300mg Sub Cut once monthly (Patient not taking: Reported on 9/12/2023) 1.96 mL 5    lidocaine (LIDODERM) 5 % Patch Place 1 Patch on the skin every 24 hours. (Patient not taking: Reported on 9/6/2023) 10 Patch 0     No current facility-administered medications for this visit.       Vitals:    09/12/23 0934   BP: 112/62   Pulse: 68   Resp: 16   SpO2: 97%         Review of Systems   Constitutional: Negative.  Negative for malaise/fatigue.   HENT: Negative.     Eyes: Negative.    Respiratory: Negative.  Negative for shortness of breath and wheezing.    Cardiovascular:  Negative for chest pain, palpitations, orthopnea, claudication, leg swelling and PND.    Gastrointestinal: Negative.  Negative for nausea.   Genitourinary: Negative.    Musculoskeletal: Negative.    Skin: Negative.    Neurological: Negative.  Negative for dizziness and sensory change.   Endo/Heme/Allergies: Negative.  Does not bruise/bleed easily.   Psychiatric/Behavioral:  Negative for depression and hallucinations. The patient is not nervous/anxious.         Prior echo/stress results reviewed: GOMEZ 9/7 well seated Watchman and no evidence of neelam-device leak        EKG interpreted by me: Atrial Fibrillation with controlled rate     Physical Exam  Constitutional:       Appearance: Normal appearance.   HENT:      Head: Normocephalic.   Eyes:      Pupils: Pupils are equal, round, and reactive to light.   Neck:      Vascular: No JVD.   Cardiovascular:      Rate and Rhythm: Normal rate. Rhythm irregular.      Pulses: Normal pulses.      Heart sounds: Normal heart sounds.   Pulmonary:      Effort: Pulmonary effort is normal.      Breath sounds: Normal breath sounds.   Abdominal:      General: Abdomen is flat.      Palpations: Abdomen is soft.   Musculoskeletal:      Cervical back: Normal range of motion.      Right lower leg: No edema.      Left lower leg: No edema.   Skin:     General: Skin is warm and dry.   Neurological:      Mental Status: She is alert and oriented to person, place, and time.   Psychiatric:         Mood and Affect: Mood normal.         Behavior: Behavior normal.          Data:  Lipids:   Lab Results   Component Value Date/Time    CHOLSTRLTOT 142 11/17/2022 09:36 AM    TRIGLYCERIDE 102 11/17/2022 09:36 AM    HDL 34 (A) 11/17/2022 09:36 AM    LDL 88 11/17/2022 09:36 AM        BMP:  Lab Results   Component Value Date/Time    SODIUM 139 08/31/2023 0844    POTASSIUM 3.3 (L) 08/31/2023 0844    CHLORIDE 103 08/31/2023 0844    CO2 24 08/31/2023 0844    GLUCOSE 104 (H) 08/31/2023 0844    BUN 34 (H) 08/31/2023 0844    CREATININE 1.77 (H) 08/31/2023 0844    CALCIUM 9.5 08/31/2023 0844    ANION  "12.0 08/31/2023 0844       GFR:  Lab Results   Component Value Date/Time    IFAFRICA 36 (A) 11/02/2021 0911    IFNOTAFR 30 (A) 11/02/2021 0911        TSH:   Lab Results   Component Value Date/Time    TSHULTRASEN 14.600 (H) 08/31/2023 0843       MAGNESIUM:  Lab Results   Component Value Date/Time    MAGNESIUM 2.6 (H) 07/26/2023 2130    MAGNESIUM 1.6 02/19/2023 0051    MAGNESIUM 2.0 02/17/2023 0322        THYROXINE (T4):   No results found for: \"FREEDIR\"     CBC:   Lab Results   Component Value Date/Time    WBC 5.6 08/31/2023 08:40 AM    RBC 3.57 (L) 08/31/2023 08:40 AM    HEMOGLOBIN 10.3 (L) 09/05/2023 05:00 PM    HEMATOCRIT 33.8 (L) 09/05/2023 05:00 PM    MCV 93.3 08/31/2023 08:40 AM    MCH 29.1 08/31/2023 08:40 AM    MCHC 31.2 (L) 08/31/2023 08:40 AM    RDW 63.3 (H) 08/31/2023 08:40 AM    PLATELETCT 220 08/31/2023 08:40 AM    MPV 11.6 08/31/2023 08:40 AM    NEUTSPOLYS 83.60 (H) 07/27/2023 03:55 PM    LYMPHOCYTES 4.60 (L) 07/27/2023 03:55 PM    MONOCYTES 11.20 07/27/2023 03:55 PM    EOSINOPHILS 0.00 07/27/2023 03:55 PM    BASOPHILS 0.10 07/27/2023 03:55 PM    IMMGRAN 0.50 07/27/2023 03:55 PM    NRBC 0.20 07/27/2023 03:55 PM    NEUTS 10.50 (H) 07/27/2023 03:55 PM    LYMPHS 0.58 (L) 07/27/2023 03:55 PM    MONOS 1.40 (H) 07/27/2023 03:55 PM    EOS 0.00 07/27/2023 03:55 PM    BASO 0.01 07/27/2023 03:55 PM    IMMGRANAB 0.06 07/27/2023 03:55 PM    NRBCAB 0.02 07/27/2023 03:55 PM        CBC w/o DIFF  Lab Results   Component Value Date/Time    WBC 5.6 08/31/2023 08:40 AM    RBC 3.57 (L) 08/31/2023 08:40 AM    HEMOGLOBIN 10.3 (L) 09/05/2023 05:00 PM    MCV 93.3 08/31/2023 08:40 AM    MCH 29.1 08/31/2023 08:40 AM    MCHC 31.2 (L) 08/31/2023 08:40 AM    RDW 63.3 (H) 08/31/2023 08:40 AM    MPV 11.6 08/31/2023 08:40 AM       LIVER:  Lab Results   Component Value Date/Time    ALKPHOSPHAT 101 (H) 07/24/2023 06:33 PM    ASTSGOT 22 07/24/2023 06:33 PM    ALTSGPT 18 07/24/2023 06:33 PM    TBILIRUBIN 0.6 07/24/2023 06:33 PM "       BNP:  Lab Results   Component Value Date/Time    BNPBTYPENAT 499 (H) 07/13/2017 03:01 AM       PT/INR:  Lab Results   Component Value Date/Time    PROTHROMBTM 24.1 (H) 09/07/2023 10:25 AM    PROTHROMBTM 42.1 (H) 07/29/2023 03:00 AM    PROTHROMBTM 45.8 (H) 07/28/2023 08:40 AM    INR 2.12 (H) 09/07/2023 10:25 AM    INR 2.90 09/05/2023 12:00 AM    INR 2.10 08/28/2023 12:00 AM             Impression/Plan:  1. Presence of Watchman left atrial appendage closure device  aspirin 81 MG EC tablet      2. Longstanding persistent atrial fibrillation (HCC)  EKG       - can stop warfarin at this time and restart 81mg aspirin long term    - Watchman well seated and no evidence of neelam-device leak   - bisoprolol will continue to provide rate control for atrial fibrillation   - discussed device healing and long term AF care and concerns   - has upcoming echocardiograms and appts with structural heart team to discuss worsening mitral and tricuspid regurgitation         A total of 28 minutes of time was spent on day of encounter reviewing medical record, performing history and examination, counseling, ordering medication/test/consults, collaborating with referring service, and documentation.    Darrius Hess [facility]-EP  Cardiac Electrophysiology

## 2023-09-12 NOTE — PROGRESS NOTES
Discharged from [facility] as pt has completed OAC therapy s/p Watchman per cardiology  Nedra Donald, PharmD

## 2023-09-13 ENCOUNTER — TELEPHONE (OUTPATIENT)
Dept: CARDIOLOGY | Facility: MEDICAL CENTER | Age: 83
End: 2023-09-13
Payer: MEDICARE

## 2023-09-13 DIAGNOSIS — I35.0 NONRHEUMATIC AORTIC VALVE STENOSIS: ICD-10-CM

## 2023-09-13 DIAGNOSIS — Z01.810 PRE-PROCEDURAL CARDIOVASCULAR EXAMINATION: ICD-10-CM

## 2023-09-13 NOTE — PROGRESS NOTES
Pt presents to [facility] for epoetin injection. Butterfly needle used in RAC; brisk blood return observed and pt tolerated well. Labs drawn and not within treatable parameters. Next appointment confirmed and education provided. Pt discharged to self care with all personal belongings and in NAD.

## 2023-09-13 NOTE — TELEPHONE ENCOUNTER
Reviewed patient's GOMEZ in valve conference today. Recommend patient have a TAVR CTA with AV calcium scoring. If scoring is high, recommend TAVR pathway.

## 2023-09-14 LAB — EKG IMPRESSION: NORMAL

## 2023-09-14 PROCEDURE — 93010 ELECTROCARDIOGRAM REPORT: CPT | Performed by: INTERNAL MEDICINE

## 2023-09-14 NOTE — TELEPHONE ENCOUNTER
Called patient to discuss MD recommendations. She is agreeable to the plan. When I went to schedule the TAVR CTA, realized patient's GFR is <30, so they will not administer contrast. Advised the patient I would reach out to Dr. Gray to advise.

## 2023-09-14 NOTE — TELEPHONE ENCOUNTER
Cesar Gray M.D.  You 33 minutes ago (1:33 PM)     We can do non-contrast, they ca still get calcium score      You  Cesar Gray M.D. 1 hour ago (12:13 PM)     I went to schedule patient's TAVR CTA as we recommended after conference yesterday, but her GFR is <30 and they won't give contrast with a GFR this low. Please advise what you'd like to do.    ------------------------------------------------------------------------------  Called patient and scheduled next available TAVR CTA.

## 2023-09-15 ENCOUNTER — OFFICE VISIT (OUTPATIENT)
Dept: NEPHROLOGY | Facility: MEDICAL CENTER | Age: 83
End: 2023-09-15
Payer: MEDICARE

## 2023-09-15 VITALS
HEIGHT: 62 IN | BODY MASS INDEX: 36.8 KG/M2 | HEART RATE: 64 BPM | WEIGHT: 200 LBS | SYSTOLIC BLOOD PRESSURE: 118 MMHG | RESPIRATION RATE: 18 BRPM | TEMPERATURE: 97.7 F | OXYGEN SATURATION: 95 % | DIASTOLIC BLOOD PRESSURE: 78 MMHG

## 2023-09-15 DIAGNOSIS — I50.42 CHRONIC COMBINED SYSTOLIC AND DIASTOLIC CONGESTIVE HEART FAILURE (HCC): Chronic | ICD-10-CM

## 2023-09-15 DIAGNOSIS — N18.4 CKD (CHRONIC KIDNEY DISEASE) STAGE 4, GFR 15-29 ML/MIN (HCC): ICD-10-CM

## 2023-09-15 DIAGNOSIS — I10 ESSENTIAL HYPERTENSION: Chronic | ICD-10-CM

## 2023-09-15 DIAGNOSIS — Z87.440 HISTORY OF UTI: ICD-10-CM

## 2023-09-15 DIAGNOSIS — D63.8 ANEMIA OF CHRONIC DISEASE: ICD-10-CM

## 2023-09-15 PROBLEM — R57.0 CARDIOGENIC SHOCK (HCC): Status: RESOLVED | Noted: 2023-07-27 | Resolved: 2023-09-15

## 2023-09-15 PROBLEM — E87.5 HYPERKALEMIA: Status: RESOLVED | Noted: 2023-07-27 | Resolved: 2023-09-15

## 2023-09-15 PROBLEM — I50.9 CHF (CONGESTIVE HEART FAILURE) (HCC): Status: RESOLVED | Noted: 2023-07-27 | Resolved: 2023-09-15

## 2023-09-15 PROCEDURE — 3074F SYST BP LT 130 MM HG: CPT | Performed by: INTERNAL MEDICINE

## 2023-09-15 PROCEDURE — 99214 OFFICE O/P EST MOD 30 MIN: CPT | Performed by: INTERNAL MEDICINE

## 2023-09-15 PROCEDURE — 3078F DIAST BP <80 MM HG: CPT | Performed by: INTERNAL MEDICINE

## 2023-09-15 ASSESSMENT — ENCOUNTER SYMPTOMS
ABDOMINAL PAIN: 0
FEVER: 0
SHORTNESS OF BREATH: 1

## 2023-09-15 ASSESSMENT — FIBROSIS 4 INDEX: FIB4 SCORE: 1.909188309203678316

## 2023-09-15 NOTE — PROGRESS NOTES
"Chief Complaint   Patient presents with    Follow-Up     CKD (chronic kidney disease) stage 4, GFR 15-29 ml/min ([facility]), [facility]       CC: f/u CKD    HPI:  Aleshia Crooks is a 83 y.o. female with HTN, Psoriasis on immunosuppression, [id number], severe mitral regurgitation and valvular heart failure status post MitraClip April 2023, Afib s/p Watchman 7/26/23 c/b cardiac arrest and cardiac tamponade, who presents today for follow up.    Re: HTN, diagnosed 1992 when she had a heart attack. BP control over the years has been well controlled. She checks BP at home, usually 100's / 60's. Denies Light-headedness.     Re: CHF, noted in 2017 and 2020. She had mitraclip in 4/2023. She feels like the mitraclip helped her breathing. She thinks her LE edema is starting to get better. She remains on lasix 40mg (and she feels diuretic effect).     Re: CKD, notably worsening in the last year. Her appetite is \"too good.\" Denies headaches, N/V, metallic taste. Denies NSAIDs, takes Tylenol PRN.     Re: Anemia, she needed some blood transfusions in 3/2023. She had c-scope in 1/2023 with diverticulosis, and EGD with nodular mucosa of the stomach. Energy level is \"gradually getting better.\" She has needed some retacrit shots, but none in the last month. She is off warfarin now because she had the Watchman procedure. She had iron infusions in 8/2023.       Past Medical History:   Diagnosis Date    Apnea, sleep     cpap    Atrial fibrillation (HCC)     Breath shortness     Cataract     fede IOL    CKD (chronic kidney disease) stage 4, GFR 15-29 ml/min (HCC)     Congestive heart failure (HCC)     Dental disorder     full dentures    Gasping for breath     Heart attack (HCC) 1992    Heart murmur     Hemorrhagic disorder (HCC)     takes warfarin    High cholesterol     Hypertension     Iron deficiency anemia     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) (HCC) 03/25/2021    Malignant melanoma of left upper extremity " including shoulder (HCC) 06/08/2020 2015    Moderate tricuspid regurgitation 11/16/2022    Pneumonia 2019    Snoring     Thyroid disease        Past Surgical History:   Procedure Laterality Date    TRANSCATHETER MITRAL VALVE REPAIR N/A 4/17/2023    Procedure: TRANSCATHETER MITRAL VALVE PROCEDURE;  Surgeon: Cesar Gray M.D.;  Location: SURGERY [facility];  Service: Cardiac    ECHOCARDIOGRAM, TRANSESOPHAGEAL, INTRAOPERATIVE N/A 4/17/2023    Procedure: ECHOCARDIOGRAM, TRANSESOPHAGEAL, INTRAOPERATIVE;  Surgeon: Cesar Gray M.D.;  Location: SURGERY [facility];  Service: Cardiac    NY COLONOSCOPY,DIAGNOSTIC N/A 1/24/2023    Procedure: COLONOSCOPY;  Surgeon: Amy Zarate M.D.;  Location: SURGERY SAME DAY [facility];  Service: Gastroenterology    NY UPPER GI ENDOSCOPY,DIAGNOSIS N/A 1/24/2023    Procedure: GASTROSCOPY;  Surgeon: Amy Zarate M.D.;  Location: SURGERY SAME DAY [facility];  Service: Gastroenterology    NY UPPER GI ENDOSCOPY,BIOPSY N/A 1/24/2023    Procedure: GASTROSCOPY, WITH BIOPSY;  Surgeon: Amy Zarate M.D.;  Location: SURGERY SAME DAY [facility];  Service: Gastroenterology    CHOLECYSTECTOMY      EYE SURGERY Bilateral     cataracts    MULTIPLE CORONARY ARTERY BYPASS      1 bypass    THYROIDECTOMY TOTAL          Outpatient Encounter Medications as of 9/15/2023   Medication Sig Dispense Refill    aspirin 81 MG EC tablet Take 1 Tablet by mouth every day. 100 Tablet 3    Secukinumab (COSENTYX SENSOREADY PEN) 150 MG/ML Solution Auto-injector Inject 300mg Sub Cut once monthly (Patient taking differently: Inject 300mg Sub Cut once monthly    Hasnt taken yet) 1.96 mL 5    Cholecalciferol (VITAMIN D) 125 MCG (5000 UT) Cap Take 1 Tablet by mouth every morning.      LEVOXYL 175 MCG Tab Take 1 Tablet by mouth every morning on an empty stomach. 90 Tablet 3    ferrous sulfate 325 (65 Fe) MG tablet Take 1 Tablet by mouth every day. (Patient taking differently: Take 325 mg by mouth 1/2 hour  "after lunch.) 90 Tablet 2    CINNAMON PO Take 1 Tablet by mouth 2 times a day.      bisoprolol (ZEBETA) 10 MG tablet Take 1 Tablet by mouth every day. (Patient taking differently: Take 10 mg by mouth every morning.) 90 Tablet 4    Vitamins-Lipotropics (MULTI-VITAMIN HP/MINERALS) Cap Take 1 Capsule by mouth every evening.      pantoprazole (PROTONIX) 40 MG Tablet Delayed Response Take 1 Tablet by mouth 2 times a day. 180 Tablet 3    losartan (COZAAR) 25 MG Tab Take 1 Tablet by mouth every evening. 100 Tablet 3    Acetaminophen (TYLENOL PO) Take 2 Tablets by mouth 2 times a day as needed (For pain).      furosemide (LASIX) 40 MG Tab Take 1 Tablet by mouth every day. (Patient taking differently: Take 40 mg by mouth every morning.) 90 Tablet 3    atorvastatin (LIPITOR) 40 MG Tab TAKE 1 TABLET BY MOUTH AT  BEDTIME (Patient taking differently: Take 40 mg by mouth every evening. TAKE 1 TABLET BY MOUTH AT  BEDTIME) 100 Tablet 3    [DISCONTINUED] lidocaine (LIDODERM) 5 % Patch Place 1 Patch on the skin every 24 hours. (Patient not taking: Reported on 9/6/2023) 10 Patch 0     No facility-administered encounter medications on file as of 9/15/2023.        Allergies   Allergen Reactions    Morphine Vomiting           Family History   Problem Relation Age of Onset    Cancer Mother         cancer, smoker    Stroke Maternal Grandmother     Other Other         Crohn    Kidney Disease Other         kidney transplant       Review of Systems   Constitutional:  Negative for fever.   Respiratory:  Positive for shortness of breath (\"I always have that\").    Cardiovascular:  Positive for leg swelling. Negative for chest pain.   Gastrointestinal:  Negative for abdominal pain.   Genitourinary:  Negative for dysuria.   All other systems reviewed and are negative.      /78 (BP Location: Right arm, Patient Position: Sitting, BP Cuff Size: Adult)   Pulse 64   Temp 36.5 °C (97.7 °F) (Temporal)   Resp 18   Ht 1.575 m (5' 2\")   Wt 90.7 " kg (200 lb)   LMP  (LMP Unknown)   SpO2 95%   BMI 36.58 kg/m²     Physical Exam  Constitutional:       General: She is not in acute distress.     Appearance: She is obese.      Comments: In a power scooter   HENT:      Mouth/Throat:      Pharynx: No oropharyngeal exudate.   Eyes:      General: No scleral icterus.  Neck:      Trachea: No tracheal deviation.   Cardiovascular:      Rate and Rhythm: Normal rate and regular rhythm.      Heart sounds: Murmur heard.   Pulmonary:      Effort: Pulmonary effort is normal.      Breath sounds: Normal breath sounds. No stridor. No rales.   Abdominal:      General: Bowel sounds are normal.      Palpations: Abdomen is soft.      Tenderness: There is no abdominal tenderness.   Musculoskeletal:         General: Normal range of motion.      Cervical back: Neck supple.      Right lower leg: Edema (trace) present.      Left lower leg: Edema (1+) present.   Skin:     General: Skin is warm and dry.      Findings: No rash.   Neurological:      General: No focal deficit present.      Mental Status: She is alert and oriented to person, place, and time.   Psychiatric:         Mood and Affect: Mood and affect normal.         Behavior: Behavior normal.         Labs reviewed.    Recent Labs     11/17/22  0936 01/23/23  1144 02/18/23  0125 02/19/23  0051 02/24/23  1135 04/18/23  0048 04/19/23  0056 07/24/23  1833 07/26/23  2130 07/29/23  0300 08/31/23  0840 08/31/23  0844   ALBUMIN 4.1   < >  --   --    < > 3.9  --  4.5  --   --  4.1  --    HDL 34*  --   --   --   --   --   --   --   --   --   --   --    TRIGLYCERIDE 102  --   --   --   --   --   --   --   --   --   --   --    SODIUM 137   < > 137 138   < > 140   < > 134*   < > 135 142 139   POTASSIUM 4.8   < > 4.2 3.4*   < > 4.6   < > 4.8   < > 5.0 3.5* 3.3*   CHLORIDE 103   < > 99 98   < > 105   < > 97   < > 100 104 103   CO2 23   < > 26 26   < > 17*   < > 20   < > 21 25 24   BUN 35*   < > 23* 22   < > 58*   < > 52*   < > 62* 34* 34*    CREATININE 1.57*   < > 1.96* 2.05*   < > 2.67*   < > 2.61*   < > 2.22* 1.83* 1.77*   PHOSPHORUS  --   --  4.8* 3.9  --   --   --   --   --   --  3.0  --     < > = values in this interval not displayed.       Lab Results   Component Value Date/Time    WBC 5.6 08/31/2023 08:40 AM    RBC 3.57 (L) 08/31/2023 08:40 AM    HEMOGLOBIN 10.5 (L) 09/12/2023 04:55 PM    HEMATOCRIT 33.2 (L) 09/12/2023 04:55 PM    MCV 93.3 08/31/2023 08:40 AM    MCH 29.1 08/31/2023 08:40 AM    MCHC 31.2 (L) 08/31/2023 08:40 AM    MPV 11.6 08/31/2023 08:40 AM       Recent Labs     09/12/23  1655   HEMOGLOBIN 10.5*   HEMATOCRIT 33.2*       URINALYSIS:  Lab Results   Component Value Date/Time    COLORURINE Yellow 08/31/2023 0841    CLARITY Clear 08/31/2023 0841    SPECGRAVITY 1.015 08/31/2023 0841    PHURINE 6.0 08/31/2023 0841    KETONES Negative 08/31/2023 0841    PROTEINURIN Negative 08/31/2023 0841    BILIRUBINUR Negative 08/31/2023 0841    UROBILU 0.2 08/31/2023 0841    NITRITE Negative 08/31/2023 0841    LEUKESTERAS Negative 08/31/2023 0841    OCCULTBLOOD Negative 08/31/2023 0841       UPC  Lab Results   Component Value Date/Time    TOTPROTUR 17.0 (H) 08/31/2023 0840      Lab Results   Component Value Date/Time    CREATININEU 114.85 08/31/2023 0840           Imaging reviewed  No orders to display         Assessment:  Aleshia Crooks is a 83 y.o. female with HTN, Psoriasis on immunosuppression, [id number], severe mitral regurgitation and valvular heart failure status post MitraClip April 2023, Afib s/p Watchman 7/26/23 c/b cardiac arrest and cardiac tamponade, who presents today for follow up.    Plan:  1. CKD 4  -Creatinine and GFR remained stable within stage IV CKD.  Patient is at mild to moderate risk of CKD progression with her microalbuminuria.   Her baseline CKD likely due to history of hypertension, age-related kidney decline.  I explained the importance of blood pressure control to help slow CKD progression.  Avoid NSAIDs and other  nephrotoxins.  Recommend low-sodium diet.  Continue losartan for long-term kidney protection; SGLT2 inhibitor too expensive per patient.  Patient is undecided whether or not she would want dialysis if her kidney failure worsens.  Recommend dialysis options education.    2. Essential hypertension  -Controlled.  Continue low-dose losartan for long-term kidney protection.  Continue Lasix 40 mg daily for now.    3. Moderate to severe pulmonary hypertension (HCC)  -Likely due to valvular disease from tricuspid and mitral regurgitation.  Now stable status post MitraClip procedure April 2023.  Defer further management to cardiology.    4. Chronic combined systolic and diastolic congestive heart failure (HCC)  -Patient appears stable and euvolemic on my exam today.  Continue Lasix 40 mg daily.  No longer on spironolactone after cardiac arrest in July 2023.  It is okay to resume spironolactone from a nephrology perspective if cardiology wishes to prescribe it again, but I would recommend low-dose 25 mg daily.    5. Anemia, unspecified type  -Patient with anemia of chronic disease, but with history of iron deficiency anemia.  Patient responded well to IV iron infusions in the past.  Has not needed any Retacrit injections for over 1 month.  Recommend decrease hemoglobin checks to every other week, and administer Retacrit 10,000 units subcutaneous if hemoglobin is less than 10.0.      Return to clinic 4 months with preclinic labs    Ryder Davis MD  Nephrology

## 2023-09-19 ENCOUNTER — APPOINTMENT (OUTPATIENT)
Dept: VASCULAR LAB | Facility: MEDICAL CENTER | Age: 83
End: 2023-09-19
Attending: INTERNAL MEDICINE
Payer: MEDICARE

## 2023-09-21 ENCOUNTER — OFFICE VISIT (OUTPATIENT)
Dept: MEDICAL GROUP | Facility: MEDICAL CENTER | Age: 83
End: 2023-09-21
Payer: MEDICARE

## 2023-09-21 VITALS
RESPIRATION RATE: 16 BRPM | WEIGHT: 208 LBS | OXYGEN SATURATION: 94 % | HEART RATE: 78 BPM | TEMPERATURE: 98 F | DIASTOLIC BLOOD PRESSURE: 60 MMHG | BODY MASS INDEX: 36.86 KG/M2 | HEIGHT: 63 IN | SYSTOLIC BLOOD PRESSURE: 128 MMHG

## 2023-09-21 DIAGNOSIS — D64.89 ANEMIA DUE TO MULTIPLE MECHANISMS: ICD-10-CM

## 2023-09-21 DIAGNOSIS — E03.9 HYPOTHYROIDISM, UNSPECIFIED TYPE: ICD-10-CM

## 2023-09-21 DIAGNOSIS — D50.0 IRON DEFICIENCY ANEMIA DUE TO CHRONIC BLOOD LOSS: ICD-10-CM

## 2023-09-21 PROCEDURE — 3078F DIAST BP <80 MM HG: CPT | Performed by: FAMILY MEDICINE

## 2023-09-21 PROCEDURE — 99213 OFFICE O/P EST LOW 20 MIN: CPT | Performed by: FAMILY MEDICINE

## 2023-09-21 PROCEDURE — 3074F SYST BP LT 130 MM HG: CPT | Performed by: FAMILY MEDICINE

## 2023-09-21 RX ORDER — ASCORBIC ACID 500 MG
500 TABLET ORAL DAILY
Qty: 30 TABLET | Refills: 11 | Status: SHIPPED | OUTPATIENT
Start: 2023-09-21

## 2023-09-21 ASSESSMENT — FIBROSIS 4 INDEX: FIB4 SCORE: 1.909188309203678316

## 2023-09-21 NOTE — PROGRESS NOTES
Chief Complaint   Patient presents with    Follow-Up     Q4M with labs     Anemia    Arthritis    Leg Pain       Subjective:     HPI:   Aleshia Crooks presents today with the followin. Hypothyroidism, unspecified type  Needs recheck in December, order placed.  Taking the new dose on an empty stomach.    - TSH; Future    2. Anemia due to multiple mechanisms/Iron deficiency anemia due to chronic blood loss  Taking iron and vitamin C.  She is feeling a little better but still tired and short of breath on exertion.  - ascorbic acid (VITAMIN C) 500 MG tablet; Take 1 Tablet by mouth every day.  Dispense: 30 Tablet; Refill: 11      Patient Active Problem List    Diagnosis Date Noted    Anemia due to multiple mechanisms 2023    Secondary hypercoagulable state ([facility]) 2023    Psoriasis 2023    Pericardial effusion with cardiac tamponade 2023    Cardiac arrest ([facility]) 2023    Iron deficiency anemia 2023    Anemia of chronic disease 2023    S/P mitral valve clip implantation 2023    H/O: GI bleed 04/10/2023    History of recent blood transfusion 04/10/2023    Contraindication to anticoagulation therapy 04/10/2023    Mild aortic stenosis 2023    Acute blood loss anemia 2023    Hyperglycemia 2022    Situational stress 10/27/2021    Falls 2021    Stress incontinence 2021    CKD (chronic kidney disease) stage 4, GFR 15-29 ml/min ([facility]) 2020    Gastroesophageal reflux disease without esophagitis 2020    Mixed hyperlipidemia 2020    Hepatic steatosis 2020    Elevated alkaline phosphatase level 2020    History of malignant melanoma 2020    Skin lesions 2020    Essential hypertension 2020    Hypothyroid 2020    Obesity (BMI 35.0-39.9 without comorbidity) ([facility]) 2020    Obstructive sleep apnea treated with continuous positive airway pressure (CPAP) 2020    Moderate to severe pulmonary  hypertension ([facility]) 07/14/2017    Chronic combined systolic and diastolic congestive heart failure ([facility]) 07/14/2017    AF (atrial fibrillation) ([facility]) 07/14/2017    Coronary artery disease involving native coronary artery of native heart without angina pectoris, s/p 1V CABG 1992 07/14/2017    Situational anxiety 07/13/2017       Current medicines (including changes today)  Current Outpatient Medications   Medication Sig Dispense Refill    ascorbic acid (VITAMIN C) 500 MG tablet Take 1 Tablet by mouth every day. 30 Tablet 11    aspirin 81 MG EC tablet Take 1 Tablet by mouth every day. 100 Tablet 3    Secukinumab (COSENTYX SENSOREADY PEN) 150 MG/ML Solution Auto-injector Inject 300mg Sub Cut once monthly (Patient taking differently: Inject 300mg Sub Cut once monthly    Hasnt taken yet) 1.96 mL 5    Cholecalciferol (VITAMIN D) 125 MCG (5000 UT) Cap Take 1 Tablet by mouth every morning.      LEVOXYL 175 MCG Tab Take 1 Tablet by mouth every morning on an empty stomach. 90 Tablet 3    ferrous sulfate 325 (65 Fe) MG tablet Take 1 Tablet by mouth every day. (Patient taking differently: Take 325 mg by mouth 1/2 hour after lunch.) 90 Tablet 2    CINNAMON PO Take 1 Tablet by mouth 2 times a day.      bisoprolol (ZEBETA) 10 MG tablet Take 1 Tablet by mouth every day. (Patient taking differently: Take 10 mg by mouth every morning.) 90 Tablet 4    Vitamins-Lipotropics (MULTI-VITAMIN HP/MINERALS) Cap Take 1 Capsule by mouth every evening.      pantoprazole (PROTONIX) 40 MG Tablet Delayed Response Take 1 Tablet by mouth 2 times a day. 180 Tablet 3    losartan (COZAAR) 25 MG Tab Take 1 Tablet by mouth every evening. 100 Tablet 3    Acetaminophen (TYLENOL PO) Take 2 Tablets by mouth 2 times a day as needed (For pain).      furosemide (LASIX) 40 MG Tab Take 1 Tablet by mouth every day. (Patient taking differently: Take 40 mg by mouth every morning.) 90 Tablet 3    atorvastatin (LIPITOR) 40 MG Tab TAKE 1 TABLET BY MOUTH AT  BEDTIME  "(Patient taking differently: Take 40 mg by mouth every evening. TAKE 1 TABLET BY MOUTH AT  BEDTIME) 100 Tablet 3     No current facility-administered medications for this visit.       Allergies   Allergen Reactions    Morphine Vomiting       ROS: As per HPI  still has exertional SOB.  No chest pain.       Objective:     /60   Pulse 78   Temp 36.7 °C (98 °F)   Resp 16   Ht 1.588 m (5' 2.5\")   Wt 94.3 kg (208 lb)   SpO2 94%  Body mass index is 37.44 kg/m².    Physical Exam:  Constitutional: Well-developed and well-nourished. Not diaphoretic. No distress. Lucid and fluent.  Skin: Skin is warm and dry. No rash noted.  Head: Atraumatic without lesions.  Eyes: Conjunctivae and extraocular motions are normal. Pupils are equal, round, and reactive to light. No scleral icterus.   Ears:  External ears unremarkable.   Neck: Supple, trachea midline. No thyromegaly present. No cervical or supraclavicular lymphadenopathy. No JVD or carotid bruits appreciated  Cardiovascular: Regular rate and rhythm.  Normal S1, S2 with 3/6 murmurs appreciated.  Chest: Effort normal. Clear to auscultation throughout. No adventitious sounds.   Extremities: No cyanosis, clubbing, erythema, nor edema.   Neurological: Alert and oriented x 3.   Psychiatric:  Behavior, mood, and affect are appropriate.    Results reviewed, notes reviewed.  Pericardiocentesis reviewed.     Assessment and Plan:     83 y.o. female with the following issues:    1. Hypothyroidism, unspecified type  TSH      2. Anemia due to multiple mechanisms        3. Iron deficiency anemia due to chronic blood loss  ascorbic acid (VITAMIN C) 500 MG tablet            Followup: Return in about 4 months (around 1/21/2024), or if symptoms worsen or fail to improve.  "

## 2023-09-26 ENCOUNTER — OUTPATIENT INFUSION SERVICES (OUTPATIENT)
Dept: ONCOLOGY | Facility: MEDICAL CENTER | Age: 83
End: 2023-09-26
Attending: INTERNAL MEDICINE
Payer: MEDICARE

## 2023-09-26 VITALS
DIASTOLIC BLOOD PRESSURE: 62 MMHG | OXYGEN SATURATION: 94 % | HEART RATE: 86 BPM | HEIGHT: 61 IN | TEMPERATURE: 97 F | RESPIRATION RATE: 20 BRPM | SYSTOLIC BLOOD PRESSURE: 118 MMHG | WEIGHT: 207.23 LBS | BODY MASS INDEX: 39.13 KG/M2

## 2023-09-26 DIAGNOSIS — D63.8 ANEMIA OF CHRONIC DISEASE: ICD-10-CM

## 2023-09-26 LAB
HCT VFR BLD AUTO: 33.3 % (ref 37–47)
HGB BLD-MCNC: 10.3 G/DL (ref 12–16)

## 2023-09-26 PROCEDURE — 36415 COLL VENOUS BLD VENIPUNCTURE: CPT

## 2023-09-26 PROCEDURE — 85018 HEMOGLOBIN: CPT

## 2023-09-26 PROCEDURE — 85014 HEMATOCRIT: CPT

## 2023-09-26 RX ORDER — 0.9 % SODIUM CHLORIDE 0.9 %
10 VIAL (ML) INJECTION PRN
Status: CANCELLED | OUTPATIENT
Start: 2023-10-03

## 2023-09-26 RX ORDER — 0.9 % SODIUM CHLORIDE 0.9 %
3 VIAL (ML) INJECTION PRN
Status: CANCELLED | OUTPATIENT
Start: 2023-10-03

## 2023-09-26 RX ORDER — 0.9 % SODIUM CHLORIDE 0.9 %
VIAL (ML) INJECTION PRN
Status: CANCELLED | OUTPATIENT
Start: 2023-10-03

## 2023-09-26 ASSESSMENT — FIBROSIS 4 INDEX: FIB4 SCORE: 1.909188309203678316

## 2023-09-26 NOTE — PROGRESS NOTES
Pt presents to [facility] for ep[facility]etin injection. Butterfly needle used in RAC; brisk blood return observed and pt tolerated well. Labs drawn and not within treatable parameters. Next appointment confirmed and education provided. Pt discharged to self care with all personal belongings and in NAD.

## 2023-09-28 ENCOUNTER — HOSPITAL ENCOUNTER (OUTPATIENT)
Dept: RADIOLOGY | Facility: MEDICAL CENTER | Age: 83
End: 2023-09-28
Attending: NURSE PRACTITIONER
Payer: MEDICARE

## 2023-09-28 DIAGNOSIS — Z01.810 PRE-PROCEDURAL CARDIOVASCULAR EXAMINATION: ICD-10-CM

## 2023-09-28 DIAGNOSIS — I35.0 NONRHEUMATIC AORTIC VALVE STENOSIS: ICD-10-CM

## 2023-09-28 PROCEDURE — 71250 CT THORAX DX C-: CPT

## 2023-10-02 PROBLEM — K57.90 DIVERTICULOSIS: Status: ACTIVE | Noted: 2023-10-02

## 2023-10-02 PROBLEM — R91.8 PULMONARY NODULES: Status: ACTIVE | Noted: 2023-10-02

## 2023-10-02 PROBLEM — M43.00 SPONDYLOLYSIS: Status: ACTIVE | Noted: 2023-10-02

## 2023-10-02 NOTE — TELEPHONE ENCOUNTER
Cesar Gray M.D.  You 10 minutes ago (2:09 PM)     Lets get another stat echo, and put her on tavr pathway

## 2023-10-02 NOTE — TELEPHONE ENCOUNTER
Called patient and she states she is going out of town tomorrow and not returning until the following week. Scheduled echo for 10/16 as patient's first available. Advised we would discuss next steps after the results. Patient verbalizes understanding.

## 2023-10-05 NOTE — ANESTHESIA POSTPROCEDURE EVALUATION
[facility] Office Follow Up       Office Follow Up Visit       Patient Name: Geovanny Khan    Chief Complaint:   Chief Complaint   Patient presents with    Post-op     2 weeks status post right knee arthroscopy with partial medial menisectomy (DOS 9/20/23)        Referring Physician: No ref. provider found    History of Present Illness:   Geovanny Khan returns to clinic today for 2-week postop visit s/p right knee arthroscopy for partial medial meniscectomy and chondroplasty of medial femoral condyle with Dr. Diallo.  He reports to doing well.  He is having minimal pain and swelling at this time.  Pain of medial meniscus has improved compared to prior to surgery.  He is taking meloxicam as needed.  He has been walking at home.  He is icing afterwards.    PROCEDURE PERFORMED: Right knee arthroscopy, partial medial meniscectomy chondroplasty of medial femoral condyle     Subjective     Review of Systems   Constitutional: Negative.  Negative for chills, fatigue and fever.   HENT: Negative.  Negative for congestion and dental problem.    Eyes: Negative.  Negative for blurred vision.   Respiratory: Negative.  Negative for shortness of breath.    Cardiovascular: Negative.  Negative for leg swelling.   Gastrointestinal: Negative.  Negative for abdominal pain.   Endocrine: Negative.  Negative for polyuria.   Genitourinary: Negative.  Negative for difficulty urinating.   Musculoskeletal:  Positive for arthralgias.   Skin: Negative.    Allergic/Immunologic: Negative.    Neurological: Negative.    Hematological: Negative.  Negative for adenopathy.   Psychiatric/Behavioral: Negative.  Negative for behavioral problems.       I have reviewed and updated the following portions of the patient's history and review of systems: allergies, current medications, past family history, past medical history, past social history, past surgical history and problem        Patient: Aleshia Crooks    Procedure Summary     Date: 01/24/23 Room / Location: [facility] ROOM 26 / SURGERY SAME DAY [facility]    Anesthesia Start: 0916 Anesthesia Stop: 0952    Procedures:       COLONOSCOPY (Anus)      GASTROSCOPY (Esophagus)      GASTROSCOPY, WITH BIOPSY (Esophagus) Diagnosis: (nodular mucosa, diverticulitis )    Surgeons: Amy Zarate M.D. Responsible Provider: Crista Lawrence M.D.    Anesthesia Type: general, MAC ASA Status: 3          Final Anesthesia Type: general, MAC  Last vitals  BP   Blood Pressure : 107/63    Temp   36 °C (96.8 °F)    Pulse   75   Resp   18    SpO2   96 %      Anesthesia Post Evaluation    Patient location during evaluation: PACU  Patient participation: complete - patient participated  Level of consciousness: awake and alert  Pain score: 0    Airway patency: patent  Anesthetic complications: no  Cardiovascular status: adequate and hemodynamically stable  Respiratory status: acceptable and room air  Hydration status: acceptable    PONV: none          No notable events documented.     Nurse Pain Score: 0 (NPRS)         list.    Medications:   Current Outpatient Medications:     ALPRAZolam (XANAX) 0.5 MG tablet, TAKE 1 TABLET BY MOUTH TWICE A DAY AS NEEDED FOR ANXIETY (Patient taking differently: Take 0.5 tablets by mouth As Needed.), Disp: 30 tablet, Rfl: 2    Cholecalciferol (Vitamin D3) 25 MCG (1000 UT) capsule, Take 1 capsule by mouth Daily., Disp: , Rfl:     clotrimazole-betamethasone (LOTRISONE) 1-0.05 % cream, Apply  topically to the appropriate area as directed 2 (Two) Times a Day., Disp: 45 g, Rfl: 2    Coenzyme Q10 (CO Q-10) 400 MG capsule, Take 1 capsule by mouth Daily. Take 1 capsule daily with a meal., Disp: , Rfl:     colchicine 0.6 MG tablet, Take 1 tablet by mouth 2 (Two) Times a Day As Needed for Muscle / Joint Pain., Disp: 60 tablet, Rfl: 2    ezetimibe (ZETIA) 10 MG tablet, TAKE 1 TABLET BY MOUTH EVERY DAY, Disp: 90 tablet, Rfl: 1    indomethacin (INDOCIN) 50 MG capsule, Take 1 capsule by mouth 2 (Two) Times a Day As Needed for Moderate Pain ., Disp: 60 capsule, Rfl: 2    levothyroxine (SYNTHROID, LEVOTHROID) 88 MCG tablet, Take 1 tablet by mouth Daily., Disp: 90 tablet, Rfl: 1    losartan (COZAAR) 25 MG tablet, Take 1 tablet by mouth Daily., Disp: 90 tablet, Rfl: 1    magnesium oxide (MAG-OX) 400 MG tablet, Take 1 tablet by mouth Daily., Disp: , Rfl:     meloxicam (MOBIC) 15 MG tablet, Take 1 tablet by mouth Daily., Disp: 30 tablet, Rfl: 5    pantoprazole (PROTONIX) 40 MG EC tablet, TAKE 1 TABLET BY MOUTH EVERY DAY (Patient taking differently: Take 1 tablet by mouth Every Night.), Disp: 90 tablet, Rfl: 1    simvastatin (ZOCOR) 40 MG tablet, TAKE 1 TABLET BY MOUTH EVERY DAY, Disp: 90 tablet, Rfl: 1    Allergies: No Known Allergies      Objective      Vital Signs:   Vitals:    10/05/23 1534   Temp: 97.5 °F (36.4 °C)       Ortho Exam:  Knee Exam: Right  ----------  ALIGNMENT: neutral, no varus or valgus deformity     RANGE OF MOTION:  0-120    KNEE EFFUSION: Trace  PAIN WITH KNEE ROM: no    SENSATION TO LIGHT  TOUCH:  DEEP PERONEAL/SUPERFICIAL PERONEAL/SURAL/SAPHENOUS/TIBIAL:   intact    EDEMA:  no  ERYTHEMA:  no  WOUNDS/INCISIONS: Incisions healing well without sign of infection or drainage  NO SIGN OF SEPTIC KNEE OR DVT      Results Review:    MRI Knee Right Without Contrast    Result Date: 8/23/2023  Impression: Longitudinal tear of the posterior horn of the medial meniscus with 3 mm of peripheral extrusion. Mild degenerative changes of the knee most advanced in the medial and patellofemoral compartments. Small knee joint effusion. Electronically Signed: Liang Nieto MD  8/23/2023 5:15 PM EDT  Workstation ID: [id number]        Assessment / Plan      Assessment:   Diagnoses and all orders for this visit:    1. S/P arthroscopic knee surgery (Primary)        Quality Metrics:   BMI:   BMI is >= 30 and <35. (Class 1 Obesity). The following options were offered after discussion;: weight loss educational material (shared in after visit summary)       Tobacco:   Geovanny Khan  reports that he has never smoked. He has been exposed to tobacco smoke. He has never used smokeless tobacco..  He understands risk associated with tobacco use.    Plan:  Doing well 2 weeks s/p right knee arthroscopy.    May continue with meloxicam and icing as needed for pain and swelling.  Sutures out today.  May continue to weight-bear as tolerated.  He would like to do home exercise program over formal physical therapy.  Knee exercise handout given.  He will let us know if he would like to reconsider outpatient PT.      Follow Up:   4 weeks with Dr. Imani Evangelista PA-C  [facility]    Dictated using Dragon Speech Recognition.

## 2023-10-13 ENCOUNTER — OUTPATIENT INFUSION SERVICES (OUTPATIENT)
Dept: ONCOLOGY | Facility: MEDICAL CENTER | Age: 83
End: 2023-10-13
Attending: INTERNAL MEDICINE
Payer: MEDICARE

## 2023-10-13 VITALS
RESPIRATION RATE: 18 BRPM | SYSTOLIC BLOOD PRESSURE: 115 MMHG | WEIGHT: 202.82 LBS | DIASTOLIC BLOOD PRESSURE: 53 MMHG | TEMPERATURE: 97.2 F | OXYGEN SATURATION: 98 % | HEIGHT: 61 IN | HEART RATE: 80 BPM | BODY MASS INDEX: 38.29 KG/M2

## 2023-10-13 DIAGNOSIS — D63.8 ANEMIA OF CHRONIC DISEASE: ICD-10-CM

## 2023-10-13 LAB
HCT VFR BLD AUTO: 35.6 % (ref 37–47)
HGB BLD-MCNC: 10.9 G/DL (ref 12–16)

## 2023-10-13 PROCEDURE — 85018 HEMOGLOBIN: CPT

## 2023-10-13 PROCEDURE — 36415 COLL VENOUS BLD VENIPUNCTURE: CPT

## 2023-10-13 PROCEDURE — 85014 HEMATOCRIT: CPT

## 2023-10-13 RX ORDER — 0.9 % SODIUM CHLORIDE 0.9 %
VIAL (ML) INJECTION PRN
Status: CANCELLED | OUTPATIENT
Start: 2023-10-17

## 2023-10-13 RX ORDER — 0.9 % SODIUM CHLORIDE 0.9 %
10 VIAL (ML) INJECTION PRN
Status: CANCELLED | OUTPATIENT
Start: 2023-10-17

## 2023-10-13 RX ORDER — 0.9 % SODIUM CHLORIDE 0.9 %
3 VIAL (ML) INJECTION PRN
Status: CANCELLED | OUTPATIENT
Start: 2023-10-17

## 2023-10-13 ASSESSMENT — FIBROSIS 4 INDEX: FIB4 SCORE: 1.909188309203678316

## 2023-10-13 NOTE — PROGRESS NOTES
Aleshia arrived ambulatory to the infusion center for her retacrit injection. Her labs were drawn and resulted:  Hemoglobin 10.9  Hematocrit 35.6  She does not meet parameters to receive the injection today. She was notified and discharged home in stable condition.

## 2023-10-16 ENCOUNTER — HOSPITAL ENCOUNTER (OUTPATIENT)
Dept: CARDIOLOGY | Facility: MEDICAL CENTER | Age: 83
End: 2023-10-16
Attending: INTERNAL MEDICINE
Payer: MEDICARE

## 2023-10-16 DIAGNOSIS — I34.0 NONRHEUMATIC MITRAL VALVE REGURGITATION: ICD-10-CM

## 2023-10-16 LAB
LV EJECT FRACT  99904: 60
LV EJECT FRACT MOD 2C 99903: 62.66
LV EJECT FRACT MOD 4C 99902: 60.98
LV EJECT FRACT MOD BP 99901: 61.26

## 2023-10-16 PROCEDURE — 93306 TTE W/DOPPLER COMPLETE: CPT

## 2023-10-16 PROCEDURE — 93306 TTE W/DOPPLER COMPLETE: CPT | Mod: 26 | Performed by: INTERNAL MEDICINE

## 2023-10-17 ENCOUNTER — TELEPHONE (OUTPATIENT)
Dept: CARDIOLOGY | Facility: MEDICAL CENTER | Age: 83
End: 2023-10-17
Payer: MEDICARE

## 2023-10-17 DIAGNOSIS — I35.0 NONRHEUMATIC AORTIC VALVE STENOSIS: ICD-10-CM

## 2023-10-17 DIAGNOSIS — Z01.810 PRE-PROCEDURAL CARDIOVASCULAR EXAMINATION: ICD-10-CM

## 2023-10-17 NOTE — TELEPHONE ENCOUNTER
"Per previous telephone encounter with Dr. Gray, recommend TAVR pathway. Echo results state \"consider paradoxical LFLG sev AS\"    Called patient and notified of echo results and MD's recommendations. Scheduled IC/CTS consults for next week.   "

## 2023-10-18 ENCOUNTER — TELEPHONE (OUTPATIENT)
Dept: CARDIOLOGY | Facility: MEDICAL CENTER | Age: 83
End: 2023-10-18
Payer: MEDICARE

## 2023-10-18 NOTE — TELEPHONE ENCOUNTER
----- Message from Ekaterina Benoit R.N. sent at 10/17/2023  4:03 PM PDT -----  Regarding: pre tavr cath  Please hold pre tavr cath for 10/31. Thanks!

## 2023-10-18 NOTE — TELEPHONE ENCOUNTER
Consult on 10-25-23 with  at 1:20. Pre TAVR angio spot held on 10-31-23 with  at 7:30. Paper work put into basket in RN office.

## 2023-10-18 NOTE — PROGRESS NOTES
REFERRING PHYSICIAN: Cesar Gray MD.     CONSULTING PHYSICIAN: Antelmo Brower DO     CHIEF COMPLAINT: Shortness of breath    HISTORY OF PRESENT ILLNESS: The patient is a 83 y.o. female with past medical history of atrial fibrillation status post watchamn's, coronary artery diease status post CABG in 1992, chronic kidney disease, mitral valve regurgitation status mitraclip, aortic stenosis, pulmonary hypertension, obstructive sleep apnea who presents with shortness of breath. She denies chest pain, orthopnea, PND, dizziness, and syncope. Echocardiogram reveals low flow low gradient paradoxical severe aortic stenosis.     PAST MEDICAL HISTORY:   Active Ambulatory Problems     Diagnosis Date Noted    Situational anxiety 07/13/2017    Moderate to severe pulmonary hypertension ([facility]) 07/14/2017    Chronic combined systolic and diastolic congestive heart failure ([facility]) 07/14/2017    AF (atrial fibrillation) ([facility]) 07/14/2017    Coronary artery disease involving native coronary artery of native heart without angina pectoris, s/p 1V CABG 1992 07/14/2017    History of malignant melanoma 03/12/2020    Skin lesions 03/12/2020    Essential hypertension 03/12/2020    Hypothyroid 03/12/2020    Obesity (BMI 35.0-39.9 without comorbidity) ([facility]) 03/12/2020    Obstructive sleep apnea treated with continuous positive airway pressure (CPAP) 03/12/2020    Mixed hyperlipidemia 04/17/2020    Hepatic steatosis 04/17/2020    Elevated alkaline phosphatase level 04/17/2020    Gastroesophageal reflux disease without esophagitis 06/08/2020    CKD (chronic kidney disease) stage 4, GFR 15-29 ml/min ([facility]) 09/11/2020    Falls 03/01/2021    Stress incontinence 03/01/2021    Situational stress 10/27/2021    Hyperglycemia 11/16/2022    Acute blood loss anemia 01/23/2023    Mild aortic stenosis 02/18/2023    H/O: GI bleed 04/10/2023    History of recent blood transfusion 04/10/2023    Contraindication to anticoagulation therapy 04/10/2023     S/P mitral valve clip implantation 04/18/2023    Anemia of chronic disease 05/09/2023    Iron deficiency anemia 05/30/2023    Pericardial effusion with cardiac tamponade 07/26/2023    Cardiac arrest (HCC) 07/26/2023    Psoriasis 07/27/2023    Secondary hypercoagulable state (HCC) 08/21/2023    Anemia due to multiple mechanisms 09/21/2023    Diverticulosis 10/02/2023    Spondylolysis 10/02/2023    Pulmonary nodules 10/02/2023     Resolved Ambulatory Problems     Diagnosis Date Noted    Hyperthyroidism 07/13/2017    Microcytic anemia 07/13/2017    Severe mitral regurgitation 07/14/2017    Elevated troponin 04/08/2020    Blood loss anemia 04/08/2020    Abnormal CT of the abdomen 04/17/2020    Abnormal CBC 06/08/2020    Malignant melanoma of left upper extremity including shoulder (HCC) 06/08/2020    Dermatitis 03/01/2021    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) (HCC) 03/25/2021    Caregiver stress 10/27/2021    Irritability 10/27/2021    Stage 4 chronic kidney disease (HCC) 10/27/2021    Postsurgical hypothyroidism 12/14/2021    Severe tricuspid regurgitation by prior echocardiogram 11/16/2022    GI bleed 01/23/2023    Hyperkalemia 07/27/2023    CHF (congestive heart failure) (HCC) 07/27/2023    Cardiogenic shock (HCC) 07/27/2023     Past Medical History:   Diagnosis Date    Apnea, sleep     Atrial fibrillation (HCC)     Breath shortness     Cataract     Congestive heart failure (HCC)     Dental disorder     Gasping for breath     Heart attack (HCC) 1992    Heart murmur     Hemorrhagic disorder (HCC)     High cholesterol     Hypertension     Moderate tricuspid regurgitation 11/16/2022    Pneumonia 2019    Snoring     Thyroid disease        PAST SURGICAL HISTORY:   Past Surgical History:   Procedure Laterality Date    TRANSCATHETER MITRAL VALVE REPAIR N/A 4/17/2023    Procedure: TRANSCATHETER MITRAL VALVE PROCEDURE;  Surgeon: Cesar Gray M.D.;  Location: SURGERY [facility];   Service: Cardiac    ECHOCARDIOGRAM, TRANSESOPHAGEAL, INTRAOPERATIVE N/A 4/17/2023    Procedure: ECHOCARDIOGRAM, TRANSESOPHAGEAL, INTRAOPERATIVE;  Surgeon: Cesar Gray M.D.;  Location: SURGERY [facility];  Service: Cardiac    SD COLONOSCOPY,DIAGNOSTIC N/A 1/24/2023    Procedure: COLONOSCOPY;  Surgeon: Amy Zarate M.D.;  Location: SURGERY SAME DAY [facility];  Service: Gastroenterology    SD UPPER GI ENDOSCOPY,DIAGNOSIS N/A 1/24/2023    Procedure: GASTROSCOPY;  Surgeon: Amy Zarate M.D.;  Location: SURGERY SAME DAY [facility];  Service: Gastroenterology    SD UPPER GI ENDOSCOPY,BIOPSY N/A 1/24/2023    Procedure: GASTROSCOPY, WITH BIOPSY;  Surgeon: Amy Zarate M.D.;  Location: SURGERY SAME DAY [facility];  Service: Gastroenterology    CHOLECYSTECTOMY      EYE SURGERY Bilateral     cataracts    MULTIPLE CORONARY ARTERY BYPASS      1 bypass    THYROIDECTOMY TOTAL          ALLERGIES:   Allergies   Allergen Reactions    Morphine Vomiting        CURRENT MEDICATIONS:   Current Outpatient Medications:     ascorbic acid (VITAMIN C) 500 MG tablet, Take 1 Tablet by mouth every day., Disp: 30 Tablet, Rfl: 11    aspirin 81 MG EC tablet, Take 1 Tablet by mouth every day., Disp: 100 Tablet, Rfl: 3    Secukinumab (COSENTYX SENSOREADY PEN) 150 MG/ML Solution Auto-injector, Inject 300mg Sub Cut once monthly (Patient taking differently: Inject 300mg Sub Cut once monthly  Hasnt taken yet), Disp: 1.96 mL, Rfl: 5    Cholecalciferol (VITAMIN D) 125 MCG (5000 UT) Cap, Take 1 Tablet by mouth every morning., Disp: , Rfl:     LEVOXYL 175 MCG Tab, Take 1 Tablet by mouth every morning on an empty stomach., Disp: 90 Tablet, Rfl: 3    ferrous sulfate 325 (65 Fe) MG tablet, Take 1 Tablet by mouth every day. (Patient taking differently: Take 325 mg by mouth 1/2 hour after lunch.), Disp: 90 Tablet, Rfl: 2    CINNAMON PO, Take 1 Tablet by mouth 2 times a day., Disp: , Rfl:     bisoprolol (ZEBETA) 10 MG tablet, Take 1 Tablet by  mouth every day. (Patient taking differently: Take 10 mg by mouth every morning.), Disp: 90 Tablet, Rfl: 4    Vitamins-Lipotropics (MULTI-VITAMIN HP/MINERALS) Cap, Take 1 Capsule by mouth every evening., Disp: , Rfl:     pantoprazole (PROTONIX) 40 MG Tablet Delayed Response, Take 1 Tablet by mouth 2 times a day., Disp: 180 Tablet, Rfl: 3    losartan (COZAAR) 25 MG Tab, Take 1 Tablet by mouth every evening., Disp: 100 Tablet, Rfl: 3    Acetaminophen (TYLENOL PO), Take 2 Tablets by mouth 2 times a day as needed (For pain)., Disp: , Rfl:     furosemide (LASIX) 40 MG Tab, Take 1 Tablet by mouth every day. (Patient taking differently: Take 40 mg by mouth every morning.), Disp: 90 Tablet, Rfl: 3    atorvastatin (LIPITOR) 40 MG Tab, TAKE 1 TABLET BY MOUTH AT  BEDTIME (Patient taking differently: Take 40 mg by mouth every evening. TAKE 1 TABLET BY MOUTH AT  BEDTIME), Disp: 100 Tablet, Rfl: 3    FAMILY HISTORY:   Family History   Problem Relation Age of Onset    Cancer Mother         cancer, smoker    Stroke Maternal Grandmother     Other Other         Crohn    Kidney Disease Other         kidney transplant        SOCIAL HISTORY:   Social History     Socioeconomic History    Marital status:      Spouse name: Not on file    Number of children: Not on file    Years of education: Not on file    Highest education level: Not on file   Occupational History     Comment: [employer]   Tobacco Use    Smoking status: Former     Current packs/day: 0.00     Average packs/day: 1 pack/day for 31.0 years (31.0 ttl pk-yrs)     Types: Cigarettes     Start date: 5/10/1961     Quit date: 5/10/1992     Years since quittin.4    Smokeless tobacco: Never    Tobacco comments:     Started smoking of  do not remember month or day   Vaping Use    Vaping Use: Never used   Substance and Sexual Activity    Alcohol use: No    Drug use: No    Sexual activity: Not Currently     Partners: Male   Other Topics Concern    Not on file   Social  History Narrative    Not on file     Social Determinants of Health     Financial Resource Strain: Low Risk  (11/11/2022)    Overall Financial Resource Strain (CARDIA)     Difficulty of Paying Living Expenses: Not hard at all   Food Insecurity: No Food Insecurity (11/11/2022)    Hunger Vital Sign     Worried About Running Out of Food in the Last Year: Never true     Ran Out of Food in the Last Year: Never true   Transportation Needs: No Transportation Needs (11/11/2022)    PRAPARE - Transportation     Lack of Transportation (Medical): No     Lack of Transportation (Non-Medical): No   Physical Activity: Inactive (11/11/2022)    Exercise Vital Sign     Days of Exercise per Week: 0 days     Minutes of Exercise per Session: 0 min   Stress: No Stress Concern Present (11/11/2022)    [employer] [place] [employer] - Occupational Stress Questionnaire     Feeling of Stress : Not at all   Social Connections: Socially Isolated (11/11/2022)    Social Connection and Isolation Panel [NHANES]     Frequency of Communication with Friends and Family: More than three times a week     Frequency of Social Gatherings with Friends and Family: Once a week     Attends Hoahaoism Services: Never     Active Member of Clubs or Organizations: No     Attends Club or Organization Meetings: Never     Marital Status:    Intimate Partner Violence: Not At Risk (11/11/2022)    Humiliation, Afraid, Rape, and Kick questionnaire     Fear of Current or Ex-Partner: No     Emotionally Abused: No     Physically Abused: No     Sexually Abused: No   Housing Stability: Low Risk  (11/11/2022)    Housing Stability Vital Sign     Unable to Pay for Housing in the Last Year: No     Number of Places Lived in the Last Year: 1     Unstable Housing in the Last Year: No       REVIEW OF SYSTEMS:  Review of Systems   Constitutional:  Negative for chills, diaphoresis, fever and weight loss.   HENT:  Negative for congestion, ear pain, hearing loss, nosebleeds,  "sore throat and tinnitus.    Eyes:  Negative for blurred vision, double vision, pain and discharge.   Respiratory:  Positive for shortness of breath. Negative for cough, hemoptysis, sputum production, wheezing and stridor.    Cardiovascular:  Negative for chest pain, palpitations, orthopnea and leg swelling.   Gastrointestinal:  Negative for abdominal pain, constipation, heartburn, melena, nausea and vomiting.   Genitourinary:  Negative for dysuria, flank pain and hematuria.   Musculoskeletal:  Negative for joint pain, myalgias and neck pain.   Skin:  Negative for itching and rash.   Neurological:  Negative for dizziness, speech change, focal weakness, seizures, weakness and headaches.   Endo/Heme/Allergies:  Negative for environmental allergies and polydipsia. Does not bruise/bleed easily.   Psychiatric/Behavioral:  Negative for depression, hallucinations, substance abuse and suicidal ideas.      PHYSICAL EXAMINATION:    /64 (BP Location: Right arm, Patient Position: Sitting, BP Cuff Size: Adult)   Pulse 64   Temp 36.9 °C (98.4 °F) (Temporal)   Ht 1.549 m (5' 1\")   Wt 90.7 kg (200 lb)   LMP  (LMP Unknown)   SpO2 96%   BMI 37.79 kg/m²    Physical Exam  Constitutional:       General: She is not in acute distress.  HENT:      Head: Normocephalic and atraumatic.      Nose: Nose normal.   Eyes:      Conjunctiva/sclera: Conjunctivae normal.      Pupils: Pupils are equal, round, and reactive to light.   Neck:      Vascular: No JVD.      Trachea: No tracheal deviation.   Cardiovascular:      Rate and Rhythm: Normal rate and regular rhythm.      Heart sounds: Murmur heard.   Pulmonary:      Effort: Pulmonary effort is normal. No respiratory distress.      Breath sounds: Normal breath sounds. No stridor.   Abdominal:      General: Bowel sounds are normal. There is no distension.      Palpations: Abdomen is soft.      Tenderness: There is no abdominal tenderness.   Musculoskeletal:         General: No tenderness. " Normal range of motion.      Cervical back: Normal range of motion and neck supple.   Skin:     General: Skin is warm and dry.   Neurological:      Mental Status: She is alert and oriented to person, place, and time.      Coordination: Coordination normal.      Gait: Gait is intact.   Psychiatric:         Mood and Affect: Mood and affect normal.         Cognition and Memory: Memory normal.       LABS REVIEWED:  Lab Results   Component Value Date/Time    SODIUM 139 08/31/2023 08:44 AM    POTASSIUM 3.3 (L) 08/31/2023 08:44 AM    CHLORIDE 103 08/31/2023 08:44 AM    CO2 24 08/31/2023 08:44 AM    GLUCOSE 104 (H) 08/31/2023 08:44 AM    BUN 34 (H) 08/31/2023 08:44 AM    CREATININE 1.77 (H) 08/31/2023 08:44 AM      Lab Results   Component Value Date/Time    PROTHROMBTM 24.1 (H) 09/07/2023 10:25 AM    INR 2.12 (H) 09/07/2023 10:25 AM      Lab Results   Component Value Date/Time    WBC 5.6 08/31/2023 08:40 AM    RBC 3.57 (L) 08/31/2023 08:40 AM    HEMOGLOBIN 10.9 (L) 10/13/2023 02:06 PM    HEMATOCRIT 35.6 (L) 10/13/2023 02:06 PM    MCV 93.3 08/31/2023 08:40 AM    MCH 29.1 08/31/2023 08:40 AM    MCHC 31.2 (L) 08/31/2023 08:40 AM    MPV 11.6 08/31/2023 08:40 AM    NEUTSPOLYS 83.60 (H) 07/27/2023 03:55 PM    LYMPHOCYTES 4.60 (L) 07/27/2023 03:55 PM    MONOCYTES 11.20 07/27/2023 03:55 PM    EOSINOPHILS 0.00 07/27/2023 03:55 PM    BASOPHILS 0.10 07/27/2023 03:55 PM    HYPOCHROMIA 1+ 10/31/2020 10:54 AM    ANISOCYTOSIS 1+ 07/21/2023 11:01 AM        IMAGING REVIEWED AND INTERPRETED:    [place]OCARDIO[place]   [place] [facility] [place]  Compared to the prior study on 9/7/23, aortic valve is better evaluated   in this study.  The ejection fraction is measured to be 55-60 % by Bearden's biplane.  Known mitral valve with mitraclip repair with appropriate transvalvular   gradient.  Moderate mitral regurgitation. Calcified aortic valve leaflets with   decreased excursion.  Consider paradoxical low flow low gradient severe aortic  stenosis.  Stroke volume index is 17, Dimensionless index is 0.25.  Aortic valve area 0.7 cm2.       CARDIAC CATHETERIZATION pending    CT SCAN CHEST   [place] [facility] [place]  IMPRESSION:     1.  Aortic valve calcium score: 1235.4.  2.  Limited study without IV contrast. Unable to estimate aortic annulus area.  3.  Both coronary ostia are more than 10 mm away from the aortic annulus.  4.  Both iliofemoral arteries measure less than 5 mm in smallest lumen and may not be suitable for endovascular access.  5.  Both subclavian arteries measure greater than 5 mm in smallest lumen.  6.  Marked cardiomegaly. Mild interstitial edema.  7.  Several pulmonary micronodules, statistically benign. Optional CT follow-up in 12 months can be considered.  8.   Nodular hepatic contour, suggestive of underlying cirrhosis.      IMPRESSION:  Symptomatic aortic stenosis      PLAN:    I recommend proceeding with work up for TAVR  The procedure, its risks, benefits, potential complications and alternative treatments were discussed with the patient in detail including the risks should she decide not to undergo my recommended treatment. All of her questions were answered to her satisfaction and she is willing to proceed with the operation. The risks include death, stroke, infection: to include a rare bacterial infection related to the use of the heart/lung machine, neelam-operative myocardial infarction, dysrhythmias, diaphragmatic paralysis, chest wall paresthesia, tracheostomy, kidney or other organ failure, possible return to the operating room for bleeding, bleeding requiring transfusion with its attendant risks including AIDS or hepatitis, dehiscence of surgical incisions, respiratory complications including the need for prolonged ventilator support, Protamine or other drug reaction, peripheral neuropathy, loss of limb, and miscount of surgical items. The operative mortality risk is approximately 15%. The STS mortality risk score is 12% and the  morbidity and mortality risk score is 22.5%. The scores were discussed with patient.    Findings and recommendations have been discussed with the patient’s cardiologist, Cesar Gray MD.  Thank you for this very challenging consultation and participation in the patient’s care.  I will keep you apprised of all future developments.      Sincerely,     Antelmo Brower, .

## 2023-10-24 ENCOUNTER — TELEPHONE (OUTPATIENT)
Dept: CARDIOLOGY | Facility: MEDICAL CENTER | Age: 83
End: 2023-10-24
Payer: MEDICARE

## 2023-10-24 ENCOUNTER — OFFICE VISIT (OUTPATIENT)
Dept: CARDIOLOGY | Facility: MEDICAL CENTER | Age: 83
End: 2023-10-24
Attending: INTERNAL MEDICINE
Payer: MEDICARE

## 2023-10-24 ENCOUNTER — DOCUMENTATION (OUTPATIENT)
Dept: CARDIOLOGY | Facility: MEDICAL CENTER | Age: 83
End: 2023-10-24
Payer: MEDICARE

## 2023-10-24 VITALS
RESPIRATION RATE: 18 BRPM | WEIGHT: 200 LBS | SYSTOLIC BLOOD PRESSURE: 126 MMHG | HEART RATE: 86 BPM | BODY MASS INDEX: 37.76 KG/M2 | OXYGEN SATURATION: 93 % | HEIGHT: 61 IN | DIASTOLIC BLOOD PRESSURE: 80 MMHG

## 2023-10-24 DIAGNOSIS — Z00.6 EXAMINATION OF PARTICIPANT IN CLINICAL TRIAL: ICD-10-CM

## 2023-10-24 DIAGNOSIS — I35.0 SEVERE AORTIC STENOSIS: ICD-10-CM

## 2023-10-24 DIAGNOSIS — I25.10 CORONARY ARTERY DISEASE INVOLVING NATIVE CORONARY ARTERY OF NATIVE HEART WITHOUT ANGINA PECTORIS: ICD-10-CM

## 2023-10-24 DIAGNOSIS — Z98.890 S/P MITRAL VALVE CLIP IMPLANTATION: ICD-10-CM

## 2023-10-24 DIAGNOSIS — I35.0 NONRHEUMATIC AORTIC (VALVE) STENOSIS: ICD-10-CM

## 2023-10-24 DIAGNOSIS — Z95.818 S/P MITRAL VALVE CLIP IMPLANTATION: ICD-10-CM

## 2023-10-24 DIAGNOSIS — I10 ESSENTIAL HYPERTENSION: Chronic | ICD-10-CM

## 2023-10-24 PROCEDURE — 99213 OFFICE O/P EST LOW 20 MIN: CPT | Performed by: INTERNAL MEDICINE

## 2023-10-24 PROCEDURE — 99215 OFFICE O/P EST HI 40 MIN: CPT | Performed by: INTERNAL MEDICINE

## 2023-10-24 PROCEDURE — 3074F SYST BP LT 130 MM HG: CPT | Performed by: INTERNAL MEDICINE

## 2023-10-24 PROCEDURE — 3079F DIAST BP 80-89 MM HG: CPT | Performed by: INTERNAL MEDICINE

## 2023-10-24 ASSESSMENT — FIBROSIS 4 INDEX: FIB4 SCORE: 1.909188309203678316

## 2023-10-24 ASSESSMENT — PATIENT HEALTH QUESTIONNAIRE - PHQ9: CLINICAL INTERPRETATION OF PHQ2 SCORE: 0

## 2023-10-24 NOTE — TELEPHONE ENCOUNTER
Patient is scheduled on 10-31-23 for a Pre TAVR angio with Dr. Gray. Patient was told to hold lasix AM Day of procedure and to check in at 6:00 for a 7:30 procedure. H&P was done on 10-24-23 by . Pre admit to call patient.

## 2023-10-24 NOTE — PROGRESS NOTES
Interventional cardiology Initial Consultation Note      Chief Complaint: Aortic stenosis    Aleshia Crooks is a 83 y.o. female  patient presented today for consultation regarding aortic stenosis.  She has history of CAD, CABG , persistent atrial fibrillation s/p Watchman device, severe mitral regurgitation s/p mitral valve clip, moderate tricuspid regurgitation, most recent transesophageal echocardiogram showed possible low-flow low gradient paradoxical severe aortic stenosis.  She is here to discuss treatment options.  She complains of significant dyspnea on exertion, lifestyle-limiting.        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 30 Tablet 11    aspirin 81 MG EC tablet Take 1 Tablet by mouth every day. 100 Tablet 3    Secukinumab (COSENTYX SENSOREADY PEN) 150 MG/ML Solution Auto-injector Inject 300mg Sub Cut once monthly (Patient taking differently: Inject 300mg Sub Cut once monthly    Hasnt taken yet) 1.96 mL 5    Cholecalciferol (VITAMIN D) 125 MCG (5000 UT) Cap Take 1 Tablet by mouth every morning.      LEVOXYL 175 MCG Tab Take 1 Tablet by mouth every morning on an empty  "stomach. 90 Tablet 3    ferrous sulfate 325 (65 Fe) MG tablet Take 1 Tablet by mouth every day. (Patient taking differently: Take 325 mg by mouth 1/2 hour after lunch.) 90 Tablet 2    CINNAMON PO Take 1 Tablet by mouth 2 times a day.      bisoprolol (ZEBETA) 10 MG tablet Take 1 Tablet by mouth every day. (Patient taking differently: Take 10 mg by mouth every morning.) 90 Tablet 4    Vitamins-Lipotropics (MULTI-VITAMIN HP/MINERALS) Cap Take 1 Capsule by mouth every evening.      pantoprazole (PROTONIX) 40 MG Tablet Delayed Response Take 1 Tablet by mouth 2 times a day. 180 Tablet 3    losartan (COZAAR) 25 MG Tab Take 1 Tablet by mouth every evening. 100 Tablet 3    Acetaminophen (TYLENOL PO) Take 2 Tablets by mouth 2 times a day as needed (For pain).      furosemide (LASIX) 40 MG Tab Take 1 Tablet by mouth every day. (Patient taking differently: Take 40 mg by mouth every morning.) 90 Tablet 3    atorvastatin (LIPITOR) 40 MG Tab TAKE 1 TABLET BY MOUTH AT  BEDTIME (Patient taking differently: Take 40 mg by mouth every evening. TAKE 1 TABLET BY MOUTH AT  BEDTIME) 100 Tablet 3     No current facility-administered medications for this visit.             Physical Exam:  Ambulatory Vitals  /80 (BP Location: Left arm, Patient Position: Sitting, BP Cuff Size: Adult)   Pulse 86   Resp 18   Ht 1.549 m (5' 1\")   Wt 90.7 kg (200 lb)   SpO2 93%    Oxygen Therapy:  Pulse Oximetry: 93 %  BP Readings from Last 4 Encounters:   10/24/23 126/80   10/13/23 115/53   09/26/23 118/62   09/21/23 128/60       Weight/BMI: Body mass index is 37.79 kg/m².  Wt Readings from Last 4 Encounters:   10/24/23 90.7 kg (200 lb)   10/13/23 92 kg (202 lb 13.2 oz)   09/26/23 94 kg (207 lb 3.7 oz)   09/21/23 94.3 kg (208 lb)           General: Well appearing and in no apparent distress  Neck: carotid bruits absent  Lungs: respiratory sounds  normal  Heart: irregularly irregular rhythm,  No palpable thrills on palpation, murmurs present, no rubs, "   Lower extremity edema absent.     Echocardiogram reviewed, independently interpreted  CONCLUSIONS  Compared to the prior study on 9/7/23, aortic valve is better evaluated   in this study.  The ejection fraction is measured to be 55-60 % by Bearden's biplane.  Known mitral valve with mitraclip repair with appropriate transvalvular   gradient.  Moderate mitral regurgitation. Calcified aortic valve leaflets with   decreased excursion.  Consider paradoxical low flow low gradient severe aortic stenosis.  Stroke volume index is 17, Dimensionless index is 0.25.  Aortic valve area 0.7 cm2.      CT chest  IMPRESSION:     1.  Aortic valve calcium score: 1235.4.  2.  Limited study without IV contrast. Unable to estimate aortic annulus area.  3.  Both coronary ostia are more than 10 mm away from the aortic annulus.  4.  Both iliofemoral arteries measure less than 5 mm in smallest lumen and may not be suitable for endovascular access.  5.  Both subclavian arteries measure greater than 5 mm in smallest lumen.  6.  Marked cardiomegaly. Mild interstitial edema.  7.  Several pulmonary micronodules, statistically benign. Optional CT follow-up in 12 months can be considered.  8.   Nodular hepatic contour, suggestive of underlying cirrhosis.    Medical Decision Making:  Problem List Items Addressed This Visit       Essential hypertension (Chronic)    Coronary artery disease involving native coronary artery of native heart without angina pectoris, s/p 1V CABG 1992    S/P mitral valve clip implantation     Other Visit Diagnoses       Severe aortic stenosis              She has significant residual mitral regurgitation, paradoxical low-flow low gradient aortic stenosis, lifestyle-limiting dyspnea NYHA class III stage C symptoms.  Reviewed her echocardiograms, GOMEZ in multidisciplinary valve conference, aortic valve appears very narrowed visually with low gradients, dimensional index is 0.25, calculated valve area 0.7 cm².  Aortic valve  calcium score 1235, consistent with severe aortic valve calcification.    Discussed at length with the patient, given her lifestyle-limiting symptoms she would like to have some sort of intervention done.  I feel she is a good candidate for transcatheter aortic valve replacement.  We will proceed with rest of the work-up with coronary angiogram.  Mitral regurgitation most likely improve after aortic valve replacement.  Risk benefits of transcatheter aortic valve replacement discussed in detail.      This note was dictated using Dragon speech recognition software.    Cesar LEDESMA  Interventional cardiologist  [facility], [address]  [address] [address]  Phone: [phone]  Fax: [phone]

## 2023-10-24 NOTE — PROGRESS NOTES
Valve Program Functional Assessment:     KCCQ12   1a) Showering/bathin  1b) Walking 1 block on ground: 1  1c) Hurrying or joggin  2) Swellin  3) Fatigue: 4  4) Shortness of breath: 1  5) Sleep sitting up: 5  6) Limited enjoyment of life: 2  7) Spend the rest of your life with HF: 1  8a) Hobbies, recreational activities:2  8b) Working or doing household chores:2  8c) Visiting family or friends: 2    5 meter walk test  Unable to walk the distance, needed her scooter      Strength   1) _22_____ kg  2) _22_____ kg  3) _20_____ kg  AVG:__21.3____    KING ADLs  Patient independently preforms...   - Bathing: Yes   - Dressing: Yes   - Toileting: Yes   - Transferring: Yes   - Continence: Yes   - Feeding: Yes   Total Score: _6_/6    Living Situation  Patient lives:  with adult child or relative    Mobility Aids   Patient uses: cane, walker, motorized scooter     FRAILTY SCORE: _1_/ 4

## 2023-10-25 ENCOUNTER — OFFICE VISIT (OUTPATIENT)
Dept: CARDIOTHORACIC SURGERY | Facility: MEDICAL CENTER | Age: 83
End: 2023-10-25
Payer: MEDICARE

## 2023-10-25 ENCOUNTER — APPOINTMENT (OUTPATIENT)
Dept: ADMISSIONS | Facility: MEDICAL CENTER | Age: 83
End: 2023-10-25
Attending: INTERNAL MEDICINE
Payer: MEDICARE

## 2023-10-25 VITALS
SYSTOLIC BLOOD PRESSURE: 102 MMHG | WEIGHT: 200 LBS | BODY MASS INDEX: 37.76 KG/M2 | TEMPERATURE: 98.4 F | DIASTOLIC BLOOD PRESSURE: 64 MMHG | HEIGHT: 61 IN | OXYGEN SATURATION: 96 % | HEART RATE: 64 BPM

## 2023-10-25 DIAGNOSIS — I35.0 SEVERE AORTIC STENOSIS: ICD-10-CM

## 2023-10-25 PROCEDURE — 3078F DIAST BP <80 MM HG: CPT | Performed by: THORACIC SURGERY (CARDIOTHORACIC VASCULAR SURGERY)

## 2023-10-25 PROCEDURE — 3074F SYST BP LT 130 MM HG: CPT | Performed by: THORACIC SURGERY (CARDIOTHORACIC VASCULAR SURGERY)

## 2023-10-25 PROCEDURE — 99205 OFFICE O/P NEW HI 60 MIN: CPT | Performed by: THORACIC SURGERY (CARDIOTHORACIC VASCULAR SURGERY)

## 2023-10-25 ASSESSMENT — ENCOUNTER SYMPTOMS
CONSTIPATION: 0
HEMOPTYSIS: 0
WEAKNESS: 0
HEADACHES: 0
SEIZURES: 0
BRUISES/BLEEDS EASILY: 0
DIZZINESS: 0
WEIGHT LOSS: 0
NAUSEA: 0
CHILLS: 0
ABDOMINAL PAIN: 0
EYE PAIN: 0
DOUBLE VISION: 0
WHEEZING: 0
DIAPHORESIS: 0
SPUTUM PRODUCTION: 0
FOCAL WEAKNESS: 0
PALPITATIONS: 0
DEPRESSION: 0
HEARTBURN: 0
FEVER: 0
SPEECH CHANGE: 0
ORTHOPNEA: 0
EYE DISCHARGE: 0
VOMITING: 0
POLYDIPSIA: 0
FLANK PAIN: 0
NECK PAIN: 0
STRIDOR: 0
BLURRED VISION: 0
SHORTNESS OF BREATH: 1
MYALGIAS: 0
COUGH: 0
SORE THROAT: 0
HALLUCINATIONS: 0

## 2023-10-25 ASSESSMENT — FIBROSIS 4 INDEX: FIB4 SCORE: 1.909188309203678316

## 2023-10-25 ASSESSMENT — LIFESTYLE VARIABLES: SUBSTANCE_ABUSE: 0

## 2023-10-26 ENCOUNTER — PRE-ADMISSION TESTING (OUTPATIENT)
Dept: ADMISSIONS | Facility: MEDICAL CENTER | Age: 83
End: 2023-10-26
Attending: INTERNAL MEDICINE
Payer: MEDICARE

## 2023-10-26 ENCOUNTER — DOCUMENTATION (OUTPATIENT)
Dept: CARDIOLOGY | Facility: MEDICAL CENTER | Age: 83
End: 2023-10-26
Payer: MEDICARE

## 2023-10-26 NOTE — PROGRESS NOTES
Valve Program Consultation: 10/24/2023 for tentative TAVR TBD    Mental Status Assessment:  Appearance: very edematous, appropriately dressed for the climate, in a motorized chair  Behavior: normal  Mood/Affect: flat affect, gloomy d/t symptoms and limitations   Thought process/content: normal  Cognition: normal  Functional ability: very limited d/t symptoms, frailty  Dental Concerns: full upper and lower dentures, patient denies s/s of active oral infection   Further mental assessment needed: no    Post-op Plan of Care:  Support systems: daughter Yamilka present with patient and participating in discussion; daughter Zack  Patient understands discharge plan: yes  DME: motorized chair, CPAP  Home health warranted prior to procedure: no  Social concerns for discharge: none  PCP alerted of social concerns: no  POLST: none  Advance directive: not on file, patient thinks she has one at home  Post-op goal: improve SOB, live longer  Medication instructions: hold losartan x24hrs, hold furosemide AM of procedure    Concerns prior to procedure: CKD 4, mod MR/TR, normal EF with low AV area; acute HF? (Perla KELLER referred to HF)    Met with patient during New TAVR consult.     All patient's questions and concerns were addressed during this visit. They understood pre-operative and post-operative plan of care.    Reviewed patient TAVR education packet explaining that information is provided regarding preparation for TAVR the night prior, what to expect during the hospital stay, average LOS, and what to look out for post TAVR discharge. Explained that patient will require SBE prophylactic antibiotic prior to any dental treatment post TAVR. Advised patient not to receive any new vaccinations within 1 week pre- and 1 week post-procedure.     Explained that patient should not eat or drink anything past midnight the day of the procedure. Encouraged patient to wear something clean and comfortable, easy to get on/off to check in  Monday morning, time TBD. Patient may have friends and family in the pre-operational area until patient is taken to the operating room, at which time family and friends will be asked to wait on floor 1 [facility] surgical waiting area. On completion of TAVR, heart team will update family. Once update is given, there is some time before family/friends may visit patient in their assigned room. Length of stay on average is one night, however patient may stay longer depending on specific needs at that time. Patient given printed instructions sheet with all above stated instructions. Patient states understanding of all material and education presented today and has no further questions at this time. Encouraged patient again, to contact me with any questions or concerns during this work-up process. Patient states understanding.

## 2023-10-31 ENCOUNTER — HOSPITAL ENCOUNTER (OUTPATIENT)
Facility: MEDICAL CENTER | Age: 83
End: 2023-10-31
Attending: INTERNAL MEDICINE | Admitting: INTERNAL MEDICINE
Payer: MEDICARE

## 2023-10-31 ENCOUNTER — DOCUMENTATION (OUTPATIENT)
Dept: CARDIOLOGY | Facility: MEDICAL CENTER | Age: 83
End: 2023-10-31

## 2023-10-31 ENCOUNTER — APPOINTMENT (OUTPATIENT)
Dept: CARDIOLOGY | Facility: MEDICAL CENTER | Age: 83
End: 2023-10-31
Attending: INTERNAL MEDICINE
Payer: MEDICARE

## 2023-10-31 VITALS
HEART RATE: 86 BPM | OXYGEN SATURATION: 93 % | TEMPERATURE: 97 F | WEIGHT: 201.94 LBS | BODY MASS INDEX: 37.16 KG/M2 | RESPIRATION RATE: 18 BRPM | HEIGHT: 62 IN | SYSTOLIC BLOOD PRESSURE: 131 MMHG | DIASTOLIC BLOOD PRESSURE: 67 MMHG

## 2023-10-31 DIAGNOSIS — I35.0 NONRHEUMATIC AORTIC VALVE STENOSIS: ICD-10-CM

## 2023-10-31 DIAGNOSIS — Z01.810 PRE-PROCEDURAL CARDIOVASCULAR EXAMINATION: ICD-10-CM

## 2023-10-31 LAB
ALBUMIN SERPL BCP-MCNC: 4.4 G/DL (ref 3.2–4.9)
ALBUMIN/GLOB SERPL: 1.5 G/DL
ALP SERPL-CCNC: 113 U/L (ref 30–99)
ALT SERPL-CCNC: 14 U/L (ref 2–50)
ANION GAP SERPL CALC-SCNC: 16 MMOL/L (ref 7–16)
APTT PPP: 37 SEC (ref 24.7–36)
AST SERPL-CCNC: 18 U/L (ref 12–45)
BILIRUB SERPL-MCNC: 1 MG/DL (ref 0.1–1.5)
BUN SERPL-MCNC: 29 MG/DL (ref 8–22)
CALCIUM ALBUM COR SERPL-MCNC: 9.2 MG/DL (ref 8.5–10.5)
CALCIUM SERPL-MCNC: 9.5 MG/DL (ref 8.5–10.5)
CHLORIDE SERPL-SCNC: 103 MMOL/L (ref 96–112)
CO2 SERPL-SCNC: 23 MMOL/L (ref 20–33)
CREAT SERPL-MCNC: 1.82 MG/DL (ref 0.5–1.4)
EKG IMPRESSION: NORMAL
ERYTHROCYTE [DISTWIDTH] IN BLOOD BY AUTOMATED COUNT: 53.1 FL (ref 35.9–50)
GFR SERPLBLD CREATININE-BSD FMLA CKD-EPI: 27 ML/MIN/1.73 M 2
GLOBULIN SER CALC-MCNC: 2.9 G/DL (ref 1.9–3.5)
GLUCOSE SERPL-MCNC: 111 MG/DL (ref 65–99)
HCT VFR BLD AUTO: 35.1 % (ref 37–47)
HGB BLD-MCNC: 11 G/DL (ref 12–16)
INR PPP: 1.12 (ref 0.87–1.13)
MCH RBC QN AUTO: 29.8 PG (ref 27–33)
MCHC RBC AUTO-ENTMCNC: 31.3 G/DL (ref 32.2–35.5)
MCV RBC AUTO: 95.1 FL (ref 81.4–97.8)
PLATELET # BLD AUTO: 218 K/UL (ref 164–446)
PMV BLD AUTO: 11 FL (ref 9–12.9)
POTASSIUM SERPL-SCNC: 3.3 MMOL/L (ref 3.6–5.5)
PROT SERPL-MCNC: 7.3 G/DL (ref 6–8.2)
PROTHROMBIN TIME: 14.6 SEC (ref 12–14.6)
RBC # BLD AUTO: 3.69 M/UL (ref 4.2–5.4)
SODIUM SERPL-SCNC: 142 MMOL/L (ref 135–145)
WBC # BLD AUTO: 7.2 K/UL (ref 4.8–10.8)

## 2023-10-31 PROCEDURE — 700117 HCHG RX CONTRAST REV CODE 255: Performed by: INTERNAL MEDICINE

## 2023-10-31 PROCEDURE — 80053 COMPREHEN METABOLIC PANEL: CPT

## 2023-10-31 PROCEDURE — 700101 HCHG RX REV CODE 250

## 2023-10-31 PROCEDURE — 99152 MOD SED SAME PHYS/QHP 5/>YRS: CPT | Performed by: INTERNAL MEDICINE

## 2023-10-31 PROCEDURE — 160047 HCHG PACU  - EA ADDL 30 MINS PHASE II

## 2023-10-31 PROCEDURE — G0278 ILIAC ART ANGIO,CARDIAC CATH: HCPCS | Performed by: INTERNAL MEDICINE

## 2023-10-31 PROCEDURE — 93010 ELECTROCARDIOGRAM REPORT: CPT | Performed by: INTERNAL MEDICINE

## 2023-10-31 PROCEDURE — 160036 HCHG PACU - EA ADDL 30 MINS PHASE I

## 2023-10-31 PROCEDURE — 85610 PROTHROMBIN TIME: CPT

## 2023-10-31 PROCEDURE — 160046 HCHG PACU - 1ST 60 MINS PHASE II

## 2023-10-31 PROCEDURE — 85730 THROMBOPLASTIN TIME PARTIAL: CPT

## 2023-10-31 PROCEDURE — 93459 L HRT ART/GRFT ANGIO: CPT | Mod: 26 | Performed by: INTERNAL MEDICINE

## 2023-10-31 PROCEDURE — 160035 HCHG PACU - 1ST 60 MINS PHASE I

## 2023-10-31 PROCEDURE — 700111 HCHG RX REV CODE 636 W/ 250 OVERRIDE (IP): Mod: JZ

## 2023-10-31 PROCEDURE — 160002 HCHG RECOVERY MINUTES (STAT)

## 2023-10-31 PROCEDURE — 99153 MOD SED SAME PHYS/QHP EA: CPT

## 2023-10-31 PROCEDURE — 85027 COMPLETE CBC AUTOMATED: CPT

## 2023-10-31 PROCEDURE — 93005 ELECTROCARDIOGRAM TRACING: CPT | Performed by: INTERNAL MEDICINE

## 2023-10-31 RX ORDER — LIDOCAINE HYDROCHLORIDE 20 MG/ML
INJECTION, SOLUTION INFILTRATION; PERINEURAL
Status: COMPLETED
Start: 2023-10-31 | End: 2023-10-31

## 2023-10-31 RX ORDER — MIDAZOLAM HYDROCHLORIDE 1 MG/ML
INJECTION INTRAMUSCULAR; INTRAVENOUS
Status: COMPLETED
Start: 2023-10-31 | End: 2023-10-31

## 2023-10-31 RX ORDER — VERAPAMIL HYDROCHLORIDE 2.5 MG/ML
INJECTION, SOLUTION INTRAVENOUS
Status: COMPLETED
Start: 2023-10-31 | End: 2023-10-31

## 2023-10-31 RX ORDER — HEPARIN SODIUM 1000 [USP'U]/ML
INJECTION, SOLUTION INTRAVENOUS; SUBCUTANEOUS
Status: COMPLETED
Start: 2023-10-31 | End: 2023-10-31

## 2023-10-31 RX ORDER — HEPARIN SODIUM 200 [USP'U]/100ML
INJECTION, SOLUTION INTRAVENOUS
Status: COMPLETED
Start: 2023-10-31 | End: 2023-10-31

## 2023-10-31 RX ORDER — SODIUM CHLORIDE 9 MG/ML
INJECTION, SOLUTION INTRAVENOUS CONTINUOUS
Status: DISCONTINUED | OUTPATIENT
Start: 2023-10-31 | End: 2023-10-31 | Stop reason: HOSPADM

## 2023-10-31 RX ADMIN — VERAPAMIL HYDROCHLORIDE 5 MG: 2.5 INJECTION, SOLUTION INTRAVENOUS at 08:55

## 2023-10-31 RX ADMIN — NITROGLYCERIN 10 ML: 20 INJECTION INTRAVENOUS at 08:56

## 2023-10-31 RX ADMIN — LIDOCAINE HYDROCHLORIDE: 20 INJECTION, SOLUTION INFILTRATION; PERINEURAL at 08:55

## 2023-10-31 RX ADMIN — IOHEXOL 35 ML: 350 INJECTION, SOLUTION INTRAVENOUS at 09:43

## 2023-10-31 RX ADMIN — FENTANYL CITRATE 100 MCG: 50 INJECTION, SOLUTION INTRAMUSCULAR; INTRAVENOUS at 09:18

## 2023-10-31 RX ADMIN — HEPARIN SODIUM: 1000 INJECTION, SOLUTION INTRAVENOUS; SUBCUTANEOUS at 08:55

## 2023-10-31 RX ADMIN — HEPARIN SODIUM 2000 UNITS: 200 INJECTION, SOLUTION INTRAVENOUS at 08:56

## 2023-10-31 RX ADMIN — MIDAZOLAM HYDROCHLORIDE 2 MG: 1 INJECTION, SOLUTION INTRAMUSCULAR; INTRAVENOUS at 09:18

## 2023-10-31 ASSESSMENT — PAIN DESCRIPTION - PAIN TYPE
TYPE: SURGICAL PAIN

## 2023-10-31 ASSESSMENT — FIBROSIS 4 INDEX
FIB4 SCORE: 1.909188309203678316
FIB4 SCORE: 1.909188309203678316

## 2023-10-31 NOTE — DISCHARGE INSTRUCTIONS
"HOME CARE INSTRUCTIONS    ACTIVITY: Rest and take it easy for the first 24 hours.  A responsible adult is recommended to remain with you during that time.  It is normal to feel sleepy.  We encourage you to not do anything that requires balance, judgment or coordination.    FOR 24 HOURS DO NOT:  Drive, operate machinery or run household appliances.  Drink beer or alcoholic beverages.  Make important decisions or sign legal documents.    SPECIAL INSTRUCTIONS: Post Angiogram Groin Care Instructions    INSTRUCTIONS:    1.  Examine (look and feel) the site of your heart catheterization TODAY so you can recognize changes that should be called to your doctor.  2.  Avoid straining either by lifting or pulling objects for 4-5 days.  Avoid lifting over 5 pounds.   3.  For at least 72 hours, if you should sneeze or cough, please hold pressure over your groin area.    4.  If you should begin to have oozing from the catheterization site, please hold firm pressure and call your doctor's office immediately.  5.  If profuse bleeding occurs from the catheterization site, hold firm pressure and call \"[phone]\" immediately for assistance.  6.  Remove bandage after 24 hours.    ACTIVITY  1.  Limit activity as instructed by your doctor.  2.  No driving or very limited driving with frequent stops for one week.  3.  If you must take a long care ride, stop every hour and walk around the car.  4.  Warm showers or baths are permitted after the bandage is removed.  Avoid hot showers or baths for one week.      PLEASE CALL YOUR DOCTOR IF:    1.  Temperature elevation occurs.  2.  Catheterization site becomes reddened or begins to drain.  3.  Bruising appears to be new or not resolving.  (The bruise may move down your leg, this is normal.)  4.   The small round lump in the groin increases in size.  5.  Any leg numbness, aching, or discomfort (immediately).  6.  Increasing discomfort in the leg at the insertion site.    DIET: To avoid nausea, slowly " advance diet as tolerated, avoiding spicy or greasy foods for the first day.  Add more substantial food to your diet according to your physician's instructions.  Babies can be fed formula or breast milk as soon as they are hungry.  INCREASE FLUIDS AND FIBER TO AVOID CONSTIPATION.    SURGICAL DRESSING/BATHING: may remove dressing in 24 hours and then shower    MEDICATIONS: Resume taking daily medication.  Take prescribed pain medication with food.  If no medication is prescribed, you may take non-aspirin pain medication if needed.  PAIN MEDICATION CAN BE VERY CONSTIPATING.  Take a stool softener or laxative such as senokot, pericolace, or milk of magnesia if needed.    A follow-up appointment should be arranged with your doctor; call to schedule.    You should CALL YOUR PHYSICIAN if you develop:  Fever greater than 101 degrees F.  Pain not relieved by medication, or persistent nausea or vomiting.  Excessive bleeding (blood soaking through dressing) or unexpected drainage from the wound.  Extreme redness or swelling around the incision site, drainage of pus or foul smelling drainage.  Inability to urinate or empty your bladder within 8 hours.  Problems with breathing or chest pain.    You should call 911 if you develop problems with breathing or chest pain.  If you are unable to contact your doctor or surgical center, you should go to the nearest emergency room or urgent care center.  Physician's telephone #: [phone]    MILD FLU-LIKE SYMPTOMS ARE NORMAL.  YOU MAY EXPERIENCE GENERALIZED MUSCLE ACHES, THROAT IRRITATION, HEADACHE AND/OR SOME NAUSEA.    If any questions arise, call your doctor.  If your doctor is not available, please feel free to call the Surgical Center at [phone].  The Center is open Monday through Friday from 7AM to 7PM.      A registered nurse may call you a few days after your surgery to see how you are doing after your procedure.    You may also receive a survey in the mail within the  next two weeks and we ask that you take a few moments to complete the survey and return it to us.  Our goal is to provide you with very good care and we value your comments.     Depression / Suicide Risk    As you are discharged from this [facility] facility, it is important to learn how to keep safe from harming yourself.    Recognize the warning signs:  Abrupt changes in personality, positive or negative- including increase in energy   Giving away possessions  Change in eating patterns- significant weight changes-  positive or negative  Change in sleeping patterns- unable to sleep or sleeping all the time   Unwillingness or inability to communicate  Depression  Unusual sadness, discouragement and loneliness  Talk of wanting to die  Neglect of personal appearance   Rebelliousness- reckless behavior  Withdrawal from people/activities they love  Confusion- inability to concentrate     If you or a loved one observes any of these behaviors or has concerns about self-harm, here's what you can do:  Talk about it- your feelings and reasons for harming yourself  Remove any means that you might use to hurt yourself (examples: pills, rope, extension cords, firearm)  Get professional help from the community (Mental Health, Substance Abuse, psychological counseling)  Do not be alone:Call your Safe Contact- someone whom you trust who will be there for you.  Call your local CRISIS HOTLINE 108-6057 or 010-096-8044  Call your local Children's Mobile Crisis Response Team [place] (536) 133-9081 or www.Imcompany  Call the toll free National Suicide Prevention Hotlines   National Suicide Prevention Lifeline 274-970-VJMA (9646)  National Hope Line Network 800-SUICIDE (271-8766)    I acknowledge receipt and understanding of these Home Care instructions.

## 2023-10-31 NOTE — PROGRESS NOTES
Escobar TAVR Review:    Non con CTs are suboptimal. Please use BAV/GOMEZ to confirm sizing. Consider a 26 S3U Resilia from a right femoral approach (may need to PTA).  LVH  Annular calcium extending into the LVOT  Moderate calcium in small vessels (Iliacs bilaterally)  L17, Ca28

## 2023-11-01 ENCOUNTER — DOCUMENTATION (OUTPATIENT)
Dept: CARDIOLOGY | Facility: MEDICAL CENTER | Age: 83
End: 2023-11-01
Payer: MEDICARE

## 2023-11-01 ENCOUNTER — TELEPHONE (OUTPATIENT)
Dept: CARDIOLOGY | Facility: MEDICAL CENTER | Age: 83
End: 2023-11-01
Payer: MEDICARE

## 2023-11-01 NOTE — TELEPHONE ENCOUNTER
Valve Conference Plan of Care: 11/1/2023 for TAVR 11/6/2023           TAVR Candidate: yes  Access: right femoral  Valve Size: TBD  General Anesthesia or MAC: GA with GOMEZ for valve sizing   Unit: telemetry   Incidental findings to be discussed with PCP: sent to Dr. Lorie Pink: multiple areas of linea atelectasis/scarring, several micro-nodules bilaterally, interlobular septal thickening in lower lungs, hepatomegaly/nodular hepatic contour (cirrhosis?), colonic diverticulosis, and spondylosis.   Clearance needed prior to procedure: no    Check in time of 0700 11/6/2023.     Pre-op appointment completed: yes    NPO after midnight. Hold all vitamins/supplements x7 days, hold losartan x24hrs, hold furosemide AM of procedure.

## 2023-11-01 NOTE — PROGRESS NOTES
Thank you for the opportunity to participate in your patient's care.     Your patient is scheduled for transcatheter aortic valve replacement (TAVR) on 11/6/2023    Sincerely,    [facility]    MAURY Holland, RN, Structural Heart Program Nurse Coordinator ([phone])  MAURY Vyas, RN, Structural Heart Program Nurse Coordinator ([phone])

## 2023-11-01 NOTE — TELEPHONE ENCOUNTER
Message from Km that patient has more questions regarding her medication instructions.    Called patient back and answered her questions.

## 2023-11-02 DIAGNOSIS — I35.0 SEVERE AORTIC STENOSIS: ICD-10-CM

## 2023-11-06 ENCOUNTER — ANESTHESIA EVENT (OUTPATIENT)
Dept: SURGERY | Facility: MEDICAL CENTER | Age: 83
DRG: 266 | End: 2023-11-06
Payer: MEDICARE

## 2023-11-06 ENCOUNTER — APPOINTMENT (OUTPATIENT)
Dept: RADIOLOGY | Facility: MEDICAL CENTER | Age: 83
DRG: 266 | End: 2023-11-06
Attending: NURSE PRACTITIONER
Payer: MEDICARE

## 2023-11-06 ENCOUNTER — APPOINTMENT (OUTPATIENT)
Dept: CARDIOLOGY | Facility: MEDICAL CENTER | Age: 83
DRG: 266 | End: 2023-11-06
Attending: ANESTHESIOLOGY
Payer: MEDICARE

## 2023-11-06 ENCOUNTER — HOSPITAL ENCOUNTER (INPATIENT)
Facility: MEDICAL CENTER | Age: 83
LOS: 1 days | DRG: 266 | End: 2023-11-07
Attending: INTERNAL MEDICINE | Admitting: INTERNAL MEDICINE
Payer: MEDICARE

## 2023-11-06 ENCOUNTER — ANESTHESIA (OUTPATIENT)
Dept: SURGERY | Facility: MEDICAL CENTER | Age: 83
DRG: 266 | End: 2023-11-06
Payer: MEDICARE

## 2023-11-06 DIAGNOSIS — I50.33 ACUTE ON CHRONIC DIASTOLIC HEART FAILURE (HCC): ICD-10-CM

## 2023-11-06 DIAGNOSIS — E03.9 HYPOTHYROIDISM, UNSPECIFIED TYPE: ICD-10-CM

## 2023-11-06 DIAGNOSIS — D50.0 IRON DEFICIENCY ANEMIA DUE TO CHRONIC BLOOD LOSS: ICD-10-CM

## 2023-11-06 DIAGNOSIS — Z95.2 S/P TAVR (TRANSCATHETER AORTIC VALVE REPLACEMENT): ICD-10-CM

## 2023-11-06 DIAGNOSIS — E03.9 IDIOPATHIC HYPOTHYROIDISM: ICD-10-CM

## 2023-11-06 DIAGNOSIS — D63.8 ANEMIA OF CHRONIC DISEASE: ICD-10-CM

## 2023-11-06 PROBLEM — I31.39 PERICARDIAL EFFUSION WITH CARDIAC TAMPONADE: Status: RESOLVED | Noted: 2023-07-26 | Resolved: 2023-11-06

## 2023-11-06 PROBLEM — I48.11 LONGSTANDING PERSISTENT ATRIAL FIBRILLATION (HCC): Status: ACTIVE | Noted: 2017-07-14

## 2023-11-06 PROBLEM — D50.9 IRON DEFICIENCY ANEMIA: Status: RESOLVED | Noted: 2023-05-30 | Resolved: 2023-11-06

## 2023-11-06 PROBLEM — D62 ACUTE BLOOD LOSS ANEMIA: Status: RESOLVED | Noted: 2023-01-23 | Resolved: 2023-11-06

## 2023-11-06 PROBLEM — D68.69 SECONDARY HYPERCOAGULABLE STATE (HCC): Status: RESOLVED | Noted: 2023-08-21 | Resolved: 2023-11-06

## 2023-11-06 PROBLEM — I46.9 CARDIAC ARREST (HCC): Status: RESOLVED | Noted: 2023-07-26 | Resolved: 2023-11-06

## 2023-11-06 PROBLEM — R73.9 HYPERGLYCEMIA: Status: RESOLVED | Noted: 2022-11-16 | Resolved: 2023-11-06

## 2023-11-06 PROBLEM — I35.0 SEVERE AORTIC STENOSIS: Status: RESOLVED | Noted: 2023-02-18 | Resolved: 2023-11-06

## 2023-11-06 PROBLEM — Z92.89 HISTORY OF RECENT BLOOD TRANSFUSION: Status: RESOLVED | Noted: 2023-04-10 | Resolved: 2023-11-06

## 2023-11-06 PROBLEM — D64.89 ANEMIA DUE TO MULTIPLE MECHANISMS: Status: RESOLVED | Noted: 2023-09-21 | Resolved: 2023-11-06

## 2023-11-06 PROBLEM — I31.4 PERICARDIAL EFFUSION WITH CARDIAC TAMPONADE: Status: RESOLVED | Noted: 2023-07-26 | Resolved: 2023-11-06

## 2023-11-06 LAB
ABO GROUP BLD: NORMAL
ACT BLD: 221 SEC (ref 74–137)
ALBUMIN SERPL BCP-MCNC: 3.4 G/DL (ref 3.2–4.9)
ALBUMIN/GLOB SERPL: 1.4 G/DL
ALP SERPL-CCNC: 92 U/L (ref 30–99)
ALT SERPL-CCNC: 10 U/L (ref 2–50)
ANION GAP SERPL CALC-SCNC: 14 MMOL/L (ref 7–16)
AST SERPL-CCNC: 16 U/L (ref 12–45)
BILIRUB SERPL-MCNC: 0.9 MG/DL (ref 0.1–1.5)
BLD GP AB SCN SERPL QL: NORMAL
BUN SERPL-MCNC: 26 MG/DL (ref 8–22)
CALCIUM ALBUM COR SERPL-MCNC: 9 MG/DL (ref 8.5–10.5)
CALCIUM SERPL-MCNC: 8.5 MG/DL (ref 8.5–10.5)
CHLORIDE SERPL-SCNC: 104 MMOL/L (ref 96–112)
CO2 SERPL-SCNC: 22 MMOL/L (ref 20–33)
CREAT SERPL-MCNC: 1.74 MG/DL (ref 0.5–1.4)
ERYTHROCYTE [DISTWIDTH] IN BLOOD BY AUTOMATED COUNT: 52.3 FL (ref 35.9–50)
GFR SERPLBLD CREATININE-BSD FMLA CKD-EPI: 29 ML/MIN/1.73 M 2
GLOBULIN SER CALC-MCNC: 2.4 G/DL (ref 1.9–3.5)
GLUCOSE SERPL-MCNC: 125 MG/DL (ref 65–99)
HCT VFR BLD AUTO: 28.7 % (ref 37–47)
HGB BLD-MCNC: 9 G/DL (ref 12–16)
MCH RBC QN AUTO: 29.4 PG (ref 27–33)
MCHC RBC AUTO-ENTMCNC: 31.4 G/DL (ref 32.2–35.5)
MCV RBC AUTO: 93.8 FL (ref 81.4–97.8)
NT-PROBNP SERPL IA-MCNC: 4562 PG/ML (ref 0–125)
NT-PROBNP SERPL IA-MCNC: 4937 PG/ML (ref 0–125)
PLATELET # BLD AUTO: 182 K/UL (ref 164–446)
PMV BLD AUTO: 11.1 FL (ref 9–12.9)
POTASSIUM SERPL-SCNC: 3.1 MMOL/L (ref 3.6–5.5)
PROT SERPL-MCNC: 5.8 G/DL (ref 6–8.2)
RBC # BLD AUTO: 3.06 M/UL (ref 4.2–5.4)
RH BLD: NORMAL
SODIUM SERPL-SCNC: 140 MMOL/L (ref 135–145)
T3FREE SERPL-MCNC: 2.24 PG/ML (ref 2–4.4)
T4 FREE SERPL-MCNC: 1.66 NG/DL (ref 0.93–1.7)
T4 SERPL-MCNC: 9.8 UG/DL (ref 4–12)
TSH SERPL DL<=0.005 MIU/L-ACNC: 13.8 UIU/ML (ref 0.38–5.33)
WBC # BLD AUTO: 8.9 K/UL (ref 4.8–10.8)

## 2023-11-06 PROCEDURE — 71045 X-RAY EXAM CHEST 1 VIEW: CPT

## 2023-11-06 PROCEDURE — 84443 ASSAY THYROID STIM HORMONE: CPT

## 2023-11-06 PROCEDURE — 700111 HCHG RX REV CODE 636 W/ 250 OVERRIDE (IP): Performed by: INTERNAL MEDICINE

## 2023-11-06 PROCEDURE — 84439 ASSAY OF FREE THYROXINE: CPT

## 2023-11-06 PROCEDURE — 700105 HCHG RX REV CODE 258: Performed by: INTERNAL MEDICINE

## 2023-11-06 PROCEDURE — 047C3ZZ DILATION OF RIGHT COMMON ILIAC ARTERY, PERCUTANEOUS APPROACH: ICD-10-PCS | Performed by: THORACIC SURGERY (CARDIOTHORACIC VASCULAR SURGERY)

## 2023-11-06 PROCEDURE — 700101 HCHG RX REV CODE 250: Performed by: ANESTHESIOLOGY

## 2023-11-06 PROCEDURE — 160009 HCHG ANES TIME/MIN: Performed by: INTERNAL MEDICINE

## 2023-11-06 PROCEDURE — 160036 HCHG PACU - EA ADDL 30 MINS PHASE I: Performed by: INTERNAL MEDICINE

## 2023-11-06 PROCEDURE — C1887 CATHETER, GUIDING: HCPCS | Performed by: INTERNAL MEDICINE

## 2023-11-06 PROCEDURE — 160048 HCHG OR STATISTICAL LEVEL 1-5: Performed by: INTERNAL MEDICINE

## 2023-11-06 PROCEDURE — 86901 BLOOD TYPING SEROLOGIC RH(D): CPT

## 2023-11-06 PROCEDURE — 160031 HCHG SURGERY MINUTES - 1ST 30 MINS LEVEL 5: Performed by: INTERNAL MEDICINE

## 2023-11-06 PROCEDURE — C1769 GUIDE WIRE: HCPCS | Performed by: INTERNAL MEDICINE

## 2023-11-06 PROCEDURE — 700111 HCHG RX REV CODE 636 W/ 250 OVERRIDE (IP): Performed by: ANESTHESIOLOGY

## 2023-11-06 PROCEDURE — 160035 HCHG PACU - 1ST 60 MINS PHASE I: Performed by: INTERNAL MEDICINE

## 2023-11-06 PROCEDURE — A9270 NON-COVERED ITEM OR SERVICE: HCPCS | Performed by: NURSE PRACTITIONER

## 2023-11-06 PROCEDURE — 80053 COMPREHEN METABOLIC PANEL: CPT

## 2023-11-06 PROCEDURE — 700105 HCHG RX REV CODE 258: Performed by: NURSE PRACTITIONER

## 2023-11-06 PROCEDURE — C1725 CATH, TRANSLUMIN NON-LASER: HCPCS | Performed by: INTERNAL MEDICINE

## 2023-11-06 PROCEDURE — 33361 REPLACE AORTIC VALVE PERQ: CPT | Mod: 62,Q0 | Performed by: THORACIC SURGERY (CARDIOTHORACIC VASCULAR SURGERY)

## 2023-11-06 PROCEDURE — 85347 COAGULATION TIME ACTIVATED: CPT

## 2023-11-06 PROCEDURE — 110372 HCHG SHELL REV 278: Performed by: INTERNAL MEDICINE

## 2023-11-06 PROCEDURE — 83880 ASSAY OF NATRIURETIC PEPTIDE: CPT

## 2023-11-06 PROCEDURE — 700117 HCHG RX CONTRAST REV CODE 255: Performed by: INTERNAL MEDICINE

## 2023-11-06 PROCEDURE — C1760 CLOSURE DEV, VASC: HCPCS | Performed by: INTERNAL MEDICINE

## 2023-11-06 PROCEDURE — C1883 ADAPT/EXT, PACING/NEURO LEAD: HCPCS | Performed by: INTERNAL MEDICINE

## 2023-11-06 PROCEDURE — B245ZZ4 ULTRASONOGRAPHY OF LEFT HEART, TRANSESOPHAGEAL: ICD-10-PCS | Performed by: THORACIC SURGERY (CARDIOTHORACIC VASCULAR SURGERY)

## 2023-11-06 PROCEDURE — 160002 HCHG RECOVERY MINUTES (STAT): Performed by: INTERNAL MEDICINE

## 2023-11-06 PROCEDURE — 5A1223Z PERFORMANCE OF CARDIAC PACING, CONTINUOUS: ICD-10-PCS | Performed by: THORACIC SURGERY (CARDIOTHORACIC VASCULAR SURGERY)

## 2023-11-06 PROCEDURE — 85027 COMPLETE CBC AUTOMATED: CPT

## 2023-11-06 PROCEDURE — 84481 FREE ASSAY (FT-3): CPT

## 2023-11-06 PROCEDURE — C1751 CATH, INF, PER/CENT/MIDLINE: HCPCS | Performed by: INTERNAL MEDICINE

## 2023-11-06 PROCEDURE — 02RF38Z REPLACEMENT OF AORTIC VALVE WITH ZOOPLASTIC TISSUE, PERCUTANEOUS APPROACH: ICD-10-PCS | Performed by: THORACIC SURGERY (CARDIOTHORACIC VASCULAR SURGERY)

## 2023-11-06 PROCEDURE — 93005 ELECTROCARDIOGRAM TRACING: CPT | Performed by: NURSE PRACTITIONER

## 2023-11-06 PROCEDURE — 160042 HCHG SURGERY MINUTES - EA ADDL 1 MIN LEVEL 5: Performed by: INTERNAL MEDICINE

## 2023-11-06 PROCEDURE — 36415 COLL VENOUS BLD VENIPUNCTURE: CPT

## 2023-11-06 PROCEDURE — 503001 HCHG PERFUSION: Performed by: INTERNAL MEDICINE

## 2023-11-06 PROCEDURE — 5A2204Z RESTORATION OF CARDIAC RHYTHM, SINGLE: ICD-10-PCS | Performed by: THORACIC SURGERY (CARDIOTHORACIC VASCULAR SURGERY)

## 2023-11-06 PROCEDURE — B3101ZZ FLUOROSCOPY OF THORACIC AORTA USING LOW OSMOLAR CONTRAST: ICD-10-PCS | Performed by: THORACIC SURGERY (CARDIOTHORACIC VASCULAR SURGERY)

## 2023-11-06 PROCEDURE — 86900 BLOOD TYPING SEROLOGIC ABO: CPT

## 2023-11-06 PROCEDURE — 700102 HCHG RX REV CODE 250 W/ 637 OVERRIDE(OP): Performed by: NURSE PRACTITIONER

## 2023-11-06 PROCEDURE — 700111 HCHG RX REV CODE 636 W/ 250 OVERRIDE (IP): Mod: JZ | Performed by: NURSE PRACTITIONER

## 2023-11-06 PROCEDURE — C1894 INTRO/SHEATH, NON-LASER: HCPCS | Performed by: INTERNAL MEDICINE

## 2023-11-06 PROCEDURE — 33361 REPLACE AORTIC VALVE PERQ: CPT | Mod: 62,Q0 | Performed by: INTERNAL MEDICINE

## 2023-11-06 PROCEDURE — 700101 HCHG RX REV CODE 250: Performed by: INTERNAL MEDICINE

## 2023-11-06 PROCEDURE — 770020 HCHG ROOM/CARE - TELE (206)

## 2023-11-06 PROCEDURE — 93355 ECHO TRANSESOPHAGEAL (TEE): CPT

## 2023-11-06 PROCEDURE — 86850 RBC ANTIBODY SCREEN: CPT

## 2023-11-06 DEVICE — DEVICE CLSR 6FR HMST IMPL SLF STS PLUS ANGIOSEAL (10EA/CA): Type: IMPLANTABLE DEVICE | Site: GROIN | Status: FUNCTIONAL

## 2023-11-06 DEVICE — IMPLANTABLE DEVICE: Type: IMPLANTABLE DEVICE | Site: HEART | Status: FUNCTIONAL

## 2023-11-06 RX ORDER — HYDROMORPHONE HYDROCHLORIDE 1 MG/ML
0.1 INJECTION, SOLUTION INTRAMUSCULAR; INTRAVENOUS; SUBCUTANEOUS
Status: DISCONTINUED | OUTPATIENT
Start: 2023-11-06 | End: 2023-11-06 | Stop reason: HOSPADM

## 2023-11-06 RX ORDER — POTASSIUM CHLORIDE 20 MEQ/1
40 TABLET, EXTENDED RELEASE ORAL DAILY
Status: DISCONTINUED | OUTPATIENT
Start: 2023-11-06 | End: 2023-11-07 | Stop reason: HOSPADM

## 2023-11-06 RX ORDER — EPHEDRINE SULFATE 50 MG/ML
5 INJECTION, SOLUTION INTRAVENOUS
Status: DISCONTINUED | OUTPATIENT
Start: 2023-11-06 | End: 2023-11-06 | Stop reason: HOSPADM

## 2023-11-06 RX ORDER — PANTOPRAZOLE SODIUM 40 MG/1
40 TABLET, DELAYED RELEASE ORAL 2 TIMES DAILY
Status: DISCONTINUED | OUTPATIENT
Start: 2023-11-06 | End: 2023-11-06

## 2023-11-06 RX ORDER — ATORVASTATIN CALCIUM 40 MG/1
40 TABLET, FILM COATED ORAL EVERY EVENING
Status: DISCONTINUED | OUTPATIENT
Start: 2023-11-06 | End: 2023-11-07 | Stop reason: HOSPADM

## 2023-11-06 RX ORDER — OXYCODONE HCL 5 MG/5 ML
5 SOLUTION, ORAL ORAL
Status: DISCONTINUED | OUTPATIENT
Start: 2023-11-06 | End: 2023-11-06 | Stop reason: HOSPADM

## 2023-11-06 RX ORDER — ONDANSETRON 2 MG/ML
4 INJECTION INTRAMUSCULAR; INTRAVENOUS
Status: DISCONTINUED | OUTPATIENT
Start: 2023-11-06 | End: 2023-11-06 | Stop reason: HOSPADM

## 2023-11-06 RX ORDER — HYDRALAZINE HYDROCHLORIDE 20 MG/ML
5 INJECTION INTRAMUSCULAR; INTRAVENOUS
Status: DISCONTINUED | OUTPATIENT
Start: 2023-11-06 | End: 2023-11-06 | Stop reason: HOSPADM

## 2023-11-06 RX ORDER — LIDOCAINE HYDROCHLORIDE 20 MG/ML
INJECTION, SOLUTION INFILTRATION; PERINEURAL
Status: DISCONTINUED | OUTPATIENT
Start: 2023-11-06 | End: 2023-11-06 | Stop reason: HOSPADM

## 2023-11-06 RX ORDER — LEVOTHYROXINE SODIUM 0.1 MG/1
200 TABLET ORAL
Status: DISCONTINUED | OUTPATIENT
Start: 2023-11-07 | End: 2023-11-07 | Stop reason: HOSPADM

## 2023-11-06 RX ORDER — ONDANSETRON 2 MG/ML
INJECTION INTRAMUSCULAR; INTRAVENOUS PRN
Status: DISCONTINUED | OUTPATIENT
Start: 2023-11-06 | End: 2023-11-06 | Stop reason: SURG

## 2023-11-06 RX ORDER — HYDRALAZINE HYDROCHLORIDE 20 MG/ML
10 INJECTION INTRAMUSCULAR; INTRAVENOUS
Status: DISCONTINUED | OUTPATIENT
Start: 2023-11-06 | End: 2023-11-07 | Stop reason: HOSPADM

## 2023-11-06 RX ORDER — DIPHENHYDRAMINE HYDROCHLORIDE 50 MG/ML
25 INJECTION INTRAMUSCULAR; INTRAVENOUS EVERY 6 HOURS PRN
Status: DISCONTINUED | OUTPATIENT
Start: 2023-11-06 | End: 2023-11-07 | Stop reason: HOSPADM

## 2023-11-06 RX ORDER — HYDROMORPHONE HYDROCHLORIDE 1 MG/ML
0.4 INJECTION, SOLUTION INTRAMUSCULAR; INTRAVENOUS; SUBCUTANEOUS
Status: DISCONTINUED | OUTPATIENT
Start: 2023-11-06 | End: 2023-11-06 | Stop reason: HOSPADM

## 2023-11-06 RX ORDER — SODIUM CHLORIDE, SODIUM LACTATE, POTASSIUM CHLORIDE, CALCIUM CHLORIDE 600; 310; 30; 20 MG/100ML; MG/100ML; MG/100ML; MG/100ML
INJECTION, SOLUTION INTRAVENOUS CONTINUOUS
Status: DISCONTINUED | OUTPATIENT
Start: 2023-11-06 | End: 2023-11-06

## 2023-11-06 RX ORDER — LOSARTAN POTASSIUM 50 MG/1
50 TABLET ORAL EVERY EVENING
Status: DISCONTINUED | OUTPATIENT
Start: 2023-11-06 | End: 2023-11-07 | Stop reason: HOSPADM

## 2023-11-06 RX ORDER — ACETAMINOPHEN 325 MG/1
650 TABLET ORAL EVERY 6 HOURS PRN
Status: DISCONTINUED | OUTPATIENT
Start: 2023-11-06 | End: 2023-11-07 | Stop reason: HOSPADM

## 2023-11-06 RX ORDER — ONDANSETRON 2 MG/ML
4 INJECTION INTRAMUSCULAR; INTRAVENOUS EVERY 4 HOURS PRN
Status: DISCONTINUED | OUTPATIENT
Start: 2023-11-06 | End: 2023-11-07 | Stop reason: HOSPADM

## 2023-11-06 RX ORDER — BUPIVACAINE HYDROCHLORIDE 2.5 MG/ML
INJECTION, SOLUTION EPIDURAL; INFILTRATION; INTRACAUDAL
Status: DISCONTINUED | OUTPATIENT
Start: 2023-11-06 | End: 2023-11-06 | Stop reason: HOSPADM

## 2023-11-06 RX ORDER — FUROSEMIDE 10 MG/ML
40 INJECTION INTRAMUSCULAR; INTRAVENOUS
Status: DISCONTINUED | OUTPATIENT
Start: 2023-11-06 | End: 2023-11-07 | Stop reason: HOSPADM

## 2023-11-06 RX ORDER — AMOXICILLIN 250 MG
2 CAPSULE ORAL 2 TIMES DAILY
Status: DISCONTINUED | OUTPATIENT
Start: 2023-11-06 | End: 2023-11-07 | Stop reason: HOSPADM

## 2023-11-06 RX ORDER — CEFAZOLIN SODIUM 1 G/3ML
INJECTION, POWDER, FOR SOLUTION INTRAMUSCULAR; INTRAVENOUS PRN
Status: DISCONTINUED | OUTPATIENT
Start: 2023-11-06 | End: 2023-11-06 | Stop reason: SURG

## 2023-11-06 RX ORDER — OXYCODONE HCL 5 MG/5 ML
10 SOLUTION, ORAL ORAL
Status: DISCONTINUED | OUTPATIENT
Start: 2023-11-06 | End: 2023-11-06 | Stop reason: HOSPADM

## 2023-11-06 RX ORDER — DIPHENHYDRAMINE HCL 25 MG
25 TABLET ORAL NIGHTLY PRN
Status: DISCONTINUED | OUTPATIENT
Start: 2023-11-06 | End: 2023-11-07 | Stop reason: HOSPADM

## 2023-11-06 RX ORDER — LIDOCAINE HYDROCHLORIDE 40 MG/ML
SOLUTION TOPICAL PRN
Status: DISCONTINUED | OUTPATIENT
Start: 2023-11-06 | End: 2023-11-06 | Stop reason: SURG

## 2023-11-06 RX ORDER — BISACODYL 10 MG
10 SUPPOSITORY, RECTAL RECTAL
Status: DISCONTINUED | OUTPATIENT
Start: 2023-11-06 | End: 2023-11-07 | Stop reason: HOSPADM

## 2023-11-06 RX ORDER — SODIUM CHLORIDE, SODIUM LACTATE, POTASSIUM CHLORIDE, CALCIUM CHLORIDE 600; 310; 30; 20 MG/100ML; MG/100ML; MG/100ML; MG/100ML
INJECTION, SOLUTION INTRAVENOUS CONTINUOUS
Status: DISCONTINUED | OUTPATIENT
Start: 2023-11-06 | End: 2023-11-06 | Stop reason: HOSPADM

## 2023-11-06 RX ORDER — HEPARIN SODIUM,PORCINE 1000/ML
VIAL (ML) INJECTION PRN
Status: DISCONTINUED | OUTPATIENT
Start: 2023-11-06 | End: 2023-11-06 | Stop reason: SURG

## 2023-11-06 RX ORDER — POLYETHYLENE GLYCOL 3350 17 G/17G
1 POWDER, FOR SOLUTION ORAL
Status: DISCONTINUED | OUTPATIENT
Start: 2023-11-06 | End: 2023-11-07 | Stop reason: HOSPADM

## 2023-11-06 RX ORDER — FERROUS SULFATE 325(65) MG
650 TABLET ORAL
Status: DISCONTINUED | OUTPATIENT
Start: 2023-11-06 | End: 2023-11-07 | Stop reason: HOSPADM

## 2023-11-06 RX ORDER — SODIUM CHLORIDE 9 MG/ML
INJECTION, SOLUTION INTRAVENOUS CONTINUOUS
Status: ACTIVE | OUTPATIENT
Start: 2023-11-06 | End: 2023-11-06

## 2023-11-06 RX ORDER — DEXAMETHASONE SODIUM PHOSPHATE 4 MG/ML
INJECTION, SOLUTION INTRA-ARTICULAR; INTRALESIONAL; INTRAMUSCULAR; INTRAVENOUS; SOFT TISSUE PRN
Status: DISCONTINUED | OUTPATIENT
Start: 2023-11-06 | End: 2023-11-06 | Stop reason: SURG

## 2023-11-06 RX ORDER — OMEPRAZOLE 20 MG/1
20 CAPSULE, DELAYED RELEASE ORAL 2 TIMES DAILY
Status: DISCONTINUED | OUTPATIENT
Start: 2023-11-06 | End: 2023-11-07 | Stop reason: HOSPADM

## 2023-11-06 RX ORDER — HYDROMORPHONE HYDROCHLORIDE 1 MG/ML
0.2 INJECTION, SOLUTION INTRAMUSCULAR; INTRAVENOUS; SUBCUTANEOUS
Status: DISCONTINUED | OUTPATIENT
Start: 2023-11-06 | End: 2023-11-06 | Stop reason: HOSPADM

## 2023-11-06 RX ORDER — HALOPERIDOL 5 MG/ML
1 INJECTION INTRAMUSCULAR
Status: DISCONTINUED | OUTPATIENT
Start: 2023-11-06 | End: 2023-11-06 | Stop reason: HOSPADM

## 2023-11-06 RX ORDER — LIDOCAINE HYDROCHLORIDE 20 MG/ML
INJECTION, SOLUTION EPIDURAL; INFILTRATION; INTRACAUDAL; PERINEURAL PRN
Status: DISCONTINUED | OUTPATIENT
Start: 2023-11-06 | End: 2023-11-06 | Stop reason: HOSPADM

## 2023-11-06 RX ORDER — ASPIRIN 81 MG/1
81 TABLET ORAL DAILY
Status: DISCONTINUED | OUTPATIENT
Start: 2023-11-07 | End: 2023-11-07 | Stop reason: HOSPADM

## 2023-11-06 RX ADMIN — ATORVASTATIN CALCIUM 40 MG: 40 TABLET, FILM COATED ORAL at 17:20

## 2023-11-06 RX ADMIN — DOCUSATE SODIUM 50 MG AND SENNOSIDES 8.6 MG 2 TABLET: 8.6; 5 TABLET, FILM COATED ORAL at 17:20

## 2023-11-06 RX ADMIN — LOSARTAN POTASSIUM 50 MG: 50 TABLET, FILM COATED ORAL at 17:20

## 2023-11-06 RX ADMIN — SODIUM CHLORIDE: 9 INJECTION, SOLUTION INTRAVENOUS at 13:05

## 2023-11-06 RX ADMIN — SUGAMMADEX 200 MG: 100 INJECTION, SOLUTION INTRAVENOUS at 10:54

## 2023-11-06 RX ADMIN — SODIUM CHLORIDE, POTASSIUM CHLORIDE, SODIUM LACTATE AND CALCIUM CHLORIDE: 600; 310; 30; 20 INJECTION, SOLUTION INTRAVENOUS at 07:59

## 2023-11-06 RX ADMIN — PROPOFOL 150 MG: 10 INJECTION, EMULSION INTRAVENOUS at 09:44

## 2023-11-06 RX ADMIN — ACETAMINOPHEN 650 MG: 325 TABLET, FILM COATED ORAL at 15:24

## 2023-11-06 RX ADMIN — POTASSIUM CHLORIDE 40 MEQ: 1500 TABLET, EXTENDED RELEASE ORAL at 15:23

## 2023-11-06 RX ADMIN — CEFAZOLIN 2 G: 1 INJECTION, POWDER, FOR SOLUTION INTRAMUSCULAR; INTRAVENOUS at 09:46

## 2023-11-06 RX ADMIN — HEPARIN SODIUM 3000 UNITS: 1000 INJECTION, SOLUTION INTRAVENOUS; SUBCUTANEOUS at 10:32

## 2023-11-06 RX ADMIN — ONDANSETRON 4 MG: 2 INJECTION INTRAMUSCULAR; INTRAVENOUS at 09:46

## 2023-11-06 RX ADMIN — SODIUM CHLORIDE, POTASSIUM CHLORIDE, SODIUM LACTATE AND CALCIUM CHLORIDE: 600; 310; 30; 20 INJECTION, SOLUTION INTRAVENOUS at 09:38

## 2023-11-06 RX ADMIN — FERROUS SULFATE TAB 325 MG (65 MG ELEMENTAL FE) 650 MG: 325 (65 FE) TAB at 15:24

## 2023-11-06 RX ADMIN — LIDOCAINE HYDROCHLORIDE 4 ML: 40 SOLUTION TOPICAL at 09:44

## 2023-11-06 RX ADMIN — ROCURONIUM BROMIDE 50 MG: 10 INJECTION, SOLUTION INTRAVENOUS at 09:44

## 2023-11-06 RX ADMIN — OMEPRAZOLE 20 MG: 20 CAPSULE, DELAYED RELEASE ORAL at 17:20

## 2023-11-06 RX ADMIN — LIDOCAINE HYDROCHLORIDE 80 MG: 20 INJECTION, SOLUTION EPIDURAL; INFILTRATION; INTRACAUDAL at 09:44

## 2023-11-06 RX ADMIN — FUROSEMIDE 40 MG: 10 INJECTION, SOLUTION INTRAVENOUS at 16:59

## 2023-11-06 RX ADMIN — HEPARIN SODIUM 10000 UNITS: 1000 INJECTION, SOLUTION INTRAVENOUS; SUBCUTANEOUS at 10:19

## 2023-11-06 RX ADMIN — DEXAMETHASONE SODIUM PHOSPHATE 4 MG: 4 INJECTION INTRA-ARTICULAR; INTRALESIONAL; INTRAMUSCULAR; INTRAVENOUS; SOFT TISSUE at 09:46

## 2023-11-06 ASSESSMENT — COGNITIVE AND FUNCTIONAL STATUS - GENERAL
DAILY ACTIVITIY SCORE: 20
STANDING UP FROM CHAIR USING ARMS: A LITTLE
CLIMB 3 TO 5 STEPS WITH RAILING: A LOT
MOBILITY SCORE: 18
SUGGESTED CMS G CODE MODIFIER MOBILITY: CK
MOVING TO AND FROM BED TO CHAIR: A LITTLE
WALKING IN HOSPITAL ROOM: A LITTLE
TOILETING: A LITTLE
SUGGESTED CMS G CODE MODIFIER DAILY ACTIVITY: CJ
MOVING FROM LYING ON BACK TO SITTING ON SIDE OF FLAT BED: A LITTLE
HELP NEEDED FOR BATHING: A LITTLE
DRESSING REGULAR UPPER BODY CLOTHING: A LITTLE
DRESSING REGULAR LOWER BODY CLOTHING: A LITTLE

## 2023-11-06 ASSESSMENT — PAIN SCALES - GENERAL: PAIN_LEVEL: 0

## 2023-11-06 ASSESSMENT — LIFESTYLE VARIABLES
HAVE PEOPLE ANNOYED YOU BY CRITICIZING YOUR DRINKING: NO
ALCOHOL_USE: NO
TOTAL SCORE: 0
EVER FELT BAD OR GUILTY ABOUT YOUR DRINKING: NO
CONSUMPTION TOTAL: NEGATIVE
ON A TYPICAL DAY WHEN YOU DRINK ALCOHOL HOW MANY DRINKS DO YOU HAVE: 0
HOW MANY TIMES IN THE PAST YEAR HAVE YOU HAD 5 OR MORE DRINKS IN A DAY: 0
AVERAGE NUMBER OF DAYS PER WEEK YOU HAVE A DRINK CONTAINING ALCOHOL: 0
EVER HAD A DRINK FIRST THING IN THE MORNING TO STEADY YOUR NERVES TO GET RID OF A HANGOVER: NO
HAVE YOU EVER FELT YOU SHOULD CUT DOWN ON YOUR DRINKING: NO

## 2023-11-06 ASSESSMENT — FIBROSIS 4 INDEX
FIB4 SCORE: 1.79
FIB4 SCORE: 1.79

## 2023-11-06 ASSESSMENT — PATIENT HEALTH QUESTIONNAIRE - PHQ9
2. FEELING DOWN, DEPRESSED, IRRITABLE, OR HOPELESS: NOT AT ALL
SUM OF ALL RESPONSES TO PHQ9 QUESTIONS 1 AND 2: 0
1. LITTLE INTEREST OR PLEASURE IN DOING THINGS: NOT AT ALL

## 2023-11-06 ASSESSMENT — PAIN DESCRIPTION - PAIN TYPE
TYPE: ACUTE PAIN;SURGICAL PAIN
TYPE: CHRONIC PAIN

## 2023-11-06 NOTE — ANESTHESIA POSTPROCEDURE EVALUATION
Patient: Aleshia Crooks    Procedure Summary     Date: 11/06/23 Room / Location: [place] [address] / SURGERY [facility]    Anesthesia Start: 0938 Anesthesia Stop: 1106    Procedures:       TRANSCATHETER AORTIC VALVE REPLACEMENT (Bilateral: Groin)      ECHOCARDIOGRAM, TRANSESOPHAGEAL, INTRAOPERATIVE (Throat) Diagnosis: (SEVERE AORTIC STENOSIS)    Surgeons: Cesar Gray M.D. Responsible Provider: Saad Dewitt M.D.    Anesthesia Type: general ASA Status: 4          Final Anesthesia Type: general  Last vitals  BP   Blood Pressure : 121/50    Temp 98       Pulse   74   Resp   16    SpO2   94 %      Anesthesia Post Evaluation    Patient location during evaluation: PACU  Patient participation: complete - patient participated  Level of consciousness: awake  Pain score: 0    Airway patency: patent  Anesthetic complications: no  Cardiovascular status: adequate  Respiratory status: acceptable  Hydration status: stable    PONV: controlled          There were no known notable events for this encounter.     Nurse Pain Score: 0 (NPRS)

## 2023-11-06 NOTE — PROGRESS NOTES
Report received from PACU RN. Updated on POC.  Assumed care of patient upon arrival to unit. Patient currently A & O x 4; on 4 L O2 NC. Pt placed on monitor, monitor room notified. Patient declines pain. Patient oriented to unit and to call light system. Call light within reach. Pt on bed rest. Fall precautions in place. Pt provided with personal grooming items. Bed locked and in lowest position. All questions answered. No other needs indicated at this time.

## 2023-11-06 NOTE — PROCEDURES
DATE OF PROCEDURE: 11/6/23    PROCEDURES performed:  1. Transcatheter aortic valve replacement.  2. Insertion and removal of temporary pacer wire  3. Unsuccessful cardioversion    PRE PROCEDURAL DIAGNOSIS:  1. Severe symptomatic aortic stenosis, NYHA III.    Operators:  CT Surgeon:   Interventional cardiologist: Dr. Gray    DESCRIPTION OF PROCEDURE:  After informed consent was signed by the   patient, the patient was brought into the operating room, was prepped and draped in   usual sterile manner.  Timeout was performed.   General anaesthesia and   Transesophagealechocardiogram was provided by .    Primary Arterial access:   Right femoral artery was cannulated using modified Seldinger technique under ultrasound guidance, a 6 Lebanese sheath was inserted.  Right iliac artery dilated with 7 mm balloon. 2 Perclose sutures were placed, arteriotomy was serially dilated, 14Fr sheath was inserted in the femoral artery over a stiff Lunderquist wire.    Secondary arterial access:   Right radial artery was cannulated with 6 Lebanese sheath.  A pigtail catheter was advanced through 6 Lebanese sheath, aortic root angiogram was performed to determine coplanar view.    Venous Access:   Left femoral vein was cannulated with 6 Lebanese sheath, a temporary pacer wire was advanced to RV apex in usual fashion.    Through Escobar self-expandable sheath, AL-1 catheter was advanced to aortic root, straight wire was used to cross the aortic valve, a stiff Amplatz wire was placed in the LV apex and AL-1 catheter was withdrawn. 26 mm Escobar Darion 3 lauri was prepped in usual fashion, orientation was confirmed.  Sapient 3 valve was slowly advanced over a stiff Amplatz wire to aortic position.  Rapid pacing was achieved using temporary pacer wire, aortic root angiogram was performed and after confirming good positioning, valve was slowly deployed under fluoroscopic guidance leaving 1 cc in the barrel.Post procedure  echocardiogram showed no significant paravalvular leak, good valve positioning. Patient had Afib with RVR post valve deployment, did a cardioversion, but did not work, she went back to Afib right away but rates controlled after few minutes.  The patient tolerated the procedure well. At the end of procedure, all pacemaker wire,   pigtail catheters and sheaths were removed.  The primary arterial access site was closed with the PreClose system.  The secondary arterial access was closed via vasc-band. The patient was then extubated and transferred to PACU in stable condition.    Complications: none  Specimens: none  Estimated blood loss: <50cc      IMPRESSION:  Successful transcatheter aortic valve replacement using 26 mm Escobar sheldon 3 resilia Ultra valve with one less cc of volume.    RECOMMENDATION:   Guideline directed medical therapy.    Cesar Gray  Interventional cardiologist

## 2023-11-06 NOTE — OR SURGEON
OPERATIVE REPORT    Operation date: 11/6/2023    PreOp Diagnosis: Severe symptomatic aortic stenosis    PostOp Diagnosis: Severe symptomatic aortic stenosis    Procedure(s):  TRANSCATHETER AORTIC VALVE REPLACEMENT (TAVR 26 mm S3 Ultra Resilia Escobar bovine pericardial valve, -1 mL in balloon), right common iliac artery balloon angioplasty (7 mm balloon) and intraoperative transesophageal echocardiography    Surgeon(s):  HEAVENLY Pozo M.D.    Anesthesiologist/Type of Anesthesia:  Anesthesiologist: Saad Dewitt M.D./General    Surgical Staff:  Circulator: Walt Coto R.N.; Asael June R.N.  Perfusionist: Hawa Bui  Scrub Person: Nina Castellon  Radiology Technologist: Mackenzie Valadez; Grisell de La Rosa Marquez    Specimens removed if any: None    Estimated Blood Loss: Minimal    Findings: Severe aortic stenosis    Complications: None    Indications:  Ms. Crooks is an 83-year-old female with severe symptomatic aortic stenosis.    Procedure:  The patient was brought to the operating room and placed on the operating room table in the supine position.  After successful induction of general anesthesia endotracheal intubation, the patient was prepped and draped in regular sterile fashion.  Ultrasound-guided right radial arterial, right femoral arterial and left femoral venous access was obtained.  A temporary transvenous pacemaker was placed in the right ventricle and a pigtail catheter in the right coronary sinus.  The deployment angle was determined.  A 1 cm incision was made in the right groin and 2 Perclose sutures were deployed.  Aortic balloon valvuloplasty of the right common iliac artery was performed with a 7 mm balloon by Dr. Gray.  A 14 Salvadorean eSheath was then placed in the right common femoral artery and its tip was positioned in the abdominal aorta.  The aortic valve was crossed with a wire.  A deployment catheter with a 26 mm S3 Ultra Resilia Escobar  bovine pericardial valve at its tip was positioned across the aortic annulus.  The implantation balloon was inflated to a peak pressure of 6.5 juliet.  One mL of volume remained in the balloon.  Wires and catheters were removed.  Intraoperative transesophageal echocardiography did not show any paravalvular or central leaks.  The right femoral eSheath was removed and the Perclose sutures were tightened down.  A 6 Slovak Angio-Seal was required to obtain hemostasis.  The remainder of the operation will be dictated by Dr. Gray.  There were no apparent complications.  The patient tolerated the procedure well and left the operating room in stable condition.      11/6/2023 10:59 AM Christine Mc MD, FACS

## 2023-11-06 NOTE — OR NURSING
Patient received from OR at 1102, bedside report received from anesthesia/OR RN, 2 patient identifiers confirmed . Patient connected to monitors, assessed for general head to toe and pain/nausea. Ordered reviewed and checked. Family updated via telephone on patient status.  At this time patient meets criteria for D/C to phase II/room. VSS, awake and appropriate, pain minimal or at a tolerable level per patient. All access sites CDI and soft. Neuro assessment WDL. Report called to Cassie BUSTILLO and patient taken to Tele 8.  Bedside site assessment completed with Tele RN.

## 2023-11-06 NOTE — ANESTHESIA PROCEDURE NOTES
Airway    Date/Time: 11/6/2023 9:44 AM    Performed by: Saad Dewitt M.D.  Authorized by: Saad Dewitt M.D.    Location:  OR  Urgency:  Elective  Difficult Airway: No    Indications for Airway Management:  Anesthesia      Spontaneous Ventilation: absent    Sedation Level:  Deep  Preoxygenated: Yes    Patient Position:  Sniffing  Mask Difficulty Assessment:  0 - not attempted  Final Airway Type:  Endotracheal airway  Final Endotracheal Airway:  ETT  Cuffed: Yes    Technique Used for Successful ETT Placement:  Direct laryngoscopy    Insertion Site:  Oral  Blade Type:  Sharma  Laryngoscope Blade/Videolaryngoscope Blade Size:  2  ETT Size (mm):  7.0  Measured from:  Teeth  ETT to Teeth (cm):  21  Placement Verified by: auscultation and capnometry    Cormack-Lehane Classification:  Grade I - full view of glottis  Number of Attempts at Approach:  1

## 2023-11-06 NOTE — ANESTHESIA PROCEDURE NOTES
GOMEZ    Date/Time: 11/6/2023 9:50 AM    Performed by: Saad Dewitt M.D.  Authorized by: Saad Dewitt M.D.    Start Time:11/6/2023 9:50 AM  Preanesthetic Checklist: patient identified, IV checked, site marked, risks and benefits discussed, surgical consent, monitors and equipment checked, pre-op evaluation and timeout performed    Indication for GOMEZ: diagnostic   Patient Location: OR  Intubated: Yes  Bite Block: Yes  Heart Visualized: Yes  Insertion: atraumatic    **See FULL GOMEZ report in patient's chart via CV Synapse**

## 2023-11-06 NOTE — ANESTHESIA PROCEDURE NOTES
Arterial Line    Performed by: Saad Dewitt M.D.  Authorized by: Saad Dewitt M.D.    Start Time:  11/6/2023 10:00 AM  Localization: ultrasound guidance and surface landmarks    Patient Location:  OR  Indication: continuous blood pressure monitoring        Catheter Size:  20 G  Seldinger Technique?: Yes    Laterality:  Left  Site:  Radial artery  Line Secured:  Antimicrobial disc, tape and transparent dressing  Events: patient tolerated procedure well with no complications

## 2023-11-06 NOTE — ANESTHESIA PREPROCEDURE EVALUATION
Case: [id number] Date/Time: 11/06/23 0845    Procedures:       TRANSCATHETER AORTIC VALVE REPLACEMENT (Bilateral: Groin) - Site also includes right wrist, site clean, closure N/A      ECHOCARDIOGRAM, TRANSESOPHAGEAL, INTRAOPERATIVE (Throat)    Anesthesia type: General    Pre-op diagnosis: SEVERE AORTIC STENOSIS    Location: [facility] OR 19 / SURGERY [facility]    Surgeons: Cesar Gray M.D.          Relevant Problems   ANESTHESIA   (positive) Obstructive sleep apnea treated with continuous positive airway pressure (CPAP)      CARDIAC   (positive) AF (atrial fibrillation) (HCC)   (positive) Cardiac arrest (HCC)   (positive) Chronic combined systolic and diastolic congestive heart failure (HCC)   (positive) Coronary artery disease involving native coronary artery of native heart without angina pectoris, s/p 1V CABG 1992   (positive) Essential hypertension   (positive) Mild aortic stenosis   (positive) Moderate to severe pulmonary hypertension (HCC)      GI   (positive) Gastroesophageal reflux disease without esophagitis         (positive) CKD (chronic kidney disease) stage 4, GFR 15-29 ml/min (HCC)   (positive) Hepatic steatosis      ENDO   (positive) Hypothyroid       Physical Exam    Airway   Mallampati: III  TM distance: >3 FB  Neck ROM: full       Cardiovascular   Rhythm: regular  Rate: normal  (+) murmur, weak pulses, peripheral edema     Dental - normal exam           Pulmonary   (+) decreased breath sounds     Abdominal   (+) obese     Neurological - normal exam                 Anesthesia Plan    ASA 4 (severe AS, severe pulmonary HTN)       Plan - general       Airway plan will be ETT  GOMEZ Planned        Induction: intravenous      Pertinent diagnostic labs and testing reviewed    Informed Consent:    Anesthetic plan and risks discussed with patient.    Use of blood products discussed with: patient whom consented to blood products.

## 2023-11-06 NOTE — ANESTHESIA TIME REPORT
Anesthesia Start and Stop Event Times     Date Time Event    11/6/2023 0938 Ready for Procedure     0938 Anesthesia Start     1106 Anesthesia Stop        Responsible Staff  11/06/23    Name Role Begin End    Saad Dewitt M.D. Anesth 0938 1106        Overtime Reason:  no overtime (within assigned shift)    Comments:

## 2023-11-07 ENCOUNTER — APPOINTMENT (OUTPATIENT)
Dept: RADIOLOGY | Facility: MEDICAL CENTER | Age: 83
DRG: 266 | End: 2023-11-07
Attending: NURSE PRACTITIONER
Payer: MEDICARE

## 2023-11-07 VITALS
DIASTOLIC BLOOD PRESSURE: 54 MMHG | HEIGHT: 62 IN | BODY MASS INDEX: 37 KG/M2 | OXYGEN SATURATION: 93 % | TEMPERATURE: 98.8 F | HEART RATE: 65 BPM | RESPIRATION RATE: 17 BRPM | WEIGHT: 201.06 LBS | SYSTOLIC BLOOD PRESSURE: 109 MMHG

## 2023-11-07 LAB
ALBUMIN SERPL BCP-MCNC: 3.8 G/DL (ref 3.2–4.9)
ALBUMIN/GLOB SERPL: 1.5 G/DL
ALP SERPL-CCNC: 106 U/L (ref 30–99)
ALT SERPL-CCNC: 20 U/L (ref 2–50)
ANION GAP SERPL CALC-SCNC: 14 MMOL/L (ref 7–16)
AST SERPL-CCNC: 36 U/L (ref 12–45)
BILIRUB SERPL-MCNC: 0.9 MG/DL (ref 0.1–1.5)
BUN SERPL-MCNC: 32 MG/DL (ref 8–22)
CALCIUM ALBUM COR SERPL-MCNC: 8.7 MG/DL (ref 8.5–10.5)
CALCIUM SERPL-MCNC: 8.5 MG/DL (ref 8.5–10.5)
CHLORIDE SERPL-SCNC: 105 MMOL/L (ref 96–112)
CHOLEST SERPL-MCNC: 102 MG/DL (ref 100–199)
CO2 SERPL-SCNC: 21 MMOL/L (ref 20–33)
CREAT SERPL-MCNC: 2.16 MG/DL (ref 0.5–1.4)
EKG IMPRESSION: NORMAL
EKG IMPRESSION: NORMAL
ERYTHROCYTE [DISTWIDTH] IN BLOOD BY AUTOMATED COUNT: 52.3 FL (ref 35.9–50)
GFR SERPLBLD CREATININE-BSD FMLA CKD-EPI: 22 ML/MIN/1.73 M 2
GLOBULIN SER CALC-MCNC: 2.6 G/DL (ref 1.9–3.5)
GLUCOSE SERPL-MCNC: 192 MG/DL (ref 65–99)
HCT VFR BLD AUTO: 30.6 % (ref 37–47)
HDLC SERPL-MCNC: 34 MG/DL
HGB BLD-MCNC: 9.4 G/DL (ref 12–16)
LDLC SERPL CALC-MCNC: 48 MG/DL
MCH RBC QN AUTO: 29.1 PG (ref 27–33)
MCHC RBC AUTO-ENTMCNC: 30.7 G/DL (ref 32.2–35.5)
MCV RBC AUTO: 94.7 FL (ref 81.4–97.8)
NT-PROBNP SERPL IA-MCNC: 7484 PG/ML (ref 0–125)
PLATELET # BLD AUTO: 179 K/UL (ref 164–446)
PMV BLD AUTO: 11 FL (ref 9–12.9)
POTASSIUM SERPL-SCNC: 4.3 MMOL/L (ref 3.6–5.5)
PROT SERPL-MCNC: 6.4 G/DL (ref 6–8.2)
RBC # BLD AUTO: 3.23 M/UL (ref 4.2–5.4)
SODIUM SERPL-SCNC: 140 MMOL/L (ref 135–145)
TRIGL SERPL-MCNC: 98 MG/DL (ref 0–149)
WBC # BLD AUTO: 6.8 K/UL (ref 4.8–10.8)

## 2023-11-07 PROCEDURE — 99239 HOSP IP/OBS DSCHRG MGMT >30: CPT | Mod: FS | Performed by: INTERNAL MEDICINE

## 2023-11-07 PROCEDURE — 80061 LIPID PANEL: CPT

## 2023-11-07 PROCEDURE — 36415 COLL VENOUS BLD VENIPUNCTURE: CPT

## 2023-11-07 PROCEDURE — 71045 X-RAY EXAM CHEST 1 VIEW: CPT

## 2023-11-07 PROCEDURE — 700102 HCHG RX REV CODE 250 W/ 637 OVERRIDE(OP): Performed by: NURSE PRACTITIONER

## 2023-11-07 PROCEDURE — 83880 ASSAY OF NATRIURETIC PEPTIDE: CPT

## 2023-11-07 PROCEDURE — 700111 HCHG RX REV CODE 636 W/ 250 OVERRIDE (IP): Mod: JZ | Performed by: NURSE PRACTITIONER

## 2023-11-07 PROCEDURE — 93010 ELECTROCARDIOGRAM REPORT: CPT | Performed by: INTERNAL MEDICINE

## 2023-11-07 PROCEDURE — 93005 ELECTROCARDIOGRAM TRACING: CPT | Performed by: NURSE PRACTITIONER

## 2023-11-07 PROCEDURE — 85027 COMPLETE CBC AUTOMATED: CPT

## 2023-11-07 PROCEDURE — 80053 COMPREHEN METABOLIC PANEL: CPT

## 2023-11-07 PROCEDURE — A9270 NON-COVERED ITEM OR SERVICE: HCPCS | Performed by: NURSE PRACTITIONER

## 2023-11-07 RX ORDER — LEVOTHYROXINE SODIUM 0.2 MG/1
200 TABLET ORAL
Qty: 100 TABLET | Refills: 3 | Status: SHIPPED | OUTPATIENT
Start: 2023-11-08 | End: 2023-11-08 | Stop reason: SDUPTHER

## 2023-11-07 RX ADMIN — LEVOTHYROXINE SODIUM 200 MCG: 0.1 TABLET ORAL at 05:30

## 2023-11-07 RX ADMIN — ASPIRIN 81 MG: 81 TABLET, COATED ORAL at 05:30

## 2023-11-07 RX ADMIN — OMEPRAZOLE 20 MG: 20 CAPSULE, DELAYED RELEASE ORAL at 05:30

## 2023-11-07 RX ADMIN — POTASSIUM CHLORIDE 40 MEQ: 1500 TABLET, EXTENDED RELEASE ORAL at 05:30

## 2023-11-07 RX ADMIN — FUROSEMIDE 40 MG: 10 INJECTION, SOLUTION INTRAVENOUS at 05:30

## 2023-11-07 ASSESSMENT — FIBROSIS 4 INDEX: FIB4 SCORE: 3.64

## 2023-11-07 ASSESSMENT — PAIN DESCRIPTION - PAIN TYPE: TYPE: ACUTE PAIN

## 2023-11-07 NOTE — DISCHARGE SUMMARY
This version of the note has been redacted during the course of a chart correction case. If you need access to the original text of this version of the note, please contact the Health Information Management department at [phone].

## 2023-11-07 NOTE — DISCHARGE SUMMARY
PRIMARY DISCHARGE DIAGNOSIS: Status post transcatheter aortic valve replacement.    DISCHARGE DIAGNOSIS:  S/P TAVR    PROCEDURES/TESTIN. Successful transcatheter aortic valve replacement (TAVR) with #26-1 ml Escobar Darion 3 ultra Resilia valve, transfemoral approach under general anesthesia on 23  2. Intraoperative transesophageal echocardiogram showing results pending  3. CXR on 23 showing   1.  Interstitial pulmonary parenchymal prominence suggest chronic underlying lung disease, component of interstitial edema and/or infiltrates not excluded. Overall appears stable since prior study.  2.  Cardiomegaly  4. EKG on 23 showing afib rate of 67 bpm  5. Labs   Lab Results   Component Value Date/Time    SODIUM 140 2023 12:32 AM    POTASSIUM 4.3 2023 12:32 AM    CHLORIDE 105 2023 12:32 AM    CO2 21 2023 12:32 AM    GLUCOSE 192 (H) 2023 12:32 AM    BUN 32 (H) 2023 12:32 AM    CREATININE 2.16 (H) 2023 12:32 AM       Lab Results   Component Value Date/Time    PROTHROMBTM 14.6 10/31/2023 07:08 AM    INR 1.12 10/31/2023 07:08 AM       Lab Results   Component Value Date/Time    WBC 6.8 2023 12:32 AM    RBC 3.23 (L) 2023 12:32 AM    HEMOGLOBIN 9.4 (L) 2023 12:32 AM    HEMATOCRIT 30.6 (L) 2023 12:32 AM    MCV 94.7 2023 12:32 AM    MCH 29.1 2023 12:32 AM    MCHC 30.7 (L) 2023 12:32 AM    MPV 11.0 2023 12:32 AM    NEUTSPOLYS 83.60 (H) 2023 03:55 PM    LYMPHOCYTES 4.60 (L) 2023 03:55 PM    MONOCYTES 11.20 2023 03:55 PM    EOSINOPHILS 0.00 2023 03:55 PM    BASOPHILS 0.10 2023 03:55 PM    HYPOCHROMIA 1+ 10/31/2020 10:54 AM    ANISOCYTOSIS 2023 11:01 AM       HOSPITAL COURSE: The patient is a pleasant 83 year old female with severe symptomatic aortic stenosis, S/P mitral clip in , S/P Watchman , acute on chronic diastolic heart failure related to valvular disease, chronic  anemia with CKD stage 4, HLD with CAD ( RCA), longstanding afib, HTN, and obesity with SHAKA on CPAP with moderate PAH.Due to the patient's symptoms, the patient underwent successful TAVR described as above. Post-procedure, the patient did well. They did require IV diuresis during their stay and will continue on oral diuretics post-operatively. Acute heart failure exacerbation as evidenced by: signs and symptoms JONES, bilateral lower extremity edema; Echocardiogram findings of moderate or severe valvular stenosis; corroborated by elevated BNP (>100) and interstitial edema on chest x-ray. With IV diuresis, she was able to have improvement in her HF symptoms with walking.They were able to ambulate without difficulty. No further events were noted during their stay. They are now off oxygen and are to be discharged to home with her family.    DISCHARGE MEDICATIONS:      Medication List        ASK your doctor about these medications        Instructions   ascorbic acid 500 MG tablet  Commonly known as: Vitamin C   Take 1 Tablet by mouth every day.  Dose: 500 mg     aspirin 81 MG EC tablet   Take 1 Tablet by mouth every day.  Dose: 81 mg     atorvastatin 40 MG Tabs  Commonly known as: Lipitor   TAKE 1 TABLET BY MOUTH AT  BEDTIME     bisoprolol 10 MG tablet  Commonly known as: Zebeta   Doctor's comments: Do not substitute atenolol.  This is the medication that cardiology wants  Take 1 Tablet by mouth every day.  Dose: 10 mg     Cosentyx Sensoready Pen 150 MG/ML Soaj  Generic drug: Secukinumab   Doctor's comments: Requests 90 day supply.  Inject 300mg Sub Cut once monthly     ferrous sulfate 325 (65 Fe) MG tablet   Take 1 Tablet by mouth every day.  Dose: 325 mg     furosemide 40 MG Tabs  Commonly known as: Lasix   Take 1 Tablet by mouth every day.  Dose: 40 mg     losartan 25 MG Tabs  Commonly known as: Cozaar   Take 1 Tablet by mouth every evening.  Dose: 25 mg     Multi-Vitamin HP/Minerals Caps   Take 1 Capsule by mouth  every evening.  Dose: 1 Capsule     pantoprazole 40 MG Tbec  Commonly known as: Protonix   Take 1 Tablet by mouth 2 times a day.  Dose: 40 mg     Vitamin D 125 MCG (5000 UT) Caps   Take 5,000 Units by mouth every morning.  Dose: 5,000 Units            DISCHARGE INSTRUCTIONS: They are given discharge instructions on potential post-operative complications and symptoms to watch out for. Their groin sites were checked and were clean, dry, and intact. Patient or family to notify us for any complications noted on the discharge instructions. They will follow up with myself, Perla KELLER, on Tuesday in our cardiology office. They will get repeat CBC, iron studies, BMP, and BNP before their follow up appointment. They will then follow up with Dr. Gray with a repeat echocardiogram in one month for post TAVR assessment.      Future Appointments   Date Time Provider Department Center   11/14/2023  1:15 PM [facility] Mart[facility] None   12/1/2023 10:00 AM Patricia Downs M.D. [facility]   12/4/2023 12:15 PM [facility]   12/6/2023  2:45 PM [facility] None   1/24/2024  1:40 PM Asael Pink M.D. [address]   5/24/2024  1:15 PM [facility] EXAM 12 [facility]   11/6/2024 10:15 AM [facility] EXAM 10 [facility]   11/13/2024  8:40 AM Cesar Gray M.D. [facility] None     Discharge time spent with patient was 21 minutes.

## 2023-11-07 NOTE — PROGRESS NOTES
Monitor Summary  Rhythm: Afib  Heart Rate: 72-91  Ectopy: r-oPVC, rCoup, rTrig, 8bts VTACH, rTrip  -/.10/-

## 2023-11-07 NOTE — PROGRESS NOTES
Bedside report received. No overnight events per night RN. Patient A&O x 4. Titrated off O2 now RA satting mid to low 90's. SBP soft ranging low 100's. AFIB 60-70's on telemetry. BL groin sites CDI and soft. No complaints of pain at this time. Up to chair for breakfast.  POC discussed with patient. Pt verbalizes understanding. Call light and belongings with in reach. Bed locked and in lowest position, alarm and fall precautions in place.

## 2023-11-07 NOTE — PROGRESS NOTES
Pt. being discharged. Pt. educated on discharge instructions and new prescriptions. Pt verbalizes understanding. Follow up appointment made with CARDIOLOGY. PIV removed, monitor checked in. Right femoral site dressing replaced. Pt. Going home with FAMILY. RN to call transport for escort out, will monitor pt. Till off unit.

## 2023-11-07 NOTE — PROGRESS NOTES
4 Eyes Skin Assessment Completed by IWONA Matthews and IWONA Martinez.    Head WDL  Ears WDL  Nose WDL  Mouth WDL  Neck WDL  Breast/Chest Scar  Shoulder Blades WDL  Spine/Back psoriasis rash  (R) Arm/Elbow/Hand Bruising  (L) Arm/Elbow/Hand Bruising  Abdomen WDL  Groin fede groin sites, R oozing, L CDI  Scrotum/Coccyx/Buttocks Redness and Blanching  (R) Leg Rash and Edema  (L) Leg Rash and Edema  (R) Heel/Foot/Toe Redness and Blanching  (L) Heel/Foot/Toe Redness and Blanching          Devices In Places Tele Box, Blood Pressure Cuff, Pulse Ox, Nasal Cannula, and GOLDY's      Interventions In Place Gray Ear Foams, Pillows, and Pressure Redistribution Mattress    Possible Skin Injury Yes    Pictures Uploaded Into Epic Yes  Wound Consult Placed N/A (chronic psoriasis managed)  RN Wound Prevention Protocol Ordered No

## 2023-11-07 NOTE — CARE PLAN
The patient is Stable - Low risk of patient condition declining or worsening    Shift Goals  Clinical Goals: monitor sites and pulses  Patient Goals: rest  Family Goals: na    Progress made toward(s) clinical / shift goals:    Problem: Post Op Day 1 CABG/Heart Valve Replacement  Goal: Optimal care of the post op CABG/heart valve replacement Post Op Day 1  Outcome: Progressing  Intervention: Daily weights in the morning  Note: Weight taken in the morning with med pass.   Intervention: Up in chair for all meals  Note: Up to chair for dinner.   Intervention: Ambulate in am if stable. First ambulation 25 feet. Repeat x 3 as tolerated  Note: Patient ambulating to the bathroom numerous times.   Intervention: Assess surgical dressing and check provider orders for potential removal  Note: R femoral site with scant drainage.   Intervention: IS q 1 hour while awake and record best IS volume  Note: Patient is using IS, highest is 1000.  Intervention: Knee high GOLDY hose, on during the day, off at night  Note: Patient wearing GOLDY hose.   Intervention: Saline lock IV  Note: PIVs are saline locked.   Intervention: Transfer to [facility], begin VS q 4 hours  Note: Q4 vitals being taken.        Patient is not progressing towards the following goals:

## 2023-11-07 NOTE — PROGRESS NOTES
Per monitors, pt with 5 sets of triplets followed by 8 beats of VT. Asymptomatic, /66. HR 82. Perla KELLER notified.

## 2023-11-07 NOTE — PROGRESS NOTES
Assumed care of patient, received bedside report from Cassie BUSTILLO. Patient is A&O X 4. Pain 0/10, on 1 L of oxygen NC. On tele monitor, Afib 73. POC discussed with patient. Patient verbalized understanding. All questions answered. Call light within reach and fall precautions in place. Bed alarm on, bed locked and in lowest position.

## 2023-11-07 NOTE — PROGRESS NOTES
Monitor Summary:    Rhythm: 67  Rate: 67-78  Ectopy: PVC, coup, trigem  Measurement : -/.06/-

## 2023-11-07 NOTE — PROGRESS NOTES
Patient's R groin site oozing, scant sanguineous drainage. Perla KELLER notified. Orders received to cleanse site and dress with gauze and tegaderm.

## 2023-11-07 NOTE — CARE PLAN
The patient is Watcher - Medium risk of patient condition declining or worsening    Shift Goals  Clinical Goals: monitor femoral/radial sites, monitor pulse sites  Patient Goals: comfort, pain management    Progress made toward(s) clinical / shift goals:  CMS intact in all extremities. R groin site oozing, provider aware, other access sites CDI.    Problem: Day of surgery post CABG/Heart valve replacement  Goal: Stabilization in immediate post op period  Outcome: Progressing  Note: S/P TAVR POD 0  Intervention: VS q 15 min x 4 hours, then q 1 hour. Include temperature immediately upon arrival. Check CO/CI q 2-4 hours and PRN  Note: Post op vitals completed  Intervention: If radial artery used, elevate arm, no BP checks or needle sticks from affected arm, monitor ulnar pulse and capillary refill  Note: TR Band removed CMS intact  Intervention: First post op hour labs and EKG per order  Note: Labs and EKG completed per order  Intervention: IS q 1 hour while awake post extubation  Note: Instructed on use of IS. Achieving 1000  Intervention: Bedrest until extubated and groin lines out  Note: Bed rest 4 hours completed.   Intervention: Dangle within 4 hours post extubation  Note: To edge of bed after 4 hours of bed rest.  Intervention: Up in chair 4 hours, day of extubation  Note: Ambulate in room up to chair. Ambulate to hallway 75 feet SBA with FWW  Intervention: Maintain all original surgical dressings per provider orders and specifications  Note: Access sites assessed, dressing to right groin per APRN  Intervention: Clear liquids post extubation, order carbohydrate free (post cardiac surgery) diet, advance as tolerated  Note: Cardiac diet  Intervention: A-Fib and DVT prophylaxis per MD order or contraindications documented (refer to DVT/VTE problem on Care Plan)  Note: Jeevan tomlinson on        Patient is not progressing towards the following goals: N/A

## 2023-11-07 NOTE — DISCHARGE INSTRUCTIONS
Transcatheter Aortic Valve Replacement (TAVR)    General Instructions:  Take Medication as directed.  You will likely need to take aspirin and another blood thinner (antiplatelet medication) for a period.  You'll need to take the aspirin for the rest of your life.  Be sure your doctor knows about all other medicines you take, including over-the-counter medicines and dietary supplements.    Incision Care Instructions:  Look and feel the site(s) of your TAVR TODAY so you can recognize changes that should be called to your doctor (see below).  It's normal for your incision to be bruised, itchy, or sore while it's healing.  Your incision may take a week or more to heal.  Bruising may occur.  Some of the discoloration may travel down the leg.  As it resolves, the color should change from blue to green to yellow-brown.  A small, round lump under the TAVR site may remain for up to 6 weeks.  Wash your incision site every day with warm water and soap.  Gently pat it dry.  Don't put powder, lotion, or ointment on the incision until it's healed.    Activity:  No driving for one week  If you must take a long car ride (not as a ), stop every hour and walk around the car.  No lifting or pulling objects over 5 pounds for 4-5 days.  Warm showers or baths are permitted after the bandage is removed.  Walk regularly.  One of the best ways to get stronger is to walk.  Walk a little more each day.  Take someone with you until you feel OK to walk alone.    When to call your health care provider:  You develop a fever.  Redness, swelling, bleeding, warmth, or fluid draining at the incision site.  Bruising appears to be new or does not look like it is getting better.  The small, round lump in the groin in the area of the TAVR site increases in size.    Seek immediate medical help if you have any of the following symptoms:  You experience any leg numbness, aching, or discomfort  Chest pain or trouble breathing (call 911)  Sudden  numbness or weakness in your face, arms, or legs (call 911)  Bowel movement that is bright red  Dizziness or fainting  Weight gain of more than 2 pounds in 24 hours or more than 5 pounds in 1 week  Swelling in your hands, feet, or ankles  Shortness of breath that doesn't get better when you rest  Pain that gets worse or doesn't go away  Fast or irregular pulse   Femoral Site Care  The following information offers guidance on how to care for yourself after your procedure. Your health care provider may also give you more specific instructions. If you have problems or questions, contact your health care provider.  What can I expect after the procedure?  After the procedure, it is common to have bruising and tenderness at the incision site. This usually fades within 1-2 weeks.  Follow these instructions at home:  Incision site care    Follow instructions from your health care provider about how to take care of your incision site. Make sure you:  Wash your hands with soap and water for at least 20 seconds before and after you change your bandage (dressing). If soap and water are not available, use hand .  Change or remove your dressing as told by your health care provider.  Leave stitches (sutures), skin glue, or adhesive strips in place. These skin closures may need to stay in place for 2 weeks or longer. If adhesive strip edges start to loosen and curl up, you may trim the loose edges. Do not remove adhesive strips completely unless your health care provider tells you to do that.  Do not take baths, swim, or use a hot tub until your health care provider approves.  You may shower 24-48 hours after the procedure or as told by your health care provider.  Gently wash the incision site with plain soap and water.  Pat the area dry with a clean towel.  Do not rub the site. This may cause bleeding.  Do not apply powder or lotion to the site. Keep the site clean and dry.  Check your femoral site every day for signs of  infection. Check for:  Redness, swelling, or pain.  Fluid or blood.  Warmth.  Pus or a bad smell.  Activity  If you were given a sedative during the procedure, it can affect you for several hours. Do not drive or operate machinery until your health care provider says that it is safe.  Rest as told by your health care provider.  Avoid sitting for a long time without moving. Get up to take short walks every 1-2 hours. This is important to improve blood flow and breathing. Ask for help if you feel weak or unsteady.  Return to your normal activities as told by your health care provider. Ask your health care provider what activities are safe for you and when you can return to work.  Avoid activities that take a lot of effort for the first 2-3 days after your procedure, or as long as directed.  Do not lift anything that is heavier than 10 lb (4.5 kg), or the limit that you are told, until your health care provider says that it is safe.  General instructions  Take over-the-counter and prescription medicines only as told by your health care provider.  If you will be going home right after the procedure, plan to have a responsible adult care for you for the time you are told. This is important.  Keep all follow-up visits. This is important.  Contact a health care provider if:  You have a fever or chills.  You have any of these signs of infection at your incision site:  Redness, swelling, or pain.  Fluid or blood.  Warmth.  Pus or a bad smell.  Get help right away if:  The incision area swells very fast.  The incision area is bleeding, and the bleeding does not stop when you hold steady pressure on the area.  Your leg or foot becomes pale, cool, tingly, or numb.  These symptoms may represent a serious problem that is an emergency. Do not wait to see if the symptoms will go away. Get medical help right away. Call your local emergency services (911 in the [place] Do not drive yourself to the hospital.  Summary  After the  procedure, it is common to have bruising and tenderness that fade within 1-2 weeks.  Check your femoral site every day for signs of infection.  Do not lift anything that is heavier than 10 lb (4.5 kg), or the limit that you are told, until your health care provider says that it is safe.  Get help right away if the incision area swells very fast, you have bleeding at the incision area that does not stop, or your leg or foot becomes pale, cool, or numb.  This information is not intended to replace advice given to you by your health care provider. Make sure you discuss any questions you have with your health care provider.  Document Revised: 09/07/2022 Document Reviewed: 02/06/2022  Elsevier Patient Education © 2023 [employer] Inc[employer]

## 2023-11-08 ENCOUNTER — TELEPHONE (OUTPATIENT)
Dept: CARDIOLOGY | Facility: MEDICAL CENTER | Age: 83
End: 2023-11-08
Payer: MEDICARE

## 2023-11-08 DIAGNOSIS — E03.9 HYPOTHYROIDISM, UNSPECIFIED TYPE: ICD-10-CM

## 2023-11-08 RX ORDER — LEVOTHYROXINE SODIUM 0.2 MG/1
200 TABLET ORAL
Qty: 100 TABLET | Refills: 3 | Status: SHIPPED | OUTPATIENT
Start: 2023-11-08 | End: 2023-11-09

## 2023-11-08 NOTE — TELEPHONE ENCOUNTER
Hi Dr. Pink,     Would you please refill this medication for our mutual patient, as we do not typically handle refills for this medication.    Thank you and have a great day.     Pita Pitt RN  [facility]  Phone: [phone]  Fax: [phone]

## 2023-11-08 NOTE — TELEPHONE ENCOUNTER
AK  Caller: Aleshia Crooks     Medication Name and Dosage: levothyroxine (SYNTHROID) 200 MCG Tab  Medication amount left: NA    Preferred Pharmacy: [facility] #[id number] - [place], [place] - [address]    Other questions (Topic): Patient states she can not take generic brand medications, it must be name brand.    Callback Number (Will only call for issues): [phone]     Thank you   Mary Ann QUINTANA

## 2023-11-09 DIAGNOSIS — E03.9 HYPOTHYROIDISM, UNSPECIFIED TYPE: ICD-10-CM

## 2023-11-09 RX ORDER — LEVOTHYROXINE SODIUM 200 UG/1
200 TABLET ORAL
Qty: 90 TABLET | Refills: 3 | Status: SHIPPED | OUTPATIENT
Start: 2023-11-09

## 2023-11-13 ENCOUNTER — HOSPITAL ENCOUNTER (OUTPATIENT)
Dept: LAB | Facility: MEDICAL CENTER | Age: 83
End: 2023-11-13
Attending: NURSE PRACTITIONER
Payer: MEDICARE

## 2023-11-13 DIAGNOSIS — D63.8 ANEMIA OF CHRONIC DISEASE: ICD-10-CM

## 2023-11-13 DIAGNOSIS — I50.33 ACUTE ON CHRONIC DIASTOLIC HEART FAILURE (HCC): ICD-10-CM

## 2023-11-13 LAB
ANION GAP SERPL CALC-SCNC: 13 MMOL/L (ref 7–16)
BASOPHILS # BLD AUTO: 1 % (ref 0–1.8)
BASOPHILS # BLD: 0.06 K/UL (ref 0–0.12)
BUN SERPL-MCNC: 31 MG/DL (ref 8–22)
CALCIUM SERPL-MCNC: 9.4 MG/DL (ref 8.5–10.5)
CHLORIDE SERPL-SCNC: 103 MMOL/L (ref 96–112)
CO2 SERPL-SCNC: 25 MMOL/L (ref 20–33)
CREAT SERPL-MCNC: 1.83 MG/DL (ref 0.5–1.4)
EOSINOPHIL # BLD AUTO: 0.3 K/UL (ref 0–0.51)
EOSINOPHIL NFR BLD: 4.8 % (ref 0–6.9)
ERYTHROCYTE [DISTWIDTH] IN BLOOD BY AUTOMATED COUNT: 55.2 FL (ref 35.9–50)
FERRITIN SERPL-MCNC: 126 NG/ML (ref 10–291)
FOLATE SERPL-MCNC: >40 NG/ML
GFR SERPLBLD CREATININE-BSD FMLA CKD-EPI: 27 ML/MIN/1.73 M 2
GLUCOSE SERPL-MCNC: 102 MG/DL (ref 65–99)
HCT VFR BLD AUTO: 32.8 % (ref 37–47)
HGB BLD-MCNC: 9.9 G/DL (ref 12–16)
IMM GRANULOCYTES # BLD AUTO: 0.02 K/UL (ref 0–0.11)
IMM GRANULOCYTES NFR BLD AUTO: 0.3 % (ref 0–0.9)
IRON SATN MFR SERPL: 13 % (ref 15–55)
IRON SERPL-MCNC: 39 UG/DL (ref 40–170)
LYMPHOCYTES # BLD AUTO: 0.56 K/UL (ref 1–4.8)
LYMPHOCYTES NFR BLD: 8.9 % (ref 22–41)
MCH RBC QN AUTO: 28.9 PG (ref 27–33)
MCHC RBC AUTO-ENTMCNC: 30.2 G/DL (ref 32.2–35.5)
MCV RBC AUTO: 95.6 FL (ref 81.4–97.8)
MONOCYTES # BLD AUTO: 0.59 K/UL (ref 0–0.85)
MONOCYTES NFR BLD AUTO: 9.4 % (ref 0–13.4)
NEUTROPHILS # BLD AUTO: 4.77 K/UL (ref 1.82–7.42)
NEUTROPHILS NFR BLD: 75.6 % (ref 44–72)
NRBC # BLD AUTO: 0 K/UL
NRBC BLD-RTO: 0 /100 WBC (ref 0–0.2)
NT-PROBNP SERPL IA-MCNC: 4260 PG/ML (ref 0–125)
PLATELET # BLD AUTO: 215 K/UL (ref 164–446)
PMV BLD AUTO: 11.4 FL (ref 9–12.9)
POTASSIUM SERPL-SCNC: 3.7 MMOL/L (ref 3.6–5.5)
RBC # BLD AUTO: 3.43 M/UL (ref 4.2–5.4)
SODIUM SERPL-SCNC: 141 MMOL/L (ref 135–145)
TIBC SERPL-MCNC: 297 UG/DL (ref 250–450)
TRANSFERRIN SERPL-MCNC: 267 MG/DL (ref 200–370)
UIBC SERPL-MCNC: 258 UG/DL (ref 110–370)
WBC # BLD AUTO: 6.3 K/UL (ref 4.8–10.8)

## 2023-11-13 PROCEDURE — 83540 ASSAY OF IRON: CPT

## 2023-11-13 PROCEDURE — 83550 IRON BINDING TEST: CPT

## 2023-11-13 PROCEDURE — 80048 BASIC METABOLIC PNL TOTAL CA: CPT

## 2023-11-13 PROCEDURE — 83880 ASSAY OF NATRIURETIC PEPTIDE: CPT

## 2023-11-13 PROCEDURE — 82728 ASSAY OF FERRITIN: CPT

## 2023-11-13 PROCEDURE — 85025 COMPLETE CBC W/AUTO DIFF WBC: CPT

## 2023-11-13 PROCEDURE — 82746 ASSAY OF FOLIC ACID SERUM: CPT

## 2023-11-13 PROCEDURE — 36415 COLL VENOUS BLD VENIPUNCTURE: CPT

## 2023-11-13 PROCEDURE — 84466 ASSAY OF TRANSFERRIN: CPT

## 2023-11-14 ENCOUNTER — OFFICE VISIT (OUTPATIENT)
Dept: CARDIOLOGY | Facility: MEDICAL CENTER | Age: 83
End: 2023-11-14
Attending: NURSE PRACTITIONER
Payer: MEDICARE

## 2023-11-14 VITALS
BODY MASS INDEX: 37.54 KG/M2 | HEART RATE: 79 BPM | WEIGHT: 204 LBS | RESPIRATION RATE: 16 BRPM | HEIGHT: 62 IN | DIASTOLIC BLOOD PRESSURE: 66 MMHG | SYSTOLIC BLOOD PRESSURE: 108 MMHG | OXYGEN SATURATION: 94 %

## 2023-11-14 DIAGNOSIS — Z53.09 CONTRAINDICATION TO ANTICOAGULATION THERAPY: ICD-10-CM

## 2023-11-14 DIAGNOSIS — Z98.890 S/P MITRAL VALVE CLIP IMPLANTATION: ICD-10-CM

## 2023-11-14 DIAGNOSIS — I10 ESSENTIAL HYPERTENSION: Chronic | ICD-10-CM

## 2023-11-14 DIAGNOSIS — E78.2 MIXED HYPERLIPIDEMIA: ICD-10-CM

## 2023-11-14 DIAGNOSIS — I48.11 LONGSTANDING PERSISTENT ATRIAL FIBRILLATION (HCC): ICD-10-CM

## 2023-11-14 DIAGNOSIS — I25.10 CORONARY ARTERY DISEASE INVOLVING NATIVE CORONARY ARTERY OF NATIVE HEART WITHOUT ANGINA PECTORIS: ICD-10-CM

## 2023-11-14 DIAGNOSIS — N18.4 CKD (CHRONIC KIDNEY DISEASE) STAGE 4, GFR 15-29 ML/MIN (HCC): ICD-10-CM

## 2023-11-14 DIAGNOSIS — E66.9 OBESITY (BMI 35.0-39.9 WITHOUT COMORBIDITY): ICD-10-CM

## 2023-11-14 DIAGNOSIS — I27.20 MODERATE TO SEVERE PULMONARY HYPERTENSION (HCC): ICD-10-CM

## 2023-11-14 DIAGNOSIS — I50.42 CHRONIC COMBINED SYSTOLIC AND DIASTOLIC CONGESTIVE HEART FAILURE (HCC): Chronic | ICD-10-CM

## 2023-11-14 DIAGNOSIS — Z95.2 S/P TAVR (TRANSCATHETER AORTIC VALVE REPLACEMENT): ICD-10-CM

## 2023-11-14 DIAGNOSIS — G47.33 OBSTRUCTIVE SLEEP APNEA TREATED WITH CONTINUOUS POSITIVE AIRWAY PRESSURE (CPAP): ICD-10-CM

## 2023-11-14 DIAGNOSIS — Z95.818 S/P MITRAL VALVE CLIP IMPLANTATION: ICD-10-CM

## 2023-11-14 LAB — EKG IMPRESSION: NORMAL

## 2023-11-14 PROCEDURE — 3074F SYST BP LT 130 MM HG: CPT | Performed by: NURSE PRACTITIONER

## 2023-11-14 PROCEDURE — 3078F DIAST BP <80 MM HG: CPT | Performed by: NURSE PRACTITIONER

## 2023-11-14 PROCEDURE — 99213 OFFICE O/P EST LOW 20 MIN: CPT | Performed by: NURSE PRACTITIONER

## 2023-11-14 PROCEDURE — 93005 ELECTROCARDIOGRAM TRACING: CPT | Performed by: NURSE PRACTITIONER

## 2023-11-14 PROCEDURE — 93010 ELECTROCARDIOGRAM REPORT: CPT | Performed by: INTERNAL MEDICINE

## 2023-11-14 PROCEDURE — 99214 OFFICE O/P EST MOD 30 MIN: CPT | Performed by: NURSE PRACTITIONER

## 2023-11-14 ASSESSMENT — ENCOUNTER SYMPTOMS
DIZZINESS: 0
MYALGIAS: 0
PND: 0
SHORTNESS OF BREATH: 0
CLAUDICATION: 0
PALPITATIONS: 0
COUGH: 0
FEVER: 0
ABDOMINAL PAIN: 0
ORTHOPNEA: 0

## 2023-11-14 ASSESSMENT — FIBROSIS 4 INDEX: FIB4 SCORE: 3.03

## 2023-11-14 NOTE — PATIENT INSTRUCTIONS
DECREASE furosemide (lasix) to 20 mg once a day and see how you feel on this. If no increase in leg swelling or shortness of breath, continue this dosing.    If leg cramps get worse, let me know and we can go back on potassium supplementation if wanted.

## 2023-11-14 NOTE — PROGRESS NOTES
Chief Complaint   Patient presents with    Coronary Artery Disease     F/v dx: Coronary artery disease involving native coronary artery of native heart without angina pectoris, s/p 1V CABG 1992    Mitral Stenosis/Insufficiency     F/v dx: S/P mitral valve clip implantation    Aortic Stenosis     F/v dx: Aortic valve stenosis, etiology of cardiac valve disease unspecified       Subjective     Aleshia Crooks is a 83 y.o. female who presents today for 1 week post TAVR.    She is a patient of Dr. Rehman in our office. Hx of severe mitral regurgitation now S/P bhavana clip x1 NTW clip and now S/P TAVR, acute on chronic diastolic heart failure, HLD with CAD with prior CABG in '92, severe TR, moderate AS, chronic afib (not on oral anticoagulation due to recent GIB), obesity with mod-severe PAH with SHAKA on CPAP, HTN, CKD stage IV, hypothyroidism, GERD, and hepatic steatosis.    She presents today with her daughter.    She admits to improvement in her symptoms but remains with mild edema in her legs. Breathing much improved.    She has no chest pain, dizziness/lightheadedness, or palpitations.    Past Medical History:   Diagnosis Date    Anemia     Apnea, sleep     cpap    Atrial fibrillation (HCC)     Breath shortness     Cataract     fede IOL    CKD (chronic kidney disease) stage 4, GFR 15-29 ml/min (HCC)     Congestive heart failure (HCC)     Dental disorder     full dentures    Gasping for breath     Heart attack (HCC) 1992    Followed by Dr. 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/08/2020    2015    Moderate tricuspid regurgitation 11/16/2022    Pneumonia 2019    Psychiatric problem     Anxiety    Snoring     Thyroid disease      Past Surgical History:   Procedure  Laterality Date    TRANSCATHETER AORTIC VALVE REPLACEMENT Bilateral 11/6/2023    Procedure: TRANSCATHETER AORTIC VALVE REPLACEMENT;  Surgeon: Cesar Gray M.D.;  Location: SURGERY [facility];  Service: Cardiac    ECHOCARDIOGRAM, TRANSESOPHAGEAL, INTRAOPERATIVE N/A 11/6/2023    Procedure: ECHOCARDIOGRAM, TRANSESOPHAGEAL, INTRAOPERATIVE;  Surgeon: Cesar Gray M.D.;  Location: SURGERY [facility];  Service: Cardiac    TRANSCATHETER MITRAL VALVE REPAIR N/A 04/17/2023    Procedure: TRANSCATHETER MITRAL VALVE PROCEDURE;  Surgeon: Cesar Gray M.D.;  Location: SURGERY [facility];  Service: Cardiac    ECHOCARDIOGRAM, TRANSESOPHAGEAL, INTRAOPERATIVE N/A 04/17/2023    Procedure: ECHOCARDIOGRAM, TRANSESOPHAGEAL, INTRAOPERATIVE;  Surgeon: Cesar Gray M.D.;  Location: SURGERY [facility];  Service: Cardiac    UT COLONOSCOPY,DIAGNOSTIC N/A 01/24/2023    Procedure: COLONOSCOPY;  Surgeon: Amy Zarate M.D.;  Location: SURGERY SAME DAY [facility];  Service: Gastroenterology    UT UPPER GI ENDOSCOPY,DIAGNOSIS N/A 01/24/2023    Procedure: GASTROSCOPY;  Surgeon: Amy Zarate M.D.;  Location: SURGERY SAME DAY [facility];  Service: Gastroenterology    UT UPPER GI ENDOSCOPY,BIOPSY N/A 01/24/2023    Procedure: GASTROSCOPY, WITH BIOPSY;  Surgeon: Amy Zarate M.D.;  Location: SURGERY SAME DAY [facility];  Service: Gastroenterology    CHOLECYSTECTOMY      EYE SURGERY Bilateral     cataracts    MULTIPLE CORONARY ARTERY BYPASS      1 bypass    THYROIDECTOMY TOTAL      TONSILLECTOMY       Family History   Problem Relation Age of Onset    Cancer Mother         cancer, smoker    Stroke Maternal Grandmother     Other Other         Crohn    Kidney Disease Other         kidney transplant     Social History     Socioeconomic History    Marital status:      Spouse name: Not on file    Number of children: Not on file    Years of education: Not on file    Highest education level: Not on file    Occupational History     Comment: [employer]   Tobacco Use    Smoking status: Former     Current packs/day: 0.00     Average packs/day: 1 pack/day for 31.0 years (31.0 ttl pk-yrs)     Types: Cigarettes     Start date: 5/10/1961     Quit date: 5/10/1992     Years since quittin.5     Passive exposure: Yes    Smokeless tobacco: Never    Tobacco comments:     Started smoking of  do not remember month or day   Vaping Use    Vaping Use: Never used   Substance and Sexual Activity    Alcohol use: No    Drug use: No    Sexual activity: Not Currently     Partners: Male   Other Topics Concern    Not on file   Social History Narrative    Not on file     Social Determinants of Health     Financial Resource Strain: Low Risk  (2022)    Overall Financial Resource Strain (CARDIA)     Difficulty of Paying Living Expenses: Not hard at all   Food Insecurity: No Food Insecurity (2022)    Hunger Vital Sign     Worried About Running Out of Food in the Last Year: Never true     Ran Out of Food in the Last Year: Never true   Transportation Needs: No Transportation Needs (2022)    PRAPARE - Transportation     Lack of Transportation (Medical): No     Lack of Transportation (Non-Medical): No   Physical Activity: Inactive (2022)    Exercise Vital Sign     Days of Exercise per Week: 0 days     Minutes of Exercise per Session: 0 min   Stress: No Stress Concern Present (2022)    [employer] [place] [employer] - Occupational Stress Questionnaire     Feeling of Stress : Not at all   Social Connections: Socially Isolated (2022)    Social Connection and Isolation Panel [NHANES]     Frequency of Communication with Friends and Family: More than three times a week     Frequency of Social Gatherings with Friends and Family: Once a week     Attends Yazdanism Services: Never     Active Member of Clubs or Organizations: No     Attends Club or Organization Meetings: Never     Marital Status:     Intimate Partner Violence: Not At Risk (11/11/2022)    Humiliation, Afraid, Rape, and Kick questionnaire     Fear of Current or Ex-Partner: No     Emotionally Abused: No     Physically Abused: No     Sexually Abused: No   Housing Stability: Low Risk  (11/11/2022)    Housing Stability Vital Sign     Unable to Pay for Housing in the Last Year: No     Number of Places Lived in the Last Year: 1     Unstable Housing in the Last Year: No     Allergies   Allergen Reactions    Morphine Vomiting     Outpatient Encounter Medications as of 11/14/2023   Medication Sig Dispense Refill    LEVOXYL 200 MCG Tab Take 1 Tablet by mouth every morning on an empty stomach. 90 Tablet 3    ascorbic acid (VITAMIN C) 500 MG tablet Take 1 Tablet by mouth every day. 30 Tablet 11    aspirin 81 MG EC tablet Take 1 Tablet by mouth every day. 100 Tablet 3    Secukinumab (COSENTYX SENSOREADY PEN) 150 MG/ML Solution Auto-injector Inject 300mg Sub Cut once monthly (Patient taking differently: Inject 300 mg under the skin every 28 days. Indications: Psoriasis) 1.96 mL 5    Cholecalciferol (VITAMIN D) 125 MCG (5000 UT) Cap Take 5,000 Units by mouth every morning.      ferrous sulfate 325 (65 Fe) MG tablet Take 1 Tablet by mouth every day. 90 Tablet 2    bisoprolol (ZEBETA) 10 MG tablet Take 1 Tablet by mouth every day. (Patient taking differently: Take 10 mg by mouth every morning.) 90 Tablet 4    Vitamins-Lipotropics (MULTI-VITAMIN HP/MINERALS) Cap Take 1 Capsule by mouth every evening.      pantoprazole (PROTONIX) 40 MG Tablet Delayed Response Take 1 Tablet by mouth 2 times a day. 180 Tablet 3    losartan (COZAAR) 25 MG Tab Take 1 Tablet by mouth every evening. 100 Tablet 3    furosemide (LASIX) 40 MG Tab Take 1 Tablet by mouth every day. (Patient taking differently: Take 40 mg by mouth every morning.) 90 Tablet 3    atorvastatin (LIPITOR) 40 MG Tab TAKE 1 TABLET BY MOUTH AT  BEDTIME (Patient taking differently: Take 40 mg by mouth every evening.  "TAKE 1 TABLET BY MOUTH AT  BEDTIME) 100 Tablet 3     No facility-administered encounter medications on file as of 11/14/2023.     Review of Systems   Constitutional:  Negative for fever and malaise/fatigue.   Respiratory:  Negative for cough and shortness of breath.    Cardiovascular:  Negative for chest pain, palpitations, orthopnea, claudication, leg swelling and PND.   Gastrointestinal:  Negative for abdominal pain.   Musculoskeletal:  Negative for myalgias.   Neurological:  Negative for dizziness.              Objective     /66 (BP Location: Left arm, Patient Position: Sitting, BP Cuff Size: Adult)   Pulse 79   Resp 16   Ht 1.575 m (5' 2\")   Wt 92.5 kg (204 lb)   LMP  (LMP Unknown)   SpO2 94%   BMI 37.31 kg/m²     Physical Exam  Vitals and nursing note reviewed.   Constitutional:       Appearance: Normal appearance. She is well-developed. She is obese.   HENT:      Head: Normocephalic and atraumatic.   Neck:      Vascular: No JVD.   Cardiovascular:      Rate and Rhythm: Normal rate and regular rhythm.      Pulses: Normal pulses.      Heart sounds: Normal heart sounds.   Pulmonary:      Effort: Pulmonary effort is normal.      Breath sounds: Normal breath sounds.   Musculoskeletal:         General: Normal range of motion.      Right lower leg: Edema present.      Left lower leg: Edema present.      Comments: Mild LE edema   Skin:     General: Skin is warm and dry.      Capillary Refill: Capillary refill takes less than 2 seconds.   Neurological:      General: No focal deficit present.      Mental Status: She is alert and oriented to person, place, and time. Mental status is at baseline.   Psychiatric:         Mood and Affect: Mood normal.         Behavior: Behavior normal.         Thought Content: Thought content normal.         Judgment: Judgment normal.                Assessment & Plan     1. S/P mitral valve clip implantation  EKG      2. Obstructive sleep apnea treated with continuous positive " airway pressure (CPAP)        3. Obesity (BMI 35.0-39.9 without comorbidity) ([facility])        4. Moderate to severe pulmonary hypertension (HCC)        5. Mixed hyperlipidemia        6. Longstanding persistent atrial fibrillation (HCC)        7. Essential hypertension        8. Coronary artery disease involving native coronary artery of native heart without angina pectoris        9. Contraindication to anticoagulation therapy        10. CKD (chronic kidney disease) stage 4, GFR 15-29 ml/min ([facility])        11. Chronic combined systolic and diastolic congestive heart failure (HCC)        12. S/P TAVR (transcatheter aortic valve replacement)          Medical Decision Making: Today's Assessment/Status/Plan:      1. S/P Angie Clip X1 and S/P TAVR  -doing well post-op  -cont asa  -echo in 1 month  -symptoms improved  -SBE prophylaxis    2.HFrEF, Stage C, Class III, LVEF 60%: valvular disease  -stable symptoms  -ACE-I/ARB/ARNI: losartan 25 mg QD  -Evidence Based Beta-blocker: bisoprolol 10 mg QD  -Aldosterone Antagonist: hold due to CKD  -Diuretic: lasix 40 mg QD, leg cramps now, OK to cut back lasix to 20 mg daily and see if she has any symptoms on lower dose  -SGLT2 inhibitor: not warranted  -repeat labs per nephrology or worsening symptoms  -Discussed/reviewed maintaining a low Sodium and hydration recommendations  -repeat echo in 1 month    3. HTN with CKD  -good control  -follow at home  -BP goal <130/80    4. Obesity with SHAKA with PAH  -tolerating and compliant with CPAP    5. Anemia with recent GIB  -followed by PCP and nephrology, pending epoetin injections  -pending Watchman for persistent anemia and intolerance for GIB    6. Afib, persistent  -rate controlled, asymptomatic  -Watchman pending  -cont bisoprolol 10 mg for rate control    7. HLD with CAD  -no angina or JONES  -cont statin, asa  -follow    Patient is to follow up with Perla KELLER in 1 month with echo and labs.

## 2023-11-16 ENCOUNTER — DOCUMENTATION (OUTPATIENT)
Dept: CARDIOLOGY | Facility: MEDICAL CENTER | Age: 83
End: 2023-11-16
Payer: MEDICARE

## 2023-11-16 NOTE — PROGRESS NOTES
On behalf of [facility]'s Structural Heart Program, we would like to thank you for allowing us to participate in the care of your patient, Ms. Crooks.     She underwent a successful transcatheter aortic valve replacement (TAVR) on Monday, November 6, 2023.     Your patient is scheduled to follow up with our Structural Heart Program at one month and one year post procedure.     Again, thank you for allowing us to participate in the care of your patient. If you have any questions, please do not hesitate to contact our Structural Heart team.     Sincerely,    [facility] Structural Heart Team    MAURY Holland, RN, Structural Heart Program Nurse Coordinator ([phone])  MAURY Vyas, RN, Structural Heart Program Nurse Coordinator ([phone])

## 2023-11-29 ENCOUNTER — PATIENT MESSAGE (OUTPATIENT)
Dept: HEALTH INFORMATION MANAGEMENT | Facility: OTHER | Age: 83
End: 2023-11-29

## 2023-12-01 ENCOUNTER — OFFICE VISIT (OUTPATIENT)
Dept: DERMATOLOGY | Facility: IMAGING CENTER | Age: 83
End: 2023-12-01
Payer: MEDICARE

## 2023-12-01 DIAGNOSIS — Z85.820 HX OF MELANOMA OF SKIN: ICD-10-CM

## 2023-12-01 DIAGNOSIS — Z12.83 SKIN CANCER SCREENING: ICD-10-CM

## 2023-12-01 DIAGNOSIS — L28.1 PICKER'S NODULES: ICD-10-CM

## 2023-12-01 DIAGNOSIS — R60.0 LEG EDEMA: ICD-10-CM

## 2023-12-01 DIAGNOSIS — L90.8 SKIN AGING: ICD-10-CM

## 2023-12-01 DIAGNOSIS — D22.9 NEVUS: ICD-10-CM

## 2023-12-01 DIAGNOSIS — L40.9 PSORIASIS: ICD-10-CM

## 2023-12-01 DIAGNOSIS — Z79.899 HIGH RISK MEDICATION USE: ICD-10-CM

## 2023-12-01 DIAGNOSIS — L81.4 LENTIGO: ICD-10-CM

## 2023-12-01 DIAGNOSIS — L82.1 SEBORRHEIC KERATOSIS: ICD-10-CM

## 2023-12-01 DIAGNOSIS — L29.9 ITCHING: ICD-10-CM

## 2023-12-01 PROCEDURE — 99213 OFFICE O/P EST LOW 20 MIN: CPT | Performed by: DERMATOLOGY

## 2023-12-01 NOTE — PROGRESS NOTES
CC:  Follow up CARL     Subjective: prev seen patient follow here for 6 mth full skin exam/ PSO.     Denies new, growing, changing, itching or bleeding skin lesions today.  Had trouble getting/using cosentyx during recent admission(s). Did have flare on legs / itching on back, while awaiting medication. Has been scratching and picking at sites. Dryness aggravated by weather - just started home humidifier and finding that helpful.    History of skin cancer: Yes, Details: melanoma 2014 left shoulder ,   History of biopsies:Yes, Details:  . June 2023 - r arm - SK/LSC  History of blistering/severe sunburns:Yes, Details: .  Family history of skin cancer:No  Family history of atypical moles:No    ROS: no fevers/chills. +/- itch.  No cough. Some shortness of breath - chronic. No weight loss.   DermPMH: hx melanoma left shoulder, 2014? Per records  Relevant PMH: many med comorbidities.  GONZALEZ, CHF- chronic leg edema and elevated BNP, kidney Dz, hypothyroidism  Social: former smoker; denies travel abroad.  Daughter with well water which she doesn't drink due to taste.  FmHx:  not affected    PE: Gen:WDWN female in NAD.  Skin:Scalp/face/eyes/lips/neck/chest/back/arms/legs/hands/feet/buttocks - examined  Genitals exam declined  -marks of excoriation diffuse on upper shoulders/arms/back and lower legs.   -scattered hyperpigmented macules/papules, appearing benign on torso and extremities  -pink macules, small plaques on lower anterior shins with marks of excoriation, xerosis / asteatosis noted of lower legs. Edema of lower legs, pitted. Left greater than right with scar longitudinal down length of lower leg visible    SCI Prior path - see scans = spongiotic dermatitis + eosinophils. Consider ACD, Nummular dermatitis or drug reaction    Labs: Hep B/C (Ab X5) April 2020 - WNL  Quantiferon negative - June 2022, Aug 2023  O&P - negative    A/P: PSO: Papulosquamous rash, prior path showing spongiotic dermatitis + eosinophils.   Suspicion for drug reaction and possible MTX intolerance: kidney dz and elevated Cr after test dose.  Steroid responsive by patient report.  Cannot exclude PSO; Improved on Cosentyx, tolerating well but flares when stopped or dosing extended. Flare with recent difficulty getting medication/admission  -cont cosentyx 300mg SubCut Qmonth, se reviewed  -cont current home supply topicals - Lidex cream vs betamethasone oint BID severe sites and TAC oint BID less severe sites PRN / itching. Declines rf today    High risk medication: monitoring for safety prior to therapeutic doses:  -TB test next due Summer 2024    Hx of skin cancer: NER - left arm melanoma  -cont'd sunprotection and skin cancer surveillance  -Q 6mo-annual exam recommended; f/u suspicious lesions PRN    Lentigo/SKs/nevi, back:  -b/r    Leg edema: reviewed signs/sx of DVT and imaging to detect. Declined testing today  Advised to seek emergent care if noting increased swelling/pain/cough    Xerosis, diffuse:  -counseled re: dx/tx  -OTC moisturizers recommended/topicals for itching related to dryness      I have reviewed medications relevant to my specialty.

## 2023-12-04 ENCOUNTER — HOSPITAL ENCOUNTER (OUTPATIENT)
Dept: CARDIOLOGY | Facility: MEDICAL CENTER | Age: 83
End: 2023-12-04
Attending: INTERNAL MEDICINE
Payer: MEDICARE

## 2023-12-04 DIAGNOSIS — I35.0 SEVERE AORTIC STENOSIS: ICD-10-CM

## 2023-12-04 PROCEDURE — 93306 TTE W/DOPPLER COMPLETE: CPT

## 2023-12-05 LAB
LV EJECT FRACT  99904: 65
LV EJECT FRACT MOD 2C 99903: 54.73
LV EJECT FRACT MOD 4C 99902: 68.06
LV EJECT FRACT MOD BP 99901: 61.9

## 2023-12-05 PROCEDURE — 93306 TTE W/DOPPLER COMPLETE: CPT | Mod: 26 | Performed by: INTERNAL MEDICINE

## 2023-12-06 ENCOUNTER — DOCUMENTATION (OUTPATIENT)
Dept: CARDIOLOGY | Facility: MEDICAL CENTER | Age: 83
End: 2023-12-06
Payer: MEDICARE

## 2023-12-06 ENCOUNTER — OFFICE VISIT (OUTPATIENT)
Dept: CARDIOLOGY | Facility: MEDICAL CENTER | Age: 83
End: 2023-12-06
Attending: NURSE PRACTITIONER
Payer: MEDICARE

## 2023-12-06 VITALS
WEIGHT: 205 LBS | BODY MASS INDEX: 37.73 KG/M2 | OXYGEN SATURATION: 93 % | DIASTOLIC BLOOD PRESSURE: 70 MMHG | HEIGHT: 62 IN | HEART RATE: 74 BPM | SYSTOLIC BLOOD PRESSURE: 114 MMHG

## 2023-12-06 DIAGNOSIS — Z95.2 S/P TAVR (TRANSCATHETER AORTIC VALVE REPLACEMENT): ICD-10-CM

## 2023-12-06 DIAGNOSIS — E66.9 OBESITY (BMI 35.0-39.9 WITHOUT COMORBIDITY): ICD-10-CM

## 2023-12-06 DIAGNOSIS — I48.11 LONGSTANDING PERSISTENT ATRIAL FIBRILLATION (HCC): ICD-10-CM

## 2023-12-06 DIAGNOSIS — N18.4 CKD (CHRONIC KIDNEY DISEASE) STAGE 4, GFR 15-29 ML/MIN (HCC): ICD-10-CM

## 2023-12-06 DIAGNOSIS — G47.33 OBSTRUCTIVE SLEEP APNEA TREATED WITH CONTINUOUS POSITIVE AIRWAY PRESSURE (CPAP): ICD-10-CM

## 2023-12-06 DIAGNOSIS — I27.20 MODERATE TO SEVERE PULMONARY HYPERTENSION (HCC): ICD-10-CM

## 2023-12-06 DIAGNOSIS — I10 ESSENTIAL HYPERTENSION: Chronic | ICD-10-CM

## 2023-12-06 DIAGNOSIS — I25.10 CORONARY ARTERY DISEASE INVOLVING NATIVE CORONARY ARTERY OF NATIVE HEART WITHOUT ANGINA PECTORIS: ICD-10-CM

## 2023-12-06 DIAGNOSIS — E78.2 MIXED HYPERLIPIDEMIA: ICD-10-CM

## 2023-12-06 DIAGNOSIS — Z53.09 CONTRAINDICATION TO ANTICOAGULATION THERAPY: ICD-10-CM

## 2023-12-06 DIAGNOSIS — I50.42 CHRONIC COMBINED SYSTOLIC AND DIASTOLIC CONGESTIVE HEART FAILURE (HCC): Chronic | ICD-10-CM

## 2023-12-06 PROCEDURE — 3074F SYST BP LT 130 MM HG: CPT | Performed by: NURSE PRACTITIONER

## 2023-12-06 PROCEDURE — 3078F DIAST BP <80 MM HG: CPT | Performed by: NURSE PRACTITIONER

## 2023-12-06 PROCEDURE — 99214 OFFICE O/P EST MOD 30 MIN: CPT | Performed by: NURSE PRACTITIONER

## 2023-12-06 PROCEDURE — 99213 OFFICE O/P EST LOW 20 MIN: CPT | Performed by: NURSE PRACTITIONER

## 2023-12-06 RX ORDER — ACETAMINOPHEN 500 MG
500-1000 TABLET ORAL EVERY 6 HOURS PRN
COMMUNITY

## 2023-12-06 ASSESSMENT — ENCOUNTER SYMPTOMS
COUGH: 0
DIZZINESS: 0
PALPITATIONS: 0
ABDOMINAL PAIN: 0
ORTHOPNEA: 0
SHORTNESS OF BREATH: 0
FEVER: 0
PND: 0
MYALGIAS: 0
CLAUDICATION: 0

## 2023-12-06 ASSESSMENT — FIBROSIS 4 INDEX: FIB4 SCORE: 3.03

## 2023-12-06 NOTE — PROGRESS NOTES
Valve Program Functional Assessment:     KCCQ12   1a) Showering/bathing: 3  1b) Walking 1 block on ground: 1  1c) Hurrying or joggin  2) Swelling: 3  3) Fatigue: 5  4) Shortness of breath: 1  5) Sleep sitting up: 2  6) Limited enjoyment of life: 3  7) Spend the rest of your life with HF: 2  8a) Hobbies, recreational activities:3  8b) Working or doing household chores:2  8c) Visiting family or friends: 3

## 2023-12-06 NOTE — PROGRESS NOTES
Chief Complaint   Patient presents with    Follow-Up     F/v dx: 1 month Post TAVR    Hypertension    CHF (Chronic)     F/v dx: Chronic combined systolic and diastolic congestive heart failure (HCC)     Subjective     Aleshia Crooks is a 83 y.o. female who presents today for 1 month post TAVR.    She is a patient of Dr. Rehman in our office. Hx of severe mitral regurgitation now S/P bhavana clip x1 NTW clip and now S/P TAVR, acute on chronic diastolic heart failure, HLD with CAD with prior CABG in '92, severe TR, moderate AS, chronic afib (not on oral anticoagulation due to recent GIB), obesity with mod-severe PAH with SHAKA on CPAP, HTN, CKD stage IV, hypothyroidism, GERD, and hepatic steatosis.    She presents today with her daughter.    She admits to improvement in her symptoms but remains with mild edema in her legs. Breathing is day by day, some days good, some days bad.    She has no chest pain, dizziness/lightheadedness, or palpitations.    Past Medical History:   Diagnosis Date    Anemia     Apnea, sleep     cpap    Atrial fibrillation (HCC)     Breath shortness     Cataract     fede IOL    CKD (chronic kidney disease) stage 4, GFR 15-29 ml/min (HCC)     Congestive heart failure (HCC)     Dental disorder     full dentures    Gasping for breath     Heart attack (HCC) 1992    Followed by Dr. 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/08/2020    2015    Moderate tricuspid regurgitation 11/16/2022    Pneumonia 2019    Psychiatric problem     Anxiety    Snoring     Thyroid disease      Past Surgical History:   Procedure Laterality Date    TRANSCATHETER AORTIC VALVE REPLACEMENT Bilateral 11/6/2023    Procedure: TRANSCATHETER AORTIC VALVE REPLACEMENT;  Surgeon:  Cesar Gray M.D.;  Location: SURGERY [facility];  Service: Cardiac    ECHOCARDIOGRAM, TRANSESOPHAGEAL, INTRAOPERATIVE N/A 11/6/2023    Procedure: ECHOCARDIOGRAM, TRANSESOPHAGEAL, INTRAOPERATIVE;  Surgeon: Cesar Gray M.D.;  Location: SURGERY [facility];  Service: Cardiac    TRANSCATHETER MITRAL VALVE REPAIR N/A 04/17/2023    Procedure: TRANSCATHETER MITRAL VALVE PROCEDURE;  Surgeon: Cesar Gray M.D.;  Location: SURGERY [facility];  Service: Cardiac    ECHOCARDIOGRAM, TRANSESOPHAGEAL, INTRAOPERATIVE N/A 04/17/2023    Procedure: ECHOCARDIOGRAM, TRANSESOPHAGEAL, INTRAOPERATIVE;  Surgeon: Cesar Gray M.D.;  Location: SURGERY [facility];  Service: Cardiac    OH COLONOSCOPY,DIAGNOSTIC N/A 01/24/2023    Procedure: COLONOSCOPY;  Surgeon: Amy Zarate M.D.;  Location: SURGERY SAME DAY [facility];  Service: Gastroenterology    OH UPPER GI ENDOSCOPY,DIAGNOSIS N/A 01/24/2023    Procedure: GASTROSCOPY;  Surgeon: Amy Zarate M.D.;  Location: SURGERY SAME DAY [facility];  Service: Gastroenterology    OH UPPER GI ENDOSCOPY,BIOPSY N/A 01/24/2023    Procedure: GASTROSCOPY, WITH BIOPSY;  Surgeon: Amy Zarate M.D.;  Location: SURGERY SAME DAY [facility];  Service: Gastroenterology    CHOLECYSTECTOMY      EYE SURGERY Bilateral     cataracts    MULTIPLE CORONARY ARTERY BYPASS      1 bypass    THYROIDECTOMY TOTAL      TONSILLECTOMY       Family History   Problem Relation Age of Onset    Cancer Mother         cancer, smoker    Stroke Maternal Grandmother     Other Other         Crohn    Kidney Disease Other         kidney transplant     Social History     Socioeconomic History    Marital status:      Spouse name: Not on file    Number of children: Not on file    Years of education: Not on file    Highest education level: Not on file   Occupational History     Comment: [employer]   Tobacco Use    Smoking status: Former     Current packs/day: 0.00     Average packs/day: 1 pack/day  for 31.0 years (31.0 ttl pk-yrs)     Types: Cigarettes     Start date: 5/10/1961     Quit date: 5/10/1992     Years since quittin.5     Passive exposure: Yes    Smokeless tobacco: Never    Tobacco comments:     Started smoking of  do not remember month or day   Vaping Use    Vaping Use: Never used   Substance and Sexual Activity    Alcohol use: No    Drug use: No    Sexual activity: Not Currently     Partners: Male   Other Topics Concern    Not on file   Social History Narrative    Not on file     Social Determinants of Health     Financial Resource Strain: Low Risk  (2022)    Overall Financial Resource Strain (CARDIA)     Difficulty of Paying Living Expenses: Not hard at all   Food Insecurity: No Food Insecurity (2022)    Hunger Vital Sign     Worried About Running Out of Food in the Last Year: Never true     Ran Out of Food in the Last Year: Never true   Transportation Needs: No Transportation Needs (2022)    PRAPARE - Transportation     Lack of Transportation (Medical): No     Lack of Transportation (Non-Medical): No   Physical Activity: Inactive (2022)    Exercise Vital Sign     Days of Exercise per Week: 0 days     Minutes of Exercise per Session: 0 min   Stress: No Stress Concern Present (2022)    [employer] [place] [employer] - Occupational Stress Questionnaire     Feeling of Stress : Not at all   Social Connections: Socially Isolated (2022)    Social Connection and Isolation Panel [NHANES]     Frequency of Communication with Friends and Family: More than three times a week     Frequency of Social Gatherings with Friends and Family: Once a week     Attends Rastafari Services: Never     Active Member of Clubs or Organizations: No     Attends Club or Organization Meetings: Never     Marital Status:    Intimate Partner Violence: Not At Risk (2022)    Humiliation, Afraid, Rape, and Kick questionnaire     Fear of Current or Ex-Partner: No      Emotionally Abused: No     Physically Abused: No     Sexually Abused: No   Housing Stability: Low Risk  (11/11/2022)    Housing Stability Vital Sign     Unable to Pay for Housing in the Last Year: No     Number of Places Lived in the Last Year: 1     Unstable Housing in the Last Year: No     Allergies   Allergen Reactions    Morphine Vomiting     Outpatient Encounter Medications as of 12/6/2023   Medication Sig Dispense Refill    CINNAMON PO Take  by mouth 2 (two) times a day.      acetaminophen (TYLENOL) 500 MG Tab Take 500-1,000 mg by mouth every 6 hours as needed.      LEVOXYL 200 MCG Tab Take 1 Tablet by mouth every morning on an empty stomach. 90 Tablet 3    ascorbic acid (VITAMIN C) 500 MG tablet Take 1 Tablet by mouth every day. 30 Tablet 11    aspirin 81 MG EC tablet Take 1 Tablet by mouth every day. 100 Tablet 3    Secukinumab (COSENTYX SENSOREADY PEN) 150 MG/ML Solution Auto-injector Inject 300mg Sub Cut once monthly (Patient taking differently: Inject 300 mg under the skin every 28 days. Indications: Psoriasis) 1.96 mL 5    Cholecalciferol (VITAMIN D) 125 MCG (5000 UT) Cap Take 5,000 Units by mouth every morning.      ferrous sulfate 325 (65 Fe) MG tablet Take 1 Tablet by mouth every day. 90 Tablet 2    bisoprolol (ZEBETA) 10 MG tablet Take 1 Tablet by mouth every day. (Patient taking differently: Take 10 mg by mouth every morning.) 90 Tablet 4    Vitamins-Lipotropics (MULTI-VITAMIN HP/MINERALS) Cap Take 1 Capsule by mouth every evening.      pantoprazole (PROTONIX) 40 MG Tablet Delayed Response Take 1 Tablet by mouth 2 times a day. 180 Tablet 3    losartan (COZAAR) 25 MG Tab Take 1 Tablet by mouth every evening. 100 Tablet 3    furosemide (LASIX) 40 MG Tab Take 1 Tablet by mouth every day. (Patient taking differently: Take 40 mg by mouth every morning.) 90 Tablet 3    atorvastatin (LIPITOR) 40 MG Tab TAKE 1 TABLET BY MOUTH AT  BEDTIME (Patient taking differently: Take 40 mg by mouth every evening. TAKE 1  "TABLET BY MOUTH AT  BEDTIME) 100 Tablet 3     No facility-administered encounter medications on file as of 12/6/2023.     Review of Systems   Constitutional:  Negative for fever and malaise/fatigue.   Respiratory:  Negative for cough and shortness of breath.    Cardiovascular:  Negative for chest pain, palpitations, orthopnea, claudication, leg swelling and PND.   Gastrointestinal:  Negative for abdominal pain.   Musculoskeletal:  Negative for myalgias.   Neurological:  Negative for dizziness.              Objective     /70 (BP Location: Left arm, Patient Position: Sitting, BP Cuff Size: Adult)   Pulse 74   Ht 1.575 m (5' 2\")   Wt 93 kg (205 lb)   LMP  (LMP Unknown)   SpO2 93%   BMI 37.49 kg/m²     Physical Exam  Vitals and nursing note reviewed.   Constitutional:       Appearance: Normal appearance. She is well-developed. She is obese.   HENT:      Head: Normocephalic and atraumatic.   Neck:      Vascular: No JVD.   Cardiovascular:      Rate and Rhythm: Normal rate and regular rhythm.      Pulses: Normal pulses.      Heart sounds: Normal heart sounds.   Pulmonary:      Effort: Pulmonary effort is normal.      Breath sounds: Normal breath sounds.   Musculoskeletal:         General: Normal range of motion.      Right lower leg: Edema present.      Left lower leg: Edema present.      Comments: Mild LE edema   Skin:     General: Skin is warm and dry.      Capillary Refill: Capillary refill takes less than 2 seconds.   Neurological:      General: No focal deficit present.      Mental Status: She is alert and oriented to person, place, and time. Mental status is at baseline.   Psychiatric:         Mood and Affect: Mood normal.         Behavior: Behavior normal.         Thought Content: Thought content normal.         Judgment: Judgment normal.                Assessment & Plan     1. S/P TAVR (transcatheter aortic valve replacement)        2. Chronic combined systolic and diastolic congestive heart failure " (HCC)  proBrain Natriuretic Peptide, NT    CBC WITHOUT DIFFERENTIAL    Basic Metabolic Panel      3. CKD (chronic kidney disease) stage 4, GFR 15-29 ml/min (HCC)        4. Contraindication to anticoagulation therapy        5. Coronary artery disease involving native coronary artery of native heart without angina pectoris        6. Essential hypertension        7. Longstanding persistent atrial fibrillation (HCC)        8. Mixed hyperlipidemia        9. Moderate to severe pulmonary hypertension (HCC)        10. Obesity (BMI 35.0-39.9 without comorbidity) (Formerly [facility])        11. Obstructive sleep apnea treated with continuous positive airway pressure (CPAP)          Medical Decision Making: Today's Assessment/Status/Plan:      1. S/P Angie Clip X1 and S/P TAVR  -doing well post-op, some residual mod-severe MR, repeat echo in 6 months  -cont asa  -symptoms improving  -SBE prophylaxis    2.HFrEF, Stage C, Class III, LVEF 60%: valvular disease  -stable symptoms  -ACE-I/ARB/ARNI: losartan 25 mg QD  -Evidence Based Beta-blocker: bisoprolol 10 mg QD  -Aldosterone Antagonist: hold due to CKD  -Diuretic: lasix 40 mg QD  -SGLT2 inhibitor: not warranted  -repeat labs per nephrology or worsening symptoms  -Discussed/reviewed maintaining a low Sodium and hydration recommendations  -repeat echo in 6 months    3. HTN with CKD  -good control  -follow at home  -BP goal <130/80    4. Obesity with SHAKA with PAH  -tolerating and compliant with CPAP    5. Anemia with recent GIB  -followed by PCP and nephrology  -Watchman in place    6. Afib, persistent  -rate controlled, asymptomatic  -Watchman in place  -cont bisoprolol 10 mg for rate control    7. HLD with CAD  -no angina or JONES  -cont statin, asa  -follow    Patient is to follow up with Dr. Rehman in 6 months with labs/echo; 1 year with structural heart team with echo.

## 2023-12-07 ENCOUNTER — TELEPHONE (OUTPATIENT)
Dept: CARDIOLOGY | Facility: MEDICAL CENTER | Age: 83
End: 2023-12-07
Payer: MEDICARE

## 2023-12-07 NOTE — TELEPHONE ENCOUNTER
----- Message from THAO Martínez sent at 12/7/2023  8:37 AM PST -----  Regarding: RE: mod-severe MR post clip?  I have her getting echo in 3 months, can you schedule follow up with [facility] instead of Sherie dumont? [place]  ----- Message -----  From: Cesar Gray M.D.  Sent: 12/7/2023   8:08 AM PST  To: THAO Martínez  Subject: RE: mod-severe MR post clip?                     Lets do another echo in 3 months and see me.  ----- Message -----  From: THAO Martínez  Sent: 12/6/2023   3:16 PM PST  To: Ekaterina Munguia R.N.; #  Subject: mod-severe MR post clip?                         Residual mod-severe MR, symptoms labile. Follow up in 6 months with repeat echo? [place]

## 2023-12-07 NOTE — TELEPHONE ENCOUNTER
Called and notified patient of MD's recommendations. Moved up echo appt and scheduled FU with Dr. Gray to review after.

## 2023-12-11 ENCOUNTER — HOSPITAL ENCOUNTER (OUTPATIENT)
Dept: LAB | Facility: MEDICAL CENTER | Age: 83
End: 2023-12-11
Attending: FAMILY MEDICINE
Payer: MEDICARE

## 2023-12-11 DIAGNOSIS — E03.9 HYPOTHYROIDISM, UNSPECIFIED TYPE: ICD-10-CM

## 2023-12-11 LAB — TSH SERPL DL<=0.005 MIU/L-ACNC: 1.83 UIU/ML (ref 0.38–5.33)

## 2023-12-11 PROCEDURE — 84443 ASSAY THYROID STIM HORMONE: CPT

## 2023-12-11 PROCEDURE — 36415 COLL VENOUS BLD VENIPUNCTURE: CPT

## 2024-01-08 NOTE — PROGRESS NOTES
CC: Annual follow-up for SHAKA on BiPAP 14/10 CWP        HPI:  Aleshia Crooks is a 84 y.o.female  kindly referred by Asael Pink M.D. and last seen by Dr. GALVAN on 6/30/2022.  At that time her compliance was 100% usage for more than 4 hours in the last 30 days with an average residual estimated apnea-hypopnea index of 2.6.  She was on BiPAP 14/10 CWP.  She is not happy with her DME and wanted to change to [facility]. Still with preferred.      She does not feel that her device is working as well as it used to.  She is right.  See below.    She was diagnosed originally in [facility] in 2019 and improved with BiPAP therapy.    Today, 1/9/2024, her 30-day compliance is 100% for 7 hours and 47 minutes.  On bilevel 14/10 CWP her average residual estimated apnea-hypopnea index is elevated 24.5.  Her apnea index is 23.9, her central index is 2.4, her hypopnea index is 0.6, her obstructive index is 20.9, and her unknown index is 0.6.    Past medical history significant for anemia,  malignant melanoma, obesity, RVSP 62 mmHg, status post TAVR, psoriasis, pulmonary nodules, anemia, post surgical hypothyroidism, hypertension, atrial fibrillation, chronic kidney disease stage IV, obesity, GONZALEZ, mitral regurgitation, CAD, pulmonary hypertension, and former smoker.        Patient Active Problem List    Diagnosis Date Noted    S/P TAVR (transcatheter aortic valve replacement) 11/06/2023    Diverticulosis 10/02/2023    Spondylolysis 10/02/2023    Pulmonary nodules 10/02/2023    Psoriasis 07/27/2023    Anemia of chronic disease 05/09/2023    S/P mitral valve clip implantation 04/18/2023    H/O: GI bleed 04/10/2023    Contraindication to anticoagulation therapy 04/10/2023    Situational stress 10/27/2021    Falls 03/01/2021    Stress incontinence 03/01/2021    CKD (chronic kidney disease) stage 4, GFR 15-29 ml/min ([facility]) 09/11/2020    Gastroesophageal reflux disease without esophagitis 06/08/2020    Mixed hyperlipidemia  04/17/2020    Hepatic steatosis 04/17/2020    Elevated alkaline phosphatase level 04/17/2020    History of malignant melanoma 03/12/2020    Skin lesions 03/12/2020    Essential hypertension 03/12/2020    Hypothyroid 03/12/2020    Obesity (BMI 35.0-39.9 without comorbidity) (HCC) 03/12/2020    Obstructive sleep apnea treated with continuous positive airway pressure (CPAP) 03/12/2020    Moderate to severe pulmonary hypertension (HCC) 07/14/2017    Chronic combined systolic and diastolic congestive heart failure (HCC) 07/14/2017    Longstanding persistent atrial fibrillation (HCC) 07/14/2017    Coronary artery disease involving native coronary artery of native heart without angina pectoris 07/14/2017    Situational anxiety 07/13/2017       Past Medical History:   Diagnosis Date    Anemia     Apnea, sleep     cpap    Atrial fibrillation (HCC)     Breath shortness     Cataract     fede IOL    CKD (chronic kidney disease) stage 4, GFR 15-29 ml/min (HCC)     Congestive heart failure (HCC)     Dental disorder     full dentures    Gasping for breath     Heart attack (HCC) 1992    Followed by Dr. 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/08/2020    2015    Moderate tricuspid regurgitation 11/16/2022    Pneumonia 2019    Psychiatric problem     Anxiety    Snoring     Thyroid disease         Past Surgical History:   Procedure Laterality Date    TRANSCATHETER AORTIC VALVE REPLACEMENT Bilateral 11/6/2023    Procedure: TRANSCATHETER AORTIC VALVE REPLACEMENT;  Surgeon: Cesar Gray M.D.;  Location: SURGERY [facility];  Service: Cardiac    ECHOCARDIOGRAM, TRANSESOPHAGEAL, INTRAOPERATIVE N/A 11/6/2023    Procedure: ECHOCARDIOGRAM, TRANSESOPHAGEAL, INTRAOPERATIVE;   Surgeon: Cesar Gray M.D.;  Location: SURGERY [facility];  Service: Cardiac    TRANSCATHETER MITRAL VALVE REPAIR N/A 2023    Procedure: TRANSCATHETER MITRAL VALVE PROCEDURE;  Surgeon: Cesar Gray M.D.;  Location: SURGERY [facility];  Service: Cardiac    ECHOCARDIOGRAM, TRANSESOPHAGEAL, INTRAOPERATIVE N/A 2023    Procedure: ECHOCARDIOGRAM, TRANSESOPHAGEAL, INTRAOPERATIVE;  Surgeon: Cesar Gray M.D.;  Location: SURGERY [facility];  Service: Cardiac    NC COLONOSCOPY,DIAGNOSTIC N/A 2023    Procedure: COLONOSCOPY;  Surgeon: Amy Zarate M.D.;  Location: SURGERY SAME DAY [facility];  Service: Gastroenterology    NC UPPER GI ENDOSCOPY,DIAGNOSIS N/A 2023    Procedure: GASTROSCOPY;  Surgeon: Amy Zarate M.D.;  Location: SURGERY SAME DAY [facility];  Service: Gastroenterology    NC UPPER GI ENDOSCOPY,BIOPSY N/A 2023    Procedure: GASTROSCOPY, WITH BIOPSY;  Surgeon: Amy Zarate M.D.;  Location: SURGERY SAME DAY [facility];  Service: Gastroenterology    CHOLECYSTECTOMY      EYE SURGERY Bilateral     cataracts    MULTIPLE CORONARY ARTERY BYPASS      1 bypass    THYROIDECTOMY TOTAL      TONSILLECTOMY         Family History   Problem Relation Age of Onset    Cancer Mother         cancer, smoker    Stroke Maternal Grandmother     Other Other         Crohn    Kidney Disease Other         kidney transplant       Social History     Socioeconomic History    Marital status:      Spouse name: Not on file    Number of children: Not on file    Years of education: Not on file    Highest education level: Not on file   Occupational History     Comment: [employer]   Tobacco Use    Smoking status: Former     Current packs/day: 0.00     Average packs/day: 1 pack/day for 31.0 years (31.0 ttl pk-yrs)     Types: Cigarettes     Start date: 5/10/1961     Quit date: 5/10/1992     Years since quittin.6     Passive exposure: Yes    Smokeless tobacco: Never    Tobacco  comments:     Started smoking of 1961 do not remember month or day   Vaping Use    Vaping Use: Never used   Substance and Sexual Activity    Alcohol use: No    Drug use: No    Sexual activity: Not Currently     Partners: Male   Other Topics Concern    Not on file   Social History Narrative    Not on file     Social Determinants of Health     Financial Resource Strain: Low Risk  (11/11/2022)    Overall Financial Resource Strain (CARDIA)     Difficulty of Paying Living Expenses: Not hard at all   Food Insecurity: No Food Insecurity (11/11/2022)    Hunger Vital Sign     Worried About Running Out of Food in the Last Year: Never true     Ran Out of Food in the Last Year: Never true   Transportation Needs: No Transportation Needs (11/11/2022)    PRAPARE - Transportation     Lack of Transportation (Medical): No     Lack of Transportation (Non-Medical): No   Physical Activity: Inactive (11/11/2022)    Exercise Vital Sign     Days of Exercise per Week: 0 days     Minutes of Exercise per Session: 0 min   Stress: No Stress Concern Present (11/11/2022)    [employer] - Occupational Stress Questionnaire     Feeling of Stress : Not at all   Social Connections: Socially Isolated (11/11/2022)    Social Connection and Isolation Panel [NHANES]     Frequency of Communication with Friends and Family: More than three times a week     Frequency of Social Gatherings with Friends and Family: Once a week     Attends [employer] Services: Never     Active Member of Clubs or Organizations: No     Attends Club or Organization Meetings: Never     Marital Status:    Intimate Partner Violence: Not At Risk (11/11/2022)    Humiliation, Afraid, Rape, and Kick questionnaire     Fear of Current or Ex-Partner: No     Emotionally Abused: No     Physically Abused: No     Sexually Abused: No   Housing Stability: Low Risk  (11/11/2022)    Housing Stability Vital Sign     Unable to Pay for Housing in the Last Year: No      Number of Places Lived in the Last Year: 1     Unstable Housing in the Last Year: No       Current Outpatient Medications   Medication Sig Dispense Refill    zolpidem (AMBIEN) 5 MG Tab Take 1-2 Tablets by mouth at bedtime as needed for Sleep (Take 1-2 tabs on the night of the sleep study as needed) for up to 1 day. Take 1-2 tabs on the night of the sleep study as needed 2 Tablet 0    CINNAMON PO Take  by mouth 2 (two) times a day.      acetaminophen (TYLENOL) 500 MG Tab Take 500-1,000 mg by mouth every 6 hours as needed.      LEVOXYL 200 MCG Tab Take 1 Tablet by mouth every morning on an empty stomach. 90 Tablet 3    ascorbic acid (VITAMIN C) 500 MG tablet Take 1 Tablet by mouth every day. 30 Tablet 11    aspirin 81 MG EC tablet Take 1 Tablet by mouth every day. 100 Tablet 3    Secukinumab (COSENTYX SENSOREADY PEN) 150 MG/ML Solution Auto-injector Inject 300mg Sub Cut once monthly (Patient taking differently: Inject 300 mg under the skin every 28 days. Indications: Psoriasis) 1.96 mL 5    Cholecalciferol (VITAMIN D) 125 MCG (5000 UT) Cap Take 5,000 Units by mouth every morning.      ferrous sulfate 325 (65 Fe) MG tablet Take 1 Tablet by mouth every day. 90 Tablet 2    bisoprolol (ZEBETA) 10 MG tablet Take 1 Tablet by mouth every day. (Patient taking differently: Take 10 mg by mouth every morning.) 90 Tablet 4    Vitamins-Lipotropics (MULTI-VITAMIN HP/MINERALS) Cap Take 1 Capsule by mouth every evening.      pantoprazole (PROTONIX) 40 MG Tablet Delayed Response Take 1 Tablet by mouth 2 times a day. 180 Tablet 3    losartan (COZAAR) 25 MG Tab Take 1 Tablet by mouth every evening. 100 Tablet 3    furosemide (LASIX) 40 MG Tab Take 1 Tablet by mouth every day. (Patient taking differently: Take 40 mg by mouth every morning.) 90 Tablet 3    atorvastatin (LIPITOR) 40 MG Tab TAKE 1 TABLET BY MOUTH AT  BEDTIME (Patient taking differently: Take 40 mg by mouth every evening. TAKE 1 TABLET BY MOUTH AT  BEDTIME) 100 Tablet 3  "    No current facility-administered medications for this visit.    \"CURRENT RX\"    ALLERGIES: Morphine    ROS    Constitutional: Denies fever, chills, sweats,  weight loss, fatigue  Cardiovascular: Denies chest pain, tightness, palpitations, swelling in legs/feet, fainting, difficulty breathing when lying down but gets better when sitting up.   Respiratory: Denies shortness of breath, cough, sputum, wheezing, painful breathing, coughing up blood.   Sleep: per HPI  Gastrointestinal: Denies  difficulty swallowing, nausea, abdominal pain, diarrhea, constipation, heartburn.  Musculoskeletal: Denies painful joints, sore muscles, back pain.        PHYSICAL EXAM  Obese  In WC    /82 (BP Location: Left arm, Patient Position: Sitting, BP Cuff Size: Adult)   Pulse (!) 58   Resp 14   Ht 1.575 m (5' 2\")   Wt 90.7 kg (200 lb)   LMP  (LMP Unknown)   SpO2 93%   BMI 36.58 kg/m²   Appearance: Well-nourished, well-developed, no acute distress  Eyes:  PERRLA, EOMI  ENMT: without change  Neck: Supple, trachea midline, no masses  Respiratory effort:  No intercostal retractions or use of accessory muscles  Lung auscultation:  No wheezes rhonchi rubs or rales  Cardiac: No murmurs, rubs, or gallops; regular rhythm, normal rate; no edema  Abdomen:  No tenderness, no organomegaly.  Musculoskeletal:  Grossly normal; gait and station normal; digits and nails normal  Skin:  No rashes, petechiae, cyanosis  Neurologic: without focal signs; oriented to person, time, place, and purpose; judgement intact  Psychiatric:  No depression, anxiety, agitation      Medical Decision Making       The medical record was reviewed in its entirety including the referral notes, records from primary care, consultants notes, hospital records, lab, imaging, microbiology, immunology, and immunizations.  Care gaps identified and reviewed with the patient.    Diagnostic and titration nocturnal polysomnogram's, home sleep apnea tests, continuous nocturnal " oximetry results, multiple sleep latency tests, and compliance reports reviewed.  1. SHAKA (obstructive sleep apnea)  - zolpidem (AMBIEN) 5 MG Tab; Take 1-2 Tablets by mouth at bedtime as needed for Sleep (Take 1-2 tabs on the night of the sleep study as needed) for up to 1 day. Take 1-2 tabs on the night of the sleep study as needed  Dispense: 2 Tablet; Refill: 0  - Polysomnography Titration; Future      PLAN:   Today, 1/9/2024, her 30-day compliance is 100% for 7 hours and 47 minutes.  On bilevel 14/10 CWP her average residual estimated apnea-hypopnea index is elevated 24.5.  Her apnea index is 23.9, her central index is 2.4, her hypopnea index is 0.6, her obstructive index is 20.9, and her unknown index is 0.6.    Will schedule her for a BiPAP retitration.    The risks of untreated sleep apnea were discussed with the patient at length. Patients with SHAKA are at increased risk of cardiovascular disease including systemic arterial hypertension, pulmonary arterial hypertension (transient or fixed), TIA, and an elevated risk of stroke, heart attack, sudden death, or arrhythmia between the hours of 11 PM and 6 AM. SHAKA patients have an increased risk of motor vehicle accidents, type 2 diabetes, GERD, repetitive mechanical trauma to pharyngeal muscles with inflammation and denervation, frequent EEG arousals leading to nonrestorative sleep, excessive daytime sleepiness, fatigue, depression, poor pain control, irritability, and lower quality of life.  The patient was advised to avoid driving a motor vehicle when drowsy.    Positive airway pressure will favorably impact many of the adverse conditions and effects provoked by SHAKA.    Have advised the patient to follow up with the appropriate healthcare practitioners for all other medical problems and issues.    Return in about 1 year (around 1/9/2025) for after sleep study.    Total time 30 minutes

## 2024-01-09 ENCOUNTER — OFFICE VISIT (OUTPATIENT)
Dept: SLEEP MEDICINE | Facility: MEDICAL CENTER | Age: 84
End: 2024-01-09
Attending: INTERNAL MEDICINE
Payer: MEDICARE

## 2024-01-09 VITALS
OXYGEN SATURATION: 93 % | SYSTOLIC BLOOD PRESSURE: 124 MMHG | WEIGHT: 200 LBS | BODY MASS INDEX: 36.8 KG/M2 | RESPIRATION RATE: 14 BRPM | DIASTOLIC BLOOD PRESSURE: 82 MMHG | HEART RATE: 58 BPM | HEIGHT: 62 IN

## 2024-01-09 DIAGNOSIS — G47.33 OSA (OBSTRUCTIVE SLEEP APNEA): ICD-10-CM

## 2024-01-09 PROCEDURE — 99214 OFFICE O/P EST MOD 30 MIN: CPT | Performed by: INTERNAL MEDICINE

## 2024-01-09 PROCEDURE — 99213 OFFICE O/P EST LOW 20 MIN: CPT | Performed by: INTERNAL MEDICINE

## 2024-01-09 PROCEDURE — 3079F DIAST BP 80-89 MM HG: CPT | Performed by: INTERNAL MEDICINE

## 2024-01-09 PROCEDURE — 3074F SYST BP LT 130 MM HG: CPT | Performed by: INTERNAL MEDICINE

## 2024-01-09 RX ORDER — ZOLPIDEM TARTRATE 5 MG/1
5-10 TABLET ORAL NIGHTLY PRN
Qty: 2 TABLET | Refills: 0 | Status: SHIPPED | OUTPATIENT
Start: 2024-01-09 | End: 2024-01-10

## 2024-01-09 ASSESSMENT — FIBROSIS 4 INDEX: FIB4 SCORE: 3.07

## 2024-01-09 ASSESSMENT — PATIENT HEALTH QUESTIONNAIRE - PHQ9: CLINICAL INTERPRETATION OF PHQ2 SCORE: 0

## 2024-01-15 ENCOUNTER — HOSPITAL ENCOUNTER (OUTPATIENT)
Dept: LAB | Facility: MEDICAL CENTER | Age: 84
End: 2024-01-15
Attending: NURSE PRACTITIONER
Payer: MEDICARE

## 2024-01-15 ENCOUNTER — HOSPITAL ENCOUNTER (OUTPATIENT)
Dept: LAB | Facility: MEDICAL CENTER | Age: 84
End: 2024-01-15
Attending: INTERNAL MEDICINE
Payer: MEDICARE

## 2024-01-15 DIAGNOSIS — D63.8 ANEMIA OF CHRONIC DISEASE: ICD-10-CM

## 2024-01-15 DIAGNOSIS — N18.4 CKD (CHRONIC KIDNEY DISEASE) STAGE 4, GFR 15-29 ML/MIN (HCC): ICD-10-CM

## 2024-01-15 DIAGNOSIS — I50.42 CHRONIC COMBINED SYSTOLIC AND DIASTOLIC CONGESTIVE HEART FAILURE (HCC): Chronic | ICD-10-CM

## 2024-01-15 DIAGNOSIS — Z87.440 HISTORY OF UTI: ICD-10-CM

## 2024-01-15 LAB
ALBUMIN SERPL BCP-MCNC: 4.2 G/DL (ref 3.2–4.9)
ANION GAP SERPL CALC-SCNC: 16 MMOL/L (ref 7–16)
APPEARANCE UR: CLEAR
BACTERIA #/AREA URNS HPF: NEGATIVE /HPF
BILIRUB UR QL STRIP.AUTO: NEGATIVE
BUN SERPL-MCNC: 20 MG/DL (ref 8–22)
CALCIUM SERPL-MCNC: 9.2 MG/DL (ref 8.5–10.5)
CHLORIDE SERPL-SCNC: 103 MMOL/L (ref 96–112)
CO2 SERPL-SCNC: 25 MMOL/L (ref 20–33)
COLOR UR: YELLOW
CREAT SERPL-MCNC: 1.53 MG/DL (ref 0.5–1.4)
CREAT UR-MCNC: 59.07 MG/DL
CREAT UR-MCNC: 59.68 MG/DL
EPI CELLS #/AREA URNS HPF: ABNORMAL /HPF
ERYTHROCYTE [DISTWIDTH] IN BLOOD BY AUTOMATED COUNT: 56.6 FL (ref 35.9–50)
FERRITIN SERPL-MCNC: 138 NG/ML (ref 10–291)
GFR SERPLBLD CREATININE-BSD FMLA CKD-EPI: 34 ML/MIN/1.73 M 2
GLUCOSE SERPL-MCNC: 106 MG/DL (ref 65–99)
GLUCOSE UR STRIP.AUTO-MCNC: NEGATIVE MG/DL
HCT VFR BLD AUTO: 40 % (ref 37–47)
HGB BLD-MCNC: 11.9 G/DL (ref 12–16)
HYALINE CASTS #/AREA URNS LPF: ABNORMAL /LPF
IRON SATN MFR SERPL: 13 % (ref 15–55)
IRON SERPL-MCNC: 43 UG/DL (ref 40–170)
KETONES UR STRIP.AUTO-MCNC: NEGATIVE MG/DL
LEUKOCYTE ESTERASE UR QL STRIP.AUTO: NEGATIVE
MCH RBC QN AUTO: 27.7 PG (ref 27–33)
MCHC RBC AUTO-ENTMCNC: 29.8 G/DL (ref 32.2–35.5)
MCV RBC AUTO: 93 FL (ref 81.4–97.8)
MICRO URNS: ABNORMAL
MICROALBUMIN UR-MCNC: 11.7 MG/DL
MICROALBUMIN/CREAT UR: 198 MG/G (ref 0–30)
NITRITE UR QL STRIP.AUTO: NEGATIVE
NT-PROBNP SERPL IA-MCNC: 7799 PG/ML (ref 0–125)
PH UR STRIP.AUTO: 6.5 [PH] (ref 5–8)
PHOSPHATE SERPL-MCNC: 3.4 MG/DL (ref 2.5–4.5)
PLATELET # BLD AUTO: 202 K/UL (ref 164–446)
PMV BLD AUTO: 12.3 FL (ref 9–12.9)
POTASSIUM SERPL-SCNC: 3.4 MMOL/L (ref 3.6–5.5)
PROT UR QL STRIP: 30 MG/DL
PROT UR-MCNC: 24 MG/DL (ref 0–15)
PROT/CREAT UR: 402 MG/G (ref 10–107)
RBC # BLD AUTO: 4.3 M/UL (ref 4.2–5.4)
RBC # URNS HPF: ABNORMAL /HPF
RBC UR QL AUTO: NEGATIVE
SODIUM SERPL-SCNC: 144 MMOL/L (ref 135–145)
SP GR UR STRIP.AUTO: 1.01
TIBC SERPL-MCNC: 325 UG/DL (ref 250–450)
UIBC SERPL-MCNC: 282 UG/DL (ref 110–370)
UROBILINOGEN UR STRIP.AUTO-MCNC: 0.2 MG/DL
WBC # BLD AUTO: 5.9 K/UL (ref 4.8–10.8)
WBC #/AREA URNS HPF: ABNORMAL /HPF

## 2024-01-15 PROCEDURE — 82043 UR ALBUMIN QUANTITATIVE: CPT

## 2024-01-15 PROCEDURE — 84100 ASSAY OF PHOSPHORUS: CPT

## 2024-01-15 PROCEDURE — 36415 COLL VENOUS BLD VENIPUNCTURE: CPT

## 2024-01-15 PROCEDURE — 82570 ASSAY OF URINE CREATININE: CPT | Mod: 91

## 2024-01-15 PROCEDURE — 80048 BASIC METABOLIC PNL TOTAL CA: CPT

## 2024-01-15 PROCEDURE — 83880 ASSAY OF NATRIURETIC PEPTIDE: CPT

## 2024-01-15 PROCEDURE — 83550 IRON BINDING TEST: CPT

## 2024-01-15 PROCEDURE — 85027 COMPLETE CBC AUTOMATED: CPT

## 2024-01-15 PROCEDURE — 81001 URINALYSIS AUTO W/SCOPE: CPT

## 2024-01-15 PROCEDURE — 84156 ASSAY OF PROTEIN URINE: CPT

## 2024-01-15 PROCEDURE — 82728 ASSAY OF FERRITIN: CPT

## 2024-01-15 PROCEDURE — 83540 ASSAY OF IRON: CPT

## 2024-01-15 PROCEDURE — 82040 ASSAY OF SERUM ALBUMIN: CPT

## 2024-01-16 ENCOUNTER — OFFICE VISIT (OUTPATIENT)
Dept: NEPHROLOGY | Facility: MEDICAL CENTER | Age: 84
End: 2024-01-16
Payer: MEDICARE

## 2024-01-16 VITALS
HEART RATE: 79 BPM | WEIGHT: 200 LBS | SYSTOLIC BLOOD PRESSURE: 118 MMHG | DIASTOLIC BLOOD PRESSURE: 74 MMHG | HEIGHT: 62 IN | OXYGEN SATURATION: 94 % | BODY MASS INDEX: 36.8 KG/M2 | TEMPERATURE: 97.9 F

## 2024-01-16 DIAGNOSIS — I10 ESSENTIAL HYPERTENSION: Chronic | ICD-10-CM

## 2024-01-16 DIAGNOSIS — N18.4 CKD (CHRONIC KIDNEY DISEASE) STAGE 4, GFR 15-29 ML/MIN (HCC): ICD-10-CM

## 2024-01-16 DIAGNOSIS — D63.8 ANEMIA OF CHRONIC DISEASE: ICD-10-CM

## 2024-01-16 DIAGNOSIS — I50.42 CHRONIC COMBINED SYSTOLIC AND DIASTOLIC CONGESTIVE HEART FAILURE (HCC): Chronic | ICD-10-CM

## 2024-01-16 DIAGNOSIS — E55.9 VITAMIN D DEFICIENCY: ICD-10-CM

## 2024-01-16 DIAGNOSIS — I48.11 LONGSTANDING PERSISTENT ATRIAL FIBRILLATION (HCC): ICD-10-CM

## 2024-01-16 DIAGNOSIS — Z87.440 HISTORY OF UTI: ICD-10-CM

## 2024-01-16 PROCEDURE — 99214 OFFICE O/P EST MOD 30 MIN: CPT | Performed by: INTERNAL MEDICINE

## 2024-01-16 PROCEDURE — 3078F DIAST BP <80 MM HG: CPT | Performed by: INTERNAL MEDICINE

## 2024-01-16 PROCEDURE — 3074F SYST BP LT 130 MM HG: CPT | Performed by: INTERNAL MEDICINE

## 2024-01-16 ASSESSMENT — ENCOUNTER SYMPTOMS
COUGH: 1
SHORTNESS OF BREATH: 1
FEVER: 0
ABDOMINAL PAIN: 0

## 2024-01-16 ASSESSMENT — FIBROSIS 4 INDEX: FIB4 SCORE: 3.27

## 2024-01-16 NOTE — PROGRESS NOTES
"Chief Complaint   Patient presents with    Chronic Kidney Disease       CC: f/u CKD    HPI:  Aleshia Crooks is a 84 y.o. female with HTN, Psoriasis on immunosuppression, [id number], severe mitral regurgitation and valvular heart failure status post MitraClip April 2023, Afib s/p Watchman 7/26/23 c/b cardiac arrest and cardiac tamponade, who presents today for follow up.    Re: HTN, diagnosed 1992 when she had a heart attack. BP control over the years has been well controlled. She hasn't been checking BP at home, usually controlled at doctor visits. Denies Light-headedness.     Re: CHF, noted in 2017 and 2020. She had mitraclip in 4/2023. She feels like the mitraclip helped her breathing. She does complain of ongoing SOB. She will be getting a sleep study because her CPAP machine isn't adequate.  She thinks her LE edema worsens through the day and improves overnight by morning.  She remains on lasix 40mg once daily (and she feels diuretic effect).     Re: CKD, notably worsening in the last year. Her appetite is \"too good.\" Denies headaches, N/V, metallic taste. Denies NSAIDs, takes Tylenol PRN.     Re: Anemia, she needed some blood transfusions in 3/2023. She had c-scope in 1/2023 with diverticulosis, and EGD with nodular mucosa of the stomach. Energy level is \"gradually getting better.\" She has needed some retacrit shots, but none since Aug/Sept/2023. She remains off warfarin now because she had the Watchman procedure. She had iron infusions in 8/2023.       Past Medical History:   Diagnosis Date    Anemia     Apnea, sleep     cpap    Atrial fibrillation (HCC)     Breath shortness     Cataract     fede IOL    CKD (chronic kidney disease) stage 4, GFR 15-29 ml/min (HCC)     Congestive heart failure (HCC)     Dental disorder     full dentures    Gasping for breath     Heart attack (HCC) 1992    Followed by Dr. Garcia    Heart murmur     Hemorrhagic disorder (HCC)     takes warfarin    High cholesterol     " Hypertension     Iron deficiency anemia     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) (HCC) 03/25/2021    Malignant melanoma of left upper extremity including shoulder (HCC) 06/08/2020 2015    Moderate tricuspid regurgitation 11/16/2022    Pneumonia 2019    Psychiatric problem     Anxiety    Snoring     Thyroid disease        Past Surgical History:   Procedure Laterality Date    TRANSCATHETER AORTIC VALVE REPLACEMENT Bilateral 11/6/2023    Procedure: TRANSCATHETER AORTIC VALVE REPLACEMENT;  Surgeon: Cesar Gray M.D.;  Location: SURGERY [facility];  Service: Cardiac    ECHOCARDIOGRAM, TRANSESOPHAGEAL, INTRAOPERATIVE N/A 11/6/2023    Procedure: ECHOCARDIOGRAM, TRANSESOPHAGEAL, INTRAOPERATIVE;  Surgeon: Cesar Gray M.D.;  Location: SURGERY [facility];  Service: Cardiac    TRANSCATHETER MITRAL VALVE REPAIR N/A 04/17/2023    Procedure: TRANSCATHETER MITRAL VALVE PROCEDURE;  Surgeon: Cesar Gray M.D.;  Location: SURGERY [facility];  Service: Cardiac    ECHOCARDIOGRAM, TRANSESOPHAGEAL, INTRAOPERATIVE N/A 04/17/2023    Procedure: ECHOCARDIOGRAM, TRANSESOPHAGEAL, INTRAOPERATIVE;  Surgeon: Cesar Gray M.D.;  Location: SURGERY [facility];  Service: Cardiac    AR COLONOSCOPY,DIAGNOSTIC N/A 01/24/2023    Procedure: COLONOSCOPY;  Surgeon: Amy Zarate M.D.;  Location: SURGERY SAME DAY [facility];  Service: Gastroenterology    AR UPPER GI ENDOSCOPY,DIAGNOSIS N/A 01/24/2023    Procedure: GASTROSCOPY;  Surgeon: Amy Zarate M.D.;  Location: SURGERY SAME DAY [facility];  Service: Gastroenterology    AR UPPER GI ENDOSCOPY,BIOPSY N/A 01/24/2023    Procedure: GASTROSCOPY, WITH BIOPSY;  Surgeon: Amy Zarate M.D.;  Location: SURGERY SAME DAY [facility];  Service: Gastroenterology    CHOLECYSTECTOMY      EYE SURGERY Bilateral     cataracts    MULTIPLE CORONARY ARTERY BYPASS      1 bypass    THYROIDECTOMY TOTAL      TONSILLECTOMY          Outpatient Encounter  Medications as of 1/16/2024   Medication Sig Dispense Refill    CINNAMON PO Take  by mouth 2 (two) times a day.      acetaminophen (TYLENOL) 500 MG Tab Take 500-1,000 mg by mouth every 6 hours as needed.      LEVOXYL 200 MCG Tab Take 1 Tablet by mouth every morning on an empty stomach. 90 Tablet 3    ascorbic acid (VITAMIN C) 500 MG tablet Take 1 Tablet by mouth every day. 30 Tablet 11    aspirin 81 MG EC tablet Take 1 Tablet by mouth every day. 100 Tablet 3    Secukinumab (COSENTYX SENSOREADY PEN) 150 MG/ML Solution Auto-injector Inject 300mg Sub Cut once monthly (Patient taking differently: Inject 300 mg under the skin every 28 days. Indications: Psoriasis) 1.96 mL 5    Cholecalciferol (VITAMIN D) 125 MCG (5000 UT) Cap Take 5,000 Units by mouth every morning.      ferrous sulfate 325 (65 Fe) MG tablet Take 1 Tablet by mouth every day. 90 Tablet 2    bisoprolol (ZEBETA) 10 MG tablet Take 1 Tablet by mouth every day. (Patient taking differently: Take 10 mg by mouth every morning.) 90 Tablet 4    Vitamins-Lipotropics (MULTI-VITAMIN HP/MINERALS) Cap Take 1 Capsule by mouth every evening.      pantoprazole (PROTONIX) 40 MG Tablet Delayed Response Take 1 Tablet by mouth 2 times a day. 180 Tablet 3    losartan (COZAAR) 25 MG Tab Take 1 Tablet by mouth every evening. 100 Tablet 3    furosemide (LASIX) 40 MG Tab Take 1 Tablet by mouth every day. (Patient taking differently: Take 40 mg by mouth every morning.) 90 Tablet 3    atorvastatin (LIPITOR) 40 MG Tab TAKE 1 TABLET BY MOUTH AT  BEDTIME (Patient taking differently: Take 40 mg by mouth every evening. TAKE 1 TABLET BY MOUTH AT  BEDTIME) 100 Tablet 3     No facility-administered encounter medications on file as of 1/16/2024.        Allergies   Allergen Reactions    Morphine Vomiting           Family History   Problem Relation Age of Onset    Cancer Mother         cancer, smoker    Stroke Maternal Grandmother     Other Other         Crohn    Kidney Disease Other          "kidney transplant       Review of Systems   Constitutional:  Negative for fever.   Respiratory:  Positive for cough and shortness of breath.    Cardiovascular:  Positive for leg swelling. Negative for chest pain.   Gastrointestinal:  Negative for abdominal pain.   Genitourinary:  Negative for dysuria and hematuria.   All other systems reviewed and are negative.      /74 (BP Location: Right arm, Patient Position: Sitting, BP Cuff Size: Adult)   Pulse 79   Temp 36.6 °C (97.9 °F) (Temporal)   Ht 1.575 m (5' 2\")   Wt 90.7 kg (200 lb)   LMP  (LMP Unknown)   SpO2 94%   BMI 36.58 kg/m²     Physical Exam  Constitutional:       General: She is not in acute distress.     Appearance: She is obese.      Comments: Power scooter   HENT:      Mouth/Throat:      Pharynx: No oropharyngeal exudate.   Eyes:      General: No scleral icterus.  Neck:      Trachea: No tracheal deviation.   Cardiovascular:      Rate and Rhythm: Normal rate. Rhythm irregular.      Heart sounds: Murmur heard.   Pulmonary:      Effort: Pulmonary effort is normal.      Breath sounds: Normal breath sounds. No stridor. No rales.   Abdominal:      General: Bowel sounds are normal.      Palpations: Abdomen is soft.      Tenderness: There is no abdominal tenderness.   Musculoskeletal:         General: Normal range of motion.      Cervical back: Neck supple.      Right lower leg: Edema (1+) present.      Left lower leg: Edema (1-2+) present.   Skin:     General: Skin is warm and dry.      Findings: No rash.   Neurological:      General: No focal deficit present.      Mental Status: She is alert and oriented to person, place, and time.   Psychiatric:         Mood and Affect: Mood and affect normal.         Behavior: Behavior normal.         Labs reviewed.    Recent Labs     02/19/23  0051 02/24/23  1135 08/31/23  0840 08/31/23  0844 11/06/23  1107 11/07/23  0032 11/13/23  1058 01/15/24  1056   ALBUMIN  --    < > 4.1   < > 3.4 3.8  --  4.2   HDL  --   --  "  --   --   --  34*  --   --    TRIGLYCERIDE  --   --   --   --   --  98  --   --    SODIUM 138   < > 142   < > 140 140 141 144   POTASSIUM 3.4*   < > 3.5*   < > 3.1* 4.3 3.7 3.4*   CHLORIDE 98   < > 104   < > 104 105 103 103   CO2 26   < > 25   < > 22 21 25 25   BUN 22   < > 34*   < > 26* 32* 31* 20   CREATININE 2.05*   < > 1.83*   < > 1.74* 2.16* 1.83* 1.53*   PHOSPHORUS 3.9  --  3.0  --   --   --   --  3.4    < > = values in this interval not displayed.       Lab Results   Component Value Date/Time    WBC 5.9 01/15/2024 10:56 AM    RBC 4.30 01/15/2024 10:56 AM    HEMOGLOBIN 11.9 (L) 01/15/2024 10:56 AM    HEMATOCRIT 40.0 01/15/2024 10:56 AM    MCV 93.0 01/15/2024 10:56 AM    MCH 27.7 01/15/2024 10:56 AM    MCHC 29.8 (L) 01/15/2024 10:56 AM    MPV 12.3 01/15/2024 10:56 AM       Recent Labs     01/15/24  1056   WBC 5.9   RBC 4.30   HEMOGLOBIN 11.9*   HEMATOCRIT 40.0   MCV 93.0   MCH 27.7   MCHC 29.8*   RDW 56.6*   PLATELETCT 202   MPV 12.3       URINALYSIS:  Lab Results   Component Value Date/Time    COLORURINE Yellow 01/15/2024 1056    CLARITY Clear 01/15/2024 1056    SPECGRAVITY 1.013 01/15/2024 1056    PHURINE 6.5 01/15/2024 1056    KETONES Negative 01/15/2024 1056    PROTEINURIN 30 (A) 01/15/2024 1056    BILIRUBINUR Negative 01/15/2024 1056    UROBILU 0.2 01/15/2024 1056    NITRITE Negative 01/15/2024 1056    LEUKESTERAS Negative 01/15/2024 1056    OCCULTBLOOD Negative 01/15/2024 1056       UPC  Lab Results   Component Value Date/Time    TOTPROTUR 24.0 (H) 01/15/2024 1056      Lab Results   Component Value Date/Time    CREATININEU 59.68 01/15/2024 1056             Imaging reviewed  No orders to display         Assessment:  Aleshia Crooks is a 84 y.o. female with HTN, Psoriasis on immunosuppression, [id number], severe mitral regurgitation and valvular heart failure status post MitraClip April 2023, Afib s/p Watchman 7/26/23 c/b cardiac arrest and cardiac tamponade, who presents today for follow up.    Plan:  1.  CKD 3b-4  -Creatinine and GFR slightly improved into stage IIIb CKD, but might be dilutional, historically had been in stage IV CKD.  Patient remains at mild to moderate risk of CKD progression with her microalbuminuria.   Her baseline CKD likely due to history of hypertension, age-related kidney decline.  I explained the importance of blood pressure control to help slow CKD progression.  I recommend avoiding NSAIDs and other nephrotoxins.  I recommend a low-sodium diet.  Maintain losartan for long-term kidney protection; SGLT2 inhibitor too expensive per patient.  Patient would consider home PD (daughter did PD before transplant) if needed.     2. Essential hypertension  -Controlled.  Continue losartan for long-term kidney protection.  Continue Lasix 40 mg p.o. daily for now.    3. Moderate to severe pulmonary hypertension (HCC)  -Likely due to valvular disease from tricuspid and mitral regurgitation.  Remains stable status post MitraClip procedure April 2023.      4. Chronic combined systolic and diastolic congestive heart failure (HCC)  -Patient appears mildly hypervolemic today.  Recommend Lasix 40 mg p.o. twice daily for 2 days, then resume Lasix 40 mg p.o. daily.      5. Anemia, unspecified type  -Anemia much improved ever since patient had Watchman procedure and has been off of warfarin.  No need for iron infusions or Retacrit injections.  Discontinue Retacrit therapy plan.  Maintain oral ferrous sulfate 325 mg daily for now.      Return to clinic 4 months with preclinic labs    Ryder Davis MD  Nephrology

## 2024-01-16 NOTE — PATIENT INSTRUCTIONS
"If needed, \"double for two.\" If your breathing is worse when lying flat, or if the leg swelling gets very bad, increase lasix to 40mg twice a day for two days. Then resume your regular dose.    "

## 2024-01-24 ENCOUNTER — SLEEP STUDY (OUTPATIENT)
Dept: SLEEP MEDICINE | Facility: MEDICAL CENTER | Age: 84
End: 2024-01-24
Attending: INTERNAL MEDICINE
Payer: MEDICARE

## 2024-01-24 ENCOUNTER — OFFICE VISIT (OUTPATIENT)
Dept: MEDICAL GROUP | Facility: MEDICAL CENTER | Age: 84
End: 2024-01-24
Payer: MEDICARE

## 2024-01-24 VITALS
TEMPERATURE: 98 F | SYSTOLIC BLOOD PRESSURE: 118 MMHG | RESPIRATION RATE: 14 BRPM | OXYGEN SATURATION: 93 % | HEIGHT: 62 IN | WEIGHT: 202.38 LBS | HEART RATE: 71 BPM | DIASTOLIC BLOOD PRESSURE: 70 MMHG | BODY MASS INDEX: 37.24 KG/M2

## 2024-01-24 DIAGNOSIS — D50.0 IRON DEFICIENCY ANEMIA DUE TO CHRONIC BLOOD LOSS: ICD-10-CM

## 2024-01-24 DIAGNOSIS — G47.33 OSA (OBSTRUCTIVE SLEEP APNEA): ICD-10-CM

## 2024-01-24 DIAGNOSIS — I34.0 SEVERE MITRAL REGURGITATION: ICD-10-CM

## 2024-01-24 DIAGNOSIS — I07.1 SEVERE TRICUSPID REGURGITATION BY PRIOR ECHOCARDIOGRAM: ICD-10-CM

## 2024-01-24 DIAGNOSIS — R23.4 SKIN INDURATION: ICD-10-CM

## 2024-01-24 DIAGNOSIS — E66.9 OBESITY, CLASS II, BMI 35-39.9: ICD-10-CM

## 2024-01-24 DIAGNOSIS — R05.3 PERSISTENT COUGH: ICD-10-CM

## 2024-01-24 DIAGNOSIS — K43.9 SUPRAUMBILICAL HERNIA: ICD-10-CM

## 2024-01-24 PROBLEM — E66.812 OBESITY, CLASS II, BMI 35-39.9: Status: ACTIVE | Noted: 2020-03-12

## 2024-01-24 PROCEDURE — 95811 POLYSOM 6/>YRS CPAP 4/> PARM: CPT | Performed by: INTERNAL MEDICINE

## 2024-01-24 PROCEDURE — 3078F DIAST BP <80 MM HG: CPT | Performed by: FAMILY MEDICINE

## 2024-01-24 PROCEDURE — 3074F SYST BP LT 130 MM HG: CPT | Performed by: FAMILY MEDICINE

## 2024-01-24 PROCEDURE — 99214 OFFICE O/P EST MOD 30 MIN: CPT | Performed by: FAMILY MEDICINE

## 2024-01-24 RX ORDER — FUROSEMIDE 40 MG/1
40-80 TABLET ORAL DAILY
Qty: 135 TABLET | Refills: 3 | Status: SHIPPED | OUTPATIENT
Start: 2024-01-24

## 2024-01-24 RX ORDER — BENZONATATE 100 MG/1
100 CAPSULE ORAL 3 TIMES DAILY PRN
Qty: 60 CAPSULE | Refills: 0 | Status: SHIPPED | OUTPATIENT
Start: 2024-01-24

## 2024-01-24 RX ORDER — FERROUS SULFATE 325(65) MG
325 TABLET ORAL 2 TIMES DAILY
Qty: 180 TABLET | Refills: 3 | Status: SHIPPED | OUTPATIENT
Start: 2024-01-24 | End: 2024-02-20

## 2024-01-24 ASSESSMENT — FIBROSIS 4 INDEX: FIB4 SCORE: 3.27

## 2024-01-24 NOTE — PROGRESS NOTES
Chief Complaint   Patient presents with    Follow-Up     Q3M      Skin Lesion     Thick skin anterior abdomen    Cough       Subjective:     HPI:   Aleshia Crooks presents today with the followin. Supraumbilical hernia  4 cm hernia, not reducible.  Texture is unusual.  US order discussed and placed.  May need non contrast CT scan.    2. Skin induration  Thick induration below umbilicus.   Non tender.  Light tissue, no pigment.    3. Persistent cough  Taking OTC cough drops, insufficient.  Short of breath, fatigued.      4. Iron deficiency anemia due to chronic blood loss  Greatly improved anemia, taking the iron bid as directed, rewritten    5. Obesity, Class II, BMI 35-39.9  Discussed, cannot walk much due to SOB and fatigue.  Doing another sleep study with titration tonight.          Patient Active Problem List    Diagnosis Date Noted    Supraumbilical hernia 2024    Skin induration 2024    S/P TAVR (transcatheter aortic valve replacement) 2023    Diverticulosis 10/02/2023    Spondylolysis 10/02/2023    Pulmonary nodules 10/02/2023    Psoriasis 2023    Anemia of chronic disease 2023    S/P mitral valve clip implantation 2023    H/O: GI bleed 04/10/2023    Contraindication to anticoagulation therapy 04/10/2023    Situational stress 10/27/2021    Falls 2021    Stress incontinence 2021    CKD (chronic kidney disease) stage 4, GFR 15-29 ml/min ([facility]) 2020    Gastroesophageal reflux disease without esophagitis 2020    Mixed hyperlipidemia 2020    Hepatic steatosis 2020    Elevated alkaline phosphatase level 2020    Iron deficiency anemia due to chronic blood loss 2020    History of malignant melanoma 2020    Skin lesions 2020    Essential hypertension 2020    Hypothyroid 2020    Obesity, Class II, BMI 35-39.9 2020    Obstructive sleep apnea treated with continuous positive airway pressure (CPAP)  03/12/2020    Moderate to severe pulmonary hypertension (HCC) 07/14/2017    Chronic combined systolic and diastolic congestive heart failure (HCC) 07/14/2017    Longstanding persistent atrial fibrillation (HCC) 07/14/2017    Coronary artery disease involving native coronary artery of native heart without angina pectoris 07/14/2017    Situational anxiety 07/13/2017       Current medicines (including changes today)  Current Outpatient Medications   Medication Sig Dispense Refill    ferrous sulfate 325 (65 Fe) MG tablet Take 1 Tablet by mouth 2 times a day. 180 Tablet 3    benzonatate (TESSALON) 100 MG Cap Take 1 Capsule by mouth 3 times a day as needed for Cough. 60 Capsule 0    CINNAMON PO Take  by mouth 2 (two) times a day.      acetaminophen (TYLENOL) 500 MG Tab Take 500-1,000 mg by mouth every 6 hours as needed.      LEVOXYL 200 MCG Tab Take 1 Tablet by mouth every morning on an empty stomach. 90 Tablet 3    ascorbic acid (VITAMIN C) 500 MG tablet Take 1 Tablet by mouth every day. 30 Tablet 11    aspirin 81 MG EC tablet Take 1 Tablet by mouth every day. 100 Tablet 3    Secukinumab (COSENTYX SENSOREADY PEN) 150 MG/ML Solution Auto-injector Inject 300mg Sub Cut once monthly (Patient taking differently: Inject 300 mg under the skin every 28 days. Indications: Psoriasis) 1.96 mL 5    Cholecalciferol (VITAMIN D) 125 MCG (5000 UT) Cap Take 5,000 Units by mouth every morning.      bisoprolol (ZEBETA) 10 MG tablet Take 1 Tablet by mouth every day. (Patient taking differently: Take 10 mg by mouth every morning.) 90 Tablet 4    Vitamins-Lipotropics (MULTI-VITAMIN HP/MINERALS) Cap Take 1 Capsule by mouth every evening.      pantoprazole (PROTONIX) 40 MG Tablet Delayed Response Take 1 Tablet by mouth 2 times a day. 180 Tablet 3    losartan (COZAAR) 25 MG Tab Take 1 Tablet by mouth every evening. 100 Tablet 3    furosemide (LASIX) 40 MG Tab Take 1 Tablet by mouth every day. (Patient taking differently: Take 40 mg by mouth  "every morning.) 90 Tablet 3    atorvastatin (LIPITOR) 40 MG Tab TAKE 1 TABLET BY MOUTH AT  BEDTIME (Patient taking differently: Take 40 mg by mouth every evening. TAKE 1 TABLET BY MOUTH AT  BEDTIME) 100 Tablet 3     No current facility-administered medications for this visit.       Allergies   Allergen Reactions    Morphine Vomiting       ROS: As per HPI       Objective:     /70   Pulse 71   Temp 36.7 °C (98 °F)   Resp 14   Ht 1.575 m (5' 2\")   Wt 91.8 kg (202 lb 6.1 oz)   SpO2 93%  Body mass index is 37.02 kg/m².    Physical Exam:  Constitutional: Well-developed and well-nourished. Not diaphoretic. No distress. Lucid and fluent.  Skin: Skin is warm and dry. No rash noted.  Head: Atraumatic without lesions.  Eyes: Conjunctivae and extraocular motions are normal. Pupils are equal, round, and reactive to light. No scleral icterus.   Ears:  External ears unremarkable.   Neck: Supple, trachea midline. No thyromegaly present. No cervical or supraclavicular lymphadenopathy. No JVD or carotid bruits appreciated  Cardiovascular: Regular rate and rhythm.  Normal S1, S2 without murmur appreciated.  Chest: Effort normal. Clear to auscultation throughout. No adventitious sounds.  Air movement is actually quite good.  Abdomen: Soft, non tender, large smooth mass above umbilicus.  Does not reduce, skin is thick.  Lower abdomen unusual induration.  No ulcerations.. Active bowel sounds in all four quadrants.   Extremities: No cyanosis, clubbing, erythema, nor edema.   Neurological: Alert and oriented x 3. No tremor appreciated.  Psychiatric:  Behavior, mood, and affect are appropriate.       Assessment and Plan:     84 y.o. female with the following issues:    1. Supraumbilical hernia  US-HERNIA ABDOMEN      2. Skin induration  US-HERNIA ABDOMEN      3. Persistent cough  benzonatate (TESSALON) 100 MG Cap      4. Iron deficiency anemia due to chronic blood loss  ferrous sulfate 325 (65 Fe) MG tablet      5. Obesity, " Class II, BMI 35-39.9              Followup: Return in about 3 months (around 4/24/2024), or if symptoms worsen or fail to improve.

## 2024-01-24 NOTE — PROGRESS NOTES
Was told by nephrology that she can take two furosemide every morning when needed.  Prescription adjusted.

## 2024-01-25 NOTE — PROCEDURES
MONTAGE: Standard  STUDY TYPE: Treatment  RECORDING TECHNIQUE:   After the scalp was prepared, gold plated electrodes were applied to the scalp according to the International 10-20 System. EEG (electroencephalogram) was continuously monitored from the O1-M2, O2-M1, C3-M2, C4-M1, F3-M2, and F4-M1. EOGs (electrooculograms) were monitored by electrodes placed at the left and right outer canthi. Chin EMG (electromyogram) was monitored by electrodes placed on the mentalis and sub-mentalis muscles. Nasal and oral airflow were monitored using a triple port thermocouple as well as oronasal pressure transducer. Respiratory effort was measured by inductive plethysmography technology employing abdominal and thoracic belts. Blood oxygen saturation and pulse were monitored by pulse oximetry. Heart rhythm was monitored by surface electrocardiogram. Leg EMG was studied using surface electrodes placed on left and right anterior tibialis. A microphone was used to monitor tracheal sounds and snoring. Body position was monitored and documented by technician observation.   SCORING CRITERIA:   A modification of the AASM manual for scoring of sleep and associated events was used. Obstructive apneas were scored by cessation of airflow for at least 10 seconds with continuing respiratory effort. Central apneas were scored by cessation of airflow for at least 10 seconds with no respiratory effort. Hypopneas were scored by a 30% or more reduction in airflow for at least 10 seconds accompanied by arterial oxygen desaturation of 3% or an arousal. For CMS (Medicare) patients, per AASM rule 1B, hypopneas are scored by 30% with mild reduction in airflow for at least 10 seconds accompanied by arterial saturation decreased at 4%.  Study start time was 10:16:15 PM. Diagnostic recording time was 7h 28.5m with a total sleep time of 5h 46.0m resulting in a sleep efficiency of 77.15%%. Sleep latency from the start of the study was 09 minutes and the  latency from sleep to REM was 166 minutes. In total,142 arousals were scored for an arousal index of 24.6.  Respiratory:  There were a total of 14 apneas consisting of 0 obstructive apneas, 0 mixed apneas, and 14 central apneas. A total of 145 hypopneas were scored. The apnea index was 2.43 per hour and the hypopnea index was 25.14 per hour resulting in an overall AHI of 27.57. AHI during REM was 1.3 and AHI while supine was 25.11.  Oximetry:  There was a mean oxygen saturation of 90.0%. The minimum oxygen saturation in NREM was 79.0 % and in REM was 83.0%. The patient spent 54.2 minutes of TST with SaO2 <88%.  Cardiac:  The highest heart rate seen while awake was 103 BPM while the highest heart rate during sleep was 96 BPM with an average sleeping heart rate of 61 BPM.  Limb Movements:  There were a total of 286 PLMs during sleep which resulted in a PLMS index of 49.6. Of these, 117 were associated with arousals which resulted in a PLMS arousal index of 20.3.  Titration: Bipap was tried from 12/8-24/20cm H2O. The apnea-hypopnea index failed to normalize on any tested BiPAP pressure.  Central apneas and hypopneas accounted for 30.8% of the abnormal respiratory events.  The patient did best on bilevel 20/16 cm water with a resultant AHI of 9.6 but only 12 minutes of sleep were recorded at this level.  The patient fared worse on higher and lower bilevel pressures.  This was a fully attended sleep study. This test was technically adequate.  The patient used a access of oral fullface mask and heated humidification.            ASSESSMENT:    Unsuccessful BiPAP titration from 12/8 CWP to 24/20 CWP.  The apnea-hypopnea index failed to normalize on any tested BiPAP pressure.  Treatment emergent central apnea 30.8%.  The patient therefore does not qualify for ASV.          RECOMMENDATION:    It is not possible to extrapolate these findings to meaningful BiPAP settings.  Consider an IVAPS titration.          NOTES:     The  AHI is the number of apneas (cessation of airflow for 10 seconds or longer) and hypopneas (shallow breaths accompanied by at least a 3% drop in the oxygen saturation) that occur per hour. Less than 5 per hour is normal, 5-15 per hour is mild sleep apnea, 15-30 per hour moderate sleep apnea, and greater than 30 per hour severe sleep apnea.    Patients with sleep apnea are at increased risk of cardiovascular disease including systemic arterial hypertension, pulmonary arterial hypertension, (transient or fixed), transient ischemic attacks, and an elevated risk of stroke, heart attack, sudden death, or arrhythmia between the hours of 11 PM and 6 AM.  Sleep apnea patients have an increased risk of motor vehicle accidents, type 2 diabetes, gastroesophageal reflux, repetitive mechanical trauma to pharyngeal muscles with inflammation and denervation, frequent EEG arousals leading to nonrestorative sleep, excessive daytime somnolence, fatigue, depression, poor pain control, irritability, and a lower quality of life.        Dr. Ivan Rangel MD

## 2024-02-12 ENCOUNTER — HOSPITAL ENCOUNTER (OUTPATIENT)
Dept: RADIOLOGY | Facility: MEDICAL CENTER | Age: 84
End: 2024-02-12
Attending: FAMILY MEDICINE
Payer: MEDICARE

## 2024-02-12 DIAGNOSIS — K43.9 SUPRAUMBILICAL HERNIA: ICD-10-CM

## 2024-02-12 DIAGNOSIS — R23.4 SKIN INDURATION: ICD-10-CM

## 2024-02-12 PROCEDURE — 76705 ECHO EXAM OF ABDOMEN: CPT

## 2024-02-14 ENCOUNTER — OFFICE VISIT (OUTPATIENT)
Dept: SLEEP MEDICINE | Facility: MEDICAL CENTER | Age: 84
End: 2024-02-14
Attending: PHYSICIAN ASSISTANT
Payer: MEDICARE

## 2024-02-14 VITALS
RESPIRATION RATE: 15 BRPM | WEIGHT: 196 LBS | DIASTOLIC BLOOD PRESSURE: 50 MMHG | OXYGEN SATURATION: 91 % | SYSTOLIC BLOOD PRESSURE: 100 MMHG | BODY MASS INDEX: 36.07 KG/M2 | HEIGHT: 62 IN | HEART RATE: 80 BPM

## 2024-02-14 DIAGNOSIS — G47.33 OSA (OBSTRUCTIVE SLEEP APNEA): ICD-10-CM

## 2024-02-14 DIAGNOSIS — G47.31 COMPLEX SLEEP APNEA SYNDROME: ICD-10-CM

## 2024-02-14 PROCEDURE — 99212 OFFICE O/P EST SF 10 MIN: CPT | Performed by: PHYSICIAN ASSISTANT

## 2024-02-14 PROCEDURE — 99213 OFFICE O/P EST LOW 20 MIN: CPT | Performed by: PHYSICIAN ASSISTANT

## 2024-02-14 PROCEDURE — 3074F SYST BP LT 130 MM HG: CPT | Performed by: PHYSICIAN ASSISTANT

## 2024-02-14 PROCEDURE — 3078F DIAST BP <80 MM HG: CPT | Performed by: PHYSICIAN ASSISTANT

## 2024-02-14 ASSESSMENT — ENCOUNTER SYMPTOMS
ORTHOPNEA: 1
DIZZINESS: 0
CHILLS: 0
PALPITATIONS: 1
SHORTNESS OF BREATH: 1
TREMORS: 0
SPUTUM PRODUCTION: 0
COUGH: 1
ROS GI COMMENTS: UPPER AND LOWER DENTURES, NO SWALLOWING ISSUES
SINUS PAIN: 0
FEVER: 0
WHEEZING: 0
SORE THROAT: 0
WEIGHT LOSS: 0
HEARTBURN: 0
INSOMNIA: 0
HEADACHES: 0

## 2024-02-14 ASSESSMENT — FIBROSIS 4 INDEX: FIB4 SCORE: 3.27

## 2024-02-14 NOTE — PATIENT INSTRUCTIONS
1-sleeping very poorly at present  2-reviewed sleep study results with incomplete titration but best tolerated BiPAP OF 20/16  3-pressures adjusted in clinic but patient may yet need IVAPS sleep study  4-if improved symptoms on follow up obtain overnight oximetry to make sure oxygen improved as well   5-reviewed compliance which is excellent but excessive apneas noted  6-follow up in 3 months, sooner if needed   7-IVAPS  study may still be needed if no relief in symptoms

## 2024-02-14 NOTE — PROGRESS NOTES
"Chief Complaint   Patient presents with    Follow-Up     SHAKA last seen 1.9.24 Dr. Rangel   Sleep study results 1.24.24       HPI:  Aleshia Crooks is a 84 y.o. year old female here today for follow-up on obstructive sleep apnea and sleep study results.  Patient last seen in clinic 1/9/2024 by Dr. Ivan Rangel.    Past Medical History: Anemia, malignant melanoma, obesity, significant pulmonary hypertension, TAVR, psoriasis, pulmonary nodules, postsurgical hypothyroidism, hypertension, atrial fibrillation, chronic kidney disease stage IV, former smoker.    Vitals:  /50 (BP Location: Right arm, Patient Position: Sitting, BP Cuff Size: Adult)   Pulse 80   Resp 15   Ht 1.575 m (5' 2\")   Wt 88.9 kg (196 lb)   SpO2 91% BMI of 35.85 kg/m².    Recent Imaging: Echocardiogram obtained 12/4/2023 demonstrated normal left ventricular chamber size, mild concentric left ventricular hypertrophy, normal left ventricular systolic function, indeterminant diastolic function, LVEF estimated 62%.  Dilated right ventricle with reduced right ventricular systolic function.  Enlarged right atrium, severely dilated left atrium, known mitral valve repair functioning normally, moderate mitral regurgitation, known TAVR functioning normally, moderate tricuspid regurgitation with estimated RVSP of 62 mmHg.  Right heart pressures consistent with severe pulmonary hypertension.  Trace pulmonic insufficiency.    Currently using  Resmed auto Bi-PAP @14/10 cm H20 pressure; compliance reviewed for 1/15/2024 through 2/13/2024, days used 29/30, average daily usage 8 hours 9 minutes, 97% of days greater than or equal to 4 hours, mask leak at 1.5 LPM at 95th percentile, AHI 31.2 per hour.  See media for full report.    Device obtained October 2021  DME provider [facility]  Mask interface fullface mask    Patient was originally diagnosed in [facility] in 2019 and improved with BiPAP therapy.  More recently she began having increased apneic " episodes and difficulty tolerating device.  She reports sleeping approximately 4 or 5 hours per night only.    Polysomnogram titration study demonstrating low O2 sat of 79% with sats less than or equal to 88% for 54.2 minutes of total sleep time.  Patient demonstrated significant periodic limb movements with a PLMS index of 49.6 events per hour.  PLMS arousal index of 20.3.  Central apneas and hypopneas accounted for 30.8% of abnormal respiratory events.  Patient did best on bilevel 20/16 with a resultant AHI of 9.6 but sleep limited to 12 minutes at this level.  Patient did worse on higher and lower bilevel pressures.  Study was considered unsuccessful BiPAP titration due to paucity of sleep at best tolerated pressure.  Treatment emergent central apnea limited to 30.8% so patient does not qualify for ASV.  Unable to extrapolate findings to meaningful BiPAP settings and possible I Babbs titration recommended.  Patient does have a history of 30+ pack years smoking with quit date in 1992.  Obesity hypoventilation may be an issue.  Patient has moderate to severe pulmonary hypertension.  Patient would like to trial BiPAP at best tolerated settings given having this current device and current lower settings.      Sleep schedule goes to bed 10-11 PM and wakens 2 AM reports will sometimes go back to sleep for a couple hours and sometimes she is up for the remainder of the night.   Symptoms denies morning headache, experiences daytime somnolence , and fatigue    [place] Sleepiness Scale No data recorded   Stop Bang Score No data recorded         Review of Systems   Constitutional:  Positive for malaise/fatigue. Negative for chills, fever and weight loss.   HENT:  Positive for congestion (occasional, uses home humidifier) and tinnitus (occasional). Negative for hearing loss, nosebleeds (resolved off blood thinners), sinus pain and sore throat.    Respiratory:  Positive for cough (dry) and shortness of breath. Negative for  sputum production and wheezing.    Cardiovascular:  Positive for palpitations (doesn't feel but has afib), orthopnea and leg swelling. Negative for chest pain.   Gastrointestinal:  Negative for heartburn.        Upper and lower dentures, no swallowing issues    Neurological:  Negative for dizziness, tremors and headaches.   Psychiatric/Behavioral:  The patient does not have insomnia (maintaining sleep).        Past Medical History:   Diagnosis Date    Anemia     Apnea, sleep     cpap    Atrial fibrillation (HCC)     Breath shortness     Cataract     fede IOL    CKD (chronic kidney disease) stage 4, GFR 15-29 ml/min (HCC)     Congestive heart failure (HCC)     Dental disorder     full dentures    Gasping for breath     Heart attack (HCC) 1992    Followed by Dr. 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    Moderate tricuspid regurgitation 11/16/2022    Pneumonia 2019    Psychiatric problem     Anxiety    Snoring     Thyroid disease        Past Surgical History:   Procedure Laterality Date    TRANSCATHETER AORTIC VALVE REPLACEMENT Bilateral 11/6/2023    Procedure: TRANSCATHETER AORTIC VALVE REPLACEMENT;  Surgeon: Cesar Gray M.D.;  Location: SURGERY [facility];  Service: Cardiac    ECHOCARDIOGRAM, TRANSESOPHAGEAL, INTRAOPERATIVE N/A 11/6/2023    Procedure: ECHOCARDIOGRAM, TRANSESOPHAGEAL, INTRAOPERATIVE;  Surgeon: Cesar Gray M.D.;  Location: SURGERY [facility];  Service: Cardiac    TRANSCATHETER MITRAL VALVE REPAIR N/A 04/17/2023    Procedure: TRANSCATHETER MITRAL VALVE PROCEDURE;  Surgeon: Cesar Gray M.D.;  Location: SURGERY [facility];  Service: Cardiac    ECHOCARDIOGRAM, TRANSESOPHAGEAL, INTRAOPERATIVE N/A  2023    Procedure: ECHOCARDIOGRAM, TRANSESOPHAGEAL, INTRAOPERATIVE;  Surgeon: Cesar Gray M.D.;  Location: SURGERY [facility];  Service: Cardiac    NC COLONOSCOPY,DIAGNOSTIC N/A 2023    Procedure: COLONOSCOPY;  Surgeon: Amy Zarate M.D.;  Location: SURGERY SAME DAY [facility];  Service: Gastroenterology    NC UPPER GI ENDOSCOPY,DIAGNOSIS N/A 2023    Procedure: GASTROSCOPY;  Surgeon: Amy Zarate M.D.;  Location: SURGERY SAME DAY [facility];  Service: Gastroenterology    NC UPPER GI ENDOSCOPY,BIOPSY N/A 2023    Procedure: GASTROSCOPY, WITH BIOPSY;  Surgeon: Amy Zarate M.D.;  Location: SURGERY SAME DAY [facility];  Service: Gastroenterology    CHOLECYSTECTOMY      EYE SURGERY Bilateral     cataracts    MULTIPLE CORONARY ARTERY BYPASS      1 bypass    THYROIDECTOMY TOTAL      TONSILLECTOMY         Family History   Problem Relation Age of Onset    Cancer Mother         cancer, smoker    Stroke Maternal Grandmother     Other Other         Crohn    Kidney Disease Other         kidney transplant       Social History     Socioeconomic History    Marital status:      Spouse name: Not on file    Number of children: Not on file    Years of education: Not on file    Highest education level: Not on file   Occupational History     Comment: [employer]   Tobacco Use    Smoking status: Former     Current packs/day: 0.00     Average packs/day: 1 pack/day for 31.0 years (31.0 ttl pk-yrs)     Types: Cigarettes     Start date: 5/10/1961     Quit date: 5/10/1992     Years since quittin.7     Passive exposure: Yes    Smokeless tobacco: Never    Tobacco comments:     Started smoking of  do not remember month or day   Vaping Use    Vaping Use: Never used   Substance and Sexual Activity    Alcohol use: No    Drug use: No    Sexual activity: Not Currently     Partners: Male   Other Topics Concern    Not on file   Social History Narrative    Not on file     Social Determinants of  Health     Financial Resource Strain: Low Risk  (11/11/2022)    Overall Financial Resource Strain (CARDIA)     Difficulty of Paying Living Expenses: Not hard at all   Food Insecurity: No Food Insecurity (11/11/2022)    Hunger Vital Sign     Worried About Running Out of Food in the Last Year: Never true     Ran Out of Food in the Last Year: Never true   Transportation Needs: No Transportation Needs (11/11/2022)    PRAPARE - Transportation     Lack of Transportation (Medical): No     Lack of Transportation (Non-Medical): No   Physical Activity: Inactive (11/11/2022)    Exercise Vital Sign     Days of Exercise per Week: 0 days     Minutes of Exercise per Session: 0 min   Stress: No Stress Concern Present (11/11/2022)    [place] [employer] - Occupational Stress Questionnaire     Feeling of Stress : Not at all   Social Connections: Socially Isolated (11/11/2022)    Social Connection and Isolation Panel [NHANES]     Frequency of Communication with Friends and Family: More than three times a week     Frequency of Social Gatherings with Friends and Family: Once a week     Attends Restorationism Services: Never     Active Member of Clubs or Organizations: No     Attends Club or Organization Meetings: Never     Marital Status:    Intimate Partner Violence: Not At Risk (11/11/2022)    Humiliation, Afraid, Rape, and Kick questionnaire     Fear of Current or Ex-Partner: No     Emotionally Abused: No     Physically Abused: No     Sexually Abused: No   Housing Stability: Low Risk  (11/11/2022)    Housing Stability Vital Sign     Unable to Pay for Housing in the Last Year: No     Number of Places Lived in the Last Year: 1     Unstable Housing in the Last Year: No       Allergies as of 02/14/2024 - Reviewed 02/14/2024   Allergen Reaction Noted    Morphine Vomiting 11/13/2018          Current medications as of today   Current Outpatient Medications   Medication Sig Dispense Refill    ferrous sulfate 325 (07  Fe) MG tablet Take 1 Tablet by mouth 2 times a day. 180 Tablet 3    benzonatate (TESSALON) 100 MG Cap Take 1 Capsule by mouth 3 times a day as needed for Cough. 60 Capsule 0    furosemide (LASIX) 40 MG Tab Take 1-2 Tablets by mouth every day. 135 Tablet 3    CINNAMON PO Take  by mouth 2 (two) times a day.      acetaminophen (TYLENOL) 500 MG Tab Take 500-1,000 mg by mouth every 6 hours as needed.      LEVOXYL 200 MCG Tab Take 1 Tablet by mouth every morning on an empty stomach. 90 Tablet 3    ascorbic acid (VITAMIN C) 500 MG tablet Take 1 Tablet by mouth every day. 30 Tablet 11    aspirin 81 MG EC tablet Take 1 Tablet by mouth every day. 100 Tablet 3    Secukinumab (COSENTYX SENSOREADY PEN) 150 MG/ML Solution Auto-injector Inject 300mg Sub Cut once monthly (Patient taking differently: Inject 300 mg under the skin every 28 days. Indications: Psoriasis) 1.96 mL 5    Cholecalciferol (VITAMIN D) 125 MCG (5000 UT) Cap Take 5,000 Units by mouth every morning.      bisoprolol (ZEBETA) 10 MG tablet Take 1 Tablet by mouth every day. (Patient taking differently: Take 10 mg by mouth every morning.) 90 Tablet 4    Vitamins-Lipotropics (MULTI-VITAMIN HP/MINERALS) Cap Take 1 Capsule by mouth every evening.      pantoprazole (PROTONIX) 40 MG Tablet Delayed Response Take 1 Tablet by mouth 2 times a day. 180 Tablet 3    losartan (COZAAR) 25 MG Tab Take 1 Tablet by mouth every evening. 100 Tablet 3    atorvastatin (LIPITOR) 40 MG Tab TAKE 1 TABLET BY MOUTH AT  BEDTIME (Patient taking differently: Take 40 mg by mouth every evening. TAKE 1 TABLET BY MOUTH AT  BEDTIME) 100 Tablet 3     No current facility-administered medications for this visit.         Physical Exam:   Gen:           Alert and oriented, No apparent distress. Mood and affect appropriate, normal interaction with examiner.   Hearing:     Grossly intact.  Nose:          Normal, no lesions or deformities.  Dentition:    Upper and lower dentures  Oropharynx:   Tongue normal,  posterior pharynx without erythema or exudate.  Mallampati Classification: III  Neck:        Supple, trachea midline, no masses.  Respiratory Effort: No intercostal retractions or use of accessory muscles.   Gait and Station: Limited mobility  Digits and Nails: No clubbing, cyanosis, petechiae, or nodes.   Skin:        No rashes, lesions or ulcers noted.               Ext:           No cyanosis or edema.      Immunizations:  Flu: 10/21/2023  Pneumovax 23: 9/11/2020  Prevnar 13: 12/15/2015  Pfizer SARS CoV2 Vaccine: 3/9/2021, 2/16/2021    Assessment / Plan:  1. SHAKA (obstructive sleep apnea)  - DME Other    2. Complex sleep apnea syndrome    Patient reports sleeping very poorly at present, reviewed sleep study results with incomplete titration but best tolerated BiPAP pressures of 20/16.  Pressures adjusted to clinic but patient may yet need an IPAP sleep study.  If improved symptoms on BiPAP settings will follow-up and obtain overnight oximetry to make sure her oxygen levels are improved as well.  Reviewed compliance which is excellent but excessive apneas as noted previously.  Patient to follow-up in 3 months, sooner if needed.    Follow-up:   Return in about 3 months (around 5/14/2024) for Return with Darlene Mcgrath PA-C.    Please note that this dictation was created using voice recognition software. I have made every reasonable attempt to correct obvious errors, but it is possible there are errors of grammar and possibly content that I did not discover before finalizing the note.

## 2024-02-16 ENCOUNTER — PATIENT MESSAGE (OUTPATIENT)
Dept: HEALTH INFORMATION MANAGEMENT | Facility: OTHER | Age: 84
End: 2024-02-16

## 2024-02-18 DIAGNOSIS — D50.0 IRON DEFICIENCY ANEMIA DUE TO CHRONIC BLOOD LOSS: ICD-10-CM

## 2024-02-20 RX ORDER — FERROUS SULFATE 325(65) MG
325 TABLET ORAL
Qty: 90 TABLET | Refills: 2 | Status: SHIPPED | OUTPATIENT
Start: 2024-02-20

## 2024-03-03 DIAGNOSIS — I50.42 CHRONIC COMBINED SYSTOLIC AND DIASTOLIC CONGESTIVE HEART FAILURE (HCC): ICD-10-CM

## 2024-03-03 DIAGNOSIS — I27.20 MODERATE TO SEVERE PULMONARY HYPERTENSION (HCC): ICD-10-CM

## 2024-03-03 DIAGNOSIS — I10 ESSENTIAL HYPERTENSION: Chronic | ICD-10-CM

## 2024-03-03 DIAGNOSIS — I34.0 SEVERE MITRAL REGURGITATION: ICD-10-CM

## 2024-03-03 DIAGNOSIS — I48.0 PAROXYSMAL ATRIAL FIBRILLATION (HCC): ICD-10-CM

## 2024-03-03 DIAGNOSIS — I25.10 CORONARY ARTERY DISEASE INVOLVING NATIVE CORONARY ARTERY OF NATIVE HEART WITHOUT ANGINA PECTORIS: ICD-10-CM

## 2024-03-04 RX ORDER — ATORVASTATIN CALCIUM 40 MG/1
TABLET, FILM COATED ORAL
Qty: 90 TABLET | Refills: 4 | Status: SHIPPED | OUTPATIENT
Start: 2024-03-04

## 2024-03-05 ENCOUNTER — HOSPITAL ENCOUNTER (OUTPATIENT)
Dept: CARDIOLOGY | Facility: MEDICAL CENTER | Age: 84
End: 2024-03-05
Attending: NURSE PRACTITIONER
Payer: MEDICARE

## 2024-03-05 DIAGNOSIS — Z95.818 S/P MITRAL VALVE CLIP IMPLANTATION: ICD-10-CM

## 2024-03-05 DIAGNOSIS — Z98.890 S/P MITRAL VALVE CLIP IMPLANTATION: ICD-10-CM

## 2024-03-05 LAB
LV EJECT FRACT  99904: 60
LV EJECT FRACT MOD 2C 99903: 74.66
LV EJECT FRACT MOD 4C 99902: 67.29
LV EJECT FRACT MOD BP 99901: 70.11

## 2024-03-05 PROCEDURE — 93306 TTE W/DOPPLER COMPLETE: CPT | Mod: 26 | Performed by: INTERNAL MEDICINE

## 2024-03-05 PROCEDURE — 93306 TTE W/DOPPLER COMPLETE: CPT

## 2024-03-07 ENCOUNTER — PATIENT MESSAGE (OUTPATIENT)
Dept: SLEEP MEDICINE | Facility: MEDICAL CENTER | Age: 84
End: 2024-03-07

## 2024-03-07 ENCOUNTER — OFFICE VISIT (OUTPATIENT)
Dept: CARDIOLOGY | Facility: MEDICAL CENTER | Age: 84
End: 2024-03-07
Attending: INTERNAL MEDICINE
Payer: MEDICARE

## 2024-03-07 VITALS
RESPIRATION RATE: 18 BRPM | WEIGHT: 195 LBS | BODY MASS INDEX: 35.88 KG/M2 | DIASTOLIC BLOOD PRESSURE: 60 MMHG | OXYGEN SATURATION: 91 % | HEART RATE: 66 BPM | SYSTOLIC BLOOD PRESSURE: 100 MMHG | HEIGHT: 62 IN

## 2024-03-07 DIAGNOSIS — G47.33 OSA (OBSTRUCTIVE SLEEP APNEA): ICD-10-CM

## 2024-03-07 DIAGNOSIS — Z95.2 S/P TAVR (TRANSCATHETER AORTIC VALVE REPLACEMENT): ICD-10-CM

## 2024-03-07 DIAGNOSIS — I27.20 MODERATE TO SEVERE PULMONARY HYPERTENSION (HCC): ICD-10-CM

## 2024-03-07 DIAGNOSIS — I10 ESSENTIAL HYPERTENSION: Chronic | ICD-10-CM

## 2024-03-07 DIAGNOSIS — I48.19 PERSISTENT ATRIAL FIBRILLATION (HCC): ICD-10-CM

## 2024-03-07 LAB — EKG IMPRESSION: NORMAL

## 2024-03-07 PROCEDURE — 93005 ELECTROCARDIOGRAM TRACING: CPT | Performed by: INTERNAL MEDICINE

## 2024-03-07 PROCEDURE — 99213 OFFICE O/P EST LOW 20 MIN: CPT | Performed by: INTERNAL MEDICINE

## 2024-03-07 PROCEDURE — 99214 OFFICE O/P EST MOD 30 MIN: CPT | Performed by: INTERNAL MEDICINE

## 2024-03-07 PROCEDURE — 3074F SYST BP LT 130 MM HG: CPT | Performed by: INTERNAL MEDICINE

## 2024-03-07 PROCEDURE — 93010 ELECTROCARDIOGRAM REPORT: CPT | Performed by: INTERNAL MEDICINE

## 2024-03-07 PROCEDURE — 3078F DIAST BP <80 MM HG: CPT | Performed by: INTERNAL MEDICINE

## 2024-03-07 ASSESSMENT — FIBROSIS 4 INDEX: FIB4 SCORE: 3.27

## 2024-03-07 NOTE — PROGRESS NOTES
Interventional cardiology follow up Consultation Note      Chief Complaint: Aortic stenosis    Aleshia rCooks is a 84 y.o. female patient with CAD, CABG , persistent atrial fibrillation s/p Watchman device, severe mitral regurgitation s/p mitral valve clip, moderate tricuspid regurgitation, severe aortic stenosis s/p TAVR here for follow-up.  She still has significant lower extremity edema but her energy is improving slowly.  She is able to do her regular activities without any problems.  No particular complaints today.    Past Medical History:   Diagnosis Date    Anemia     Apnea, sleep     cpap    Atrial fibrillation (HCC)     Breath shortness     Cataract     fede IOL    CKD (chronic kidney disease) stage 4, GFR 15-29 ml/min (HCC)     Congestive heart failure (HCC)     Dental disorder     full dentures    Gasping for breath     Heart attack (HCC) 1992    Followed by Dr. 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    Moderate tricuspid regurgitation 11/16/2022    Pneumonia 2019    Psychiatric problem     Anxiety    Snoring     Thyroid disease              Current Outpatient Medications   Medication Sig Dispense Refill    atorvastatin (LIPITOR) 40 MG Tab TAKE 1 TABLET BY MOUTH AT BEDTIME 90 Tablet 4    ferrous sulfate 325 (65 Fe) MG tablet TAKE 1 TABLET BY MOUTH EVERY DAY 90 Tablet 2    benzonatate (TESSALON) 100 MG Cap Take 1 Capsule by mouth 3 times a day as needed for Cough. 60 Capsule 0    furosemide (LASIX) 40 MG Tab Take 1-2 Tablets by mouth every day. 135 Tablet 3    CINNAMON PO Take  by mouth 2 (two) times a day.      acetaminophen (TYLENOL) 500 MG Tab Take 500-1,000 mg by mouth every 6 hours as needed.      LEVOXYL 200 MCG Tab Take  "1 Tablet by mouth every morning on an empty stomach. 90 Tablet 3    ascorbic acid (VITAMIN C) 500 MG tablet Take 1 Tablet by mouth every day. 30 Tablet 11    aspirin 81 MG EC tablet Take 1 Tablet by mouth every day. 100 Tablet 3    Secukinumab (COSENTYX SENSOREADY PEN) 150 MG/ML Solution Auto-injector Inject 300mg Sub Cut once monthly (Patient taking differently: Inject 300 mg under the skin every 28 days. Indications: Psoriasis) 1.96 mL 5    Cholecalciferol (VITAMIN D) 125 MCG (5000 UT) Cap Take 5,000 Units by mouth every morning.      bisoprolol (ZEBETA) 10 MG tablet Take 1 Tablet by mouth every day. (Patient taking differently: Take 10 mg by mouth every morning.) 90 Tablet 4    Vitamins-Lipotropics (MULTI-VITAMIN HP/MINERALS) Cap Take 1 Capsule by mouth every evening.      pantoprazole (PROTONIX) 40 MG Tablet Delayed Response Take 1 Tablet by mouth 2 times a day. 180 Tablet 3    losartan (COZAAR) 25 MG Tab Take 1 Tablet by mouth every evening. 100 Tablet 3     No current facility-administered medications for this visit.             Physical Exam:  Ambulatory Vitals  /60 (BP Location: Left arm, Patient Position: Sitting, BP Cuff Size: Adult)   Pulse 66   Resp 18   Ht 1.575 m (5' 2\")   Wt 88.5 kg (195 lb)   SpO2 91%    Oxygen Therapy:  Pulse Oximetry: 91 %  BP Readings from Last 4 Encounters:   03/07/24 100/60   02/14/24 100/50   01/24/24 118/70   01/16/24 118/74       Weight/BMI: Body mass index is 35.67 kg/m².  Wt Readings from Last 4 Encounters:   03/07/24 88.5 kg (195 lb)   02/14/24 88.9 kg (196 lb)   01/24/24 91.8 kg (202 lb 6.1 oz)   01/16/24 90.7 kg (200 lb)           General: Well appearing and in no apparent distress  Neck: carotid bruits absent  Lungs: respiratory sounds  normal  Heart: irregularly irregular rhythm,  No palpable thrills on palpation, murmurs present, no rubs,   Lower extremity edema 2+    Echocardiogram 3/5/2024 reviewed, independently interpreted-  Normal ejection fraction, " moderate mitral regurgitation, normal TAVR valve function.        Medical Decision Making:  Problem List Items Addressed This Visit          Cardiology Medicine Problems    Essential hypertension (Chronic)       Other    S/P TAVR (transcatheter aortic valve replacement)    Moderate to severe pulmonary hypertension (HCC)     Other Visit Diagnoses       Persistent atrial fibrillation (HCC)        Relevant Orders    EKG (Completed)          She still has good amount of mitral regurgitation probably moderate.  However clinically she is doing well.  Her lower extremity edema is chronic, partly from lymphedema.  Denies chest pain, shortness of breath with usual activity.  At this time continue medical therapy with aspirin, Lipitor, bisoprolol, losartan.  She had a Watchman procedure, continue aspirin.    This note was dictated using Dragon speech recognition software.    Cesar LEDESMA  Interventional cardiologist  [facility], [facility]  [address] [place] [id number]  Phone: [phone]  Fax: [phone]

## 2024-03-11 ENCOUNTER — OFFICE VISIT (OUTPATIENT)
Dept: SLEEP MEDICINE | Facility: MEDICAL CENTER | Age: 84
End: 2024-03-11
Payer: MEDICARE

## 2024-03-11 VITALS
SYSTOLIC BLOOD PRESSURE: 124 MMHG | HEART RATE: 64 BPM | DIASTOLIC BLOOD PRESSURE: 72 MMHG | OXYGEN SATURATION: 93 % | BODY MASS INDEX: 34.02 KG/M2 | HEIGHT: 63 IN | WEIGHT: 192 LBS

## 2024-03-11 DIAGNOSIS — G47.33 OSA TREATED WITH BIPAP: ICD-10-CM

## 2024-03-11 PROCEDURE — 3078F DIAST BP <80 MM HG: CPT

## 2024-03-11 PROCEDURE — 3074F SYST BP LT 130 MM HG: CPT

## 2024-03-11 PROCEDURE — 99213 OFFICE O/P EST LOW 20 MIN: CPT

## 2024-03-11 ASSESSMENT — FIBROSIS 4 INDEX: FIB4 SCORE: 3.27

## 2024-03-11 NOTE — ASSESSMENT & PLAN NOTE
Sleep Apnea:    The pathophysiology of sleep anea and the increased risk of cardiovascular morbidity from untreated sleep apnea is discussed in detail with the patient.  Urged to avoid supine sleep, weight gain and alcoholic beverages since all of these can worsen sleep apnea. Cautioned against drowsy driving. If feeling sleepy while driving, pull over for a break/nap, rather than persist on the road, in the interest of own safety and that of others on the road.    Compliance download was reviewed and discussed with the patient.  Patient's AHI is elevated and when looking at the apnea breakdowns they were all obstructive.  Patient will need to undergo an eye patch titration study.  I also advised patient to reach out to preferred home care to let them know that her machine may be malfunctioning and needs to be troubleshoot it.    - Order placed for IVAPS titration study  - Compliance was reinforced  - Recommended the patient against the use of Ozone , such as SoClean  - Clean supplies a couple times a week with dish soap and water and air dry  - Recommended the patient change out supplies as recommended for best mask fit and usage of the machine  - Ad[facility] if any questions or concerns should arise.   - Equipment replacement schedule:  Mask cushion every month  Nasal pillows 2 times per month  Mask every 6 months  Head gear every 6 months  Tubing every 3 months  Ultra-fine filters 2 times per month  Foam filter every 6 months  Humidifier chamber every 6 months  Chin strap every 6 months    Has been advised to continue the current BiPAP, clean equipment frequently, and get new mask and supplies as allowed by insurance and DME. Recommend an earlier appointment, if significant treatment barriers develop.    The risks of untreated sleep apnea were discussed with the patient at length. Patients with sleep apnea are at increased risk of cardiovascular disease including coronary  artery disease, systemic arterial hypertension, pulmonary arterial hypertension, cardiac arrythmias, and stroke. The patient was advised to avoid driving a motor vehicle when drowsy.    Positive airway pressure will favorably impact many of the adverse conditions and effects provoked by sleep apnea.

## 2024-03-11 NOTE — PROGRESS NOTES
[facility] Follow-up Visit    Date of Visit: 3/11/2024     CC:  Follow-up for SHAKA management      HPI:  Aleshia Crooks is a very pleasant 84 y.o. 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 Last seen in the office on 2/14/2024 with TEE Bhatia.     Patient presents for compliance.  Patient states that her battery on the machine is getting hot in the morning and feels that she is not getting enough pressure throughout the night and feels suffocated.  She states that she has daytime drowsiness, gasping, dry mouth, mask leak.  Patient denies any significant morning headaches, drowsiness while driving, issues while asleep, snoring, apneas, palpitations, aerophagia, or skin irritation.  Patient does bed between 10-11 PM and wakes up at 7 AM.  She will have multiple awakenings at night and will occasionally have issues when back to sleep.  She does not nap.    DME provider: [facility] (machine) & [facility] (supplies)  Device: ResMed air curve 10  Settings: BiPAP (IPAP 20/EPAP 16 CWP)  When: 2020  Mask: Nasal mask  Chin strap: No     Cleaning regimen: She changes out her supplies and states that she cleans her supplies frequently    Compliance:  Compliance data reviewed showing 100% usage > 4hours in last 30 days. Average AHI 44.6 events/hour. 95% leaks 5.3 L/min. Patient continues to use and benefit from machine.      Sleep History:  PSG titration 1/24/2024-      Patient Active Problem List    Diagnosis Date Noted    SHAKA treated with BiPAP 03/12/2020    Supraumbilical hernia 01/24/2024    Skin induration 01/24/2024    S/P TAVR (transcatheter aortic valve replacement) 11/06/2023    Diverticulosis  10/02/2023    Spondylolysis 10/02/2023    Pulmonary nodules 10/02/2023    Psoriasis 07/27/2023    Anemia of chronic disease 05/09/2023    S/P mitral valve clip implantation 04/18/2023    H/O: GI bleed 04/10/2023    Contraindication to anticoagulation therapy 04/10/2023    Situational stress 10/27/2021    Falls 03/01/2021    Stress incontinence 03/01/2021    CKD (chronic kidney disease) stage 4, GFR 15-29 ml/min (HCC) 09/11/2020    Gastroesophageal reflux disease without esophagitis 06/08/2020    Mixed hyperlipidemia 04/17/2020    Hepatic steatosis 04/17/2020    Elevated alkaline phosphatase level 04/17/2020    Iron deficiency anemia due to chronic blood loss 04/08/2020    History of malignant melanoma 03/12/2020    Skin lesions 03/12/2020    Essential hypertension 03/12/2020    Hypothyroid 03/12/2020    Obesity, Class II, BMI 35-39.9 03/12/2020    Moderate to severe pulmonary hypertension (HCC) 07/14/2017    Chronic combined systolic and diastolic congestive heart failure (HCC) 07/14/2017    Longstanding persistent atrial fibrillation (HCC) 07/14/2017    Coronary artery disease involving native coronary artery of native heart without angina pectoris 07/14/2017    Situational anxiety 07/13/2017     Past Medical History:   Diagnosis Date    Anemia     Apnea, sleep     cpap    Atrial fibrillation (HCC)     Breath shortness     Cataract     fede IOL    CKD (chronic kidney disease) stage 4, GFR 15-29 ml/min (HCC)     Congestive heart failure (HCC)     Dental disorder     full dentures    Gasping for breath     Heart attack (HCC) 1992    Followed by Dr. 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/08/2020    2015    Moderate tricuspid regurgitation 11/16/2022     Pneumonia 2019    Psychiatric problem     Anxiety    Snoring     Thyroid disease       Past Surgical History:   Procedure Laterality Date    TRANSCATHETER AORTIC VALVE REPLACEMENT Bilateral 11/6/2023    Procedure: TRANSCATHETER AORTIC VALVE REPLACEMENT;  Surgeon: Cesar Gray M.D.;  Location: SURGERY [facility];  Service: Cardiac    ECHOCARDIOGRAM, TRANSESOPHAGEAL, INTRAOPERATIVE N/A 11/6/2023    Procedure: ECHOCARDIOGRAM, TRANSESOPHAGEAL, INTRAOPERATIVE;  Surgeon: Cesar Gray M.D.;  Location: SURGERY [facility];  Service: Cardiac    TRANSCATHETER MITRAL VALVE REPAIR N/A 04/17/2023    Procedure: TRANSCATHETER MITRAL VALVE PROCEDURE;  Surgeon: Cesar Gray M.D.;  Location: SURGERY [facility];  Service: Cardiac    ECHOCARDIOGRAM, TRANSESOPHAGEAL, INTRAOPERATIVE N/A 04/17/2023    Procedure: ECHOCARDIOGRAM, TRANSESOPHAGEAL, INTRAOPERATIVE;  Surgeon: Cesar Gray M.D.;  Location: SURGERY [facility];  Service: Cardiac    FL COLONOSCOPY,DIAGNOSTIC N/A 01/24/2023    Procedure: COLONOSCOPY;  Surgeon: Amy Zarate M.D.;  Location: SURGERY SAME DAY [facility];  Service: Gastroenterology    FL UPPER GI ENDOSCOPY,DIAGNOSIS N/A 01/24/2023    Procedure: GASTROSCOPY;  Surgeon: Amy Zarate M.D.;  Location: SURGERY SAME DAY [facility];  Service: Gastroenterology    FL UPPER GI ENDOSCOPY,BIOPSY N/A 01/24/2023    Procedure: GASTROSCOPY, WITH BIOPSY;  Surgeon: Amy Zarate M.D.;  Location: SURGERY SAME DAY [facility];  Service: Gastroenterology    CHOLECYSTECTOMY      EYE SURGERY Bilateral     cataracts    MULTIPLE CORONARY ARTERY BYPASS      1 bypass    THYROIDECTOMY TOTAL      TONSILLECTOMY       Family History   Problem Relation Age of Onset    Cancer Mother         cancer, smoker    Stroke Maternal Grandmother     Other Other         Crohn    Kidney Disease Other         kidney transplant     Social History     Socioeconomic History    Marital status:      Spouse name: Not on  file    Number of children: Not on file    Years of education: Not on file    Highest education level: Not on file   Occupational History     Comment: [employer]   Tobacco Use    Smoking status: Former     Current packs/day: 0.00     Average packs/day: 1 pack/day for 31.0 years (31.0 ttl pk-yrs)     Types: Cigarettes     Start date: 5/10/1961     Quit date: 5/10/1992     Years since quittin.8     Passive exposure: Yes    Smokeless tobacco: Never    Tobacco comments:     Started smoking of  do not remember month or day   Vaping Use    Vaping Use: Never used   Substance and Sexual Activity    Alcohol use: No    Drug use: No    Sexual activity: Not Currently     Partners: Male   Other Topics Concern    Not on file   Social History Narrative    Not on file     Social Determinants of Health     Financial Resource Strain: Low Risk  (2022)    Overall Financial Resource Strain (CARDIA)     Difficulty of Paying Living Expenses: Not hard at all   Food Insecurity: No Food Insecurity (2022)    Hunger Vital Sign     Worried About Running Out of Food in the Last Year: Never true     Ran Out of Food in the Last Year: Never true   Transportation Needs: No Transportation Needs (2022)    PRAPARE - Transportation     Lack of Transportation (Medical): No     Lack of Transportation (Non-Medical): No   Physical Activity: Inactive (2022)    Exercise Vital Sign     Days of Exercise per Week: 0 days     Minutes of Exercise per Session: 0 min   Stress: No Stress Concern Present (2022)    [employer] [place] [employer] - Occupational Stress Questionnaire     Feeling of Stress : Not at all   Social Connections: Socially Isolated (2022)    Social Connection and Isolation Panel [NHANES]     Frequency of Communication with Friends and Family: More than three times a week     Frequency of Social Gatherings with Friends and Family: Once a week     Attends Jew Services: Never     Active  Member of Clubs or Organizations: No     Attends Club or Organization Meetings: Never     Marital Status:    Intimate Partner Violence: Not At Risk (11/11/2022)    Humiliation, Afraid, Rape, and Kick questionnaire     Fear of Current or Ex-Partner: No     Emotionally Abused: No     Physically Abused: No     Sexually Abused: No   Housing Stability: Low Risk  (11/11/2022)    Housing Stability Vital Sign     Unable to Pay for Housing in the Last Year: No     Number of Places Lived in the Last Year: 1     Unstable Housing in the Last Year: No     Current Outpatient Medications   Medication Sig Dispense Refill    atorvastatin (LIPITOR) 40 MG Tab TAKE 1 TABLET BY MOUTH AT BEDTIME 90 Tablet 4    ferrous sulfate 325 (65 Fe) MG tablet TAKE 1 TABLET BY MOUTH EVERY DAY 90 Tablet 2    benzonatate (TESSALON) 100 MG Cap Take 1 Capsule by mouth 3 times a day as needed for Cough. 60 Capsule 0    furosemide (LASIX) 40 MG Tab Take 1-2 Tablets by mouth every day. 135 Tablet 3    CINNAMON PO Take  by mouth 2 (two) times a day.      acetaminophen (TYLENOL) 500 MG Tab Take 500-1,000 mg by mouth every 6 hours as needed.      LEVOXYL 200 MCG Tab Take 1 Tablet by mouth every morning on an empty stomach. 90 Tablet 3    ascorbic acid (VITAMIN C) 500 MG tablet Take 1 Tablet by mouth every day. 30 Tablet 11    aspirin 81 MG EC tablet Take 1 Tablet by mouth every day. 100 Tablet 3    Secukinumab (COSENTYX SENSOREADY PEN) 150 MG/ML Solution Auto-injector Inject 300mg Sub Cut once monthly (Patient taking differently: Inject 300 mg under the skin every 28 days. Indications: Psoriasis) 1.96 mL 5    Cholecalciferol (VITAMIN D) 125 MCG (5000 UT) Cap Take 5,000 Units by mouth every morning.      bisoprolol (ZEBETA) 10 MG tablet Take 1 Tablet by mouth every day. (Patient taking differently: Take 10 mg by mouth every morning.) 90 Tablet 4    Vitamins-Lipotropics (MULTI-VITAMIN HP/MINERALS) Cap Take 1 Capsule by mouth every evening.       "pantoprazole (PROTONIX) 40 MG Tablet Delayed Response Take 1 Tablet by mouth 2 times a day. 180 Tablet 3    losartan (COZAAR) 25 MG Tab Take 1 Tablet by mouth every evening. 100 Tablet 3     No current facility-administered medications for this visit.      ALLERGIES: Morphine    ROS:  Constitutional: Denies fever, chills, sweats,  weight loss, fatigue  Cardiovascular: Denies chest pain, tightness, palpitations, swelling in legs/feet  Respiratory: Denies shortness of breath, cough, sputum, wheezing, painful breathing   Sleep: per HPI  Gastrointestinal: Denies  difficulty swallowing, nausea, abdominal pain, diarrhea, constipation, heartburn.  Musculoskeletal: Denies painful joints, sore muscles,       PHYSICAL EXAM:  /72 (BP Location: Right arm, Patient Position: Sitting, BP Cuff Size: Adult)   Pulse 64   Ht 1.6 m (5' 3\")   Wt 87.1 kg (192 lb)   LMP  (LMP Unknown)   SpO2 93%   BMI 34.01 kg/m²   Appearance: Well-nourished, well-developed, no acute distress  Eyes:  No scleral icterus , EOMI  ENMT: No redness of the oropharynx  Lung auscultation:  No wheezes rhonchi rubs or rales  Cardiac: No murmurs, rubs, or gallops; regular rhythm, normal rate; no edema  Musculoskeletal:  Grossly normal; gait and station normal; digits and nails normal  Skin:  No rashes, petechiae, cyanosis  Neurologic: without focal signs; oriented to person, time, place, and purpose; judgement intact  Psychiatric:  No depression, anxiety, agitation  Mallampati score: Class III    Assessment and Plan:    The medical record was reviewed.    Diagnostic and titration nocturnal polysomnogram's, home sleep apnea tests, continuous nocturnal oximetry results, multiple sleep latency tests, and compliance reports reviewed.    Problem List Items Addressed This Visit          Pulmonary/Sleep Medicine Problems    SHAKA treated with BiPAP     Sleep Apnea:    The pathophysiology of sleep anea and the increased risk of cardiovascular morbidity from " untreated sleep apnea is discussed in detail with the patient.  Urged to avoid supine sleep, weight gain and alcoholic beverages since all of these can worsen sleep apnea. Cautioned against drowsy driving. If feeling sleepy while driving, pull over for a break/nap, rather than persist on the road, in the interest of own safety and that of others on the road.    Compliance download was reviewed and discussed with the patient.  Patient's AHI is elevated and when looking at the apnea breakdowns they were all obstructive.  Patient will need to undergo an eye patch titration study.  I also advised patient to reach out to [facility] to let them know that her machine may be malfunctioning and needs to be troubleshoot it.    - Order placed for IVAPS titration study  - Compliance was reinforced  - Recommended the patient against the use of Ozone , such as SoClean  - Clean supplies a couple times a week with dish soap and water and air dry  - Recommended the patient change out supplies as recommended for best mask fit and usage of the machine  - Advised patient to reach out via [facility] if any questions or concerns should arise.   - Equipment replacement schedule:  Mask cushion every month  Nasal pillows 2 times per month  Mask every 6 months  Head gear every 6 months  Tubing every 3 months  Ultra-fine filters 2 times per month  Foam filter every 6 months  Humidifier chamber every 6 months  Chin strap every 6 months    Has been advised to continue the current BiPAP, clean equipment frequently, and get new mask and supplies as allowed by insurance and DME. Recommend an earlier appointment, if significant treatment barriers develop.    The risks of untreated sleep apnea were discussed with the patient at length. Patients with sleep apnea are at increased risk of cardiovascular disease including coronary artery disease, systemic arterial hypertension, pulmonary arterial hypertension, cardiac arrythmias, and  stroke. The patient was advised to avoid driving a motor vehicle when drowsy.    Positive airway pressure will favorably impact many of the adverse conditions and effects provoked by sleep apnea.           Relevant Orders    Polysomnography Titration     Have advised the patient to follow up with the appropriate healthcare practitioners for all other medical problems and issues.    Return in about 3 weeks (around 4/1/2024), or if symptoms worsen or fail to improve, for PSG/SS results.    Please note portions of this record was created using voice recognition software. I have made every reasonable attempt to correct obvious errors, but I expect that there are errors of grammar and possibly content I did not discover before finalizing the note.    Time spent in record review prior to patient arrival, reviewing results, and in face-to-face encounter totaled 23 min.  __________  ARIANNA Hernandez  Pulmonary & Sleep Medicine  [facility]

## 2024-03-15 RX ORDER — ZOLPIDEM TARTRATE 5 MG/1
5 TABLET ORAL NIGHTLY PRN
Qty: 2 TABLET | Refills: 0 | Status: SHIPPED | OUTPATIENT
Start: 2024-03-15 | End: 2024-03-17

## 2024-03-28 ENCOUNTER — SLEEP STUDY (OUTPATIENT)
Dept: SLEEP MEDICINE | Facility: MEDICAL CENTER | Age: 84
End: 2024-03-28
Payer: MEDICARE

## 2024-03-28 DIAGNOSIS — G47.33 OSA TREATED WITH BIPAP: ICD-10-CM

## 2024-03-28 PROCEDURE — 95811 POLYSOM 6/>YRS CPAP 4/> PARM: CPT | Performed by: STUDENT IN AN ORGANIZED HEALTH CARE EDUCATION/TRAINING PROGRAM

## 2024-03-29 NOTE — PROCEDURES
Patient: GINA REICH  ID: [id number] Date: 3/28/2024 Exam No.:   MONTAGE: Standard  STUDY TYPE: Treatment  RECORDING TECHNIQUE:   After the scalp was prepared, gold plated electrodes were applied to the scalp according to the International 10-20 System. EEG (electroencephalogram) was continuously monitored from the O1-M2, O2-M1, C3-M2, C4-M1, F3-M2, and F4-M1. EOGs (electrooculograms) were monitored by electrodes placed at the left and right outer canthi. Chin EMG (electromyogram) was monitored by electrodes placed on the mentalis and sub-mentalis muscles. Nasal and oral airflow were monitored using a triple port thermocouple as well as oronasal pressure transducer. Respiratory effort was measured by inductive plethysmography technology employing abdominal and thoracic belts. Blood oxygen saturation and pulse were monitored by pulse oximetry. Heart rhythm was monitored by surface electrocardiogram. Leg EMG was studied using surface electrodes placed on left and right anterior tibialis. A microphone was used to monitor tracheal sounds and snoring. Body position was monitored and documented by technician observation.   SCORING CRITERIA:   A modification of the AASM manual for scoring of sleep and associated events was used. Obstructive apneas were scored by cessation of airflow for at least 10 seconds with continuing respiratory effort. Central apneas were scored by cessation of airflow for at least 10 seconds with no respiratory effort. Hypopneas were scored by a 30% or more reduction in airflow for at least 10 seconds accompanied by arterial oxygen desaturation of 3% or an arousal. For CMS (Medicare) patients, per AASM rule 1B, hypopneas are scored by 30% with mild reduction in airflow for at least 10 seconds accompanied by arterial saturation decreased at 4%.  Study start time was 10:22:57 PM. Diagnostic recording time was 7h 29.5m with a total sleep time of 5h 43.5m resulting in a sleep efficiency of 76.42%%.  Sleep latency from the start of the study was 22 minutes and the latency from sleep to REM was 73 minutes. In total,162 arousals were scored for an arousal index of 28.3.  Respiratory:  There were a total of 1 apneas consisting of 0 obstructive apneas, 0 mixed apneas, and 1 central apneas. A total of 191 hypopneas were scored. The apnea index was 0.17 per hour and the hypopnea index was 33.36 per hour resulting in an overall AHI of 33.54. AHI during REM was 32.5 and AHI while supine was 38.67.  Oximetry:  There was a mean oxygen saturation of 87.0%. The minimum oxygen saturation in NREM was 77.0 % and in REM was 80.0%. The patient spent 252.6 minutes of TST with SaO2 <88%.  Cardiac:  The highest heart rate seen while awake was 80 BPM while the highest heart rate during sleep was 81 BPM with an average sleeping heart rate of 63 BPM.  Limb Movements:  There were a total of 227 PLMs during sleep which resulted in a PLMS index of 39.7. Of these, 69 were associated with arousals which resulted in a PLMS arousal index of 12.1.  Titration:   IVAPS was tried with Target VA: 3.5, Tgt CO: 15, EPAP: 7, PS: 4/20, Height: 160, Rise Time: 350, Ti: 2 / 0.3, Trigger: medium, and Cycle: medium to Target VA: 5.9, Tgt CO: 15, EPAP: 17, PS: 4/13, Height: 155, Rise Time: 300, Ti: 2/ 0.3, Trigger: very high, and Cycle: medium.  This was a fully attended sleep study. This test was technically adequate. This patient was titrated on CPAP starting at *** cm of water pressure. Patient was titrated up to *** cm of water pressure. Patient did best at *** cm of water pressure. Patient spent *** minutes at that pressure and the AHI was *** which is considered *** obstructive sleep apnea.   Assessment:   ***Obstructive Sleep Apnea Hypopnea - AHI*** ***Nocturnal desaturation - trent saturation ***% - saturations <88% below for *** minutes of TST.  Recommendation:    considered mild obstructive sleep apnea.     Impression:  1.  Patient used iVAPS during night of study  2.  Supine REM sleep was seen during out of study  3.  Nocturnal hypoxia with average oxygen saturation of 87%, time at or below 88% saturation 253 minutes  4.  Respiratory events appeared to do best with an EPAP of 16    Recommendations:  I recommend iVAPS  Target VA: 5.9, Tgt NH: 15, EPAP: 16, PS: 4/13, Height: 155, Rise Time: 300, Ti: 2/ 0.3, Trigger: very high, and Cycle: medium.  Patient would benefit from bleed in supplemental oxygen 2 L/min.  patient used a extra small Erika mask during night of study.    Clinical correlation is required. In general patients with sleep apnea are advised to avoid alcohol, sedatives and not to operate a motor vehicle while drowsy.  Untreated sleep apnea increases the risk for cardiovascular and neurovascular disease.

## 2024-04-11 DIAGNOSIS — I10 ESSENTIAL HYPERTENSION: Chronic | ICD-10-CM

## 2024-04-11 RX ORDER — LOSARTAN POTASSIUM 25 MG/1
25 TABLET ORAL EVERY EVENING
Qty: 100 TABLET | Refills: 3 | Status: SHIPPED | OUTPATIENT
Start: 2024-04-11

## 2024-04-12 NOTE — PROGRESS NOTES
[facility] Follow-up Visit    Date of Visit: 4/18/2024     CC:  Follow-up for SHAKA management      HPI:  Aleshia Crooks is a very pleasant 84 y.o. 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 Last seen in the office on 3/11/2024 with myself.     Patient presents for sleep study results.  Patient states that she is having morning headaches, daytime drowsiness, issues with sleep, snoring, gasping, and dry mouth.  She denies any significant palpitations.  She does bed between 10-11 PM and wakes up between 7-7:30 AM.  She will awaken a couple times at night and will occasionally have issues when back to sleep.  She does not nap.    DME provider: [facility] (machine) & [facility] (supplies)  Device: ResMed air curve 10  Settings: BiPAP (IPAP 20/EPAP 16 CWP)  When: 2020  Mask: Nasal mask  Chin strap: No       Sleep History:  PSG titration 1/24/2024-      PSG titration iVAPS 3/28/2024-        Patient Active Problem List    Diagnosis Date Noted    Complex sleep apnea syndrome 03/12/2020    Nocturnal hypoxia 04/18/2024    Supraumbilical hernia 01/24/2024    Skin induration 01/24/2024    S/P TAVR (transcatheter aortic valve replacement) 11/06/2023    Diverticulosis 10/02/2023    Spondylolysis 10/02/2023    Pulmonary nodules 10/02/2023    Psoriasis 07/27/2023    Anemia of chronic disease 05/09/2023    S/P mitral valve clip implantation 04/18/2023    H/O: GI bleed 04/10/2023    Contraindication to anticoagulation therapy 04/10/2023    Situational stress 10/27/2021    Falls 03/01/2021    Stress incontinence 03/01/2021    CKD (chronic kidney disease) stage 4, GFR 15-29 ml/min ([facility]) 09/11/2020    Gastroesophageal  reflux disease without esophagitis 06/08/2020    Mixed hyperlipidemia 04/17/2020    Hepatic steatosis 04/17/2020    Elevated alkaline phosphatase level 04/17/2020    Iron deficiency anemia due to chronic blood loss 04/08/2020    History of malignant melanoma 03/12/2020    Skin lesions 03/12/2020    Essential hypertension 03/12/2020    Hypothyroid 03/12/2020    Obesity, Class II, BMI 35-39.9 03/12/2020    Moderate to severe pulmonary hypertension (HCC) 07/14/2017    Chronic combined systolic and diastolic congestive heart failure (HCC) 07/14/2017    Longstanding persistent atrial fibrillation (HCC) 07/14/2017    Coronary artery disease involving native coronary artery of native heart without angina pectoris 07/14/2017    Situational anxiety 07/13/2017     Past Medical History:   Diagnosis Date    Anemia     Apnea, sleep     cpap    Atrial fibrillation (HCC)     Breath shortness     Cataract     fede IOL    CKD (chronic kidney disease) stage 4, GFR 15-29 ml/min (HCC)     Congestive heart failure (HCC)     Dental disorder     full dentures    Gasping for breath     Heart attack (HCC) 1992    Followed by Dr. 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/08/2020    2015    Moderate tricuspid regurgitation 11/16/2022    Pneumonia 2019    Psychiatric problem     Anxiety    Snoring     Thyroid disease       Past Surgical History:   Procedure Laterality Date    TRANSCATHETER AORTIC VALVE REPLACEMENT Bilateral 11/6/2023    Procedure: TRANSCATHETER AORTIC VALVE REPLACEMENT;  Surgeon: Ceasr Gray M.D.;  Location: SURGERY [facility];  Service: Cardiac    ECHOCARDIOGRAM, TRANSESOPHAGEAL, INTRAOPERATIVE N/A 11/6/2023    Procedure: ECHOCARDIOGRAM, TRANSESOPHAGEAL,  INTRAOPERATIVE;  Surgeon: Cesar Gray M.D.;  Location: SURGERY [facility];  Service: Cardiac    TRANSCATHETER MITRAL VALVE REPAIR N/A 2023    Procedure: TRANSCATHETER MITRAL VALVE PROCEDURE;  Surgeon: Cesar Gray M.D.;  Location: SURGERY [facility];  Service: Cardiac    ECHOCARDIOGRAM, TRANSESOPHAGEAL, INTRAOPERATIVE N/A 2023    Procedure: ECHOCARDIOGRAM, TRANSESOPHAGEAL, INTRAOPERATIVE;  Surgeon: Cesar Gray M.D.;  Location: SURGERY [facility];  Service: Cardiac    MD COLONOSCOPY,DIAGNOSTIC N/A 2023    Procedure: COLONOSCOPY;  Surgeon: Amy Zarate M.D.;  Location: SURGERY SAME DAY [facility];  Service: Gastroenterology    MD UPPER GI ENDOSCOPY,DIAGNOSIS N/A 2023    Procedure: GASTROSCOPY;  Surgeon: Amy Zarate M.D.;  Location: SURGERY SAME DAY [facility];  Service: Gastroenterology    MD UPPER GI ENDOSCOPY,BIOPSY N/A 2023    Procedure: GASTROSCOPY, WITH BIOPSY;  Surgeon: Amy Zarate M.D.;  Location: SURGERY SAME DAY [facility];  Service: Gastroenterology    CHOLECYSTECTOMY      EYE SURGERY Bilateral     cataracts    MULTIPLE CORONARY ARTERY BYPASS      1 bypass    THYROIDECTOMY TOTAL      TONSILLECTOMY       Family History   Problem Relation Age of Onset    Cancer Mother         cancer, smoker    Stroke Maternal Grandmother     Other Other         Crohn    Kidney Disease Other         kidney transplant     Social History     Socioeconomic History    Marital status:      Spouse name: Not on file    Number of children: Not on file    Years of education: Not on file    Highest education level: Not on file   Occupational History     Comment: [employer]   Tobacco Use    Smoking status: Former     Current packs/day: 0.00     Average packs/day: 1 pack/day for 31.0 years (31.0 ttl pk-yrs)     Types: Cigarettes     Start date: 5/10/1961     Quit date: 5/10/1992     Years since quittin.9     Passive exposure: Yes    Smokeless tobacco: Never     Tobacco comments:     Started smoking of 1961 do not remember month or day   Vaping Use    Vaping Use: Never used   Substance and Sexual Activity    Alcohol use: No    Drug use: No    Sexual activity: Not Currently     Partners: Male   Other Topics Concern    Not on file   Social History Narrative    Not on file     Social Determinants of Health     Financial Resource Strain: Low Risk  (11/11/2022)    Overall Financial Resource Strain (CARDIA)     Difficulty of Paying Living Expenses: Not hard at all   Food Insecurity: No Food Insecurity (11/11/2022)    Hunger Vital Sign     Worried About Running Out of Food in the Last Year: Never true     Ran Out of Food in the Last Year: Never true   Transportation Needs: No Transportation Needs (11/11/2022)    PRAPARE - Transportation     Lack of Transportation (Medical): No     Lack of Transportation (Non-Medical): No   Physical Activity: Inactive (11/11/2022)    Exercise Vital Sign     Days of Exercise per Week: 0 days     Minutes of Exercise per Session: 0 min   Stress: No Stress Concern Present (11/11/2022)    [employer] [place] [employer] - Occupational Stress Questionnaire     Feeling of Stress : Not at all   Social Connections: Socially Isolated (11/11/2022)    Social Connection and Isolation Panel [NHANES]     Frequency of Communication with Friends and Family: More than three times a week     Frequency of Social Gatherings with Friends and Family: Once a week     Attends Yazdanism Services: Never     Active Member of Clubs or Organizations: No     Attends Club or Organization Meetings: Never     Marital Status:    Intimate Partner Violence: Not At Risk (11/11/2022)    Humiliation, Afraid, Rape, and Kick questionnaire     Fear of Current or Ex-Partner: No     Emotionally Abused: No     Physically Abused: No     Sexually Abused: No   Housing Stability: Low Risk  (11/11/2022)    Housing Stability Vital Sign     Unable to Pay for Housing in the Last  Year: No     Number of Places Lived in the Last Year: 1     Unstable Housing in the Last Year: No     Current Outpatient Medications   Medication Sig Dispense Refill    Garlic 2 MG Cap Take  by mouth.      sulfamethoxazole-trimethoprim (BACTRIM DS) 800-160 MG tablet Take 1 Tablet by mouth 2 times a day for 5 days. 10 Tablet 0    losartan (COZAAR) 25 MG Tab Take 1 Tablet by mouth every evening. 100 Tablet 3    atorvastatin (LIPITOR) 40 MG Tab TAKE 1 TABLET BY MOUTH AT BEDTIME 90 Tablet 4    ferrous sulfate 325 (65 Fe) MG tablet TAKE 1 TABLET BY MOUTH EVERY DAY 90 Tablet 2    furosemide (LASIX) 40 MG Tab Take 1-2 Tablets by mouth every day. 135 Tablet 3    CINNAMON PO Take  by mouth 2 (two) times a day.      acetaminophen (TYLENOL) 500 MG Tab Take 500-1,000 mg by mouth every 6 hours as needed.      LEVOXYL 200 MCG Tab Take 1 Tablet by mouth every morning on an empty stomach. 90 Tablet 3    ascorbic acid (VITAMIN C) 500 MG tablet Take 1 Tablet by mouth every day. 30 Tablet 11    aspirin 81 MG EC tablet Take 1 Tablet by mouth every day. 100 Tablet 3    Secukinumab (COSENTYX SENSOREADY PEN) 150 MG/ML Solution Auto-injector Inject 300mg Sub Cut once monthly (Patient taking differently: Inject 300 mg under the skin every 28 days. Indications: Psoriasis) 1.96 mL 5    Cholecalciferol (VITAMIN D) 125 MCG (5000 UT) Cap Take 5,000 Units by mouth every morning.      bisoprolol (ZEBETA) 10 MG tablet Take 1 Tablet by mouth every day. (Patient taking differently: Take 10 mg by mouth every morning.) 90 Tablet 4    pantoprazole (PROTONIX) 40 MG Tablet Delayed Response Take 1 Tablet by mouth 2 times a day. 180 Tablet 3    benzonatate (TESSALON) 100 MG Cap Take 1 Capsule by mouth 3 times a day as needed for Cough. (Patient not taking: Reported on 4/15/2024) 60 Capsule 0    Vitamins-Lipotropics (MULTI-VITAMIN HP/MINERALS) Cap Take 1 Capsule by mouth every evening. (Patient not taking: Reported on 4/18/2024)       No current  "facility-administered medications for this visit.      ALLERGIES: Morphine    ROS:  Constitutional: Denies fever, chills, sweats,  weight loss, fatigue  Cardiovascular: Denies chest pain, tightness, palpitations, swelling in legs/feet  Respiratory: Denies shortness of breath, cough, sputum, wheezing, painful breathing   Sleep: per HPI  Gastrointestinal: Denies  difficulty swallowing, nausea, abdominal pain, diarrhea, constipation, heartburn.  Musculoskeletal: Denies painful joints, sore muscles,       PHYSICAL EXAM:  /72 (BP Location: Right arm, Patient Position: Sitting, BP Cuff Size: Adult)   Pulse 66   Ht 1.575 m (5' 2\")   Wt 85.3 kg (188 lb)   LMP  (LMP Unknown)   SpO2 92%   BMI 34.39 kg/m²   Appearance: Well-nourished, well-developed, no acute distress  Eyes:  No scleral icterus , EOMI  ENMT: No redness of the oropharynx  Lung auscultation:  No wheezes rhonchi rubs or rales  Cardiac: No murmurs, rubs, or gallops; regular rhythm, normal rate; no edema  Musculoskeletal:  Grossly normal; gait and station normal; digits and nails normal  Skin:  No rashes, petechiae, cyanosis  Neurologic: without focal signs; oriented to person, time, place, and purpose; judgement intact  Psychiatric:  No depression, anxiety, agitation  Mallampati score: Class III    Assessment and Plan:    The medical record was reviewed.    Diagnostic and titration nocturnal polysomnogram's, home sleep apnea tests, continuous nocturnal oximetry results, multiple sleep latency tests, and compliance reports reviewed.    Problem List Items Addressed This Visit          Pulmonary/Sleep Medicine Problems    Complex sleep apnea syndrome     Sleep Apnea:    The pathophysiology of sleep anea and the increased risk of cardiovascular morbidity from untreated sleep apnea is discussed in detail with the patient.  Urged to avoid supine sleep, weight gain and alcoholic beverages since all of these can worsen sleep apnea. Cautioned against drowsy " driving. If feeling sleepy while driving, pull over for a break/nap, rather than persist on the road, in the interest of own safety and that of others on the road.    iVAPS titration study was reviewed and discussed with the patient.  We spent significant time today defining sleep apnea, risk of untreated sleep apnea, different treatment options, process of obtaining eyedrops machine, and follow-up parameters.    - Order placed for iVAPS  (Target VA: 5.9, Tgt NC: 15, EPAP: 16, PS: 4/13, Height: 155, Rise Time: 300, Ti: 2/ 0.3, Trigger: very high, and Cycle: medium.)  Patient would benefit from bleed in supplemental oxygen 2 L/min to [employer].   - Repeat OPO once patient is compliant  - Compliance was reinforced  - Recommended the patient against the use of Ozone , such as SoClean  - Clean supplies a couple times a week with dish soap and water and air dry  - Recommended the patient change out supplies as recommended for best mask fit and usage of the machine  - Advised patient to reach out via [employer] if any questions or concerns should arise.   - Equipment replacement schedule:  Mask cushion every month  Nasal pillows 2 times per month  Mask every 6 months  Head gear every 6 months  Tubing every 3 months  Ultra-fine filters 2 times per month  Foam filter every 6 months  Humidifier chamber every 6 months  Chin strap every 6 months    Has been advised to start iVAPS w/ 2LPM, clean equipment frequently, and get new mask and supplies as allowed by insurance and DME. Recommend an earlier appointment, if significant treatment barriers develop.    The risks of untreated sleep apnea were discussed with the patient at length. Patients with sleep apnea are at increased risk of cardiovascular disease including coronary artery disease, systemic arterial hypertension, pulmonary arterial hypertension, cardiac arrythmias, and stroke. The patient was advised to avoid driving a motor vehicle when drowsy.    Positive  airway pressure will favorably impact many of the adverse conditions and effects provoked by sleep apnea.           Relevant Orders    DME Other    DME O2 New Set Up       Other    Nocturnal hypoxia     As above.          Have advised the patient to follow up with the appropriate healthcare practitioners for all other medical problems and issues.    Return in about 3 months (around 7/18/2024), or if symptoms worsen or fail to improve, for 1st compliance, with [facility].    Please note portions of this record was created using voice recognition software. I have made every reasonable attempt to correct obvious errors, but I expect that there are errors of grammar and possibly content I did not discover before finalizing the note.    Time spent in record review prior to patient arrival, reviewing results, and in face-to-face encounter totaled 20 min.  __________  ARIANNA Hernandez  Pulmonary & Sleep Medicine  [facility]

## 2024-04-15 ENCOUNTER — OFFICE VISIT (OUTPATIENT)
Dept: URGENT CARE | Facility: CLINIC | Age: 84
End: 2024-04-15
Payer: MEDICARE

## 2024-04-15 ENCOUNTER — HOSPITAL ENCOUNTER (OUTPATIENT)
Dept: RADIOLOGY | Facility: MEDICAL CENTER | Age: 84
End: 2024-04-15
Payer: MEDICARE

## 2024-04-15 VITALS
BODY MASS INDEX: 34.78 KG/M2 | HEIGHT: 62 IN | RESPIRATION RATE: 18 BRPM | WEIGHT: 189 LBS | DIASTOLIC BLOOD PRESSURE: 74 MMHG | SYSTOLIC BLOOD PRESSURE: 122 MMHG | OXYGEN SATURATION: 92 % | HEART RATE: 75 BPM | TEMPERATURE: 97.2 F

## 2024-04-15 DIAGNOSIS — L03.115 CELLULITIS OF RIGHT LOWER EXTREMITY: ICD-10-CM

## 2024-04-15 DIAGNOSIS — M79.89 SWELLING OF CALF: ICD-10-CM

## 2024-04-15 DIAGNOSIS — L03.116 CELLULITIS OF LEFT LOWER EXTREMITY: ICD-10-CM

## 2024-04-15 PROCEDURE — 3074F SYST BP LT 130 MM HG: CPT

## 2024-04-15 PROCEDURE — 99214 OFFICE O/P EST MOD 30 MIN: CPT

## 2024-04-15 PROCEDURE — 93971 EXTREMITY STUDY: CPT | Mod: LT

## 2024-04-15 PROCEDURE — 3078F DIAST BP <80 MM HG: CPT

## 2024-04-15 RX ORDER — SULFAMETHOXAZOLE AND TRIMETHOPRIM 800; 160 MG/1; MG/1
1 TABLET ORAL 2 TIMES DAILY
Qty: 10 TABLET | Refills: 0 | Status: SHIPPED | OUTPATIENT
Start: 2024-04-15 | End: 2024-04-20

## 2024-04-15 ASSESSMENT — FIBROSIS 4 INDEX: FIB4 SCORE: 3.27

## 2024-04-15 ASSESSMENT — ENCOUNTER SYMPTOMS
SHORTNESS OF BREATH: 0
FEVER: 0
CHILLS: 0

## 2024-04-15 NOTE — PROGRESS NOTES
Chief Complaint   Patient presents with    Other     X1 week Sores on both legs left leg worse        HISTORY OF PRESENT ILLNESS: Patient is a pleasant 84 y.o. female who presents to urgent care today noted bilateral leg swelling with ongoing wounds.  Patient states she has ongoing skin issues however she has noted increased redness along with drainage and wounds to both of her legs over the last several days.  Denies any fevers.  Notes that the left leg is markedly more swollen than usual.  She notes that she has ongoing chronic swelling to the site from a graft however this usually subsides at night, she states the swelling has continued despite the elevation at night.  She denies any major shortness of breath, she notes that she has not gained any weight.    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    Diverticulosis 10/02/2023    Spondylolysis 10/02/2023    Pulmonary nodules 10/02/2023    Psoriasis 07/27/2023    Anemia of chronic disease 05/09/2023    S/P mitral valve clip implantation 04/18/2023    H/O: GI bleed 04/10/2023    Contraindication to anticoagulation therapy 04/10/2023    Situational stress 10/27/2021    Falls 03/01/2021    Stress incontinence 03/01/2021    CKD (chronic kidney disease) stage 4, GFR 15-29 ml/min ([facility]) 09/11/2020    Gastroesophageal reflux disease without esophagitis 06/08/2020    Mixed hyperlipidemia 04/17/2020    Hepatic steatosis 04/17/2020    Elevated alkaline phosphatase level 04/17/2020    Iron deficiency anemia due to chronic blood loss 04/08/2020    History of malignant melanoma 03/12/2020    Skin lesions 03/12/2020    Essential hypertension 03/12/2020    Hypothyroid 03/12/2020    Obesity, Class II, BMI 35-39.9 03/12/2020    SHAKA treated with BiPAP 03/12/2020    Moderate to severe pulmonary hypertension (HCC) 07/14/2017    Chronic combined  systolic and diastolic congestive heart failure (HCC) 07/14/2017    Longstanding persistent atrial fibrillation (HCC) 07/14/2017    Coronary artery disease involving native coronary artery of native heart without angina pectoris 07/14/2017    Situational anxiety 07/13/2017       Allergies:Morphine    Current Outpatient Medications Ordered in Epic   Medication Sig Dispense Refill    Garlic 2 MG Cap Take  by mouth.      sulfamethoxazole-trimethoprim (BACTRIM DS) 800-160 MG tablet Take 1 Tablet by mouth 2 times a day for 5 days. 10 Tablet 0    losartan (COZAAR) 25 MG Tab Take 1 Tablet by mouth every evening. 100 Tablet 3    atorvastatin (LIPITOR) 40 MG Tab TAKE 1 TABLET BY MOUTH AT BEDTIME 90 Tablet 4    ferrous sulfate 325 (65 Fe) MG tablet TAKE 1 TABLET BY MOUTH EVERY DAY 90 Tablet 2    furosemide (LASIX) 40 MG Tab Take 1-2 Tablets by mouth every day. 135 Tablet 3    CINNAMON PO Take  by mouth 2 (two) times a day.      acetaminophen (TYLENOL) 500 MG Tab Take 500-1,000 mg by mouth every 6 hours as needed.      LEVOXYL 200 MCG Tab Take 1 Tablet by mouth every morning on an empty stomach. 90 Tablet 3    ascorbic acid (VITAMIN C) 500 MG tablet Take 1 Tablet by mouth every day. 30 Tablet 11    aspirin 81 MG EC tablet Take 1 Tablet by mouth every day. 100 Tablet 3    Secukinumab (COSENTYX SENSOREADY PEN) 150 MG/ML Solution Auto-injector Inject 300mg Sub Cut once monthly (Patient taking differently: Inject 300 mg under the skin every 28 days. Indications: Psoriasis) 1.96 mL 5    Cholecalciferol (VITAMIN D) 125 MCG (5000 UT) Cap Take 5,000 Units by mouth every morning.      bisoprolol (ZEBETA) 10 MG tablet Take 1 Tablet by mouth every day. (Patient taking differently: Take 10 mg by mouth every morning.) 90 Tablet 4    pantoprazole (PROTONIX) 40 MG Tablet Delayed Response Take 1 Tablet by mouth 2 times a day. 180 Tablet 3    benzonatate (TESSALON) 100 MG Cap Take 1 Capsule by mouth 3 times a day as needed for Cough. (Patient  not taking: Reported on 4/15/2024) 60 Capsule 0    Vitamins-Lipotropics (MULTI-VITAMIN HP/MINERALS) Cap Take 1 Capsule by mouth every evening. (Patient not taking: Reported on 4/15/2024)       No current [facility]-ordered facility-administered medications on file.       Past Medical History:   Diagnosis Date    Anemia     Apnea, sleep     cpap    Atrial fibrillation (HCC)     Breath shortness     Cataract     fede IOL    CKD (chronic kidney disease) stage 4, GFR 15-29 ml/min (HCC)     Congestive heart failure (HCC)     Dental disorder     full dentures    Gasping for breath     Heart attack (HCC)     Followed by Dr. 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) (HCC) 2021    Malignant melanoma of left upper extremity including shoulder (HCC) 2020    Moderate tricuspid regurgitation 2022    Pneumonia 2019    Psychiatric problem     Anxiety    Snoring     Thyroid disease        Social History     Tobacco Use    Smoking status: Former     Current packs/day: 0.00     Average packs/day: 1 pack/day for 31.0 years (31.0 ttl pk-yrs)     Types: Cigarettes     Start date: 5/10/1961     Quit date: 5/10/1992     Years since quittin.9     Passive exposure: Yes    Smokeless tobacco: Never    Tobacco comments:     Started smoking of  do not remember month or day   Vaping Use    Vaping Use: Never used   Substance Use Topics    Alcohol use: No    Drug use: No       Family Status   Relation Name Status    Mo Radha     Fa      MGMo Yin     OTHER youngest daughter Alive     Family History   Problem Relation Age of Onset    Cancer Mother         cancer, smoker    Stroke Maternal Grandmother     Other Other         Crohn    Kidney Disease Other         kidney transplant       Review of Systems   Constitutional:   "Negative for chills, fever and malaise/fatigue.   Respiratory:  Negative for shortness of breath.    Cardiovascular:  Positive for leg swelling. Negative for chest pain.   Skin:         Bilateral lower extremity leg wounds       Exam:  /74   Pulse 75   Temp 36.2 °C (97.2 °F) (Temporal)   Resp 18   Ht 1.575 m (5' 2\")   Wt 85.7 kg (189 lb)   SpO2 92%   Physical Exam  Vitals reviewed.   Constitutional:       General: She is not in acute distress.     Appearance: Normal appearance. She is normal weight. She is not toxic-appearing.   HENT:      Head: Normocephalic.      Right Ear: External ear normal.      Left Ear: External ear normal.      Nose: No nasal tenderness or mucosal edema.      Mouth/Throat:      Mouth: Mucous membranes are moist.      Tonsils: No tonsillar exudate. 1+ on the right. 1+ on the left.   Eyes:      General:         Right eye: No discharge.         Left eye: No discharge.      Extraocular Movements: Extraocular movements intact.      Conjunctiva/sclera: Conjunctivae normal.      Pupils: Pupils are equal, round, and reactive to light.   Cardiovascular:      Rate and Rhythm: Normal rate and regular rhythm.      Pulses: Normal pulses.      Heart sounds: Normal heart sounds. No murmur heard.  Pulmonary:      Effort: Pulmonary effort is normal. No respiratory distress.      Breath sounds: Normal breath sounds.   Abdominal:      General: Abdomen is flat. Bowel sounds are normal.      Palpations: Abdomen is soft.   Musculoskeletal:         General: Swelling present. No tenderness, deformity or signs of injury. Normal range of motion.      Cervical back: Normal range of motion and neck supple. No tenderness.      Right lower leg: No edema.      Left lower leg: No edema.      Comments: Bilateral lower extremities, left leg has 4+ nonpitting edema   Skin:     General: Skin is warm and dry.      Capillary Refill: Capillary refill takes less than 2 seconds.      Findings: Erythema present. No " bruising, lesion or rash.      Comments: Noted wounds to bilateral lower extremities especially near the ankles with increased redness, areas are warm   Neurological:      General: No focal deficit present.      Mental Status: She is alert.      Sensory: No sensory deficit.      Motor: No weakness.      Coordination: Coordination normal.      Gait: Gait normal.   Psychiatric:         Mood and Affect: Mood normal.         Behavior: Behavior normal.         Thought Content: Thought content normal.         Judgment: Judgment normal.           Assessment/Plan:  1. Cellulitis of left lower extremity  - sulfamethoxazole-trimethoprim (BACTRIM DS) 800-160 MG tablet; Take 1 Tablet by mouth 2 times a day for 5 days.  Dispense: 10 Tablet; Refill: 0  - Referral to Wound Clinic    2. Cellulitis of right lower extremity  - sulfamethoxazole-trimethoprim (BACTRIM DS) 800-160 MG tablet; Take 1 Tablet by mouth 2 times a day for 5 days.  Dispense: 10 Tablet; Refill: 0  - Referral to Wound Clinic    3. Swelling of calf  - US-EXTREMITY VENOUS LOWER UNILAT LEFT; Future    Other orders  - Garlic 2 MG Cap; Take  by mouth.    Based on physical exam along with review of systems I did start patient on Bactrim today.  Referral was placed for wound clinic for ongoing management of patient's chronic edema to her bilateral lower extremities especially the left leg.  Given the increased swelling to the left leg as well as patient's sedentary lifestyle I am somewhat concerned that she may have developed a DVT in this leg given the nature of her complaint.  Ultrasound was ordered to rule out potential causes.  Reviewed plan of care with the patient, she is aware and agreeable this time, advise she take medication with food, drink plenty of fluids.    Ultrasound negative for DVT at this time.  Patient notified via phone.    Supportive care, differential diagnoses, and indications for immediate follow-up discussed with patient.   Pathogenesis of  diagnosis discussed including typical length and natural progression.   Instructed to return to clinic or nearest emergency department for any change in condition, further concerns, or worsening of symptoms.  Patient states understanding of the plan of care and discharge instructions.  Instructed to make an appointment, for follow up, with primary care provider.    Please note that this dictation was created using voice recognition software. I have made every reasonable attempt to correct obvious errors, but I expect that there are errors of grammar and possibly content that I did not discover before finalizing the note.      Ekaterina KELLER

## 2024-04-18 ENCOUNTER — OFFICE VISIT (OUTPATIENT)
Dept: SLEEP MEDICINE | Facility: MEDICAL CENTER | Age: 84
End: 2024-04-18
Payer: MEDICARE

## 2024-04-18 VITALS
OXYGEN SATURATION: 92 % | DIASTOLIC BLOOD PRESSURE: 72 MMHG | HEART RATE: 66 BPM | SYSTOLIC BLOOD PRESSURE: 118 MMHG | HEIGHT: 62 IN | WEIGHT: 188 LBS | BODY MASS INDEX: 34.6 KG/M2

## 2024-04-18 DIAGNOSIS — G47.34 NOCTURNAL HYPOXIA: ICD-10-CM

## 2024-04-18 DIAGNOSIS — G47.31 COMPLEX SLEEP APNEA SYNDROME: ICD-10-CM

## 2024-04-18 PROBLEM — G47.39 COMPLEX SLEEP APNEA SYNDROME: Status: ACTIVE | Noted: 2020-03-12

## 2024-04-18 PROCEDURE — 99213 OFFICE O/P EST LOW 20 MIN: CPT

## 2024-04-18 PROCEDURE — 3074F SYST BP LT 130 MM HG: CPT

## 2024-04-18 PROCEDURE — 3078F DIAST BP <80 MM HG: CPT

## 2024-04-18 ASSESSMENT — FIBROSIS 4 INDEX: FIB4 SCORE: 3.27

## 2024-04-18 NOTE — ASSESSMENT & PLAN NOTE
Sleep Apnea:    The pathophysiology of sleep anea and the increased risk of cardiovascular morbidity from untreated sleep apnea is discussed in detail with the patient.  Urged to avoid supine sleep, weight gain and alcoholic beverages since all of these can worsen sleep apnea. Cautioned against drowsy driving. If feeling sleepy while driving, pull over for a break/nap, rather than persist on the road, in the interest of own safety and that of others on the road.    iVAPS titration study was reviewed and discussed with the patient.  We spent significant time today defining sleep apnea, risk of untreated sleep apnea, different treatment options, process of obtaining eyedrops machine, and follow-up parameters.    - Order placed for iVAPS  (Target VA: 5.9, Tgt DC: 15, EPAP: 16, PS: 4/13, Height: 155, Rise Time: 300, Ti: 2/ 0.3, Trigger: very high, and Cycle: medium.)  Patient would benefit from bleed in supplemental oxygen 2 L/min to [employer].   - Repeat OPO once patient is compliant  - Compliance was reinforced  - Recommended the patient against the use of Ozone , such as SoClean  - Clean supplies a couple times a week with dish soap and water and air dry  - Recommended the patient change out supplies as recommended for best mask fit and usage of the machine  - Advised patient to reach out via [facility] if any questions or concerns should arise.   - Equipment replacement schedule:  Mask cushion every month  Nasal pillows 2 times per month  Mask every 6 months  Head gear every 6 months  Tubing every 3 months  Ultra-fine filters 2 times per month  Foam filter every 6 months  Humidifier chamber every 6 months  Chin strap every 6 months    Has been advised to start iVAPS w/ 2LPM, clean equipment frequently, and get new mask and supplies as allowed by insurance and DME. Recommend an earlier appointment, if significant treatment barriers develop.    The risks of untreated sleep apnea were discussed with the patient  at length. Patients with sleep apnea are at increased risk of cardiovascular disease including coronary artery disease, systemic arterial hypertension, pulmonary arterial hypertension, cardiac arrythmias, and stroke. The patient was advised to avoid driving a motor vehicle when drowsy.    Positive airway pressure will favorably impact many of the adverse conditions and effects provoked by sleep apnea.

## 2024-04-18 NOTE — PATIENT INSTRUCTIONS
Please make sure that you are seen within 90 days of receiving your machine, with at least 30 days of use.  Please call scheduling at [phone] if your appointment needs to be moved to meet these parameters.     To meet compliance requirements for insurance please ensure that you use the machine at least 6/7 days a week for at least 4 or more hours a night.    I advise patients to research different mask options before picking up the new machine.  You should also ask what the [employer] company's return policy is for the mask because if you do not like the mask and don't return it within that time frame, you will have to wait 6 months for insurance to cover another mask.     Please bring your entire machine and chip to your first appointment, regardless if the machine is set up for wireless access.    Please do not return the machine without discussing any issues with a sleep provider, as you would be forfeiting therapy and would have to restart the testing process over.

## 2024-04-22 DIAGNOSIS — I34.0 SEVERE MITRAL REGURGITATION: ICD-10-CM

## 2024-04-22 DIAGNOSIS — I27.20 MODERATE TO SEVERE PULMONARY HYPERTENSION (HCC): ICD-10-CM

## 2024-04-22 DIAGNOSIS — D50.0 IRON DEFICIENCY ANEMIA DUE TO CHRONIC BLOOD LOSS: ICD-10-CM

## 2024-04-22 DIAGNOSIS — I50.42 CHRONIC COMBINED SYSTOLIC AND DIASTOLIC CONGESTIVE HEART FAILURE (HCC): ICD-10-CM

## 2024-04-22 DIAGNOSIS — I48.0 PAROXYSMAL ATRIAL FIBRILLATION (HCC): ICD-10-CM

## 2024-04-22 DIAGNOSIS — I10 ESSENTIAL HYPERTENSION: Chronic | ICD-10-CM

## 2024-04-22 DIAGNOSIS — I25.10 CORONARY ARTERY DISEASE INVOLVING NATIVE CORONARY ARTERY OF NATIVE HEART WITHOUT ANGINA PECTORIS: ICD-10-CM

## 2024-04-22 DIAGNOSIS — Z87.19 H/O: UPPER GI BLEED: ICD-10-CM

## 2024-04-22 DIAGNOSIS — E03.9 HYPOTHYROIDISM, UNSPECIFIED TYPE: ICD-10-CM

## 2024-04-22 RX ORDER — FERROUS SULFATE 325(65) MG
325 TABLET ORAL
Qty: 90 TABLET | Refills: 2 | Status: SHIPPED | OUTPATIENT
Start: 2024-04-22

## 2024-04-22 RX ORDER — ATORVASTATIN CALCIUM 40 MG/1
TABLET, FILM COATED ORAL
Qty: 90 TABLET | Refills: 4 | Status: SHIPPED | OUTPATIENT
Start: 2024-04-22

## 2024-04-22 RX ORDER — PANTOPRAZOLE SODIUM 40 MG/1
40 TABLET, DELAYED RELEASE ORAL 2 TIMES DAILY
Qty: 180 TABLET | Refills: 3 | Status: SHIPPED | OUTPATIENT
Start: 2024-04-22

## 2024-04-22 RX ORDER — LEVOTHYROXINE SODIUM 200 UG/1
200 TABLET ORAL
Qty: 90 TABLET | Refills: 3 | Status: SHIPPED | OUTPATIENT
Start: 2024-04-22

## 2024-04-22 RX ORDER — BISOPROLOL FUMARATE 10 MG/1
10 TABLET, FILM COATED ORAL EVERY MORNING
Qty: 90 TABLET | Refills: 3 | Status: SHIPPED | OUTPATIENT
Start: 2024-04-22

## 2024-04-22 NOTE — TELEPHONE ENCOUNTER
Received request via: Pharmacy    Was the patient seen in the last year in this department? Yes    Does the patient have an active prescription (recently filled or refills available) for medication(s) requested? No    Pharmacy Name: [id number] - [place], [place] - [address] AT [facility] [address]     Does the patient have skilled nursing Plus and need 100 day supply (blood pressure, diabetes and cholesterol meds only)? Patient does not have SCP

## 2024-04-24 ENCOUNTER — OFFICE VISIT (OUTPATIENT)
Dept: MEDICAL GROUP | Facility: MEDICAL CENTER | Age: 84
End: 2024-04-24
Payer: MEDICARE

## 2024-04-24 VITALS
HEIGHT: 62 IN | SYSTOLIC BLOOD PRESSURE: 134 MMHG | HEART RATE: 73 BPM | DIASTOLIC BLOOD PRESSURE: 60 MMHG | OXYGEN SATURATION: 92 % | RESPIRATION RATE: 16 BRPM | TEMPERATURE: 98 F | WEIGHT: 192.9 LBS | BODY MASS INDEX: 35.5 KG/M2

## 2024-04-24 DIAGNOSIS — R60.9 2+ PITTING EDEMA: ICD-10-CM

## 2024-04-24 DIAGNOSIS — I34.0 SEVERE MITRAL REGURGITATION: ICD-10-CM

## 2024-04-24 DIAGNOSIS — L03.115 CELLULITIS OF BOTH LOWER EXTREMITIES: ICD-10-CM

## 2024-04-24 DIAGNOSIS — L03.116 CELLULITIS OF BOTH LOWER EXTREMITIES: ICD-10-CM

## 2024-04-24 DIAGNOSIS — E03.9 HYPOTHYROIDISM, UNSPECIFIED TYPE: ICD-10-CM

## 2024-04-24 DIAGNOSIS — E66.9 OBESITY, CLASS II, BMI 35-39.9: ICD-10-CM

## 2024-04-24 DIAGNOSIS — I07.1 SEVERE TRICUSPID REGURGITATION BY PRIOR ECHOCARDIOGRAM: ICD-10-CM

## 2024-04-24 PROCEDURE — 99214 OFFICE O/P EST MOD 30 MIN: CPT | Performed by: FAMILY MEDICINE

## 2024-04-24 PROCEDURE — 3078F DIAST BP <80 MM HG: CPT | Performed by: FAMILY MEDICINE

## 2024-04-24 PROCEDURE — 3075F SYST BP GE 130 - 139MM HG: CPT | Performed by: FAMILY MEDICINE

## 2024-04-24 RX ORDER — FUROSEMIDE 40 MG/1
80 TABLET ORAL DAILY
Qty: 180 TABLET | Refills: 3 | Status: SHIPPED | OUTPATIENT
Start: 2024-04-24

## 2024-04-24 RX ORDER — CEFUROXIME AXETIL 250 MG/1
250 TABLET ORAL 2 TIMES DAILY
Qty: 20 TABLET | Refills: 0 | Status: SHIPPED | OUTPATIENT
Start: 2024-04-24 | End: 2024-05-04

## 2024-04-24 ASSESSMENT — FIBROSIS 4 INDEX: FIB4 SCORE: 3.27

## 2024-04-24 NOTE — PROGRESS NOTES
Chief Complaint   Patient presents with    Follow-Up     Q3M      Wound Infection     Bilateral lower extremity    Edema       Subjective:     HPI:   Aleshia Crooks presents today with the followin. Cellulitis of both lower extremities  She was seen in urgent care and diagnosed with cellulitis.  Unfortunately, she had hive reaction to Bactrim DS that was prescribed at urgent care.  She has bilateral tight edema and erythema with areas of drainage of the skin and yellow avitia crusting as well as shallow ulceration.  Antibiotics are prescribed.  Have marked for now as sulfa allergic.    2. 2+ pitting edema  Patient has significant edema but has only been taking 1 furosemide per day.  I have asked her to increase that to at least taking 80 mg on even days  and 40 mg on odd days.    3. Severe tricuspid regurgitation by prior echocardiogram/Severe mitral regurgitation  Patient is taking at least 40 mg once a day.  She does find her shortness of breath is better.  She feels she would be doing better overall except for the pain and drainage in her legs.  She does follow with cardiology.    4. Hypothyroidism, unspecified type  Patient reports good energy level on the medication. Patient denies insomnia, tremor or change in appetite.  Patient is taking the medication on an empty stomach in the morning and waiting at least 30 minutes before eating.  Last TSH in January was finally at target.  Recheck in .    5. Obesity, Class II, BMI 35-39.9  Some of her increased weight is most likely water weight.  Overall she has been trying to reduce her portions and drop a few pounds.        Patient Active Problem List    Diagnosis Date Noted    Severe tricuspid regurgitation by prior echocardiogram 2024    2+ pitting edema 2024    Cellulitis of both lower extremities 2024    Nocturnal hypoxia 2024    Supraumbilical hernia 2024    Skin induration 2024    S/P TAVR (transcatheter aortic  valve replacement) 11/06/2023    Diverticulosis 10/02/2023    Spondylolysis 10/02/2023    Pulmonary nodules 10/02/2023    Psoriasis 07/27/2023    Anemia of chronic disease 05/09/2023    S/P mitral valve clip implantation 04/18/2023    H/O: GI bleed 04/10/2023    Contraindication to anticoagulation therapy 04/10/2023    Situational stress 10/27/2021    Falls 03/01/2021    Stress incontinence 03/01/2021    CKD (chronic kidney disease) stage 4, GFR 15-29 ml/min ([facility]) 09/11/2020    Gastroesophageal reflux disease without esophagitis 06/08/2020    Mixed hyperlipidemia 04/17/2020    Hepatic steatosis 04/17/2020    Elevated alkaline phosphatase level 04/17/2020    Iron deficiency anemia due to chronic blood loss 04/08/2020    History of malignant melanoma 03/12/2020    Skin lesions 03/12/2020    Essential hypertension 03/12/2020    Hypothyroid 03/12/2020    Obesity, Class II, BMI 35-39.9 03/12/2020    Complex sleep apnea syndrome 03/12/2020    Severe mitral regurgitation 07/14/2017    Moderate to severe pulmonary hypertension (HCC) 07/14/2017    Chronic combined systolic and diastolic congestive heart failure (HCC) 07/14/2017    Longstanding persistent atrial fibrillation (HCC) 07/14/2017    Coronary artery disease involving native coronary artery of native heart without angina pectoris 07/14/2017    Situational anxiety 07/13/2017       Current medicines (including changes today)  Current Outpatient Medications   Medication Sig Dispense Refill    cefUROXime (CEFTIN) 250 MG Tab Take 1 Tablet by mouth 2 times a day for 10 days. 20 Tablet 0    furosemide (LASIX) 40 MG Tab Take 2 Tablets by mouth every day. 180 Tablet 3    pantoprazole (PROTONIX) 40 MG Tablet Delayed Response Take 1 Tablet by mouth 2 times a day. 180 Tablet 3    bisoprolol (ZEBETA) 10 MG tablet Take 1 Tablet by mouth every morning. 90 Tablet 3    LEVOXYL 200 MCG Tab Take 1 Tablet by mouth every morning on an empty stomach. 90 Tablet 3    ferrous sulfate 325  "(65 Fe) MG tablet Take 1 Tablet by mouth every day. 90 Tablet 2    atorvastatin (LIPITOR) 40 MG Tab TAKE 1 TABLET BY MOUTH AT  BEDTIME 90 Tablet 4    Garlic 2 MG Cap Take  by mouth.      losartan (COZAAR) 25 MG Tab Take 1 Tablet by mouth every evening. 100 Tablet 3    CINNAMON PO Take  by mouth 2 (two) times a day.      acetaminophen (TYLENOL) 500 MG Tab Take 500-1,000 mg by mouth every 6 hours as needed.      ascorbic acid (VITAMIN C) 500 MG tablet Take 1 Tablet by mouth every day. 30 Tablet 11    aspirin 81 MG EC tablet Take 1 Tablet by mouth every day. 100 Tablet 3    Secukinumab (COSENTYX SENSOREADY PEN) 150 MG/ML Solution Auto-injector Inject 300mg Sub Cut once monthly (Patient taking differently: Inject 300 mg under the skin every 28 days. Indications: Psoriasis) 1.96 mL 5    Cholecalciferol (VITAMIN D) 125 MCG (5000 UT) Cap Take 5,000 Units by mouth every morning.      Vitamins-Lipotropics (MULTI-VITAMIN HP/MINERALS) Cap Take 1 Capsule by mouth every evening. (Patient not taking: Reported on 4/18/2024)       No current facility-administered medications for this visit.       Allergies   Allergen Reactions    Morphine Vomiting    Sulfa Drugs Hives       ROS: As per HPI  denies chest pain or shortness of breath       Objective:     /60   Pulse 73   Temp 36.7 °C (98 °F)   Resp 16   Ht 1.575 m (5' 2\")   Wt 87.5 kg (192 lb 14.4 oz)   SpO2 92%  Body mass index is 35.28 kg/m².    Physical Exam:  Constitutional: Well-developed and well-nourished. Not diaphoretic. No distress. Lucid and fluent.  Skin: Skin is warm and dry. No rash noted.  Head: Atraumatic without lesions.  Eyes: Conjunctivae and extraocular motions are normal. Pupils are equal, round, and reactive to light. No scleral icterus.   Ears:  External ears unremarkable.   Neck: Supple, trachea midline. No thyromegaly present. No cervical or supraclavicular lymphadenopathy. No JVD or carotid bruits appreciated  Cardiovascular: Regular rate and " rhythm.  Mechanical heart sound.  Chest: Effort normal. Clear to auscultation throughout. No adventitious sounds.  Good air movement.  Abdomen: Soft, non tender, and without distention. benign abdomen.  Extremities: No cyanosis, clubbing.  There is mild erythema bilaterally, there is open skin and crusting.  Both legs tight, 2+ pitting but very firm.    Neurological: Alert and oriented x 3.   Psychiatric:  Behavior, mood, and affect are appropriate.       Assessment and Plan:     84 y.o. female with the following issues:    1. Cellulitis of both lower extremities  cefUROXime (CEFTIN) 250 MG Tab      2. 2+ pitting edema        3. Severe tricuspid regurgitation by prior echocardiogram  furosemide (LASIX) 40 MG Tab      4. Severe mitral regurgitation  furosemide (LASIX) 40 MG Tab      5. Hypothyroidism, unspecified type  TSH      6. Obesity, Class II, BMI 35-39.9  Patient identified as having weight management issue.  Appropriate orders and counseling given.            Followup: Return in about 6 months (around 10/24/2024), or if symptoms worsen or fail to improve.

## 2024-04-29 ENCOUNTER — TELEPHONE (OUTPATIENT)
Dept: HEALTH INFORMATION MANAGEMENT | Facility: OTHER | Age: 84
End: 2024-04-29
Payer: MEDICARE

## 2024-05-01 ENCOUNTER — OFFICE VISIT (OUTPATIENT)
Dept: WOUND CARE | Facility: MEDICAL CENTER | Age: 84
End: 2024-05-01
Payer: MEDICARE

## 2024-05-01 VITALS
OXYGEN SATURATION: 95 % | DIASTOLIC BLOOD PRESSURE: 80 MMHG | RESPIRATION RATE: 17 BRPM | SYSTOLIC BLOOD PRESSURE: 110 MMHG | HEART RATE: 75 BPM | TEMPERATURE: 97 F

## 2024-05-01 DIAGNOSIS — R60.0 EDEMA OF BOTH LOWER LEGS: ICD-10-CM

## 2024-05-01 DIAGNOSIS — R09.89 DIMINISHED PULSES IN LOWER EXTREMITY: ICD-10-CM

## 2024-05-01 DIAGNOSIS — I87.319 CHRONIC VENOUS HYPERTENSION W ULCERATION, UNSPECIFIED LATERALITY (HCC): Primary | ICD-10-CM

## 2024-05-01 DIAGNOSIS — R09.89 DECREASED PEDAL PULSES: ICD-10-CM

## 2024-05-01 DIAGNOSIS — L97.909 CHRONIC VENOUS HYPERTENSION W ULCERATION, UNSPECIFIED LATERALITY (HCC): Primary | ICD-10-CM

## 2024-05-01 DIAGNOSIS — I50.42 CHRONIC COMBINED SYSTOLIC AND DIASTOLIC CONGESTIVE HEART FAILURE (HCC): Chronic | ICD-10-CM

## 2024-05-01 PROCEDURE — 3079F DIAST BP 80-89 MM HG: CPT | Performed by: NURSE PRACTITIONER

## 2024-05-01 PROCEDURE — 99214 OFFICE O/P EST MOD 30 MIN: CPT | Mod: 25 | Performed by: NURSE PRACTITIONER

## 2024-05-01 PROCEDURE — 11042 DBRDMT SUBQ TIS 1ST 20SQCM/<: CPT | Performed by: NURSE PRACTITIONER

## 2024-05-01 PROCEDURE — 3074F SYST BP LT 130 MM HG: CPT | Performed by: NURSE PRACTITIONER

## 2024-05-01 ASSESSMENT — ENCOUNTER SYMPTOMS
DIARRHEA: 0
VOMITING: 0
CONSTIPATION: 0
FEVER: 0
SHORTNESS OF BREATH: 1
CHILLS: 0
NAUSEA: 0

## 2024-05-01 NOTE — PROGRESS NOTES
Provider Encounter- Lower Extremity Ulcer      HISTORY OF PRESENT ILLNESS  Wound History:    START OF CARE IN CLINIC: 5/1/2024    REFERRING PROVIDER: Ekaterina Holliday      WOUND ETIOLOGY: Venous    LOCATION: Bilateral lower extremities   HISTORY: Patient developed increased redness, swelling in both legs with weeping of clear fluid,  in early to mid April of this year.  She presented to an urgent care on 4/15 where a DVT study was done (negative) and she was prescribed Bactrim.  She was also referred to the wound clinic.  Unfortunately, she had an allergic reaction to to the Bactrim, developing hives and shallow ulcers.  She was seen by her PCP on 4/24 at which time it was noted she still had significant edema along with new ulcerations.  Her antibiotic was switched to cefdinir, and  her diuretic dose increased.   Patient normally wears compression stockings, but was unable to get them on due to severe swelling and pain.   Her left lower extremity has been significantly more edematous than her right for several years since undergoing coronary bypass with donor vessel taken from this leg.    Pertinent Medical History: Edema bilateral lower legs, CVI, CAD, CHF, pulmonary nodules, obesity, pulmonary hypertension        TOBACCO USE: Former smoker, quit in 1992    Patient's problem list, allergies, and current medications reviewed and updated in Epic    Interval History:  5/1/2024 : Clinic visit with ARIANNA Austin, FNP-BC, CWOCN, CFCN.   Patient presents today accompanied by her daughter, with whom she lives.  She states her edema is significantly improved from what it was, however she is still unable to get compression stockings on.  Most of her wounds are quite shallow.  There is no weeping of clear fluid from either leg.      REVIEW OF SYSTEMS:   Review of Systems   Constitutional:  Negative for chills and fever.   Respiratory:  Positive for shortness of breath.         She becomes short of breath easily, on CPAP  at night however her machine is currently broken   Cardiovascular:  Positive for leg swelling. Negative for chest pain.        Swelling in legs for many years, left lower extremity significantly more edematous after undergoing cardiac bypass surgery with donor vessel being taken for this leg.   Gastrointestinal:  Negative for constipation, diarrhea, nausea and vomiting.   Skin:         Wounds to both legs after episode of increased swelling in mid April         PHYSICAL EXAMINATION:   /80   Pulse 75   Temp 36.1 °C (97 °F) (Temporal)   Resp 17   LMP  (LMP Unknown)   SpO2 95%     Physical Exam  Constitutional:       Appearance: She is obese.   Cardiovascular:      Comments: Left DP pulse palpable, sluggish and biphasic by Doppler.    Left PT pulse not palpable, distinct and monophasic by Doppler    Unable to palpate right PT and DP pulses, sluggish and biphasic by Doppler  Pulmonary:      Comments: Short of breath with minimal exertion  Musculoskeletal:      Right lower leg: Edema present.      Left lower leg: Edema present.      Comments: 3+ edema to left lower extremity  2+ edema to right lower extremity   Skin:     Comments: Multiple shallow, scattered ulcers to bilateral lower extremities.  The largest to right lateral lower leg, covered with dry brown tissue, scant serous drainage.  No evidence of infection     Neurological:      Mental Status: She is alert and oriented to person, place, and time.   Psychiatric:         Mood and Affect: Mood normal.         WOUND ASSESSMENT  Wound 05/01/24 Venous Ulcer Leg Lateral;Lower Left (Active)   Wound Image    05/01/24 1500   Site Assessment Red;Painful 05/01/24 1500   Periwound Assessment Blanchable erythema;Edema;Painful 05/01/24 1500   Margins Unattached edges 05/01/24 1500   Closure Secondary intention 05/01/24 1500   Drainage Amount Moderate 05/01/24 1500   Drainage Description Serous 05/01/24 1500   Treatments Cleansed;Topical Lidocaine;Provider  debridement;Site care 05/01/24 1500   Wound Cleansing Hypochlorus Acid 05/01/24 1500   Periwound Protectant Barrier Paste;Skin Moisturizer 05/01/24 1500   Dressing Status Clean;Dry;Intact 05/01/24 1500   Dressing Changed New 05/01/24 1500   Dressing Cleansing/Solutions Not Applicable 05/01/24 1500   Dressing Options Hydrofiber Silver;Super Absorbent Pad;Dry Roll Gauze;Hypafix Tape;Tubigrip 05/01/24 1500   Dressing Change/Treatment Frequency Every 72 hrs, and As Needed 05/01/24 1500   Wound Team Following Weekly 05/01/24 1500   Non-staged Wound Description Full thickness 05/01/24 1500   Post-Procedure Length (cm) 0.7 cm 05/01/24 1500   Post-Procedure Width (cm) 0.4 cm 05/01/24 1500   Post-Procedure Depth (cm) 0.2 cm 05/01/24 1500   Post-Procedure Surface Area (cm^2) 0.28 cm^2 05/01/24 1500   Post-Procedure Volume (cm^3) 0.056 cm^3 05/01/24 1500   Tunneling (cm) 0 cm 05/01/24 1500   Undermining (cm) 0 cm 05/01/24 1500   Wound Odor None 05/01/24 1500   Pulses Left;DP;2+;PT;Doppler 05/01/24 1500   Exposed Structures None 05/01/24 1500   Number of days: 0       Wound 05/01/24 Venous Ulcer Leg Anterior;Lower Left cluster (Active)   Wound Image   05/01/24 1500   Site Assessment Pink;Red;Other (Comment) 05/01/24 1500   Periwound Assessment Blanchable erythema;Edema 05/01/24 1500   Margins Undefined edges 05/01/24 1500   Closure Secondary intention 05/01/24 1500   Drainage Amount Copious 05/01/24 1500   Drainage Description Serous 05/01/24 1500   Treatments Cleansed;Site care 05/01/24 1500   Wound Cleansing Hypochlorus Acid 05/01/24 1500   Periwound Protectant Barrier Paste;Skin Moisturizer 05/01/24 1500   Dressing Status Clean;Dry;Intact 05/01/24 1500   Dressing Changed New 05/01/24 1500   Dressing Cleansing/Solutions Not Applicable 05/01/24 1500   Dressing Options Hydrofiber Silver;Super Absorbent Pad;Absorbent Abdominal Pad;Hypafix Tape;Dry Roll Gauze;Tubigrip 05/01/24 1500   Dressing Change/Treatment Frequency Every 72  hrs, and As Needed 05/01/24 1500   Wound Team Following Weekly 05/01/24 1500   Non-staged Wound Description Full thickness 05/01/24 1500   Wound Length (cm) 3 cm 05/01/24 1500   Wound Width (cm) 2 cm 05/01/24 1500   Wound Depth (cm) 0.1 cm 05/01/24 1500   Wound Surface Area (cm^2) 6 cm^2 05/01/24 1500   Wound Volume (cm^3) 0.6 cm^3 05/01/24 1500   Post-Procedure Length (cm) 3 cm 05/01/24 1500   Post-Procedure Width (cm) 2 cm 05/01/24 1500   Post-Procedure Depth (cm) 0.1 cm 05/01/24 1500   Post-Procedure Surface Area (cm^2) 6 cm^2 05/01/24 1500   Post-Procedure Volume (cm^3) 0.6 cm^3 05/01/24 1500   Tunneling (cm) 0 cm 05/01/24 1500   Undermining (cm) 0 cm 05/01/24 1500   Wound Odor None 05/01/24 1500   Pulses Left;DP;2+;PT;Doppler 05/01/24 1500   Number of days: 0       Wound 05/01/24 Venous Ulcer Leg Lower;Anterior Right (Active)   Wound Team Following Weekly 05/01/24 1500   Non-staged Wound Description Full thickness 05/01/24 1500   Pulses DP;PT;Doppler 05/01/24 1500   Number of days: 0           PROCEDURE: Excisional debridement of left lateral lower leg ulcer  -2% viscous lidocaine applied topically to wound bed for approximately 5 minutes prior to debridement  -Curette used to excise nonviable tissue from wound bed.  Excisional debridement was performed to remove devitalized tissue until healthy, bleeding tissue was visualized.   Entire surface of wound, 0.28 cm² debrided.  Tissue debrided into the subcutaneous layer.    -Bleeding controlled with manual pressure.   -Wound care completed by wound RN, refer to wound flowsheet   -Patient tolerated the procedure well, without c/o of significant pain or discomfort.       Pertinent Labs and Diagnostics:    Labs:   A1c:   Lab Results   Component Value Date/Time    HBA1C 5.1 08/31/2023 08:43 AM            IMAGING: N/A    VASCULAR STUDIES:   4/15/2024: DVT study  FINDINGS:  There is no evidence of luminal thrombus.  There is normal augmentation to flow and respiratory  variation.     There is a 2.4 x 4.6 cm left Baker's cyst.     IMPRESSION:        1. No evidence of left lower extremity deep venous thrombosis.    LAST  WOUND CULTURE:  DATE :    Lab Results   Component Value Date/Time    CULTRSULT Usual urogenital jaycee ,000 cfu/mL (A) 02/03/2023 03:32 PM    CULTRSULT (A) 02/03/2023 03:32 PM     Escherichia coli  >100,000 cfu/mL  Presumptive identification                ASSESSMENT AND PLAN:       1. Chronic venous hypertension w ulceration, unspecified laterality (HCC)  2. Edema of both lower legs    5/1/2024: Shallow ulcerations which occurred during episode of increased bilateral lower extremity edema.  Most of which appear to be healing, with the exception of the ulcer to left lateral lower leg.  -Left lower extremity significantly more edematous than the right.  Patient has history of vein harvest from this leg for cardiac bypass  -Excisional debridement of wound in clinic today, medically necessary to promote wound healing.  -Patient to return to clinic weekly for assessment and debridement  -Since daughter to change dressing 1-2 times per week in between clinic visits   -Will start with Tubigrip for mild compression.  If patient able to tolerate, consider multilayer compression wrap next visit.  -Patient's legs were measured for Solaris ready wraps.  May facilitate compliance as patient has difficulty applying compression stockings.    Wound care: Silver Hydrofiber to manage exudate and bioburden, foam cover dressing, roll gauze, Hypafix tape, Tubigrip    3. Decreased pedal pulses    5/1/2024: Left DP pulses easily palpated.  PT pulse distant but biphasic.  Right DP and PT pulses are also biphasic by Doppler, nonpalpable.    -Arterial studies ordered to assess lower extremity perfusion.    4. Chronic combined systolic and diastolic congestive heart failure (HCC)    5/1/2024:  -Complicating factor.  Contributory to lower extremity edema  -Patient encouraged to elevate  her legs periodically throughout the day      PATIENT EDUCATION  - Etiology of  venous stasis ulceration discussed with patient  - Importance of managing edema for healing of ulcer, and for prevention of new ulcer development  -Need for lifelong compression of lower legs   -Elevate legs above the level of the heart periodically throughout the day.  - Importance of adequate nutrition for wound healing  -Advised to go to ER for any increased redness, swelling, drainage or odor, or if patient develops fever, chills, nausea or vomiting.      My total time spent caring for the patient on the day of the encounter was 30  minutes.   This does not include time spent on separately billable procedures/tests.       Please note that this note may have been created using voice recognition software. I have worked with technical experts from [employer] to optimize the interface.  I have made every reasonable attempt to correct obvious errors, but there may be errors of grammar and possibly     N

## 2024-05-02 NOTE — WOUND TEAM
[facility]   [facility]   [address]   [address]   [place] [id number]   [phone] Fax: [phone]        Duration of Supply Order: 90 Days  Dispense as written.    VERSE SUPPLY ORDER: wound care and solaris ready wrap fusion kit ordered    Wound 05/01/24 Venous Ulcer Leg Lateral;Lower Left (Active)   Wound Image    05/01/24 1500   Site Assessment Red;Painful 05/01/24 1500   Periwound Assessment Blanchable erythema;Edema;Painful 05/01/24 1500   Margins Unattached edges 05/01/24 1500   Closure Secondary intention 05/01/24 1500   Drainage Amount Moderate 05/01/24 1500   Drainage Description Serous 05/01/24 1500   Treatments Cleansed;Topical Lidocaine;Provider debridement;Site care 05/01/24 1500   Wound Cleansing Hypochlorus Acid 05/01/24 1500   Periwound Protectant Barrier Paste;Skin Moisturizer 05/01/24 1500   Dressing Status Clean;Dry;Intact 05/01/24 1500   Dressing Changed New 05/01/24 1500   Dressing Cleansing/Solutions Not Applicable 05/01/24 1500   Dressing Options Hydrofiber Silver;Super Absorbent Pad;Dry Roll Gauze;Hypafix Tape;Tubigrip 05/01/24 1500   Dressing Change/Treatment Frequency Every 72 hrs, and As Needed 05/01/24 1500   Wound Team Following Weekly 05/01/24 1500   Non-staged Wound Description Full thickness 05/01/24 1500   Post-Procedure Length (cm) 0.7 cm 05/01/24 1500   Post-Procedure Width (cm) 0.4 cm 05/01/24 1500   Post-Procedure Depth (cm) 0.2 cm 05/01/24 1500   Post-Procedure Surface Area (cm^2) 0.28 cm^2 05/01/24 1500   Post-Procedure Volume (cm^3) 0.056 cm^3 05/01/24 1500   Tunneling (cm) 0 cm 05/01/24 1500   Undermining (cm) 0 cm 05/01/24 1500   Wound Odor None 05/01/24 1500   Pulses Left;DP;2+;PT;Doppler 05/01/24 1500   Exposed Structures None 05/01/24 1500       Wound 05/01/24 Venous Ulcer Leg Anterior;Lower Left cluster (Active)   Wound Image   05/01/24 1500   Site Assessment Pink;Red;Other (Comment) 05/01/24 1500   Periwound  Assessment Blanchable erythema;Edema 05/01/24 1500   Margins Undefined edges 05/01/24 1500   Closure Secondary intention 05/01/24 1500   Drainage Amount Copious 05/01/24 1500   Drainage Description Serous 05/01/24 1500   Treatments Cleansed;Site care 05/01/24 1500   Wound Cleansing Hypochlorus Acid 05/01/24 1500   Periwound Protectant Barrier Paste;Skin Moisturizer 05/01/24 1500   Dressing Status Clean;Dry;Intact 05/01/24 1500   Dressing Changed New 05/01/24 1500   Dressing Cleansing/Solutions Not Applicable 05/01/24 1500   Dressing Options Hydrofiber Silver;Super Absorbent Pad;Absorbent Abdominal Pad;Hypafix Tape;Dry Roll Gauze;Tubigrip 05/01/24 1500   Dressing Change/Treatment Frequency Every 72 hrs, and As Needed 05/01/24 1500   Wound Team Following Weekly 05/01/24 1500   Non-staged Wound Description Full thickness 05/01/24 1500   Wound Length (cm) 3 cm 05/01/24 1500   Wound Width (cm) 2 cm 05/01/24 1500   Wound Depth (cm) 0.1 cm 05/01/24 1500   Wound Surface Area (cm^2) 6 cm^2 05/01/24 1500   Wound Volume (cm^3) 0.6 cm^3 05/01/24 1500   Post-Procedure Length (cm) 3 cm 05/01/24 1500   Post-Procedure Width (cm) 2 cm 05/01/24 1500   Post-Procedure Depth (cm) 0.1 cm 05/01/24 1500   Post-Procedure Surface Area (cm^2) 6 cm^2 05/01/24 1500   Post-Procedure Volume (cm^3) 0.6 cm^3 05/01/24 1500   Tunneling (cm) 0 cm 05/01/24 1500   Undermining (cm) 0 cm 05/01/24 1500   Wound Odor None 05/01/24 1500   Pulses Left;DP;2+;PT;Doppler 05/01/24 1500   Exposed Structures None 05/01/24 1500       Wound 05/01/24 Venous Ulcer Leg Lower;Anterior Right (Active)   Wound Image   05/01/24 1500   Site Assessment Pink;Red;Dark edges 05/01/24 1500   Periwound Assessment Blanchable erythema;Edema 05/01/24 1500   Margins Undefined edges 05/01/24 1500   Closure Secondary intention 05/01/24 1500   Drainage Amount Moderate 05/01/24 1500   Drainage Description Serous 05/01/24 1500   Treatments Cleansed;Site care 05/01/24 1500   Wound Cleansing  Hypochlorus Acid 05/01/24 1500   Periwound Protectant Barrier Paste;Skin Moisturizer 05/01/24 1500   Dressing Status Clean;Dry;Intact 05/01/24 1500   Dressing Changed New 05/01/24 1500   Dressing Cleansing/Solutions Not Applicable 05/01/24 1500   Dressing Options Hydrofiber Silver;Super Absorbent Pad;Absorbent Abdominal Pad;Tubigrip 05/01/24 1500   Dressing Change/Treatment Frequency Every 72 hrs, and As Needed 05/01/24 1500   Wound Team Following Weekly 05/01/24 1500   Non-staged Wound Description Full thickness 05/01/24 1500   Wound Length (cm) 2 cm 05/01/24 1500   Wound Width (cm) 1 cm 05/01/24 1500   Wound Depth (cm) 0.1 cm 05/01/24 1500   Wound Surface Area (cm^2) 6 cm^2 05/01/24 1500   Wound Volume (cm^3) 0.6 cm^3 05/01/24 1500   Post-Procedure Length (cm) 2 cm 05/01/24 1500   Post-Procedure Width (cm) 1 cm 05/01/24 1500   Post-Procedure Depth (cm) 0.1 cm 05/01/24 1500   Post-Procedure Surface Area (cm^2) 6 cm^2 05/01/24 1500   Post-Procedure Volume (cm^3) 0.6 cm^3 05/01/24 1500   Tunneling (cm) 0 cm 05/01/24 1500   Undermining (cm) 0 cm 05/01/24 1500   Wound Odor None 05/01/24 1500   Pulses DP;PT;Doppler 05/01/24 1500   Exposed Structures None 05/01/24 1500

## 2024-05-06 ENCOUNTER — HOSPITAL ENCOUNTER (OUTPATIENT)
Dept: LAB | Facility: MEDICAL CENTER | Age: 84
End: 2024-05-06
Attending: INTERNAL MEDICINE
Payer: MEDICARE

## 2024-05-06 DIAGNOSIS — I10 ESSENTIAL HYPERTENSION: Chronic | ICD-10-CM

## 2024-05-06 DIAGNOSIS — D63.8 ANEMIA OF CHRONIC DISEASE: ICD-10-CM

## 2024-05-06 DIAGNOSIS — N18.4 CKD (CHRONIC KIDNEY DISEASE) STAGE 4, GFR 15-29 ML/MIN (HCC): ICD-10-CM

## 2024-05-06 DIAGNOSIS — E55.9 VITAMIN D DEFICIENCY: ICD-10-CM

## 2024-05-06 DIAGNOSIS — Z87.440 HISTORY OF UTI: ICD-10-CM

## 2024-05-06 LAB
25(OH)D3 SERPL-MCNC: 81 NG/ML (ref 30–100)
ALBUMIN SERPL BCP-MCNC: 4.4 G/DL (ref 3.2–4.9)
ANION GAP SERPL CALC-SCNC: 14 MMOL/L (ref 7–16)
APPEARANCE UR: CLEAR
BILIRUB UR QL STRIP.AUTO: NEGATIVE
BUN SERPL-MCNC: 24 MG/DL (ref 8–22)
CALCIUM SERPL-MCNC: 9.9 MG/DL (ref 8.5–10.5)
CHLORIDE SERPL-SCNC: 103 MMOL/L (ref 96–112)
CO2 SERPL-SCNC: 25 MMOL/L (ref 20–33)
COLOR UR: YELLOW
CREAT SERPL-MCNC: 1.32 MG/DL (ref 0.5–1.4)
CREAT UR-MCNC: 25.16 MG/DL
CREAT UR-MCNC: 25.2 MG/DL
ERYTHROCYTE [DISTWIDTH] IN BLOOD BY AUTOMATED COUNT: 54.7 FL (ref 35.9–50)
FERRITIN SERPL-MCNC: 223 NG/ML (ref 10–291)
GFR SERPLBLD CREATININE-BSD FMLA CKD-EPI: 40 ML/MIN/1.73 M 2
GLUCOSE SERPL-MCNC: 100 MG/DL (ref 65–99)
GLUCOSE UR STRIP.AUTO-MCNC: NEGATIVE MG/DL
HCT VFR BLD AUTO: 38.6 % (ref 37–47)
HGB BLD-MCNC: 12.4 G/DL (ref 12–16)
IRON SATN MFR SERPL: 16 % (ref 15–55)
IRON SERPL-MCNC: 48 UG/DL (ref 40–170)
KETONES UR STRIP.AUTO-MCNC: NEGATIVE MG/DL
LEUKOCYTE ESTERASE UR QL STRIP.AUTO: NEGATIVE
MCH RBC QN AUTO: 29 PG (ref 27–33)
MCHC RBC AUTO-ENTMCNC: 32.1 G/DL (ref 32.2–35.5)
MCV RBC AUTO: 90.4 FL (ref 81.4–97.8)
MICRO URNS: NORMAL
MICROALBUMIN UR-MCNC: 5.7 MG/DL
MICROALBUMIN/CREAT UR: 227 MG/G (ref 0–30)
NITRITE UR QL STRIP.AUTO: NEGATIVE
PH UR STRIP.AUTO: 6.5 [PH] (ref 5–8)
PHOSPHATE SERPL-MCNC: 3.6 MG/DL (ref 2.5–4.5)
PLATELET # BLD AUTO: 177 K/UL (ref 164–446)
PMV BLD AUTO: 12 FL (ref 9–12.9)
POTASSIUM SERPL-SCNC: 3.5 MMOL/L (ref 3.6–5.5)
PROT UR QL STRIP: NEGATIVE MG/DL
PROT UR-MCNC: 11 MG/DL (ref 0–15)
PROT/CREAT UR: 437 MG/G (ref 10–107)
PTH-INTACT SERPL-MCNC: 41.8 PG/ML (ref 14–72)
RBC # BLD AUTO: 4.27 M/UL (ref 4.2–5.4)
RBC UR QL AUTO: NEGATIVE
SODIUM SERPL-SCNC: 142 MMOL/L (ref 135–145)
SP GR UR STRIP.AUTO: 1.01
TIBC SERPL-MCNC: 307 UG/DL (ref 250–450)
TSH SERPL DL<=0.005 MIU/L-ACNC: 4.9 UIU/ML (ref 0.38–5.33)
UIBC SERPL-MCNC: 259 UG/DL (ref 110–370)
UROBILINOGEN UR STRIP.AUTO-MCNC: 0.2 MG/DL
WBC # BLD AUTO: 6 K/UL (ref 4.8–10.8)

## 2024-05-10 ENCOUNTER — NON-PROVIDER VISIT (OUTPATIENT)
Dept: WOUND CARE | Facility: MEDICAL CENTER | Age: 84
End: 2024-05-10
Payer: MEDICARE

## 2024-05-10 ENCOUNTER — OFFICE VISIT (OUTPATIENT)
Dept: NEPHROLOGY | Facility: MEDICAL CENTER | Age: 84
End: 2024-05-10
Payer: MEDICARE

## 2024-05-10 VITALS
WEIGHT: 187 LBS | SYSTOLIC BLOOD PRESSURE: 108 MMHG | DIASTOLIC BLOOD PRESSURE: 60 MMHG | HEART RATE: 61 BPM | BODY MASS INDEX: 34.41 KG/M2 | TEMPERATURE: 97.7 F | HEIGHT: 62 IN | OXYGEN SATURATION: 95 %

## 2024-05-10 DIAGNOSIS — N18.4 CKD (CHRONIC KIDNEY DISEASE) STAGE 4, GFR 15-29 ML/MIN (HCC): ICD-10-CM

## 2024-05-10 DIAGNOSIS — N39.3 STRESS INCONTINENCE: ICD-10-CM

## 2024-05-10 DIAGNOSIS — E67.3 HYPERVITAMINOSIS D: ICD-10-CM

## 2024-05-10 DIAGNOSIS — I50.42 CHRONIC COMBINED SYSTOLIC AND DIASTOLIC CONGESTIVE HEART FAILURE (HCC): Chronic | ICD-10-CM

## 2024-05-10 DIAGNOSIS — I48.11 LONGSTANDING PERSISTENT ATRIAL FIBRILLATION (HCC): ICD-10-CM

## 2024-05-10 DIAGNOSIS — Z86.2 HISTORY OF ANEMIA: ICD-10-CM

## 2024-05-10 DIAGNOSIS — Z86.2 HISTORY OF ACQUIRED HEMOLYTIC ANEMIA: ICD-10-CM

## 2024-05-10 DIAGNOSIS — I10 ESSENTIAL HYPERTENSION: Chronic | ICD-10-CM

## 2024-05-10 PROCEDURE — 3078F DIAST BP <80 MM HG: CPT | Performed by: INTERNAL MEDICINE

## 2024-05-10 PROCEDURE — 3074F SYST BP LT 130 MM HG: CPT | Performed by: INTERNAL MEDICINE

## 2024-05-10 PROCEDURE — 99214 OFFICE O/P EST MOD 30 MIN: CPT | Performed by: INTERNAL MEDICINE

## 2024-05-10 ASSESSMENT — ENCOUNTER SYMPTOMS
ABDOMINAL PAIN: 0
FEVER: 0
SHORTNESS OF BREATH: 1

## 2024-05-10 ASSESSMENT — FIBROSIS 4 INDEX: FIB4 SCORE: 3.73

## 2024-05-10 NOTE — PROGRESS NOTES
BRIEF PROVIDER ENCOUNTER    Patient was able to tolerate Tubigrip's without any difficulty.  She is here today for nurse visit.  RN asking if okay to apply Unna's boot and Coban.  Chart reviewed.  Patient has tolerated compression stockings in the past.  Okay to application of compression wrap.  Patient will be advised to remove for any discomfort.  Solaris wraps or delivered to her home.  Will transition patient to these once wounds are nearly resolved

## 2024-05-10 NOTE — PROGRESS NOTES
Nonselective debridement with NS and guaze to remove nonviable tissue from wound bed.     All wounds are improving however crust/irritation/fragile area present to BLE and edema still present. Discussed wit Zainab KELLER for option for unna boots. See her note. Pt agreeable to unna boot application to BLE. Reminded pt to bring Solaris wrap kit to next appointment to be applied if nearly resolved.

## 2024-05-10 NOTE — PROGRESS NOTES
"Chief Complaint   Patient presents with    Chronic Kidney Disease       CC: f/u CKD    HPI:  Aleshia Crooks is a 84 y.o. female with HTN, Psoriasis on immunosuppression, GIA2l-4, severe mitral regurgitation and valvular heart failure status post MitraClip April 2023, Afib s/p Watchman 7/26/23 c/b cardiac arrest and cardiac tamponade, who presents today for follow up.    She had cellulitis and finished antibiotics early 5/2024.     Re: HTN, diagnosed 1992 when she had a heart attack. BP control over the years has been well controlled. She doesn't check BP at home, usually controlled at doctor visits. Denies Light-headedness.     Re: CHF, noted in 2017 and 2020. She had mitraclip in 4/2023. She feels like the mitraclip helped her breathing. She does complain of ongoing SOB. She will be getting a sleep study because her CPAP machine isn't adequate.  LE edema is back to normal.  She remains on lasix 40mg once daily (and she feels diuretic effect, takes lasix 80mg maybe once a month).     Re: CKD, notably worsening in the last year. Her appetite is \"I wish it would slow down a little bit.\" Denies daily headaches, N/V, metallic taste. Denies NSAIDs, takes Tylenol PRN.     Re: Anemia, she needed some blood transfusions in 3/2023. She had c-scope in 1/2023 with diverticulosis, and EGD with nodular mucosa of the stomach. Energy level is \"it's there but it's not.\" Complains of poor CPAP fit.  She has needed some retacrit shots, but none since Aug/Sept/2023. She remains off warfarin now because she had the Watchman procedure. She had iron infusions in 8/2023.       Past Medical History:   Diagnosis Date    Anemia     Apnea, sleep     cpap    Atrial fibrillation (HCC)     Breath shortness     Cataract     fede IOL    CKD (chronic kidney disease) stage 4, GFR 15-29 ml/min (HCC)     Congestive heart failure (HCC)     Dental disorder     full dentures    Gasping for breath     Heart attack (HCC) 1992    Followed by Dr." Akinappelli    Heart murmur     Hemorrhagic disorder (HCC)     takes warfarin    High cholesterol     Hypertension     Iron deficiency anemia     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) (HCC) 03/25/2021    Malignant melanoma of left upper extremity including shoulder (HCC) 06/08/2020 2015    Moderate tricuspid regurgitation 11/16/2022    Pneumonia 2019    Psychiatric problem     Anxiety    Snoring     Thyroid disease        Past Surgical History:   Procedure Laterality Date    TRANSCATHETER AORTIC VALVE REPLACEMENT Bilateral 11/6/2023    Procedure: TRANSCATHETER AORTIC VALVE REPLACEMENT;  Surgeon: Cesar Gray M.D.;  Location: SURGERY [facility];  Service: Cardiac    ECHOCARDIOGRAM, TRANSESOPHAGEAL, INTRAOPERATIVE N/A 11/6/2023    Procedure: ECHOCARDIOGRAM, TRANSESOPHAGEAL, INTRAOPERATIVE;  Surgeon: Cesar Gray M.D.;  Location: SURGERY [facility];  Service: Cardiac    TRANSCATHETER MITRAL VALVE REPAIR N/A 04/17/2023    Procedure: TRANSCATHETER MITRAL VALVE PROCEDURE;  Surgeon: Cesar Gray M.D.;  Location: SURGERY [facility];  Service: Cardiac    ECHOCARDIOGRAM, TRANSESOPHAGEAL, INTRAOPERATIVE N/A 04/17/2023    Procedure: ECHOCARDIOGRAM, TRANSESOPHAGEAL, INTRAOPERATIVE;  Surgeon: Cesar Gray M.D.;  Location: SURGERY [facility];  Service: Cardiac    OH COLONOSCOPY,DIAGNOSTIC N/A 01/24/2023    Procedure: COLONOSCOPY;  Surgeon: Amy Zarate M.D.;  Location: SURGERY SAME DAY [facility];  Service: Gastroenterology    OH UPPER GI ENDOSCOPY,DIAGNOSIS N/A 01/24/2023    Procedure: GASTROSCOPY;  Surgeon: Amy Zarate M.D.;  Location: SURGERY SAME DAY [facility];  Service: Gastroenterology    OH UPPER GI ENDOSCOPY,BIOPSY N/A 01/24/2023    Procedure: GASTROSCOPY, WITH BIOPSY;  Surgeon: Amy Zarate M.D.;  Location: SURGERY SAME DAY [facility];  Service: Gastroenterology    CHOLECYSTECTOMY      EYE SURGERY Bilateral     cataracts    MULTIPLE CORONARY  ARTERY BYPASS      1 bypass    THYROIDECTOMY TOTAL      TONSILLECTOMY          Outpatient Encounter Medications as of 5/10/2024   Medication Sig Dispense Refill    furosemide (LASIX) 40 MG Tab Take 2 Tablets by mouth every day. 180 Tablet 3    pantoprazole (PROTONIX) 40 MG Tablet Delayed Response Take 1 Tablet by mouth 2 times a day. 180 Tablet 3    bisoprolol (ZEBETA) 10 MG tablet Take 1 Tablet by mouth every morning. 90 Tablet 3    LEVOXYL 200 MCG Tab Take 1 Tablet by mouth every morning on an empty stomach. 90 Tablet 3    ferrous sulfate 325 (65 Fe) MG tablet Take 1 Tablet by mouth every day. 90 Tablet 2    atorvastatin (LIPITOR) 40 MG Tab TAKE 1 TABLET BY MOUTH AT  BEDTIME 90 Tablet 4    Garlic 2 MG Cap Take  by mouth.      losartan (COZAAR) 25 MG Tab Take 1 Tablet by mouth every evening. 100 Tablet 3    CINNAMON PO Take  by mouth 2 (two) times a day.      acetaminophen (TYLENOL) 500 MG Tab Take 500-1,000 mg by mouth every 6 hours as needed.      ascorbic acid (VITAMIN C) 500 MG tablet Take 1 Tablet by mouth every day. 30 Tablet 11    aspirin 81 MG EC tablet Take 1 Tablet by mouth every day. 100 Tablet 3    Secukinumab (COSENTYX SENSOREADY PEN) 150 MG/ML Solution Auto-injector Inject 300mg Sub Cut once monthly (Patient taking differently: Inject 300 mg under the skin every 28 days. Indications: Psoriasis) 1.96 mL 5    Cholecalciferol (VITAMIN D) 125 MCG (5000 UT) Cap Take 5,000 Units by mouth every morning.      Vitamins-Lipotropics (MULTI-VITAMIN HP/MINERALS) Cap Take 1 Capsule by mouth every evening.       No facility-administered encounter medications on file as of 5/10/2024.        Allergies   Allergen Reactions    Morphine Vomiting    Sulfa Drugs Hives           Family History   Problem Relation Age of Onset    Cancer Mother         cancer, smoker    Stroke Maternal Grandmother     Other Other         Crohn    Kidney Disease Other         kidney transplant       Review of Systems   Constitutional:  Negative  "for fever.   Respiratory:  Positive for shortness of breath.    Cardiovascular:  Positive for leg swelling. Negative for chest pain.   Gastrointestinal:  Negative for abdominal pain.   Genitourinary:  Negative for dysuria and hematuria.   All other systems reviewed and are negative.      /60 (BP Location: Right arm, Patient Position: Sitting, BP Cuff Size: Adult)   Pulse 61   Temp 36.5 °C (97.7 °F) (Temporal)   Ht 1.575 m (5' 2\")   Wt 84.8 kg (187 lb)   LMP  (LMP Unknown)   SpO2 95%   BMI 34.20 kg/m²     Physical Exam  Constitutional:       General: She is not in acute distress.     Appearance: She is obese.      Comments: Power scooter   HENT:      Mouth/Throat:      Pharynx: No oropharyngeal exudate.   Eyes:      General: No scleral icterus.  Neck:      Trachea: No tracheal deviation.   Cardiovascular:      Rate and Rhythm: Normal rate. Rhythm irregular.      Heart sounds: Murmur heard.   Pulmonary:      Effort: Pulmonary effort is normal.      Breath sounds: Normal breath sounds. No stridor. No rales.   Abdominal:      General: Bowel sounds are normal.      Palpations: Abdomen is soft.      Tenderness: There is no abdominal tenderness.   Musculoskeletal:         General: Normal range of motion.      Cervical back: Neck supple.      Right lower leg: Edema (1-2+) present.      Left lower leg: Edema (1-2+) present.   Skin:     General: Skin is warm and dry.      Findings: No rash.   Neurological:      General: No focal deficit present.      Mental Status: She is alert and oriented to person, place, and time.   Psychiatric:         Mood and Affect: Mood and affect normal.         Behavior: Behavior normal.         Labs reviewed.    Recent Labs     08/31/23  0840 08/31/23  0844 11/07/23  0032 11/13/23  1058 01/15/24  1056 05/06/24  1139   ALBUMIN 4.1   < > 3.8  --  4.2 4.4   HDL  --   --  34*  --   --   --    TRIGLYCERIDE  --   --  98  --   --   --    SODIUM 142   < > 140 141 144 142   POTASSIUM 3.5*   < " > 4.3 3.7 3.4* 3.5*   CHLORIDE 104   < > 105 103 103 103   CO2 25   < > 21 25 25 25   BUN 34*   < > 32* 31* 20 24*   CREATININE 1.83*   < > 2.16* 1.83* 1.53* 1.32   PHOSPHORUS 3.0  --   --   --  3.4 3.6    < > = values in this interval not displayed.       Lab Results   Component Value Date/Time    WBC 6.0 05/06/2024 11:39 AM    RBC 4.27 05/06/2024 11:39 AM    HEMOGLOBIN 12.4 05/06/2024 11:39 AM    HEMATOCRIT 38.6 05/06/2024 11:39 AM    MCV 90.4 05/06/2024 11:39 AM    MCH 29.0 05/06/2024 11:39 AM    MCHC 32.1 (L) 05/06/2024 11:39 AM    MPV 12.0 05/06/2024 11:39 AM               URINALYSIS:  Lab Results   Component Value Date/Time    COLORURINE Yellow 05/06/2024 1138    CLARITY Clear 05/06/2024 1138    SPECGRAVITY 1.009 05/06/2024 1138    PHURINE 6.5 05/06/2024 1138    KETONES Negative 05/06/2024 1138    PROTEINURIN Negative 05/06/2024 1138    BILIRUBINUR Negative 05/06/2024 1138    UROBILU 0.2 05/06/2024 1138    NITRITE Negative 05/06/2024 1138    LEUKESTERAS Negative 05/06/2024 1138    OCCULTBLOOD Negative 05/06/2024 1138       UPC  Lab Results   Component Value Date/Time    TOTPROTUR 11.0 05/06/2024 1138      Lab Results   Component Value Date/Time    CREATININEU 25.20 05/06/2024 1138            Imaging reviewed  No orders to display         Assessment:  Aleshia Crooks is a 84 y.o. female with HTN, Psoriasis on immunosuppression, [id number], severe mitral regurgitation and valvular heart failure status post MitraClip April 2023, Afib s/p Watchman 7/26/23 c/b cardiac arrest and cardiac tamponade, who presents today for follow up.    Plan:  1. CKD 3b-4  -Creatinine and GFR currently within stage IIIb CKD.  She had historically been within stage IV CKD.  Patient remains at mild to moderate risk of CKD progression with her microalbuminuria.   Her baseline CKD likely due to history of hypertension, age-related kidney decline.  I explained the importance of blood pressure control to help slow CKD progression.  I  recommend avoiding NSAIDs and other nephrotoxins.  I recommend a low-sodium diet.  Recommend maintaining losartan for long-term kidney protection; SGLT2 inhibitor too expensive per patient.  Patient would consider home PD (daughter did PD before transplant) if needed.     2. Essential hypertension  -Controlled.  Maintain losartan for long-term kidney protection.  Continue Lasix 40 mg p.o. daily for now, with option to increase to Lasix 80 mg daily if needed for worsening hypertension or lower extremity edema.    3. Moderate to severe pulmonary hypertension (HCC)  -Likely due to valvular disease from tricuspid and mitral regurgitation.  Remains stable status post MitraClip procedure April 2023.  Continue Lasix as above.    4. Chronic combined systolic and diastolic congestive heart failure (HCC)  -Patient appears euvolemic today.  Continue Lasix 40 mg p.o. daily.  Patient unable to afford SGLT2 inhibitor.  Maintain losartan.    5. Anemia, unspecified type  -Anemia remains improved ever since patient had Watchman procedure and has been off of warfarin.  No need for oral iron, IV iron, or Retacrit injections.  Okay to discontinue oral ferrous sulfate for now.      Return to clinic in 4 months with preclinic labs    Ryder Davis MD  Nephrology  [facility]

## 2024-05-10 NOTE — PATIENT INSTRUCTIONS
Avoid prolonged standing or sitting without elevating your legs.  - Multilayer compression wrap to both legs. Do not get wet and keep on for the week. Only remove if temperature or sensation changes.   If compression needs to be removed, un-wrap it do not cut it off.     Should you experience any significant changes in your wound(s), such as infection (redness, swelling, localized heat, increased pain, fever > 101 F, chills) or have any questions regarding your home care instructions, please contact the wound center at [phone]. If after hours, contact your primary care physician or go to the hospital emergency room.   Keep dressing clean, dry and covered while bathing. Only change dressing if it becomes over saturated, soiled or falls off.

## 2024-05-17 ENCOUNTER — NON-PROVIDER VISIT (OUTPATIENT)
Dept: WOUND CARE | Facility: MEDICAL CENTER | Age: 84
End: 2024-05-17
Payer: MEDICARE

## 2024-05-17 NOTE — PROGRESS NOTES
Wounds are resolved today, but fragile. Showed patient how to don [employer] wraps. She will be purchasing a sock hodge later today. Pt instructed dc today, keep area clean, it will be fragile for a few days, bathe and dry area gently, as scar tissue only ever regains a maximum of 80% of the tensile strength of the surrounding skin, remodeling of scar can continue for 6mo - a year. Contact PCP for a referral back here if any problems with area opening and draining again. Pt and daughter understand.

## 2024-05-17 NOTE — PATIENT INSTRUCTIONS
- Resolved wound be fragile for a few days, bathe and dry area gently, only ever regains a maximum of 80% of the tensile strength of the surrounding skin, remodeling of scar can continue for 6mo - a year. Contact PCP for a referral back her if any problems with area opening and draining again.

## 2024-06-19 ENCOUNTER — HOSPITAL ENCOUNTER (OUTPATIENT)
Dept: LAB | Facility: MEDICAL CENTER | Age: 84
End: 2024-06-19
Attending: FAMILY MEDICINE
Payer: MEDICARE

## 2024-06-19 DIAGNOSIS — E03.9 HYPOTHYROIDISM, UNSPECIFIED TYPE: ICD-10-CM

## 2024-06-19 LAB — TSH SERPL DL<=0.005 MIU/L-ACNC: 2.73 UIU/ML (ref 0.38–5.33)

## 2024-06-19 PROCEDURE — 84443 ASSAY THYROID STIM HORMONE: CPT

## 2024-06-19 PROCEDURE — 36415 COLL VENOUS BLD VENIPUNCTURE: CPT

## 2024-06-20 ENCOUNTER — OFFICE VISIT (OUTPATIENT)
Dept: CARDIOLOGY | Facility: MEDICAL CENTER | Age: 84
End: 2024-06-20
Attending: STUDENT IN AN ORGANIZED HEALTH CARE EDUCATION/TRAINING PROGRAM
Payer: MEDICARE

## 2024-06-20 VITALS
HEART RATE: 69 BPM | SYSTOLIC BLOOD PRESSURE: 98 MMHG | OXYGEN SATURATION: 93 % | RESPIRATION RATE: 14 BRPM | HEIGHT: 62 IN | BODY MASS INDEX: 34.04 KG/M2 | DIASTOLIC BLOOD PRESSURE: 48 MMHG | WEIGHT: 185 LBS

## 2024-06-20 DIAGNOSIS — Z98.890 S/P MITRAL VALVE CLIP IMPLANTATION: ICD-10-CM

## 2024-06-20 DIAGNOSIS — I50.9 CHF (NYHA CLASS II, ACC/AHA STAGE C) (HCC): ICD-10-CM

## 2024-06-20 DIAGNOSIS — I25.10 CORONARY ARTERY DISEASE DUE TO LIPID RICH PLAQUE: ICD-10-CM

## 2024-06-20 DIAGNOSIS — Z95.818 S/P MITRAL VALVE CLIP IMPLANTATION: ICD-10-CM

## 2024-06-20 DIAGNOSIS — I10 ESSENTIAL HYPERTENSION: ICD-10-CM

## 2024-06-20 DIAGNOSIS — I48.20 CHRONIC A-FIB (HCC): ICD-10-CM

## 2024-06-20 DIAGNOSIS — Z95.1 HX OF CABG: ICD-10-CM

## 2024-06-20 DIAGNOSIS — I50.32 CHRONIC HEART FAILURE WITH PRESERVED EJECTION FRACTION (HCC): ICD-10-CM

## 2024-06-20 DIAGNOSIS — I25.83 CORONARY ARTERY DISEASE DUE TO LIPID RICH PLAQUE: ICD-10-CM

## 2024-06-20 DIAGNOSIS — Z95.2 S/P TAVR (TRANSCATHETER AORTIC VALVE REPLACEMENT): ICD-10-CM

## 2024-06-20 DIAGNOSIS — Z95.818 PRESENCE OF WATCHMAN LEFT ATRIAL APPENDAGE CLOSURE DEVICE: ICD-10-CM

## 2024-06-20 PROCEDURE — 3078F DIAST BP <80 MM HG: CPT | Performed by: STUDENT IN AN ORGANIZED HEALTH CARE EDUCATION/TRAINING PROGRAM

## 2024-06-20 PROCEDURE — 3074F SYST BP LT 130 MM HG: CPT | Performed by: STUDENT IN AN ORGANIZED HEALTH CARE EDUCATION/TRAINING PROGRAM

## 2024-06-20 PROCEDURE — 99213 OFFICE O/P EST LOW 20 MIN: CPT | Performed by: STUDENT IN AN ORGANIZED HEALTH CARE EDUCATION/TRAINING PROGRAM

## 2024-06-20 PROCEDURE — 99215 OFFICE O/P EST HI 40 MIN: CPT | Performed by: STUDENT IN AN ORGANIZED HEALTH CARE EDUCATION/TRAINING PROGRAM

## 2024-06-20 ASSESSMENT — ENCOUNTER SYMPTOMS
VOMITING: 0
DYSPNEA ON EXERTION: 0
ABDOMINAL PAIN: 0
FOCAL WEAKNESS: 0
DIZZINESS: 0
WEAKNESS: 0
IRREGULAR HEARTBEAT: 0
PND: 0
NAUSEA: 0
SYNCOPE: 0
SHORTNESS OF BREATH: 0
DIARRHEA: 0
NIGHT SWEATS: 0
WHEEZING: 0
ORTHOPNEA: 0
NEAR-SYNCOPE: 0
FEVER: 0
PALPITATIONS: 0
COUGH: 0

## 2024-06-20 ASSESSMENT — FIBROSIS 4 INDEX: FIB4 SCORE: 3.73

## 2024-06-20 NOTE — PROGRESS NOTES
Cardiology Follow-up Consultation Note    Date of note:    6/20/2024    Primary Care Provider: Asael Pink M.D.    Patient Name: Aleshia Crooks   YOB: 1940  MRN:              [id number]    Chief Complaint: Follow-up HFpEF    History of Present Illness: Ms. Aleshia Crooks is an 84 y.o. female whose current medical problems include HFpEF, status post TAVR, has post MitraClip, pulmonary hypertension, AF post watchman, CKD, hypertension, dyslipidemia, and CAD status post one-vessel CABG ( of RCA with collaterals from the left system) who is here for follow-up HFpEF.    The patient is a patient of Dr. Gray last seen 3/7/2024.  The patient was previously a patient of Dr. Villar.    The patient presents today for follow-up. The patient presents with her daughter.  The patient reports feeling very well today.  However, the patient does report some leg swelling.  She denies any orthopnea or PND however.  She denies any chest pain or shortness of breath on exertion.  No palpitations.  No syncope or presyncopal episodes.    Cardiovascular Risk Factors:  1. Smoking status: Former smoker  2. Type II Diabetes Mellitus: None  Lab Results   Component Value Date/Time    HBA1C 5.1 08/31/2023 08:43 AM    HBA1C 6.1 (H) 11/17/2022 09:36 AM     3. Hypertension: On medication  4. Dyslipidemia: On statin  Cholesterol,Tot   Date Value Ref Range Status   11/07/2023 102 100 - 199 mg/dL Final     LDL   Date Value Ref Range Status   11/07/2023 48 <100 mg/dL Final     HDL   Date Value Ref Range Status   11/07/2023 34 (A) >=40 mg/dL Final     Triglycerides   Date Value Ref Range Status   11/07/2023 98 0 - 149 mg/dL Final     5. Family history of early Coronary Artery Disease in a first degree relative (Male less than 55 years of age; Female less than 65 years of age): None  6.  Obesity and/or Metabolic Syndrome: Body mass index is 33.84 kg/m².  7. Sedentary lifestyle: Not active    Review of Systems    Constitutional: Negative for fever, malaise/fatigue and night sweats.   Cardiovascular:  Negative for chest pain, dyspnea on exertion, irregular heartbeat, leg swelling, near-syncope, orthopnea, palpitations, paroxysmal nocturnal dyspnea and syncope.   Respiratory:  Negative for cough, shortness of breath and wheezing.    Gastrointestinal:  Negative for abdominal pain, diarrhea, nausea and vomiting.   Neurological:  Negative for dizziness, focal weakness and weakness.       All other systems reviewed and are negative.     Current Outpatient Medications   Medication Sig Dispense Refill    furosemide (LASIX) 40 MG Tab Take 2 Tablets by mouth every day. 180 Tablet 3    pantoprazole (PROTONIX) 40 MG Tablet Delayed Response Take 1 Tablet by mouth 2 times a day. 180 Tablet 3    bisoprolol (ZEBETA) 10 MG tablet Take 1 Tablet by mouth every morning. 90 Tablet 3    LEVOXYL 200 MCG Tab Take 1 Tablet by mouth every morning on an empty stomach. 90 Tablet 3    atorvastatin (LIPITOR) 40 MG Tab TAKE 1 TABLET BY MOUTH AT  BEDTIME 90 Tablet 4    Garlic 2 MG Cap Take  by mouth.      losartan (COZAAR) 25 MG Tab Take 1 Tablet by mouth every evening. 100 Tablet 3    CINNAMON PO Take  by mouth 2 (two) times a day.      acetaminophen (TYLENOL) 500 MG Tab Take 500-1,000 mg by mouth every 6 hours as needed.      aspirin 81 MG EC tablet Take 1 Tablet by mouth every day. 100 Tablet 3    Secukinumab (COSENTYX SENSOREADY PEN) 150 MG/ML Solution Auto-injector Inject 300mg Sub Cut once monthly (Patient taking differently: Inject 300 mg under the skin every 28 days. Indications: Psoriasis) 1.96 mL 5    Cholecalciferol (VITAMIN D) 125 MCG (5000 UT) Cap Take 5,000 Units by mouth every Monday, Wednesday, and Friday.      Vitamins-Lipotropics (MULTI-VITAMIN HP/MINERALS) Cap Take 1 Capsule by mouth every evening.       No current facility-administered medications for this visit.         Allergies   Allergen Reactions    Morphine Vomiting    Sulfa  "Drugs Hives       Physical Exam:  Ambulatory Vitals  BP 98/48 (BP Location: Right leg, Patient Position: Sitting, BP Cuff Size: Adult)   Pulse 69   Resp 14   Ht 1.575 m (5' 2\")   Wt 83.9 kg (185 lb)   SpO2 93%    Oxygen Therapy:  Pulse Oximetry: 93 %  BP Readings from Last 4 Encounters:   06/20/24 98/48   05/10/24 108/60   05/01/24 110/80   04/24/24 134/60       Weight/BMI: Body mass index is 33.84 kg/m².  Wt Readings from Last 4 Encounters:   06/20/24 83.9 kg (185 lb)   05/10/24 84.8 kg (187 lb)   04/24/24 87.5 kg (192 lb 14.4 oz)   04/18/24 85.3 kg (188 lb)         General: Well appearing and in no apparent distress  Eyes: nl conjunctiva, no icteric sclera  ENT: normal external appearance of ears, nose, and throat  Neck: no visible JVP,  no carotid bruits  Lungs: normal respiratory effort, CTAB  Heart: Irregular rhythm, regular rhythm, 3/6 systolic murmur,  trace edema bilateral lower extremities. No LV/RV heave on cardiac palpatation. + bilateral radial pulses.  + bilateral dp pulses.   Abdomen: soft, non tender, non distended, no masses, normal bowel sounds.  No HSM.  Extremities/MSK: no clubbing, no cyanosis  Neurological: No focal sensory deficits  Psychiatric: Appropriate affect, A/O x 3, intact judgement and insight  Skin: Warm extremities      Lab Data Review:  Lab Results   Component Value Date/Time    CHOLSTRLTOT 102 11/07/2023 12:32 AM    LDL 48 11/07/2023 12:32 AM    HDL 34 (A) 11/07/2023 12:32 AM    TRIGLYCERIDE 98 11/07/2023 12:32 AM       Lab Results   Component Value Date/Time    SODIUM 142 05/06/2024 11:39 AM    POTASSIUM 3.5 (L) 05/06/2024 11:39 AM    CHLORIDE 103 05/06/2024 11:39 AM    CO2 25 05/06/2024 11:39 AM    GLUCOSE 100 (H) 05/06/2024 11:39 AM    BUN 24 (H) 05/06/2024 11:39 AM    CREATININE 1.32 05/06/2024 11:39 AM     Lab Results   Component Value Date/Time    ALKPHOSPHAT 106 (H) 11/07/2023 12:32 AM    ASTSGOT 36 11/07/2023 12:32 AM    ALTSGPT 20 11/07/2023 12:32 AM    TBILIRUBIN 0.9 " 11/07/2023 12:32 AM      Lab Results   Component Value Date/Time    WBC 6.0 05/06/2024 11:39 AM    HEMOGLOBIN 12.4 05/06/2024 11:39 AM     Lab Results   Component Value Date/Time    HBA1C 5.1 08/31/2023 08:43 AM    HBA1C 6.1 (H) 11/17/2022 09:36 AM         Cardiac Imaging and Procedures Review:    EKG dated 3/7/2024: My personal interpretation is Atrial fibrillation, Right axis deviation, Borderline repolarization abnormality     Echo dated 3/5/2024:   CONCLUSIONS  Compared to the prior study on 12/4/23, no significant changes  Mild-moderate concentric LVH with preserved systolic function,   estimated LVEF 60%.  Known TAVR aortic valve that is functioning normally with normal   transvalvular gradients. No AI or PVL noted.  Known MitraClip, residual moderate MR. Appropriate transvalvular   gradients.  Consider GOMEZ for better assessment if clinically warranted.  Moderate TR  Atria not well-visualized but appears to be dilated  RV not well-visualized but appears to be dilated  No pericardial effusion  Dilated IVC  Pulmonary hypertension with estimated RVSP 60-65 mmHg    Coronary angiogram 10/31/2023  Primary Care Provider: Asael Pink M.D.     Indication for procedure: low flow low gradient aortic stenosis     Procedures performed:  Coronary arteriograms  Left heart catheterization   Distal abdominal aortogram with iliac run off     Final impression:  Chronically occluded RCA fills through collaterals from left coronary system.  Vein graft to RCA is chronically occluded.  Moderate bilateral common iliac artery stenosis.     Findings:  1.  Left main coronary artery:  Normal.  2.  Left anterior descending artery: Luminal irregularities no significant disease.    3.  Left circumflex coronary artery: Luminal irregularities no significant disease.    4.  Right coronary artery: Occluded in midportion.  Distal RCA fills through collaterals from left system.  This is a right dominant system.  5.  Left ventricular end  diastolic pressure:  15 mmHg. pullback gradient across the aortic valve mean 20 mmHg  6.  Distal abdominal aortogram with bilateral iliac runoff showed moderate bilateral common iliac artery stenosis, calcified .         Assessment & Plan     1. Chronic heart failure with preserved ejection fraction (HCC)        2. CHF (NYHA class II, ACC/AHA stage C) (HCC)        3. S/P TAVR (transcatheter aortic valve replacement)        4. Essential hypertension        5. S/P mitral valve clip implantation        6. Coronary artery disease due to lipid rich plaque        7. Hx of CABG        8. Chronic a-fib (HCC)        9. Presence of Watchman left atrial appendage closure device              Shared Medical Decision Making:  HFpEF  Pulmonary hypertension  Status post TAVR  Status post mitraclip  Close to euvolemic on exam.  -Continue Lasix  -Discussed starting Aldactone, discussed starting low dose such as 12.5 mg daily due to GFR 40 on 5/2/2024.  However, the patient reports that she is feeling well, she would like to hold off additional medications at this time, which I think is reasonable.  -We also discussed starting Jardiance, which she would also like to hold off  -Follow-up in 4 months to reassess volume status and see if GDMT could be added    Hypertension  BP controlled/low this visit  -Continue Lasix as above  -Continue bisoprolol and losartan    CAD   Status post CABG  Asymptomatic  -Continue aspirin and high intensity statin  -Continue bisoprolol    A-fib  Status post Watchman  -Continue bisoprolol as above      A total of 41 minutes of time was spent on day of encounter reviewing medical record, performing history and examination, counseling, ordering medication/test/consults and documentation.      All of th chrissy's excellent questions were answered to the best of my knowledge and to her satisfaction.  It was a pleasure seeing Ms. Aleshia Crooks in my clinic today. Return in about 4 months (around 10/20/2024).  Patient is aware to call the cardiology clinic with any questions or concerns.      Jacquelin Rehman MD  [facility], [address]  [address] [address]  Phone: [phone]  Fax: [phone]

## 2024-07-18 ENCOUNTER — OFFICE VISIT (OUTPATIENT)
Dept: SLEEP MEDICINE | Facility: MEDICAL CENTER | Age: 84
End: 2024-07-18
Payer: MEDICARE

## 2024-07-18 VITALS
HEIGHT: 62 IN | OXYGEN SATURATION: 93 % | BODY MASS INDEX: 32.39 KG/M2 | DIASTOLIC BLOOD PRESSURE: 78 MMHG | SYSTOLIC BLOOD PRESSURE: 124 MMHG | WEIGHT: 176 LBS | HEART RATE: 74 BPM

## 2024-07-18 DIAGNOSIS — G47.31 COMPLEX SLEEP APNEA SYNDROME: ICD-10-CM

## 2024-07-18 PROCEDURE — 99213 OFFICE O/P EST LOW 20 MIN: CPT

## 2024-07-18 PROCEDURE — 3074F SYST BP LT 130 MM HG: CPT

## 2024-07-18 PROCEDURE — 3078F DIAST BP <80 MM HG: CPT

## 2024-07-18 ASSESSMENT — FIBROSIS 4 INDEX: FIB4 SCORE: 3.73

## 2024-07-23 ENCOUNTER — HOSPITAL ENCOUNTER (OUTPATIENT)
Dept: RADIOLOGY | Facility: MEDICAL CENTER | Age: 84
End: 2024-07-23
Attending: NURSE PRACTITIONER
Payer: MEDICARE

## 2024-07-23 DIAGNOSIS — R09.89 DIMINISHED PULSES IN LOWER EXTREMITY: ICD-10-CM

## 2024-07-23 PROCEDURE — 93922 UPR/L XTREMITY ART 2 LEVELS: CPT

## 2024-08-01 ENCOUNTER — APPOINTMENT (OUTPATIENT)
Dept: DERMATOLOGY | Facility: IMAGING CENTER | Age: 84
End: 2024-08-01
Payer: MEDICARE

## 2024-08-01 DIAGNOSIS — Z12.83 SKIN CANCER SCREENING: ICD-10-CM

## 2024-08-01 DIAGNOSIS — D22.9 NEVUS: ICD-10-CM

## 2024-08-01 DIAGNOSIS — R60.0 LEG EDEMA: ICD-10-CM

## 2024-08-01 DIAGNOSIS — L28.1 PICKER'S NODULES: ICD-10-CM

## 2024-08-01 DIAGNOSIS — L81.4 LENTIGO: ICD-10-CM

## 2024-08-01 DIAGNOSIS — L82.1 SEBORRHEIC KERATOSIS: ICD-10-CM

## 2024-08-01 DIAGNOSIS — Z85.820 HX OF MELANOMA OF SKIN: ICD-10-CM

## 2024-08-01 DIAGNOSIS — L90.8 SKIN AGING: ICD-10-CM

## 2024-08-01 DIAGNOSIS — Z79.899 HIGH RISK MEDICATION USE: ICD-10-CM

## 2024-08-01 DIAGNOSIS — L40.9 PSORIASIS: ICD-10-CM

## 2024-08-01 DIAGNOSIS — L29.9 ITCHING: ICD-10-CM

## 2024-08-01 PROCEDURE — 99213 OFFICE O/P EST LOW 20 MIN: CPT | Performed by: DERMATOLOGY

## 2024-08-01 NOTE — PROGRESS NOTES
CC:  Follow up CARL     Subjective: prev seen patient follow here for 6 mth full skin exam  Had an infection on legs and has cleared up through wound care - had been seen 3 times to get assistance and now treating on own. 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 June 2023 - r arm - SK/LSC  History of blistering/severe sunburns:Yes, Details: .  Family history of skin cancer:No  Family history of atypical moles:No    ROS: no fevers/chills. +/- itch.  No cough. Had had weight loss/gain - now stable. Denies LAD. Denies night sweats  DermPMH: hx melanoma left shoulder, 2014? Per records  Relevant PMH: many med comorbidities.  GONZALEZ, CHF- chronic leg edema and elevated BNP, kidney Dz, hypothyroidism  Social: former smoker; denies travel abroad.  Daughter with well water which she doesn't drink due to taste.  FmHx:  not affected    PE: Gen:WDWN female in NAD.  Skin:Scalp/face/eyes/lips/neck/chest/back/arms/legs/hands/feet/buttocks - examined  Genitals exam declined  -marks of excoriation few on upper shoulders/arms/back.   -scattered hyperpigmented macules/papules, appearing benign on torso and extremities  -superficial erosions on lower left leg, consistent with healing abrasions, not appearing infected today  -Edema of lower legs, pitted. Left greater than right with scar longitudinal down length of lower leg visible    SCI Prior path - see scans = spongiotic dermatitis + eosinophils. Consider ACD, Nummular dermatitis or drug reaction    Labs: Hep B/C (Ab X5) April 2020 - WNL  Quantiferon negative - June 2022, Aug 2023  O&P - negative    A/P: PSO: Papulosquamous rash, prior path showing spongiotic dermatitis + eosinophils.  Suspicion for drug reaction and possible MTX intolerance: kidney dz and elevated Cr after test dose.  Steroid responsive by patient report.   Cannot exclude PSO; Improved on Cosentyx, tolerating well but flares when stopped or dosing extended.   -cont cosentyx 300mg SubCut Qmonth, se reviewed  -cont current home supply topicals - Lidex cream vs betamethasone oint BID severe sites and TAC oint BID less severe sites PRN / itching. Declines rf today    High risk medication: monitoring for safety prior to therapeutic doses:  -TB test next due Summer 2024    Hx of skin cancer: NER - left arm melanoma  -cont'd sunprotection and skin cancer surveillance  -Q 6mo-annual exam recommended; f/u suspicious lesions PRN    Lentigo/SKs/nevi, back:  -b/r    Leg edema: prev reviewed signs/sx of DVT and imaging to detect.   Advised in abrasions wound care and anticipated healing.  Advised to seek emergent care if noting increased swelling/pain/warmth, fevers          I have reviewed medications relevant to my specialty.

## 2024-08-08 ENCOUNTER — HOME STUDY (OUTPATIENT)
Dept: SLEEP MEDICINE | Facility: MEDICAL CENTER | Age: 84
End: 2024-08-08
Payer: MEDICARE

## 2024-08-08 DIAGNOSIS — G47.31 COMPLEX SLEEP APNEA SYNDROME: ICD-10-CM

## 2024-08-08 PROCEDURE — 94762 N-INVAS EAR/PLS OXIMTRY CONT: CPT | Performed by: INTERNAL MEDICINE

## 2024-08-14 NOTE — PROCEDURES
Overnight oximetry  Total analyzed time 8 hours  Study done on IV naps EPAP of 16 SB 4-13 with 2 L of oxygen bleed    Basal saturation 92.5%  Events less than 88% 76  Total time less than 88% 81 minutes  Minimum O2 70%  Average pulse rate 62 bpm    Impression  Additional oxygen needed at night with AVAPS mode versus review AVAPS download if adequate tidal volume it does appear it was a portion of the night but there was marked a saturation assumed it was either positional or during REM sleep that is hence additional O2 may be adequate

## 2024-09-20 NOTE — PROGRESS NOTES
[facility] Follow-up Visit    Date of Visit: 7/18/2024     CC:  Follow-up for SHAKA management      HPI:  Aleshia Crooks is a very pleasant 84 y.o. 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 Last seen in the office on 7/18/2024 with myself.     Patient presents for compliance.  OPO on 2 LPM showed saturations less than 88% for 81 minutes and got as low as 70%.  Patient states that she will have daytime drowsiness and occasional dry mouth.  She denies any significant morning headaches, drowsiness while driving, issues falling asleep, snoring, gasping of apneas, palpitations, aphasia, sleep, or skin irritation.  Patient will go to bed 11 PM and wake up at 7 AM.  She will have 1 awakening at night which will take several hours to fall back asleep.  She will nap on occasion for about 30 minutes.    DME provider: [employer]   Device: [employer] 10 ST-A  Settings: iVAPS Target VA: 5.9, Tgt AL: 15, EPAP: 16, PS: 4/13, Height: 155, Rise Time: 300, Ti: 2/ 0.3, Trigger: very high, and Cycle: medium.    Oxygen: 2 LPM  When: May 2024  Mask: Nasal mask  Chin strap: No     Cleaning regimen: Once a day with wipes    Compliance:  Compliance data reviewed showing 97% usage > 4hours in last 30 days. Average AHI 22.5 events/hour. 95% leaks 27.9 L/min. Patient continues to use and benefit from machine.        Sleep History:  PSG titration 1/24/2024-      PSG titration iVAPS 3/28/2024-        Patient Active Problem List    Diagnosis Date Noted    Complex sleep apnea syndrome 03/12/2020    Nocturnal hypoxia 04/18/2024    Severe tricuspid regurgitation by prior echocardiogram 04/24/2024    2+ pitting edema 04/24/2024     Cellulitis of both lower extremities 04/24/2024    Supraumbilical hernia 01/24/2024    Skin induration 01/24/2024    S/P TAVR (transcatheter aortic valve replacement) 11/06/2023    Diverticulosis 10/02/2023    Spondylolysis 10/02/2023    Pulmonary nodules 10/02/2023    Psoriasis 07/27/2023    Anemia of chronic disease 05/09/2023    S/P mitral valve clip implantation 04/18/2023    H/O: GI bleed 04/10/2023    Contraindication to anticoagulation therapy 04/10/2023    Situational stress 10/27/2021    Falls 03/01/2021    Stress incontinence 03/01/2021    CKD (chronic kidney disease) stage 4, GFR 15-29 ml/min ([facility]) 09/11/2020    Gastroesophageal reflux disease without esophagitis 06/08/2020    Mixed hyperlipidemia 04/17/2020    Hepatic steatosis 04/17/2020    Elevated alkaline phosphatase level 04/17/2020    Iron deficiency anemia due to chronic blood loss 04/08/2020    History of malignant melanoma 03/12/2020    Skin lesions 03/12/2020    Essential hypertension 03/12/2020    Hypothyroid 03/12/2020    Obesity, Class II, BMI 35-39.9 03/12/2020    Severe mitral regurgitation 07/14/2017    Moderate to severe pulmonary hypertension (HCC) 07/14/2017    Chronic combined systolic and diastolic congestive heart failure (HCC) 07/14/2017    Longstanding persistent atrial fibrillation (HCC) 07/14/2017    Coronary artery disease involving native coronary artery of native heart without angina pectoris 07/14/2017    Situational anxiety 07/13/2017     Past Medical History:   Diagnosis Date    Anemia     Apnea, sleep     cpap    Atrial fibrillation (HCC)     Breath shortness     Cataract     fede IOL    CKD (chronic kidney disease) stage 4, GFR 15-29 ml/min (HCC)     Congestive heart failure (HCC)     Dental disorder     full dentures    Gasping for breath     Heart attack (HCC) 1992    Followed by Dr. Garcia    Heart murmur     Hemorrhagic disorder (HCC)     takes warfarin    High cholesterol     Hypertension     Iron deficiency  anemia     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) (HCC) 03/25/2021    Malignant melanoma of left upper extremity including shoulder (HCC) 06/08/2020 2015    Moderate tricuspid regurgitation 11/16/2022    Pneumonia 2019    Psychiatric problem     Anxiety    Snoring     Thyroid disease       Past Surgical History:   Procedure Laterality Date    TRANSCATHETER AORTIC VALVE REPLACEMENT Bilateral 11/6/2023    Procedure: TRANSCATHETER AORTIC VALVE REPLACEMENT;  Surgeon: Cesar Gray M.D.;  Location: SURGERY [facility];  Service: Cardiac    ECHOCARDIOGRAM, TRANSESOPHAGEAL, INTRAOPERATIVE N/A 11/6/2023    Procedure: ECHOCARDIOGRAM, TRANSESOPHAGEAL, INTRAOPERATIVE;  Surgeon: Cesar Gray M.D.;  Location: SURGERY [facility];  Service: Cardiac    TRANSCATHETER MITRAL VALVE REPAIR N/A 04/17/2023    Procedure: TRANSCATHETER MITRAL VALVE PROCEDURE;  Surgeon: Cesar Gray M.D.;  Location: SURGERY [facility];  Service: Cardiac    ECHOCARDIOGRAM, TRANSESOPHAGEAL, INTRAOPERATIVE N/A 04/17/2023    Procedure: ECHOCARDIOGRAM, TRANSESOPHAGEAL, INTRAOPERATIVE;  Surgeon: Cesar Gray M.D.;  Location: SURGERY [facility];  Service: Cardiac    NC COLONOSCOPY,DIAGNOSTIC N/A 01/24/2023    Procedure: COLONOSCOPY;  Surgeon: Amy Zarate M.D.;  Location: SURGERY SAME DAY [facility];  Service: Gastroenterology    NC UPPER GI ENDOSCOPY,DIAGNOSIS N/A 01/24/2023    Procedure: GASTROSCOPY;  Surgeon: Amy Zarate M.D.;  Location: SURGERY SAME DAY [facility];  Service: Gastroenterology    NC UPPER GI ENDOSCOPY,BIOPSY N/A 01/24/2023    Procedure: GASTROSCOPY, WITH BIOPSY;  Surgeon: Amy Zarate M.D.;  Location: SURGERY SAME DAY [facility];  Service: Gastroenterology    CHOLECYSTECTOMY      EYE SURGERY Bilateral     cataracts    MULTIPLE CORONARY ARTERY BYPASS      1 bypass    THYROIDECTOMY TOTAL      TONSILLECTOMY       Family History   Problem Relation Age of Onset    Cancer Mother          cancer, smoker    Stroke Maternal Grandmother     Other Other         Crohn    Kidney Disease Other         kidney transplant     Social History     Socioeconomic History    Marital status:      Spouse name: Not on file    Number of children: Not on file    Years of education: Not on file    Highest education level: Not on file   Occupational History     Comment: [employer]   Tobacco Use    Smoking status: Former     Current packs/day: 0.00     Average packs/day: 1 pack/day for 31.0 years (31.0 ttl pk-yrs)     Types: Cigarettes     Start date: 5/10/1961     Quit date: 5/10/1992     Years since quittin.4     Passive exposure: Yes    Smokeless tobacco: Never    Tobacco comments:     Started smoking of  do not remember month or day   Vaping Use    Vaping status: Never Used   Substance and Sexual Activity    Alcohol use: No    Drug use: No    Sexual activity: Not Currently     Partners: Male   Other Topics Concern    Not on file   Social History Narrative    Not on file     Social Determinants of Health     Financial Resource Strain: Low Risk  (2022)    Overall Financial Resource Strain (CARDIA)     Difficulty of Paying Living Expenses: Not hard at all   Food Insecurity: No Food Insecurity (2022)    Hunger Vital Sign     Worried About Running Out of Food in the Last Year: Never true     Ran Out of Food in the Last Year: Never true   Transportation Needs: No Transportation Needs (2022)    PRAPARE - Transportation     Lack of Transportation (Medical): No     Lack of Transportation (Non-Medical): No   Physical Activity: Inactive (2022)    Exercise Vital Sign     Days of Exercise per Week: 0 days     Minutes of Exercise per Session: 0 min   Stress: No Stress Concern Present (2022)    [employer] - Occupational Stress Questionnaire     Feeling of Stress : Not at all   Social Connections: Socially Isolated (2022)    Social Connection and  Isolation Panel [NHANES]     Frequency of Communication with Friends and Family: More than three times a week     Frequency of Social Gatherings with Friends and Family: Once a week     Attends Adventism Services: Never     Active Member of Clubs or Organizations: No     Attends Club or Organization Meetings: Never     Marital Status:    Intimate Partner Violence: Not At Risk (11/11/2022)    Humiliation, Afraid, Rape, and Kick questionnaire     Fear of Current or Ex-Partner: No     Emotionally Abused: No     Physically Abused: No     Sexually Abused: No   Housing Stability: Low Risk  (11/11/2022)    Housing Stability Vital Sign     Unable to Pay for Housing in the Last Year: No     Number of Places Lived in the Last Year: 1     Unstable Housing in the Last Year: No     Current Outpatient Medications   Medication Sig Dispense Refill    furosemide (LASIX) 40 MG Tab Take 2 Tablets by mouth every day. 180 Tablet 3    pantoprazole (PROTONIX) 40 MG Tablet Delayed Response Take 1 Tablet by mouth 2 times a day. 180 Tablet 3    bisoprolol (ZEBETA) 10 MG tablet Take 1 Tablet by mouth every morning. 90 Tablet 3    LEVOXYL 200 MCG Tab Take 1 Tablet by mouth every morning on an empty stomach. 90 Tablet 3    atorvastatin (LIPITOR) 40 MG Tab TAKE 1 TABLET BY MOUTH AT  BEDTIME 90 Tablet 4    Garlic 2 MG Cap Take  by mouth.      losartan (COZAAR) 25 MG Tab Take 1 Tablet by mouth every evening. 100 Tablet 3    CINNAMON PO Take  by mouth 2 (two) times a day.      aspirin 81 MG EC tablet Take 1 Tablet by mouth every day. 100 Tablet 3    Secukinumab (COSENTYX SENSOREADY PEN) 150 MG/ML Solution Auto-injector Inject 300mg Sub Cut once monthly (Patient taking differently: Inject 300 mg under the skin every 28 days. Indications: Psoriasis) 1.96 mL 5    Cholecalciferol (VITAMIN D) 125 MCG (5000 UT) Cap Take 5,000 Units by mouth every Monday, Wednesday, and Friday.      Vitamins-Lipotropics (MULTI-VITAMIN HP/MINERALS) Cap Take 1  "Capsule by mouth every evening.      acetaminophen (TYLENOL) 500 MG Tab Take 500-1,000 mg by mouth every 6 hours as needed.       No current facility-administered medications for this visit.      ALLERGIES: Morphine    ROS:  Constitutional: Denies fever, chills, sweats,  weight loss, fatigue  Cardiovascular: Denies chest pain, tightness, palpitations, swelling in legs/feet  Respiratory: Denies shortness of breath, cough, sputum, wheezing, painful breathing   Sleep: per HPI  Gastrointestinal: Denies  difficulty swallowing, nausea, abdominal pain, diarrhea, constipation, heartburn.  Musculoskeletal: Denies painful joints, sore muscles,       PHYSICAL EXAM:  /72 (BP Location: Right arm, Patient Position: Sitting, BP Cuff Size: Adult)   Pulse (!) 59   Ht 1.575 m (5' 2\")   Wt 81.6 kg (180 lb)   LMP  (LMP Unknown)   SpO2 94%   BMI 32.92 kg/m²   Appearance: Well-nourished, well-developed, no acute distress  Eyes:  No scleral icterus , EOMI  ENMT: No redness of the oropharynx  Lung auscultation:  No wheezes rhonchi rubs or rales  Cardiac: No murmurs, rubs, or gallops; regular rhythm, normal rate; no edema  Musculoskeletal:  Grossly normal; gait and station normal; digits and nails normal  Skin:  No rashes, petechiae, cyanosis  Neurologic: without focal signs; oriented to person, time, place, and purpose; judgement intact  Psychiatric:  No depression, anxiety, agitation  Mallampati score: Class III    Assessment and Plan:    The medical record was reviewed.    Diagnostic and titration nocturnal polysomnogram's, home sleep apnea tests, continuous nocturnal oximetry results, multiple sleep latency tests, and compliance reports reviewed.    Problem List Items Addressed This Visit          Pulmonary/Sleep Medicine Problems    Complex sleep apnea syndrome     Sleep Apnea:    The pathophysiology of sleep anea and the increased risk of cardiovascular morbidity from untreated sleep apnea is discussed in detail with the " patient.  Urged to avoid supine sleep, weight gain and alcoholic beverages since all of these can worsen sleep apnea. Cautioned against drowsy driving. If feeling sleepy while driving, pull over for a break/nap, rather than persist on the road, in the interest of own safety and that of others on the road.  The risks of untreated sleep apnea were discussed with the patient at length. Patients with sleep apnea are at increased risk of cardiovascular disease including coronary artery disease, systemic arterial hypertension, pulmonary arterial hypertension, cardiac arrythmias, and stroke.  Positive airway pressure will favorably impact many of the adverse conditions and effects provoked by sleep apnea.    Plan:    Compliance download was reviewed and discussed with the patient.  Patient is compliant, but unfortunately her AHI is still uncontrolled and is symptomatic.  OPO was also significantly positive on 2 LPM of oxygen for hypoxia with the iVAPS machine.  We will send her back for a titration study and see her back in 3 weeks for follow-up to review results.    - PSG titration- iVAPS  - Compliance was reinforced  - Clean supplies a least once a week with dish soap and water and air dry  - Recommended the patient against the use of Ozone , such as SoClean  - Recommended the patient change out supplies as recommended for best mask fit and usage of the machine  - Equipment replacement schedule:  Mask cushion every month  Nasal pillows 2 times per month  Mask every 6 months  Head gear every 6 months  Tubing every 3 months  Ultra-fine filters 2 times per month  Foam filter every 6 months  Humidifier chamber every 6 months  Chin strap every 6 months    Has been advised to continue the current iVAPS, clean equipment frequently, and get new mask and supplies as allowed by insurance and DME. Recommend an earlier appointment, if significant treatment barriers develop.    Advised patient to reach out via [facility] [place] [employer] ([facility]: [place])t if any  questions or concerns should arise.          Relevant Orders    Polysomnography Titration     Other Visit Diagnoses       Need for vaccination        Relevant Orders    Influenza Vaccine, High Dose (65+ Only)          Have advised the patient to follow up with the appropriate healthcare practitioners for all other medical problems and issues.    Return in about 3 weeks (around 10/16/2024), or if symptoms worsen or fail to improve, for PSG/SS results, with Man.    Please note portions of this record was created using voice recognition software. I have made every reasonable attempt to correct obvious errors, but I expect that there are errors of grammar and possibly content I did not discover before finalizing the note.    Time spent in record review prior to patient arrival, reviewing results, and in face-to-face encounter totaled 20 min.  __________  ARIANNA Hernandez  Pulmonary & Sleep Medicine  [facility]

## 2024-09-25 ENCOUNTER — OFFICE VISIT (OUTPATIENT)
Dept: SLEEP MEDICINE | Facility: MEDICAL CENTER | Age: 84
End: 2024-09-25
Payer: MEDICARE

## 2024-09-25 VITALS
HEART RATE: 59 BPM | BODY MASS INDEX: 33.13 KG/M2 | HEIGHT: 62 IN | SYSTOLIC BLOOD PRESSURE: 118 MMHG | OXYGEN SATURATION: 94 % | WEIGHT: 180 LBS | DIASTOLIC BLOOD PRESSURE: 72 MMHG

## 2024-09-25 DIAGNOSIS — G47.31 COMPLEX SLEEP APNEA SYNDROME: ICD-10-CM

## 2024-09-25 DIAGNOSIS — Z23 NEED FOR VACCINATION: ICD-10-CM

## 2024-09-25 PROCEDURE — 3078F DIAST BP <80 MM HG: CPT

## 2024-09-25 PROCEDURE — 3074F SYST BP LT 130 MM HG: CPT

## 2024-09-25 PROCEDURE — 90471 IMMUNIZATION ADMIN: CPT

## 2024-09-25 PROCEDURE — 99213 OFFICE O/P EST LOW 20 MIN: CPT

## 2024-09-25 ASSESSMENT — FIBROSIS 4 INDEX: FIB4 SCORE: 3.73

## 2024-09-25 NOTE — ASSESSMENT & PLAN NOTE
Sleep Apnea:    The pathophysiology of sleep anea and the increased risk of cardiovascular morbidity from untreated sleep apnea is discussed in detail with the patient.  Urged to avoid supine sleep, weight gain and alcoholic beverages since all of these can worsen sleep apnea. Cautioned against drowsy driving. If feeling sleepy while driving, pull over for a break/nap, rather than persist on the road, in the interest of own safety and that of others on the road.  The risks of untreated sleep apnea were discussed with the patient at length. Patients with sleep apnea are at increased risk of cardiovascular disease including coronary artery disease, systemic arterial hypertension, pulmonary arterial hypertension, cardiac arrythmias, and stroke.  Positive airway pressure will favorably impact many of the adverse conditions and effects provoked by sleep apnea.    Plan:    Compliance download was reviewed and discussed with the patient.  Patient is compliant, but unfortunately her AHI is still uncontrolled and is symptomatic.  OPO was also significantly positive on 2 LPM of oxygen for hypoxia with the iVAPS machine.  We will send her back for a titration study and see her back in 3 weeks for follow-up to review results.    - PSG titration- iVAPS  - Compliance was reinforced  - Clean supplies a least once a week with dish soap and water and air dry  - Recommended the patient against the use of Ozone , such as SoClean  - Recommended the patient change out supplies as recommended for best mask fit and usage of the machine  - Equipment replacement schedule:  Mask cushion every month  Nasal pillows 2 times per month  Mask every 6 months  Head gear every 6 months  Tubing every 3 months  Ultra-fine filters 2 times per month  Foam filter every 6 months  Humidifier chamber every 6 months  Chin strap every 6 months    Has been advised to continue the current iVAPS, clean equipment frequently, and get new mask and supplies  as allowed by insurance and DME. Recommend an earlier appointment, if significant treatment barriers develop.    Advised patient to reach out via [email or web address] if any questions or concerns should arise.

## 2024-10-03 ENCOUNTER — OFFICE VISIT (OUTPATIENT)
Dept: CARDIOLOGY | Facility: MEDICAL CENTER | Age: 84
End: 2024-10-03
Attending: INTERNAL MEDICINE
Payer: MEDICARE

## 2024-10-03 VITALS
OXYGEN SATURATION: 93 % | HEART RATE: 75 BPM | SYSTOLIC BLOOD PRESSURE: 98 MMHG | WEIGHT: 180 LBS | BODY MASS INDEX: 33.13 KG/M2 | HEIGHT: 62 IN | DIASTOLIC BLOOD PRESSURE: 56 MMHG | RESPIRATION RATE: 16 BRPM

## 2024-10-03 DIAGNOSIS — Z95.2 S/P TAVR (TRANSCATHETER AORTIC VALVE REPLACEMENT): ICD-10-CM

## 2024-10-03 DIAGNOSIS — I27.20 MODERATE TO SEVERE PULMONARY HYPERTENSION (HCC): ICD-10-CM

## 2024-10-03 DIAGNOSIS — Z95.818 S/P MITRAL VALVE CLIP IMPLANTATION: ICD-10-CM

## 2024-10-03 DIAGNOSIS — Z98.890 S/P MITRAL VALVE CLIP IMPLANTATION: ICD-10-CM

## 2024-10-03 PROBLEM — I34.0 SEVERE MITRAL REGURGITATION: Status: RESOLVED | Noted: 2017-07-14 | Resolved: 2024-10-03

## 2024-10-03 PROCEDURE — 99214 OFFICE O/P EST MOD 30 MIN: CPT | Performed by: INTERNAL MEDICINE

## 2024-10-03 PROCEDURE — 3074F SYST BP LT 130 MM HG: CPT | Performed by: INTERNAL MEDICINE

## 2024-10-03 PROCEDURE — 99213 OFFICE O/P EST LOW 20 MIN: CPT | Performed by: INTERNAL MEDICINE

## 2024-10-03 PROCEDURE — 3078F DIAST BP <80 MM HG: CPT | Performed by: INTERNAL MEDICINE

## 2024-10-03 ASSESSMENT — FIBROSIS 4 INDEX: FIB4 SCORE: 3.73

## 2024-10-09 ENCOUNTER — DOCUMENTATION (OUTPATIENT)
Dept: CARDIOLOGY | Facility: MEDICAL CENTER | Age: 84
End: 2024-10-09
Payer: MEDICARE

## 2024-10-09 ENCOUNTER — SLEEP STUDY (OUTPATIENT)
Dept: SLEEP MEDICINE | Facility: MEDICAL CENTER | Age: 84
End: 2024-10-09
Payer: MEDICARE

## 2024-10-09 DIAGNOSIS — G47.39 COMPLEX SLEEP APNEA SYNDROME: ICD-10-CM

## 2024-10-09 PROCEDURE — 95811 POLYSOM 6/>YRS CPAP 4/> PARM: CPT | Performed by: STUDENT IN AN ORGANIZED HEALTH CARE EDUCATION/TRAINING PROGRAM

## 2024-10-18 DIAGNOSIS — Z87.19 H/O: UPPER GI BLEED: ICD-10-CM

## 2024-10-18 RX ORDER — PANTOPRAZOLE SODIUM 40 MG/1
40 TABLET, DELAYED RELEASE ORAL 2 TIMES DAILY
Qty: 180 TABLET | Refills: 3 | Status: ON HOLD | OUTPATIENT
Start: 2024-10-18 | End: 2024-10-28

## 2024-10-21 ENCOUNTER — HOSPITAL ENCOUNTER (OUTPATIENT)
Dept: LAB | Facility: MEDICAL CENTER | Age: 84
End: 2024-10-21
Attending: STUDENT IN AN ORGANIZED HEALTH CARE EDUCATION/TRAINING PROGRAM
Payer: MEDICARE

## 2024-10-21 ENCOUNTER — OFFICE VISIT (OUTPATIENT)
Dept: CARDIOLOGY | Facility: MEDICAL CENTER | Age: 84
End: 2024-10-21
Attending: STUDENT IN AN ORGANIZED HEALTH CARE EDUCATION/TRAINING PROGRAM
Payer: MEDICARE

## 2024-10-21 VITALS
BODY MASS INDEX: 32.76 KG/M2 | RESPIRATION RATE: 16 BRPM | WEIGHT: 178 LBS | OXYGEN SATURATION: 95 % | DIASTOLIC BLOOD PRESSURE: 52 MMHG | HEART RATE: 80 BPM | HEIGHT: 62 IN | SYSTOLIC BLOOD PRESSURE: 102 MMHG

## 2024-10-21 DIAGNOSIS — I27.20 PULMONARY HYPERTENSION (HCC): ICD-10-CM

## 2024-10-21 DIAGNOSIS — I48.20 CHRONIC A-FIB (HCC): ICD-10-CM

## 2024-10-21 DIAGNOSIS — I50.9 CHF (NYHA CLASS II, ACC/AHA STAGE C) (HCC): ICD-10-CM

## 2024-10-21 DIAGNOSIS — I25.83 CORONARY ARTERY DISEASE DUE TO LIPID RICH PLAQUE: ICD-10-CM

## 2024-10-21 DIAGNOSIS — I10 ESSENTIAL HYPERTENSION: ICD-10-CM

## 2024-10-21 DIAGNOSIS — Z95.818 PRESENCE OF WATCHMAN LEFT ATRIAL APPENDAGE CLOSURE DEVICE: ICD-10-CM

## 2024-10-21 DIAGNOSIS — Z95.2 S/P TAVR (TRANSCATHETER AORTIC VALVE REPLACEMENT): ICD-10-CM

## 2024-10-21 DIAGNOSIS — Z98.890 S/P MITRAL VALVE CLIP IMPLANTATION: ICD-10-CM

## 2024-10-21 DIAGNOSIS — I50.32 CHRONIC HEART FAILURE WITH PRESERVED EJECTION FRACTION (HCC): ICD-10-CM

## 2024-10-21 DIAGNOSIS — Z95.818 S/P MITRAL VALVE CLIP IMPLANTATION: ICD-10-CM

## 2024-10-21 DIAGNOSIS — I25.10 CORONARY ARTERY DISEASE DUE TO LIPID RICH PLAQUE: ICD-10-CM

## 2024-10-21 DIAGNOSIS — K92.1 BLACK STOOL: ICD-10-CM

## 2024-10-21 DIAGNOSIS — R06.02 SHORTNESS OF BREATH: ICD-10-CM

## 2024-10-21 DIAGNOSIS — Z95.1 HX OF CABG: ICD-10-CM

## 2024-10-21 LAB
ALBUMIN SERPL BCP-MCNC: 4.1 G/DL (ref 3.2–4.9)
ALBUMIN/GLOB SERPL: 1.5 G/DL
ALP SERPL-CCNC: 128 U/L (ref 30–99)
ALT SERPL-CCNC: 22 U/L (ref 2–50)
ANION GAP SERPL CALC-SCNC: 16 MMOL/L (ref 7–16)
AST SERPL-CCNC: 28 U/L (ref 12–45)
BASOPHILS # BLD AUTO: 0.7 % (ref 0–1.8)
BASOPHILS # BLD: 0.05 K/UL (ref 0–0.12)
BILIRUB SERPL-MCNC: 0.8 MG/DL (ref 0.1–1.5)
BUN SERPL-MCNC: 55 MG/DL (ref 8–22)
CALCIUM ALBUM COR SERPL-MCNC: 9.4 MG/DL (ref 8.5–10.5)
CALCIUM SERPL-MCNC: 9.5 MG/DL (ref 8.5–10.5)
CHLORIDE SERPL-SCNC: 101 MMOL/L (ref 96–112)
CO2 SERPL-SCNC: 22 MMOL/L (ref 20–33)
CREAT SERPL-MCNC: 2.14 MG/DL (ref 0.5–1.4)
EOSINOPHIL # BLD AUTO: 0.16 K/UL (ref 0–0.51)
EOSINOPHIL NFR BLD: 2.4 % (ref 0–6.9)
ERYTHROCYTE [DISTWIDTH] IN BLOOD BY AUTOMATED COUNT: 59.3 FL (ref 35.9–50)
GFR SERPLBLD CREATININE-BSD FMLA CKD-EPI: 22 ML/MIN/1.73 M 2
GLOBULIN SER CALC-MCNC: 2.7 G/DL (ref 1.9–3.5)
GLUCOSE SERPL-MCNC: 106 MG/DL (ref 65–99)
HCT VFR BLD AUTO: 23.1 % (ref 37–47)
HGB BLD-MCNC: 7 G/DL (ref 12–16)
IMM GRANULOCYTES # BLD AUTO: 0.03 K/UL (ref 0–0.11)
IMM GRANULOCYTES NFR BLD AUTO: 0.4 % (ref 0–0.9)
LYMPHOCYTES # BLD AUTO: 0.65 K/UL (ref 1–4.8)
LYMPHOCYTES NFR BLD: 9.6 % (ref 22–41)
MCH RBC QN AUTO: 28.2 PG (ref 27–33)
MCHC RBC AUTO-ENTMCNC: 30.3 G/DL (ref 32.2–35.5)
MCV RBC AUTO: 93.1 FL (ref 81.4–97.8)
MONOCYTES # BLD AUTO: 0.72 K/UL (ref 0–0.85)
MONOCYTES NFR BLD AUTO: 10.6 % (ref 0–13.4)
NEUTROPHILS # BLD AUTO: 5.19 K/UL (ref 1.82–7.42)
NEUTROPHILS NFR BLD: 76.3 % (ref 44–72)
NRBC # BLD AUTO: 0 K/UL
NRBC BLD-RTO: 0 /100 WBC (ref 0–0.2)
PLATELET # BLD AUTO: 294 K/UL (ref 164–446)
PMV BLD AUTO: 11.2 FL (ref 9–12.9)
POTASSIUM SERPL-SCNC: 3.8 MMOL/L (ref 3.6–5.5)
PROT SERPL-MCNC: 6.8 G/DL (ref 6–8.2)
RBC # BLD AUTO: 2.48 M/UL (ref 4.2–5.4)
SODIUM SERPL-SCNC: 139 MMOL/L (ref 135–145)
WBC # BLD AUTO: 6.8 K/UL (ref 4.8–10.8)

## 2024-10-21 PROCEDURE — 3078F DIAST BP <80 MM HG: CPT | Performed by: STUDENT IN AN ORGANIZED HEALTH CARE EDUCATION/TRAINING PROGRAM

## 2024-10-21 PROCEDURE — 80053 COMPREHEN METABOLIC PANEL: CPT

## 2024-10-21 PROCEDURE — 85025 COMPLETE CBC W/AUTO DIFF WBC: CPT

## 2024-10-21 PROCEDURE — 99215 OFFICE O/P EST HI 40 MIN: CPT | Performed by: STUDENT IN AN ORGANIZED HEALTH CARE EDUCATION/TRAINING PROGRAM

## 2024-10-21 PROCEDURE — 36415 COLL VENOUS BLD VENIPUNCTURE: CPT

## 2024-10-21 PROCEDURE — 3074F SYST BP LT 130 MM HG: CPT | Performed by: STUDENT IN AN ORGANIZED HEALTH CARE EDUCATION/TRAINING PROGRAM

## 2024-10-21 PROCEDURE — 99213 OFFICE O/P EST LOW 20 MIN: CPT

## 2024-10-21 ASSESSMENT — ENCOUNTER SYMPTOMS
FOCAL WEAKNESS: 0
PND: 0
PALPITATIONS: 0
NEAR-SYNCOPE: 0
DIZZINESS: 0
VOMITING: 0
ORTHOPNEA: 0
NAUSEA: 0
SHORTNESS OF BREATH: 0
SYNCOPE: 0
NIGHT SWEATS: 0
WHEEZING: 0
DYSPNEA ON EXERTION: 0
IRREGULAR HEARTBEAT: 0
COUGH: 0
WEAKNESS: 0
ABDOMINAL PAIN: 0
DIARRHEA: 0
FEVER: 0

## 2024-10-21 ASSESSMENT — FIBROSIS 4 INDEX: FIB4 SCORE: 3.73

## 2024-10-22 ENCOUNTER — HOSPITAL ENCOUNTER (INPATIENT)
Facility: MEDICAL CENTER | Age: 84
LOS: 6 days | End: 2024-10-28
Attending: EMERGENCY MEDICINE | Admitting: HOSPITALIST
Payer: MEDICARE

## 2024-10-22 ENCOUNTER — TELEPHONE (OUTPATIENT)
Dept: CARDIOLOGY | Facility: MEDICAL CENTER | Age: 84
End: 2024-10-22
Payer: MEDICARE

## 2024-10-22 ENCOUNTER — ANESTHESIA EVENT (OUTPATIENT)
Dept: SURGERY | Facility: MEDICAL CENTER | Age: 84
End: 2024-10-22
Payer: MEDICARE

## 2024-10-22 DIAGNOSIS — K92.1 GASTROINTESTINAL HEMORRHAGE WITH MELENA: ICD-10-CM

## 2024-10-22 DIAGNOSIS — N28.9 ACUTE RENAL INSUFFICIENCY: ICD-10-CM

## 2024-10-22 DIAGNOSIS — R19.5 OCCULT BLOOD IN STOOLS: ICD-10-CM

## 2024-10-22 DIAGNOSIS — D64.9 ANEMIA, UNSPECIFIED TYPE: ICD-10-CM

## 2024-10-22 DIAGNOSIS — R06.02 SOB (SHORTNESS OF BREATH): ICD-10-CM

## 2024-10-22 PROBLEM — N17.9 AKI (ACUTE KIDNEY INJURY) (HCC): Status: ACTIVE | Noted: 2024-10-22

## 2024-10-22 PROBLEM — K92.2 GI BLEED: Status: ACTIVE | Noted: 2024-10-22

## 2024-10-22 PROBLEM — I21.9 MI (MYOCARDIAL INFARCTION) (HCC): Status: ACTIVE | Noted: 2024-10-22

## 2024-10-22 LAB
ABO GROUP BLD: NORMAL
ALBUMIN SERPL BCP-MCNC: 4.1 G/DL (ref 3.2–4.9)
ALBUMIN/GLOB SERPL: 1.5 G/DL
ALP SERPL-CCNC: 134 U/L (ref 30–99)
ALT SERPL-CCNC: 20 U/L (ref 2–50)
ANION GAP SERPL CALC-SCNC: 15 MMOL/L (ref 7–16)
APTT PPP: 36.4 SEC (ref 24.7–36)
AST SERPL-CCNC: 26 U/L (ref 12–45)
BARCODED ABORH UBTYP: 600
BARCODED PRD CODE UBPRD: NORMAL
BARCODED UNIT NUM UBUNT: NORMAL
BASOPHILS # BLD AUTO: 0.6 % (ref 0–1.8)
BASOPHILS # BLD: 0.05 K/UL (ref 0–0.12)
BILIRUB SERPL-MCNC: 0.8 MG/DL (ref 0.1–1.5)
BLD GP AB SCN SERPL QL: NORMAL
BUN SERPL-MCNC: 56 MG/DL (ref 8–22)
CALCIUM ALBUM COR SERPL-MCNC: 9.4 MG/DL (ref 8.5–10.5)
CALCIUM SERPL-MCNC: 9.5 MG/DL (ref 8.5–10.5)
CHLORIDE SERPL-SCNC: 104 MMOL/L (ref 96–112)
CO2 SERPL-SCNC: 21 MMOL/L (ref 20–33)
COMPONENT R 8504R: NORMAL
CREAT SERPL-MCNC: 2.13 MG/DL (ref 0.5–1.4)
EKG IMPRESSION: NORMAL
EOSINOPHIL # BLD AUTO: 0.18 K/UL (ref 0–0.51)
EOSINOPHIL NFR BLD: 2.1 % (ref 0–6.9)
ERYTHROCYTE [DISTWIDTH] IN BLOOD BY AUTOMATED COUNT: 58.2 FL (ref 35.9–50)
FERRITIN SERPL-MCNC: 59.5 NG/ML (ref 10–291)
GFR SERPLBLD CREATININE-BSD FMLA CKD-EPI: 23 ML/MIN/1.73 M 2
GLOBULIN SER CALC-MCNC: 2.8 G/DL (ref 1.9–3.5)
GLUCOSE SERPL-MCNC: 129 MG/DL (ref 65–99)
HCT VFR BLD AUTO: 24.1 % (ref 37–47)
HGB BLD-MCNC: 7.3 G/DL (ref 12–16)
HGB BLD-MCNC: 7.5 G/DL (ref 12–16)
HGB RETIC QN AUTO: 26.9 PG/CELL (ref 29–35)
IMM GRANULOCYTES # BLD AUTO: 0.03 K/UL (ref 0–0.11)
IMM GRANULOCYTES NFR BLD AUTO: 0.4 % (ref 0–0.9)
IMM RETICS NFR: 29.3 % (ref 2.6–16.1)
INR PPP: 1.2 (ref 0.87–1.13)
IRON SATN MFR SERPL: 11 % (ref 15–55)
IRON SERPL-MCNC: 45 UG/DL (ref 40–170)
LIPASE SERPL-CCNC: 77 U/L (ref 11–82)
LYMPHOCYTES # BLD AUTO: 0.84 K/UL (ref 1–4.8)
LYMPHOCYTES NFR BLD: 9.9 % (ref 22–41)
MCH RBC QN AUTO: 28.8 PG (ref 27–33)
MCHC RBC AUTO-ENTMCNC: 31.1 G/DL (ref 32.2–35.5)
MCV RBC AUTO: 92.7 FL (ref 81.4–97.8)
MONOCYTES # BLD AUTO: 0.82 K/UL (ref 0–0.85)
MONOCYTES NFR BLD AUTO: 9.7 % (ref 0–13.4)
NEUTROPHILS # BLD AUTO: 6.56 K/UL (ref 1.82–7.42)
NEUTROPHILS NFR BLD: 77.3 % (ref 44–72)
NRBC # BLD AUTO: 0 K/UL
NRBC BLD-RTO: 0 /100 WBC (ref 0–0.2)
PLATELET # BLD AUTO: 320 K/UL (ref 164–446)
PMV BLD AUTO: 10.8 FL (ref 9–12.9)
POTASSIUM SERPL-SCNC: 4.5 MMOL/L (ref 3.6–5.5)
PRODUCT TYPE UPROD: NORMAL
PROT SERPL-MCNC: 6.9 G/DL (ref 6–8.2)
PROTHROMBIN TIME: 15.3 SEC (ref 12–14.6)
RBC # BLD AUTO: 2.6 M/UL (ref 4.2–5.4)
RETICS # AUTO: 0.14 M/UL (ref 0.04–0.12)
RETICS/RBC NFR: 5.4 % (ref 0.8–2.6)
RH BLD: NORMAL
SODIUM SERPL-SCNC: 140 MMOL/L (ref 135–145)
TIBC SERPL-MCNC: 398 UG/DL (ref 250–450)
UIBC SERPL-MCNC: 353 UG/DL (ref 110–370)
UNIT STATUS USTAT: NORMAL
WBC # BLD AUTO: 8.5 K/UL (ref 4.8–10.8)

## 2024-10-22 PROCEDURE — 85018 HEMOGLOBIN: CPT

## 2024-10-22 PROCEDURE — 83550 IRON BINDING TEST: CPT

## 2024-10-22 PROCEDURE — 700111 HCHG RX REV CODE 636 W/ 250 OVERRIDE (IP): Performed by: HOSPITALIST

## 2024-10-22 PROCEDURE — 36415 COLL VENOUS BLD VENIPUNCTURE: CPT

## 2024-10-22 PROCEDURE — 86901 BLOOD TYPING SEROLOGIC RH(D): CPT

## 2024-10-22 PROCEDURE — 99223 1ST HOSP IP/OBS HIGH 75: CPT | Mod: AI | Performed by: HOSPITALIST

## 2024-10-22 PROCEDURE — 85046 RETICYTE/HGB CONCENTRATE: CPT

## 2024-10-22 PROCEDURE — 93005 ELECTROCARDIOGRAM TRACING: CPT | Performed by: EMERGENCY MEDICINE

## 2024-10-22 PROCEDURE — 99222 1ST HOSP IP/OBS MODERATE 55: CPT | Mod: AI | Performed by: INTERNAL MEDICINE

## 2024-10-22 PROCEDURE — 86900 BLOOD TYPING SEROLOGIC ABO: CPT

## 2024-10-22 PROCEDURE — 83540 ASSAY OF IRON: CPT

## 2024-10-22 PROCEDURE — 86850 RBC ANTIBODY SCREEN: CPT

## 2024-10-22 PROCEDURE — 770020 HCHG ROOM/CARE - TELE (206)

## 2024-10-22 PROCEDURE — 93005 ELECTROCARDIOGRAM TRACING: CPT

## 2024-10-22 PROCEDURE — A9270 NON-COVERED ITEM OR SERVICE: HCPCS | Performed by: HOSPITALIST

## 2024-10-22 PROCEDURE — 85025 COMPLETE CBC W/AUTO DIFF WBC: CPT

## 2024-10-22 PROCEDURE — 83690 ASSAY OF LIPASE: CPT

## 2024-10-22 PROCEDURE — 83010 ASSAY OF HAPTOGLOBIN QUANT: CPT

## 2024-10-22 PROCEDURE — 99285 EMERGENCY DEPT VISIT HI MDM: CPT

## 2024-10-22 PROCEDURE — 85610 PROTHROMBIN TIME: CPT

## 2024-10-22 PROCEDURE — 85730 THROMBOPLASTIN TIME PARTIAL: CPT

## 2024-10-22 PROCEDURE — 82728 ASSAY OF FERRITIN: CPT

## 2024-10-22 PROCEDURE — 700102 HCHG RX REV CODE 250 W/ 637 OVERRIDE(OP): Performed by: HOSPITALIST

## 2024-10-22 PROCEDURE — 80053 COMPREHEN METABOLIC PANEL: CPT

## 2024-10-22 RX ORDER — ONDANSETRON 2 MG/ML
4 INJECTION INTRAMUSCULAR; INTRAVENOUS EVERY 4 HOURS PRN
Status: DISCONTINUED | OUTPATIENT
Start: 2024-10-22 | End: 2024-10-28 | Stop reason: HOSPADM

## 2024-10-22 RX ORDER — BISOPROLOL FUMARATE 5 MG/1
10 TABLET, FILM COATED ORAL EVERY MORNING
Status: DISCONTINUED | OUTPATIENT
Start: 2024-10-23 | End: 2024-10-28 | Stop reason: HOSPADM

## 2024-10-22 RX ORDER — ATORVASTATIN CALCIUM 40 MG/1
40 TABLET, FILM COATED ORAL EVERY EVENING
Status: DISCONTINUED | OUTPATIENT
Start: 2024-10-22 | End: 2024-10-28 | Stop reason: HOSPADM

## 2024-10-22 RX ORDER — LEVOTHYROXINE SODIUM 200 UG/1
200 TABLET ORAL
Status: DISCONTINUED | OUTPATIENT
Start: 2024-10-23 | End: 2024-10-28 | Stop reason: HOSPADM

## 2024-10-22 RX ORDER — PANTOPRAZOLE SODIUM 40 MG/10ML
40 INJECTION, POWDER, LYOPHILIZED, FOR SOLUTION INTRAVENOUS 2 TIMES DAILY
Status: DISCONTINUED | OUTPATIENT
Start: 2024-10-22 | End: 2024-10-27

## 2024-10-22 RX ORDER — ONDANSETRON 4 MG/1
4 TABLET, ORALLY DISINTEGRATING ORAL EVERY 4 HOURS PRN
Status: DISCONTINUED | OUTPATIENT
Start: 2024-10-22 | End: 2024-10-28 | Stop reason: HOSPADM

## 2024-10-22 RX ORDER — ACETAMINOPHEN 500 MG
500-1000 TABLET ORAL EVERY 6 HOURS PRN
Status: DISCONTINUED | OUTPATIENT
Start: 2024-10-22 | End: 2024-10-28 | Stop reason: HOSPADM

## 2024-10-22 RX ADMIN — PANTOPRAZOLE SODIUM 40 MG: 40 INJECTION, POWDER, FOR SOLUTION INTRAVENOUS at 14:07

## 2024-10-22 RX ADMIN — ACETAMINOPHEN 500 MG: 500 TABLET ORAL at 14:08

## 2024-10-22 RX ADMIN — ATORVASTATIN CALCIUM 40 MG: 40 TABLET, FILM COATED ORAL at 18:41

## 2024-10-22 RX ADMIN — ACETAMINOPHEN 1000 MG: 500 TABLET ORAL at 20:56

## 2024-10-22 ASSESSMENT — SOCIAL DETERMINANTS OF HEALTH (SDOH)
IN THE PAST 12 MONTHS, HAS THE ELECTRIC, GAS, OIL, OR WATER COMPANY THREATENED TO SHUT OFF SERVICE IN YOUR HOME?: PATIENT DECLINED
WITHIN THE PAST 12 MONTHS, THE FOOD YOU BOUGHT JUST DIDN'T LAST AND YOU DIDN'T HAVE MONEY TO GET MORE: NEVER TRUE
WITHIN THE LAST YEAR, HAVE YOU BEEN HUMILIATED OR EMOTIONALLY ABUSED IN OTHER WAYS BY YOUR PARTNER OR EX-PARTNER?: NO
WITHIN THE PAST 12 MONTHS, YOU WORRIED THAT YOUR FOOD WOULD RUN OUT BEFORE YOU GOT THE MONEY TO BUY MORE: NEVER TRUE
WITHIN THE LAST YEAR, HAVE TO BEEN RAPED OR FORCED TO HAVE ANY KIND OF SEXUAL ACTIVITY BY YOUR PARTNER OR EX-PARTNER?: NO
WITHIN THE LAST YEAR, HAVE YOU BEEN KICKED, HIT, SLAPPED, OR OTHERWISE PHYSICALLY HURT BY YOUR PARTNER OR EX-PARTNER?: NO
WITHIN THE LAST YEAR, HAVE YOU BEEN AFRAID OF YOUR PARTNER OR EX-PARTNER?: NO

## 2024-10-22 ASSESSMENT — COGNITIVE AND FUNCTIONAL STATUS - GENERAL
TOILETING: A LITTLE
SUGGESTED CMS G CODE MODIFIER MOBILITY: CK
MOVING FROM LYING ON BACK TO SITTING ON SIDE OF FLAT BED: A LITTLE
MOBILITY SCORE: 19
WALKING IN HOSPITAL ROOM: A LITTLE
STANDING UP FROM CHAIR USING ARMS: A LITTLE
SUGGESTED CMS G CODE MODIFIER DAILY ACTIVITY: CJ
MOVING TO AND FROM BED TO CHAIR: A LITTLE
DAILY ACTIVITIY SCORE: 22
HELP NEEDED FOR BATHING: A LITTLE
CLIMB 3 TO 5 STEPS WITH RAILING: A LITTLE

## 2024-10-22 ASSESSMENT — ENCOUNTER SYMPTOMS
ABDOMINAL PAIN: 0
SHORTNESS OF BREATH: 1

## 2024-10-22 ASSESSMENT — LIFESTYLE VARIABLES
ALCOHOL_USE: NO
EVER FELT BAD OR GUILTY ABOUT YOUR DRINKING: NO
TOTAL SCORE: 0
HAVE PEOPLE ANNOYED YOU BY CRITICIZING YOUR DRINKING: NO
DOES PATIENT WANT TO STOP DRINKING: NO
ON A TYPICAL DAY WHEN YOU DRINK ALCOHOL HOW MANY DRINKS DO YOU HAVE: 0
EVER HAD A DRINK FIRST THING IN THE MORNING TO STEADY YOUR NERVES TO GET RID OF A HANGOVER: NO
CONSUMPTION TOTAL: NEGATIVE
AVERAGE NUMBER OF DAYS PER WEEK YOU HAVE A DRINK CONTAINING ALCOHOL: 0
HOW MANY TIMES IN THE PAST YEAR HAVE YOU HAD 5 OR MORE DRINKS IN A DAY: 0
HAVE YOU EVER FELT YOU SHOULD CUT DOWN ON YOUR DRINKING: NO

## 2024-10-22 ASSESSMENT — FIBROSIS 4 INDEX
FIB4 SCORE: 1.66
FIB4 SCORE: 1.49

## 2024-10-22 ASSESSMENT — PAIN DESCRIPTION - PAIN TYPE
TYPE: ACUTE PAIN

## 2024-10-22 ASSESSMENT — PAIN SCALES - WONG BAKER: WONGBAKER_NUMERICALRESPONSE: DOESN'T HURT AT ALL

## 2024-10-23 ENCOUNTER — ANESTHESIA (OUTPATIENT)
Dept: SURGERY | Facility: MEDICAL CENTER | Age: 84
End: 2024-10-23
Payer: MEDICARE

## 2024-10-23 LAB
ALBUMIN SERPL BCP-MCNC: 3.8 G/DL (ref 3.2–4.9)
ANION GAP SERPL CALC-SCNC: 17 MMOL/L (ref 7–16)
BUN SERPL-MCNC: 53 MG/DL (ref 8–22)
BUN SERPL-MCNC: 53 MG/DL (ref 8–22)
CALCIUM ALBUM COR SERPL-MCNC: 9.1 MG/DL (ref 8.5–10.5)
CALCIUM SERPL-MCNC: 8.9 MG/DL (ref 8.5–10.5)
CALCIUM SERPL-MCNC: 9 MG/DL (ref 8.5–10.5)
CHLORIDE SERPL-SCNC: 104 MMOL/L (ref 96–112)
CHLORIDE SERPL-SCNC: 104 MMOL/L (ref 96–112)
CO2 SERPL-SCNC: 20 MMOL/L (ref 20–33)
CO2 SERPL-SCNC: 21 MMOL/L (ref 20–33)
CREAT SERPL-MCNC: 1.89 MG/DL (ref 0.5–1.4)
CREAT SERPL-MCNC: 1.96 MG/DL (ref 0.5–1.4)
ERYTHROCYTE [DISTWIDTH] IN BLOOD BY AUTOMATED COUNT: 57.9 FL (ref 35.9–50)
GFR SERPLBLD CREATININE-BSD FMLA CKD-EPI: 25 ML/MIN/1.73 M 2
GFR SERPLBLD CREATININE-BSD FMLA CKD-EPI: 26 ML/MIN/1.73 M 2
GLUCOSE SERPL-MCNC: 97 MG/DL (ref 65–99)
GLUCOSE SERPL-MCNC: 99 MG/DL (ref 65–99)
HCT VFR BLD AUTO: 21.6 % (ref 37–47)
HGB BLD-MCNC: 6.6 G/DL (ref 12–16)
HGB BLD-MCNC: 8.4 G/DL (ref 12–16)
HGB BLD-MCNC: 8.5 G/DL (ref 12–16)
MCH RBC QN AUTO: 27.8 PG (ref 27–33)
MCHC RBC AUTO-ENTMCNC: 30.6 G/DL (ref 32.2–35.5)
MCV RBC AUTO: 91.1 FL (ref 81.4–97.8)
PHOSPHATE SERPL-MCNC: 3.7 MG/DL (ref 2.5–4.5)
PLATELET # BLD AUTO: 265 K/UL (ref 164–446)
PMV BLD AUTO: 10.8 FL (ref 9–12.9)
POTASSIUM SERPL-SCNC: 3.5 MMOL/L (ref 3.6–5.5)
POTASSIUM SERPL-SCNC: 4 MMOL/L (ref 3.6–5.5)
RBC # BLD AUTO: 2.37 M/UL (ref 4.2–5.4)
SODIUM SERPL-SCNC: 137 MMOL/L (ref 135–145)
SODIUM SERPL-SCNC: 142 MMOL/L (ref 135–145)
WBC # BLD AUTO: 6.2 K/UL (ref 4.8–10.8)

## 2024-10-23 PROCEDURE — 30233N1 TRANSFUSION OF NONAUTOLOGOUS RED BLOOD CELLS INTO PERIPHERAL VEIN, PERCUTANEOUS APPROACH: ICD-10-PCS | Performed by: INTERNAL MEDICINE

## 2024-10-23 PROCEDURE — 700102 HCHG RX REV CODE 250 W/ 637 OVERRIDE(OP): Mod: JZ

## 2024-10-23 PROCEDURE — 700111 HCHG RX REV CODE 636 W/ 250 OVERRIDE (IP): Performed by: HOSPITALIST

## 2024-10-23 PROCEDURE — 700111 HCHG RX REV CODE 636 W/ 250 OVERRIDE (IP): Performed by: STUDENT IN AN ORGANIZED HEALTH CARE EDUCATION/TRAINING PROGRAM

## 2024-10-23 PROCEDURE — 160009 HCHG ANES TIME/MIN: Performed by: INTERNAL MEDICINE

## 2024-10-23 PROCEDURE — 80069 RENAL FUNCTION PANEL: CPT

## 2024-10-23 PROCEDURE — 80048 BASIC METABOLIC PNL TOTAL CA: CPT

## 2024-10-23 PROCEDURE — 36415 COLL VENOUS BLD VENIPUNCTURE: CPT

## 2024-10-23 PROCEDURE — 0DJ68ZZ INSPECTION OF STOMACH, VIA NATURAL OR ARTIFICIAL OPENING ENDOSCOPIC: ICD-10-PCS | Performed by: INTERNAL MEDICINE

## 2024-10-23 PROCEDURE — 700101 HCHG RX REV CODE 250: Performed by: STUDENT IN AN ORGANIZED HEALTH CARE EDUCATION/TRAINING PROGRAM

## 2024-10-23 PROCEDURE — 85027 COMPLETE CBC AUTOMATED: CPT

## 2024-10-23 PROCEDURE — A9270 NON-COVERED ITEM OR SERVICE: HCPCS | Mod: JZ

## 2024-10-23 PROCEDURE — 160002 HCHG RECOVERY MINUTES (STAT): Performed by: INTERNAL MEDICINE

## 2024-10-23 PROCEDURE — 86923 COMPATIBILITY TEST ELECTRIC: CPT

## 2024-10-23 PROCEDURE — 160202 HCHG ENDO MINUTES - 1ST 30 MINS LEVEL 3: Performed by: INTERNAL MEDICINE

## 2024-10-23 PROCEDURE — 770020 HCHG ROOM/CARE - TELE (206)

## 2024-10-23 PROCEDURE — 160035 HCHG PACU - 1ST 60 MINS PHASE I: Performed by: INTERNAL MEDICINE

## 2024-10-23 PROCEDURE — A9270 NON-COVERED ITEM OR SERVICE: HCPCS | Performed by: HOSPITALIST

## 2024-10-23 PROCEDURE — P9016 RBC LEUKOCYTES REDUCED: HCPCS

## 2024-10-23 PROCEDURE — 700101 HCHG RX REV CODE 250

## 2024-10-23 PROCEDURE — 36430 TRANSFUSION BLD/BLD COMPNT: CPT

## 2024-10-23 PROCEDURE — 160048 HCHG OR STATISTICAL LEVEL 1-5: Performed by: INTERNAL MEDICINE

## 2024-10-23 PROCEDURE — 99233 SBSQ HOSP IP/OBS HIGH 50: CPT | Performed by: INTERNAL MEDICINE

## 2024-10-23 PROCEDURE — 700105 HCHG RX REV CODE 258: Performed by: STUDENT IN AN ORGANIZED HEALTH CARE EDUCATION/TRAINING PROGRAM

## 2024-10-23 PROCEDURE — 85018 HEMOGLOBIN: CPT | Mod: 91

## 2024-10-23 PROCEDURE — 43235 EGD DIAGNOSTIC BRUSH WASH: CPT | Performed by: INTERNAL MEDICINE

## 2024-10-23 PROCEDURE — 700102 HCHG RX REV CODE 250 W/ 637 OVERRIDE(OP): Performed by: HOSPITALIST

## 2024-10-23 RX ORDER — POTASSIUM CHLORIDE 1500 MG/1
40 TABLET, EXTENDED RELEASE ORAL ONCE
Status: COMPLETED | OUTPATIENT
Start: 2024-10-23 | End: 2024-10-23

## 2024-10-23 RX ORDER — SODIUM CHLORIDE 9 MG/ML
INJECTION, SOLUTION INTRAVENOUS
Status: DISCONTINUED | OUTPATIENT
Start: 2024-10-23 | End: 2024-10-23 | Stop reason: SURG

## 2024-10-23 RX ORDER — PHENYLEPHRINE HCL IN 0.9% NACL 1 MG/10 ML
SYRINGE (ML) INTRAVENOUS PRN
Status: DISCONTINUED | OUTPATIENT
Start: 2024-10-23 | End: 2024-10-23 | Stop reason: SURG

## 2024-10-23 RX ORDER — ONDANSETRON 2 MG/ML
4 INJECTION INTRAMUSCULAR; INTRAVENOUS
Status: DISCONTINUED | OUTPATIENT
Start: 2024-10-23 | End: 2024-10-23 | Stop reason: HOSPADM

## 2024-10-23 RX ORDER — ALBUTEROL SULFATE 5 MG/ML
2.5 SOLUTION RESPIRATORY (INHALATION)
Status: DISCONTINUED | OUTPATIENT
Start: 2024-10-23 | End: 2024-10-23 | Stop reason: HOSPADM

## 2024-10-23 RX ORDER — LIDOCAINE HYDROCHLORIDE 20 MG/ML
INJECTION, SOLUTION EPIDURAL; INFILTRATION; INTRACAUDAL; PERINEURAL PRN
Status: DISCONTINUED | OUTPATIENT
Start: 2024-10-23 | End: 2024-10-23 | Stop reason: SURG

## 2024-10-23 RX ORDER — LIDOCAINE 4 G/G
1 PATCH TOPICAL EVERY 24 HOURS
Status: DISCONTINUED | OUTPATIENT
Start: 2024-10-23 | End: 2024-10-28 | Stop reason: HOSPADM

## 2024-10-23 RX ORDER — EPHEDRINE SULFATE 50 MG/ML
5 INJECTION, SOLUTION INTRAVENOUS
Status: DISCONTINUED | OUTPATIENT
Start: 2024-10-23 | End: 2024-10-23 | Stop reason: HOSPADM

## 2024-10-23 RX ORDER — HYDRALAZINE HYDROCHLORIDE 20 MG/ML
5 INJECTION INTRAMUSCULAR; INTRAVENOUS
Status: DISCONTINUED | OUTPATIENT
Start: 2024-10-23 | End: 2024-10-23 | Stop reason: HOSPADM

## 2024-10-23 RX ORDER — LABETALOL HYDROCHLORIDE 5 MG/ML
5 INJECTION, SOLUTION INTRAVENOUS
Status: DISCONTINUED | OUTPATIENT
Start: 2024-10-23 | End: 2024-10-23 | Stop reason: HOSPADM

## 2024-10-23 RX ADMIN — POTASSIUM CHLORIDE 40 MEQ: 1500 TABLET, EXTENDED RELEASE ORAL at 14:24

## 2024-10-23 RX ADMIN — PANTOPRAZOLE SODIUM 40 MG: 40 INJECTION, POWDER, FOR SOLUTION INTRAVENOUS at 16:47

## 2024-10-23 RX ADMIN — PROPOFOL 60 MG: 10 INJECTION, EMULSION INTRAVENOUS at 07:59

## 2024-10-23 RX ADMIN — LIDOCAINE HYDROCHLORIDE 20 MG: 20 INJECTION, SOLUTION EPIDURAL; INFILTRATION; INTRACAUDAL at 07:59

## 2024-10-23 RX ADMIN — Medication 150 MCG: at 08:10

## 2024-10-23 RX ADMIN — ACETAMINOPHEN 1000 MG: 500 TABLET ORAL at 05:24

## 2024-10-23 RX ADMIN — PANTOPRAZOLE SODIUM 40 MG: 40 INJECTION, POWDER, FOR SOLUTION INTRAVENOUS at 05:24

## 2024-10-23 RX ADMIN — LIDOCAINE 1 PATCH: 4 PATCH TOPICAL at 17:20

## 2024-10-23 RX ADMIN — LEVOTHYROXINE SODIUM 200 MCG: 0.2 TABLET ORAL at 05:24

## 2024-10-23 RX ADMIN — SODIUM CHLORIDE: 9 INJECTION, SOLUTION INTRAVENOUS at 07:53

## 2024-10-23 RX ADMIN — ACETAMINOPHEN 500 MG: 500 TABLET ORAL at 14:24

## 2024-10-23 RX ADMIN — ATORVASTATIN CALCIUM 40 MG: 40 TABLET, FILM COATED ORAL at 16:47

## 2024-10-23 ASSESSMENT — FIBROSIS 4 INDEX: FIB4 SCORE: 1.8

## 2024-10-23 ASSESSMENT — ENCOUNTER SYMPTOMS
VOMITING: 0
NAUSEA: 0
BLOOD IN STOOL: 0
CONSTIPATION: 1
FEVER: 0
WEAKNESS: 0
PALPITATIONS: 0
HEMOPTYSIS: 0
WHEEZING: 0
ABDOMINAL PAIN: 0
SHORTNESS OF BREATH: 0
COUGH: 0
HEADACHES: 0
CHILLS: 0
DIZZINESS: 0
ORTHOPNEA: 0

## 2024-10-23 ASSESSMENT — PAIN DESCRIPTION - PAIN TYPE
TYPE: CHRONIC PAIN
TYPE: CHRONIC PAIN
TYPE: ACUTE PAIN;CHRONIC PAIN

## 2024-10-23 ASSESSMENT — COGNITIVE AND FUNCTIONAL STATUS - GENERAL
MOVING TO AND FROM BED TO CHAIR: A LITTLE
HELP NEEDED FOR BATHING: A LITTLE
WALKING IN HOSPITAL ROOM: A LITTLE
MOVING FROM LYING ON BACK TO SITTING ON SIDE OF FLAT BED: A LITTLE
STANDING UP FROM CHAIR USING ARMS: A LITTLE
CLIMB 3 TO 5 STEPS WITH RAILING: A LITTLE
SUGGESTED CMS G CODE MODIFIER MOBILITY: CK
DAILY ACTIVITIY SCORE: 22
TOILETING: A LITTLE
MOBILITY SCORE: 19
SUGGESTED CMS G CODE MODIFIER DAILY ACTIVITY: CJ

## 2024-10-23 ASSESSMENT — PAIN SCALES - GENERAL: PAIN_LEVEL: 3

## 2024-10-24 PROBLEM — R17 ELEVATED BILIRUBIN: Status: ACTIVE | Noted: 2024-10-24

## 2024-10-24 PROBLEM — G25.81 RESTLESS LEG SYNDROME: Status: ACTIVE | Noted: 2024-10-24

## 2024-10-24 PROBLEM — M54.9 BACK PAIN: Status: ACTIVE | Noted: 2024-10-24

## 2024-10-24 LAB
ALBUMIN SERPL BCP-MCNC: 3.8 G/DL (ref 3.2–4.9)
ALBUMIN/GLOB SERPL: 1.4 G/DL
ALP SERPL-CCNC: 120 U/L (ref 30–99)
ALT SERPL-CCNC: 17 U/L (ref 2–50)
ANION GAP SERPL CALC-SCNC: 11 MMOL/L (ref 7–16)
AST SERPL-CCNC: 29 U/L (ref 12–45)
BILIRUB CONJ SERPL-MCNC: 0.8 MG/DL (ref 0.1–0.5)
BILIRUB SERPL-MCNC: 2.1 MG/DL (ref 0.1–1.5)
BUN SERPL-MCNC: 47 MG/DL (ref 8–22)
CALCIUM ALBUM COR SERPL-MCNC: 9.2 MG/DL (ref 8.5–10.5)
CALCIUM SERPL-MCNC: 9 MG/DL (ref 8.5–10.5)
CHLORIDE SERPL-SCNC: 107 MMOL/L (ref 96–112)
CO2 SERPL-SCNC: 21 MMOL/L (ref 20–33)
CREAT SERPL-MCNC: 1.84 MG/DL (ref 0.5–1.4)
ERYTHROCYTE [DISTWIDTH] IN BLOOD BY AUTOMATED COUNT: 56.7 FL (ref 35.9–50)
GFR SERPLBLD CREATININE-BSD FMLA CKD-EPI: 27 ML/MIN/1.73 M 2
GLOBULIN SER CALC-MCNC: 2.7 G/DL (ref 1.9–3.5)
GLUCOSE SERPL-MCNC: 106 MG/DL (ref 65–99)
HAPTOGLOB SERPL-MCNC: 127 MG/DL (ref 30–200)
HCT VFR BLD AUTO: 24.4 % (ref 37–47)
HGB BLD-MCNC: 7.7 G/DL (ref 12–16)
LDH SERPL L TO P-CCNC: 348 U/L (ref 107–266)
MCH RBC QN AUTO: 28.1 PG (ref 27–33)
MCHC RBC AUTO-ENTMCNC: 31.6 G/DL (ref 32.2–35.5)
MCV RBC AUTO: 89.1 FL (ref 81.4–97.8)
PLATELET # BLD AUTO: 270 K/UL (ref 164–446)
PMV BLD AUTO: 10.5 FL (ref 9–12.9)
POTASSIUM SERPL-SCNC: 4.2 MMOL/L (ref 3.6–5.5)
PROT SERPL-MCNC: 6.5 G/DL (ref 6–8.2)
RBC # BLD AUTO: 2.74 M/UL (ref 4.2–5.4)
SODIUM SERPL-SCNC: 139 MMOL/L (ref 135–145)
WBC # BLD AUTO: 8 K/UL (ref 4.8–10.8)

## 2024-10-24 PROCEDURE — 700101 HCHG RX REV CODE 250

## 2024-10-24 PROCEDURE — 36415 COLL VENOUS BLD VENIPUNCTURE: CPT

## 2024-10-24 PROCEDURE — 80053 COMPREHEN METABOLIC PANEL: CPT

## 2024-10-24 PROCEDURE — 91110 GI TRC IMG INTRAL ESOPH-ILE: CPT | Performed by: INTERNAL MEDICINE

## 2024-10-24 PROCEDURE — A9270 NON-COVERED ITEM OR SERVICE: HCPCS | Performed by: HOSPITALIST

## 2024-10-24 PROCEDURE — 85027 COMPLETE CBC AUTOMATED: CPT

## 2024-10-24 PROCEDURE — 160048 HCHG OR STATISTICAL LEVEL 1-5: Performed by: INTERNAL MEDICINE

## 2024-10-24 PROCEDURE — 83615 LACTATE (LD) (LDH) ENZYME: CPT

## 2024-10-24 PROCEDURE — 83010 ASSAY OF HAPTOGLOBIN QUANT: CPT

## 2024-10-24 PROCEDURE — 99232 SBSQ HOSP IP/OBS MODERATE 35: CPT | Mod: GC | Performed by: INTERNAL MEDICINE

## 2024-10-24 PROCEDURE — 770020 HCHG ROOM/CARE - TELE (206)

## 2024-10-24 PROCEDURE — 700102 HCHG RX REV CODE 250 W/ 637 OVERRIDE(OP): Performed by: HOSPITALIST

## 2024-10-24 PROCEDURE — 82248 BILIRUBIN DIRECT: CPT

## 2024-10-24 PROCEDURE — 700111 HCHG RX REV CODE 636 W/ 250 OVERRIDE (IP): Performed by: HOSPITALIST

## 2024-10-24 PROCEDURE — 0DJ07ZZ INSPECTION OF UPPER INTESTINAL TRACT, VIA NATURAL OR ARTIFICIAL OPENING: ICD-10-PCS | Performed by: INTERNAL MEDICINE

## 2024-10-24 RX ADMIN — ACETAMINOPHEN 1000 MG: 500 TABLET ORAL at 09:21

## 2024-10-24 RX ADMIN — LEVOTHYROXINE SODIUM 200 MCG: 0.2 TABLET ORAL at 04:48

## 2024-10-24 RX ADMIN — ACETAMINOPHEN 1000 MG: 500 TABLET ORAL at 03:27

## 2024-10-24 RX ADMIN — PANTOPRAZOLE SODIUM 40 MG: 40 INJECTION, POWDER, FOR SOLUTION INTRAVENOUS at 16:27

## 2024-10-24 RX ADMIN — ACETAMINOPHEN 1000 MG: 500 TABLET ORAL at 16:27

## 2024-10-24 RX ADMIN — PANTOPRAZOLE SODIUM 40 MG: 40 INJECTION, POWDER, FOR SOLUTION INTRAVENOUS at 04:48

## 2024-10-24 RX ADMIN — ATORVASTATIN CALCIUM 40 MG: 40 TABLET, FILM COATED ORAL at 16:27

## 2024-10-24 RX ADMIN — LIDOCAINE 1 PATCH: 4 PATCH TOPICAL at 16:27

## 2024-10-24 ASSESSMENT — ENCOUNTER SYMPTOMS
PALPITATIONS: 0
HEMOPTYSIS: 0
COUGH: 0
ORTHOPNEA: 0
DIARRHEA: 1
FEVER: 0
HEADACHES: 0
BACK PAIN: 1
SHORTNESS OF BREATH: 0
WHEEZING: 0
NAUSEA: 0
VOMITING: 0
BLOOD IN STOOL: 1
CONSTIPATION: 0
WEAKNESS: 0
CHILLS: 0
ABDOMINAL PAIN: 0
DIZZINESS: 0

## 2024-10-24 ASSESSMENT — FIBROSIS 4 INDEX: FIB4 SCORE: 2.14

## 2024-10-24 ASSESSMENT — PAIN DESCRIPTION - PAIN TYPE
TYPE: ACUTE PAIN
TYPE: ACUTE PAIN

## 2024-10-25 LAB
ALBUMIN SERPL BCP-MCNC: 3.7 G/DL (ref 3.2–4.9)
ALBUMIN/GLOB SERPL: 1.4 G/DL
ALP SERPL-CCNC: 121 U/L (ref 30–99)
ALT SERPL-CCNC: 16 U/L (ref 2–50)
ANION GAP SERPL CALC-SCNC: 11 MMOL/L (ref 7–16)
AST SERPL-CCNC: 29 U/L (ref 12–45)
BASOPHILS # BLD AUTO: 0.9 % (ref 0–1.8)
BASOPHILS # BLD: 0.06 K/UL (ref 0–0.12)
BILIRUB SERPL-MCNC: 1.7 MG/DL (ref 0.1–1.5)
BUN SERPL-MCNC: 38 MG/DL (ref 8–22)
CALCIUM ALBUM COR SERPL-MCNC: 9.2 MG/DL (ref 8.5–10.5)
CALCIUM SERPL-MCNC: 9 MG/DL (ref 8.5–10.5)
CHLORIDE SERPL-SCNC: 107 MMOL/L (ref 96–112)
CO2 SERPL-SCNC: 20 MMOL/L (ref 20–33)
CREAT SERPL-MCNC: 1.67 MG/DL (ref 0.5–1.4)
EOSINOPHIL # BLD AUTO: 0.29 K/UL (ref 0–0.51)
EOSINOPHIL NFR BLD: 4.4 % (ref 0–6.9)
ERYTHROCYTE [DISTWIDTH] IN BLOOD BY AUTOMATED COUNT: 60.8 FL (ref 35.9–50)
ERYTHROCYTE [DISTWIDTH] IN BLOOD BY AUTOMATED COUNT: 61.1 FL (ref 35.9–50)
GFR SERPLBLD CREATININE-BSD FMLA CKD-EPI: 30 ML/MIN/1.73 M 2
GLOBULIN SER CALC-MCNC: 2.7 G/DL (ref 1.9–3.5)
GLUCOSE SERPL-MCNC: 108 MG/DL (ref 65–99)
HCT VFR BLD AUTO: 24.7 % (ref 37–47)
HCT VFR BLD AUTO: 25.5 % (ref 37–47)
HGB BLD-MCNC: 7.4 G/DL (ref 12–16)
HGB BLD-MCNC: 7.7 G/DL (ref 12–16)
IMM GRANULOCYTES # BLD AUTO: 0.03 K/UL (ref 0–0.11)
IMM GRANULOCYTES NFR BLD AUTO: 0.5 % (ref 0–0.9)
LYMPHOCYTES # BLD AUTO: 0.69 K/UL (ref 1–4.8)
LYMPHOCYTES NFR BLD: 10.6 % (ref 22–41)
MCH RBC QN AUTO: 28.1 PG (ref 27–33)
MCH RBC QN AUTO: 28.1 PG (ref 27–33)
MCHC RBC AUTO-ENTMCNC: 30 G/DL (ref 32.2–35.5)
MCHC RBC AUTO-ENTMCNC: 30.2 G/DL (ref 32.2–35.5)
MCV RBC AUTO: 93.1 FL (ref 81.4–97.8)
MCV RBC AUTO: 93.9 FL (ref 81.4–97.8)
MONOCYTES # BLD AUTO: 0.7 K/UL (ref 0–0.85)
MONOCYTES NFR BLD AUTO: 10.7 % (ref 0–13.4)
NEUTROPHILS # BLD AUTO: 4.76 K/UL (ref 1.82–7.42)
NEUTROPHILS NFR BLD: 72.9 % (ref 44–72)
NRBC # BLD AUTO: 0 K/UL
NRBC BLD-RTO: 0 /100 WBC (ref 0–0.2)
PLATELET # BLD AUTO: 294 K/UL (ref 164–446)
PLATELET # BLD AUTO: 315 K/UL (ref 164–446)
PMV BLD AUTO: 10.6 FL (ref 9–12.9)
PMV BLD AUTO: 11 FL (ref 9–12.9)
POTASSIUM SERPL-SCNC: 4.1 MMOL/L (ref 3.6–5.5)
PROT SERPL-MCNC: 6.4 G/DL (ref 6–8.2)
RBC # BLD AUTO: 2.63 M/UL (ref 4.2–5.4)
RBC # BLD AUTO: 2.74 M/UL (ref 4.2–5.4)
SODIUM SERPL-SCNC: 138 MMOL/L (ref 135–145)
WBC # BLD AUTO: 6.5 K/UL (ref 4.8–10.8)
WBC # BLD AUTO: 8 K/UL (ref 4.8–10.8)

## 2024-10-25 PROCEDURE — 36415 COLL VENOUS BLD VENIPUNCTURE: CPT

## 2024-10-25 PROCEDURE — 700102 HCHG RX REV CODE 250 W/ 637 OVERRIDE(OP): Performed by: HOSPITALIST

## 2024-10-25 PROCEDURE — 700111 HCHG RX REV CODE 636 W/ 250 OVERRIDE (IP): Performed by: HOSPITALIST

## 2024-10-25 PROCEDURE — 97162 PT EVAL MOD COMPLEX 30 MIN: CPT

## 2024-10-25 PROCEDURE — 80053 COMPREHEN METABOLIC PANEL: CPT

## 2024-10-25 PROCEDURE — 99232 SBSQ HOSP IP/OBS MODERATE 35: CPT | Performed by: NURSE PRACTITIONER

## 2024-10-25 PROCEDURE — 700101 HCHG RX REV CODE 250

## 2024-10-25 PROCEDURE — A9270 NON-COVERED ITEM OR SERVICE: HCPCS | Performed by: HOSPITALIST

## 2024-10-25 PROCEDURE — 99497 ADVNCD CARE PLAN 30 MIN: CPT | Performed by: INTERNAL MEDICINE

## 2024-10-25 PROCEDURE — 85025 COMPLETE CBC W/AUTO DIFF WBC: CPT

## 2024-10-25 PROCEDURE — 85027 COMPLETE CBC AUTOMATED: CPT

## 2024-10-25 PROCEDURE — 97165 OT EVAL LOW COMPLEX 30 MIN: CPT

## 2024-10-25 PROCEDURE — 99232 SBSQ HOSP IP/OBS MODERATE 35: CPT | Mod: 25 | Performed by: INTERNAL MEDICINE

## 2024-10-25 PROCEDURE — 770020 HCHG ROOM/CARE - TELE (206)

## 2024-10-25 RX ADMIN — PANTOPRAZOLE SODIUM 40 MG: 40 INJECTION, POWDER, FOR SOLUTION INTRAVENOUS at 16:01

## 2024-10-25 RX ADMIN — ACETAMINOPHEN 1000 MG: 500 TABLET ORAL at 19:15

## 2024-10-25 RX ADMIN — PANTOPRAZOLE SODIUM 40 MG: 40 INJECTION, POWDER, FOR SOLUTION INTRAVENOUS at 04:44

## 2024-10-25 RX ADMIN — ATORVASTATIN CALCIUM 40 MG: 40 TABLET, FILM COATED ORAL at 16:00

## 2024-10-25 RX ADMIN — LEVOTHYROXINE SODIUM 200 MCG: 0.2 TABLET ORAL at 04:44

## 2024-10-25 RX ADMIN — LIDOCAINE 1 PATCH: 4 PATCH TOPICAL at 16:01

## 2024-10-25 ASSESSMENT — PATIENT HEALTH QUESTIONNAIRE - PHQ9
2. FEELING DOWN, DEPRESSED, IRRITABLE, OR HOPELESS: NOT AT ALL
SUM OF ALL RESPONSES TO PHQ9 QUESTIONS 1 AND 2: 0
1. LITTLE INTEREST OR PLEASURE IN DOING THINGS: NOT AT ALL
SUM OF ALL RESPONSES TO PHQ9 QUESTIONS 1 AND 2: 0
1. LITTLE INTEREST OR PLEASURE IN DOING THINGS: NOT AT ALL
2. FEELING DOWN, DEPRESSED, IRRITABLE, OR HOPELESS: NOT AT ALL

## 2024-10-25 ASSESSMENT — COGNITIVE AND FUNCTIONAL STATUS - GENERAL
SUGGESTED CMS G CODE MODIFIER MOBILITY: CI
MOBILITY SCORE: 23
DAILY ACTIVITIY SCORE: 24
SUGGESTED CMS G CODE MODIFIER DAILY ACTIVITY: CH
CLIMB 3 TO 5 STEPS WITH RAILING: A LITTLE

## 2024-10-25 ASSESSMENT — PAIN DESCRIPTION - PAIN TYPE
TYPE: ACUTE PAIN

## 2024-10-25 ASSESSMENT — ENCOUNTER SYMPTOMS
COUGH: 0
ABDOMINAL PAIN: 0
HEMOPTYSIS: 0
BLOOD IN STOOL: 0
WEAKNESS: 0
CHILLS: 0
WHEEZING: 0
CONSTIPATION: 0
BACK PAIN: 0
DEPRESSION: 0
VOMITING: 0
FEVER: 0
BLOOD IN STOOL: 1
PALPITATIONS: 0
DIARRHEA: 1
DIZZINESS: 0
SHORTNESS OF BREATH: 0
ORTHOPNEA: 0
NAUSEA: 0
HEADACHES: 0
BLURRED VISION: 0
DIARRHEA: 0
HEARTBURN: 0

## 2024-10-25 ASSESSMENT — GAIT ASSESSMENTS
DISTANCE (FEET): 10
DEVIATION: BRADYKINETIC
ASSISTIVE DEVICE: FRONT WHEEL WALKER
GAIT LEVEL OF ASSIST: SUPERVISED

## 2024-10-25 ASSESSMENT — ACTIVITIES OF DAILY LIVING (ADL): TOILETING: INDEPENDENT

## 2024-10-25 ASSESSMENT — FIBROSIS 4 INDEX: FIB4 SCORE: 2.02

## 2024-10-26 ENCOUNTER — ANESTHESIA (OUTPATIENT)
Dept: SURGERY | Facility: MEDICAL CENTER | Age: 84
End: 2024-10-26
Payer: MEDICARE

## 2024-10-26 ENCOUNTER — ANESTHESIA EVENT (OUTPATIENT)
Dept: SURGERY | Facility: MEDICAL CENTER | Age: 84
End: 2024-10-26
Payer: MEDICARE

## 2024-10-26 PROBLEM — R06.02 SHORTNESS OF BREATH: Status: ACTIVE | Noted: 2024-10-26

## 2024-10-26 LAB
ALBUMIN SERPL BCP-MCNC: 3.5 G/DL (ref 3.2–4.9)
ALBUMIN/GLOB SERPL: 1.2 G/DL
ALP SERPL-CCNC: 122 U/L (ref 30–99)
ALT SERPL-CCNC: 17 U/L (ref 2–50)
ANION GAP SERPL CALC-SCNC: 12 MMOL/L (ref 7–16)
AST SERPL-CCNC: 30 U/L (ref 12–45)
BILIRUB SERPL-MCNC: 1.6 MG/DL (ref 0.1–1.5)
BUN SERPL-MCNC: 29 MG/DL (ref 8–22)
CALCIUM ALBUM COR SERPL-MCNC: 9.4 MG/DL (ref 8.5–10.5)
CALCIUM SERPL-MCNC: 9 MG/DL (ref 8.5–10.5)
CHLORIDE SERPL-SCNC: 106 MMOL/L (ref 96–112)
CO2 SERPL-SCNC: 18 MMOL/L (ref 20–33)
CREAT SERPL-MCNC: 1.69 MG/DL (ref 0.5–1.4)
EKG IMPRESSION: NORMAL
ERYTHROCYTE [DISTWIDTH] IN BLOOD BY AUTOMATED COUNT: 60.8 FL (ref 35.9–50)
ERYTHROCYTE [DISTWIDTH] IN BLOOD BY AUTOMATED COUNT: 61.6 FL (ref 35.9–50)
FERRITIN SERPL-MCNC: 59 NG/ML (ref 10–291)
GFR SERPLBLD CREATININE-BSD FMLA CKD-EPI: 30 ML/MIN/1.73 M 2
GLOBULIN SER CALC-MCNC: 2.9 G/DL (ref 1.9–3.5)
GLUCOSE SERPL-MCNC: 94 MG/DL (ref 65–99)
HAPTOGLOB SERPL-MCNC: 101 MG/DL (ref 30–200)
HCT VFR BLD AUTO: 23.7 % (ref 37–47)
HCT VFR BLD AUTO: 25 % (ref 37–47)
HGB BLD-MCNC: 7.4 G/DL (ref 12–16)
HGB BLD-MCNC: 7.8 G/DL (ref 12–16)
IRON SATN MFR SERPL: 5 % (ref 15–55)
IRON SERPL-MCNC: 18 UG/DL (ref 40–170)
MAGNESIUM SERPL-MCNC: 2.7 MG/DL (ref 1.5–2.5)
MCH RBC QN AUTO: 29.2 PG (ref 27–33)
MCH RBC QN AUTO: 29.4 PG (ref 27–33)
MCHC RBC AUTO-ENTMCNC: 31.2 G/DL (ref 32.2–35.5)
MCHC RBC AUTO-ENTMCNC: 31.2 G/DL (ref 32.2–35.5)
MCV RBC AUTO: 93.6 FL (ref 81.4–97.8)
MCV RBC AUTO: 94 FL (ref 81.4–97.8)
PLATELET # BLD AUTO: 282 K/UL (ref 164–446)
PLATELET # BLD AUTO: 295 K/UL (ref 164–446)
PMV BLD AUTO: 10.3 FL (ref 9–12.9)
PMV BLD AUTO: 10.4 FL (ref 9–12.9)
POTASSIUM SERPL-SCNC: 4.1 MMOL/L (ref 3.6–5.5)
PROT SERPL-MCNC: 6.4 G/DL (ref 6–8.2)
RBC # BLD AUTO: 2.52 M/UL (ref 4.2–5.4)
RBC # BLD AUTO: 2.67 M/UL (ref 4.2–5.4)
SODIUM SERPL-SCNC: 136 MMOL/L (ref 135–145)
TIBC SERPL-MCNC: 352 UG/DL (ref 250–450)
TROPONIN T SERPL-MCNC: 25 NG/L (ref 6–19)
UIBC SERPL-MCNC: 334 UG/DL (ref 110–370)
WBC # BLD AUTO: 6.9 K/UL (ref 4.8–10.8)
WBC # BLD AUTO: 7.4 K/UL (ref 4.8–10.8)

## 2024-10-26 PROCEDURE — 85027 COMPLETE CBC AUTOMATED: CPT | Mod: 91

## 2024-10-26 PROCEDURE — 80053 COMPREHEN METABOLIC PANEL: CPT

## 2024-10-26 PROCEDURE — 700111 HCHG RX REV CODE 636 W/ 250 OVERRIDE (IP): Performed by: ANESTHESIOLOGY

## 2024-10-26 PROCEDURE — 43235 EGD DIAGNOSTIC BRUSH WASH: CPT | Mod: 59 | Performed by: INTERNAL MEDICINE

## 2024-10-26 PROCEDURE — 36415 COLL VENOUS BLD VENIPUNCTURE: CPT

## 2024-10-26 PROCEDURE — 700101 HCHG RX REV CODE 250: Performed by: ANESTHESIOLOGY

## 2024-10-26 PROCEDURE — 700102 HCHG RX REV CODE 250 W/ 637 OVERRIDE(OP): Performed by: HOSPITALIST

## 2024-10-26 PROCEDURE — 160035 HCHG PACU - 1ST 60 MINS PHASE I: Performed by: INTERNAL MEDICINE

## 2024-10-26 PROCEDURE — 160048 HCHG OR STATISTICAL LEVEL 1-5: Performed by: INTERNAL MEDICINE

## 2024-10-26 PROCEDURE — 160203 HCHG ENDO MINUTES - 1ST 30 MINS LEVEL 4: Performed by: INTERNAL MEDICINE

## 2024-10-26 PROCEDURE — A9270 NON-COVERED ITEM OR SERVICE: HCPCS | Performed by: HOSPITALIST

## 2024-10-26 PROCEDURE — 160002 HCHG RECOVERY MINUTES (STAT): Performed by: INTERNAL MEDICINE

## 2024-10-26 PROCEDURE — 83540 ASSAY OF IRON: CPT

## 2024-10-26 PROCEDURE — 84484 ASSAY OF TROPONIN QUANT: CPT

## 2024-10-26 PROCEDURE — 93010 ELECTROCARDIOGRAM REPORT: CPT | Performed by: INTERNAL MEDICINE

## 2024-10-26 PROCEDURE — 93005 ELECTROCARDIOGRAM TRACING: CPT

## 2024-10-26 PROCEDURE — 700111 HCHG RX REV CODE 636 W/ 250 OVERRIDE (IP): Performed by: HOSPITALIST

## 2024-10-26 PROCEDURE — 700111 HCHG RX REV CODE 636 W/ 250 OVERRIDE (IP)

## 2024-10-26 PROCEDURE — 0DJ08ZZ INSPECTION OF UPPER INTESTINAL TRACT, VIA NATURAL OR ARTIFICIAL OPENING ENDOSCOPIC: ICD-10-PCS | Performed by: INTERNAL MEDICINE

## 2024-10-26 PROCEDURE — 44360 SMALL BOWEL ENDOSCOPY: CPT | Performed by: INTERNAL MEDICINE

## 2024-10-26 PROCEDURE — 83735 ASSAY OF MAGNESIUM: CPT

## 2024-10-26 PROCEDURE — 99232 SBSQ HOSP IP/OBS MODERATE 35: CPT | Mod: GC | Performed by: INTERNAL MEDICINE

## 2024-10-26 PROCEDURE — 82728 ASSAY OF FERRITIN: CPT

## 2024-10-26 PROCEDURE — 700101 HCHG RX REV CODE 250

## 2024-10-26 PROCEDURE — 770020 HCHG ROOM/CARE - TELE (206)

## 2024-10-26 PROCEDURE — 160009 HCHG ANES TIME/MIN: Performed by: INTERNAL MEDICINE

## 2024-10-26 PROCEDURE — 83550 IRON BINDING TEST: CPT

## 2024-10-26 RX ORDER — DIPHENHYDRAMINE HYDROCHLORIDE 50 MG/ML
12.5 INJECTION INTRAMUSCULAR; INTRAVENOUS
Status: DISCONTINUED | OUTPATIENT
Start: 2024-10-26 | End: 2024-10-26 | Stop reason: HOSPADM

## 2024-10-26 RX ORDER — MAGNESIUM SULFATE HEPTAHYDRATE 40 MG/ML
2 INJECTION, SOLUTION INTRAVENOUS ONCE
Status: COMPLETED | OUTPATIENT
Start: 2024-10-26 | End: 2024-10-26

## 2024-10-26 RX ORDER — LIDOCAINE HYDROCHLORIDE 20 MG/ML
INJECTION, SOLUTION EPIDURAL; INFILTRATION; INTRACAUDAL; PERINEURAL PRN
Status: DISCONTINUED | OUTPATIENT
Start: 2024-10-26 | End: 2024-10-26 | Stop reason: SURG

## 2024-10-26 RX ORDER — ONDANSETRON 2 MG/ML
4 INJECTION INTRAMUSCULAR; INTRAVENOUS
Status: DISCONTINUED | OUTPATIENT
Start: 2024-10-26 | End: 2024-10-26 | Stop reason: HOSPADM

## 2024-10-26 RX ORDER — HALOPERIDOL 5 MG/ML
1 INJECTION INTRAMUSCULAR
Status: DISCONTINUED | OUTPATIENT
Start: 2024-10-26 | End: 2024-10-26 | Stop reason: HOSPADM

## 2024-10-26 RX ADMIN — ACETAMINOPHEN 1000 MG: 500 TABLET ORAL at 02:08

## 2024-10-26 RX ADMIN — LIDOCAINE 1 PATCH: 4 PATCH TOPICAL at 16:12

## 2024-10-26 RX ADMIN — LEVOTHYROXINE SODIUM 200 MCG: 0.2 TABLET ORAL at 04:38

## 2024-10-26 RX ADMIN — PANTOPRAZOLE SODIUM 40 MG: 40 INJECTION, POWDER, FOR SOLUTION INTRAVENOUS at 04:38

## 2024-10-26 RX ADMIN — MAGNESIUM SULFATE HEPTAHYDRATE 2 G: 2 INJECTION, SOLUTION INTRAVENOUS at 16:18

## 2024-10-26 RX ADMIN — PANTOPRAZOLE SODIUM 40 MG: 40 INJECTION, POWDER, FOR SOLUTION INTRAVENOUS at 16:12

## 2024-10-26 RX ADMIN — ATORVASTATIN CALCIUM 40 MG: 40 TABLET, FILM COATED ORAL at 16:12

## 2024-10-26 RX ADMIN — PROPOFOL 100 MG: 10 INJECTION, EMULSION INTRAVENOUS at 08:23

## 2024-10-26 RX ADMIN — LIDOCAINE HYDROCHLORIDE 100 MG: 20 INJECTION, SOLUTION EPIDURAL; INFILTRATION; INTRACAUDAL at 08:22

## 2024-10-26 RX ADMIN — ACETAMINOPHEN 1000 MG: 500 TABLET ORAL at 12:26

## 2024-10-26 RX ADMIN — PROPOFOL 50 MG: 10 INJECTION, EMULSION INTRAVENOUS at 08:26

## 2024-10-26 RX ADMIN — BISOPROLOL FUMARATE 10 MG: 5 TABLET ORAL at 04:38

## 2024-10-26 ASSESSMENT — COGNITIVE AND FUNCTIONAL STATUS - GENERAL
CLIMB 3 TO 5 STEPS WITH RAILING: A LITTLE
SUGGESTED CMS G CODE MODIFIER DAILY ACTIVITY: CH
SUGGESTED CMS G CODE MODIFIER MOBILITY: CI
DAILY ACTIVITIY SCORE: 24
MOBILITY SCORE: 23

## 2024-10-26 ASSESSMENT — FIBROSIS 4 INDEX: FIB4 SCORE: 2.02

## 2024-10-26 ASSESSMENT — ENCOUNTER SYMPTOMS
VOMITING: 0
ORTHOPNEA: 0
NAUSEA: 0
HEADACHES: 0
SHORTNESS OF BREATH: 0
FEVER: 0
ABDOMINAL PAIN: 0
CHILLS: 0
COUGH: 0
PALPITATIONS: 0
BLOOD IN STOOL: 0
DIZZINESS: 0
CONSTIPATION: 0
WEAKNESS: 0
HEMOPTYSIS: 0
BACK PAIN: 0
DIARRHEA: 0
WHEEZING: 0

## 2024-10-26 ASSESSMENT — PAIN DESCRIPTION - PAIN TYPE
TYPE: ACUTE PAIN
TYPE: ACUTE PAIN

## 2024-10-27 LAB
ABO GROUP BLD: ABNORMAL
ALBUMIN SERPL BCP-MCNC: 3.6 G/DL (ref 3.2–4.9)
ALBUMIN SERPL BCP-MCNC: 3.7 G/DL (ref 3.2–4.9)
ALBUMIN/GLOB SERPL: 1.3 G/DL
ALP SERPL-CCNC: 124 U/L (ref 30–99)
ALT SERPL-CCNC: 17 U/L (ref 2–50)
ANION GAP SERPL CALC-SCNC: 11 MMOL/L (ref 7–16)
AST SERPL-CCNC: 29 U/L (ref 12–45)
BARCODED ABORH UBTYP: 600
BARCODED PRD CODE UBPRD: ABNORMAL
BARCODED UNIT NUM UBUNT: ABNORMAL
BILIRUB SERPL-MCNC: 1.2 MG/DL (ref 0.1–1.5)
BLD GP AB SCN SERPL QL: ABNORMAL
BUN SERPL-MCNC: 30 MG/DL (ref 8–22)
BUN SERPL-MCNC: 30 MG/DL (ref 8–22)
CALCIUM ALBUM COR SERPL-MCNC: 9.2 MG/DL (ref 8.5–10.5)
CALCIUM ALBUM COR SERPL-MCNC: 9.3 MG/DL (ref 8.5–10.5)
CALCIUM SERPL-MCNC: 9 MG/DL (ref 8.5–10.5)
CALCIUM SERPL-MCNC: 9 MG/DL (ref 8.5–10.5)
CHLORIDE SERPL-SCNC: 107 MMOL/L (ref 96–112)
CHLORIDE SERPL-SCNC: 107 MMOL/L (ref 96–112)
CO2 SERPL-SCNC: 16 MMOL/L (ref 20–33)
CO2 SERPL-SCNC: 19 MMOL/L (ref 20–33)
COMPONENT R 8504R: ABNORMAL
CREAT SERPL-MCNC: 1.91 MG/DL (ref 0.5–1.4)
CREAT SERPL-MCNC: 1.94 MG/DL (ref 0.5–1.4)
ERYTHROCYTE [DISTWIDTH] IN BLOOD BY AUTOMATED COUNT: 60.8 FL (ref 35.9–50)
GFR SERPLBLD CREATININE-BSD FMLA CKD-EPI: 25 ML/MIN/1.73 M 2
GFR SERPLBLD CREATININE-BSD FMLA CKD-EPI: 26 ML/MIN/1.73 M 2
GLOBULIN SER CALC-MCNC: 2.9 G/DL (ref 1.9–3.5)
GLUCOSE SERPL-MCNC: 101 MG/DL (ref 65–99)
GLUCOSE SERPL-MCNC: 94 MG/DL (ref 65–99)
HCT VFR BLD AUTO: 24.7 % (ref 37–47)
HGB BLD-MCNC: 7.5 G/DL (ref 12–16)
MCH RBC QN AUTO: 28.4 PG (ref 27–33)
MCHC RBC AUTO-ENTMCNC: 30.4 G/DL (ref 32.2–35.5)
MCV RBC AUTO: 93.6 FL (ref 81.4–97.8)
PHOSPHATE SERPL-MCNC: 3.4 MG/DL (ref 2.5–4.5)
PLATELET # BLD AUTO: 284 K/UL (ref 164–446)
PMV BLD AUTO: 10.4 FL (ref 9–12.9)
POTASSIUM SERPL-SCNC: 4.3 MMOL/L (ref 3.6–5.5)
POTASSIUM SERPL-SCNC: 4.5 MMOL/L (ref 3.6–5.5)
PRODUCT TYPE UPROD: ABNORMAL
PROT SERPL-MCNC: 6.6 G/DL (ref 6–8.2)
RBC # BLD AUTO: 2.64 M/UL (ref 4.2–5.4)
RH BLD: ABNORMAL
SODIUM SERPL-SCNC: 137 MMOL/L (ref 135–145)
SODIUM SERPL-SCNC: 137 MMOL/L (ref 135–145)
UNIT STATUS USTAT: ABNORMAL
WBC # BLD AUTO: 6.8 K/UL (ref 4.8–10.8)

## 2024-10-27 PROCEDURE — 700102 HCHG RX REV CODE 250 W/ 637 OVERRIDE(OP): Performed by: HOSPITALIST

## 2024-10-27 PROCEDURE — 85027 COMPLETE CBC AUTOMATED: CPT

## 2024-10-27 PROCEDURE — 86850 RBC ANTIBODY SCREEN: CPT

## 2024-10-27 PROCEDURE — 700105 HCHG RX REV CODE 258: Performed by: INTERNAL MEDICINE

## 2024-10-27 PROCEDURE — 700101 HCHG RX REV CODE 250

## 2024-10-27 PROCEDURE — 99232 SBSQ HOSP IP/OBS MODERATE 35: CPT | Mod: GC | Performed by: INTERNAL MEDICINE

## 2024-10-27 PROCEDURE — P9016 RBC LEUKOCYTES REDUCED: HCPCS

## 2024-10-27 PROCEDURE — 36415 COLL VENOUS BLD VENIPUNCTURE: CPT

## 2024-10-27 PROCEDURE — 36430 TRANSFUSION BLD/BLD COMPNT: CPT

## 2024-10-27 PROCEDURE — 700111 HCHG RX REV CODE 636 W/ 250 OVERRIDE (IP): Performed by: HOSPITALIST

## 2024-10-27 PROCEDURE — 86901 BLOOD TYPING SEROLOGIC RH(D): CPT

## 2024-10-27 PROCEDURE — 770020 HCHG ROOM/CARE - TELE (206)

## 2024-10-27 PROCEDURE — 80053 COMPREHEN METABOLIC PANEL: CPT

## 2024-10-27 PROCEDURE — A9270 NON-COVERED ITEM OR SERVICE: HCPCS | Performed by: HOSPITALIST

## 2024-10-27 PROCEDURE — 700111 HCHG RX REV CODE 636 W/ 250 OVERRIDE (IP): Mod: JZ | Performed by: INTERNAL MEDICINE

## 2024-10-27 PROCEDURE — 86923 COMPATIBILITY TEST ELECTRIC: CPT

## 2024-10-27 PROCEDURE — 80069 RENAL FUNCTION PANEL: CPT

## 2024-10-27 PROCEDURE — 86900 BLOOD TYPING SEROLOGIC ABO: CPT

## 2024-10-27 RX ADMIN — ACETAMINOPHEN 1000 MG: 500 TABLET ORAL at 21:53

## 2024-10-27 RX ADMIN — LEVOTHYROXINE SODIUM 200 MCG: 0.2 TABLET ORAL at 04:49

## 2024-10-27 RX ADMIN — LIDOCAINE 1 PATCH: 4 PATCH TOPICAL at 16:18

## 2024-10-27 RX ADMIN — PANTOPRAZOLE SODIUM 40 MG: 40 INJECTION, POWDER, FOR SOLUTION INTRAVENOUS at 04:49

## 2024-10-27 RX ADMIN — SODIUM CHLORIDE 250 MG: 9 INJECTION, SOLUTION INTRAVENOUS at 12:35

## 2024-10-27 RX ADMIN — SODIUM CHLORIDE 250 MG: 9 INJECTION, SOLUTION INTRAVENOUS at 16:22

## 2024-10-27 RX ADMIN — ACETAMINOPHEN 1000 MG: 500 TABLET ORAL at 13:32

## 2024-10-27 RX ADMIN — ATORVASTATIN CALCIUM 40 MG: 40 TABLET, FILM COATED ORAL at 16:18

## 2024-10-27 ASSESSMENT — FIBROSIS 4 INDEX: FIB4 SCORE: 2.03

## 2024-10-28 ENCOUNTER — PHARMACY VISIT (OUTPATIENT)
Dept: PHARMACY | Facility: MEDICAL CENTER | Age: 84
End: 2024-10-28
Payer: COMMERCIAL

## 2024-10-28 VITALS
HEIGHT: 62 IN | TEMPERATURE: 97 F | OXYGEN SATURATION: 97 % | WEIGHT: 177.91 LBS | SYSTOLIC BLOOD PRESSURE: 113 MMHG | DIASTOLIC BLOOD PRESSURE: 58 MMHG | HEART RATE: 68 BPM | RESPIRATION RATE: 16 BRPM | BODY MASS INDEX: 32.74 KG/M2

## 2024-10-28 LAB
BASOPHILS # BLD AUTO: 0.7 % (ref 0–1.8)
BASOPHILS # BLD: 0.05 K/UL (ref 0–0.12)
EOSINOPHIL # BLD AUTO: 0.41 K/UL (ref 0–0.51)
EOSINOPHIL NFR BLD: 5.7 % (ref 0–6.9)
ERYTHROCYTE [DISTWIDTH] IN BLOOD BY AUTOMATED COUNT: 63 FL (ref 35.9–50)
HCT VFR BLD AUTO: 28.2 % (ref 37–47)
HGB BLD-MCNC: 8.8 G/DL (ref 12–16)
IMM GRANULOCYTES # BLD AUTO: 0.03 K/UL (ref 0–0.11)
IMM GRANULOCYTES NFR BLD AUTO: 0.4 % (ref 0–0.9)
LYMPHOCYTES # BLD AUTO: 0.89 K/UL (ref 1–4.8)
LYMPHOCYTES NFR BLD: 12.4 % (ref 22–41)
MCH RBC QN AUTO: 29.3 PG (ref 27–33)
MCHC RBC AUTO-ENTMCNC: 31.2 G/DL (ref 32.2–35.5)
MCV RBC AUTO: 94 FL (ref 81.4–97.8)
MONOCYTES # BLD AUTO: 0.39 K/UL (ref 0–0.85)
MONOCYTES NFR BLD AUTO: 5.4 % (ref 0–13.4)
NEUTROPHILS # BLD AUTO: 5.43 K/UL (ref 1.82–7.42)
NEUTROPHILS NFR BLD: 75.4 % (ref 44–72)
NRBC # BLD AUTO: 0.02 K/UL
NRBC BLD-RTO: 0.3 /100 WBC (ref 0–0.2)
PLATELET # BLD AUTO: 259 K/UL (ref 164–446)
PMV BLD AUTO: 10.5 FL (ref 9–12.9)
RBC # BLD AUTO: 3 M/UL (ref 4.2–5.4)
WBC # BLD AUTO: 7.2 K/UL (ref 4.8–10.8)

## 2024-10-28 PROCEDURE — 36415 COLL VENOUS BLD VENIPUNCTURE: CPT

## 2024-10-28 PROCEDURE — 99239 HOSP IP/OBS DSCHRG MGMT >30: CPT | Mod: GC | Performed by: INTERNAL MEDICINE

## 2024-10-28 PROCEDURE — 700111 HCHG RX REV CODE 636 W/ 250 OVERRIDE (IP): Mod: JZ | Performed by: INTERNAL MEDICINE

## 2024-10-28 PROCEDURE — 85025 COMPLETE CBC W/AUTO DIFF WBC: CPT

## 2024-10-28 PROCEDURE — 700102 HCHG RX REV CODE 250 W/ 637 OVERRIDE(OP): Performed by: HOSPITALIST

## 2024-10-28 PROCEDURE — 700105 HCHG RX REV CODE 258: Performed by: INTERNAL MEDICINE

## 2024-10-28 PROCEDURE — A9270 NON-COVERED ITEM OR SERVICE: HCPCS

## 2024-10-28 PROCEDURE — RXMED WILLOW AMBULATORY MEDICATION CHARGE

## 2024-10-28 PROCEDURE — A9270 NON-COVERED ITEM OR SERVICE: HCPCS | Performed by: HOSPITALIST

## 2024-10-28 PROCEDURE — 700102 HCHG RX REV CODE 250 W/ 637 OVERRIDE(OP)

## 2024-10-28 RX ORDER — BISACODYL 10 MG
10 SUPPOSITORY, RECTAL RECTAL DAILY
Status: DISCONTINUED | OUTPATIENT
Start: 2024-10-28 | End: 2024-10-28 | Stop reason: HOSPADM

## 2024-10-28 RX ORDER — BISOPROLOL FUMARATE 10 MG/1
10 TABLET, FILM COATED ORAL EVERY MORNING
Qty: 30 TABLET | Refills: 0 | Status: SHIPPED | OUTPATIENT
Start: 2024-10-29 | End: 2024-11-28

## 2024-10-28 RX ORDER — ATORVASTATIN CALCIUM 40 MG/1
40 TABLET, FILM COATED ORAL EVERY EVENING
Qty: 30 TABLET | Refills: 0 | Status: SHIPPED | OUTPATIENT
Start: 2024-10-28

## 2024-10-28 RX ADMIN — OMEPRAZOLE 20 MG: 20 CAPSULE, DELAYED RELEASE ORAL at 05:55

## 2024-10-28 RX ADMIN — BISOPROLOL FUMARATE 10 MG: 5 TABLET ORAL at 05:55

## 2024-10-28 RX ADMIN — BISACODYL 10 MG: 10 SUPPOSITORY RECTAL at 12:06

## 2024-10-28 RX ADMIN — SODIUM CHLORIDE 250 MG: 9 INJECTION, SOLUTION INTRAVENOUS at 06:01

## 2024-10-28 RX ADMIN — LEVOTHYROXINE SODIUM 200 MCG: 0.2 TABLET ORAL at 05:55

## 2024-10-28 ASSESSMENT — SOCIAL DETERMINANTS OF HEALTH (SDOH)
WITHIN THE LAST YEAR, HAVE YOU BEEN HUMILIATED OR EMOTIONALLY ABUSED IN OTHER WAYS BY YOUR PARTNER OR EX-PARTNER?: NO
WITHIN THE LAST YEAR, HAVE YOU BEEN KICKED, HIT, SLAPPED, OR OTHERWISE PHYSICALLY HURT BY YOUR PARTNER OR EX-PARTNER?: NO
WITHIN THE LAST YEAR, HAVE YOU BEEN AFRAID OF YOUR PARTNER OR EX-PARTNER?: NO
IN THE PAST 12 MONTHS, HAS THE ELECTRIC, GAS, OIL, OR WATER COMPANY THREATENED TO SHUT OFF SERVICE IN YOUR HOME?: PATIENT DECLINED
WITHIN THE LAST YEAR, HAVE TO BEEN RAPED OR FORCED TO HAVE ANY KIND OF SEXUAL ACTIVITY BY YOUR PARTNER OR EX-PARTNER?: NO

## 2024-10-28 ASSESSMENT — PAIN DESCRIPTION - PAIN TYPE: TYPE: ACUTE PAIN

## 2024-10-28 ASSESSMENT — FIBROSIS 4 INDEX: FIB4 SCORE: 2.03

## 2024-10-29 ENCOUNTER — PATIENT OUTREACH (OUTPATIENT)
Dept: MEDICAL GROUP | Facility: MEDICAL CENTER | Age: 84
End: 2024-10-29
Payer: MEDICARE

## 2024-10-30 ENCOUNTER — OFFICE VISIT (OUTPATIENT)
Dept: SLEEP MEDICINE | Facility: MEDICAL CENTER | Age: 84
End: 2024-10-30
Attending: NURSE PRACTITIONER
Payer: MEDICARE

## 2024-10-30 VITALS
RESPIRATION RATE: 15 BRPM | SYSTOLIC BLOOD PRESSURE: 90 MMHG | HEART RATE: 83 BPM | DIASTOLIC BLOOD PRESSURE: 58 MMHG | OXYGEN SATURATION: 95 % | WEIGHT: 172 LBS | BODY MASS INDEX: 31.65 KG/M2 | HEIGHT: 62 IN

## 2024-10-30 DIAGNOSIS — G47.39 COMPLEX SLEEP APNEA SYNDROME: ICD-10-CM

## 2024-10-30 DIAGNOSIS — G47.34 NOCTURNAL HYPOXIA: ICD-10-CM

## 2024-10-30 PROCEDURE — 99212 OFFICE O/P EST SF 10 MIN: CPT | Performed by: NURSE PRACTITIONER

## 2024-10-30 ASSESSMENT — FIBROSIS 4 INDEX: FIB4 SCORE: 2.23

## 2024-11-01 ENCOUNTER — APPOINTMENT (OUTPATIENT)
Dept: MEDICAL GROUP | Facility: MEDICAL CENTER | Age: 84
End: 2024-11-01
Payer: MEDICARE

## 2024-11-01 VITALS
WEIGHT: 180.4 LBS | HEIGHT: 62 IN | BODY MASS INDEX: 33.2 KG/M2 | SYSTOLIC BLOOD PRESSURE: 108 MMHG | DIASTOLIC BLOOD PRESSURE: 62 MMHG | TEMPERATURE: 97.9 F | HEART RATE: 117 BPM | OXYGEN SATURATION: 89 %

## 2024-11-01 DIAGNOSIS — Z09 HOSPITAL DISCHARGE FOLLOW-UP: Primary | ICD-10-CM

## 2024-11-01 DIAGNOSIS — R25.2 MUSCLE CRAMPS: ICD-10-CM

## 2024-11-01 DIAGNOSIS — I48.11 LONGSTANDING PERSISTENT ATRIAL FIBRILLATION (HCC): ICD-10-CM

## 2024-11-01 DIAGNOSIS — K92.2 GASTROINTESTINAL HEMORRHAGE, UNSPECIFIED GASTROINTESTINAL HEMORRHAGE TYPE: ICD-10-CM

## 2024-11-01 ASSESSMENT — FIBROSIS 4 INDEX: FIB4 SCORE: 2.23

## 2024-11-01 NOTE — PROGRESS NOTES
"Subjective:     Aleshia Crooks is a 84 y.o. female who presents for Hospital Follow-up.    Transitional Care Management  TCM Outreach Date and Time: Filed (10/29/2024  8:31 AM)    Discharge Questions  Actual Discharge Date: 10/28/24  Now that you are home, how are you feeling?: Very Good (\"fantastic\")  Did you receive any new prescriptions?: Yes (omeprazole; STOP asa, cinnamon, furosemide, losartan, pantoprazole)  Were you able to get them filled?: Yes  Meds to Bed or Pharmacy filled?: Meds to Bed  Do you have any questions about your current medications or new medications (Review Med Rec)?: Yes (Please explain) (Discussed medications stopped by IP MD)  Did you have any durable medical equipment ordered?: No  Do you have a follow up appointment scheduled with your PCP?: Yes  Appointment Date: 11/01/24  Appointment Time: 1400  Any issues or paperwork you wish to discuss with your PCP?: Yes (Please specify) (Would like to discuss medications stopped by the internist)  Are you (patient) able to get to the appointment?: Yes  If Home Health was ordered, have they contacted you (Patient): Not Applicable  Did you have enough support after your last discharge?: Yes (Pt's daughter)  Does this patient qualify for the CCM program?: No    Transitional Care  Number of attempts made to contact patient: 1  Current or previous attempts completed within two business days of discharge? : Yes  Provided education regarding treatment plan, medications, self-management, ADLs?: Yes (advancing diet)  Has patient completed an Advanced Directive?: No  Has the Care Manager's phone number provided?: No  Is there anything else I can help you with?: No    Discharge Summary  Chief Complaint: Shortness of Breath    Bloody Stools  Admitting Diagnosis: GI bleed  Discharge Diagnosis: GI bleed      History of Present Illness  The patient presents for a follow-up from her recent hospital visit. She is accompanied by an adult female.    She reports " an improvement in her condition, with no further instances of blood in her stool.  Patient was hospitalized for a lower GI bleed from 10/22/2024 - 10/28/2024.  She presented with symptoms of shortness of breath, melena, and symptomatic blood loss anemia. Her hemoglobin was found to be 7.5, down from 12 5 months prior. She was admitted for suspected upper GI bleed. She was transfused appropriately throughout her admission. She was seen by our GI colleagues who performed an upper endoscopy without significant findings of bleeding. She had subsequent capsule endoscopy with identified bleeding in the small bowel. Push enteroscopy was performed subsequently. However, GI was unable to access the bleed to staunch it. They had planned to transfer her to [facility] for balloon enteroscopy if she had continued to bleed; however, patient stabilized and the bleeding ceased.     She was advised to discontinue aspirin and was switched from Protonix to omeprazole. She is not experiencing dizziness or lightheadedness. She had been experiencing shortness of breath, but this has since improved.    She also mentions experiencing leg cramps at night and restlessness and cramps in her hands.  She was given magnesium intravenously, which she found beneficial. Her last magnesium level was 2.7.     She has an upcoming appointment with Dr. Pink next month.      Current medicines (including reconciliation performed today)  Current Outpatient Medications   Medication Sig Dispense Refill    atorvastatin (LIPITOR) 40 MG Tab Take 1 Tablet by mouth every evening. 30 Tablet 0    bisoprolol (ZEBETA) 10 MG tablet Take 1 Tablet by mouth every morning for 30 days. 30 Tablet 0    omeprazole (PRILOSEC) 20 MG delayed-release capsule Take 1 Capsule by mouth every day. 30 Capsule 0    LEVOXYL 200 MCG Tab Take 1 Tablet by mouth every morning on an empty stomach. 90 Tablet 3    acetaminophen (TYLENOL) 500 MG Tab Take 1,000 mg by mouth every 6 hours as needed for  "Mild Pain.      Cholecalciferol (VITAMIN D) 125 MCG (5000 UT) Cap Take 5,000 Units by mouth every Monday, Wednesday, and Friday.      Vitamins-Lipotropics (MULTI-VITAMIN HP/MINERALS) Cap Take 1 Capsule by mouth every evening.      Secukinumab (COSENTYX SENSOREADY PEN) 150 MG/ML Solution Auto-injector Inject 300mg Sub Cut once monthly (Patient taking differently: Inject 300 mg under the skin every 28 days. Indications: Psoriasis) 1.96 mL 5     No current facility-administered medications for this visit.       Allergies:   Morphine and Sulfa drugs    Social History     Tobacco Use    Smoking status: Former     Current packs/day: 0.00     Average packs/day: 1 pack/day for 31.0 years (31.0 ttl pk-yrs)     Types: Cigarettes     Start date: 5/10/1961     Quit date: 5/10/1992     Years since quittin.5     Passive exposure: Yes    Smokeless tobacco: Never    Tobacco comments:     Started smoking of  do not remember month or day   Vaping Use    Vaping status: Never Used   Substance Use Topics    Alcohol use: No    Drug use: No       ROS:  See HPI    Objective:     Vitals:    24 1504   BP: 108/62   Pulse: (!) 117   Temp: 36.6 °C (97.9 °F)   TempSrc: Temporal   SpO2: 89%   Weight: 81.8 kg (180 lb 6.4 oz)   Height: 1.575 m (5' 2\")     Body mass index is 33 kg/m².    Physical Exam:  Physical Exam  Constitutional:       Appearance: Normal appearance.   Eyes:      Pupils: Pupils are equal, round, and reactive to light.   Cardiovascular:      Rate and Rhythm: Normal rate and regular rhythm.      Pulses: Normal pulses.      Heart sounds: Normal heart sounds.   Pulmonary:      Effort: Pulmonary effort is normal.      Breath sounds: Normal breath sounds.   Abdominal:      General: Bowel sounds are normal.      Palpations: Abdomen is soft.   Neurological:      Mental Status: She is alert and oriented to person, place, and time.   Psychiatric:         Mood and Affect: Mood normal.         Behavior: Behavior normal.      "   Assessment & Plan        Assessment and Plan:   1. Hospital discharge follow-up    2. Gastrointestinal hemorrhage, unspecified gastrointestinal hemorrhage type  Acute, complicated- improving  Her blood pressure is within normal range, but her blood volume remains on the lower end of the normal spectrum.  Hemoglobin and hematocrit were checked 4 days ago,  increased to 8.8/28.2.  A blood test will be ordered to monitor her levels prior to her next appointment with Dr. Pink. She is advised to continue her omeprazole regimen and to abstain from aspirin until her consultation with Dr. Pink. Dietary recommendations include the consumption of iron-rich foods such as leafy green vegetables and oatmeal.  Patient declined iron supplement at this time. Should she experience any bleeding, immediate emergency room visitation is advised.  - CBC WITH DIFFERENTIAL; Future    3. Muscle cramps  Acute, uncomplicated   For her muscle cramps, the intake of pickle juice is suggested.     4. Longstanding persistent atrial fibrillation (HCC)  Chronic, stable  Her EKG from the hospital confirmed atrial fibrillation. No immediate changes to her current management plan are necessary.    - Chart and discharge summary were reviewed.   - Hospitalization and results reviewed with patient.   - Medications reviewed including instructions regarding high risk medications, dosing and side effects.  - Recommended Services: No services needed at this time  - Advance directive/POLST on file?  No     Follow-up:Return in about 4 weeks (around 11/29/2024).    Face-to-face transitional care management services with HIGH (today's visit is within days post discharge & LACE+ score 59+) medical decision complexity were provided.

## 2024-11-06 ENCOUNTER — TELEPHONE (OUTPATIENT)
Dept: CARDIOLOGY | Facility: MEDICAL CENTER | Age: 84
End: 2024-11-06
Payer: MEDICARE

## 2024-11-06 ENCOUNTER — APPOINTMENT (OUTPATIENT)
Dept: CARDIOLOGY | Facility: MEDICAL CENTER | Age: 84
End: 2024-11-06
Attending: INTERNAL MEDICINE
Payer: MEDICARE

## 2024-11-06 NOTE — TELEPHONE ENCOUNTER
Received VM from patient wanting to know if she needs to keep her appt with Dr. Gray 11/13/2024.    Called patient back and advised she does not need to keep her upcoming appt if she does not wish to (she was seen 10/3/2024 as her 1yr post TAVR). Advised I was leaving the appt at this time to ensure she doesn't want to come in. Also sent "[employer] message.

## 2024-11-12 ENCOUNTER — TELEPHONE (OUTPATIENT)
Dept: DERMATOLOGY | Facility: IMAGING CENTER | Age: 84
End: 2024-11-12
Payer: MEDICARE

## 2024-11-12 NOTE — TELEPHONE ENCOUNTER
"Spoke to pt regarding NPAF application, it's due for renewal, pt states she is no longer on Cosentyx due to it \"causing her to bleed internally\"  "

## 2024-11-13 ENCOUNTER — APPOINTMENT (OUTPATIENT)
Dept: CARDIOLOGY | Facility: MEDICAL CENTER | Age: 84
End: 2024-11-13
Attending: INTERNAL MEDICINE
Payer: MEDICARE

## 2024-11-14 ENCOUNTER — OFFICE VISIT (OUTPATIENT)
Dept: NEPHROLOGY | Facility: MEDICAL CENTER | Age: 84
End: 2024-11-14
Payer: MEDICARE

## 2024-11-14 VITALS
HEIGHT: 63 IN | TEMPERATURE: 98.2 F | DIASTOLIC BLOOD PRESSURE: 62 MMHG | OXYGEN SATURATION: 92 % | HEART RATE: 77 BPM | BODY MASS INDEX: 31.18 KG/M2 | WEIGHT: 176 LBS | SYSTOLIC BLOOD PRESSURE: 120 MMHG

## 2024-11-14 DIAGNOSIS — E55.9 VITAMIN D DEFICIENCY: ICD-10-CM

## 2024-11-14 DIAGNOSIS — I10 ESSENTIAL HYPERTENSION: Chronic | ICD-10-CM

## 2024-11-14 DIAGNOSIS — I50.42 CHRONIC COMBINED SYSTOLIC AND DIASTOLIC CONGESTIVE HEART FAILURE (HCC): Chronic | ICD-10-CM

## 2024-11-14 DIAGNOSIS — N39.3 STRESS INCONTINENCE: ICD-10-CM

## 2024-11-14 DIAGNOSIS — D50.0 IRON DEFICIENCY ANEMIA DUE TO CHRONIC BLOOD LOSS: ICD-10-CM

## 2024-11-14 DIAGNOSIS — N18.4 CKD (CHRONIC KIDNEY DISEASE) STAGE 4, GFR 15-29 ML/MIN (HCC): ICD-10-CM

## 2024-11-14 PROCEDURE — G2211 COMPLEX E/M VISIT ADD ON: HCPCS | Performed by: INTERNAL MEDICINE

## 2024-11-14 PROCEDURE — 3078F DIAST BP <80 MM HG: CPT | Performed by: INTERNAL MEDICINE

## 2024-11-14 PROCEDURE — 3074F SYST BP LT 130 MM HG: CPT | Performed by: INTERNAL MEDICINE

## 2024-11-14 PROCEDURE — 99214 OFFICE O/P EST MOD 30 MIN: CPT | Performed by: INTERNAL MEDICINE

## 2024-11-14 ASSESSMENT — ENCOUNTER SYMPTOMS
SHORTNESS OF BREATH: 1
ABDOMINAL PAIN: 1
FEVER: 0

## 2024-11-14 ASSESSMENT — FIBROSIS 4 INDEX: FIB4 SCORE: 2.23

## 2024-11-14 NOTE — PROGRESS NOTES
"Chief Complaint   Patient presents with    Chronic Kidney Disease       CC: f/u CKD    HPI:  Aleshia Crooks is a 84 y.o. female with HTN, Psoriasis on immunosuppression, VNR0w-6, severe mitral regurgitation and valvular heart failure status post MitraClip April 2023, Afib s/p Watchman 7/26/23 c/b cardiac arrest and cardiac tamponade, who presents today for follow up.     Hospitalized in 10/2024 with GI bleed, thought due to Cosentyx. Now off cosentyx.  She was taking Cosentyx for psoriasis.    Re: HTN, diagnosed 1992 when she had a heart attack. BP control over the years has been well controlled. She does check BP at home, usually 110's / 60's.  Denies Light-headedness. During 10/2024 hospitalization, she was taken off losartan 25mg and lasix 40mg once or twice a day depending on fluid status.     Re: CHF, noted in 2017 and 2020. She had mitraclip in 4/2023. She feels like the mitraclip helped her breathing. She is SOB thinks due to anemia. She is waiting for CPAP adjustment.   Has chronic LLE edema due to saphenous vein harvest. No longer on lasix except for occasionally. Used to take lasix 40mg once daily (and she feels diuretic effect, takes lasix 80mg maybe once a month).     Re: CKD, notably worsening in the last year. Her appetite is \"it's good, too good.\" Denies daily headaches, N/V, metallic taste. Denies NSAIDs, takes Tylenol PRN.     Re: Anemia, she needed some blood transfusions in 3/2023. She had c-scope in 1/2023 with diverticulosis, and EGD with nodular mucosa of the stomach. Energy level is fair.  She has needed some retacrit shots, but none since Aug/Sept/2023. She remains off warfarin now because she had the Watchman procedure. She had iron infusions in 8/2023. She had RBC transfusion and 3 \"bags\" of IV iron while hospitalized in 10/2024.       Past Medical History:   Diagnosis Date    Anemia     Apnea, sleep     cpap    Atrial fibrillation (HCC)     Breath shortness     Cataract     fede IOL    " CKD (chronic kidney disease) stage 4, GFR 15-29 ml/min (HCC)     Congestive heart failure (HCC)     Dental disorder     full dentures    Gasping for breath     Heart attack (HCC) 1992    Followed by Dr. 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    Moderate tricuspid regurgitation 11/16/2022    Pneumonia 2019    Psychiatric problem     Anxiety    Snoring     Thyroid disease        Past Surgical History:   Procedure Laterality Date    GASTROSCOPY W/PUSH ENTERSCOPY N/A 10/26/2024    Procedure: GASTROSCOPY, WITH PUSH ENTEROSCOPY;  Surgeon: Niki Spencer M.D.;  Location: SURGERY [facility];  Service: Gastroenterology    CAPSULE ENDOSCOPY ADMINISTRATION  10/24/2024    Procedure: ADMINISTRATION, CAPSULE, FOR CAPSULE ENDOSCOPY;  Surgeon: Shukri Lion M.D.;  Location: SURGERY SAME DAY [facility];  Service: Gastroenterology    CAPSULE ENDOSCOPY RETRIEVAL  10/24/2024    Procedure: RETRIEVAL, ENDOSCOPY CAPSULE;  Surgeon: Shukri Lion M.D.;  Location: SURGERY SAME DAY [facility];  Service: Gastroenterology    FL UPPER GI ENDOSCOPY,DIAGNOSIS N/A 10/23/2024    Procedure: GASTROSCOPY;  Surgeon: Shukri Lion M.D.;  Location: SURGERY SAME DAY [facility];  Service: Gastroenterology    TRANSCATHETER AORTIC VALVE REPLACEMENT Bilateral 11/6/2023    Procedure: TRANSCATHETER AORTIC VALVE REPLACEMENT;  Surgeon: Cesar Gray M.D.;  Location: SURGERY [facility];  Service: Cardiac    ECHOCARDIOGRAM, TRANSESOPHAGEAL, INTRAOPERATIVE N/A 11/6/2023    Procedure: ECHOCARDIOGRAM, TRANSESOPHAGEAL, INTRAOPERATIVE;  Surgeon: Cesar Gray M.D.;  Location: SURGERY [facility];  Service: Cardiac    TRANSCATHETER MITRAL VALVE REPAIR N/A 04/17/2023    Procedure:  TRANSCATHETER MITRAL VALVE PROCEDURE;  Surgeon: Cesar Gray M.D.;  Location: SURGERY [facility];  Service: Cardiac    ECHOCARDIOGRAM, TRANSESOPHAGEAL, INTRAOPERATIVE N/A 04/17/2023    Procedure: ECHOCARDIOGRAM, TRANSESOPHAGEAL, INTRAOPERATIVE;  Surgeon: Cesar Gray M.D.;  Location: SURGERY [facility];  Service: Cardiac    ME COLONOSCOPY,DIAGNOSTIC N/A 01/24/2023    Procedure: COLONOSCOPY;  Surgeon: Amy Zarate M.D.;  Location: SURGERY SAME DAY [facility];  Service: Gastroenterology    ME UPPER GI ENDOSCOPY,DIAGNOSIS N/A 01/24/2023    Procedure: GASTROSCOPY;  Surgeon: Amy Zarate M.D.;  Location: SURGERY SAME DAY [facility];  Service: Gastroenterology    ME UPPER GI ENDOSCOPY,BIOPSY N/A 01/24/2023    Procedure: GASTROSCOPY, WITH BIOPSY;  Surgeon: Amy Zarate M.D.;  Location: SURGERY SAME DAY [facility];  Service: Gastroenterology    CHOLECYSTECTOMY      EYE SURGERY Bilateral     cataracts    MULTIPLE CORONARY ARTERY BYPASS      1 bypass    THYROIDECTOMY TOTAL      TONSILLECTOMY          Outpatient Encounter Medications as of 11/14/2024   Medication Sig Dispense Refill    atorvastatin (LIPITOR) 40 MG Tab Take 1 Tablet by mouth every evening. 30 Tablet 0    bisoprolol (ZEBETA) 10 MG tablet Take 1 Tablet by mouth every morning for 30 days. 30 Tablet 0    omeprazole (PRILOSEC) 20 MG delayed-release capsule Take 1 Capsule by mouth every day. 30 Capsule 0    LEVOXYL 200 MCG Tab Take 1 Tablet by mouth every morning on an empty stomach. 90 Tablet 3    acetaminophen (TYLENOL) 500 MG Tab Take 1,000 mg by mouth every 6 hours as needed for Mild Pain.      Cholecalciferol (VITAMIN D) 125 MCG (5000 UT) Cap Take 5,000 Units by mouth every Monday, Wednesday, and Friday.      Vitamins-Lipotropics (MULTI-VITAMIN HP/MINERALS) Cap Take 1 Capsule by mouth every evening.       No facility-administered encounter medications on file as of 11/14/2024.        Allergies   Allergen Reactions    Morphine Vomiting  "   Sulfa Drugs Hives     .           Family History   Problem Relation Age of Onset    Cancer Mother         cancer, smoker    Stroke Maternal Grandmother     Other Other         Crohn    Kidney Disease Other         kidney transplant       Review of Systems   Constitutional:  Positive for malaise/fatigue. Negative for fever.   Respiratory:  Positive for shortness of breath (with exertion).    Cardiovascular:  Negative for chest pain.   Gastrointestinal:  Positive for abdominal pain (occasionally).   Genitourinary:  Negative for dysuria and hematuria.   All other systems reviewed and are negative.      /62 (BP Location: Right arm, Patient Position: Sitting, BP Cuff Size: Adult)   Pulse 77   Temp 36.8 °C (98.2 °F) (Temporal)   Ht 1.588 m (5' 2.5\")   Wt 79.8 kg (176 lb)   LMP  (LMP Unknown)   SpO2 92%   BMI 31.68 kg/m²     Physical Exam  Constitutional:       General: She is not in acute distress.     Appearance: She is obese.      Comments: Power scooter   HENT:      Mouth/Throat:      Pharynx: No oropharyngeal exudate.   Eyes:      General: No scleral icterus.  Neck:      Trachea: No tracheal deviation.   Cardiovascular:      Rate and Rhythm: Normal rate. Rhythm irregular.      Heart sounds: Murmur heard.   Pulmonary:      Effort: Pulmonary effort is normal.      Breath sounds: Normal breath sounds. No stridor. No rales.   Abdominal:      General: Bowel sounds are normal.      Palpations: Abdomen is soft.      Tenderness: There is no abdominal tenderness.   Musculoskeletal:         General: Normal range of motion.      Cervical back: Neck supple.      Right lower leg: Edema (trace) present.      Left lower leg: Edema (2-3+) present.   Skin:     General: Skin is warm and dry.      Findings: No rash.   Neurological:      General: No focal deficit present.      Mental Status: She is alert and oriented to person, place, and time.   Psychiatric:         Mood and Affect: Mood and affect normal.         " Behavior: Behavior normal.         Labs reviewed.    Recent Labs     05/06/24  1139 10/21/24  1405 10/23/24  1040 10/24/24  0034 10/25/24  0058 10/26/24  0047 10/27/24  0143   ALBUMIN 4.4   < > 3.8   < > 3.7 3.5 3.6  3.7   SODIUM 142   < > 137   < > 138 136 137  137   POTASSIUM 3.5*   < > 4.0   < > 4.1 4.1 4.5  4.3   CHLORIDE 103   < > 104   < > 107 106 107  107   CO2 25   < > 20   < > 20 18* 16*  19*   BUN 24*   < > 53*   < > 38* 29* 30*  30*   CREATININE 1.32   < > 1.96*   < > 1.67* 1.69* 1.94*  1.91*   PHOSPHORUS 3.6  --  3.7  --   --   --  3.4    < > = values in this interval not displayed.       Lab Results   Component Value Date/Time    WBC 7.2 10/28/2024 06:54 AM    RBC 3.00 (L) 10/28/2024 06:54 AM    HEMOGLOBIN 8.8 (L) 10/28/2024 06:54 AM    HEMATOCRIT 28.2 (L) 10/28/2024 06:54 AM    MCV 94.0 10/28/2024 06:54 AM    MCH 29.3 10/28/2024 06:54 AM    MCHC 31.2 (L) 10/28/2024 06:54 AM    MPV 10.5 10/28/2024 06:54 AM             URINALYSIS:  Lab Results   Component Value Date/Time    COLORURINE Yellow 05/06/2024 1138    CLARITY Clear 05/06/2024 1138    SPECGRAVITY 1.009 05/06/2024 1138    PHURINE 6.5 05/06/2024 1138    KETONES Negative 05/06/2024 1138    PROTEINURIN Negative 05/06/2024 1138    BILIRUBINUR Negative 05/06/2024 1138    UROBILU 0.2 05/06/2024 1138    NITRITE Negative 05/06/2024 1138    LEUKESTERAS Negative 05/06/2024 1138    OCCULTBLOOD Negative 05/06/2024 1138       UPC  Lab Results   Component Value Date/Time    TOTPROTUR 11.0 05/06/2024 1138      Lab Results   Component Value Date/Time    CREATININEU 25.20 05/06/2024 1138             Imaging reviewed  No orders to display         Assessment:  Aleshia Crooks is a 84 y.o. female with HTN, Psoriasis on immunosuppression, NCY1c-3, severe mitral regurgitation and valvular heart failure status post MitraClip April 2023, Afib s/p Watchman 7/26/23 c/b cardiac arrest and cardiac tamponade, who presents today for follow up.     Plan:  1. CKD  3b-4  -Creatinine and GFR currently within stage IV CKD.  She had historically been within stage IV CKD.  Patient remains at moderate risk of CKD progression with her microalbuminuria.   Her baseline CKD likely due to history of hypertension, age-related kidney decline.  I explained the importance of blood pressure control to help slow CKD progression.  I recommend avoiding NSAIDs and other nephrotoxins.  I recommend a whole food, plant-based, low-sodium diet.    SGLT2 inhibitor too expensive per patient.  Patient would consider home PD (daughter did PD before transplant) if needed.  If hypertension worsens, I would recommend resuming losartan.    KFRE 2-Year: 5.8% at 10/27/2024  1:43 AM  Calculated from:  Serum Creatinine: 1.91 mg/dL at 10/27/2024  1:43 AM  Urine Albumin Creatinine Ratio: 227 mg/g at 5/6/2024 11:38 AM  Age: 84 years  Sex: Female at 10/27/2024  1:43 AM  Has CKD-3 to CKD-5: Yes    KFRE 5-Year: 17% at 10/27/2024  1:43 AM  Calculated from:  Serum Creatinine: 1.91 mg/dL at 10/27/2024  1:43 AM  Urine Albumin Creatinine Ratio: 227 mg/g at 5/6/2024 11:38 AM  Age: 84 years  Sex: Female at 10/27/2024  1:43 AM  Has CKD-3 to CKD-5: Yes      2. Essential hypertension  -Controlled.  Patient taken off of losartan during October 2020 for hospitalization when she had GI bleed.  Blood pressure well-controlled for now on bisoprolol alone.  If hypertension worsens, I would recommend resuming losartan 25 mg daily, with an option to also resume Lasix 80 mg daily to help with volume overload.    3. Moderate to severe pulmonary hypertension (HCC)  -Likely due to valvular disease from tricuspid and mitral regurgitation.  Remains stable status post MitraClip procedure April 2023.  Currently only mildly volume overloaded.  Consider resuming Lasix as mentioned above.    4. Chronic combined systolic and diastolic congestive heart failure (HCC)  -Patient appears mildly hypervolemic, but blood pressure is controlled.  Patient  unable to afford SGLT2 inhibitor, currently off losartan due to GI bleed and hypotension in October 2024.  If hypertension and volume status worsen, I would recommend resuming previous losartan and Lasix as above.    5. Anemia, unspecified type  -Patient had another GI bleed episode in October 2024, no bleeding source found on EGD.  Patient received blood transfusion and IV iron.  Recommend repeat CBC and iron studies.  If patient remains iron deficient, I would recommend further iron infusions, as she did not tolerate oral iron in the past.  If patient is anemic with hemoglobin less than 10 and not iron deficient, I would recommend referral back to outpatient infusion center for Retacrit injections.      Return to clinic in 4 months with preclinic labs    Ryder Davis MD  Nephrology  [facility]

## 2024-11-18 ENCOUNTER — HOSPITAL ENCOUNTER (OUTPATIENT)
Dept: LAB | Facility: MEDICAL CENTER | Age: 84
End: 2024-11-18
Attending: INTERNAL MEDICINE
Payer: MEDICARE

## 2024-11-18 DIAGNOSIS — K92.2 GASTROINTESTINAL HEMORRHAGE, UNSPECIFIED GASTROINTESTINAL HEMORRHAGE TYPE: ICD-10-CM

## 2024-11-18 DIAGNOSIS — D50.0 IRON DEFICIENCY ANEMIA DUE TO CHRONIC BLOOD LOSS: ICD-10-CM

## 2024-11-18 DIAGNOSIS — K21.9 GASTROESOPHAGEAL REFLUX DISEASE WITHOUT ESOPHAGITIS: ICD-10-CM

## 2024-11-18 LAB
BASOPHILS # BLD AUTO: 0.6 % (ref 0–1.8)
BASOPHILS # BLD: 0.03 K/UL (ref 0–0.12)
COMMENT 1642: NORMAL
EOSINOPHIL # BLD AUTO: 0.15 K/UL (ref 0–0.51)
EOSINOPHIL NFR BLD: 3 % (ref 0–6.9)
ERYTHROCYTE [DISTWIDTH] IN BLOOD BY AUTOMATED COUNT: 62.8 FL (ref 35.9–50)
FERRITIN SERPL-MCNC: 112 NG/ML (ref 10–291)
HCT VFR BLD AUTO: 33 % (ref 37–47)
HGB BLD-MCNC: 9.7 G/DL (ref 12–16)
HYPOCHROMIA BLD QL SMEAR: ABNORMAL
IMM GRANULOCYTES # BLD AUTO: 0.01 K/UL (ref 0–0.11)
IMM GRANULOCYTES NFR BLD AUTO: 0.2 % (ref 0–0.9)
IRON SATN MFR SERPL: 12 % (ref 15–55)
IRON SERPL-MCNC: 39 UG/DL (ref 40–170)
LYMPHOCYTES # BLD AUTO: 0.47 K/UL (ref 1–4.8)
LYMPHOCYTES NFR BLD: 9.4 % (ref 22–41)
MCH RBC QN AUTO: 28.2 PG (ref 27–33)
MCHC RBC AUTO-ENTMCNC: 29.4 G/DL (ref 32.2–35.5)
MCV RBC AUTO: 95.9 FL (ref 81.4–97.8)
MONOCYTES # BLD AUTO: 0.45 K/UL (ref 0–0.85)
MONOCYTES NFR BLD AUTO: 9 % (ref 0–13.4)
MORPHOLOGY BLD-IMP: NORMAL
NEUTROPHILS # BLD AUTO: 3.87 K/UL (ref 1.82–7.42)
NEUTROPHILS NFR BLD: 77.8 % (ref 44–72)
NRBC # BLD AUTO: 0 K/UL
NRBC BLD-RTO: 0 /100 WBC (ref 0–0.2)
OVALOCYTES BLD QL SMEAR: NORMAL
PLATELET # BLD AUTO: 222 K/UL (ref 164–446)
PLATELET BLD QL SMEAR: NORMAL
PMV BLD AUTO: 11.2 FL (ref 9–12.9)
POIKILOCYTOSIS BLD QL SMEAR: NORMAL
RBC # BLD AUTO: 3.44 M/UL (ref 4.2–5.4)
RBC BLD AUTO: PRESENT
TIBC SERPL-MCNC: 320 UG/DL (ref 250–450)
UIBC SERPL-MCNC: 281 UG/DL (ref 110–370)
WBC # BLD AUTO: 5 K/UL (ref 4.8–10.8)

## 2024-11-18 PROCEDURE — 83550 IRON BINDING TEST: CPT

## 2024-11-18 PROCEDURE — 82728 ASSAY OF FERRITIN: CPT

## 2024-11-18 PROCEDURE — 85025 COMPLETE CBC W/AUTO DIFF WBC: CPT

## 2024-11-18 PROCEDURE — 36415 COLL VENOUS BLD VENIPUNCTURE: CPT

## 2024-11-18 PROCEDURE — 83540 ASSAY OF IRON: CPT

## 2024-11-19 DIAGNOSIS — S81.802A OPEN WOUND OF LEFT LOWER EXTREMITY, INITIAL ENCOUNTER: ICD-10-CM

## 2024-11-20 NOTE — PROGRESS NOTES
Patient has open wounds left lower extremity again.  Right leg also tender and red but no open wound yet.  Requests consultation with wound care.  Referral placed.

## 2024-11-28 DIAGNOSIS — D50.0 IRON DEFICIENCY ANEMIA DUE TO CHRONIC BLOOD LOSS: ICD-10-CM

## 2024-11-28 RX ORDER — 0.9 % SODIUM CHLORIDE 0.9 %
10 VIAL (ML) INJECTION PRN
OUTPATIENT
Start: 2024-11-28

## 2024-11-28 RX ORDER — 0.9 % SODIUM CHLORIDE 0.9 %
VIAL (ML) INJECTION PRN
OUTPATIENT
Start: 2024-11-28

## 2024-11-28 RX ORDER — 0.9 % SODIUM CHLORIDE 0.9 %
3 VIAL (ML) INJECTION PRN
OUTPATIENT
Start: 2024-11-28

## 2024-11-28 RX ORDER — DIPHENHYDRAMINE HYDROCHLORIDE 50 MG/ML
50 INJECTION INTRAMUSCULAR; INTRAVENOUS PRN
OUTPATIENT
Start: 2024-11-28

## 2024-11-28 RX ORDER — METHYLPREDNISOLONE SODIUM SUCCINATE 125 MG/2ML
125 INJECTION, POWDER, LYOPHILIZED, FOR SOLUTION INTRAMUSCULAR; INTRAVENOUS PRN
OUTPATIENT
Start: 2024-11-28

## 2024-11-28 RX ORDER — SODIUM CHLORIDE 9 MG/ML
INJECTION, SOLUTION INTRAVENOUS CONTINUOUS
OUTPATIENT
Start: 2024-11-28

## 2024-11-28 RX ORDER — EPINEPHRINE 1 MG/ML(1)
0.5 AMPUL (ML) INJECTION PRN
OUTPATIENT
Start: 2024-11-28

## 2024-11-28 NOTE — PROGRESS NOTES
Remains iron deficient.     Refer to [facility] for IV iron as patient has not tolerated oral iron in the past.    Ryder Davis MD  Nephrology  [facility]

## 2024-12-03 ENCOUNTER — NON-PROVIDER VISIT (OUTPATIENT)
Dept: WOUND CARE | Facility: MEDICAL CENTER | Age: 84
End: 2024-12-03
Payer: MEDICARE

## 2024-12-03 PROCEDURE — 99213 OFFICE O/P EST LOW 20 MIN: CPT

## 2024-12-03 PROCEDURE — 97602 WOUND(S) CARE NON-SELECTIVE: CPT

## 2024-12-03 PROCEDURE — 29581 APPL MULTLAYER CMPRN SYS LEG: CPT

## 2024-12-03 RX ORDER — BISOPROLOL FUMARATE 5 MG/1
10 TABLET, FILM COATED ORAL DAILY
COMMUNITY
End: 2024-12-17

## 2024-12-03 NOTE — CERTIFICATION
.Non Provider Encounter- Lower Extremity Ulcer    HISTORY OF PRESENT ILLNESS    Wound History:   START OF CARE IN CLINIC: 12/03/24    REFERRING PROVIDER: Asael Pink M.D.    WOUND: venous   LOCATION: LLE Anterior     Pertinent Medical History:   - HOW / WHEN injury first occurred: Patient reports she was admitted to hospital 10/22/2024-10/28/2024 for other issues, but while there noted that her leg was rubbing on the blankets and then the wound opened. No home health post discharge. Patient daughter assisting with wound care at home and accompanies patient today.   - Current/home treatment: Lotion, Absorbent pads  - ETIOLOGY HISTORY: Wears compression  but has been unable to tolerate with the large amount of drainage at this time.   Patient was followed by outpatient wound care for BLE venous ulcers in May 2024 which resolved. At that time patient had developed increased redness, swelling in both legs with weeping of clear fluid,  in early to mid April.  She presented to an urgent care on 4/15 where a DVT study was done (negative) and she was prescribed Bactrim.  She was also referred to the wound clinic.  Unfortunately, she had an allergic reaction to to the Bactrim, developing hives and shallow ulcers.  She was seen by her PCP on 4/24 at which time it was noted she still had significant edema along with new ulcerations.  Her antibiotic was switched to cefdinir, and  her diuretic dose increased.               Her left lower extremity has been significantly more edematous than her right for several years since undergoing coronary bypass with donor vessel taken from this leg.     Pertinent Medical History: Edema bilateral lower legs, CVI, CAD, CHF, pulmonary nodules, obesity, pulmonary hypertension    - Recent Vascular Studies: Patient states she had a vascular work up this year but unable to find results/notes in chart.     Labs:   A1c:   Lab Results   Component Value Date/Time    HBA1C 5.1 08/31/2023 08:43 AM         TOBACCO USE:   Tobacco Use: Medium Risk (11/14/2024)    Patient History     Smoking Tobacco Use: Former     Smokeless Tobacco Use: Never     Passive Exposure: Yes     Wound Cx:   Lab Results   Component Value Date/Time    CULTRSULT Usual urogenital jaycee ,000 cfu/mL (A) 02/03/2023 03:32 PM    CULTRSULT (A) 02/03/2023 03:32 PM     Escherichia coli  >100,000 cfu/mL  Presumptive identification         Patient's problem list, allergies, and current medications reviewed and updated in Epic. Please see medical record for details.     WOUND ASSESSMENT  Wound 12/03/24 Venous Ulcer Pretibial Left (Active)   Wound Image    12/03/24 0800   Site Assessment Red;Fragile 12/03/24 0800   Periwound Assessment Edema;Fragile;Rash 12/03/24 0800   Margins Attached edges 12/03/24 0800   Drainage Amount Large 12/03/24 0800   Drainage Description Serosanguineous 12/03/24 0800   Treatments Cleansed;Site care;Nonselective debridement 12/03/24 0800   Wound Cleansing Hypochlorus Acid 12/03/24 0800   Periwound Protectant TAC Ointment;Moisture Barrier 12/03/24 0800   Dressing Status Old drainage 12/03/24 0800   Dressing Changed New 12/03/24 0800   Dressing Cleansing/Solutions Not Applicable 12/03/24 0800   Dressing Options Hydrofiber Silver;Super Absorbent Pad;Compression Wrap Two Layer 12/03/24 0800   Dressing Change/Treatment Frequency Weekly, and As Needed 12/03/24 0800   Non-staged Wound Description Partial thickness 12/03/24 0800   Wound Length (cm) 9 cm 12/03/24 0800   Wound Width (cm) 9 cm 12/03/24 0800   Wound Depth (cm) 0.1 cm 12/03/24 0800   Wound Surface Area (cm^2) 81 cm^2 12/03/24 0800   Wound Volume (cm^3) 8.1 cm^3 12/03/24 0800   Post-Procedure Length (cm) 9 cm 12/03/24 0800   Post-Procedure Width (cm) 9 cm 12/03/24 0800   Post-Procedure Depth (cm) 0.1 cm 12/03/24 0800   Post-Procedure Surface Area (cm^2) 81 cm^2 12/03/24 0800   Post-Procedure Volume (cm^3) 8.1 cm^3 12/03/24 0800   Tunneling (cm) 0 cm 12/03/24 0800    Undermining (cm) 0 cm 12/03/24 0800   Wound Odor None 12/03/24 0800   Pulses DP;PT;1+;Left 12/03/24 0800   Exposed Structures None 12/03/24 0800   Number of days: 0        PROCEDURES    Wound cleansed, assessed, and photo taken for chart. Hypochlorous Acid soak to wound bed allowed to dwell for approximately 5 minutes. Non-selective debridement to wound and neelam-wound using hypochlorous acid and gauze to remove biofilm and non-viable tissue.    2 Layer compression wrap applied. Patient given Tubigrip F in case wrap becomes saturated or dislodges.     Recommend patient return to clinic this Friday for follow up to assess drainage and compression.       PATIENT EDUCATION    - Patient educated on wound clinic goals for care, moist wound healing, dressing selection, and how to apply dressing  - Educated on s/s of infection and when to seek emergency medical attention  - Order for wound supplies placed: No   - Requested next visit on provider: No   - Etiology of venous ulceration discussed with patient  - Importance of managing edema for healing of ulcer, and for prevention of new ulcer development  - Need for lifelong compression of lower legs   - Elevate legs above the level of the heart periodically throughout the day.  - Importance of adequate nutrition for wound healing  - Increase protein intake (unless contraindicated by renal status)  - Discussed importance of smoking cessation.

## 2024-12-03 NOTE — PATIENT INSTRUCTIONS
-Keep your wound dressing clean, dry, and intact. Only change dressing if it's over saturated, soiled or falls off.      -Remove your compression stocking or wrap if you have severe pain, severe swelling, numbness, color change, or temperature change in your toes. If you need to remove your compression wrap or stocking, do so by unrolling it. Do not cut the compression stocking or wrap off to prevent cutting yourself on accident.    -Should you experience any significant changes in your wound(s), such as infection (redness, swelling, localized heat, increased pain, fever > 101 F, chills) or have any questions regarding your home care instructions, please contact the wound center at [phone]. If after hours, contact your primary care physician or go to the hospital emergency room.     If you are admitted to any hospital, you will need a new referral to come back to the wound clinic from your primary care or other provider. Any scheduled appointments that you already have, may be cancelled and will need to be updated.

## 2024-12-06 ENCOUNTER — NON-PROVIDER VISIT (OUTPATIENT)
Dept: WOUND CARE | Facility: MEDICAL CENTER | Age: 84
End: 2024-12-06
Payer: MEDICARE

## 2024-12-06 PROCEDURE — 99211 OFF/OP EST MAY X REQ PHY/QHP: CPT

## 2024-12-06 NOTE — CERTIFICATION
Discharge Note      Referring Provider: Asael Pink M.D.  Wound location: Left Anterior Lower Leg  Date of Discharge: 12/6/2024  Photo:      Discharge patient at this time secondary to wound resolution. Patient advised that she does not need to cover the new skin with a bandage, however she should wear her compression socks every day. Patient advised that she will need a new referral if she needs to return to the wound clinic in the future.

## 2024-12-06 NOTE — PATIENT INSTRUCTIONS
-Wear your compression socks every day. Apply your compression sock as soon as you wake up in the morning.    -Please obtain a new referral if you need to return to [facility].

## 2024-12-10 ENCOUNTER — APPOINTMENT (OUTPATIENT)
Dept: WOUND CARE | Facility: MEDICAL CENTER | Age: 84
End: 2024-12-10
Payer: MEDICARE

## 2024-12-13 ENCOUNTER — OUTPATIENT INFUSION SERVICES (OUTPATIENT)
Dept: ONCOLOGY | Facility: MEDICAL CENTER | Age: 84
End: 2024-12-13
Attending: INTERNAL MEDICINE
Payer: MEDICARE

## 2024-12-13 ENCOUNTER — APPOINTMENT (OUTPATIENT)
Dept: WOUND CARE | Facility: MEDICAL CENTER | Age: 84
End: 2024-12-13
Payer: MEDICARE

## 2024-12-13 VITALS
HEART RATE: 83 BPM | BODY MASS INDEX: 35.3 KG/M2 | DIASTOLIC BLOOD PRESSURE: 64 MMHG | OXYGEN SATURATION: 90 % | WEIGHT: 191.8 LBS | SYSTOLIC BLOOD PRESSURE: 103 MMHG | HEIGHT: 62 IN | RESPIRATION RATE: 18 BRPM | TEMPERATURE: 97.7 F

## 2024-12-13 DIAGNOSIS — D50.0 IRON DEFICIENCY ANEMIA DUE TO CHRONIC BLOOD LOSS: ICD-10-CM

## 2024-12-13 PROCEDURE — 96365 THER/PROPH/DIAG IV INF INIT: CPT

## 2024-12-13 PROCEDURE — 700111 HCHG RX REV CODE 636 W/ 250 OVERRIDE (IP): Mod: JZ,JG | Performed by: INTERNAL MEDICINE

## 2024-12-13 PROCEDURE — 700105 HCHG RX REV CODE 258: Performed by: INTERNAL MEDICINE

## 2024-12-13 RX ORDER — METHYLPREDNISOLONE SODIUM SUCCINATE 125 MG/2ML
125 INJECTION, POWDER, LYOPHILIZED, FOR SOLUTION INTRAMUSCULAR; INTRAVENOUS PRN
Status: CANCELLED | OUTPATIENT
Start: 2024-12-20

## 2024-12-13 RX ORDER — 0.9 % SODIUM CHLORIDE 0.9 %
3 VIAL (ML) INJECTION PRN
Status: CANCELLED | OUTPATIENT
Start: 2024-12-20

## 2024-12-13 RX ORDER — DIPHENHYDRAMINE HYDROCHLORIDE 50 MG/ML
50 INJECTION INTRAMUSCULAR; INTRAVENOUS PRN
Status: CANCELLED | OUTPATIENT
Start: 2024-12-20

## 2024-12-13 RX ORDER — SODIUM CHLORIDE 9 MG/ML
INJECTION, SOLUTION INTRAVENOUS CONTINUOUS
Status: CANCELLED | OUTPATIENT
Start: 2024-12-20

## 2024-12-13 RX ORDER — 0.9 % SODIUM CHLORIDE 0.9 %
10 VIAL (ML) INJECTION PRN
Status: CANCELLED | OUTPATIENT
Start: 2024-12-20

## 2024-12-13 RX ORDER — 0.9 % SODIUM CHLORIDE 0.9 %
VIAL (ML) INJECTION PRN
Status: CANCELLED | OUTPATIENT
Start: 2024-12-20

## 2024-12-13 RX ORDER — EPINEPHRINE 1 MG/ML(1)
0.5 AMPUL (ML) INJECTION PRN
Status: CANCELLED | OUTPATIENT
Start: 2024-12-20

## 2024-12-13 RX ADMIN — FERUMOXYTOL 510 MG: 510 INJECTION INTRAVENOUS at 15:20

## 2024-12-13 ASSESSMENT — FIBROSIS 4 INDEX: FIB4 SCORE: 2.6

## 2024-12-14 NOTE — PROGRESS NOTES
Pt arrived to IS, ambulatory, for dose 1 of 2 Feraheme. Pt has received this medication in the past. 22g PIV established in the R-AC, positive blood return noted. Feraheme infused with no s/sx of adverse reaction. 30 minute monitoring completed with no complications. PIV flushed and removed. Pt left IS with no s/sx of distress. Follow up appointment confirmed.

## 2024-12-17 ENCOUNTER — APPOINTMENT (OUTPATIENT)
Dept: WOUND CARE | Facility: MEDICAL CENTER | Age: 84
End: 2024-12-17
Payer: MEDICARE

## 2024-12-17 ENCOUNTER — OFFICE VISIT (OUTPATIENT)
Dept: MEDICAL GROUP | Facility: MEDICAL CENTER | Age: 84
End: 2024-12-17
Payer: MEDICARE

## 2024-12-17 VITALS
RESPIRATION RATE: 14 BRPM | HEART RATE: 58 BPM | OXYGEN SATURATION: 94 % | DIASTOLIC BLOOD PRESSURE: 70 MMHG | SYSTOLIC BLOOD PRESSURE: 130 MMHG | TEMPERATURE: 98 F | WEIGHT: 191 LBS | BODY MASS INDEX: 36.06 KG/M2 | HEIGHT: 61 IN

## 2024-12-17 DIAGNOSIS — R60.9 2+ PITTING EDEMA: ICD-10-CM

## 2024-12-17 DIAGNOSIS — I10 ESSENTIAL HYPERTENSION: Chronic | ICD-10-CM

## 2024-12-17 DIAGNOSIS — L03.116 CELLULITIS OF LEFT ANTERIOR LOWER LEG: ICD-10-CM

## 2024-12-17 PROBLEM — D62 ANEMIA ASSOCIATED WITH ACUTE BLOOD LOSS: Status: RESOLVED | Noted: 2023-01-23 | Resolved: 2024-12-17

## 2024-12-17 PROBLEM — N17.9 AKI (ACUTE KIDNEY INJURY) (HCC): Status: RESOLVED | Noted: 2024-10-22 | Resolved: 2024-12-17

## 2024-12-17 PROCEDURE — 99214 OFFICE O/P EST MOD 30 MIN: CPT | Performed by: FAMILY MEDICINE

## 2024-12-17 PROCEDURE — 3078F DIAST BP <80 MM HG: CPT | Performed by: FAMILY MEDICINE

## 2024-12-17 PROCEDURE — 3075F SYST BP GE 130 - 139MM HG: CPT | Performed by: FAMILY MEDICINE

## 2024-12-17 RX ORDER — LOSARTAN POTASSIUM 25 MG/1
25 TABLET ORAL DAILY
Qty: 90 TABLET | Refills: 2 | Status: SHIPPED | OUTPATIENT
Start: 2024-12-17

## 2024-12-17 RX ORDER — FUROSEMIDE 20 MG/1
20 TABLET ORAL EVERY MORNING
Qty: 90 TABLET | Refills: 2 | Status: SHIPPED | OUTPATIENT
Start: 2024-12-17

## 2024-12-17 RX ORDER — CEFUROXIME AXETIL 250 MG/1
250 TABLET ORAL 2 TIMES DAILY
Qty: 20 TABLET | Refills: 0 | Status: SHIPPED | OUTPATIENT
Start: 2024-12-17 | End: 2024-12-27

## 2024-12-17 ASSESSMENT — FIBROSIS 4 INDEX: FIB4 SCORE: 2.6

## 2024-12-17 NOTE — PROGRESS NOTES
Chief Complaint   Patient presents with    Wound Check     LLE       Subjective:     HPI:   Aleshia Crooks presents today with the followin. Cellulitis of left anterior lower leg  Prominent erythema and heat left anterior shin going from a few centimeters below the knee to all the way to the midfoot.  There is some mild swelling.  There is serous seepage from the skin with mild foul odor.  No open ulcer.  I feel this does represent infection and we will treat her with Ceftin twice daily.  I am also adding furosemide.  She will keep her legs elevated.    2. 2+ pitting edema  Peripheral edema bilaterally though the right is 1+ in the left is 2+ pitting edema.  I believe that some of the left lower extremity edema is due to her infection.    3. Essential hypertension  She has had to stop bisoprolol, makes her feel ill.  She did take it the last couple of days.  However, it makes her feel ill every time.  Note from Dr. ANDERSON reviewed.  He suggested losartan if we needed to resume blood pressure medication.  I have placed her on a prescription of low-dose losartan and have also resumed low-dose furosemide for her edema.  I have asked her to check her blood pressure daily.        Patient Active Problem List    Diagnosis Date Noted    Shortness of breath 10/26/2024    Elevated bilirubin 10/24/2024    Back pain 10/24/2024    Restless leg syndrome 10/24/2024    GI bleed 10/22/2024    MI (myocardial infarction) [facility]) 10/22/2024    Severe tricuspid regurgitation by prior echocardiogram 2024    2+ pitting edema 2024    Cellulitis of left anterior lower leg 2024    Nocturnal hypoxia 2024    Supraumbilical hernia 2024    Skin induration 2024    S/P TAVR (transcatheter aortic valve replacement) 2023    Diverticulosis 10/02/2023    Spondylolysis 10/02/2023    Pulmonary nodules 10/02/2023    Psoriasis 2023    Anemia of chronic disease 2023    S/P mitral valve clip  implantation 04/18/2023    H/O: GI bleed 04/10/2023    Contraindication to anticoagulation therapy 04/10/2023    Situational stress 10/27/2021    Falls 03/01/2021    Stress incontinence 03/01/2021    CKD (chronic kidney disease) stage 4, GFR 15-29 ml/min ([facility]) 09/11/2020    Gastroesophageal reflux disease without esophagitis 06/08/2020    Mixed hyperlipidemia 04/17/2020    Hepatic steatosis 04/17/2020    Elevated alkaline phosphatase level 04/17/2020    Iron deficiency anemia due to chronic blood loss 04/08/2020    History of malignant melanoma 03/12/2020    Skin lesions 03/12/2020    Essential hypertension 03/12/2020    Hypothyroid 03/12/2020    Obesity, Class II, BMI 35-39.9 03/12/2020    Complex sleep apnea syndrome 03/12/2020    Moderate to severe pulmonary hypertension (HCC) 07/14/2017    Chronic combined systolic and diastolic congestive heart failure (HCC) 07/14/2017    Longstanding persistent atrial fibrillation (HCC) 07/14/2017    Coronary artery disease involving native coronary artery of native heart without angina pectoris 07/14/2017    Situational anxiety 07/13/2017       Current medicines (including changes today)  Current Outpatient Medications   Medication Sig Dispense Refill    cefUROXime (CEFTIN) 250 MG Tab Take 1 Tablet by mouth 2 times a day for 10 days. 20 Tablet 0    furosemide (LASIX) 20 MG Tab Take 1 Tablet by mouth every morning. 90 Tablet 2    losartan (COZAAR) 25 MG Tab Take 1 Tablet by mouth every day. 90 Tablet 2    omeprazole (PRILOSEC) 20 MG delayed-release capsule Take 1 Capsule by mouth every day. 90 Capsule 3    atorvastatin (LIPITOR) 40 MG Tab Take 1 Tablet by mouth every evening. 30 Tablet 0    LEVOXYL 200 MCG Tab Take 1 Tablet by mouth every morning on an empty stomach. 90 Tablet 3    acetaminophen (TYLENOL) 500 MG Tab Take 1,000 mg by mouth every 6 hours as needed for Mild Pain.      Cholecalciferol (VITAMIN D) 125 MCG (5000 UT) Cap Take 5,000 Units by mouth every Monday,  "Wednesday, and Friday.      Vitamins-Lipotropics (MULTI-VITAMIN HP/MINERALS) Cap Take 1 Capsule by mouth every evening.       No current facility-administered medications for this visit.       Allergies   Allergen Reactions    Morphine Vomiting    Sulfa Drugs Hives     .       ROS: As per HPI       Objective:     /70   Pulse (!) 58   Temp 36.7 °C (98 °F)   Resp 14   Ht 1.549 m (5' 1\")   Wt 86.6 kg (191 lb)   SpO2 94%  Body mass index is 36.09 kg/m².    Physical Exam:  Constitutional: Well-developed and well-nourished. Not diaphoretic. No distress. Lucid and fluent.  Skin: Skin is warm and dry. Swelling and redness left anterior lower extremity with redness now extending to the top of the foot.  She is having seepage.    Head: Atraumatic without lesions.  Eyes: Conjunctivae and extraocular motions are normal. Pupils are equal, round, and reactive to light. No scleral icterus.   Ears:  External ears unremarkable.   Neck: Supple, trachea midline. No thyromegaly present. No cervical or supraclavicular lymphadenopathy. No JVD appreciated  Cardiovascular: Regular rate and rhythm.  Normal S1, S2 without murmur appreciated.  Chest: Effort normal. Clear to auscultation throughout. No adventitious sounds.  Good air movement.  No rales  Extremities: No cyanosis, clubbing.  Significant swelling and erythema of left LE with serous drainage on dressing. Erythema and heat from 5 cm below the knee and redness onto the top of the foot.     Neurological: Alert and oriented x 3.  In wheelchair.  Psychiatric:  Behavior, mood, and affect are appropriate.       Assessment and Plan:     84 y.o. female with the following issues:    1. Cellulitis of left anterior lower leg  cefUROXime (CEFTIN) 250 MG Tab      2. 2+ pitting edema  furosemide (LASIX) 20 MG Tab      3. Essential hypertension  losartan (COZAAR) 25 MG Tab        She will let me know if the symptoms do not improve within 72 hours.  I expect her to start seeing some " improvement after 48 hours.  If it worsens she will go to ER.    Followup: Return in about 4 months (around 4/17/2025), or if symptoms worsen or fail to improve.

## 2024-12-20 ENCOUNTER — OUTPATIENT INFUSION SERVICES (OUTPATIENT)
Dept: ONCOLOGY | Facility: MEDICAL CENTER | Age: 84
End: 2024-12-20
Attending: INTERNAL MEDICINE
Payer: MEDICARE

## 2024-12-20 ENCOUNTER — APPOINTMENT (OUTPATIENT)
Dept: WOUND CARE | Facility: MEDICAL CENTER | Age: 84
End: 2024-12-20
Payer: MEDICARE

## 2024-12-20 VITALS
HEIGHT: 62 IN | DIASTOLIC BLOOD PRESSURE: 66 MMHG | RESPIRATION RATE: 18 BRPM | WEIGHT: 189.6 LBS | TEMPERATURE: 96.7 F | BODY MASS INDEX: 34.89 KG/M2 | OXYGEN SATURATION: 91 % | HEART RATE: 90 BPM | SYSTOLIC BLOOD PRESSURE: 117 MMHG

## 2024-12-20 DIAGNOSIS — L03.116 CELLULITIS OF LEFT ANTERIOR LOWER LEG: ICD-10-CM

## 2024-12-20 DIAGNOSIS — D50.0 IRON DEFICIENCY ANEMIA DUE TO CHRONIC BLOOD LOSS: ICD-10-CM

## 2024-12-20 PROCEDURE — 700111 HCHG RX REV CODE 636 W/ 250 OVERRIDE (IP): Mod: JZ,JG | Performed by: INTERNAL MEDICINE

## 2024-12-20 PROCEDURE — 96365 THER/PROPH/DIAG IV INF INIT: CPT

## 2024-12-20 PROCEDURE — 700105 HCHG RX REV CODE 258: Performed by: INTERNAL MEDICINE

## 2024-12-20 RX ORDER — 0.9 % SODIUM CHLORIDE 0.9 %
3 VIAL (ML) INJECTION PRN
OUTPATIENT
Start: 2024-12-20

## 2024-12-20 RX ORDER — 0.9 % SODIUM CHLORIDE 0.9 %
10 VIAL (ML) INJECTION PRN
OUTPATIENT
Start: 2024-12-20

## 2024-12-20 RX ORDER — METHYLPREDNISOLONE SODIUM SUCCINATE 125 MG/2ML
125 INJECTION, POWDER, LYOPHILIZED, FOR SOLUTION INTRAMUSCULAR; INTRAVENOUS PRN
OUTPATIENT
Start: 2024-12-20

## 2024-12-20 RX ORDER — 0.9 % SODIUM CHLORIDE 0.9 %
VIAL (ML) INJECTION PRN
OUTPATIENT
Start: 2024-12-20

## 2024-12-20 RX ORDER — EPINEPHRINE 1 MG/ML(1)
0.5 AMPUL (ML) INJECTION PRN
OUTPATIENT
Start: 2024-12-20

## 2024-12-20 RX ORDER — DIPHENHYDRAMINE HYDROCHLORIDE 50 MG/ML
50 INJECTION INTRAMUSCULAR; INTRAVENOUS PRN
OUTPATIENT
Start: 2024-12-20

## 2024-12-20 RX ORDER — SODIUM CHLORIDE 9 MG/ML
INJECTION, SOLUTION INTRAVENOUS CONTINUOUS
OUTPATIENT
Start: 2024-12-20

## 2024-12-20 RX ADMIN — FERUMOXYTOL 510 MG: 510 INJECTION INTRAVENOUS at 15:15

## 2024-12-20 ASSESSMENT — FIBROSIS 4 INDEX: FIB4 SCORE: 2.6

## 2024-12-21 NOTE — PROGRESS NOTES
Pt arrived to IS, ambulatory, for dose 2 of 2 Feraheme. Pt has received this medication in the past. 22g PIV established in the R-AC, positive blood return noted. Feraheme infused with no s/sx of adverse reaction. 30 minute monitoring completed with no complications. PIV flushed and removed. Pt left IS with no s/sx of distress. Follow up appointment not necessary at this time.

## 2024-12-27 ENCOUNTER — APPOINTMENT (OUTPATIENT)
Dept: WOUND CARE | Facility: MEDICAL CENTER | Age: 84
End: 2024-12-27
Payer: MEDICARE

## 2024-12-31 ENCOUNTER — OFFICE VISIT (OUTPATIENT)
Dept: WOUND CARE | Facility: MEDICAL CENTER | Age: 84
End: 2024-12-31
Payer: MEDICARE

## 2024-12-31 VITALS
SYSTOLIC BLOOD PRESSURE: 110 MMHG | HEART RATE: 105 BPM | DIASTOLIC BLOOD PRESSURE: 70 MMHG | RESPIRATION RATE: 18 BRPM | TEMPERATURE: 97.2 F | OXYGEN SATURATION: 91 %

## 2024-12-31 DIAGNOSIS — I89.0 LYMPHEDEMA: ICD-10-CM

## 2024-12-31 DIAGNOSIS — I87.2 VENOUS INSUFFICIENCY (CHRONIC) (PERIPHERAL): ICD-10-CM

## 2024-12-31 DIAGNOSIS — I87.319 CHRONIC VENOUS HYPERTENSION W ULCERATION, UNSPECIFIED LATERALITY (HCC): ICD-10-CM

## 2024-12-31 DIAGNOSIS — L97.909 CHRONIC VENOUS HYPERTENSION W ULCERATION, UNSPECIFIED LATERALITY (HCC): ICD-10-CM

## 2024-12-31 PROCEDURE — 3074F SYST BP LT 130 MM HG: CPT | Performed by: STUDENT IN AN ORGANIZED HEALTH CARE EDUCATION/TRAINING PROGRAM

## 2024-12-31 PROCEDURE — 3078F DIAST BP <80 MM HG: CPT | Performed by: STUDENT IN AN ORGANIZED HEALTH CARE EDUCATION/TRAINING PROGRAM

## 2024-12-31 PROCEDURE — 99214 OFFICE O/P EST MOD 30 MIN: CPT

## 2024-12-31 PROCEDURE — 99214 OFFICE O/P EST MOD 30 MIN: CPT | Performed by: STUDENT IN AN ORGANIZED HEALTH CARE EDUCATION/TRAINING PROGRAM

## 2024-12-31 NOTE — NON-PROVIDER
Wound cleansed, assessed, and photo taken for chart. Hypochlorous Acid soak to wound bed allowed to dwell for approximately 3 minutes.     Provider visit. See flowsheet for full details. Dressing applied.    2 Layer compression wrap applied. Tubi F applied to RLE.

## 2024-12-31 NOTE — PATIENT INSTRUCTIONS
-Keep your wound dressing clean, dry, and intact. Only change dressing if it's over saturated, soiled or falls off.      -Remove your compression stocking or wrap if you have severe pain, severe swelling, numbness, color change, or temperature change in your toes. If you need to remove your compression wrap or stocking, do so by unrolling it. Do not cut the compression stocking or wrap off to prevent cutting yourself on accident.    -Should you experience any significant changes in your wound(s), such as infection (redness, swelling, localized heat, increased pain, fever > 101 F, chills) or have any questions regarding your home care instructions, please contact the wound center at [phone]. If after hours, contact your primary care physician or go to the hospital emergency room.     If you are admitted to any hospital, you will need a new referral to come back to the wound clinic from your primary care or other provider. Any scheduled appointments that you already have, may be cancelled and will need to be updated.

## 2025-01-01 ASSESSMENT — ENCOUNTER SYMPTOMS
FEVER: 0
CHILLS: 0
ROS SKIN COMMENTS: OPEN WOUND LEFT LOWER EXTREMITY
CLAUDICATION: 0

## 2025-01-01 NOTE — PROGRESS NOTES
Provider Encounter- Lower Extremity Ulcer      HISTORY OF PRESENT ILLNESS  Wound History:    START OF CARE IN CLINIC: 12/31/2024    REFERRING PROVIDER: Asael Pink      WOUND ETIOLOGY: Venous / Lymphedema   LOCATION: LLE anterior   HISTORY:  84F well known to clinic for lower extremity wounds associated with venous insufficiency and lymphedema. Patient previously seen in wound clinic earlier this month and was discharged for wound healing. She was recommended to wear compression, but did not wear and wound reoccurred shortly after. Patient was referred back to [facility] for further wound care.    Pertinent Medical History: Fatty liver, Afib, CAD s/p CABG, TAVR, chronic lower extremity edema.       TOBACCO USE:  Former    Patient's problem list, allergies, and current medications reviewed and updated in Epic    Interval History:  1/31/2024: Clinic visit with Abiel Cuevas MD. Patient is frustrated by recurrence of wound. Reports reoccurred shortly after last clinic visit. Patient's edema is not well controlled. She has edema from lower extremity extending to thighs. She has compression socks and velcro compression garment from previous wounds but does not wear due to discomfort and causing edema proximally in thighs. Counseled about association with wounds and edema, need for compression. She previously tolerated 2 layer compression.    REVIEW OF SYSTEMS:   Review of Systems   Constitutional:  Negative for chills, fever and malaise/fatigue.   Cardiovascular:  Positive for leg swelling. Negative for claudication.   Skin:         Open wound left lower extremity         PHYSICAL EXAMINATION:   /70   Pulse (!) 105   Temp 36.2 °C (97.2 °F) (Temporal)   Resp 18   LMP  (LMP Unknown)   SpO2 91%     Physical Exam  Constitutional:       General: She is not in acute distress.     Appearance: She is obese.   Cardiovascular:      Rate and Rhythm: Tachycardia present.      Comments: Palpable pedal pulses  Pulmonary:       Effort: Pulmonary effort is normal. No respiratory distress.   Musculoskeletal:      Right lower leg: Edema present.      Left lower leg: Edema present.      Comments: Edema in lower extremities, L > R and extending to thighs non pitting   Skin:     Comments: Left anterior lower extremity wounds: Constellation of small wounds, weeping serous fluid. Periwound skin irritation from drainage. No evidence of infection   Neurological:      Mental Status: She is alert.         WOUND ASSESSMENT  Wound 12/31/24 Venous Ulcer Pretibial Left (Active)   Wound Image   12/31/24 1300   Site Assessment Red;Pink;Edema;Painful 12/31/24 1300   Periwound Assessment Edema;Red 12/31/24 1300   Drainage Amount Large 12/31/24 1300   Drainage Description Serous 12/31/24 1300   Treatments Site care;Cleansed 12/31/24 1300   Wound Cleansing Hypochlorus Acid 12/31/24 1300   Periwound Protectant No-sting Skin Prep;Moisture Barrier 12/31/24 1300   Dressing Status Old drainage 12/31/24 1300   Dressing Changed New 12/31/24 1300   Dressing Cleansing/Solutions Not Applicable 12/31/24 1300   Dressing Options Hydrofiber Silver;Super Absorbent Pad;Compression Wrap Two Layer 12/31/24 1300   Dressing Change/Treatment Frequency Weekly, and As Needed 12/31/24 1300   Wound Length (cm) 12 cm 12/31/24 1300   Wound Width (cm) 9 cm 12/31/24 1300   Wound Depth (cm) 0.1 cm 12/31/24 1300   Wound Surface Area (cm^2) 108 cm^2 12/31/24 1300   Wound Volume (cm^3) 10.8 cm^3 12/31/24 1300   Tunneling (cm) 0 cm 12/31/24 1300   Undermining (cm) 0 cm 12/31/24 1300   Wound Odor None 12/31/24 1300   Exposed Structures None 12/31/24 1300   Number of days: 1           PROCEDURE:   - No excisional debridement required at this time.      Pertinent Labs and Diagnostics:    Labs:   A1c:   Lab Results   Component Value Date/Time    HBA1C 5.1 08/31/2023 08:43 AM      IMAGING: None    VASCULAR STUDIES:  No results found.    LAST  WOUND CULTURE:  DATE :    Lab Results   Component  Value Date/Time    CULTRSULT Usual urogenital jaycee ,000 cfu/mL (A) 02/03/2023 03:32 PM    CULTRSULT (A) 02/03/2023 03:32 PM     Escherichia coli  >100,000 cfu/mL  Presumptive identification                ASSESSMENT AND PLAN:     1. Chronic venous hypertension w ulceration, unspecified laterality (HCC)  12/31/2024  - Recurrence of wound, reoccured shortly after discharge due to noncompliance with compression.  - No need for debridement today  - Counseled today on venous disease and lymphedema, need for compression to treat wounds and to maintain skin integrity and prevent future wounds.  - Patient previously tolerated 2 layer compression, resume today.   Wound Care: Hydrofiber silver, Super absorbent pad, 2 layer compression    2. Venous insufficiency (chronic) (peripheral)  3. Lymphedema    12/31/2024  - Patient with skin changes, limb size discrepancy, non pitting edema in thighs consistent with lymphedema due to venous disease  - Has velcro compression garment, does not wear  - Counseled about etiology of both conditions and need for compression  - Will place referral to lymphedema clinic  - Will order pneumatic compression device due to lymphedema, failed months of elevation and compression. Medically necessary to help manage lymphedema.    PATIENT EDUCATION  - Etiology of venous stasis ulceration discussed with patient  - Importance of managing edema for healing of ulcer, and for prevention of new ulcer development  -Need for lifelong compression of lower legs   -Elevate legs above the level of the heart periodically throughout the day.  - Importance of adequate nutrition for wound healing  -Advised to go to ER for any increased redness, swelling, drainage or odor, or if patient develops fever, chills, nausea or vomiting.    My total time spent caring for the patient on the day of the encounter was 30 minutes, reviewing history, assessment, counseling and education, and coordination of care.  This does  not include time spent on separately billable procedures/tests.    Please note that this note may have been created using voice recognition software. I have worked with technical experts from [facility] to optimize the interface.  I have made every reasonable attempt to correct obvious errors, but there may be errors of grammar and possibly     N

## 2025-01-02 ENCOUNTER — TELEPHONE (OUTPATIENT)
Dept: WOUND CARE | Facility: MEDICAL CENTER | Age: 85
End: 2025-01-02
Payer: MEDICARE

## 2025-01-03 ENCOUNTER — APPOINTMENT (OUTPATIENT)
Dept: WOUND CARE | Facility: MEDICAL CENTER | Age: 85
End: 2025-01-03
Payer: MEDICARE

## 2025-01-07 ENCOUNTER — NON-PROVIDER VISIT (OUTPATIENT)
Dept: WOUND CARE | Facility: MEDICAL CENTER | Age: 85
End: 2025-01-07
Attending: FAMILY MEDICINE
Payer: MEDICARE

## 2025-01-07 PROCEDURE — 97602 WOUND(S) CARE NON-SELECTIVE: CPT

## 2025-01-07 NOTE — PROGRESS NOTES
Non selective debridement with no rinse foam cleanser and warm washcloth to remove loose non viable tissue from LLE wound and neelam wound.     Patient presents for appointment with 2 layer compression wrap bunched up mid calf. Patient states that she had a wrinkle in the wrap that was causing pain in her leg so she cut a slit in the top of the wrap to alleviate pressure.     Discussed importance of having wrap end closer to popliteal fossa. Instructed patient that this RN will wrap slightly higher so that wrap sits above the proximal portion of the calf, over the highest point to prevent rolling of the wrap. Instructed patient that if 2 layer compression wrap begins to roll to remove it completely instead of cutting portions off.     Discussed with patient importance of lifelong compression with patient, and that wounds of this type are very likely to re open without proper compression. Patient had a referral placed to [facility]. Patient states that she has not heard from them yet.

## 2025-01-07 NOTE — PATIENT INSTRUCTIONS
-Keep dressings clean and dry. Change dressings if they become over saturated, soiled or fall off.     -If you need to change your dressings at home: Wash your wound with normal saline, wound cleanser, or unscented soap and water prior to applying your new dressings. Please avoid cleansing with hydrogen peroxide or rubbing alcohol. Hydrogen peroxide and rubbing alcohol are toxic to new tissue and skin cells.    -Avoid prolonged standing or sitting without elevating your legs.    -Remove your compression garments if you have severe pain, severe swelling, numbness, color change, or temperature change in your toes. If you need to remove your compression garments, do so by unrolling them. Do not cut the compression garments off, this is to prevent cutting yourself on accident.    -Should you experience any significant changes in your wound(s), such as signs of infection (increasing redness, swelling, localized heat, increased pain, fever > 101 F, chills) or have any questions regarding your home care instructions, please contact the wound center at [phone]. If after hours, contact your primary care physician or go to the hospital emergency room.     -If you are admitted to any hospital, you will need a new referral to come back to the wound clinic and any scheduled appointments that you already have, may be cancelled.     -If you are 5 or more minutes late for an appointment, we reserve the right to cancel and reschedule that appointment. Additionally, if you are habitually late or not showing (3 late cancellations and/or no shows), we reserve the right to cancel your remaining appointments and it will be your responsibility to obtain a new referral if services are still needed.

## 2025-01-14 ENCOUNTER — NON-PROVIDER VISIT (OUTPATIENT)
Dept: WOUND CARE | Facility: MEDICAL CENTER | Age: 85
End: 2025-01-14
Attending: FAMILY MEDICINE
Payer: MEDICARE

## 2025-01-14 PROCEDURE — 99211 OFF/OP EST MAY X REQ PHY/QHP: CPT

## 2025-01-14 PROCEDURE — 97602 WOUND(S) CARE NON-SELECTIVE: CPT

## 2025-01-14 NOTE — PROGRESS NOTES
Non-selective debridement with hypochlorous acid and dry gauze to remove loose non viable tissue from LLE wound and periwound. Patient tolerated well.

## 2025-01-21 ENCOUNTER — HOSPITAL ENCOUNTER (OUTPATIENT)
Dept: CARDIOLOGY | Facility: MEDICAL CENTER | Age: 85
End: 2025-01-21
Attending: STUDENT IN AN ORGANIZED HEALTH CARE EDUCATION/TRAINING PROGRAM
Payer: MEDICARE

## 2025-01-21 ENCOUNTER — NON-PROVIDER VISIT (OUTPATIENT)
Dept: WOUND CARE | Facility: MEDICAL CENTER | Age: 85
End: 2025-01-21
Attending: FAMILY MEDICINE
Payer: MEDICARE

## 2025-01-21 DIAGNOSIS — I50.9 CHF (NYHA CLASS II, ACC/AHA STAGE C) (HCC): ICD-10-CM

## 2025-01-21 DIAGNOSIS — Z95.818 S/P MITRAL VALVE CLIP IMPLANTATION: ICD-10-CM

## 2025-01-21 DIAGNOSIS — I50.32 CHRONIC HEART FAILURE WITH PRESERVED EJECTION FRACTION (HCC): ICD-10-CM

## 2025-01-21 DIAGNOSIS — Z95.2 S/P TAVR (TRANSCATHETER AORTIC VALVE REPLACEMENT): ICD-10-CM

## 2025-01-21 DIAGNOSIS — I27.20 PULMONARY HYPERTENSION (HCC): ICD-10-CM

## 2025-01-21 DIAGNOSIS — I10 ESSENTIAL HYPERTENSION: ICD-10-CM

## 2025-01-21 DIAGNOSIS — R06.02 SHORTNESS OF BREATH: ICD-10-CM

## 2025-01-21 DIAGNOSIS — Z98.890 S/P MITRAL VALVE CLIP IMPLANTATION: ICD-10-CM

## 2025-01-21 PROCEDURE — 93306 TTE W/DOPPLER COMPLETE: CPT

## 2025-01-21 PROCEDURE — 97602 WOUND(S) CARE NON-SELECTIVE: CPT

## 2025-01-21 NOTE — PATIENT INSTRUCTIONS
-Keep dressings clean and dry. Change dressings if they become over saturated, soiled or fall off.     -On the days you are not changing your dressings, avoid getting the dressings wet. It is not harmful to get your wound wet when you are washing your wound before applying a new dressing.    -If you need to change your dressings at home: Wash your wound with normal saline, wound cleanser, or unscented soap and water prior to applying your new dressings. Please avoid cleansing with hydrogen peroxide or rubbing alcohol. Hydrogen peroxide and rubbing alcohol are toxic to new tissue and skin cells.    -Avoid prolonged standing or sitting without elevating your legs.    -Remove your compression garments if you have severe pain, severe swelling, numbness, color change, or temperature change in your toes. If you need to remove your compression garments, do so by unrolling them. Do not cut the compression garments off, this is to prevent cutting yourself on accident.    -Should you experience any significant changes in your wound, such as signs of infection (increasing redness, swelling, localized heat, increased pain, fever > 101 F, chills) or have any questions regarding your home care instructions, please contact the wound center at [phone]. If after hours, contact your primary care physician or go to the hospital emergency room.     -If you are admitted to any hospital, you will need a new referral to come back to the wound clinic and any scheduled appointments that you already have, may be cancelled.     -If you are 5 or more minutes late for an appointment, we reserve the right to cancel and reschedule that appointment. Additionally, if you are habitually late or not showing (3 late cancellations and/or no shows), we reserve the right to cancel your remaining appointments and it will be your responsibility to obtain a new referral if services are still needed.

## 2025-01-21 NOTE — PROGRESS NOTES
RN Wound Care Procedures 1/21/2025:  Viscous lidocaine applied to the left anterior lower leg wound for approximately 3 minutes prior to procedure.    Nonselective debridement with Puracyn moistened gauze to remove biofilm from the left anterior lower leg wound. Patient tolerated the procedure well.    Patient has a large to copious amount of drainage from her wound at this time. She states she is not interested in home health. Requested via check-out sheet that patient's wound care visits be increased to twice per week for better drainage management.

## 2025-01-23 LAB
LV EJECT FRACT MOD 2C 99903: 60.57
LV EJECT FRACT MOD 4C 99902: 69.18
LV EJECT FRACT MOD BP 99901: 63.88

## 2025-01-23 PROCEDURE — 93306 TTE W/DOPPLER COMPLETE: CPT | Mod: 26 | Performed by: STUDENT IN AN ORGANIZED HEALTH CARE EDUCATION/TRAINING PROGRAM

## 2025-01-24 ENCOUNTER — NON-PROVIDER VISIT (OUTPATIENT)
Dept: WOUND CARE | Facility: MEDICAL CENTER | Age: 85
End: 2025-01-24
Attending: FAMILY MEDICINE
Payer: MEDICARE

## 2025-01-24 PROCEDURE — 97602 WOUND(S) CARE NON-SELECTIVE: CPT

## 2025-01-28 ENCOUNTER — OFFICE VISIT (OUTPATIENT)
Dept: WOUND CARE | Facility: MEDICAL CENTER | Age: 85
End: 2025-01-28
Attending: FAMILY MEDICINE
Payer: MEDICARE

## 2025-01-28 VITALS
OXYGEN SATURATION: 98 % | SYSTOLIC BLOOD PRESSURE: 120 MMHG | DIASTOLIC BLOOD PRESSURE: 62 MMHG | HEART RATE: 99 BPM | TEMPERATURE: 98.9 F | RESPIRATION RATE: 18 BRPM

## 2025-01-28 DIAGNOSIS — L97.909 CHRONIC VENOUS HYPERTENSION W ULCERATION, UNSPECIFIED LATERALITY (HCC): ICD-10-CM

## 2025-01-28 DIAGNOSIS — I87.319 CHRONIC VENOUS HYPERTENSION W ULCERATION, UNSPECIFIED LATERALITY (HCC): ICD-10-CM

## 2025-01-28 DIAGNOSIS — I89.0 LYMPHEDEMA: ICD-10-CM

## 2025-01-28 DIAGNOSIS — I87.2 VENOUS INSUFFICIENCY (CHRONIC) (PERIPHERAL): ICD-10-CM

## 2025-01-28 PROCEDURE — 3074F SYST BP LT 130 MM HG: CPT | Performed by: NURSE PRACTITIONER

## 2025-01-28 PROCEDURE — 3078F DIAST BP <80 MM HG: CPT | Performed by: NURSE PRACTITIONER

## 2025-01-28 PROCEDURE — 99213 OFFICE O/P EST LOW 20 MIN: CPT

## 2025-01-28 PROCEDURE — 99213 OFFICE O/P EST LOW 20 MIN: CPT | Performed by: NURSE PRACTITIONER

## 2025-01-29 ENCOUNTER — DOCUMENTATION (OUTPATIENT)
Dept: WOUND CARE | Facility: MEDICAL CENTER | Age: 85
End: 2025-01-29
Payer: MEDICARE

## 2025-01-29 NOTE — PROGRESS NOTES
Provider Encounter- Lower Extremity Ulcer      HISTORY OF PRESENT ILLNESS  Wound History:    START OF CARE IN CLINIC: 12/31/2024    REFERRING PROVIDER: Asael Pink      WOUND ETIOLOGY: Venous / Lymphedema   LOCATION: LLE anterior   HISTORY:  84F well known to clinic for lower extremity wounds associated with venous insufficiency and lymphedema. Patient previously seen in wound clinic earlier this month and was discharged for wound healing. She was recommended to wear compression, but did not wear and wound reoccurred shortly after. Patient was referred back to [facility] for further wound care.    Pertinent Medical History: Fatty liver, Afib, CAD s/p CABG, TAVR, chronic lower extremity edema.       TOBACCO USE:  Former    Patient's problem list, allergies, and current medications reviewed and updated in Epic    Interval History:  1/31/2024: Clinic visit with Abiel Cuevas MD. Patient is frustrated by recurrence of wound. Reports reoccurred shortly after last clinic visit. Patient's edema is not well controlled. She has edema from lower extremity extending to thighs. She has compression socks and velcro compression garment from previous wounds but does not wear due to discomfort and causing edema proximally in thighs. Counseled about association with wounds and edema, need for compression. She previously tolerated 2 layer compression.    1/28/2025 : Clinic visit with ARIANNA Austin, KIRSTIEP-BC, CWBABATUNDEN, CFHAYDER.   Patient states she is feeling well.  Her wound appears to be nearly resolved, covered with fragile epithelium, scant drainage.  No excisional debridement required today.  Will have her return to clinic later this week.  If drainage is still well-controlled, will decrease frequency of visits to weekly.  Will hopefully be able to discharge soon.  Patient reminded that she will require lifelong compression to manage edema and prevent wound recurrence.    REVIEW OF SYSTEMS:   Unchanged from previous clinic visit on  1/31/2024, except as documented in interval history above      PHYSICAL EXAMINATION:   /62   Pulse 99   Temp 37.2 °C (98.9 °F) (Temporal)   Resp 18   LMP  (LMP Unknown)   SpO2 98%     Physical Exam  Constitutional:       General: She is not in acute distress.     Appearance: She is obese.   Cardiovascular:      Rate and Rhythm: Tachycardia present.      Comments: Palpable pedal pulses  Pulmonary:      Effort: Pulmonary effort is normal. No respiratory distress.   Musculoskeletal:      Right lower leg: Edema present.      Left lower leg: Edema present.      Comments: Edema/lymphedema in lower extremities, L > R and extending to thighs non pitting  Left lower extremity edema improved with compression   Skin:     Comments: Left anterior lower extremity wounds: Appears to be nearly 100% reepithelialized, fragile epithelium, scant drainage.  No evidence of infection   Neurological:      Mental Status: She is alert and oriented to person, place, and time.         WOUND ASSESSMENT  Wound 12/31/24 Venous Ulcer Leg Anterior;Lower Left (Active)   Wound Image   01/28/25 1520   Site Assessment Red 01/28/25 1520   Periwound Assessment Fragile;Edema 01/28/25 1520   Margins Attached edges 01/28/25 1520   Drainage Amount Moderate 01/28/25 1520   Drainage Description Serosanguineous 01/28/25 1520   Treatments Cleansed 01/28/25 1520   Wound Cleansing Foam Cleanser/Washcloth 01/28/25 1520   Periwound Protectant Skin Moisturizer 01/28/25 1520   Dressing Status Old drainage 12/31/24 1300   Dressing Changed Changed 01/28/25 1520   Dressing Cleansing/Solutions Not Applicable 01/28/25 1520   Dressing Options Hydrofiber Silver;Super Absorbent Pad;Unna Boot;Compression Wrap Two Layer 01/28/25 1520   Dressing Change/Treatment Frequency Twice Weekly 01/28/25 1520   Non-staged Wound Description Full thickness 01/28/25 1520   Wound Length (cm) 9 cm 01/28/25 1520   Wound Width (cm) 10 cm 01/28/25 1520   Wound Depth (cm) 0 cm 01/28/25  1520   Wound Surface Area (cm^2) 90 cm^2 01/28/25 1520   Wound Volume (cm^3) 0 cm^3 01/28/25 1520   Post-Procedure Length (cm) 13.7 cm 01/21/25 1340   Post-Procedure Width (cm) 10.1 cm 01/21/25 1340   Post-Procedure Depth (cm) 0.1 cm 01/21/25 1340   Post-Procedure Surface Area (cm^2) 138.37 cm^2 01/21/25 1340   Post-Procedure Volume (cm^3) 13.837 cm^3 01/21/25 1340   Wound Healing % 100 01/28/25 1520   Tunneling (cm) 0 cm 01/28/25 1520   Undermining (cm) 0 cm 01/28/25 1520   Wound Odor None 01/28/25 1520   Pulses DP;2+ 01/28/25 1520   Exposed Structures None 01/28/25 1520   Number of days: 28           PROCEDURE:   - No excisional debridement required today      Pertinent Labs and Diagnostics:    Labs:   A1c:   Lab Results   Component Value Date/Time    HBA1C 5.1 08/31/2023 08:43 AM      IMAGING: None    VASCULAR STUDIES:  No results found.    LAST  WOUND CULTURE:  DATE :    Lab Results   Component Value Date/Time    CULTRSULT Usual urogenital jaycee ,000 cfu/mL (A) 02/03/2023 03:32 PM    CULTRSULT (A) 02/03/2023 03:32 PM     Escherichia coli  >100,000 cfu/mL  Presumptive identification                ASSESSMENT AND PLAN:     1. Chronic venous hypertension w ulceration, unspecified laterality (HCC)    1/28/2025: Recurrence of wound, reoccured shortly after discharge due to noncompliance with compression.  -No need for excisional debridement.  Wound is nearly resolved, covered with fragile epithelium  -Reviewed etiology of venous disease and lymphedema, need for compression to treat wounds and to maintain skin integrity and prevent future wounds.  -Patient was able to tolerate 2 layer compression wrap, continue     Wound Care: Hydrofiber silver, Super absorbent pad, 2 layer compression    2. Venous insufficiency (chronic) (peripheral)  3. Lymphedema    1/20/2025: Patient with skin changes, limb size discrepancy, non pitting edema in thighs consistent with lymphedema due to chronic venous disease  - Has velcro  compression garment, but does not wear  -She has been counseled about etiology of both conditions and need for compression  -Referral to lymphedema clinic previously placed  - pneumatic compression device previously ordered for treatment of lymphedema, failed months of elevation and compression. Medically necessary to help manage lymphedema.    PATIENT EDUCATION  - Etiology of venous stasis ulceration discussed with patient  - Importance of managing edema for healing of ulcer, and for prevention of new ulcer development  -Need for lifelong compression of lower legs   -Elevate legs above the level of the heart periodically throughout the day.  - Importance of adequate nutrition for wound healing  -Advised to go to ER for any increased redness, swelling, drainage or odor, or if patient develops fever, chills, nausea or vomiting.    My total time spent caring for the patient on the day of the encounter was 20 minutes, reviewing history, assessment, counseling and education, and coordination of care.  This does not include time spent on separately billable procedures/tests.    Please note that this note may have been created using voice recognition software. I have worked with technical experts from [facility] [employer] to optimize the interface.  I have made every reasonable attempt to correct obvious errors, but there may be errors of grammar and possibly     N

## 2025-01-31 ENCOUNTER — OFFICE VISIT (OUTPATIENT)
Dept: WOUND CARE | Facility: MEDICAL CENTER | Age: 85
End: 2025-01-31
Attending: FAMILY MEDICINE
Payer: MEDICARE

## 2025-01-31 DIAGNOSIS — L97.909 CHRONIC VENOUS HYPERTENSION W ULCERATION, UNSPECIFIED LATERALITY (HCC): ICD-10-CM

## 2025-01-31 DIAGNOSIS — I87.319 CHRONIC VENOUS HYPERTENSION W ULCERATION, UNSPECIFIED LATERALITY (HCC): ICD-10-CM

## 2025-01-31 PROCEDURE — 99211 OFF/OP EST MAY X REQ PHY/QHP: CPT

## 2025-01-31 NOTE — PROGRESS NOTES
Seen by RN only for wound care dressing change as patient was seen and debrided by provider earlier this week.

## 2025-02-01 NOTE — WOUND TEAM
Wound 95% resolved with thin layer of epithelial tissue. Pt complained of dressing sticking to the wound so applied adaptic as a contact layer today.

## 2025-02-04 ENCOUNTER — NON-PROVIDER VISIT (OUTPATIENT)
Dept: WOUND CARE | Facility: MEDICAL CENTER | Age: 85
End: 2025-02-04
Attending: FAMILY MEDICINE
Payer: MEDICARE

## 2025-02-04 PROCEDURE — 97597 DBRDMT OPN WND 1ST 20 CM/<: CPT

## 2025-02-04 NOTE — PROGRESS NOTES
2% topical lidocaine applied with a 5 minute dwell time.  CSWD using curette to remove loose crusted skin around wound and areas of wound with thin layer of biofilm.    Used skin prep pads to remove build up areas of zinc paste.   Discontinued unna boot zinc wrap and boarded wound with Triad dressing.

## 2025-02-04 NOTE — PATIENT INSTRUCTIONS
After Visit Summary Wound Care Instructions    Nutrition - Patient instructed increased protein diet unless contraindicated in renal failure (meat, eggs, fish, yogurt, cottage cheese, beans), use of multivitamin with minerals and Arginaid supplementation (check if ok with Primary Care Provider first).    Infection -  instructed signs and symptoms of infection, increased redness and swelling, localized heat over wound and surrounding area/fever/chills/nausea and vomiting, when to call doctor or go to Emergency Room.     Dressing Changes - Instructed patient rationale for wound care products. Instructed to keep dressings clean and dry, shower on clinic days right before coming in. Change your dressing if it becomes soiled, soaked, or falls off.     Compression Wraps - Avoid prolonged standing or sitting without elevating your legs.  Apply tubigrip to your legs ending 2 fingers below back of knee without wrinkles.   Knee-high gradient compression stockings to both lower legs  Multilayer compression wrap to LEFT leg. Do not get wet and keep on for the week. Only remove if temperature or sensation changes.   Remove your compression wrap if you have severe pain, severe swelling, numbness, color change, or temperature change in your toes. If you need to remove your compression wrap, do so by unrolling it. Do not cut the compression wrap off to prevent cutting yourself on accident.    Questions - should you have any questions regarding your home care instructions, please contact the wound center at [phone]. If after hours, contact your primary care physician or go to the hospital emergency room.   If you are admitted to any hospital, you will need a new referral to come back to the wound clinic and any scheduled appointments that you already have, may be cancelled.

## 2025-02-07 ENCOUNTER — NON-PROVIDER VISIT (OUTPATIENT)
Dept: WOUND CARE | Facility: MEDICAL CENTER | Age: 85
End: 2025-02-07
Attending: FAMILY MEDICINE
Payer: MEDICARE

## 2025-02-07 PROCEDURE — 97602 WOUND(S) CARE NON-SELECTIVE: CPT

## 2025-02-08 NOTE — PROGRESS NOTES
Lifted nonviable tissue trimmed using scissors and forceps. 2% lidocaine then allowed to dwell on wound bed for five minutes. Gentle nonselective debridement performed using puracyn-soaked gauze to remove biofilm and nonviable tissue. Soft roll then 2 layer compression wrap then applied to left lower extremity. Patient tolerated well. Refer to flowsheets for further details.

## 2025-02-08 NOTE — PATIENT INSTRUCTIONS
-Keep your wound dressing clean, dry, and intact.     -Remove your compression wrap/garments if you have severe pain, severe swelling, numbness, color change, or temperature change in your toes. Remove wrap if it becomes wet. If you need to remove your compression wrap, do so by unrolling it. Do not cut the compression wrap off to prevent cutting yourself on accident. Do not trim wrap.  Avoid prolonged standing or sitting without elevating your legs.    -Should you experience any significant changes in your wound(s), such as infection (redness, swelling, localized heat, increased pain, fever > 101 F, chills) or have any questions regarding your home care instructions, please contact the wound center at [phone]. If after hours, contact your primary care physician or go to the hospital emergency room.  If you are admitted to any hospital, you will need a new referral to come back to the wound clinic. Any scheduled appointments that you already have may be cancelled.      -If you are 5 or more minutes late for an appointment, we reserve the right to cancel and reschedule that appointment. Additionally, if you are habitually late or not showing (3 late cancellations and/or no shows), we reserve the right to cancel your remaining appointments and it will be your responsibility to obtain a new referral if services are still needed.

## 2025-02-11 ENCOUNTER — NON-PROVIDER VISIT (OUTPATIENT)
Dept: WOUND CARE | Facility: MEDICAL CENTER | Age: 85
End: 2025-02-11
Attending: FAMILY MEDICINE
Payer: MEDICARE

## 2025-02-11 PROCEDURE — 29581 APPL MULTLAYER CMPRN SYS LEG: CPT

## 2025-02-11 PROCEDURE — 97602 WOUND(S) CARE NON-SELECTIVE: CPT

## 2025-02-11 NOTE — PATIENT INSTRUCTIONS
-Keep your wound dressing clean, dry, and intact. Only change dressing if it's over saturated, soiled or falls off.      -Remove your compression stocking or wrap if you have severe pain, severe swelling, numbness, color change, or temperature change in your toes. If you need to remove your compression wrap or stocking, do so by unrolling it. Do not cut the compression stocking or wrap off to prevent cutting yourself on accident.    -Should you experience any significant changes in your wound(s), such as infection (redness, swelling, localized heat, increased pain, fever > 101 F, chills) or have any questions regarding your home care instructions, please contact the wound center at [phone]. If after hours, contact your primary care physician or go to the hospital emergency room.     If you are admitted to any hospital, you will need a new referral to come back to the wound clinic from your primary care or other provider. Any scheduled appointments that you already have, may be cancelled and will need to be updated.

## 2025-02-11 NOTE — PROGRESS NOTES
"Wound areas cleansed, assessed, and photo taken for chart. Hypochlorous Acid soak to wound beds allowed to dwell for approximately 3 minutes.     Non-selective debridement to wound and neelam-wound using normal saline and gauze to remove biofilm and non-viable tissue. Patient tolerated well without complaint during care. Patient states some mild tenderness but otherwise \"no pain\" during wound care.     Dressing change completed. See flowsheet for full details. 2 Layer Compression wrap applied to Left lower leg.     Noted Triad wound dressing applied to neelam wounds 2/4/2025 and 1/28/2025. Continued Triad to neelam wounds. Changed from super absorbent pad to ABD pad as drainage was small and super pad unnecessary.      02/11/25 1500   Wound 12/31/24 Venous Ulcer Leg Anterior;Lower Left   Date First Assessed/Time First Assessed: 12/31/24 1323   Primary Wound Type: Venous Ulcer  Location: Leg  Wound Orientation: Anterior;Lower  Laterality: Left   Wound Image   (camera unavailable)   Site Assessment Red;Pink;Epithelialization;Fragile   Periwound Assessment Crusted;Edema;Fragile   Margins Attached edges   Drainage Amount Small   Drainage Description Serosanguineous   Treatments Cleansed;Nonselective debridement;Site care   Wound Cleansing Hypochlorus Acid  (Foam Cleanser/Washcloth to LLE)   Periwound Protectant Moisture Barrier  (Silicone Barrier Cream to LLE)   Dressing Cleansing/Solutions Not Applicable   Dressing Options Hydrofiber Silver;Absorbent Abdominal Pad;Soft Conforming Roll;Compression Wrap Two Layer  (Triad between clustered wounds and periwound areas)   Dressing Change/Treatment Frequency Twice Weekly   Non-staged Wound Description Full thickness   Wound Length (cm) 14 cm  (cluster of open areas)   Wound Width (cm) 13 cm  (cluster of open areas)   Wound Depth (cm) 0.1 cm   Wound Surface Area (cm^2) 182 cm^2   Wound Volume (cm^3) 18.2 cm^3   Post-Procedure Length (cm) 14 cm  (cluster of open areas) "   Post-Procedure Width (cm) 13 cm  (cluster of open areas)   Post-Procedure Depth (cm) 0.1 cm   Post-Procedure Surface Area (cm^2) 182 cm^2   Post-Procedure Volume (cm^3) 18.2 cm^3   Wound Healing % -69   Tunneling (cm) 0 cm   Undermining (cm) 0 cm   Wound Odor None   Exposed Structures None

## 2025-02-14 ENCOUNTER — OFFICE VISIT (OUTPATIENT)
Dept: WOUND CARE | Facility: MEDICAL CENTER | Age: 85
End: 2025-02-14
Attending: FAMILY MEDICINE
Payer: MEDICARE

## 2025-02-14 DIAGNOSIS — I87.2 VENOUS INSUFFICIENCY (CHRONIC) (PERIPHERAL): ICD-10-CM

## 2025-02-14 DIAGNOSIS — L97.909 CHRONIC VENOUS HYPERTENSION W ULCERATION, UNSPECIFIED LATERALITY (HCC): ICD-10-CM

## 2025-02-14 DIAGNOSIS — I89.0 LYMPHEDEMA: ICD-10-CM

## 2025-02-14 DIAGNOSIS — I87.319 CHRONIC VENOUS HYPERTENSION W ULCERATION, UNSPECIFIED LATERALITY (HCC): ICD-10-CM

## 2025-02-14 PROCEDURE — 11042 DBRDMT SUBQ TIS 1ST 20SQCM/<: CPT | Performed by: NURSE PRACTITIONER

## 2025-02-14 PROCEDURE — 11042 DBRDMT SUBQ TIS 1ST 20SQCM/<: CPT

## 2025-02-15 NOTE — PATIENT INSTRUCTIONS
- Remove your compression wrap if you have severe pain, severe swelling, numbness, color change, or temperature change in your toes. If you need to remove your compression wrap, do so by unrolling it. Do not cut the compression wrap off to prevent cutting yourself on accident.    - Avoid prolonged standing or sitting without elevating your legs.    - Should you experience any significant changes in your wound(s), such as infection (redness, swelling, localized heat, increased pain, fever > 101 F, chills) or have any questions regarding your home care instructions, please contact the wound center at [phone]. If after hours, contact your primary care physician or go to the hospital emergency room.     - If you are admitted to any hospital, you will need a new referral to come back to the wound clinic and any scheduled appointments that you already have, may be cancelled.    - If you are 5 or more minutes late for an appointment, we reserve the right to cancel and reschedule that appointment. Additionally, if you are habitually late or not showing (3 late cancellations and/or no shows), we reserve the right to cancel your remaining appointments and it will be your responsibility to obtain a new referral if services are still needed.

## 2025-02-15 NOTE — PROGRESS NOTES
Solft roll with layer compression wrap applied to left lower extremity. Refer to flow sheet for wound care details. Patient tolerated the procedure well.

## 2025-02-15 NOTE — PROGRESS NOTES
Provider Encounter- Lower Extremity Ulcer      HISTORY OF PRESENT ILLNESS  Wound History:    START OF CARE IN CLINIC: 12/31/2024    REFERRING PROVIDER: Asael Pink      WOUND ETIOLOGY: Venous / Lymphedema   LOCATION: LLE anterior   HISTORY:  84F well known to clinic for lower extremity wounds associated with venous insufficiency and lymphedema. Patient previously seen in wound clinic earlier this month and was discharged for wound healing. She was recommended to wear compression, but did not wear and wound reoccurred shortly after. Patient was referred back to [facility] for further wound care.    Pertinent Medical History: Fatty liver, Afib, CAD s/p CABG, TAVR, chronic lower extremity edema.       TOBACCO USE:  Former    Patient's problem list, allergies, and current medications reviewed and updated in Epic    Interval History:  1/31/2024: Clinic visit with Abiel Cuevas MD. Patient is frustrated by recurrence of wound. Reports reoccurred shortly after last clinic visit. Patient's edema is not well controlled. She has edema from lower extremity extending to thighs. She has compression socks and velcro compression garment from previous wounds but does not wear due to discomfort and causing edema proximally in thighs. Counseled about association with wounds and edema, need for compression. She previously tolerated 2 layer compression.    1/28/2025 : Clinic visit with ARIANNA Austin, FIORELLA, CWOCN, CFCN.   Patient states she is feeling well.  Her wound appears to be nearly resolved, covered with fragile epithelium, scant drainage.  No excisional debridement required today.  Will have her return to clinic later this week.  If drainage is still well-controlled, will decrease frequency of visits to weekly.  Will hopefully be able to discharge soon.  Patient reminded that she will require lifelong compression to manage edema and prevent wound recurrence.    2/14/2025 : Clinic visit with ARIANNA Austin, FIORELLA,  MATTN, CFHAYDER.   Patient continues to feel well.  Her wound is nearly resolved, only 2 small open areas remain.  Will likely be resolved by next clinic visit.  I did advise her to bring her Solaris ready wraps with her to clinic next week.    REVIEW OF SYSTEMS:   Unchanged from previous clinic visit on 1/28/2025, except as documented in interval history above      PHYSICAL EXAMINATION:   LMP  (LMP Unknown)     Physical Exam  Constitutional:       General: She is not in acute distress.     Appearance: She is obese.   Cardiovascular:      Rate and Rhythm: Tachycardia present.      Comments: Palpable pedal pulses  Pulmonary:      Effort: Pulmonary effort is normal. No respiratory distress.   Musculoskeletal:      Right lower leg: Edema present.      Left lower leg: Edema present.      Comments: Edema/lymphedema in lower extremities, L > R and extending to thighs non pitting  Left lower extremity edema improved with compression   Skin:     Comments: Left anterior lower extremity wounds: Nearly resolved.  Only 2 small open areas remain.  Thin layer of slough.  Moderate serosanguineous drainage, no odor.  No evidence of infection.  New epithelium to remainder of wound bed, friable.   Neurological:      Mental Status: She is alert and oriented to person, place, and time.         WOUND ASSESSMENT  Wound 12/31/24 Venous Ulcer Leg Anterior;Lower Left (Active)   Wound Image    02/14/25 1600   Site Assessment Pink;Red 02/14/25 1600   Periwound Assessment Edema;Flaky;Fragile 02/14/25 1600   Margins Attached edges 02/14/25 1600   Drainage Amount Small 02/14/25 1600   Drainage Description Serosanguineous 02/14/25 1600   Treatments Cleansed;Topical Lidocaine;Provider debridement;Site care 02/14/25 1600   Wound Cleansing Hypochlorus Acid 02/14/25 1600   Periwound Protectant Moisture Barrier 02/14/25 1600   Dressing Status Old drainage 12/31/24 1300   Dressing Changed Changed 02/14/25 1600   Dressing Cleansing/Solutions Not Applicable  02/14/25 1600   Dressing Options Hydrofiber Silver;Soft Conforming Roll;Compression Wrap Two Layer 02/14/25 1600   Dressing Change/Treatment Frequency Twice Weekly 02/14/25 1600   Wound Team Following Bi-Weekly 02/07/25 1652   Non-staged Wound Description Full thickness 02/14/25 1600   Wound Length (cm) 2.7 cm 02/14/25 1600   Wound Width (cm) 2 cm 02/14/25 1600   Wound Depth (cm) 0.1 cm 02/14/25 1600   Wound Surface Area (cm^2) 5.4 cm^2 02/14/25 1600   Wound Volume (cm^3) 0.54 cm^3 02/14/25 1600   Post-Procedure Length (cm) 2.7 cm 02/14/25 1600   Post-Procedure Width (cm) 2 cm 02/14/25 1600   Post-Procedure Depth (cm) 0.1 cm 02/14/25 1600   Post-Procedure Surface Area (cm^2) 5.4 cm^2 02/14/25 1600   Post-Procedure Volume (cm^3) 0.54 cm^3 02/14/25 1600   Wound Healing % 95 02/14/25 1600   Tunneling (cm) 0 cm 02/14/25 1600   Undermining (cm) 0 cm 02/14/25 1600   Wound Odor None 02/14/25 1600   Pulses Left;DP;1+ 02/07/25 1652   Exposed Structures None 02/14/25 1600   Number of days: 45     PROCEDURE: Excisional debridement of left lower leg wounds  -2% viscous lidocaine applied topically to wound bed for approximately 5 minutes prior to debridement  -Curette used to debride wound bed.  Excisional debridement was performed to remove devitalized tissue until healthy, bleeding tissue was visualized.   Total area debrided was approximately 4 cm².  Tissue debrided into the subcutaneous layer.    -Bleeding controlled with manual pressure.    -Wound care completed by wound RN, refer to flowsheet  -Patient tolerated the procedure well, without c/o pain or discomfort.        Pertinent Labs and Diagnostics:    Labs:   A1c:   Lab Results   Component Value Date/Time    HBA1C 5.1 08/31/2023 08:43 AM      IMAGING: None    VASCULAR STUDIES:  No results found.    LAST  WOUND CULTURE:  DATE :    Lab Results   Component Value Date/Time    CULTRSULT Usual urogenital jaycee ,000 cfu/mL (A) 02/03/2023 03:32 PM    CULTRSULT (A)  02/03/2023 03:32 PM     Escherichia coli  >100,000 cfu/mL  Presumptive identification                ASSESSMENT AND PLAN:     1. Chronic venous hypertension w ulceration, unspecified laterality (HCC)    2/14/2025: Nearly resolved  -Excisional debridement in clinic today, medically necessary to promote wound healing  -Fragile epithelium to heal portion of wound bed  -Reviewed etiology of venous disease and lymphedema, need for compression to treat wounds and to maintain skin integrity and prevent future wounds.  -2 layer compression wrap applied in clinic today  -Patient will likely be healed by next visit.  I advised her to bring her Solaris ready wrap with her next week, we will instruct on application     Wound Care: Hydrofiber silver, Super absorbent pad, 2 layer compression    2. Venous insufficiency (chronic) (peripheral)  3. Lymphedema    2/14/2025: Patient with skin changes, limb size discrepancy, non pitting edema in thighs consistent with lymphedema due to chronic venous disease  - Has new Solaris ready wrap  -She has been counseled about etiology of both conditions and need for compression  -Referral to lymphedema clinic previously placed  - pneumatic compression device previously ordered for treatment of lymphedema, failed months of elevation and compression. Medically necessary to help manage lymphedema.    PATIENT EDUCATION  - Etiology of venous stasis ulceration discussed with patient  - Importance of managing edema for healing of ulcer, and for prevention of new ulcer development  -Need for lifelong compression of lower legs   -Elevate legs above the level of the heart periodically throughout the day.  - Importance of adequate nutrition for wound healing  -Advised to go to ER for any increased redness, swelling, drainage or odor, or if patient develops fever, chills, nausea or vomiting.    .    Please note that this note may have been created using voice recognition software. I have worked with  technical experts from [facility] to optimize the interface.  I have made every reasonable attempt to correct obvious errors, but there may be errors of grammar and possibly     N

## 2025-02-18 ENCOUNTER — NON-PROVIDER VISIT (OUTPATIENT)
Dept: WOUND CARE | Facility: MEDICAL CENTER | Age: 85
End: 2025-02-18
Attending: FAMILY MEDICINE
Payer: MEDICARE

## 2025-02-18 PROCEDURE — 97602 WOUND(S) CARE NON-SELECTIVE: CPT

## 2025-02-18 NOTE — PROGRESS NOTES
Nonselective debridement performed using purayn-soaked gauze to remove nonviable tissue of left lower extremity wound bed. Scissors and forceps used to trim lifted skin flakes. 2 layer compression wrap placed to left lower extremity. Patient tolerated well. Refer to flowsheets for further details.     Patient did not feel comfortable decreasing to once weekly appointments at this time.

## 2025-02-18 NOTE — PATIENT INSTRUCTIONS
-Keep your wound dressing clean, dry, and intact.     -Remove your compression wrap/garments if you have severe pain, severe swelling, numbness, color change, or temperature change in your toes. Remove wrap if it becomes wet. If you need to remove your compression wrap, do so by unrolling it. Do not cut the compression wrap off to prevent cutting yourself on accident. Do not trim wrap.  Avoid prolonged standing or sitting without elevating your legs.    -Should you experience any significant changes in your wound(s), such as infection (redness, swelling, localized heat, increased pain, fever > 101 F, chills) or have any questions regarding your home care instructions, please contact the wound center at [phone]. If after hours, contact your primary care physician or go to the hospital emergency room.  If you are admitted to any hospital, you will need a new referral to come back to the wound clinic. Any scheduled appointments that you already have may be cancelled.      -If you are 5 or more minutes late for an appointment, we reserve the right to cancel and reschedule that appointment. Additionally, if you are habitually late or not showing (3 late cancellations and/or no shows), we reserve the right to cancel your remaining appointments and it will be your responsibility to obtain a new referral if services are still needed.

## 2025-02-21 ENCOUNTER — OFFICE VISIT (OUTPATIENT)
Dept: WOUND CARE | Facility: MEDICAL CENTER | Age: 85
End: 2025-02-21
Attending: FAMILY MEDICINE
Payer: MEDICARE

## 2025-02-21 DIAGNOSIS — I87.2 VENOUS INSUFFICIENCY (CHRONIC) (PERIPHERAL): ICD-10-CM

## 2025-02-21 DIAGNOSIS — I89.0 LYMPHEDEMA: ICD-10-CM

## 2025-02-21 DIAGNOSIS — I87.319 CHRONIC VENOUS HYPERTENSION W ULCERATION, UNSPECIFIED LATERALITY (HCC): ICD-10-CM

## 2025-02-21 DIAGNOSIS — L97.909 CHRONIC VENOUS HYPERTENSION W ULCERATION, UNSPECIFIED LATERALITY (HCC): ICD-10-CM

## 2025-02-21 PROCEDURE — 11042 DBRDMT SUBQ TIS 1ST 20SQCM/<: CPT

## 2025-02-21 PROCEDURE — 11042 DBRDMT SUBQ TIS 1ST 20SQCM/<: CPT | Performed by: NURSE PRACTITIONER

## 2025-02-22 NOTE — PROGRESS NOTES
Provider Encounter- Lower Extremity Ulcer      HISTORY OF PRESENT ILLNESS  Wound History:    START OF CARE IN CLINIC: 12/31/2024    REFERRING PROVIDER: Asael Pink      WOUND ETIOLOGY: Venous / Lymphedema   LOCATION: LLE anterior   HISTORY:  84F well known to clinic for lower extremity wounds associated with venous insufficiency and lymphedema. Patient previously seen in wound clinic earlier this month and was discharged for wound healing. She was recommended to wear compression, but did not wear and wound reoccurred shortly after. Patient was referred back to [facility] for further wound care.    Pertinent Medical History: Fatty liver, Afib, CAD s/p CABG, TAVR, chronic lower extremity edema.       TOBACCO USE:  Former    Patient's problem list, allergies, and current medications reviewed and updated in Epic    Interval History:  1/31/2024: Clinic visit with Abiel Cuevas MD. Patient is frustrated by recurrence of wound. Reports reoccurred shortly after last clinic visit. Patient's edema is not well controlled. She has edema from lower extremity extending to thighs. She has compression socks and velcro compression garment from previous wounds but does not wear due to discomfort and causing edema proximally in thighs. Counseled about association with wounds and edema, need for compression. She previously tolerated 2 layer compression.    1/28/2025 : Clinic visit with ARIANNA Austin, FIORELLA, CWOCN, CFCN.   Patient states she is feeling well.  Her wound appears to be nearly resolved, covered with fragile epithelium, scant drainage.  No excisional debridement required today.  Will have her return to clinic later this week.  If drainage is still well-controlled, will decrease frequency of visits to weekly.  Will hopefully be able to discharge soon.  Patient reminded that she will require lifelong compression to manage edema and prevent wound recurrence.    2/14/2025 : Clinic visit with ARIANNA Austin, FIORELLA,  ARTHUR BLOOD.   Patient continues to feel well.  Her wound is nearly resolved, only 2 small open areas remain.  Will likely be resolved by next clinic visit.  I did advise her to bring her Solaris ready wraps with her to clinic next week.    2/21/2025 : Clinic visit with ARIANNA Austin, FIORELLA, ARTHUR BLOOD.  Patient states she is feeling well overall.  Only 1 small open area of wound remains in her lower leg, near her ankle.  She believes this is not healing due to rubbing from her shoe.  We discussed various footwear options.  I did remove a large dry flake of skin from her anterior lower leg, underlying epithelium intact but fragile.  She did bring her Solaris ready wrap with her today, however I opted to continue with compression wrap for 1 more week.  She will bring it with her again next week.    REVIEW OF SYSTEMS:   Unchanged from previous clinic visit on 2/14/2025, except as documented in interval history above      PHYSICAL EXAMINATION:   LMP  (LMP Unknown)     Physical Exam  Constitutional:       General: She is not in acute distress.     Appearance: She is obese.   Cardiovascular:      Rate and Rhythm: Tachycardia present.      Comments: Palpable pedal pulses  Pulmonary:      Effort: Pulmonary effort is normal. No respiratory distress.   Musculoskeletal:      Right lower leg: Edema present.      Left lower leg: Edema present.      Comments: Edema/lymphedema in lower extremities, L > R and extending to thighs non pitting  Left lower extremity edema improved with compression   Skin:     Comments: Left anterior lower extremity wounds: Nearly resolved.  Only 1 small open area, near her ankle, remains.  Thin layer of slough.  Moderate serosanguineous drainage, no odor.  No evidence of infection.  New epithelium to remainder of former wound bed, friable.   Neurological:      Mental Status: She is alert and oriented to person, place, and time.         WOUND ASSESSMENT  Wound 12/31/24 Venous Ulcer Leg  Anterior;Lower Left (Active)   Wound Image     02/21/25 1600   Site Assessment Pink;Red 02/21/25 1600   Periwound Assessment Dry;Edema;Flaky;Fragile 02/21/25 1600   Margins Attached edges 02/21/25 1600   Drainage Amount Scant 02/21/25 1600   Drainage Description Serosanguineous 02/21/25 1600   Treatments Cleansed;Topical Lidocaine 02/21/25 1600   Wound Cleansing Foam Cleanser/Washcloth 02/21/25 1600   Periwound Protectant Moisture Barrier;Skin Moisturizer 02/21/25 1600   Dressing Status Old drainage 12/31/24 1300   Dressing Changed Changed 02/14/25 1600   Dressing Cleansing/Solutions Not Applicable 02/21/25 1600   Dressing Options Hydrofiber Silver;Soft Conforming Roll;Compression Wrap Two Layer 02/21/25 1600   Dressing Change/Treatment Frequency Twice Weekly 02/21/25 1600   Wound Team Following Bi-Weekly 02/21/25 1600   Non-staged Wound Description Full thickness 02/21/25 1600   Wound Length (cm) 0.7 cm 02/21/25 1600   Wound Width (cm) 1 cm 02/21/25 1600   Wound Depth (cm) 0.1 cm 02/21/25 1600   Wound Surface Area (cm^2) 0.7 cm^2 02/21/25 1600   Wound Volume (cm^3) 0.07 cm^3 02/21/25 1600   Post-Procedure Length (cm) 0.7 cm 02/21/25 1600   Post-Procedure Width (cm) 0.9 cm 02/21/25 1600   Post-Procedure Depth (cm) 0.1 cm 02/21/25 1600   Post-Procedure Surface Area (cm^2) 0.63 cm^2 02/21/25 1600   Post-Procedure Volume (cm^3) 0.063 cm^3 02/21/25 1600   Wound Healing % 99 02/21/25 1600   Tunneling (cm) 0 cm 02/21/25 1600   Undermining (cm) 0 cm 02/21/25 1600   Wound Odor None 02/21/25 1600   Pulses Left;DP;1+ 02/07/25 1652   Exposed Structures None 02/21/25 1600   Number of days: 52     PROCEDURE: Excisional debridement of left lower leg wounds  -2% viscous lidocaine applied topically to wound bed for approximately 5 minutes prior to debridement  -Curette used to debride wound bed.  Excisional debridement was performed to remove devitalized tissue until healthy, bleeding tissue was visualized.   Total area debrided  was approximately 0.63 cm².  Tissue debrided into the subcutaneous layer.    -Bleeding controlled with manual pressure.    -Wound care completed by wound RN, refer to flowsheet  -Patient tolerated the procedure well, without c/o pain or discomfort.        Pertinent Labs and Diagnostics:    Labs:   A1c:   Lab Results   Component Value Date/Time    HBA1C 5.1 08/31/2023 08:43 AM      IMAGING: None    VASCULAR STUDIES:  No results found.    LAST  WOUND CULTURE:  DATE :    Lab Results   Component Value Date/Time    CULTRSULT Usual urogenital jaycee ,000 cfu/mL (A) 02/03/2023 03:32 PM    CULTRSULT (A) 02/03/2023 03:32 PM     Escherichia coli  >100,000 cfu/mL  Presumptive identification                ASSESSMENT AND PLAN:     1. Chronic venous hypertension w ulceration, unspecified laterality (HCC)    2/21/2025: Patient's wound is nearly resolved, 1 small open area remains to her medial ankle, possibly being kept open by rubbing from her shoe  -Excisional debridement in clinic today, medically necessary to promote wound healing  -Fragile epithelium to healed portion of wound bed  -Briefly reviewed etiology of venous disease and lymphedema, need for compression to treat wounds and to maintain skin integrity and prevent future wounds.  -2 layer compression wrap applied in clinic again today  -Patient advised to consider other footwear, shoe or slipper which she does not rub over the area of her wound  -Patient will hopefully be healed by next visit.  I advised her to bring her Solaris ready wrap with her next week, we will instruct on application     Wound Care: Hydrofiber silver, soft roll, 2 layer compression    2. Venous insufficiency (chronic) (peripheral)  3. Lymphedema    2/21/2025: Patient with skin changes, limb size discrepancy, non pitting edema in thighs consistent with lymphedema due to chronic venous disease  - Has new Solaris ready wrap.  She was asked to bring with her to clinic again next week  -She has  been counseled about etiology of both conditions and need for compression  -Referral to lymphedema clinic previously placed  - pneumatic compression device previously ordered for treatment of lymphedema, failed months of elevation and compression. Medically necessary to help manage lymphedema.    PATIENT EDUCATION  - Etiology of venous stasis ulceration discussed with patient  - Importance of managing edema for healing of ulcer, and for prevention of new ulcer development  -Need for lifelong compression of lower legs   -Elevate legs above the level of the heart periodically throughout the day.  - Importance of adequate nutrition for wound healing  -Advised to go to ER for any increased redness, swelling, drainage or odor, or if patient develops fever, chills, nausea or vomiting.    .    Please note that this note may have been created using voice recognition software. I have worked with technical experts from [employer] to optimize the interface.  I have made every reasonable attempt to correct obvious errors, but there may be errors of grammar and possibly     N

## 2025-02-22 NOTE — PROGRESS NOTES
Soft roll with 2 layer compression wrap applied to left lower extremity. Refer to flow sheet for wound care details. Patient tolerated the procedure well.

## 2025-02-22 NOTE — PATIENT INSTRUCTIONS
- Remove your compression wrap if you have severe pain, severe swelling, numbness, color change, or temperature change in your toes. If you need to remove your compression wrap, do so by unrolling it. Do not cut the compression wrap off to prevent cutting yourself on accident.    - Avoid prolonged standing or sitting without elevating your legs.    - Should you experience any significant changes in your wound(s), such as infection (redness, swelling, localized heat, increased pain, fever > 101 F, chills) or have any questions regarding your home care instructions, please contact the wound center at [phone]. If after hours, contact your primary care physician or go to the hospital emergency room.     - If you are admitted to any hospital, you will need a new referral to come back to the wound clinic and any scheduled appointments that you already have, may be cancelled.    - If you are 5 or more minutes late for an appointment, we reserve the right to cancel and reschedule that appointment. Additionally, if you are habitually late or not showing (3 late cancellations and/or no shows), we reserve the right to cancel your remaining appointments and it will be your responsibility to obtain a new referral if services are still needed.

## 2025-02-25 ENCOUNTER — NON-PROVIDER VISIT (OUTPATIENT)
Dept: WOUND CARE | Facility: MEDICAL CENTER | Age: 85
End: 2025-02-25
Attending: FAMILY MEDICINE
Payer: MEDICARE

## 2025-02-25 PROCEDURE — 29581 APPL MULTLAYER CMPRN SYS LEG: CPT

## 2025-02-25 NOTE — PROGRESS NOTES
2 layer compression wrap applied to LLE.    Patient reports that after her appointment on 02/28/25 she will be moving to [place], [place]. Requested that ARIANNA Lamb place a referral to wound clinic in [place], [place] for continued care for LLE wound.

## 2025-02-25 NOTE — PATIENT INSTRUCTIONS
-Keep dressings clean and dry. Change dressings if they become over saturated, soiled or fall off.     -On the days you are not changing your dressings, avoid getting the dressings wet. It is not harmful to get your wound wet when you are washing your wound before applying a new dressing.    -If you need to change your dressings at home: Wash your wound with normal saline, wound cleanser, or unscented soap and water prior to applying your new dressings. Please avoid cleansing with hydrogen peroxide or rubbing alcohol. Hydrogen peroxide and rubbing alcohol are toxic to new tissue and skin cells.    -Avoid prolonged standing or sitting without elevating your legs.    -Remove your compression garments if you have severe pain, severe swelling, numbness, color change, or temperature change in your toes. If you need to remove your compression garments, do so by unrolling them. Do not cut the compression garments off, this is to prevent cutting yourself on accident.    -Should you experience any significant changes in your wound(s), such as signs of infection (increasing redness, swelling, localized heat, increased pain, fever > 101 F, chills) or have any questions regarding your home care instructions, please contact the wound center at [phone]. If after hours, contact your primary care physician or go to the hospital emergency room.     -If you are admitted to any hospital, you will need a new referral to come back to the wound clinic and any scheduled appointments that you already have, may be cancelled.     -If you are 5 or more minutes late for an appointment, we reserve the right to cancel and reschedule that appointment. Additionally, if you are habitually late or not showing (3 late cancellations and/or no shows), we reserve the right to cancel your remaining appointments and it will be your responsibility to obtain a new referral if services are still needed.

## 2025-02-28 ENCOUNTER — OFFICE VISIT (OUTPATIENT)
Dept: SLEEP MEDICINE | Facility: MEDICAL CENTER | Age: 85
End: 2025-02-28
Attending: NURSE PRACTITIONER
Payer: MEDICARE

## 2025-02-28 ENCOUNTER — NON-PROVIDER VISIT (OUTPATIENT)
Dept: WOUND CARE | Facility: MEDICAL CENTER | Age: 85
End: 2025-02-28
Attending: FAMILY MEDICINE
Payer: MEDICARE

## 2025-02-28 VITALS
OXYGEN SATURATION: 95 % | WEIGHT: 174 LBS | HEIGHT: 61 IN | BODY MASS INDEX: 32.85 KG/M2 | HEART RATE: 93 BPM | SYSTOLIC BLOOD PRESSURE: 90 MMHG | RESPIRATION RATE: 16 BRPM | DIASTOLIC BLOOD PRESSURE: 58 MMHG

## 2025-02-28 DIAGNOSIS — G47.39 COMPLEX SLEEP APNEA SYNDROME: ICD-10-CM

## 2025-02-28 DIAGNOSIS — G47.34 NOCTURNAL HYPOXIA: ICD-10-CM

## 2025-02-28 PROCEDURE — 99214 OFFICE O/P EST MOD 30 MIN: CPT | Performed by: NURSE PRACTITIONER

## 2025-02-28 PROCEDURE — 99213 OFFICE O/P EST LOW 20 MIN: CPT | Performed by: NURSE PRACTITIONER

## 2025-02-28 PROCEDURE — 97602 WOUND(S) CARE NON-SELECTIVE: CPT

## 2025-02-28 PROCEDURE — 3074F SYST BP LT 130 MM HG: CPT | Performed by: NURSE PRACTITIONER

## 2025-02-28 PROCEDURE — 3078F DIAST BP <80 MM HG: CPT | Performed by: NURSE PRACTITIONER

## 2025-02-28 ASSESSMENT — FIBROSIS 4 INDEX: FIB4 SCORE: 2.63

## 2025-02-28 NOTE — PROGRESS NOTES
Lifted skin flakes removed with scissors and forceps. Nonselective debridement performed using foam cleanser and washcloth. 2 layer compression wrap placed to left lower extremity. Patient tolerated well. Refer to flowsheets for further details.     Move to [place] delayed by one week. Patient will have assistance removing 2 layer compression wrap, if discharged in wrap.

## 2025-02-28 NOTE — PATIENT INSTRUCTIONS
-Keep your wound dressing clean, dry, and intact.     -Remove your compression wrap/garments if you have severe pain, severe swelling, numbness, color change, or temperature change in your toes. Remove wrap if it becomes wet. If you need to remove your compression wrap, do so by unrolling it. Do not cut the compression wrap off to prevent cutting yourself on accident. Do not trim wrap.  Avoid prolonged standing or sitting without elevating your legs.    -Should you experience any significant changes in your wound(s), such as infection (redness, swelling, localized heat, increased pain, fever > 101 F, chills) or have any questions regarding your home care instructions, please contact the wound center at [phone]. If after hours, contact your primary care physician or go to the hospital emergency room.  If you are admitted to any hospital, you will need a new referral to come back to the wound clinic. Any scheduled appointments that you already have may be cancelled.      -If you are 5 or more minutes late for an appointment, we reserve the right to cancel and reschedule that appointment. Additionally, if you are habitually late or not showing (3 late cancellations and/or no shows), we reserve the right to cancel your remaining appointments and it will be your responsibility to obtain a new referral if services are still needed.

## 2025-03-04 ENCOUNTER — NON-PROVIDER VISIT (OUTPATIENT)
Dept: WOUND CARE | Facility: MEDICAL CENTER | Age: 85
End: 2025-03-04
Attending: FAMILY MEDICINE
Payer: MEDICARE

## 2025-03-04 PROCEDURE — 97602 WOUND(S) CARE NON-SELECTIVE: CPT

## 2025-03-04 NOTE — PATIENT INSTRUCTIONS
- Remove your compression wrap if you have severe pain, severe swelling, numbness, color change, or temperature change in your toes. If you need to remove your compression wrap, do so by unrolling it. Do not cut the compression wrap off to prevent cutting yourself on accident.    - Avoid prolonged standing or sitting without elevating your legs.    - Should you experience any significant changes in your wound(s), such as infection (redness, swelling, localized heat, increased pain, fever > 101 F, chills) or have any questions regarding your home care instructions, please contact the wound center at [phone]. If after hours, contact your primary care physician or go to the hospital emergency room.     - If you are admitted to any hospital, you will need a new referral to come back to the wound clinic and any scheduled appointments that you already have, may be cancelled.    - If you are 5 or more minutes late for an appointment, we reserve the right to cancel and reschedule that appointment. Additionally, if you are habitually late or not showing (3 late cancellations and/or no shows), we reserve the right to cancel your remaining appointments and it will be your responsibility to obtain a new referral if services are still needed.

## 2025-03-04 NOTE — PROGRESS NOTES
Non-Selective Debridement with foam cleanser and a wash cloth to debride non-viable tissue from the wound bed and periwound areas. Soft conforming roll with 2 layer compression wrap applied to left lower extremity. Refer to flow sheet for wound care details. Patient tolerated the procedure well.

## 2025-03-05 ASSESSMENT — PATIENT HEALTH QUESTIONNAIRE - PHQ9: CLINICAL INTERPRETATION OF PHQ2 SCORE: 0

## 2025-03-05 NOTE — PROGRESS NOTES
Chief Complaint   Patient presents with    Follow-Up     SLEEP - ESTABLISHED PT CHART PREP COMPLETED ON 02/21/2025   Setup date: 09/27/2021  Last Office Visit 10/30/2024 with Luis M White    1st Compliance: No     DME: DME:  [facility] / ph [phone] / fx [phone]     PAP/O2/OAT: EPAP (cmH2O)  16.0  IPAP (cmH2O)  20.0  Easy-Breathe  On     Ramp enable  On  Ramp time (min)  5  Start EPAP (cmH2O)  4.0    Wireless Data? YES ResMed       HPI:  Aleshia Crooks is a 85 y.o. year old female here today for follow-up on SHAKA on BiPAP.  Last seen 10/30/2024 by me.  At last visit we reviewed titration study results due to elevated AHI on BiPAP therapy.  Recommendation was made for IVAPS and order was sent to [facility], but patient never received device.  She is moving in 1 week to [place].  Previous sleep study recommended noninvasive ventilator as well as supplemental oxygen bleed in at 2 L/min.  She is moving to a lower elevation, therefore recommendation will be made today to establish ASAP with sleep provider and request records.  Patient will need to be retested at lower elevations due to potential improvement in oxygen saturation as well as potential improvement in sleep apnea.    Current compliance data on BiPAP 20/16 cm/H2O shows 100% use with an average time of 7 hours and 40 minutes and a residual AHI of 53.4 with an obstructive apnea index of 51.7 and no evidence of mask leak.          ROS: As per HPI and otherwise negative if not stated.    Past Medical History:   Diagnosis Date    Anemia     Apnea, sleep     cpap    Atrial fibrillation (HCC)     Breath shortness     Cataract     fede IOL    CKD (chronic kidney disease) stage 4, GFR 15-29 ml/min (HCC)     Congestive heart failure (HCC)     Dental disorder     full dentures    Gasping for breath     Heart attack (HCC) 1992    Followed by Dr. 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    2015    Moderate tricuspid regurgitation 11/16/2022    Pneumonia 2019    Psychiatric problem     Anxiety    Snoring     Thyroid disease        Past Surgical History:   Procedure Laterality Date    GASTROSCOPY W/PUSH ENTERSCOPY N/A 10/26/2024    Procedure: GASTROSCOPY, WITH PUSH ENTEROSCOPY;  Surgeon: Niki Spencer M.D.;  Location: SURGERY [facility];  Service: Gastroenterology    CAPSULE ENDOSCOPY ADMINISTRATION  10/24/2024    Procedure: ADMINISTRATION, CAPSULE, FOR CAPSULE ENDOSCOPY;  Surgeon: Shukri Lion M.D.;  Location: SURGERY SAME DAY [facility];  Service: Gastroenterology    CAPSULE ENDOSCOPY RETRIEVAL  10/24/2024    Procedure: RETRIEVAL, ENDOSCOPY CAPSULE;  Surgeon: Shukri Lion M.D.;  Location: SURGERY SAME DAY [facility];  Service: Gastroenterology    CO UPPER GI ENDOSCOPY,DIAGNOSIS N/A 10/23/2024    Procedure: GASTROSCOPY;  Surgeon: Shukri Lion M.D.;  Location: SURGERY SAME DAY [facility];  Service: Gastroenterology    TRANSCATHETER AORTIC VALVE REPLACEMENT Bilateral 11/6/2023    Procedure: TRANSCATHETER AORTIC VALVE REPLACEMENT;  Surgeon: Cesar Gray M.D.;  [place];  Service: Cardiac    ECHOCARDIOGRAM, TRANSESOPHAGEAL, INTRAOPERATIVE N/A 11/6/2023    Procedure: ECHOCARDIOGRAM, TRANSESOPHAGEAL, INTRAOPERATIVE;  Surgeon: Cesar Gray M.D.;  Location: SURGERY [facility];  Service: Cardiac    TRANSCATHETER MITRAL VALVE REPAIR N/A 04/17/2023    Procedure: TRANSCATHETER MITRAL VALVE PROCEDURE;  Surgeon: Cesar Gray M.D.;  [place];  Service: Cardiac    ECHOCARDIOGRAM, TRANSESOPHAGEAL, INTRAOPERATIVE N/A 04/17/2023    Procedure: ECHOCARDIOGRAM, TRANSESOPHAGEAL, INTRAOPERATIVE;  Surgeon: Cesar Gray M.D.;  Location: [facility]  [place];  Service: Cardiac    [place] COLONOSCOPY-FLEXIBLE N/A 01/24/2023    Procedure: COLONOSCOPY;  Surgeon: Amy Zarate M.D.;  Location: SURGERY SAME DAY [facility];  Service: Gastroenterology    [place] UPPER GI ENDOSCOPY,DIAGNOSIS N/A 01/24/2023    Procedure: GASTROSCOPY;  Surgeon: Amy Zarate M.D.;  Location: SURGERY SAME DAY [facility];  Service: Gastroenterology    [place] UPPER GI ENDOSCOPY,BIOPSY N/A 01/24/2023    Procedure: GASTROSCOPY, WITH BIOPSY;  Surgeon: Amy Zarate M.D.;  Location: SURGERY SAME DAY [facility];  Service: Gastroenterology    CHOLECYSTECTOMY      EYE SURGERY Bilateral     cataracts    MULTIPLE CORONARY ARTERY BYPASS      1 bypass    THYROIDECTOMY TOTAL      TONSILLECTOMY         Family History   Problem Relation Age of Onset    Cancer Mother         cancer, smoker    Stroke Maternal Grandmother     Other Other         Crohn    Kidney Disease Other         kidney transplant       Allergies as of 02/28/2025 - Reviewed 02/28/2025   Allergen Reaction Noted    Morphine Vomiting 11/13/2018    Sulfa drugs Hives 04/24/2024        Vitals:  BP 90/58 (BP Location: Left arm, Patient Position: Sitting, BP Cuff Size: Adult)   Pulse 93   Resp 16   Ht 1.549 m (5' 1\")   Wt 78.9 kg (174 lb)   SpO2 95%     Current medications as of today   Current Outpatient Medications   Medication Sig Dispense Refill    furosemide (LASIX) 20 MG Tab Take 1 Tablet by mouth every morning. 90 Tablet 2    losartan (COZAAR) 25 MG Tab Take 1 Tablet by mouth every day. 90 Tablet 2    omeprazole (PRILOSEC) 20 MG delayed-release capsule Take 1 Capsule by mouth every day. 90 Capsule 3    atorvastatin (LIPITOR) 40 MG Tab Take 1 Tablet by mouth every evening. 30 Tablet 0    LEVOXYL 200 MCG Tab Take 1 Tablet by mouth every morning on an empty stomach. 90 Tablet 3    acetaminophen (TYLENOL) 500 MG Tab Take 1,000 mg by mouth every 6 hours as needed for Mild Pain.      Cholecalciferol (VITAMIN D) 125 MCG (5000 UT) Cap Take 5,000 Units " by mouth every Monday, Wednesday, and Friday.      Vitamins-Lipotropics (MULTI-VITAMIN HP/MINERALS) Cap Take 1 Capsule by mouth every evening.       No current facility-administered medications for this visit.         Physical Exam:   Gen:           Alert and oriented, No apparent distress. Mood and affect appropriate, normal interaction with examiner.  Eyes:          PERRL, EOM intact, sclere white, conjunctive moist.  Ears:          Not examined.   Hearing:     Grossly intact.  Nose:          Normal, no lesions or deformities.  Dentition:    Good dentition.  Oropharynx:   Tongue normal, posterior pharynx without erythema or exudate.  Neck:        Supple, trachea midline, no masses.  Respiratory Effort: No intercostal retractions or use of accessory muscles.   Lung Auscultation:      Clear to auscultation bilaterally; no rales, rhonchi or wheezing.  CV:            Regular rate and rhythm. No murmurs, rubs or gallops.  Abd:           Not examined.   Lymphadenopathy: Not examined.  Gait and Station: Normal.  Digits and Nails: No clubbing, cyanosis, petechiae, or nodes.   Cranial Nerves: II-XII grossly intact.  Skin:        No rashes, lesions or ulcers noted.               Ext:           No cyanosis or edema.      Assessment:  1. Complex sleep apnea syndrome        2. Nocturnal hypoxia            Plan:  t last visit we reviewed titration study results due to elevated AHI on BiPAP therapy.  Recommendation was made for IVAPS and order was sent to [facility], but patient never received device.  She is moving in 1 week to [place].  Previous sleep study recommended noninvasive ventilator as well as supplemental oxygen bleed in at 2 L/min.  She is moving to a lower elevation, therefore recommendation will be made today to establish ASAP with sleep provider and request records.  Patient will need to be retested at lower elevations due to potential improvement in oxygen saturation as well as potential improvement in sleep  apnea.    Current compliance data on BiPAP 20/16 cm/H2O shows 100% use with an average time of 7 hours and 40 minutes and a residual AHI of 53.4 with an obstructive apnea index of 51.7 and no evidence of mask leak.  Recommend retesting at lower elevation due to patient's at higher elevation has been prone to more incidences of central sleep apnea and hypoxia.  Referral will be placed and sent to patient's preferred provider based on a [employer] message we will received from patient when she gets settled in.    Please note that this dictation was created using voice recognition software. I have made every reasonable attempt to correct obvious errors, but it is possible there are errors of grammar and possibly content that I did not discover before finalizing the note.

## 2025-03-07 ENCOUNTER — NON-PROVIDER VISIT (OUTPATIENT)
Dept: WOUND CARE | Facility: MEDICAL CENTER | Age: 85
End: 2025-03-07
Attending: FAMILY MEDICINE
Payer: MEDICARE

## 2025-03-07 PROCEDURE — 97602 WOUND(S) CARE NON-SELECTIVE: CPT

## 2025-03-07 NOTE — PATIENT INSTRUCTIONS
-Keep your wound dressing clean, dry, and intact. Only change dressing if it's over saturated, soiled or falls off.     -Should you experience any significant changes in your wound(s), such as infection (redness, swelling, localized heat, increased pain, fever > 101 F, chills) or have any questions regarding your home care instructions, please contact the wound center at [phone]. If after hours, contact your primary care physician or go to the hospital emergency room.

## 2025-03-07 NOTE — WOUND TEAM
Non selective debridement with foam washcloth and no rinse foam cleanser to remove non viable tissue from lower extremity open and weeping areas. Patient tolerated well.     No follow ups scheduled, patient states that she is moving to [place] this weekend, has appt scheduled for this Monday 3/10/25 at a wound care clinic in [place]. 2 layer wrap not applied this visit, used patient's own solaris ready wrap.

## 2025-03-21 DIAGNOSIS — I25.10 CORONARY ARTERY DISEASE INVOLVING NATIVE CORONARY ARTERY OF NATIVE HEART WITHOUT ANGINA PECTORIS: ICD-10-CM

## 2025-04-22 NOTE — TELEPHONE ENCOUNTER
[facility]    Received anticoagulation referral.    PCP: Dr Paredes  INS: Medicare  Preferred location: [facility] be [facility].     LM for pt to call clinic and establish care.   Received referral for warfarin, uncertain if this is a new start or continuation of therapy for her.     Rudy Lowe, PharmD      nausea, vomiting, diarrhea, RUQ pain since Sunday went to [facility] today sent to r/o gallstones

## (undated) DEVICE — ELECTRODE DUAL RETURN W/ CORD - (50/PK)

## (undated) DEVICE — TUBING CLEARLINK DUO-VENT - C-FLO (48EA/CA)

## (undated) DEVICE — SET EXTENSION WITH 2 PORTS (48EA/CA) ***PART #2C8610 IS A SUBSTITUTE*****

## (undated) DEVICE — TUBE CONNECTING SUCTION - CLEAR PLASTIC STERILE 72 IN (50EA/CA)

## (undated) DEVICE — TOWEL STOP TIMEOUT SAFETY FLAG (40EA/CA)

## (undated) DEVICE — SUCTION INSTRUMENT YANKAUER BULBOUS TIP W/O VENT (50EA/CA)

## (undated) DEVICE — DRAPE CLEAR W/ELASTIC BAND RAD CARM 40 X40" (20EA/CA)"

## (undated) DEVICE — KIT CUSTOM PROCEDURE SINGLE FOR ENDO (15/CA)

## (undated) DEVICE — STOPCOCK MALE 4-WAY - (50/CA)

## (undated) DEVICE — WATER IRRIGATION STERILE 1000ML (12EA/CA)

## (undated) DEVICE — GLOVE BIOGEL SZ 7 SURGICAL PF LTX - (50PR/BX 4BX/CA)

## (undated) DEVICE — COVER LIGHT HANDLE ALC PLUS DISP (18EA/BX)

## (undated) DEVICE — BLADE SURGICAL #11 - (50/BX)

## (undated) DEVICE — GLOVE BIOGEL SZ 8 SURGICAL PF LTX - (50PR/BX 4BX/CA)

## (undated) DEVICE — IV TUBING HI-FLO RATE W/CLAMP (50/CA)

## (undated) DEVICE — MICRODRIP PRIMARY VENTED 60 (48EA/CA) THIS WAS PART #2C8428 WHICH WAS DISCONTINUED

## (undated) DEVICE — CHLORAPREP 26 ML APPLICATOR - ORANGE TINT(25/CA)

## (undated) DEVICE — CANISTER SUCTION RIGID RED 1500CC (40EA/CA)

## (undated) DEVICE — BLANKET UNDERBODY FULL ACCES - (5/CA)

## (undated) DEVICE — KIT RADIAL ARTERY 20GA W/MAX BARRIER AND BIOPATCH  (5EA/CA) #10740 IS FOR THE SET RADIAL ARTERIAL

## (undated) DEVICE — GLOVE BIOGEL INDICATOR SZ 8 SURGICAL PF LTX - (50/BX 4BX/CA)

## (undated) DEVICE — CANNULA O2 COMFORT SOFT EAR ADULT 7 FT TUBING (50/CA)

## (undated) DEVICE — LACTATED RINGERS INJ 1000 ML - (14EA/CA 60CA/PF)

## (undated) DEVICE — GUIDEWIRE STARTER STRAIGHT FIXED CORE .035 150CM 4 STRAIGHT PTFE/HEPARIN COATED (10/BX)

## (undated) DEVICE — CONTAINER, SPECIMEN, STERILE

## (undated) DEVICE — MASK PANORAMIC OXYGEN PRO2 (30EA/CA)

## (undated) DEVICE — GLOVE BIOGEL SZ 7.5 SURGICAL PF LTX - (50PR/BX 4BX/CA)

## (undated) DEVICE — GLOVE BIOGEL PI ORTHO SZ 7.5 PF LF (40PR/BX)

## (undated) DEVICE — FORCEP RADIAL JAW 4 STANDARD CAPACITY W/NEEDLE 240CM (40EA/BX)

## (undated) DEVICE — DRAPE MAYO STAND - (30/CA)

## (undated) DEVICE — SODIUM CHL IRRIGATION 0.9% 1000ML (12EA/CA)

## (undated) DEVICE — SYR ANGIO CNRST INJ HI-PRS 3W 65 - (10EA/CA)"

## (undated) DEVICE — ELECTRODE 850 FOAM ADHESIVE - HYDROGEL RADIOTRNSPRNT (50/PK)

## (undated) DEVICE — TUBING PRSS MNTR 48IN NAMIC - (25/CA)

## (undated) DEVICE — BALLOON 7.0X60 75 CM MUSTANG

## (undated) DEVICE — COVER FOOT UNIVERSAL DISP. - (25EA/CA)

## (undated) DEVICE — GLOVE BIOGEL PI INDICATOR SZ 7.0 SURGICAL PF LF - (50/BX 4BX/CA)

## (undated) DEVICE — CANISTER SUCTION 3000ML MECHANICAL FILTER AUTO SHUTOFF MEDI-VAC NONSTERILE LF DISP (40EA/CA)

## (undated) DEVICE — BUTTON ENDOSCOPY DISPOSABLE

## (undated) DEVICE — ELECTRODE RADIOLUCENT LF 3PK - RED DOT (3/PK 200PK/CA)

## (undated) DEVICE — INTRODUCER CATHETER  DILATOR PROTRUDING 8FR 2.5CM (10EA/BX)

## (undated) DEVICE — ARM BAND RADIAL TR BAND (5EA/BX)

## (undated) DEVICE — SET LEADWIRE 5 LEAD BEDSIDE DISPOSABLE ECG (1SET OF 5/EA)

## (undated) DEVICE — DEVICE INFLATION ATRION NOVALFEX TRANSFEMORAL SYSTEM (1EA)

## (undated) DEVICE — DECANTER FLD BLS - (50/CA)

## (undated) DEVICE — Device

## (undated) DEVICE — SET FLUID WARMING STANDARD FLOW - (10/CA)

## (undated) DEVICE — DEVICE INFLATION DIGITAL BLUE DIAMOND (5EA/BX)

## (undated) DEVICE — CABLE TEMPORARY PACING

## (undated) DEVICE — SENSOR OXIMETER ADULT SPO2 RD SET (20EA/BX)

## (undated) DEVICE — CANISTER SUCTION 3000ML MECHANICAL FILTER AUTO SHUTOFF MEDI-VAC NONSTERILE LF DISP  (40EA/CA)

## (undated) DEVICE — BAG RESUSCITATION DISPOSABLE - WITH MASK (10 EA/CA)

## (undated) DEVICE — FILM CASSETTE ENDO

## (undated) DEVICE — PORT AUXILLARY WATER (50EA/BX)

## (undated) DEVICE — TUBE SUCTION YANKAUER 1/4 X 6FT (50EA/CA)"

## (undated) DEVICE — WIRE GUIDE LUNDQST.035X180 - TSMG-35-180-4-LES ORDER BY BOX (5EA/BX)

## (undated) DEVICE — TOWELS CLOTH SURGICAL - (4/PK 20PK/CA)

## (undated) DEVICE — NEPTUNE 4 PORT MANIFOLD - (20/PK)

## (undated) DEVICE — KIT VASCULAR DILATOR

## (undated) DEVICE — GLOVE BIOGEL SZ 8.5 SURGICAL PF LTX - (50PR/BX 4BX/CA)

## (undated) DEVICE — CAPSULE VIDEO PILLCAM (10EA/BX)

## (undated) DEVICE — BITE BLOCK, DISP.

## (undated) DEVICE — CRIMPER CATHETER EDWARDS DISPOSABLE (1EA)

## (undated) DEVICE — PACK TAVR (3EA/CA)

## (undated) DEVICE — MASK OXYGEN VNYL ADLT MED CONC WITH 7 FOOT TUBING - (50EA/CA)

## (undated) DEVICE — BLOCK BITE MAXI DENTAL RETENTION RIM (100EA/BX)

## (undated) DEVICE — KIT ULTRASND COVER - (20EA/CA)

## (undated) DEVICE — ELECTRODE RADIOLUCNT SOLID GEL DEFIB PADS (12EA/CA)

## (undated) DEVICE — SUTURE 0 SILK CT-1 (36PK/BX)

## (undated) DEVICE — COVER LIGHT HANDLE FLEXIBLE - SOFT (2EA/PK 80PK/CA)

## (undated) DEVICE — SUTURE DEVICE CLOSURE REPAIR SYSTEM PERCLOSE PROSTYLE (10EA/BX)

## (undated) DEVICE — CATHETER PIGTAIL 6FR 145 (5EA/BX)

## (undated) DEVICE — GUIDEWIRE 1.5MM J TIP GLIDEWIRE 180 CM BABY J

## (undated) DEVICE — GLOVE BIOGEL PI ORTHO SZ 8 PF LF (40PR/BX)

## (undated) DEVICE — KIT NRG RF TRANSSEPTAL WITH STANDARD CURVE NEEDLE

## (undated) DEVICE — KIT PROCEDURE DOUBLE ENDO ONLY (5/CA)

## (undated) DEVICE — KIT RETROFIT PROBE COVERS (24EA/EA)

## (undated) DEVICE — CANNULA W/ SUPPLY TUBING O2 - (50/CA)

## (undated) DEVICE — CATHETER 6FR AL1 100CM (5/BX)

## (undated) DEVICE — STOPCOCK IV 400 PSI 3W ROT (50EA/BX)

## (undated) DEVICE — SUTURE  0 ETHIBOND CT-1 30 IN (36PK/BX)

## (undated) DEVICE — WIRE GUIDE AES .035 260CM WITH 3MM J TIP"

## (undated) DEVICE — SODIUM CHL. INJ. 0.9% 500ML (24EA/CA 50CA/PF)

## (undated) DEVICE — INTRODUCER SHEATH 6FR 2.5CM - DILATOR PROTRUDING (10/BX)

## (undated) DEVICE — TUBE E-T HI-LO CUFF 6.5MM (10EA/BX)

## (undated) DEVICE — GLIDESHEATH SLENDER NITINOL KIT .021 GW 6FR 10CM SINGLE WALL

## (undated) DEVICE — SET BIFURCATED BLOOD - (48EA/CS)

## (undated) DEVICE — SYSTEM DELIVERY COMMANDER TAVR KIT 26MM COMPONENT (1EA)